# Patient Record
Sex: MALE | Race: WHITE | Employment: OTHER | URBAN - METROPOLITAN AREA
[De-identification: names, ages, dates, MRNs, and addresses within clinical notes are randomized per-mention and may not be internally consistent; named-entity substitution may affect disease eponyms.]

---

## 2017-09-29 ENCOUNTER — HOSPITAL ENCOUNTER (OUTPATIENT)
Dept: VASCULAR SURGERY | Age: 77
Discharge: HOME | End: 2017-09-29
Attending: OTOLARYNGOLOGY
Payer: MEDICARE

## 2017-09-29 DIAGNOSIS — R42 DIZZINESS AND GIDDINESS: ICD-10-CM

## 2017-09-29 DIAGNOSIS — Z78.9 NONSMOKER: ICD-10-CM

## 2017-09-29 DIAGNOSIS — H90.3 SENSORINEURAL HEARING LOSS OF COMBINED TYPES, BILATERAL: ICD-10-CM

## 2017-09-29 PROCEDURE — 93880 EXTRACRANIAL BILAT STUDY: CPT

## 2017-09-29 NOTE — PROCEDURES
[facility]  *** FINAL REPORT ***    Name: Rolla Gosselin  MRN: [id number]    Outpatient  : 1940  HIS Order #: [id number]  [id number] [facility] Visit #: [id number]  Date: 29 Sep 2017    TYPE OF TEST: Cerebrovascular Duplex    REASON FOR TEST  Dizziness/vertigo    Right Carotid:-             Proximal               Mid                 Distal  cm/s  Systolic  Diastolic  Systolic  Diastolic  Systolic  Diastolic  CCA:     17.8      14.0                            75.0      16.0  Bulb:  ECA:     74.0       8.0  ICA:     51.0      14.0       64.0      17.0       59.0      18.0  ICA/CCA:  0.7       0.9    ICA Stenosis: <50%    Right Vertebral:-  Finding: Antegrade  Sys:       40.0  Glroia:        8.0    Right Subclavian: Normal    Left Carotid:-            Proximal                Mid                 Distal  cm/s  Systolic  Diastolic  Systolic  Diastolic  Systolic  Diastolic  CCA:    912.2      17.0                            82.0      18.0  Bulb:  ECA:     78.0       6.0  ICA:     55.0      11.0       61.0      17.0       67.0      18.0  ICA/CCA:  0.7       0.6    ICA Stenosis: <50%    Left Vertebral:-  Finding: Antegrade  Sys:       45.0  Gloria:       10.0    Left Subclavian: Normal    INTERPRETATION/FINDINGS  PROCEDURE:  Carotid Duplex Examination using B-mode, color and  spectral Doppler of the extracranial cerebrovascular arteries. 1. Bilateral <50% stenosis of the internal carotid arteries. 2. No significant stenosis in the external carotid arteries  bilaterally. 3. Antegrade flow in both vertebral arteries. 4. Normal flow in both subclavian arteries. Plaque Morphology:  1. Heterogeneous plaque in the bulb and right ICA. 2. Heterogeneous plaque in the bulb and left ICA. ADDITIONAL COMMENTS    I have personally reviewed the data relevant to the interpretation of  this  study. TECHNOLOGIST: Heber Benoit RVT, RDMS  Signed: 2017 01:44 PM    PHYSICIAN: Sathya Beck MD  Signed: 10/02/2017 02:37 PM

## 2017-09-29 NOTE — PROGRESS NOTES
[facility]  Preliminary Report:  Carotid Duplex Scan    Right:  Minimal plaque noted in the right carotid system. Right ICA velocities suggest less than 50% diameter reduction. Right vertebral artery flow is antegrade. Left:  Mild plaque noted in the left carotid system. Left ICA velocities suggest less than 50% diameter reduction. Left vertebral artery flow is antegrade. Final report to follow.

## 2017-11-22 ENCOUNTER — HOSPITAL ENCOUNTER (EMERGENCY)
Age: 77
Discharge: HOME | End: 2017-11-22
Attending: EMERGENCY MEDICINE | Admitting: EMERGENCY MEDICINE
Payer: MEDICARE

## 2017-11-22 VITALS
DIASTOLIC BLOOD PRESSURE: 89 MMHG | WEIGHT: 180.78 LBS | HEIGHT: 66 IN | OXYGEN SATURATION: 98 % | BODY MASS INDEX: 29.05 KG/M2 | SYSTOLIC BLOOD PRESSURE: 152 MMHG | RESPIRATION RATE: 16 BRPM | TEMPERATURE: 98.2 F
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
## 2017-11-22 DIAGNOSIS — T07.XXXA MULTIPLE LACERATIONS: Primary | ICD-10-CM

## 2017-11-22 PROCEDURE — 74011250636 HC RX REV CODE- 250/636: Performed by: PHYSICIAN ASSISTANT

## 2017-11-22 PROCEDURE — 90715 TDAP VACCINE 7 YRS/> IM: CPT | Performed by: PHYSICIAN ASSISTANT

## 2017-11-22 PROCEDURE — 90471 IMMUNIZATION ADMIN: CPT

## 2017-11-22 PROCEDURE — 99283 EMERGENCY DEPT VISIT LOW MDM: CPT

## 2017-11-22 PROCEDURE — 77030018836 HC SOL IRR NACL ICUM -A

## 2017-11-22 PROCEDURE — 77030002916 HC SUT ETHLN J&J -A

## 2017-11-22 PROCEDURE — 74011250637 HC RX REV CODE- 250/637: Performed by: PHYSICIAN ASSISTANT

## 2017-11-22 PROCEDURE — 75810000293 HC SIMP/SUPERF WND  RPR

## 2017-11-22 RX ORDER — HYDROCODONE BITARTRATE AND ACETAMINOPHEN 5; 325 MG/1; MG/1
1 TABLET ORAL
Qty: 10 TAB | Refills: 0 | Status: SHIPPED | OUTPATIENT
Start: 2017-11-22 | End: 2018-05-31 | Stop reason: CLARIF

## 2017-11-22 RX ORDER — AMOXICILLIN AND CLAVULANATE POTASSIUM 875; 125 MG/1; MG/1
1 TABLET, FILM COATED ORAL 2 TIMES DAILY
Qty: 20 TAB | Refills: 0 | Status: SHIPPED | OUTPATIENT
Start: 2017-11-22 | End: 2018-05-31 | Stop reason: CLARIF

## 2017-11-22 RX ORDER — AMOXICILLIN AND CLAVULANATE POTASSIUM 875; 125 MG/1; MG/1
1 TABLET, FILM COATED ORAL
Status: COMPLETED | OUTPATIENT
Start: 2017-11-22 | End: 2017-11-22

## 2017-11-22 RX ADMIN — AMOXICILLIN AND CLAVULANATE POTASSIUM 1 TABLET: 875; 125 TABLET, FILM COATED ORAL at 10:30

## 2017-11-22 RX ADMIN — TETANUS TOXOID, REDUCED DIPHTHERIA TOXOID AND ACELLULAR PERTUSSIS VACCINE, ADSORBED 0.5 ML: 5; 2.5; 8; 8; 2.5 SUSPENSION INTRAMUSCULAR at 10:30

## 2017-11-22 NOTE — DISCHARGE INSTRUCTIONS
Thank you for allowing us to provide you with care today. We hope we addressed all of your concerns and needs. We strive to provide excellent quality care in the Emergency Department. Please rate us as excellent, as anything less than excellent does not meet our expectations. If you feel that you have not received excellent quality care or timely care, please ask to speak to the nurse manager. Please choose us in the future for your continued health care needs. The exam and treatment you received in the Emergency Department were for an urgent problem and are not intended as complete care. It is important that you follow-up with a doctor, nurse practitioner, or [place] [id number] assistant to: (1) confirm your diagnosis, (2) re-evaluation of changes in your illness and treatment, and (3) for ongoing care. If your symptoms become worse or you do not improve as expected and you are unable to reach your usual health care provider, you should return to the Emergency Department. We are available 24 hours a day. Take this sheet with you when you go to your follow-up visit. If you have any problem arranging the follow-up visit, contact the Emergency Department immediately. Make an appointment with your Primary Care doctor for follow up of this visit. Return to the ER if you are unable to be seen in the time recommended on your discharge instructions.

## 2017-11-22 NOTE — ED TRIAGE NOTES
Pt arrives after getting bit by his own dogs while trying to break them up. Pt states he was at home and had unknown visitor at front door. Pt states his dogs (Pitbull and American Bull Dog) went towards front door and began to fight each other Pt states he attempted to separate dogs and states he believes his Pitbull cause injuries. Pt arrives with multiple wounds to bilateral arms. Pt states his Tetanus shot \"couple years ago\" and states that both his dogs have had rabies vaccine. Pt alert oriented x 4 Skin warm dry intact     Pts wounds cleansed with saline upon arrival to tx room. Pt updated on plan with pending evaluation by PA.

## 2017-11-22 NOTE — ED NOTES
Spoke with [address] regarding pt complaint Spoke with [address] [place] [address]. Advised they would have  contact pt.

## 2017-11-22 NOTE — ED PROVIDER NOTES
[facility] [address]  EMERGENCY DEPARTMENT HISTORY AND PHYSICAL EXAM         Date of Service: 11/22/2017   Patient Name: Gisele Mckeon   YOB: 1940  Medical Record Number: [id number]    History of Presenting Illness     Chief Complaint   Patient presents with    Dog Bite     trying to break up his dogs fighting, yancy arm lacerations        History Provided By:  patient    Additional History:   Gisele Mckeon is a 69 y.o. male who presents ambulatory to the ED with cc of dog bite with multiple wounds to bilateral hands and forearms. Pt states his dogs were startled when a visitor came to his front door, and when he attempted to grab them he was bitten. Bleeding was controlled with a rag before coming to the ED. Pt notes his dogs are up to date with their rabies vaccinations, but he is unsure when he last received a tetanus vaccination. He denies any numbness or weakness in his hands. Social Hx: former Tobacco, - EtOH, - Illicit Drugs    There are no other complaints, changes or physical findings at this time.     Primary Care Provider: Ronney Schlatter, MD     Past History     Past Medical History:   Past Medical History:   Diagnosis Date    Arthritis     BPH (benign prostatic hyperplasia)     Chronic obstructive pulmonary disease ([facility] [address]     Elevated LFT's     Essential hypertension 9/24/01    GERD (gastroesophageal reflux disease)     Ill-defined condition 12/02/2016    faint    Liver disease     \"fatty liver\" as per patient    Orthostatic hypotension 9/24/01    PVC's 9/24/01    Sleep apnea     Syncope 9/24/01    secondary to venous insuffiency         Past Surgical History:   Past Surgical History:   Procedure Laterality Date    COLONOSCOPY N/A 12/13/2016    COLONOSCOPY performed by Alysa Damon MD at [facility] ENDOSCOPY    ECHO 2D ADULT  5/24/12    EF 60 - 65%; mild concentric hypertrophy; pulmonary systolic artery pressure upper limits normal    HX APPENDECTOMY 1985        Family History:   Family History   Problem Relation Age of Onset    Stroke Father     Heart Disease Father         Social History:   Social History   Substance Use Topics    Smoking status: Former Smoker     Packs/day: 3.50     Quit date: 12/22/1980    Smokeless tobacco: Never Used    Alcohol use No      Comment: no alcohol since 1999        Allergies: Allergies   Allergen Reactions    Benicar [Olmesartan] Unknown (comments)     Pt states he has found out that this is not a medication allergy.  Demerol [Meperidine] Unknown (comments)     Causes unconsciousness        Review of Systems   Review of Systems   Constitutional: Negative for fatigue and fever. HENT: Negative for congestion, ear pain and rhinorrhea. Eyes: Negative for pain and redness. Respiratory: Negative for cough and wheezing. Cardiovascular: Negative for chest pain and palpitations. Gastrointestinal: Negative for abdominal pain, nausea and vomiting. Genitourinary: Negative for dysuria, frequency and urgency. Musculoskeletal: Negative for back pain, neck pain and neck stiffness. Skin: Positive for wound. Negative for rash. Neurological: Negative for weakness, light-headedness, numbness and headaches. Physical Exam  Physical Exam   Constitutional: He is oriented to person, place, and time. He appears well-developed and well-nourished. Non-toxic appearance. No distress. HENT:   Head: Normocephalic and atraumatic. Head is without right periorbital erythema and without left periorbital erythema. Right Ear: External ear normal.   Left Ear: External ear normal.   Nose: Nose normal.   Mouth/Throat: Uvula is midline. No trismus in the jaw. Eyes: Conjunctivae and EOM are normal. Pupils are equal, round, and reactive to light. No scleral icterus. Neck: Normal range of motion and full passive range of motion without pain. Cardiovascular: Normal rate, regular rhythm and normal heart sounds. Pulmonary/Chest: Effort normal and breath sounds normal. No accessory muscle usage. No tachypnea. No respiratory distress. He has no decreased breath sounds. He has no wheezes. Abdominal: Soft. There is no tenderness. There is no rigidity and no guarding. Musculoskeletal: Normal range of motion. LEFT FOREARM:  3cm jagged laceration to ulnar aspect left forearm mid-shaft. FAROM of all joints above and below laceration. RIGHT HAND:  3cm jagged laceration to dorsum of right hand  2.5cm jagged irregular laceration to dorsum of right middle finger distal to MCPJ  2.5 cm flap laceration to dorsum of right index finger at radial aspect of PIPJ. FAROM of all joints above and below lacerations. Neurological: He is alert and oriented to person, place, and time. He is not disoriented. No cranial nerve deficit or sensory deficit. GCS eye subscore is 4. GCS verbal subscore is 5. GCS motor subscore is 6. Skin: No rash noted. Psychiatric: He has a normal mood and affect. His speech is normal.   Nursing note and vitals reviewed. Medical Decision Making   I am the first provider for this patient. I reviewed the vital signs, available nursing notes, past medical history, past surgical history, family history and social history. Old Medical Records: Old medical records. Provider Notes:   DDx: dog bite, laceration, tetanus update     ED Course:  10:20 AM   Initial assessment performed. The patients presenting problems have been discussed, and they are in agreement with the care plan formulated and outlined with them. I have encouraged them to ask questions as they arise throughout their visit. Procedures:   Procedure Note - #1 Laceration Repair:  10:52 AM  Procedure by Bhavna Nieves. Complexity: Simple  3cm jagged laceration to ulnar aspect left forearm mid-shaft  was irrigated copiously with NS under jet lavage, prepped with Hibiclens and draped in a sterile fashion.   The area was anesthetized with 5 mLs of  Lidocaine 1% with epinephrine via local infiltration. The wound was explored with the following results: No foreign bodies found. The wound was repaired with One layer suture closure: Skin Layer:  3 sutures placed, stitch type:simple interrupted, suture: 4-0 ethilon. .  The wound was closed with good hemostasis and loose approximation. Sterile dressing applied. Estimated blood loss: None  The procedure took 1-15 minutes, and pt tolerated well. Procedure Note - #2 Laceration Repair:  11:07 AM  Procedure by Bhavna Nieves. Complexity: Simple  3cm jagged laceration to dorsum of right hand was irrigated copiously with NS under jet lavage, prepped with Hibiclens and draped in a sterile fashion. The area was anesthetized with 3 mLs of  Lidocaine 1% with epinephrine via local infiltration. The wound was explored with the following results: No foreign bodies found. The wound was repaired with One layer suture closure: Skin Layer:  3 sutures placed, stitch type:simple interrupted, suture: 4-0 ethilon. .  The wound was closed with good hemostasis and loose approximation. Sterile dressing applied. Estimated blood loss: None  The procedure took 1-15 minutes, and pt tolerated well. Procedure Note - #3 Laceration Repair:  11:22 AM  Procedure by Bhavna Nieves. Complexity: Simple  2.5cm jagged irregular laceration to dorsum of right middle finger distal to MCPJ  was irrigated copiously with NS under jet lavage, prepped with Hibiclens and draped in a sterile fashion. The area was anesthetized with 3 mLs of  Lidocaine 1% with epinephrine via local infiltration. The wound was explored with the following results: No foreign bodies found. The wound was repaired with One layer suture closure: Skin Layer:  2 sutures placed, stitch type:simple interrupted, suture: 4-0 ethilon. .  The wound was closed with good hemostasis and loose approximation. Sterile dressing applied.   Estimated blood loss: None  The procedure took 1-15 minutes, and pt tolerated well. Procedure Note - #4 Laceration Repair:  11:37 AM  Procedure by Bhavna Nieves. Complexity: Simple  2.5 cm flap laceration to dorsum of right index finger at radial aspect of PIPJ was irrigated copiously with NS under jet lavage, prepped with Hibiclens and draped in a sterile fashion. The area was anesthetized with 3 mLs of  Lidocaine 1% with epinephrine via local infiltration. The wound was explored with the following results: No foreign bodies found. The wound was repaired with One layer suture closure: Skin Layer:  2 sutures placed, stitch type:simple interrupted, suture: 4-0 ethilon. .  The wound was closed with good hemostasis and loose approximation. Sterile dressing applied. Estimated blood loss: None  The procedure took 1-15 minutes, and pt tolerated well. Vital Signs-Reviewed the patient's vital signs. Patient Vitals for the past 12 hrs:   Temp Resp BP SpO2   11/22/17 0956 98.2 °F (36.8 °C) 16 152/89 98 %       Medications Given in the ED:  Medications   amoxicillin-clavulanate (AUGMENTIN) 875-125 mg per tablet 1 Tab (1 Tab Oral Given 11/22/17 1030)   diph,Pertuss(AC),Tet Vac-PF (BOOSTRIX) suspension 0.5 mL (0.5 mL IntraMUSCular Given 11/22/17 1030)       Diagnosis:  Clinical Impression:   1. Multiple lacerations    2.  Dog bite, initial encounter         Plan:  1:   Follow-up Information     Follow up With Details Comments Contact Info    Claudean Najjar, MD Schedule an appointment as soon as possible for a visit PRIMARY CARE: have stitches removed in 7 - 10 days [address] [place] [address]  [phone]            2:   Discharge Medication List as of 11/22/2017 11:40 AM      START taking these medications    Details   amoxicillin-clavulanate (AUGMENTIN) 875-125 mg per tablet Take 1 Tab by mouth two (2) times a day., Print, Disp-20 Tab, R-0      HYDROcodone-acetaminophen (NORCO) 5-325 mg per tablet Take 1 Tab by mouth every four (4) hours as needed for Pain. Max Daily Amount: 6 Tabs., Print, Disp-10 Tab, R-0         CONTINUE these medications which have NOT CHANGED    Details   predniSONE (DELTASONE) 10 mg tablet Take 10 mg by mouth daily (with breakfast). , Historical Med      lisinopril (PRINIVIL, ZESTRIL) 10 mg tablet Take 10 mg by mouth two (2) times a day., Historical Med      carvedilol (COREG) 12.5 mg tablet Take 12.5 mg by mouth two (2) times daily (with meals). , Historical Med      escitalopram (LEXAPRO) 20 mg tablet Take 20 mg by mouth daily. Historical Med, 20 mg      omeprazole (PRILOSEC) 10 mg capsule Take 40 mg by mouth daily. , Historical Med           Return to ED if worse. Disposition:    DISCHARGE NOTE  11:48 AM  The patient has been re-evaluated and is ready for discharge. Reviewed available results with patient. Counseled pt on diagnosis and care plan. Pt has expressed understanding, and all questions have been answered. Pt agrees with plan and agrees to follow up as recommended, or return to the ED if their symptoms worsen. Discharge instructions have been provided and explained to the pt, along with reasons to return to the ED. Attestations: This note is prepared by Rush Andres. Huan Brush, acting as Scribe for Bhavna Nieves. GREGORIO Ku: The scribe's documentation has been prepared under my direction and personally reviewed by me in its entirety. I confirm that the note above accurately reflects all work, treatment, procedures, and medical decision making performed by me.

## 2018-04-25 ENCOUNTER — HOSPITAL ENCOUNTER (OUTPATIENT)
Dept: GENERAL RADIOLOGY | Age: 78
Discharge: HOME | End: 2018-04-25
Attending: INTERNAL MEDICINE
Payer: MEDICARE

## 2018-04-25 DIAGNOSIS — K21.9 ESOPHAGEAL REFLUX: ICD-10-CM

## 2018-04-25 PROCEDURE — 74220 X-RAY XM ESOPHAGUS 1CNTRST: CPT

## 2018-05-31 ENCOUNTER — ANESTHESIA EVENT (OUTPATIENT)
Dept: ENDOSCOPY | Age: 78
End: 2018-05-31
Payer: MEDICARE

## 2018-05-31 RX ORDER — ASPIRIN 81 MG/1
325 TABLET ORAL
Status: ON HOLD | COMMUNITY
End: 2018-06-01 | Stop reason: SDUPTHER

## 2018-05-31 NOTE — ANESTHESIA PREPROCEDURE EVALUATION
Anesthetic History   No history of anesthetic complications            Review of Systems / Medical History  Patient summary reviewed, nursing notes reviewed and pertinent labs reviewed    Pulmonary    COPD: mild    Sleep apnea: CPAP  Shortness of breath and smoker      Comments: Former Smoker   Neuro/Psych       CVA: no residual symptoms  TIA     Cardiovascular    Hypertension: well controlled        Dysrhythmias : PVC      Exercise tolerance: >4 METS  Comments: EF 60 - 65% with trivial TR (2012)       GI/Hepatic/Renal     GERD: well controlled      Liver disease    Comments: NAUSEA  ELEVATED LFTs Endo/Other        Arthritis     Other Findings   Comments: BPH       Syncope secondary to venous insuffiency            Physical Exam    Airway  Mallampati: I  TM Distance: 4 - 6 cm  Neck ROM: decreased range of motion   Mouth opening: Normal     Cardiovascular    Rhythm: regular  Rate: normal         Dental    Dentition: Poor dentition, Lower dentition intact and Upper dentition intact  Comments: 3 missing teeth   Pulmonary  Breath sounds clear to auscultation               Abdominal  GI exam deferred       Other Findings            Anesthetic Plan    ASA: 3  Anesthesia type: total IV anesthesia and general          Induction: Intravenous  Anesthetic plan and risks discussed with: Patient

## 2018-06-01 ENCOUNTER — HOSPITAL ENCOUNTER (OUTPATIENT)
Age: 78
Setting detail: OUTPATIENT SURGERY
Discharge: HOME | End: 2018-06-01
Attending: INTERNAL MEDICINE | Admitting: INTERNAL MEDICINE
Payer: MEDICARE

## 2018-06-01 ENCOUNTER — ANESTHESIA (OUTPATIENT)
Dept: ENDOSCOPY | Age: 78
End: 2018-06-01
Payer: MEDICARE

## 2018-06-01 VITALS
TEMPERATURE: 98.2 F | HEART RATE: 76 BPM | RESPIRATION RATE: 15 BRPM | HEIGHT: 66 IN | SYSTOLIC BLOOD PRESSURE: 110 MMHG | BODY MASS INDEX: 29.11 KG/M2 | OXYGEN SATURATION: 94 % | DIASTOLIC BLOOD PRESSURE: 60 MMHG | WEIGHT: 181.13 LBS

## 2018-06-01 PROCEDURE — 74011250636 HC RX REV CODE- 250/636: Performed by: INTERNAL MEDICINE

## 2018-06-01 PROCEDURE — 76040000019: Performed by: INTERNAL MEDICINE

## 2018-06-01 PROCEDURE — 74011000250 HC RX REV CODE- 250

## 2018-06-01 PROCEDURE — C1726 CATH, BAL DIL, NON-VASCULAR: HCPCS | Performed by: INTERNAL MEDICINE

## 2018-06-01 PROCEDURE — 74011250636 HC RX REV CODE- 250/636

## 2018-06-01 PROCEDURE — 77030018712 HC DEV BLLN INFL BSC -B: Performed by: INTERNAL MEDICINE

## 2018-06-01 PROCEDURE — 88305 TISSUE EXAM BY PATHOLOGIST: CPT | Performed by: INTERNAL MEDICINE

## 2018-06-01 PROCEDURE — 76060000031 HC ANESTHESIA FIRST 0.5 HR: Performed by: INTERNAL MEDICINE

## 2018-06-01 PROCEDURE — 77030019988 HC FCPS ENDOSC DISP BSC -B: Performed by: INTERNAL MEDICINE

## 2018-06-01 RX ORDER — ATROPINE SULFATE 0.1 MG/ML
0.5 INJECTION INTRAVENOUS
Status: DISCONTINUED | OUTPATIENT
Start: 2018-06-01 | End: 2018-06-01 | Stop reason: HOSPADM

## 2018-06-01 RX ORDER — NALOXONE HYDROCHLORIDE 0.4 MG/ML
0.4 INJECTION, SOLUTION INTRAMUSCULAR; INTRAVENOUS; SUBCUTANEOUS
Status: DISCONTINUED | OUTPATIENT
Start: 2018-06-01 | End: 2018-06-01 | Stop reason: HOSPADM

## 2018-06-01 RX ORDER — DEXTROMETHORPHAN/PSEUDOEPHED 2.5-7.5/.8
1.2 DROPS ORAL
Status: DISCONTINUED | OUTPATIENT
Start: 2018-06-01 | End: 2018-06-01 | Stop reason: HOSPADM

## 2018-06-01 RX ORDER — EPINEPHRINE 0.1 MG/ML
1 INJECTION INTRACARDIAC; INTRAVENOUS
Status: DISCONTINUED | OUTPATIENT
Start: 2018-06-01 | End: 2018-06-01 | Stop reason: HOSPADM

## 2018-06-01 RX ORDER — DOXAZOSIN 2 MG/1
2 TABLET ORAL DAILY
COMMUNITY
End: 2020-02-13 | Stop reason: CLARIF

## 2018-06-01 RX ORDER — MIDAZOLAM HYDROCHLORIDE 1 MG/ML
.25-5 INJECTION, SOLUTION INTRAMUSCULAR; INTRAVENOUS
Status: DISCONTINUED | OUTPATIENT
Start: 2018-06-01 | End: 2018-06-01 | Stop reason: HOSPADM

## 2018-06-01 RX ORDER — QUINAPRIL 20 MG/1
20 TABLET ORAL 2 TIMES DAILY
Status: ON HOLD | COMMUNITY
End: 2019-04-29

## 2018-06-01 RX ORDER — SODIUM CHLORIDE 9 MG/ML
75 INJECTION, SOLUTION INTRAVENOUS CONTINUOUS
Status: DISCONTINUED | OUTPATIENT
Start: 2018-06-01 | End: 2018-06-01 | Stop reason: HOSPADM

## 2018-06-01 RX ORDER — ASPIRIN 325 MG
325 TABLET ORAL DAILY
COMMUNITY
End: 2018-08-08 | Stop reason: CLARIF

## 2018-06-01 RX ORDER — SODIUM CHLORIDE 0.9 % (FLUSH) 0.9 %
5-10 SYRINGE (ML) INJECTION AS NEEDED
Status: DISCONTINUED | OUTPATIENT
Start: 2018-06-01 | End: 2018-06-01 | Stop reason: HOSPADM

## 2018-06-01 RX ORDER — PROPOFOL 10 MG/ML
INJECTION, EMULSION INTRAVENOUS AS NEEDED
Status: DISCONTINUED | OUTPATIENT
Start: 2018-06-01 | End: 2018-06-01 | Stop reason: HOSPADM

## 2018-06-01 RX ORDER — BACLOFEN 20 MG
1000 TABLET ORAL DAILY
Status: ON HOLD | COMMUNITY
End: 2019-04-29

## 2018-06-01 RX ORDER — SODIUM CHLORIDE 0.9 % (FLUSH) 0.9 %
5-10 SYRINGE (ML) INJECTION EVERY 8 HOURS
Status: DISCONTINUED | OUTPATIENT
Start: 2018-06-01 | End: 2018-06-01 | Stop reason: HOSPADM

## 2018-06-01 RX ORDER — LIDOCAINE HYDROCHLORIDE 20 MG/ML
INJECTION, SOLUTION EPIDURAL; INFILTRATION; INTRACAUDAL; PERINEURAL AS NEEDED
Status: DISCONTINUED | OUTPATIENT
Start: 2018-06-01 | End: 2018-06-01 | Stop reason: HOSPADM

## 2018-06-01 RX ORDER — FLUMAZENIL 0.1 MG/ML
0.2 INJECTION INTRAVENOUS
Status: DISCONTINUED | OUTPATIENT
Start: 2018-06-01 | End: 2018-06-01 | Stop reason: HOSPADM

## 2018-06-01 RX ADMIN — LIDOCAINE HYDROCHLORIDE 50 MG: 20 INJECTION, SOLUTION EPIDURAL; INFILTRATION; INTRACAUDAL; PERINEURAL at 14:37

## 2018-06-01 RX ADMIN — PROPOFOL 60 MG: 10 INJECTION, EMULSION INTRAVENOUS at 14:37

## 2018-06-01 RX ADMIN — SODIUM CHLORIDE 75 ML/HR: 900 INJECTION, SOLUTION INTRAVENOUS at 14:23

## 2018-06-01 RX ADMIN — SODIUM CHLORIDE: 900 INJECTION, SOLUTION INTRAVENOUS at 14:25

## 2018-06-01 RX ADMIN — PROPOFOL 120 MG: 10 INJECTION, EMULSION INTRAVENOUS at 14:49

## 2018-06-01 NOTE — DISCHARGE INSTRUCTIONS
[place] Office: [phone]    Luz Maria Mckeon  [id number]  1940    EGD/COLONOSCOPY DISCHARGE INSTRUCTIONS  Discomfort:  Sore throat- throat lozenges or warm salt water gargle  redness at IV site- apply warm compress to area; if redness or soreness persist- contact your physician  Gaseous discomfort- walking, belching will help relieve any discomfort  You may not operate a vehicle for 12 hours  You may not engage in an occupation involving machinery or appliances for rest of today. You may not drink alcoholic beverages for at least 12 hours  Avoid making any critical decisions for at least 24 hour  DIET  You may resume your regular diet - however -  remember your colon is empty and a heavy meal will produce gas. Avoid these foods:  fried / greasy foods, excessive carbonated drinks or too much caffeine  MEDICATIONS   Regarding Aspirin or Nonsteroidal medications specifically, please see below. ACTIVITY  You may resume your normal daily activities. Spend the remainder of the day resting -  avoid any strenuous activity. CALL M.D. ANY SIGN OF   Increasing pain, nausea, vomiting  Abdominal distension (swelling)  New increased bleeding (oral or rectal)  Fever (chills)  Pain in chest area  Bloody discharge from nose or mouth  Shortness of breath    You may not take any Advil, Aspirin, Ibuprofen, Motrin, Aleve, or Goodys for 7 days, ONLY  Tylenol as needed for pain. Follow-up Instructions:   Call  Shaka Sharma MD for any questions or concerns  Results of procedure / biopsy in 7 days   Telephone # [phone]      Follow-up Information     None

## 2018-06-01 NOTE — ANESTHESIA POSTPROCEDURE EVALUATION
Post-Anesthesia Evaluation and Assessment    Patient: Heather Yee MRN: [id number]  SSN: [id number]    YOB: 1940  Age: 69 y.o. Sex: male       Cardiovascular Function/Vital Signs  Visit Vitals    /60    Pulse 76    Temp 36.8 °C (98.2 °F)    Resp 15    Ht 5' 6\" (1.676 m)    Wt 82.2 kg (181 lb 2 oz)    SpO2 94%    BMI 29.23 kg/m2       Patient is status post total IV anesthesia, general anesthesia for Procedure(s):  ESOPHAGOGASTRODUODENOSCOPY (EGD)  ESOPHAGOGASTRODUODENAL (EGD) BIOPSY  ESOPHAGEAL DILATION. Nausea/Vomiting: None    Postoperative hydration reviewed and adequate. Pain:  Pain Scale 1: Numeric (0 - 10) (06/01/18 1505)  Pain Intensity 1: 0 (06/01/18 1505)   Managed    Neurological Status: At baseline    Mental Status and Level of Consciousness: Arousable    Pulmonary Status:   O2 Device: Room air (06/01/18 1505)   Adequate oxygenation and airway patent    Complications related to anesthesia: None    Post-anesthesia assessment completed.  No concerns    Signed By: Pascual Moore MD     June 1, 2018

## 2018-06-01 NOTE — PROCEDURES
[place] Office: [phone]      Esophagogastroduodenoscopy Procedure Note      Clarisa Garcia  1940  [id number]    Indication:  dysphagia     : Mayra Hays MD    Referring Provider:  Enid Boas, MD    Sedation:  MAC anesthesia    Procedure Details:  After detailed informed consent was obtained for the procedure, with all risks and benefits of procedure explained the patient was taken to the endoscopy suite and placed in the left lateral decubitus position. Following sequential administration of sedation as per above, the endoscope was inserted into the mouth and advanced under direct vision to second portion of the duodenum. A careful inspection was made as the gastroscope was withdrawn, including a retroflexed view of the proximal stomach; findings and interventions are described below. Findings:     Esophagus: The esophageal mucosa in the proximal, mid and distal esophagus is normal.   The squamo-columnar junction is at 37 cm where the Z-line was noted. There is a 3 cm hiatal hernia. TTS CRE  Balloon dilations  were performed from 18-20 mm,each over 60 seconds    Stomach: The gastric mucosa has erythema in the body:biopsies. A few polyps are noted. Biopsies are taken. The fundus was found to be normal with no lesions noted on retroflexion. The angularis is normal as well. Duodenum:   The bulb and post bulbar mucosa is normal in appearance. The duodenal folds are normal.     Therapies:  esophageal dilation with 18-20 mm sized balloon  biopsy of stomach body, and polyps    Specimen:  Specimens were collected as described and send to the laboratory. Complications:   None were encountered during the procedure. EBL:  None. Recommendations:     -Acid suppression with a proton pump inhibitor. ,  -Await pathology. ,   -Follow symptoms. ,  -Follow up with primary care physician          Shaka Hernandez MD  6/1/2018  2:49 PM

## 2018-06-01 NOTE — H&P
Pre-endoscopy H and P    The patient was seen and examined in the room/pre-op holding area. The airway was assessed and documented. The problem list, past medical history, and medications were reviewed. Patient Active Problem List   Diagnosis Code    HTN (hypertension), benign I10    Dizziness R42    TIA (transient ischemic attack) G45.9    Leg edema R60.0    PVC (premature ventricular contraction) I49.3    Orthostatic hypotension I95.1    Other and unspecified hyperlipidemia E78.5     Social History     Social History    Marital status:      Spouse name: N/A    Number of children: N/A    Years of education: N/A     Occupational History    Not on file. Social History Main Topics    Smoking status: Former Smoker     Packs/day: 3.50     Quit date: 12/22/1980    Smokeless tobacco: Never Used    Alcohol use No      Comment: no alcohol since 1999    Drug use: No    Sexual activity: Not on file     Other Topics Concern    Not on file     Social History Narrative     Past Medical History:   Diagnosis Date    Arthritis     BPH (benign prostatic hyperplasia)     Chronic obstructive pulmonary disease ([facility] [address]     Elevated LFT's     Essential hypertension 9/24/01    GERD (gastroesophageal reflux disease)     Ill-defined condition 12/02/2016    faint    Liver disease     \"fatty liver\" as per patient    Orthostatic hypotension 9/24/01    PVC's 9/24/01    Sleep apnea     Syncope 9/24/01    secondary to venous insuffiency          Prior to Admission Medications   Prescriptions Last Dose Informant Patient Reported? Taking?   aspirin (ASPIRIN) 325 mg tablet 5/29/2018  Yes Yes   Sig: Take 325 mg by mouth daily. carvedilol (COREG) 12.5 mg tablet 6/1/2018 at Unknown time  Yes Yes   Sig: Take 12.5 mg by mouth two (2) times daily (with meals). doxazosin (CARDURA) 2 mg tablet 6/1/2018 at Unknown time  Yes Yes   Sig: Take 2 mg by mouth daily.    escitalopram (LEXAPRO) 20 mg tablet 5/31/2018 at Unknown time  Yes Yes   Sig: Take 20 mg by mouth daily. magnesium oxide 500 mg tab 5/31/2018 at Unknown time  Yes Yes   Sig: Take  by mouth. omeprazole (PRILOSEC) 10 mg capsule 6/1/2018 at Unknown time  Yes Yes   Sig: Take 40 mg by mouth daily. quinapril (ACCUPRIL) 20 mg tablet 6/1/2018 at Unknown time  Yes Yes   Sig: Take 20 mg by mouth nightly. Facility-Administered Medications: None           The review of systems is:  Negative  for shortness of breath or chest pain      The heart, lungs, and mental status were satisfactory for the administration of deep sedation and for the procedure. I discussed with the patient the objectives, risks, consequences and alternatives to the procedure.       Radha Powers MD  6/1/2018  2:33 PM

## 2018-06-01 NOTE — IP AVS SNAPSHOT
[address] [facility] 
[phone] Patient: Kathe Benton MRN: [id number] About your hospitalization You were admitted on:  June 1, 2018 You last received care in the:  [facility] ENDOSCOPY You were discharged on:  June 1, 2018 Why you were hospitalized Your primary diagnosis was:  Not on File Follow-up Information None Discharge Orders None A check yadi indicates which time of day the medication should be taken. My Medications CHANGE how you take these medications Instructions Each Dose to Equal  
 Morning Noon Evening Bedtime  
 aspirin 325 mg tablet Commonly known as:  ASPIRIN What changed:  Another medication with the same name was removed. Continue taking this medication, and follow the directions you see here. Your last dose was: Your next dose is: Take 325 mg by mouth daily. 325 mg CONTINUE taking these medications Instructions Each Dose to Equal  
 Morning Noon Evening Bedtime  
 carvedilol 12.5 mg tablet Commonly known as:  Jonel Lopez Your last dose was: Your next dose is: Take 12.5 mg by mouth two (2) times daily (with meals). 12.5 mg  
    
   
   
   
  
 doxazosin 2 mg tablet Commonly known as:  CARDURA Your last dose was: Your next dose is: Take 2 mg by mouth daily. 2 mg LEXAPRO 20 mg tablet Generic drug:  escitalopram oxalate Your last dose was: Your next dose is: Take 20 mg by mouth daily. 20 mg  
    
   
   
   
  
 magnesium oxide 500 mg Tab Your last dose was: Your next dose is: Take  by mouth. PriLOSEC 10 mg capsule Generic drug:  omeprazole Your last dose was: Your next dose is: Take 40 mg by mouth daily. 40 mg  
    
   
   
   
  
 quinapril 20 mg tablet Commonly known as:  ACCUPRIL Your last dose was: Your next dose is: Take 20 mg by mouth nightly. 20 mg Discharge Instructions [facility] Office: [phone] Melany Essex [phone] 
1940 EGD/COLONOSCOPY DISCHARGE INSTRUCTIONS Discomfort: 
Sore throat- throat lozenges or warm salt water gargle 
redness at IV site- apply warm compress to area; if redness or soreness persist- contact your physician Gaseous discomfort- walking, belching will help relieve any discomfort You may not operate a vehicle for 12 hours You may not engage in an occupation involving machinery or appliances for rest of today. You may not drink alcoholic beverages for at least 12 hours Avoid making any critical decisions for at least 24 hour DIET You may resume your regular diet  however -  remember your colon is empty and a heavy meal will produce gas. Avoid these foods:  fried / greasy foods, excessive carbonated drinks or too much caffeine MEDICATIONS Regarding Aspirin or Nonsteroidal medications specifically, please see below. ACTIVITY You may resume your normal daily activities. Spend the remainder of the day resting -  avoid any strenuous activity. CALL M.D. ANY SIGN OF Increasing pain, nausea, vomiting Abdominal distension (swelling) New increased bleeding (oral or rectal) Fever (chills) Pain in chest area Bloody discharge from nose or mouth Shortness of breath You may not take any Advil, Aspirin, Ibuprofen, Motrin, Aleve, or Goodys for 7 days, ONLY  Tylenol as needed for pain. Follow-up Instructions: 
 Call  Shaka Simpson MD for any questions or concerns Results of procedure / biopsy in 7 days Telephone # [phone] Follow-up Information None Introducing [facility]! Dear Tala Lubin: Thank you for requesting a Reachable account. Our records indicate that you already have an active Reachable account. You can access your account anytime at [email or web address] Did you know that you can access your hospital and ER discharge instructions at any time in Reachable? You can also review all of your test results from your hospital stay or ER visit. Additional Information If you have questions, please visit the Frequently Asked Questions section of the Reachable website at [email or web address] Remember, Reachable is NOT to be used for urgent needs. For medical emergencies, dial 911. Now available from your iPhone and Android! Introducing Andrea Roy As a [facility] patient, I wanted to make you aware of our electronic visit tool called Andrea Roy. JessicaREPP allows you to connect within minutes with a medical provider 24 hours a day, seven days a week via a mobile device or tablet or logging into a secure website from your computer. You can access Andrea Roy from anywhere in the [place]. A virtual visit might be right for you when you have a simple condition and feel like you just dont want to get out of bed, or cant get away from work for an appointment, when your regular [facility] provider is not available (evenings, weekends or holidays), or when youre out of town and need minor care. Electronic visits cost only $49 and if the JessicaLenddo/Imaxio provider determines a prescription is needed to treat your condition, one can be electronically transmitted to a nearby pharmacy*. Please take a moment to enroll today if you have not already done so. The enrollment process is free and takes just a few minutes. To enroll, please download the Carbonite/Imaxio yung to your tablet or phone, or visit [email or web address] to enroll on your computer. And, as an [address] [place] [address] patient with a Ariisto account, the results of your visits will be scanned into your electronic medical record and your primary care provider will be able to view the scanned results. We urge you to continue to see your regular [facility] provider for your ongoing medical care. And while your primary care provider may not be the one available when you seek a Andrea Lopezfin virtual visit, the peace of mind you get from getting a real diagnosis real time can be priceless. For more information on Andrea All4Staffcarolinefin, view our Frequently Asked Questions (FAQs) at [email or web address]. Sincerely, 
 
Zoraida Vargas MD 
Chief Medical Officer [facility] Marcela Armstrong *:  certain medications cannot be prescribed via monEchellecarolinefin Providers Seen During Your Hospitalization Provider Specialty Primary office phone Delfina Ku MD Gastroenterology [phone] Your Primary Care Physician (PCP) Primary Care Physician Office Phone Office Fax Edra [facility] [phone] You are allergic to the following Allergen Reactions Benicar (Olmesartan) Unknown (comments) Pt states he has found out that this is not a medication allergy. Demerol (Meperidine) Unknown (comments) Causes unconsciousness Recent Documentation Height Weight BMI Smoking Status 1.676 m 82.2 kg 29.23 kg/m2 Former Smoker Emergency Contacts Name Discharge Info Relation Home Work Mobile Sushila Bejarano CAREGIVER [3] Daughter [21] [phone] Patient Belongings The following personal items are in your possession at time of discharge: 
  Dental Appliances: None  Visual Aid: Glasses, With patient   Hearing Aids/Status: Right, Left (given to daughter) Please provide this summary of care documentation to your next provider. Signatures-by signing, you are acknowledging that this After Visit Summary has been reviewed with you and you have received a copy. Patient Signature:  ____________________________________________________________ Date:  ____________________________________________________________  
  
Yoanna Pipo Provider Signature:  ____________________________________________________________ Date:  ____________________________________________________________

## 2018-06-01 NOTE — PROGRESS NOTES
Eli Mock  1940  [id number]    Situation:  Verbal report received from: Nickie  Procedure: Procedure(s):  ESOPHAGOGASTRODUODENOSCOPY (EGD)  ESOPHAGOGASTRODUODENAL (EGD) BIOPSY  ESOPHAGEAL DILATION    Background:    Preoperative diagnosis: DYSPNEA, GERD, NAUSEA  Postoperative diagnosis: EGD: Gastritis, gastric polyp, hiatal hernia    :  Dr. Tania Fischer  Assistant(s): Endoscopy Technician-1: Munira Rodriguez  Endoscopy RN-1: Kell Jones RN    Specimens:   ID Type Source Tests Collected by Time Destination   1 : Gastric Bx Preservative   Shaka Beard MD 6/1/2018 1442 Pathology   2 : Gastric polyp bx Preservative   Tasha Hensley MD 6/1/2018 1443 Pathology     H. Pylori  no    Assessment:  Intra-procedure medications     Anesthesia gave intra-procedure sedation and medications, see anesthesia flow sheet yes    Intravenous fluids: NS@ KVO     Vital signs stable     Abdominal assessment: round and soft     Recommendation:  Discharge patient per MD order.   Family or Friend   Permission to share finding with family or friend yes

## 2018-07-27 ENCOUNTER — OFFICE VISIT (OUTPATIENT)
Dept: SURGERY | Age: 78
End: 2018-07-27

## 2018-07-27 VITALS
BODY MASS INDEX: 29.35 KG/M2 | DIASTOLIC BLOOD PRESSURE: 81 MMHG | TEMPERATURE: 97.4 F | HEART RATE: 72 BPM | RESPIRATION RATE: 16 BRPM | WEIGHT: 182.6 LBS | OXYGEN SATURATION: 96 % | HEIGHT: 66 IN | SYSTOLIC BLOOD PRESSURE: 150 MMHG

## 2018-07-27 DIAGNOSIS — K44.9 HIATAL HERNIA WITH GERD: Primary | ICD-10-CM

## 2018-07-27 DIAGNOSIS — K21.9 HIATAL HERNIA WITH GERD: Primary | ICD-10-CM

## 2018-07-27 RX ORDER — GLUCOSAMINE SULFATE 1500 MG
1000 POWDER IN PACKET (EA) ORAL DAILY
COMMUNITY
End: 2020-09-14 | Stop reason: CLARIF

## 2018-07-27 RX ORDER — LANOLIN ALCOHOL/MO/W.PET/CERES
1000 CREAM (GRAM) TOPICAL DAILY
COMMUNITY
End: 2020-09-14 | Stop reason: CLARIF

## 2018-07-27 NOTE — PROGRESS NOTES
Surgery Consult:  Hiatal hernia  Requesting physician:  Dr. Surya Wilson    Subjective:   Patient 69 y.o.  male presents for surgical evaluation of hiatal hernia and GERD. Patient had GERD symptoms for 35 years with substernal burning pain. Patient is currently on Omeprazole 40 mg daily with some breakthrough symptoms. Patient denies any abdominal pain, but reports intermittent nausea. No emesis. Patient also reports intermittent diarrhea. No history of chronic cough or shortness of breath. No history of aspiration pneumonia. Patient props his bed up when he sleeps with 2 bricks. Also has hoarseness on and off. Patient had esophagram on 4/25/18 which showed small hiatal hernia. Patient dee dee had EGD on 6/1/18 and it showed 3 cm hiatal hernia and gastritis. Patient had balloon dilation during EGD. Patient dysphagia but better. No prior esophageal manometry. Patient wants to discuss surgery today to try to come off PPI. Patient has other symptoms including dizziness and fatigue that he think maybe from side effects of PPI.       Past Medical & Surgical History:  Past Medical History:   Diagnosis Date    Arthritis     BPH (benign prostatic hyperplasia)     Chronic obstructive pulmonary disease ([address]     Elevated LFT's     Essential hypertension 9/24/01    GERD (gastroesophageal reflux disease)     Ill-defined condition 12/02/2016    faint    Liver disease     \"fatty liver\" as per patient    Orthostatic hypotension 9/24/01    PVC's 9/24/01    Sleep apnea     Syncope 9/24/01    secondary to venous insuffiency       Past Surgical History:   Procedure Laterality Date    COLONOSCOPY N/A 12/13/2016    COLONOSCOPY performed by [facility]rera [facility] at [facility] ENDOSCOPY    ECHO 2D ADULT  5/24/12    EF 60 - 65%; mild concentric hypertrophy; pulmonary systolic artery pressure upper limits normal    HX APPENDECTOMY  1985       Social History:  Social History     Social History    Marital status:  Spouse name: N/A    Number of children: N/A    Years of education: N/A     Occupational History    Not on file. Social History Main Topics    Smoking status: Former Smoker     Packs/day: 3.50     Quit date: 12/22/1980    Smokeless tobacco: Never Used    Alcohol use No      Comment: no alcohol since 1999    Drug use: No    Sexual activity: Not on file     Other Topics Concern    Not on file     Social History Narrative        Family History:  Family History   Problem Relation Age of Onset    Stroke Father     Heart Disease Father         Medications:  Current Outpatient Prescriptions   Medication Sig    cyanocobalamin 1,000 mcg tablet Take 1,000 mcg by mouth daily.  cholecalciferol (VITAMIN D3) 1,000 unit cap Take  by mouth daily.  quinapril (ACCUPRIL) 20 mg tablet Take 20 mg by mouth nightly.  doxazosin (CARDURA) 2 mg tablet Take 2 mg by mouth daily.  magnesium oxide 500 mg tab Take  by mouth.  carvedilol (COREG) 12.5 mg tablet Take 12.5 mg by mouth two (2) times daily (with meals).  omeprazole (PRILOSEC) 10 mg capsule Take 40 mg by mouth daily.  aspirin (ASPIRIN) 325 mg tablet Take 325 mg by mouth daily.  escitalopram (LEXAPRO) 20 mg tablet Take 20 mg by mouth daily. No current facility-administered medications for this visit. Allergies: Allergies   Allergen Reactions    Benicar [Olmesartan] Unknown (comments)     Pt states he has found out that this is not a medication allergy.  Demerol [Meperidine] Unknown (comments)     Causes unconsciousness       Review of Systems  A comprehensive review of systems was negative except for that written in the HPI.     Objective:     Exam:    Visit Vitals    /81 (BP 1 Location: Left arm, BP Patient Position: Sitting)    Pulse 72    Temp 97.4 °F (36.3 °C) (Oral)    Resp 16    Ht 5' 6\" (1.676 m)    Wt 82.8 kg (182 lb 9.6 oz)    SpO2 96%    BMI 29.47 kg/m2     General appearance: alert, cooperative, no distress, appears stated age  Eyes: negative  Lungs: clear to auscultation bilaterally  Heart: regular rate and rhythm  Abdomen: soft, non-tender. Non-distended. Extremities: extremities normal, atraumatic, no cyanosis or edema. KIMBERLYN. Skin: Skin color, texture, turgor normal. No rashes or lesions. Neurologic: Grossly normal    Assessment:     Small hiatal hernia  GERD    Plan:     Esophageal manometry  Davinci hiatal hernia repair with fundoplication  Risks, benefit, and alternative to surgery was discussed with the patient. The risks of surgery include but not limited to infection, bleeding, intraabdominal organ injury, vagus nerve injury, recurrence, dysphagia, possible conversion to open, and the risks of general anesthetic. Also discussed with the patient that some of his symptoms may not resolve even after coming off of PPI. There's also possibility that he may not be able to come off completely from PPI following surgery. Patient is agreeable to surgery. All questions answered.

## 2018-07-27 NOTE — PROGRESS NOTES
Immanuel Sanchez is a 69 y.o. male     Chief Complaint   Patient presents with    Hiatal Hernia     hernia ref by Dr. Refugio Lemon /81 (BP 1 Location: Left arm, BP Patient Position: Sitting)    Pulse 72    Temp 97.4 °F (36.3 °C) (Oral)    Resp 16    Ht 5' 6\" (1.676 m)    Wt 182 lb 9.6 oz (82.8 kg)    SpO2 96%    BMI 29.47 kg/m2       Health Maintenance Due   Topic Date Due    ZOSTER VACCINE AGE 60>  04/24/2001    GLAUCOMA SCREENING Q2Y  06/24/2006    Pneumococcal 65+ Low/Medium Risk (1 of 2 - PCV13) 06/24/2006    MEDICARE YEARLY EXAM  03/14/2018       1. Have you been to the ER, urgent care clinic since your last visit? Hospitalized since your last visit? No    2. Have you seen or consulted any other health care providers outside of the [facility] since your last visit? Include any pap smears or colon screening.  No

## 2018-07-30 ENCOUNTER — TELEPHONE (OUTPATIENT)
Dept: SURGERY | Age: 78
End: 2018-07-30

## 2018-08-08 ENCOUNTER — HOSPITAL ENCOUNTER (OUTPATIENT)
Dept: PREADMISSION TESTING | Age: 78
Discharge: HOME | End: 2018-08-08
Attending: SURGERY
Payer: MEDICARE

## 2018-08-08 ENCOUNTER — HOSPITAL ENCOUNTER (OUTPATIENT)
Age: 78
Setting detail: OUTPATIENT SURGERY
Discharge: HOME | End: 2018-08-08
Attending: INTERNAL MEDICINE | Admitting: INTERNAL MEDICINE
Payer: MEDICARE

## 2018-08-08 VITALS
BODY MASS INDEX: 29.94 KG/M2 | DIASTOLIC BLOOD PRESSURE: 77 MMHG | HEIGHT: 65 IN | RESPIRATION RATE: 20 BRPM | SYSTOLIC BLOOD PRESSURE: 135 MMHG | OXYGEN SATURATION: 96 % | HEART RATE: 74 BPM

## 2018-08-08 VITALS
TEMPERATURE: 97.9 F | RESPIRATION RATE: 16 BRPM | OXYGEN SATURATION: 97 % | BODY MASS INDEX: 29.97 KG/M2 | SYSTOLIC BLOOD PRESSURE: 150 MMHG | HEIGHT: 65 IN | DIASTOLIC BLOOD PRESSURE: 62 MMHG | WEIGHT: 179.9 LBS | HEART RATE: 72 BPM

## 2018-08-08 LAB
ANION GAP SERPL CALC-SCNC: 7 MMOL/L (ref 5–15)
ATRIAL RATE: 63 BPM
BUN SERPL-MCNC: 15 MG/DL (ref 6–20)
BUN/CREAT SERPL: 13 (ref 12–20)
CALCIUM SERPL-MCNC: 8.5 MG/DL (ref 8.5–10.1)
CALCULATED P AXIS, ECG09: 39 DEGREES
CALCULATED R AXIS, ECG10: 25 DEGREES
CALCULATED T AXIS, ECG11: 18 DEGREES
CHLORIDE SERPL-SCNC: 108 MMOL/L (ref 97–108)
CO2 SERPL-SCNC: 26 MMOL/L (ref 21–32)
CREAT SERPL-MCNC: 1.19 MG/DL (ref 0.7–1.3)
DIAGNOSIS, 93000: NORMAL
ERYTHROCYTE [DISTWIDTH] IN BLOOD BY AUTOMATED COUNT: 14.3 % (ref 11.5–14.5)
GLUCOSE SERPL-MCNC: 96 MG/DL (ref 65–100)
HCT VFR BLD AUTO: 41.5 % (ref 36.6–50.3)
HGB BLD-MCNC: 14 G/DL (ref 12.1–17)
MCH RBC QN AUTO: 29 PG (ref 26–34)
MCHC RBC AUTO-ENTMCNC: 33.7 G/DL (ref 30–36.5)
MCV RBC AUTO: 85.9 FL (ref 80–99)
NRBC # BLD: 0 K/UL (ref 0–0.01)
NRBC BLD-RTO: 0 PER 100 WBC
P-R INTERVAL, ECG05: 172 MS
PLATELET # BLD AUTO: 158 K/UL (ref 150–400)
PMV BLD AUTO: 10.9 FL (ref 8.9–12.9)
POTASSIUM SERPL-SCNC: 4.1 MMOL/L (ref 3.5–5.1)
Q-T INTERVAL, ECG07: 406 MS
QRS DURATION, ECG06: 82 MS
QTC CALCULATION (BEZET), ECG08: 415 MS
RBC # BLD AUTO: 4.83 M/UL (ref 4.1–5.7)
SODIUM SERPL-SCNC: 141 MMOL/L (ref 136–145)
VENTRICULAR RATE, ECG03: 63 BPM
WBC # BLD AUTO: 3.5 K/UL (ref 4.1–11.1)

## 2018-08-08 PROCEDURE — 93005 ELECTROCARDIOGRAM TRACING: CPT

## 2018-08-08 PROCEDURE — 80048 BASIC METABOLIC PNL TOTAL CA: CPT | Performed by: SURGERY

## 2018-08-08 PROCEDURE — 36415 COLL VENOUS BLD VENIPUNCTURE: CPT | Performed by: SURGERY

## 2018-08-08 PROCEDURE — 74011000250 HC RX REV CODE- 250: Performed by: INTERNAL MEDICINE

## 2018-08-08 PROCEDURE — 85027 COMPLETE CBC AUTOMATED: CPT | Performed by: SURGERY

## 2018-08-08 PROCEDURE — 76040000007: Performed by: SPECIALIST

## 2018-08-08 RX ORDER — LIDOCAINE HYDROCHLORIDE 20 MG/ML
JELLY TOPICAL ONCE
Status: COMPLETED | OUTPATIENT
Start: 2018-08-08 | End: 2018-08-08

## 2018-08-08 RX ORDER — SODIUM CHLORIDE, SODIUM LACTATE, POTASSIUM CHLORIDE, CALCIUM CHLORIDE 600; 310; 30; 20 MG/100ML; MG/100ML; MG/100ML; MG/100ML
25 INJECTION, SOLUTION INTRAVENOUS CONTINUOUS
Status: CANCELLED | OUTPATIENT
Start: 2018-08-29

## 2018-08-08 RX ADMIN — LIDOCAINE HYDROCHLORIDE 5 ML: 20 JELLY TOPICAL at 11:51

## 2018-08-08 NOTE — DISCHARGE INSTRUCTIONS
Marcia Blanchard  [id number]  1940      MANOMETRY DISCHARGE INSTRUCTION    You may resume your regular diet as tolerated. You may resume your normal daily activities. If you develop a sore throat- throat lozenges or warm salt water gargles will help. Call your Physician if you have any complications or questions. Missingames Activation    Thank you for requesting access to Missingames. Please follow the instructions below to securely access and download your online medical record. Missingames allows you to send messages to your doctor, view your test results, renew your prescriptions, schedule appointments, and more. How Do I Sign Up? 1. In your internet browser, go to [email or web address]  2. Click on the First Time User? Click Here link in the Sign In box. You will be redirect to the New Member Sign Up page. 3. Enter your Missingames Access Code exactly as it appears below. You will not need to use this code after youve completed the sign-up process. If you do not sign up before the expiration date, you must request a new code. Missingames Access Code: Activation code not generated  Current Missingames Status: Active (This is the date your Missingames access code will )    4. Enter the last four digits of your Social Security Number (xxxx) and Date of Birth (mm/dd/yyyy) as indicated and click Submit. You will be taken to the next sign-up page. 5. Create a Missingames ID. This will be your Missingames login ID and cannot be changed, so think of one that is secure and easy to remember. 6. Create a Missingames password. You can change your password at any time. 7. Enter your Password Reset Question and Answer. This can be used at a later time if you forget your password. 8. Enter your e-mail address. You will receive e-mail notification when new information is available in [address] 9. Click Sign Up. You can now view and download portions of your medical record.   10. Click the Download Summary menu link to download a portable copy of your medical information. Additional Information    If you have questions, please visit the Frequently Asked Questions section of the Deep Glint website at [email or web address] Remember, Deep Glint is NOT to be used for urgent needs. For medical emergencies, dial 911.

## 2018-08-08 NOTE — PERIOP NOTES
[facility]  Preoperative Instructions        Surgery Date 08/29/18          Time of Arrival 1030    1. On the day of your surgery, please report to the Surgical Services Registration Desk and sign in at your designated time. The Surgery Center is located to the right of the Emergency Room. 2. You must have someone with you to drive you home. You should not drive a car for 24 hours following surgery. Please make arrangements for a friend or family member to stay with you for the first 24 hours after your surgery. 3. Do not have anything to eat or drink (including water, gum, mints, coffee, juice) after midnight ?08/28/18? Zaida Hanna ? This may not apply to medications prescribed by your physician. ?(Please note below the special instructions with medications to take the morning of your procedure.)    4. We recommend you do not drink any alcoholic beverages for 24 hours before and after your surgery. 5. Contact your surgeons office for instructions on the following medications: non-steroidal anti-inflammatory drugs (i.e. Advil, Aleve), vitamins, and supplements. (Some surgeons will want you to stop these medications prior to surgery and others may allow you to take them)  **If you are currently taking Plavix, Coumadin, Aspirin and/or other blood-thinning agents, contact your surgeon for instructions. ** Your surgeon will partner with the physician prescribing these medications to determine if it is safe to stop or if you need to continue taking. Please do not stop taking these medications without instructions from your surgeon    6. Wear comfortable clothes. Wear glasses instead of contacts. Do not bring any money or jewelry. Please bring picture ID, insurance card, and any prearranged co-payment or hospital payment. Do not wear make-up, particularly mascara the morning of your surgery. Do not wear nail polish, particularly if you are having foot /hand surgery.   Wear your hair loose or down, no ponytails, buns, bart pins or clips. All body piercings must be removed. Please shower with antibacterial soap for three consecutive days before and on the morning of surgery, but do not apply any lotions, powders or deodorants after the shower on the day of surgery. Please use a fresh towels after each shower. Please sleep in clean clothes and change bed linens the night before surgery. Please do not shave for 48 hours prior to surgery. Shaving of the face is acceptable. 7. You should understand that if you do not follow these instructions your surgery may be cancelled. If your physical condition changes (I.e. fever, cold or flu) please contact your surgeon as soon as possible. 8. It is important that you be on time. If a situation occurs where you may be late, please call [phone] (OR Holding Area). 9. If you have any questions and or problems, please call [phone] (Pre-admission Testing). 10. Your surgery time may be subject to change. You will receive a phone call the evening prior if your time changes. 11.  If having outpatient surgery, you must have someone to drive you here, stay with you during the duration of your stay, and to drive you home at time of discharge. 12.   In an effort to improve the efficiency, privacy, and safety for all of our Pre-op patients visitors are not allowed in the Holding area. Once you arrive and are registered your family/visitors will be asked to remain in the waiting room. The Pre-op staff will get you from the Surgical Waiting Area and will explain to you and your family/visitors that the Pre-op phase is beginning. The staff will answer any questions and provide instructions for tracking of the patient, by use of the existing tracking number and color-coded status board in the waiting room.   At this time the staff will also ask for your designated spokesperson information in the event that the physician or staff need to provide an update or obtain any pertinent information. The designated spokesperson will be notified if the physician needs to speak to family during the pre-operative phase. If at any time your family/visitors has questions or concerns they may approach the volunteer desk in the waiting area for assistance. Special Instructions:    MEDICATIONS TO TAKE THE MORNING OF SURGERY WITH A SIP OF WATER: carvedilol, prilosec      I understand a pre-operative phone call will be made to verify my surgery time. In the event that I am not available, I give permission for a message to be left on my answering service and/or with another person?   Yes [phone]         ___________________      __________   _________    (Signature of Patient)             (Witness)                (Date and Time)

## 2018-08-08 NOTE — IP AVS SNAPSHOT
[address] 
[phone] Patient: Bob Trevino MRN: [id number] XHF:[id number] A check yadi indicates which time of day the medication should be taken. My Medications ASK your doctor about these medications Instructions Each Dose to Equal  
 Morning Noon Evening Bedtime  
 carvedilol 12.5 mg tablet Commonly known as:  Ozie Bitter Your last dose was: Your next dose is: Take 12.5 mg by mouth two (2) times daily (with meals). 12.5 mg  
    
   
   
   
  
 cyanocobalamin 1,000 mcg tablet Your last dose was: Your next dose is: Take 1,000 mcg by mouth daily. 1000 mcg  
    
   
   
   
  
 doxazosin 2 mg tablet Commonly known as:  CARDURA Your last dose was: Your next dose is: Take 2 mg by mouth daily. 2 mg  
    
   
   
   
  
 magnesium oxide 500 mg Tab Your last dose was: Your next dose is: Take 1,000 mg by mouth daily. 1000 mg PriLOSEC 10 mg capsule Generic drug:  omeprazole Your last dose was: Your next dose is: Take 40 mg by mouth daily. 40 mg  
    
   
   
   
  
 quinapril 20 mg tablet Commonly known as:  ACCUPRIL Your last dose was: Your next dose is: Take 20 mg by mouth two (2) times a day. 20 mg  
    
   
   
   
  
 VITAMIN D3 1,000 unit Cap Generic drug:  cholecalciferol Your last dose was: Your next dose is: Take 1,000 Units by mouth daily. 1000 Units

## 2018-08-08 NOTE — IP AVS SNAPSHOT
[address] [facility] 
[phone] Patient: Dangelo Escobar MRN: [id number] LLV:[id number]/[id number] About your hospitalization You were admitted on:  August 8, 2018 You last received care in the:  [facility] You were discharged on:  August 8, 2018 Why you were hospitalized Your primary diagnosis was:  Not on File Follow-up Information None Your Scheduled Appointments Wednesday August 29, 2018 [facility] ROBOTIC ASSISTED with Guerrero Rodgers MD  
[facility] ([facility] OR PRE ASSESSMENT) [address] [facility]  
[phone] Wednesday September 12, 2018  9:40 AM EDT  
POST OP with Guerrero Rodgers MD  
[facility] [address] Jaime Purdy ([place] [address], [address] [facility]  
[phone] Discharge Orders None A check yadi indicates which time of day the medication should be taken. My Medications ASK your doctor about these medications Instructions Each Dose to Equal  
 Morning Noon Evening Bedtime  
 carvedilol 12.5 mg tablet Commonly known as:  Reynaldo Gut Your last dose was: Your next dose is: Take 12.5 mg by mouth two (2) times daily (with meals). 12.5 mg  
    
   
   
   
  
 cyanocobalamin 1,000 mcg tablet Your last dose was: Your next dose is: Take 1,000 mcg by mouth daily. 1000 mcg  
    
   
   
   
  
 doxazosin 2 mg tablet Commonly known as:  CARDURA Your last dose was: Your next dose is: Take 2 mg by mouth daily. 2 mg  
    
   
   
   
  
 magnesium oxide 500 mg Tab Your last dose was: Your next dose is: Take 1,000 mg by mouth daily. 1000 mg PriLOSEC 10 mg capsule Generic drug:  omeprazole Your last dose was: Your next dose is: Take 40 mg by mouth daily. 40 mg  
    
   
   
   
  
 quinapril 20 mg tablet Commonly known as:  ACCUPRIL Your last dose was: Your next dose is: Take 20 mg by mouth two (2) times a day. 20 mg  
    
   
   
   
  
 VITAMIN D3 1,000 unit Cap Generic drug:  cholecalciferol Your last dose was: Your next dose is: Take 1,000 Units by mouth daily. 1000 Units Discharge Instructions Katie Back [id number] 
1940 MANOMETRY DISCHARGE INSTRUCTION You may resume your regular diet as tolerated. You may resume your normal daily activities. If you develop a sore throat- throat lozenges or warm salt water gargles will help. Call your Physician if you have any complications or questions. "[employer]" Activation Thank you for requesting access to "[employer] Please follow the instructions below to securely access and download your online medical record. "[employer]" allows you to send messages to your doctor, view your test results, renew your prescriptions, schedule appointments, and more. How Do I Sign Up? 1. In your internet browser, go to [email or web address] 
2. Click on the First Time User? Click Here link in the Sign In box. You will be redirect to the New Member Sign Up page. 3. Enter your "[employer] Access Code exactly as it appears below. You will not need to use this code after youve completed the sign-up process. If you do not sign up before the expiration date, you must request a new code. "[employer] Access Code: Activation code not generated Current "[employer]" Status: Active (This is the date your "[employer] access code will ) 4. Enter the last four digits of your Social Security Number (xxxx) and Date of Birth (mm/dd/yyyy) as indicated and click Submit. You will be taken to the next sign-up page. 5. Create a [employer] ID. This will be your [employer] login ID and cannot be changed, so think of one that is secure and easy to remember. 6. Create a [employer] password. You can change your password at any time. 7. Enter your Password Reset Question and Answer. This can be used at a later time if you forget your password. 8. Enter your e-mail address. You will receive e-mail notification when new information is available in [address] 9. Click Sign Up. You can now view and download portions of your medical record. 10. Click the Download Summary menu link to download a portable copy of your medical information. Additional Information If you have questions, please visit the Frequently Asked Questions section of the [employer] website at [email or web address] Remember, [employer] is NOT to be used for urgent needs. For medical emergencies, dial 911. Introducing [facility]! Dear Vladimir Osborn: Thank you for requesting a [employer] account. Our records indicate that you already have an active [employer] account. You can access your account anytime at [email or web address] Did you know that you can access your hospital and ER discharge instructions at any time in [employer]? You can also review all of your test results from your hospital stay or ER visit. Additional Information If you have questions, please visit the Frequently Asked Questions section of the [employer] website at [email or web address] Remember, [employer] is NOT to be used for urgent needs. For medical emergencies, dial 911. Now available from your iPhone and Android! Introducing Andrea Roy As a [facility] patient, I wanted to make you aware of our electronic visit tool called Andrea Roy. [facility] 24/7 allows you to connect within minutes with a medical provider 24 hours a day, seven days a week via a mobile device or tablet or logging into a secure website from your computer. You can access [employer] from anywhere in the [place]. A virtual visit might be right for you when you have a simple condition and feel like you just dont want to get out of bed, or cant get away from work for an appointment, when your regular [facility] provider is not available (evenings, weekends or holidays), or when youre out of town and need minor care. Electronic visits cost only $49 and if the [facility] 24[facility]7 provider determines a prescription is needed to treat your condition, one can be electronically transmitted to a nearby pharmacy*. Please take a moment to enroll today if you have not already done so. The enrollment process is free and takes just a few minutes. To enroll, please download the "[employer]"/Quolaw yung to your tablet or phone, or visit [email or web address] to enroll on your computer. And, as an [address] [place] [address] patient with a [employer] account, the results of your visits will be scanned into your electronic medical record and your primary care provider will be able to view the scanned results. We urge you to continue to see your regular [facility] provider for your ongoing medical care. And while your primary care provider may not be the one available when you seek a [employer] virtual visit, the peace of mind you get from getting a real diagnosis real time can be priceless. For more information on [employer], view our Frequently Asked Questions (FAQs) at [email or web address]. Sincerely, 
 
Rickie Arora MD 
Chief Medical Officer [facility] Marcela Armstrong *:  certain medications cannot be prescribed via [employer] Providers Seen During Your Hospitalization Provider Specialty Primary office phone Lon Rodriguez MD Gastroenterology [phone] Your Primary Care Physician (PCP) Primary Care Physician Office Phone Office Fax Liza Martin [phone] You are allergic to the following Allergen Reactions Benicar (Olmesartan) Unknown (comments) Pt states he has found out that this is not a medication allergy. Demerol (Meperidine) Unknown (comments) Causes unconsciousness Recent Documentation Height BMI Smoking Status 1.651 m 29.94 kg/m2 Former Smoker Emergency Contacts Name Discharge Info Relation Home Work Mobile Erna Jackson CAREGIVER [3] Daughter [21] [phone] Patient Belongings The following personal items are in your possession at time of discharge: 
  Dental Appliances: None  Visual Aid: Glasses, With patient   Hearing Aids/Status: Left, With patient Please provide this summary of care documentation to your next provider. Signatures-by signing, you are acknowledging that this After Visit Summary has been reviewed with you and you have received a copy. Patient Signature:  ____________________________________________________________ Date:  ____________________________________________________________  
  
Celestino Louise Provider Signature:  ____________________________________________________________ Date:  ____________________________________________________________

## 2018-08-23 NOTE — OP NOTES
[address] REPORT    Bard Villafuerte  MR#: [id number]  : 1940  ACCOUNT #: [de-identified]   DATE OF SERVICE: 2018    PREOPERATIVE DIAGNOSIS:  Hiatal hernia. PROCEDURES PERFORMED:  1. Esophageal manometry. 2.  Impedance esophageal manometry. POSTOPERATIVE DIAGNOSES:    1. Normal relaxation of esophagogastric junction. 2.  Small peristaltic breaks in the esophageal body/weak peristalsis with small defects. SPECIMENS:  None. ANESTHESIA:  Topical.    ESTIMATED BLOOD LOSS:  None. COMPLICATIONS:  None. IMPLANTS:  None. SURGEON:  Yodit Pierson MD    ASSISTANT:  Colton Rousseau. DESCRIPTION OF PROCEDURE:  High resolution esophageal manometry was performed by the nursing staff with subsequent interpretation by Dr. Antonieta Haas. The lower esophageal sphincter is at 43 cm and for a distance of 3.7 cm distally. A 3 cm hiatal hernia is present. Lower esophageal sphincter pressures are as follows:  Respiratory minimum 9.2 (4.8-32). Respiratory mean 12.0 (13-43). Residual after swallowing 7.8. The upper esophageal sphincter pressure is not reported here. This is not a reliable test for measurement of or interpretation of clinical upper esophageal sphincter disorders. Wet swallows were then administered. All are peristaltic by standard criteria. The mean amplitude in the distal esophagus is normal at 75.2 mmHg. The wave duration is normal at 3.1 seconds. The velocity is normal at 3.6 seconds. High resolution scoring is as follows:  .8 which is normal.  Contractile front velocity 3.3 which is normal.  Intrabolus pressure at LES zero which is normal.  Intrabolus pressure average maximum body of the esophagus elevated 48.5 (less than 17). [place]den scoring is as follows:  Distal latency 5.3%, failed 0%, pan esophageal pressurization 0%, premature 0%, rapid 0%, large breaks 0%, small breaks 40.     Impedance manometry is performed with a flavored electrolyte solution. 100% of the boluses failed to clear completely. (Computer artifact) tracing reviewed. COMMENT:  The small peristaltic breaks referred to transition zone defects in the upper esophagus between striated proximal esophagus and middle smooth muscle esophagus.       MD PERRI Perdomo / SONNY  D: 08/22/2018 18:13     T: 08/23/2018 08:35  JOB #: [id number]  CC: Verner Asp MD  CC: Donovan Chapman MD  CC: Inna Mcdonald MD

## 2018-08-29 ENCOUNTER — HOSPITAL ENCOUNTER (INPATIENT)
Age: 78
LOS: 2 days | Discharge: HOME | DRG: 328 | End: 2018-08-31
Attending: SURGERY | Admitting: SURGERY
Payer: MEDICARE

## 2018-08-29 ENCOUNTER — ANESTHESIA (OUTPATIENT)
Dept: SURGERY | Age: 78
DRG: 328 | End: 2018-08-29
Payer: MEDICARE

## 2018-08-29 ENCOUNTER — ANESTHESIA EVENT (OUTPATIENT)
Dept: SURGERY | Age: 78
DRG: 328 | End: 2018-08-29
Payer: MEDICARE

## 2018-08-29 DIAGNOSIS — K21.9 HIATAL HERNIA WITH GERD: Primary | ICD-10-CM

## 2018-08-29 DIAGNOSIS — K44.9 HIATAL HERNIA WITH GERD: Primary | ICD-10-CM

## 2018-08-29 PROCEDURE — 77030035277 HC OBTRTR BLDELSS DISP INTU -B: Performed by: SURGERY

## 2018-08-29 PROCEDURE — 74011250636 HC RX REV CODE- 250/636

## 2018-08-29 PROCEDURE — 76060000036 HC ANESTHESIA 2.5 TO 3 HR: Performed by: SURGERY

## 2018-08-29 PROCEDURE — 0DV44ZZ RESTRICTION OF ESOPHAGOGASTRIC JUNCTION, PERCUTANEOUS ENDOSCOPIC APPROACH: ICD-10-PCS | Performed by: SURGERY

## 2018-08-29 PROCEDURE — 77030016151 HC PROTCTR LNS DFOG COVD -B: Performed by: SURGERY

## 2018-08-29 PROCEDURE — 74011000250 HC RX REV CODE- 250

## 2018-08-29 PROCEDURE — 77030037878 HC DRSG MEPILEX >48IN BORD MOLN -B

## 2018-08-29 PROCEDURE — 77030032490 HC SLV COMPR SCD KNE COVD -B: Performed by: SURGERY

## 2018-08-29 PROCEDURE — 77030008756 HC TU IRR SUC STRY -B: Performed by: SURGERY

## 2018-08-29 PROCEDURE — 77030026438 HC STYL ET INTUB CARD -A: Performed by: ANESTHESIOLOGY

## 2018-08-29 PROCEDURE — C1781 MESH (IMPLANTABLE): HCPCS | Performed by: SURGERY

## 2018-08-29 PROCEDURE — 74011250637 HC RX REV CODE- 250/637: Performed by: SURGERY

## 2018-08-29 PROCEDURE — 77030035048 HC TRCR ENDOSC OPTCL COVD -B: Performed by: SURGERY

## 2018-08-29 PROCEDURE — 77030020782 HC GWN BAIR PAWS FLX 3M -B

## 2018-08-29 PROCEDURE — 74011250636 HC RX REV CODE- 250/636: Performed by: SURGERY

## 2018-08-29 PROCEDURE — 77030011640 HC PAD GRND REM COVD -A: Performed by: SURGERY

## 2018-08-29 PROCEDURE — 77030003581 HC NDL INSUF VERES COVD -B: Performed by: SURGERY

## 2018-08-29 PROCEDURE — 77030008771 HC TU NG SALEM SUMP -A: Performed by: ANESTHESIOLOGY

## 2018-08-29 PROCEDURE — 0BUT4JZ SUPPLEMENT DIAPHRAGM WITH SYNTHETIC SUBSTITUTE, PERCUTANEOUS ENDOSCOPIC APPROACH: ICD-10-PCS | Performed by: SURGERY

## 2018-08-29 PROCEDURE — 76010000877 HC OR TIME 2.5 TO 3HR INTENSV - TIER 2: Performed by: SURGERY

## 2018-08-29 PROCEDURE — 76210000016 HC OR PH I REC 1 TO 1.5 HR: Performed by: SURGERY

## 2018-08-29 PROCEDURE — 77030002912 HC SUT ETHBND J&J -A: Performed by: SURGERY

## 2018-08-29 PROCEDURE — 77030020703 HC SEAL CANN DISP INTU -B: Performed by: SURGERY

## 2018-08-29 PROCEDURE — 77030002933 HC SUT MCRYL J&J -A: Performed by: SURGERY

## 2018-08-29 PROCEDURE — 77030010351 HC TRCR ENDOSC VSTP COVD -B: Performed by: SURGERY

## 2018-08-29 PROCEDURE — 65270000029 HC RM PRIVATE

## 2018-08-29 PROCEDURE — 77030018846 HC SOL IRR STRL H20 ICUM -A: Performed by: SURGERY

## 2018-08-29 PROCEDURE — 74011250636 HC RX REV CODE- 250/636: Performed by: ANESTHESIOLOGY

## 2018-08-29 PROCEDURE — 77030008684 HC TU ET CUF COVD -B: Performed by: ANESTHESIOLOGY

## 2018-08-29 PROCEDURE — 77030035279 HC SEAL VSL ENDOWR XI INTU -I2: Performed by: SURGERY

## 2018-08-29 PROCEDURE — 8E0W4CZ ROBOTIC ASSISTED PROCEDURE OF TRUNK REGION, PERCUTANEOUS ENDOSCOPIC APPROACH: ICD-10-PCS | Performed by: SURGERY

## 2018-08-29 PROCEDURE — 74011000250 HC RX REV CODE- 250: Performed by: SURGERY

## 2018-08-29 PROCEDURE — 77030039266 HC ADH SKN EXOFIN S2SG -A: Performed by: SURGERY

## 2018-08-29 PROCEDURE — 77010033678 HC OXYGEN DAILY

## 2018-08-29 PROCEDURE — 77030018390 HC SPNG HEMSTAT2 J&J -B: Performed by: SURGERY

## 2018-08-29 PROCEDURE — 94760 N-INVAS EAR/PLS OXIMETRY 1: CPT

## 2018-08-29 DEVICE — BIO-A TISSUE REINFORCEMENT 7CMX10CM
Type: IMPLANTABLE DEVICE | Site: ABDOMEN | Status: FUNCTIONAL
Brand: GORE BIO-A TISSUE REINFORCEMENT

## 2018-08-29 RX ORDER — OXYCODONE HYDROCHLORIDE 5 MG/1
5 TABLET ORAL
Status: DISCONTINUED | OUTPATIENT
Start: 2018-08-29 | End: 2018-08-31 | Stop reason: HOSPADM

## 2018-08-29 RX ORDER — ONDANSETRON 2 MG/ML
4 INJECTION INTRAMUSCULAR; INTRAVENOUS AS NEEDED
Status: DISCONTINUED | OUTPATIENT
Start: 2018-08-29 | End: 2018-08-29 | Stop reason: HOSPADM

## 2018-08-29 RX ORDER — SUCCINYLCHOLINE CHLORIDE 20 MG/ML
INJECTION INTRAMUSCULAR; INTRAVENOUS AS NEEDED
Status: DISCONTINUED | OUTPATIENT
Start: 2018-08-29 | End: 2018-08-29 | Stop reason: HOSPADM

## 2018-08-29 RX ORDER — MORPHINE SULFATE 10 MG/ML
2 INJECTION, SOLUTION INTRAMUSCULAR; INTRAVENOUS
Status: DISCONTINUED | OUTPATIENT
Start: 2018-08-29 | End: 2018-08-29 | Stop reason: HOSPADM

## 2018-08-29 RX ORDER — DIPHENHYDRAMINE HYDROCHLORIDE 50 MG/ML
12.5 INJECTION, SOLUTION INTRAMUSCULAR; INTRAVENOUS
Status: ACTIVE | OUTPATIENT
Start: 2018-08-29 | End: 2018-08-30

## 2018-08-29 RX ORDER — ACETAMINOPHEN 10 MG/ML
1000 INJECTION, SOLUTION INTRAVENOUS EVERY 6 HOURS
Status: COMPLETED | OUTPATIENT
Start: 2018-08-29 | End: 2018-08-30

## 2018-08-29 RX ORDER — MIDAZOLAM HYDROCHLORIDE 1 MG/ML
INJECTION, SOLUTION INTRAMUSCULAR; INTRAVENOUS AS NEEDED
Status: DISCONTINUED | OUTPATIENT
Start: 2018-08-29 | End: 2018-08-29 | Stop reason: HOSPADM

## 2018-08-29 RX ORDER — FENTANYL CITRATE 50 UG/ML
INJECTION, SOLUTION INTRAMUSCULAR; INTRAVENOUS AS NEEDED
Status: DISCONTINUED | OUTPATIENT
Start: 2018-08-29 | End: 2018-08-29 | Stop reason: HOSPADM

## 2018-08-29 RX ORDER — CEFAZOLIN SODIUM/WATER 2 G/20 ML
2 SYRINGE (ML) INTRAVENOUS
Status: COMPLETED | OUTPATIENT
Start: 2018-08-29 | End: 2018-08-29

## 2018-08-29 RX ORDER — ROCURONIUM BROMIDE 10 MG/ML
INJECTION, SOLUTION INTRAVENOUS AS NEEDED
Status: DISCONTINUED | OUTPATIENT
Start: 2018-08-29 | End: 2018-08-29 | Stop reason: HOSPADM

## 2018-08-29 RX ORDER — SODIUM CHLORIDE 0.9 % (FLUSH) 0.9 %
5-10 SYRINGE (ML) INJECTION AS NEEDED
Status: DISCONTINUED | OUTPATIENT
Start: 2018-08-29 | End: 2018-08-31 | Stop reason: HOSPADM

## 2018-08-29 RX ORDER — QUINAPRIL 10 MG/1
20 TABLET ORAL 2 TIMES DAILY
Status: DISCONTINUED | OUTPATIENT
Start: 2018-08-29 | End: 2018-08-31 | Stop reason: HOSPADM

## 2018-08-29 RX ORDER — LIDOCAINE HYDROCHLORIDE 10 MG/ML
0.1 INJECTION, SOLUTION EPIDURAL; INFILTRATION; INTRACAUDAL; PERINEURAL AS NEEDED
Status: DISCONTINUED | OUTPATIENT
Start: 2018-08-29 | End: 2018-08-29 | Stop reason: HOSPADM

## 2018-08-29 RX ORDER — CARVEDILOL 12.5 MG/1
12.5 TABLET ORAL 2 TIMES DAILY WITH MEALS
Status: DISCONTINUED | OUTPATIENT
Start: 2018-08-29 | End: 2018-08-31 | Stop reason: HOSPADM

## 2018-08-29 RX ORDER — GLYCOPYRROLATE 0.2 MG/ML
INJECTION INTRAMUSCULAR; INTRAVENOUS AS NEEDED
Status: DISCONTINUED | OUTPATIENT
Start: 2018-08-29 | End: 2018-08-29 | Stop reason: HOSPADM

## 2018-08-29 RX ORDER — HYDROCODONE BITARTRATE AND ACETAMINOPHEN 5; 325 MG/1; MG/1
1-2 TABLET ORAL
Qty: 40 TAB | Refills: 0 | Status: SHIPPED | OUTPATIENT
Start: 2018-08-29 | End: 2018-09-12

## 2018-08-29 RX ORDER — PANTOPRAZOLE SODIUM 40 MG/1
40 TABLET, DELAYED RELEASE ORAL
Status: DISCONTINUED | OUTPATIENT
Start: 2018-08-30 | End: 2018-08-31 | Stop reason: HOSPADM

## 2018-08-29 RX ORDER — CEFAZOLIN SODIUM/WATER 2 G/20 ML
2 SYRINGE (ML) INTRAVENOUS EVERY 8 HOURS
Status: COMPLETED | OUTPATIENT
Start: 2018-08-29 | End: 2018-08-30

## 2018-08-29 RX ORDER — PROPOFOL 10 MG/ML
INJECTION, EMULSION INTRAVENOUS AS NEEDED
Status: DISCONTINUED | OUTPATIENT
Start: 2018-08-29 | End: 2018-08-29 | Stop reason: HOSPADM

## 2018-08-29 RX ORDER — ONDANSETRON 2 MG/ML
4 INJECTION INTRAMUSCULAR; INTRAVENOUS
Status: DISCONTINUED | OUTPATIENT
Start: 2018-08-29 | End: 2018-08-31 | Stop reason: HOSPADM

## 2018-08-29 RX ORDER — DEXTROSE, SODIUM CHLORIDE, AND POTASSIUM CHLORIDE 5; .45; .15 G/100ML; G/100ML; G/100ML
25 INJECTION INTRAVENOUS CONTINUOUS
Status: DISCONTINUED | OUTPATIENT
Start: 2018-08-29 | End: 2018-08-31

## 2018-08-29 RX ORDER — DEXAMETHASONE SODIUM PHOSPHATE 4 MG/ML
INJECTION, SOLUTION INTRA-ARTICULAR; INTRALESIONAL; INTRAMUSCULAR; INTRAVENOUS; SOFT TISSUE AS NEEDED
Status: DISCONTINUED | OUTPATIENT
Start: 2018-08-29 | End: 2018-08-29 | Stop reason: HOSPADM

## 2018-08-29 RX ORDER — FENTANYL CITRATE 50 UG/ML
50 INJECTION, SOLUTION INTRAMUSCULAR; INTRAVENOUS AS NEEDED
Status: DISCONTINUED | OUTPATIENT
Start: 2018-08-29 | End: 2018-08-29 | Stop reason: HOSPADM

## 2018-08-29 RX ORDER — SODIUM CHLORIDE 0.9 % (FLUSH) 0.9 %
5-10 SYRINGE (ML) INJECTION EVERY 8 HOURS
Status: DISCONTINUED | OUTPATIENT
Start: 2018-08-29 | End: 2018-08-31 | Stop reason: HOSPADM

## 2018-08-29 RX ORDER — LABETALOL HYDROCHLORIDE 5 MG/ML
INJECTION, SOLUTION INTRAVENOUS AS NEEDED
Status: DISCONTINUED | OUTPATIENT
Start: 2018-08-29 | End: 2018-08-29 | Stop reason: HOSPADM

## 2018-08-29 RX ORDER — BUPIVACAINE HYDROCHLORIDE AND EPINEPHRINE 2.5; 5 MG/ML; UG/ML
INJECTION, SOLUTION EPIDURAL; INFILTRATION; INTRACAUDAL; PERINEURAL AS NEEDED
Status: DISCONTINUED | OUTPATIENT
Start: 2018-08-29 | End: 2018-08-29 | Stop reason: HOSPADM

## 2018-08-29 RX ORDER — MIDAZOLAM HYDROCHLORIDE 1 MG/ML
0.5 INJECTION, SOLUTION INTRAMUSCULAR; INTRAVENOUS
Status: DISCONTINUED | OUTPATIENT
Start: 2018-08-29 | End: 2018-08-29 | Stop reason: HOSPADM

## 2018-08-29 RX ORDER — SODIUM CHLORIDE 0.9 % (FLUSH) 0.9 %
5-10 SYRINGE (ML) INJECTION AS NEEDED
Status: DISCONTINUED | OUTPATIENT
Start: 2018-08-29 | End: 2018-08-29 | Stop reason: HOSPADM

## 2018-08-29 RX ORDER — SODIUM CHLORIDE, SODIUM LACTATE, POTASSIUM CHLORIDE, CALCIUM CHLORIDE 600; 310; 30; 20 MG/100ML; MG/100ML; MG/100ML; MG/100ML
25 INJECTION, SOLUTION INTRAVENOUS CONTINUOUS
Status: DISCONTINUED | OUTPATIENT
Start: 2018-08-29 | End: 2018-08-29 | Stop reason: HOSPADM

## 2018-08-29 RX ORDER — FENTANYL CITRATE 50 UG/ML
25 INJECTION, SOLUTION INTRAMUSCULAR; INTRAVENOUS
Status: COMPLETED | OUTPATIENT
Start: 2018-08-29 | End: 2018-08-29

## 2018-08-29 RX ORDER — CEFAZOLIN SODIUM 1 G/3ML
1 INJECTION, POWDER, FOR SOLUTION INTRAMUSCULAR; INTRAVENOUS EVERY 8 HOURS
Status: DISCONTINUED | OUTPATIENT
Start: 2018-08-29 | End: 2018-08-29

## 2018-08-29 RX ORDER — HYDROMORPHONE HYDROCHLORIDE 1 MG/ML
.2-.5 INJECTION, SOLUTION INTRAMUSCULAR; INTRAVENOUS; SUBCUTANEOUS
Status: DISCONTINUED | OUTPATIENT
Start: 2018-08-29 | End: 2018-08-29 | Stop reason: SDUPTHER

## 2018-08-29 RX ORDER — HYDROMORPHONE HYDROCHLORIDE 1 MG/ML
.2-.5 INJECTION, SOLUTION INTRAMUSCULAR; INTRAVENOUS; SUBCUTANEOUS
Status: DISCONTINUED | OUTPATIENT
Start: 2018-08-29 | End: 2018-08-29 | Stop reason: HOSPADM

## 2018-08-29 RX ORDER — DOXAZOSIN 2 MG/1
2 TABLET ORAL DAILY
Status: DISCONTINUED | OUTPATIENT
Start: 2018-08-30 | End: 2018-08-31 | Stop reason: HOSPADM

## 2018-08-29 RX ORDER — MORPHINE SULFATE 2 MG/ML
2 INJECTION, SOLUTION INTRAMUSCULAR; INTRAVENOUS
Status: DISCONTINUED | OUTPATIENT
Start: 2018-08-29 | End: 2018-08-31 | Stop reason: HOSPADM

## 2018-08-29 RX ORDER — NEOSTIGMINE METHYLSULFATE 1 MG/ML
INJECTION INTRAVENOUS AS NEEDED
Status: DISCONTINUED | OUTPATIENT
Start: 2018-08-29 | End: 2018-08-29 | Stop reason: HOSPADM

## 2018-08-29 RX ORDER — LIDOCAINE HYDROCHLORIDE 20 MG/ML
INJECTION, SOLUTION EPIDURAL; INFILTRATION; INTRACAUDAL; PERINEURAL AS NEEDED
Status: DISCONTINUED | OUTPATIENT
Start: 2018-08-29 | End: 2018-08-29 | Stop reason: HOSPADM

## 2018-08-29 RX ORDER — ONDANSETRON 2 MG/ML
INJECTION INTRAMUSCULAR; INTRAVENOUS AS NEEDED
Status: DISCONTINUED | OUTPATIENT
Start: 2018-08-29 | End: 2018-08-29 | Stop reason: HOSPADM

## 2018-08-29 RX ORDER — DIPHENHYDRAMINE HYDROCHLORIDE 50 MG/ML
12.5 INJECTION, SOLUTION INTRAMUSCULAR; INTRAVENOUS AS NEEDED
Status: DISCONTINUED | OUTPATIENT
Start: 2018-08-29 | End: 2018-08-29 | Stop reason: HOSPADM

## 2018-08-29 RX ADMIN — FENTANYL CITRATE 50 MCG: 50 INJECTION, SOLUTION INTRAMUSCULAR; INTRAVENOUS at 14:08

## 2018-08-29 RX ADMIN — FENTANYL CITRATE 50 MCG: 50 INJECTION, SOLUTION INTRAMUSCULAR; INTRAVENOUS at 13:57

## 2018-08-29 RX ADMIN — FENTANYL CITRATE 25 MCG: 50 INJECTION, SOLUTION INTRAMUSCULAR; INTRAVENOUS at 16:09

## 2018-08-29 RX ADMIN — ROCURONIUM BROMIDE 10 MG: 10 INJECTION, SOLUTION INTRAVENOUS at 14:24

## 2018-08-29 RX ADMIN — ROCURONIUM BROMIDE 10 MG: 10 INJECTION, SOLUTION INTRAVENOUS at 14:41

## 2018-08-29 RX ADMIN — MIDAZOLAM HYDROCHLORIDE 1 MG: 1 INJECTION, SOLUTION INTRAMUSCULAR; INTRAVENOUS at 12:58

## 2018-08-29 RX ADMIN — FENTANYL CITRATE 50 MCG: 50 INJECTION, SOLUTION INTRAMUSCULAR; INTRAVENOUS at 13:07

## 2018-08-29 RX ADMIN — DEXTROSE MONOHYDRATE, SODIUM CHLORIDE, AND POTASSIUM CHLORIDE 75 ML/HR: 50; 4.5; 1.49 INJECTION, SOLUTION INTRAVENOUS at 16:47

## 2018-08-29 RX ADMIN — ROCURONIUM BROMIDE 5 MG: 10 INJECTION, SOLUTION INTRAVENOUS at 13:07

## 2018-08-29 RX ADMIN — FENTANYL CITRATE 25 MCG: 50 INJECTION, SOLUTION INTRAMUSCULAR; INTRAVENOUS at 16:13

## 2018-08-29 RX ADMIN — ROCURONIUM BROMIDE 35 MG: 10 INJECTION, SOLUTION INTRAVENOUS at 13:18

## 2018-08-29 RX ADMIN — LIDOCAINE HYDROCHLORIDE 60 MG: 20 INJECTION, SOLUTION EPIDURAL; INFILTRATION; INTRACAUDAL; PERINEURAL at 13:07

## 2018-08-29 RX ADMIN — Medication 10 ML: at 17:51

## 2018-08-29 RX ADMIN — CARVEDILOL 12.5 MG: 12.5 TABLET, FILM COATED ORAL at 17:55

## 2018-08-29 RX ADMIN — GLYCOPYRROLATE 0.2 MG: 0.2 INJECTION INTRAMUSCULAR; INTRAVENOUS at 13:44

## 2018-08-29 RX ADMIN — LABETALOL HYDROCHLORIDE 10 MG: 5 INJECTION, SOLUTION INTRAVENOUS at 15:45

## 2018-08-29 RX ADMIN — SUCCINYLCHOLINE CHLORIDE 140 MG: 20 INJECTION INTRAMUSCULAR; INTRAVENOUS at 13:07

## 2018-08-29 RX ADMIN — Medication 10 ML: at 21:35

## 2018-08-29 RX ADMIN — DEXAMETHASONE SODIUM PHOSPHATE 8 MG: 4 INJECTION, SOLUTION INTRA-ARTICULAR; INTRALESIONAL; INTRAMUSCULAR; INTRAVENOUS; SOFT TISSUE at 13:34

## 2018-08-29 RX ADMIN — FENTANYL CITRATE 25 MCG: 50 INJECTION, SOLUTION INTRAMUSCULAR; INTRAVENOUS at 16:05

## 2018-08-29 RX ADMIN — Medication 2 G: at 13:17

## 2018-08-29 RX ADMIN — NEOSTIGMINE METHYLSULFATE 3 MG: 1 INJECTION INTRAVENOUS at 15:18

## 2018-08-29 RX ADMIN — FENTANYL CITRATE 25 MCG: 50 INJECTION, SOLUTION INTRAMUSCULAR; INTRAVENOUS at 16:33

## 2018-08-29 RX ADMIN — FENTANYL CITRATE 25 MCG: 50 INJECTION, SOLUTION INTRAMUSCULAR; INTRAVENOUS at 15:53

## 2018-08-29 RX ADMIN — GLYCOPYRROLATE 0.4 MG: 0.2 INJECTION INTRAMUSCULAR; INTRAVENOUS at 15:18

## 2018-08-29 RX ADMIN — FENTANYL CITRATE 50 MCG: 50 INJECTION, SOLUTION INTRAMUSCULAR; INTRAVENOUS at 15:26

## 2018-08-29 RX ADMIN — FENTANYL CITRATE 25 MCG: 50 INJECTION, SOLUTION INTRAMUSCULAR; INTRAVENOUS at 16:38

## 2018-08-29 RX ADMIN — FENTANYL CITRATE 50 MCG: 50 INJECTION, SOLUTION INTRAMUSCULAR; INTRAVENOUS at 14:51

## 2018-08-29 RX ADMIN — FENTANYL CITRATE 25 MCG: 50 INJECTION, SOLUTION INTRAMUSCULAR; INTRAVENOUS at 16:45

## 2018-08-29 RX ADMIN — FENTANYL CITRATE 25 MCG: 50 INJECTION, SOLUTION INTRAMUSCULAR; INTRAVENOUS at 16:28

## 2018-08-29 RX ADMIN — Medication 2 G: at 21:35

## 2018-08-29 RX ADMIN — ACETAMINOPHEN 1000 MG: 10 INJECTION, SOLUTION INTRAVENOUS at 17:55

## 2018-08-29 RX ADMIN — LABETALOL HYDROCHLORIDE 5 MG: 5 INJECTION, SOLUTION INTRAVENOUS at 14:16

## 2018-08-29 RX ADMIN — ONDANSETRON 4 MG: 2 INJECTION INTRAMUSCULAR; INTRAVENOUS at 14:30

## 2018-08-29 RX ADMIN — QUINAPRIL 20 MG: 10 TABLET ORAL at 17:55

## 2018-08-29 RX ADMIN — FENTANYL CITRATE 50 MCG: 50 INJECTION, SOLUTION INTRAMUSCULAR; INTRAVENOUS at 13:28

## 2018-08-29 RX ADMIN — PROPOFOL 150 MG: 10 INJECTION, EMULSION INTRAVENOUS at 13:07

## 2018-08-29 RX ADMIN — PROPOFOL 50 MG: 10 INJECTION, EMULSION INTRAVENOUS at 13:28

## 2018-08-29 RX ADMIN — SODIUM CHLORIDE, SODIUM LACTATE, POTASSIUM CHLORIDE, AND CALCIUM CHLORIDE 25 ML/HR: 600; 310; 30; 20 INJECTION, SOLUTION INTRAVENOUS at 11:18

## 2018-08-29 NOTE — ANESTHESIA PREPROCEDURE EVALUATION
Anesthetic History No history of anesthetic complications Review of Systems / Medical History Patient summary reviewed, nursing notes reviewed and pertinent labs reviewed Pulmonary COPD Sleep apnea: No treatment Comments: Former Smoker Neuro/Psych  
 
 
 
TIA Cardiovascular Hypertension Dysrhythmias Exercise tolerance: >4 METS Comments: EF 60 - 65% PVC's GI/Hepatic/Renal 
  
GERD Hiatal hernia and liver disease Comments: NAUSEA 
ELEVATED LFTs 
fatty liver Endo/Other Arthritis Other Findings Comments: BPH Syncope Physical Exam 
 
Airway Mallampati: I Neck ROM: normal range of motion Mouth opening: Normal 
 
 Cardiovascular Regular rate and rhythm,  S1 and S2 normal,  no murmur, click, rub, or gallop Dental 
 
Dentition: Caps/crowns Comments: 3 missing teeth Pulmonary Breath sounds clear to auscultation Abdominal 
GI exam deferred Other Findings Anesthetic Plan ASA: 3 Anesthesia type: general 
 
Monitoring Plan: BIS Induction: Intravenous Anesthetic plan and risks discussed with: Patient

## 2018-08-29 NOTE — IP AVS SNAPSHOT
[address] [facility] 83. 
[phone] Patient: Yared Chan MRN: [id number] [facility]:4/66/1897 About your hospitalization You were admitted on:  August 29, 2018 You last received care in the:  [facility] 2 GENERAL SURGERY You were discharged on:  August 31, 2018 Why you were hospitalized Your primary diagnosis was:  Not on File Your diagnoses also included:  Hiatal Hernia With [place] Follow-up Information Follow up With Details Comments Contact Info Veronica Perkins MD In 2 weeks  [address] [facility] 83. 
[phone] Ana Lima MD   [address] [facility] [address] 70 [facility] 
[phone] Your Scheduled Appointments Wednesday September 12, 2018  9:40 AM EDT  
POST OP with Veronica Perkins MD  
[facility] [address] Jaime U[facility] [place]) [facility] [place] [address], [address] [facility]  
[phone] Discharge Orders None A check yadi indicates which time of day the medication should be taken. My Medications START taking these medications Instructions Each Dose to Equal  
 Morning Noon Evening Bedtime HYDROcodone-acetaminophen 5-325 mg per tablet Commonly known as:  Brittni Guerrier Your last dose was: Your next dose is: Take 1-2 Tabs by mouth every four (4) hours as needed for Pain. Max Daily Amount: 12 Tabs. 1-2 Tab CHANGE how you take these medications Instructions Each Dose to Equal  
 Morning Noon Evening Bedtime * carvedilol 12.5 mg tablet Commonly known as:  David Fraire What changed:  Another medication with the same name was added. Make sure you understand how and when to take each. Your last dose was: Your next dose is: Take 12.5 mg by mouth two (2) times daily (with meals).   
 12.5 mg  
    
   
   
 * carvedilol 12.5 mg tablet Commonly known as:  Bren Foley What changed: You were already taking a medication with the same name, and this prescription was added. Make sure you understand how and when to take each. Your last dose was: Your next dose is: Take 2 Tabs by mouth two (2) times daily (with meals). 25 mg  
    
   
   
   
  
 * Notice: This list has 2 medication(s) that are the same as other medications prescribed for you. Read the directions carefully, and ask your doctor or other care provider to review them with you. CONTINUE taking these medications Instructions Each Dose to Equal  
 Morning Noon Evening Bedtime  
 cyanocobalamin 1,000 mcg tablet Your last dose was: Your next dose is: Take 1,000 mcg by mouth daily. 1000 mcg  
    
   
   
   
  
 doxazosin 2 mg tablet Commonly known as:  CARDURA Your last dose was: Your next dose is: Take 2 mg by mouth daily. 2 mg  
    
   
   
   
  
 magnesium oxide 500 mg Tab Your last dose was: Your next dose is: Take 1,000 mg by mouth daily. 1000 mg PriLOSEC 10 mg capsule Generic drug:  omeprazole Your last dose was: Your next dose is: Take 40 mg by mouth daily. 40 mg  
    
   
   
   
  
 quinapril 20 mg tablet Commonly known as:  ACCUPRIL Your last dose was: Your next dose is: Take 20 mg by mouth two (2) times a day. 20 mg  
    
   
   
   
  
 VITAMIN D3 1,000 unit Cap Generic drug:  cholecalciferol Your last dose was: Your next dose is: Take 1,000 Units by mouth daily. 1000 Units Where to Get Your Medications These medications were sent to [facility][id number] [place], [address] AT [facility]  [address] [facility], [address] [place], [place] [address] Phone:  [phone]  
  carvedilol 12.5 mg tablet Information on where to get these meds will be given to you by the nurse or doctor. ! Ask your nurse or doctor about these medications HYDROcodone-acetaminophen 5-325 mg per tablet Opioid Education Prescription Opioids: What You Need to Know: 
 
Prescription opioids can be used to help relieve moderate-to-severe pain and are often prescribed following a surgery or injury, or for certain health conditions. These medications can be an important part of treatment but also come with serious risks. Opioids are strong pain medicines. Examples include hydrocodone, oxycodone, fentanyl, and morphine. Heroin is an example of an illegal opioid. It is important to work with your health care provider to make sure you are getting the safest, most effective care. WHAT ARE THE RISKS AND SIDE EFFECTS OF OPIOID USE? Prescription opioids carry serious risks of addiction and overdose, especially with prolonged use. An opioid overdose, often marked by slow breathing, can cause sudden death. The use of prescription opioids can have a number of side effects as well, even when taken as directed. · Tolerance-meaning you might need to take more of a medication for the same pain relief · Physical dependence-meaning you have symptoms of withdrawal when the medication is stopped. Withdrawal symptoms can include nausea, sweating, chills, diarrhea, stomach cramps, and muscle aches. Withdrawal can last up to several weeks, depending on which drug you took and how long you took it. · Increased sensitivity to pain · Constipation · Nausea, vomiting, and dry mouth · Sleepiness and dizziness · Confusion · Depression · Low levels of testosterone that can result in lower sex drive, energy, and strength · Itching and sweating RISKS ARE GREATER WITH:      
· History of drug misuse, substance use disorder, or overdose · Mental health conditions (such as depression or anxiety) · Sleep apnea · Older age (72 years or older) · Pregnancy Avoid alcohol while taking prescription opioids. Also, unless specifically advised by your health care provider, medications to avoid include: · Benzodiazepines (such as Xanax or Valium) · Muscle relaxants (such as Soma or Flexeril) · Hypnotics (such as Ambien or Lunesta) · Other prescription opioids KNOW YOUR OPTIONS Talk to your health care provider about ways to manage your pain that don't involve prescription opioids. Some of these options may actually work better and have fewer risks and side effects. Options may include: 
· Pain relievers such as acetaminophen, ibuprofen, and naproxen · Some medications that are also used for depression or seizures · Physical therapy and exercise · Counseling to help patients learn how to cope better with triggers of pain and stress. · Application of heat or cold compress · Massage therapy · Relaxation techniques Be Informed Make sure you know the name of your medication, how much and how often to take it, and its potential risks & side effects. IF YOU ARE PRESCRIBED OPIOIDS FOR PAIN: 
· Never take opioids in greater amounts or more often than prescribed. Remember the goal is not to be pain-free but to manage your pain at a tolerable level. · Follow up with your primary care provider to: · Work together to create a plan on how to manage your pain. · Talk about ways to help manage your pain that don't involve prescription opioids. · Talk about any and all concerns and side effects. · Help prevent misuse and abuse. · Never sell or share prescription opioids · Help prevent misuse and abuse. · Store prescription opioids in a secure place and out of reach of others (this may include visitors, children, friends, and family).  
· Safely dispose of unused/unwanted prescription opioids: Find your community drug take-back program or your pharmacy mail-back program, or flush them down the toilet, following guidance from the Food and Drug Administration (www.fda.gov/Drugs/ResourcesForYou). · Visit www.cdc.gov/drugoverdose to learn about the risks of opioid abuse and overdose. · If you believe you may be struggling with addiction, tell your health care provider and ask for guidance or call [address] at 1-742-059-RZPX. Discharge Instructions Patient Discharge Instructions Cecilio Sharma / [id number] : 1940 Admitted 2018 Discharged: 2018 What to do at [facility] Recommended diet: full liquid diet x 2 days, then soft diet x 1 week, then regular diet. Avoid bread products and carbonated drinks. Recommended activity: no heavy lifting > 20 lbs x 2 weeks; no driving while taking narcotics for pain. May take shower, but no bath x 2 weeks. Please take over the counter stool softener or miralax while taking narcotics for pain. If you experience a lot of drainage, develop redness around the wound, or a fever over 101 F occurs please call the office. Narcotics and anesthesia sometimes cause nausea and vomiting. If persistent please call the office. Do not hesitate to call with questions or concerns. Follow-up with Dr. Latonia Donato in 2 weeks. Please call [phone] for an appointment. Follow-up with your PCP in 1 week. Information obtained by : 
I understand that if any problems occur once I am at home I am to contact my physician. I understand and acknowledge receipt of the instructions indicated above. Physician's or R.N.'s Signature                                                                  Date/Time Patient or Representative Signature                                                          Date/Time Introducing [facility]! Dear Tanner Scruggs: Thank you for requesting a [employer] account. Our records indicate that you already have an active [employer] account. You can access your account anytime at [email or web address] Did you know that you can access your hospital and ER discharge instructions at any time in [employer]? You can also review all of your test results from your hospital stay or ER visit. Additional Information If you have questions, please visit the Frequently Asked Questions section of the [employer] website at [email or web address] Remember, [employer] is NOT to be used for urgent needs. For medical emergencies, dial 911. Now available from your iPhone and Android! Introducing Andrea Roy As a [employer] patient, I wanted to make you aware of our electronic visit tool called Andrea Roy. [employer] 24/7 allows you to connect within minutes with a medical provider 24 hours a day, seven days a week via a mobile device or tablet or logging into a secure website from your computer. You can access Andrea Roy from anywhere in the [place]. A virtual visit might be right for you when you have a simple condition and feel like you just dont want to get out of bed, or cant get away from work for an appointment, when your regular [employer] provider is not available (evenings, weekends or holidays), or when youre out of town and need minor care. Electronic visits cost only $49 and if the [employer] 24/7 provider determines a prescription is needed to treat your condition, one can be electronically transmitted to a nearby pharmacy*. Please take a moment to enroll today if you have not already done so. The enrollment process is free and takes just a few minutes. To enroll, please download the [employer] 24/7 yung to your tablet or phone, or visit [email or web address] to enroll on your computer. And, as an [address] [place] [address] patient with a [employer] account, the results of your visits will be scanned into your electronic medical record and your primary care provider will be able to view the scanned results. We urge you to continue to see your regular [employer] provider for your ongoing medical care. And while your primary care provider may not be the one available when you seek a [employer] virtual visit, the peace of mind you get from getting a real diagnosis real time can be priceless. For more information on [employer], view our Frequently Asked Questions (FAQs) at [email or web address]. Sincerely, 
 
Eliazar Zarco MD 
Chief Medical Officer [employer] *:  certain medications cannot be prescribed via [employer] Unresulted Labs-Please follow up with your PCP about these lab tests Order Current Status METABOLIC PANEL, BASIC In process Providers Seen During Your Hospitalization Provider Specialty Primary office phone Roselia Ha MD General Surgery [phone] Your Primary Care Physician (PCP) Primary Care Physician Office Phone Office Fax Erendiraalidabobby Berrios [phone] You are allergic to the following Allergen Reactions Benicar (Olmesartan) Unknown (comments) Pt states he has found out that this is not a medication allergy. Demerol (Meperidine) Unknown (comments) Causes unconsciousness Recent Documentation Height Weight BMI Smoking Status 1.651 m 81.5 kg 29.9 kg/m2 Former Smoker Emergency Contacts Name Discharge Info Relation Home Work Mobile Maureen Fajardo CAREGIVER [3] Daughter [21] [phone]    
 G,Sheyla  Child [2] Patient Belongings The following personal items are in your possession at time of discharge: 
  Dental Appliances: None  Visual Aid: Glasses   Hearing Aids/Status: Left  Home Medications: None   Jewelry: None  Clothing: Footwear, Shirt, Undergarments, Pants    Other Valuables: Eyeglasses, Other (comment) (hearing aide x1)  Personal Items Sent to Safe: na 
 
  
  
 Please provide this summary of care documentation to your next provider. Signatures-by signing, you are acknowledging that this After Visit Summary has been reviewed with you and you have received a copy. Patient Signature:  ____________________________________________________________ Date:  ____________________________________________________________  
  
Maryann Drury Provider Signature:  ____________________________________________________________ Date:  ____________________________________________________________

## 2018-08-29 NOTE — PERIOP NOTES
Bedside and Verbal shift change report given to RY Bob (oncoming nurse) by Broderick Santos (offgoing nurse). Report included the following information SBAR, OR Summary, Procedure Summary, Intake/Output and MAR.

## 2018-08-29 NOTE — OP NOTES
Patient ID:   Name: Norbert Sever   Medical Record Number: [id number]   YOB: 1940    Date of Surgery: 8/29/2018      Preoperative Diagnosis:     1. Hiatal hernia  2. GERD    Postoperative Diagnosis:    Same as above    Procedures:    1. Davinci hiatal hernia repair with Bio-A  2. Ilene Rank Nissen fundoplication    Surgeon: Shannon Morin MD      Anesthesia: General    Estimated Blood Loss:  20cc    Indications:   Patient 69 y.o.  male presents with GERD symptoms for 35 years with substernal burning pain.  Patient is currently on Omeprazole 40 mg daily with some breakthrough symptoms.  Patient had esophagram on 4/25/18 which showed small hiatal hernia.  Patient dee dee had EGD on 6/1/18 and it showed 3 cm hiatal hernia and gastritis.  Patient presents today for hiatal hernia and Nissen fundoplication. Procedure Details:  with the patient. The patient was taken to o perating room and placed in the supine position. After adequate general anesthesia was provided, the abdomen was then prepped and draped in the standard surgical fashion. After local anesthetics, small right upper quadrant incision was made and a 8 mm blunt trocar was placed under direct vision. CO2 pneumoperitoneum was then created. A 8 mm blunt trocar was placed supraumbilical and 12 mm Versastep trocar was placed in the right lower quadrant. Additional two 8 mm trocars were placed in the left upper quadrant. Additional stab incision was made in the epigastric area and Thelma liver retractor was placed to expose the hiatus. The Ilene Rank was docked properly and instruments were introduced under direct vision. CO2 pneumoperitoneum was then created. Patient was found to have small hiatal hernia. Phrenoesophageal membrane was divided and dissection continued toward the right valarie of the diaphragm, reducing associated hiatal hernia.  With the stomach retracted inferiorly and to the left, peritoneum off the abdominal surface and hiatal border of the right valarie was dissected. Similarly, the left valarie was dissected. Next, attention was then moved to the greater curvature of the stomach. The short gastric vessels were ligated. Left aspect of the phrenoesophageal membrane was divided. The esophagus was now circumferentially mobilized at the hiatus. Once the stomach was was reduced and esophagus mobilized with exposure of the left and right valarie, the crura was approximated posteriorly with running non-absorbable 0-0 VLoc suture. The hernia repair with reinforced with 6 x 10 cm Bio-A. This was secured to the hiatus using interrupted 2-0 Vicryl. Hernia sac was resected to provide better exposure for Nissen. A floppy Nissen fundoplication was performed. This was done with a series of interrupted 2-0 Surgidec sutures x 4. Hemostasis was good. Trocars were removed and CO2 pneumoperitoneum was released. Skin was reapproximated using 4-0 Monocryl subcuticular followed by Dermabond. Instrument, sponge, and needle counts were correct prior to closure and at the conclusion of the case. The patient was taken to recovery room in good condition having tolerated the procedure well.      CC:  Saul Pop MD

## 2018-08-29 NOTE — H&P
Surgery Consult:  Hiatal hernia Requesting physician:  Dr. Bre Parker 
  
Subjective:  
Patient 69 y.o.  male presents for surgical evaluation of hiatal hernia and GERD. Patient had GERD symptoms for 35 years with substernal burning pain. Patient is currently on Omeprazole 40 mg daily with some breakthrough symptoms. Patient denies any abdominal pain, but reports intermittent nausea. No emesis. Patient also reports intermittent diarrhea. No history of chronic cough or shortness of breath. No history of aspiration pneumonia. Patient props his bed up when he sleeps with 2 bricks. Also has hoarseness on and off. Patient had esophagram on 4/25/18 which showed small hiatal hernia. Patient dee dee had EGD on 6/1/18 and it showed 3 cm hiatal hernia and gastritis. Patient had balloon dilation during EGD. Patient dysphagia but better. No prior esophageal manometry. Patient wants to discuss surgery today to try to come off PPI. Patient has other symptoms including dizziness and fatigue that he think maybe from side effects of PPI. 
  
  
Past Medical & Surgical History: 
    
Past Medical History:  
Diagnosis Date  Arthritis    
 BPH (benign prostatic hyperplasia)    
 Chronic obstructive pulmonary disease (HCC)    
 Elevated LFT's    
 Essential hypertension 9/24/01  GERD (gastroesophageal reflux disease)    
 Ill-defined condition 12/02/2016  
  faint  Liver disease    
  \"fatty liver\" as per patient  Orthostatic hypotension 9/24/01  PVC's 9/24/01  Sleep apnea    
 Syncope 9/24/01  
  secondary to venous insuffiency Past Surgical History:  
Procedure Laterality Date  COLONOSCOPY N/A 12/13/2016  
  COLONOSCOPY performed by Dre Meek MD at [facility] ENDOSCOPY  ECHO 2D ADULT   5/24/12  
  EF 60 - 65%; mild concentric hypertrophy; pulmonary systolic artery pressure upper limits normal  
 HX APPENDECTOMY   1985  
  
  
Social History: 
Social History  
  
     
 Social History  Marital status:   
    Spouse name: N/A  
 Number of children: N/A  
 Years of education: N/A  
  
   
Occupational History  Not on file.  
  
Social History Main Topics  Smoking status: Former Smoker  
    Packs/day: 3.50  
    Quit date: 12/22/1980  Smokeless tobacco: Never Used  Alcohol use No  
      Comment: no alcohol since 1999  Drug use: No  
 Sexual activity: Not on file  
  
    
Other Topics Concern  Not on file  
  
Social History Narrative  
  
  
Family History: 
     
Family History Problem Relation Age of Onset  Stroke Father    
 Heart Disease Father    
  
  
Medications: 
    
Current Outpatient Prescriptions Medication Sig  cyanocobalamin 1,000 mcg tablet Take 1,000 mcg by mouth daily.  cholecalciferol (VITAMIN D3) 1,000 unit cap Take  by mouth daily.  quinapril (ACCUPRIL) 20 mg tablet Take 20 mg by mouth nightly.  doxazosin (CARDURA) 2 mg tablet Take 2 mg by mouth daily.  magnesium oxide 500 mg tab Take  by mouth.  carvedilol (COREG) 12.5 mg tablet Take 12.5 mg by mouth two (2) times daily (with meals).  omeprazole (PRILOSEC) 10 mg capsule Take 40 mg by mouth daily.  aspirin (ASPIRIN) 325 mg tablet Take 325 mg by mouth daily.  escitalopram (LEXAPRO) 20 mg tablet Take 20 mg by mouth daily.  
  
No current facility-administered medications for this visit.   
  
  
Allergies: Allergies Allergen Reactions  Benicar [Olmesartan] Unknown (comments)  
    Pt states he has found out that this is not a medication allergy.  Demerol [Meperidine] Unknown (comments)  
    Causes unconsciousness  
  
  
Review of Systems A comprehensive review of systems was negative except for that written in the HPI. 
  
Objective:  
  
Exam: 
  
    
Visit Vitals  /81 (BP 1 Location: Left arm, BP Patient Position: Sitting)  Pulse 72  Temp 97.4 °F (36.3 °C) (Oral)  Resp 16  
 Ht 5' 6\" (1.676 m)  Wt 82.8 kg (182 lb 9.6 oz)  SpO2 96%  BMI 29.47 kg/m2  
  
General appearance: alert, cooperative, no distress, appears stated age Eyes: negative Lungs: clear to auscultation bilaterally Heart: regular rate and rhythm Abdomen: soft, non-tender. Non-distended. Extremities: extremities normal, atraumatic, no cyanosis or edema. KIMBERLYN. Skin: Skin color, texture, turgor normal. No rashes or lesions. Neurologic: Grossly normal 
  
Assessment:  
  
Small hiatal hernia GERD 
  
Plan:  
  
Esophageal manometry Davinci hiatal hernia repair with fundoplication Risks, benefit, and alternative to surgery was discussed with the patient. The risks of surgery include but not limited to infection, bleeding, intraabdominal organ injury, vagus nerve injury, recurrence, dysphagia, possible conversion to open, and the risks of general anesthetic. Also discussed with the patient that some of his symptoms may not resolve even after coming off of PPI. There's also possibility that he may not be able to come off completely from PPI following surgery. Patient is agreeable to surgery.   All questions answered.

## 2018-08-29 NOTE — PERIOP NOTES
Upon introducing myself to patient, patient asleep laying on right side. Patient when woken, patient states 10/10. Patient states he is under the impression that he would not have had pain after surgery. While speaking to patient, patient falls asleep. Notified Dr. Erich Apgar concerning patient's pain level when woken up and him falling asleep while trying to have a conversation. No more medication to be administered per Dr. Erich Apgar. Will place for sign out. Vitals 60 HR,  184/71 BP.  Patient arrived today at 188/98 BP

## 2018-08-29 NOTE — IP AVS SNAPSHOT
[address] [facility] 
[phone] Patient: Rd Conway MRN: [id number] DXO:4/36/9976 A check yadi indicates which time of day the medication should be taken. My Medications START taking these medications Instructions Each Dose to Equal  
 Morning Noon Evening Bedtime HYDROcodone-acetaminophen 5-325 mg per tablet Commonly known as:  Lenon Gauze Your last dose was: Your next dose is: Take 1-2 Tabs by mouth every four (4) hours as needed for Pain. Max Daily Amount: 12 Tabs. 1-2 Tab CHANGE how you take these medications Instructions Each Dose to Equal  
 Morning Noon Evening Bedtime * carvedilol 12.5 mg tablet Commonly known as:  Jenn Chew What changed:  Another medication with the same name was added. Make sure you understand how and when to take each. Your last dose was: Your next dose is: Take 12.5 mg by mouth two (2) times daily (with meals). 12.5 mg  
    
   
   
   
  
 * carvedilol 12.5 mg tablet Commonly known as:  Jenn Chew What changed: You were already taking a medication with the same name, and this prescription was added. Make sure you understand how and when to take each. Your last dose was: Your next dose is: Take 2 Tabs by mouth two (2) times daily (with meals). 25 mg  
    
   
   
   
  
 * Notice: This list has 2 medication(s) that are the same as other medications prescribed for you. Read the directions carefully, and ask your doctor or other care provider to review them with you. CONTINUE taking these medications Instructions Each Dose to Equal  
 Morning Noon Evening Bedtime  
 cyanocobalamin 1,000 mcg tablet Your last dose was: Your next dose is: Take 1,000 mcg by mouth daily. 1000 mcg  
    
   
   
   
  
 doxazosin 2 mg tablet Commonly known as:  CARDURA Your last dose was: Your next dose is: Take 2 mg by mouth daily. 2 mg  
    
   
   
   
  
 magnesium oxide 500 mg Tab Your last dose was: Your next dose is: Take 1,000 mg by mouth daily. 1000 mg PriLOSEC 10 mg capsule Generic drug:  omeprazole Your last dose was: Your next dose is: Take 40 mg by mouth daily. 40 mg  
    
   
   
   
  
 quinapril 20 mg tablet Commonly known as:  ACCUPRIL Your last dose was: Your next dose is: Take 20 mg by mouth two (2) times a day. 20 mg  
    
   
   
   
  
 VITAMIN D3 1,000 unit Cap Generic drug:  cholecalciferol Your last dose was: Your next dose is: Take 1,000 Units by mouth daily. 1000 Units Where to Get Your Medications These medications were sent to [id number], [address] AT [facility]  [address] [place] [address] [place]  [address] [address] Phone:  [phone]  
  carvedilol 12.5 mg tablet Information on where to get these meds will be given to you by the nurse or doctor. ! Ask your nurse or doctor about these medications HYDROcodone-acetaminophen 5-325 mg per tablet

## 2018-08-29 NOTE — ANESTHESIA POSTPROCEDURE EVALUATION
Post-Anesthesia Evaluation and Assessment Patient: Geeta Farr MRN: [id number]  SSN: [id number] YOB: 1940  Age: 69 y.o. Sex: male Cardiovascular Function/Vital Signs Visit Vitals  /89  Pulse 67  Temp 36.8 °C (98.2 °F)  Resp 15  Ht 5' 5\" (1.651 m)  Wt 81.5 kg (179 lb 10.8 oz)  SpO2 96%  BMI 29.9 kg/m2 Patient is status post general anesthesia for Procedure(s): 
[address] WITH FUNDOPLICATION. Nausea/Vomiting: None Postoperative hydration reviewed and adequate. Pain: 
Pain Scale 1: FLACC (08/29/18 1656) Pain Intensity 1: 0 (08/29/18 1656) Managed Neurological Status:  
Neuro (WDL): Exceptions to WDL (08/29/18 1555) Neuro Neurologic State: Eyes open to voice (08/29/18 1600) Orientation Level: Oriented to person;Disoriented to place; Disoriented to situation;Disoriented to time (08/29/18 1600) Cognition: Decreased attention/concentration; Follows commands (08/29/18 1600) Speech: Delayed responses; Appropriate for age (08/29/18 1600) LUE Motor Response: Purposeful (08/29/18 1555) LLE Motor Response: Purposeful (08/29/18 1555) RUE Motor Response: Purposeful (08/29/18 1555) RLE Motor Response: Purposeful (08/29/18 1555) At baseline Mental Status and Level of Consciousness: Arousable Pulmonary Status:  
O2 Device: Nasal cannula (08/29/18 1645) Adequate oxygenation and airway patent Complications related to anesthesia: None Post-anesthesia assessment completed. No concerns Signed By: Aubree Turpin MD   
 August 29, 2018

## 2018-08-29 NOTE — PERIOP NOTES
Handoff Report from Operating Room to PACU Report received from SHAHRAM Lopez RN and JOSE F Phelps CRNA regarding Yesenia Bhatt. Surgeon(s): 
Veronica Perkins MD  And Procedure(s) (LRB): 
DAVINCI HIATAL HERNIA REPAIR WITH FUNDOPLICATION (N/A)  confirmed  
with allergies, dressings and local anesthetic discussed. Anesthesia type, drugs, patient history, complications, estimated blood loss, vital signs, intake and output, and last pain medication, lines, reversal medications and temperature were reviewed.

## 2018-08-29 NOTE — PROGRESS NOTES
TRANSFER - IN REPORT: 
 
Verbal report received from alberto waddell(name) on Angela Deem  being received from pacu(unit) for routine post - op Report consisted of patients Situation, Background, Assessment and  
Recommendations(SBAR). Information from the following report(s) SBAR, Procedure Summary, Intake/Output, MAR and Recent Results was reviewed with the receiving nurse. Opportunity for questions and clarification was provided. Assessment completed upon patients arrival to unit and care assumed.

## 2018-08-29 NOTE — DISCHARGE INSTRUCTIONS
Patient Discharge Instructions    Norbert Sever / [id number] : 1940    Admitted 2018 Discharged: 2018         What to do at Home    Recommended diet: full liquid diet x 2 days, then soft diet x 1 week, then regular diet. Avoid bread products and carbonated drinks. Recommended activity: no heavy lifting > 20 lbs x 2 weeks; no driving while taking narcotics for pain. May take shower, but no bath x 2 weeks. Please take over the counter stool softener or miralax while taking narcotics for pain. If you experience a lot of drainage, develop redness around the wound, or a fever over 101 F occurs please call the office. Narcotics and anesthesia sometimes cause nausea and vomiting. If persistent please call the office. Do not hesitate to call with questions or concerns. Follow-up with Dr. Marsha Gonzalez in 2 weeks. Please call [phone] for an appointment. Follow-up with your PCP in 1 week. Information obtained by :  I understand that if any problems occur once I am at home I am to contact my physician. I understand and acknowledge receipt of the instructions indicated above.                                                                                                                                                Physician's or R.N.'s Signature                                                                  Date/Time                                                                                                                                              Patient or Representative Signature                                                          Date/Time

## 2018-08-30 LAB
ANION GAP SERPL CALC-SCNC: 6 MMOL/L (ref 5–15)
BUN SERPL-MCNC: 16 MG/DL (ref 6–20)
BUN/CREAT SERPL: 14 (ref 12–20)
CALCIUM SERPL-MCNC: 8.3 MG/DL (ref 8.5–10.1)
CHLORIDE SERPL-SCNC: 105 MMOL/L (ref 97–108)
CO2 SERPL-SCNC: 25 MMOL/L (ref 21–32)
CREAT SERPL-MCNC: 1.13 MG/DL (ref 0.7–1.3)
ERYTHROCYTE [DISTWIDTH] IN BLOOD BY AUTOMATED COUNT: 13.8 % (ref 11.5–14.5)
GLUCOSE SERPL-MCNC: 149 MG/DL (ref 65–100)
HCT VFR BLD AUTO: 38.9 % (ref 36.6–50.3)
HGB BLD-MCNC: 13.1 G/DL (ref 12.1–17)
MCH RBC QN AUTO: 29 PG (ref 26–34)
MCHC RBC AUTO-ENTMCNC: 33.7 G/DL (ref 30–36.5)
MCV RBC AUTO: 86.3 FL (ref 80–99)
NRBC # BLD: 0 K/UL (ref 0–0.01)
NRBC BLD-RTO: 0 PER 100 WBC
PLATELET # BLD AUTO: 155 K/UL (ref 150–400)
PMV BLD AUTO: 10.7 FL (ref 8.9–12.9)
POTASSIUM SERPL-SCNC: 4.6 MMOL/L (ref 3.5–5.1)
RBC # BLD AUTO: 4.51 M/UL (ref 4.1–5.7)
SODIUM SERPL-SCNC: 136 MMOL/L (ref 136–145)
WBC # BLD AUTO: 6.7 K/UL (ref 4.1–11.1)

## 2018-08-30 PROCEDURE — 94640 AIRWAY INHALATION TREATMENT: CPT

## 2018-08-30 PROCEDURE — 36415 COLL VENOUS BLD VENIPUNCTURE: CPT | Performed by: SURGERY

## 2018-08-30 PROCEDURE — 74011000250 HC RX REV CODE- 250: Performed by: SURGERY

## 2018-08-30 PROCEDURE — 85027 COMPLETE CBC AUTOMATED: CPT | Performed by: SURGERY

## 2018-08-30 PROCEDURE — 77010033678 HC OXYGEN DAILY

## 2018-08-30 PROCEDURE — 80048 BASIC METABOLIC PNL TOTAL CA: CPT | Performed by: SURGERY

## 2018-08-30 PROCEDURE — 74011250637 HC RX REV CODE- 250/637: Performed by: SURGERY

## 2018-08-30 PROCEDURE — 94760 N-INVAS EAR/PLS OXIMETRY 1: CPT

## 2018-08-30 PROCEDURE — 65270000029 HC RM PRIVATE

## 2018-08-30 PROCEDURE — 74011250636 HC RX REV CODE- 250/636: Performed by: SURGERY

## 2018-08-30 RX ORDER — HEPARIN SODIUM 5000 [USP'U]/ML
5000 INJECTION, SOLUTION INTRAVENOUS; SUBCUTANEOUS EVERY 12 HOURS
Status: DISCONTINUED | OUTPATIENT
Start: 2018-08-30 | End: 2018-08-31 | Stop reason: HOSPADM

## 2018-08-30 RX ORDER — IPRATROPIUM BROMIDE AND ALBUTEROL SULFATE 2.5; .5 MG/3ML; MG/3ML
3 SOLUTION RESPIRATORY (INHALATION)
Status: DISCONTINUED | OUTPATIENT
Start: 2018-08-30 | End: 2018-08-31

## 2018-08-30 RX ADMIN — Medication 10 ML: at 13:18

## 2018-08-30 RX ADMIN — Medication 2 G: at 06:08

## 2018-08-30 RX ADMIN — IPRATROPIUM BROMIDE AND ALBUTEROL SULFATE 3 ML: .5; 3 SOLUTION RESPIRATORY (INHALATION) at 20:16

## 2018-08-30 RX ADMIN — OXYCODONE HYDROCHLORIDE 5 MG: 5 TABLET ORAL at 17:28

## 2018-08-30 RX ADMIN — MORPHINE SULFATE 2 MG: 2 INJECTION, SOLUTION INTRAMUSCULAR; INTRAVENOUS at 06:08

## 2018-08-30 RX ADMIN — DOXAZOSIN 2 MG: 2 TABLET ORAL at 09:00

## 2018-08-30 RX ADMIN — ACETAMINOPHEN 1000 MG: 10 INJECTION, SOLUTION INTRAVENOUS at 11:03

## 2018-08-30 RX ADMIN — ACETAMINOPHEN 1000 MG: 10 INJECTION, SOLUTION INTRAVENOUS at 06:08

## 2018-08-30 RX ADMIN — DEXTROSE MONOHYDRATE, SODIUM CHLORIDE, AND POTASSIUM CHLORIDE 75 ML/HR: 50; 4.5; 1.49 INJECTION, SOLUTION INTRAVENOUS at 06:08

## 2018-08-30 RX ADMIN — QUINAPRIL 20 MG: 10 TABLET ORAL at 09:00

## 2018-08-30 RX ADMIN — HEPARIN SODIUM 5000 UNITS: 5000 INJECTION, SOLUTION INTRAVENOUS; SUBCUTANEOUS at 17:20

## 2018-08-30 RX ADMIN — IPRATROPIUM BROMIDE AND ALBUTEROL SULFATE 3 ML: .5; 3 SOLUTION RESPIRATORY (INHALATION) at 15:31

## 2018-08-30 RX ADMIN — ACETAMINOPHEN 1000 MG: 10 INJECTION, SOLUTION INTRAVENOUS at 00:38

## 2018-08-30 RX ADMIN — OXYCODONE HYDROCHLORIDE 5 MG: 5 TABLET ORAL at 00:38

## 2018-08-30 RX ADMIN — PANTOPRAZOLE SODIUM 40 MG: 40 TABLET, DELAYED RELEASE ORAL at 07:10

## 2018-08-30 RX ADMIN — CARVEDILOL 12.5 MG: 12.5 TABLET, FILM COATED ORAL at 09:00

## 2018-08-30 RX ADMIN — CARVEDILOL 12.5 MG: 12.5 TABLET, FILM COATED ORAL at 17:20

## 2018-08-30 RX ADMIN — QUINAPRIL 20 MG: 10 TABLET ORAL at 17:20

## 2018-08-30 NOTE — PROGRESS NOTES
Bedside and Verbal shift change report given to Holly Rodriguez rn (oncoming nurse) by Gurvinder Patton (offgoing nurse). Report included the following information SBAR, Procedure Summary, Intake/Output, MAR and Recent Results.

## 2018-08-30 NOTE — PROGRESS NOTES
Problem: Falls - Risk of 
Goal: *Absence of Falls Document Jackie Olivares Fall Risk and appropriate interventions in the flowsheet. Outcome: Progressing Towards Goal 
Fall Risk Interventions: 
  
 
  
 
Medication Interventions: Patient to call before getting OOB

## 2018-08-30 NOTE — PROGRESS NOTES
Admit Date: 2018 POD 1 Day Post-Op Procedure:  Procedure(s): 
DAVINCI HIATAL HERNIA REPAIR WITH FUNDOPLICATION Assessment:  
Active Problems: 
  Hiatal hernia with GERD (2018) 1. Tolerating clears 2. Hypoxia and some DE JESUS 
3. COPD Plan/Recommendations/Medical Decision Makin. Pulmonary toilet 2. Albuterol nebs 3. Mobilize 4. Full liquids Subjective:  
 
Patient has complaints of pain and and shortness of breath. Objective:  
 
Blood pressure 111/73, pulse 62, temperature 97.5 °F (36.4 °C), resp. rate 16, height 5' 5\" (1.651 m), weight 81.5 kg (179 lb 10.8 oz), SpO2 97 %. Temp (24hrs), Av.1 °F (36.7 °C), Min:97.5 °F (36.4 °C), Max:98.8 °F (37.1 °C) Physical Exam:  LUNG: wheezes R apex, L apex, HEART: regular rate and rhythm, S1, S2 normal, no murmur, click, rub or gallop, ABDOMEN: soft, non-tender. Bowel sounds normal. No masses,  no organomegaly, approp tender incisions CDI Labs:  
Recent Results (from the past 48 hour(s)) METABOLIC PANEL, BASIC Collection Time: 18  5:43 AM  
Result Value Ref Range Sodium 136 136 - 145 mmol/L Potassium 4.6 3.5 - 5.1 mmol/L Chloride 105 97 - 108 mmol/L  
 CO2 25 21 - 32 mmol/L Anion gap 6 5 - 15 mmol/L Glucose 149 (H) 65 - 100 mg/dL BUN 16 6 - 20 MG/DL Creatinine 1.13 0.70 - 1.30 MG/DL  
 BUN/Creatinine ratio 14 12 - 20 GFR est AA >60 >60 ml/min/1.73m2 GFR est non-AA >60 >60 ml/min/1.73m2 Calcium 8.3 (L) 8.5 - 10.1 MG/DL  
CBC W/O DIFF Collection Time: 18  5:43 AM  
Result Value Ref Range WBC 6.7 4.1 - 11.1 K/uL  
 RBC 4.51 4.10 - 5.70 M/uL  
 HGB 13.1 12.1 - 17.0 g/dL HCT 38.9 36.6 - 50.3 % MCV 86.3 80.0 - 99.0 FL  
 MCH 29.0 26.0 - 34.0 PG  
 MCHC 33.7 30.0 - 36.5 g/dL  
 RDW 13.8 11.5 - 14.5 % PLATELET 824 122 - 368 K/uL MPV 10.7 8.9 - 12.9 FL  
 NRBC 0.0 0  WBC ABSOLUTE NRBC 0.00 0.00 - 0.01 K/uL Data Review images and reports reviewed

## 2018-08-30 NOTE — PROGRESS NOTES
Reason for Admission:   Hiatal hernia RRAT Score:   12 Plan for utilizing home health:   Not at this time Likelihood of Readmission:  Low Transition of Care Plan:   Patient is independent with ADL/IADL and drives. Patient denies past HH/Rehab and DME use. Patients daughter will be staying with patient for a week after discharge. Patient does not have any needs or concerns at this point. CM will continue to follow. PCP- Dr Autumn Chamberlain [facility] [address] [facility] Care Management Interventions PCP Verified by CM: Yes (Dr Autumn Chamberlain) Mode of Transport at Discharge: Other (see comment) (Daughter) Transition of Care Consult (CM Consult): Discharge Planning Discharge Durable Medical Equipment: No (no DME use) Physical Therapy Consult: No 
Occupational Therapy Consult: No 
Speech Therapy Consult: No 
Current Support Network: Lives Alone (Lives in a one story home with 2 steps to enter the home) Confirm Follow Up Transport: Self Plan discussed with Pt/Family/Caregiver: Yes Discharge Location Discharge Placement: Home Tamie Medel [id number]

## 2018-08-30 NOTE — PROGRESS NOTES
General Surgery End of Shift Nursing Note Bedside shift change report given to Yoli Danielle (oncoming nurse) by Jayson Frost. Álvaro Pantoja RN (offgoing nurse). Report included the following information SBAR, Kardex, Intake/Output, MAR and Recent Results. Shift worked:   7a to 3p Summary of shift:    Tolerated the diet, up to chair and ambulating in halls. Issues for physician to address:   none Number times ambulated in hallway past shift: 2 Number of times OOB to chair past shift: 2 Pain Management: 
Current medication: none Patient states pain is manageable on current pain medication: YES 
 
GI: 
 
Current diet:  DIET FULL LIQUID Tolerating current diet: YES Passing flatus: YES Last Bowel Movement: yesterday Appearance: soft Respiratory: 
 
Incentive Spirometer at bedside: YES Patient instructed on use: YES Patient Safety: 
 
Falls Score: 2 Bed Alarm On? No 
Sitter?  No 
 
Princess Bernard RN

## 2018-08-30 NOTE — PROGRESS NOTES
Spiritual Care Partner Volunteer visited patient in general surgery  on 8/30/2018. Documented by: 
Visit by: Rev. David Riddle D.Min, MA, [address] [place]  [employer] Development [employer]ment

## 2018-08-30 NOTE — PROGRESS NOTES
Bedside shift change report given to Florence (oncoming nurse) by Luann De Jesus (offgoing nurse). Report included the following information SBAR, Kardex, MAR and Recent Results.

## 2018-08-30 NOTE — PROGRESS NOTES
Surgery Tolerating full liquids Breathing some better Getting neb now Was able to walk the halls twice today If able to get off oxygen will be ok for discharge tonight, otherwise will keep one more night Charly Leon MD FACS

## 2018-08-31 VITALS
SYSTOLIC BLOOD PRESSURE: 139 MMHG | RESPIRATION RATE: 18 BRPM | WEIGHT: 179.68 LBS | TEMPERATURE: 98.7 F | HEIGHT: 65 IN | HEART RATE: 123 BPM | BODY MASS INDEX: 29.94 KG/M2 | OXYGEN SATURATION: 99 % | DIASTOLIC BLOOD PRESSURE: 96 MMHG

## 2018-08-31 LAB
ANION GAP SERPL CALC-SCNC: 9 MMOL/L (ref 5–15)
BUN SERPL-MCNC: 16 MG/DL (ref 6–20)
BUN/CREAT SERPL: 14 (ref 12–20)
CALCIUM SERPL-MCNC: 8.9 MG/DL (ref 8.5–10.1)
CHLORIDE SERPL-SCNC: 105 MMOL/L (ref 97–108)
CO2 SERPL-SCNC: 24 MMOL/L (ref 21–32)
CREAT SERPL-MCNC: 1.12 MG/DL (ref 0.7–1.3)
ERYTHROCYTE [DISTWIDTH] IN BLOOD BY AUTOMATED COUNT: 14 % (ref 11.5–14.5)
GLUCOSE SERPL-MCNC: 104 MG/DL (ref 65–100)
HCT VFR BLD AUTO: 42 % (ref 36.6–50.3)
HGB BLD-MCNC: 14.4 G/DL (ref 12.1–17)
MCH RBC QN AUTO: 29.2 PG (ref 26–34)
MCHC RBC AUTO-ENTMCNC: 34.3 G/DL (ref 30–36.5)
MCV RBC AUTO: 85.2 FL (ref 80–99)
NRBC # BLD: 0 K/UL (ref 0–0.01)
NRBC BLD-RTO: 0 PER 100 WBC
PLATELET # BLD AUTO: 145 K/UL (ref 150–400)
PMV BLD AUTO: 10.7 FL (ref 8.9–12.9)
POTASSIUM SERPL-SCNC: 3.9 MMOL/L (ref 3.5–5.1)
RBC # BLD AUTO: 4.93 M/UL (ref 4.1–5.7)
SODIUM SERPL-SCNC: 138 MMOL/L (ref 136–145)
WBC # BLD AUTO: 5.4 K/UL (ref 4.1–11.1)

## 2018-08-31 PROCEDURE — 36415 COLL VENOUS BLD VENIPUNCTURE: CPT | Performed by: SURGERY

## 2018-08-31 PROCEDURE — 80048 BASIC METABOLIC PNL TOTAL CA: CPT | Performed by: SURGERY

## 2018-08-31 PROCEDURE — 74011250636 HC RX REV CODE- 250/636: Performed by: SURGERY

## 2018-08-31 PROCEDURE — 74011250637 HC RX REV CODE- 250/637: Performed by: SURGERY

## 2018-08-31 PROCEDURE — 85027 COMPLETE CBC AUTOMATED: CPT | Performed by: SURGERY

## 2018-08-31 RX ORDER — ACETAMINOPHEN 325 MG/1
650 TABLET ORAL EVERY 6 HOURS
Status: DISCONTINUED | OUTPATIENT
Start: 2018-08-31 | End: 2018-08-31 | Stop reason: HOSPADM

## 2018-08-31 RX ORDER — IPRATROPIUM BROMIDE AND ALBUTEROL SULFATE 2.5; .5 MG/3ML; MG/3ML
3 SOLUTION RESPIRATORY (INHALATION)
Status: DISCONTINUED | OUTPATIENT
Start: 2018-08-31 | End: 2018-08-31

## 2018-08-31 RX ORDER — DEXTROSE, SODIUM CHLORIDE, AND POTASSIUM CHLORIDE 5; .45; .15 G/100ML; G/100ML; G/100ML
50 INJECTION INTRAVENOUS CONTINUOUS
Status: DISCONTINUED | OUTPATIENT
Start: 2018-08-31 | End: 2018-08-31 | Stop reason: HOSPADM

## 2018-08-31 RX ORDER — IPRATROPIUM BROMIDE AND ALBUTEROL SULFATE 2.5; .5 MG/3ML; MG/3ML
3 SOLUTION RESPIRATORY (INHALATION)
Status: DISCONTINUED | OUTPATIENT
Start: 2018-08-31 | End: 2018-08-31 | Stop reason: HOSPADM

## 2018-08-31 RX ORDER — CARVEDILOL 12.5 MG/1
25 TABLET ORAL 2 TIMES DAILY WITH MEALS
Qty: 30 TAB | Refills: 0 | Status: ON HOLD | OUTPATIENT
Start: 2018-08-31 | End: 2019-04-29

## 2018-08-31 RX ADMIN — DEXTROSE, SODIUM CHLORIDE, AND POTASSIUM CHLORIDE 50 ML/HR: 5; .45; .15 INJECTION INTRAVENOUS at 10:02

## 2018-08-31 RX ADMIN — PANTOPRAZOLE SODIUM 40 MG: 40 TABLET, DELAYED RELEASE ORAL at 08:31

## 2018-08-31 RX ADMIN — QUINAPRIL 20 MG: 10 TABLET ORAL at 08:29

## 2018-08-31 RX ADMIN — CARVEDILOL 12.5 MG: 12.5 TABLET, FILM COATED ORAL at 08:30

## 2018-08-31 RX ADMIN — DOXAZOSIN 2 MG: 2 TABLET ORAL at 08:30

## 2018-08-31 RX ADMIN — HEPARIN SODIUM 5000 UNITS: 5000 INJECTION, SOLUTION INTRAVENOUS; SUBCUTANEOUS at 05:25

## 2018-08-31 NOTE — PROGRESS NOTES
08/30/18 2017 Oxygen Therapy O2 Sat (%) 97 % Pulse via Oximetry 77 beats per minute O2 Device Nasal cannula O2 Flow Rate (L/min) 1 l/min Procedures  
$$ Subsequent Procedure Aerosol Delivery Source Breath Actuated Nebulizer Aerosolized Medications DuoNeb Incentive Spirometry Treatment Actual Volume (ml) 1250 ml

## 2018-08-31 NOTE — PROGRESS NOTES
Pt's IV discontinued as pt is being discharged. Catheter tip intact, no redness or swelling noted at insertion site. Band-aid applied. Pt discharged per MD order. Pt has all personal belongings, 1 prescription, and discharge instructions. Discharge instructions gone over with pt. Pt does not have any further questions regarding discharge. Pt instructed to take his Bp and hr at home and to call Dr. Faith Carrero with ANY changes or concerns. Pt and daughter agree.

## 2018-08-31 NOTE — PROGRESS NOTES
08/30/18 2017 Pre-Treatment Breathing Pattern Regular Cough Non-productive Breath Sounds Bilateral Clear;Diminished Left Breath Sounds Clear;Diminished Right Breath Sounds Clear;Diminished Pulse 77 SpO2 97 % Respirations 18

## 2018-08-31 NOTE — PROGRESS NOTES
Patient is being discharged home today without any needs or concerns. Patients daughter will transport patient home and will be staying with patient for the first week. Care Management Interventions PCP Verified by CM: Yes (Dr Krunal Lara) Mode of Transport at Discharge: Other (see comment) (Daughter) Transition of Care Consult (CM Consult): Discharge Planning Discharge Durable Medical Equipment: No (no DME use) Physical Therapy Consult: No 
Occupational Therapy Consult: No 
Speech Therapy Consult: No 
Current Support Network: Lives Alone (Lives in a one story home with 2 steps to enter the home) Confirm Follow Up Transport: Self Plan discussed with Pt/Family/Caregiver: Yes Discharge Location Discharge Placement: Home with family assistance Glendy Gaxiola Ext [phone] [facility] called stating they are trying to verify iron supplement that was sent over yesterday. Please call back.

## 2018-08-31 NOTE — PROGRESS NOTES
SURGERY PROGRESS NOTE Admit Date: 2018 POD 2 Days Post-Op Procedure: Procedure(s): 
DAVINCI HIATAL HERNIA REPAIR WITH FUNDOPLICATION Subjective:  
 
Patient unable to sleep due to uncomfortable bed. Patient wants to go home. SOB unchanged and according to his daughter this is his baseline. O2 Sat 97% on room air. Tolerating full liquid diet. Pain well controlled. Didn't take any narcotics over night. Objective:  
 
Visit Vitals  /90 (BP 1 Location: Right arm, BP Patient Position: At rest)  Pulse (!) 111  Temp 98.5 °F (36.9 °C)  Resp 18  Ht 5' 5\" (1.651 m)  Wt 81.5 kg (179 lb 10.8 oz)  SpO2 97%  BMI 29.9 kg/m2 Temp (24hrs), Av.3 °F (36.8 °C), Min:97.5 °F (36.4 °C), Max:98.9 °F (37.2 °C) Physical Exam: Abdomen:  Soft. Non-distended. Incision C/D/I. Lab Results Component Value Date/Time WBC 6.7 2018 05:43 AM  
HGB 13.1 2018 05:43 AM  
HCT 38.9 2018 05:43 AM  
PLATELET 115  05:43 AM  
MCV 86.3 2018 05:43 AM  
 
Lab Results Component Value Date/Time GFR est non-AA >60 2018 05:43 AM  
GFRNA, POC 59 (L) 2016 07:52 AM  
GFR est AA >60 2018 05:43 AM  
GFRAA, POC >60 2016 07:52 AM  
Creatinine 1.13 2018 05:43 AM  
Creatinine (POC) 1.2 2016 07:52 AM  
BUN 16 2018 05:43 AM  
Sodium 136 2018 05:43 AM  
Potassium 4.6 2018 05:43 AM  
Chloride 105 2018 05:43 AM  
CO2 25 2018 05:43 AM  
Magnesium 2.0 2012 12:00 AM  
 
 
Assessment:  
 
Active Problems: 
  Hiatal hernia with GERD (2018) Plan/Recommendations/Medical Decision Making:  
 
Recommended staying another night to keep an eye on the HR, but patient and patient's daughter wants to go home. Patient's daughter will be with him at home. Instructed to take BP and HR at home. F/u with PCP next week.

## 2018-08-31 NOTE — PROGRESS NOTES
PCP SHERYL appt scheduled with Corinne Tamayo on 9/7/2018 at 2:45pm. Appt added to [address] [facility], [address] [facility] [address] Specialist suite

## 2018-08-31 NOTE — PROGRESS NOTES
Addressed MEWS score 3. See new orders Dr. Olga Coffey. CBC, Chem 7, increase IVF to 50ml/hr, Tylenol Q6 scheduled

## 2018-08-31 NOTE — PROGRESS NOTES
Problem: Falls - Risk of 
Goal: *Absence of Falls Document Abigail Matthews Fall Risk and appropriate interventions in the flowsheet. Outcome: Progressing Towards Goal 
Fall Risk Interventions: 
  
 
  
 
Medication Interventions: Patient to call before getting OOB Elimination Interventions: Call light in reach

## 2018-08-31 NOTE — ROUTINE PROCESS
Pt with a MEWS of three. Pt's Hr has been elevated. Dr. Madelaine Hewitt aware. To follow through with d/c.

## 2018-09-01 NOTE — PROGRESS NOTES
Problem: Falls - Risk of 
Goal: *Absence of Falls Document Raquel Coleman Fall Risk and appropriate interventions in the flowsheet. Outcome: Progressing Towards Goal 
Fall Risk Interventions: 
Mobility Interventions: Patient to call before getting OOB Medication Interventions: Patient to call before getting OOB Elimination Interventions: Call light in reach Comments: Oriented No tele Full arina Perez, BM 8/28 
 
6 incision sites D5 with 0.45 KCL 20 meq 
 
-------------------- 
 
MEWS 3 - Tylenol every6 ATC 
 
CBC, CHEM 7 Discharge today

## 2018-09-05 NOTE — DISCHARGE SUMMARY
DISCHARGE SUMMARY      Patient ID:  Norbert Sever  [id number]  74 y.o.  1940    Admit date: 8/29/2018    Discharge date and time: 8/31/2018 12:15 PM     Admitting Physician: German Maki MD     Discharge Physician: Marsha Gonzalez MD    Admission Diagnoses: HIATAL HERNIA;Hiatal hernia with GERD    Procedures: Procedure(s):  [facility] FUNDOPLICATION    Discharge Diagnoses: Active Problems:    Hiatal hernia with GERD (8/29/2018)          Hospital Course:  Patient underwent Davinci hiatal hernia repair with Bio-A and Nissen fundoplication on 8/01/06. Patient tolerated the advancement of diet and was discharged home on POD #2.       D/C Disposition: home     Diet: full liquid diet    Follow-up Information     Follow up With Details Comments Contact Info    German Maki MD On 9/12/2018 9:40am [address] [place] [address]  [address] [place] [address] NP Go on 9/7/2018 Hospital follow-up scheduled at 2:45pm Please bring Insurance Card, Photo ID, list of medications and any co-pay you may have ( If you have questions or need to reschedule please call [address] [place] [address]  [phone]            Signed:  German Maki MD  9/5/2018  2:03 PM

## 2018-09-12 ENCOUNTER — OFFICE VISIT (OUTPATIENT)
Dept: SURGERY | Age: 78
End: 2018-09-12

## 2018-09-12 VITALS
TEMPERATURE: 98.4 F | OXYGEN SATURATION: 98 % | RESPIRATION RATE: 16 BRPM | BODY MASS INDEX: 29.01 KG/M2 | WEIGHT: 174.1 LBS | DIASTOLIC BLOOD PRESSURE: 82 MMHG | HEART RATE: 78 BPM | HEIGHT: 65 IN | SYSTOLIC BLOOD PRESSURE: 138 MMHG

## 2018-09-12 DIAGNOSIS — Z09 POSTOPERATIVE EXAMINATION: Primary | ICD-10-CM

## 2018-09-12 NOTE — MR AVS SNAPSHOT
[address], [address] [facility], [address] 
[phone] Patient: Luis Moulton MRN: [id number] ZRM:1/04/6[birth year] Visit Information Date & Time Provider Department Dept. Phone Encounter #  
 9/12/2018  9:40 AM Homero Levine MD [facility] [place] [id number] [id number] Upcoming Health Maintenance Date Due ZOSTER VACCINE AGE 60> 4/24/2001 GLAUCOMA SCREENING Q2Y 6/24/2006 Pneumococcal 65+ Low/Medium Risk (1 of 2 - PCV13) 6/24/2006 MEDICARE YEARLY EXAM 3/14/2018 Influenza Age 5 to Adult 8/1/2018 DTaP/Tdap/Td series (2 - Td) 11/22/2027 Allergies as of 9/12/2018  Review Complete On: 9/12/2018 By: Homero Levine MD  
  
 Severity Noted Reaction Type Reactions Benicar [Olmesartan]  12/06/2010    Unknown (comments) Pt states he has found out that this is not a medication allergy. Demerol [Meperidine]  12/06/2010    Unknown (comments) Causes unconsciousness Current Immunizations  Reviewed on 11/22/2017 Name Date Tdap 11/22/2017 10:30 AM  
  
 Not reviewed this visit You Were Diagnosed With   
  
 Codes Comments Postoperative examination    -  Primary ICD-10-CM: S92 ICD-9-CM: V67.00 Vitals BP Pulse Temp Resp Height(growth percentile) Weight(growth percentile) 138/82 (BP 1 Location: Left arm, BP Patient Position: Sitting) 78 98.4 °F (36.9 °C) 16 5' 5\" (1.651 m) 174 lb 1.6 oz (79 kg) SpO2 BMI Smoking Status 98% 28.97 kg/m2 Former Smoker BMI and BSA Data Body Mass Index Body Surface Area  
 28.97 kg/m 2 1.9 m 2 Preferred Pharmacy Pharmacy Name Phone [id number] [id number] [place] [address] [phone] Your Updated Medication List  
  
   
This list is accurate as of 9/12/18 10:08 AM.  Always use your most recent med list.  
  
  
  
  
 * carvedilol 12.5 mg tablet Commonly known as:  Cori Flo Take 12.5 mg by mouth two (2) times daily (with meals). * carvedilol 12.5 mg tablet Commonly known as:  Cori Bridger Take 2 Tabs by mouth two (2) times daily (with meals). cyanocobalamin 1,000 mcg tablet Take 1,000 mcg by mouth daily. doxazosin 2 mg tablet Commonly known as:  CARDURA Take 2 mg by mouth daily. magnesium oxide 500 mg Tab Take 1,000 mg by mouth daily. PriLOSEC 10 mg capsule Generic drug:  omeprazole Take 40 mg by mouth daily. quinapril 20 mg tablet Commonly known as:  ACCUPRIL Take 20 mg by mouth two (2) times a day. VITAMIN D3 1,000 unit Cap Generic drug:  cholecalciferol Take 1,000 Units by mouth daily. * Notice: This list has 2 medication(s) that are the same as other medications prescribed for you. Read the directions carefully, and ask your doctor or other care provider to review them with you. Introducing [facility]! Dear Jesus Laureano: Thank you for requesting a [employer] account. Our records indicate that you already have an active [employer] account. You can access your account anytime at [email or web address] Did you know that you can access your hospital and ER discharge instructions at any time in [employer]? You can also review all of your test results from your hospital stay or ER visit. Additional Information If you have questions, please visit the Frequently Asked Questions section of the [employer] website at [email or web address] Remember, Let[employer]de[employer] is NOT to be used for urgent needs. For medical emergencies, dial 911. Now available from your iPhone and Android! Please provide this summary of care documentation to your next provider. Your primary care clinician is listed as Eugene Anna. If you have any questions after today's visit, please call [phone].

## 2018-09-12 NOTE — PROGRESS NOTES
Patient is here for follow-up. Patient underwent Davinci hiatal hernia repair with Bio-A and Nissen on 8/29/18. Doing well. On regular diet. Had mild dysphagia when eating ham biscuit. No GERD symptoms. Self d/c'ed prilosec a day after surgery. PE:  Visit Vitals    /82 (BP 1 Location: Left arm, BP Patient Position: Sitting)    Pulse 78    Temp 98.4 °F (36.9 °C)    Resp 16    Ht 5' 5\" (1.651 m)    Wt 79 kg (174 lb 1.6 oz)    SpO2 98%    BMI 28.97 kg/m2     Abdomen:  Soft, ND. Inc C/D/I. Mild rim of erythema around the supraumbilical and LUQ trocar site. No drainage or fluctuance. Neosporin applied.     Assessment:  S/p Davinci hiatal hernia repair with Bio-A and Nissen    Plan:  -instructed to avoid bread products for now  -neosporin to the trocar sites  -RTC in 2-3 weeks; earlier if no improvement

## 2018-10-05 ENCOUNTER — OFFICE VISIT (OUTPATIENT)
Dept: SURGERY | Age: 78
End: 2018-10-05

## 2018-10-05 VITALS
SYSTOLIC BLOOD PRESSURE: 126 MMHG | OXYGEN SATURATION: 96 % | WEIGHT: 173.6 LBS | TEMPERATURE: 97.7 F | RESPIRATION RATE: 16 BRPM | HEIGHT: 65 IN | BODY MASS INDEX: 28.92 KG/M2 | DIASTOLIC BLOOD PRESSURE: 77 MMHG | HEART RATE: 91 BPM

## 2018-10-05 DIAGNOSIS — Z09 POSTOPERATIVE EXAMINATION: Primary | ICD-10-CM

## 2018-10-05 NOTE — PROGRESS NOTES
Patient is here for follow-up. Patient underwent Davinci hiatal hernia repair with Bio-A and Nissen on 8/29/18. Doing well. On regular diet. Had mild dysphagia when eating pancakes but was able to eat hamburger without dysphagia. Had 1 episode of GERD symptoms. Current not on PPI. PE:  Visit Vitals    /77 (BP 1 Location: Left arm, BP Patient Position: Sitting)    Pulse 91    Temp 97.7 °F (36.5 °C) (Oral)    Resp 16    Ht 5' 5\" (1.651 m)    Wt 78.7 kg (173 lb 9.6 oz)    SpO2 96%    BMI 28.89 kg/m2     Abdomen:  Soft, ND. Inc C/D/I.      Assessment:  S/p Davinci hiatal hernia repair with Bio-A and Nissen    Plan:  -RTC in 2 months

## 2018-10-05 NOTE — MR AVS SNAPSHOT
[address] [facility], [address] [facility], [address] 
[phone] Patient: Kalpana Taylor MRN: [id number] PTL:0/08/1218 Visit Information Date & Time Provider Department Dept. Phone Encounter #  
 10/5/2018  1:00 PM Margret Moon MD [facility] [place] [id number] Upcoming Health Maintenance Date Due Shingrix Vaccine Age 50> (1 of 2) 6/24/1991 GLAUCOMA SCREENING Q2Y 6/24/2006 Pneumococcal 65+ Low/Medium Risk (1 of 2 - PCV13) 6/24/2006 MEDICARE YEARLY EXAM 3/14/2018 Influenza Age 5 to Adult 8/1/2018 DTaP/Tdap/Td series (2 - Td) 11/22/2027 Allergies as of 10/5/2018  Review Complete On: 10/5/2018 By: Margret Moon MD  
  
 Severity Noted Reaction Type Reactions Benicar [Olmesartan]  12/06/2010    Unknown (comments) Pt states he has found out that this is not a medication allergy. Demerol [Meperidine]  12/06/2010    Unknown (comments) Causes unconsciousness Current Immunizations  Reviewed on 11/22/2017 Name Date Tdap 11/22/2017 10:30 AM  
  
 Not reviewed this visit You Were Diagnosed With   
  
 Codes Comments Postoperative examination    -  Primary ICD-10-CM: O40 ICD-9-CM: V67.00 Vitals BP Pulse Temp Resp Height(growth percentile) Weight(growth percentile) 126/77 (BP 1 Location: Left arm, BP Patient Position: Sitting) 91 97.7 °F (36.5 °C) (Oral) 16 5' 5\" (1.651 m) 173 lb 9.6 oz (78.7 kg) SpO2 BMI Smoking Status 96% 28.89 kg/m2 Former Smoker BMI and BSA Data Body Mass Index Body Surface Area  
 28.89 kg/m 2 1.9 m 2 Preferred Pharmacy Pharmacy Name Phone [id number], [id number] [place] [address] [phone] Your Updated Medication List  
  
   
This list is accurate as of 10/5/18  1:08 PM.  Always use your most recent med list.  
  
  
  
  
 * carvedilol 12.5 mg tablet Commonly known as:  Ortiz Bolognjyotsna Take 12.5 mg by mouth two (2) times daily (with meals). * carvedilol 12.5 mg tablet Commonly known as:  Ortiz Bolognese Take 2 Tabs by mouth two (2) times daily (with meals). cyanocobalamin 1,000 mcg tablet Take 1,000 mcg by mouth daily. doxazosin 2 mg tablet Commonly known as:  CARDURA Take 2 mg by mouth daily. magnesium oxide 500 mg Tab Take 1,000 mg by mouth daily. PriLOSEC 10 mg capsule Generic drug:  omeprazole Take 40 mg by mouth daily. quinapril 20 mg tablet Commonly known as:  ACCUPRIL Take 20 mg by mouth two (2) times a day. VITAMIN D3 1,000 unit Cap Generic drug:  cholecalciferol Take 1,000 Units by mouth daily. * Notice: This list has 2 medication(s) that are the same as other medications prescribed for you. Read the directions carefully, and ask your doctor or other care provider to review them with you. Introducing [facility]! Dear Kennedy Montes: Thank you for requesting a [employer] account. Our records indicate that you already have an active [employer] account. You can access your account anytime at [email or web address] Did you know that you can access your hospital and ER discharge instructions at any time in [employer]? You can also review all of your test results from your hospital stay or ER visit. Additional Information If you have questions, please visit the Frequently Asked Questions section of the [employer] website at [email or web address] Remember, [employer] is NOT to be used for urgent needs. For medical emergencies, dial 911. Now available from your iPhone and Android! Please provide this summary of care documentation to your next provider. Your primary care clinician is listed as Dao Johnson. If you have any questions after today's visit, please call [phone].

## 2018-10-05 NOTE — PROGRESS NOTES
Coleman Duran is a 69 y.o. male     Chief Complaint   Patient presents with    Surgical Follow-up     po hiatal hernia 8/29/18. Visit Vitals    /77 (BP 1 Location: Left arm, BP Patient Position: Sitting)    Pulse 91    Temp 97.7 °F (36.5 °C) (Oral)    Resp 16    Ht 5' 5\" (1.651 m)    Wt 173 lb 9.6 oz (78.7 kg)    SpO2 96%    BMI 28.89 kg/m2       Health Maintenance Due   Topic Date Due    Shingrix Vaccine Age 49> (1 of 2) 06/24/1991    GLAUCOMA SCREENING Q2Y  06/24/2006    Pneumococcal 65+ Low/Medium Risk (1 of 2 - PCV13) 06/24/2006    MEDICARE YEARLY EXAM  03/14/2018    Influenza Age 9 to Adult  08/01/2018       1. Have you been to the ER, urgent care clinic since your last visit? Hospitalized since your last visit? No    2. Have you seen or consulted any other health care providers outside of the [address] [place] [address] since your last visit? Include any pap smears or colon screening. No    Do you have an Advance Directive? No

## 2018-12-07 ENCOUNTER — OFFICE VISIT (OUTPATIENT)
Dept: SURGERY | Age: 78
End: 2018-12-07

## 2018-12-07 VITALS
RESPIRATION RATE: 18 BRPM | SYSTOLIC BLOOD PRESSURE: 156 MMHG | HEART RATE: 83 BPM | HEIGHT: 65 IN | DIASTOLIC BLOOD PRESSURE: 89 MMHG | OXYGEN SATURATION: 97 % | TEMPERATURE: 97.6 F | BODY MASS INDEX: 29.16 KG/M2 | WEIGHT: 175 LBS

## 2018-12-07 DIAGNOSIS — Z09 POSTOPERATIVE EXAMINATION: Primary | ICD-10-CM

## 2018-12-07 NOTE — PROGRESS NOTES
Patient is here for follow-up. Patient underwent Davinci hiatal hernia repair with Bio-A and Nissen on 8/29/18. Doing well. On regular diet. No dysphagia. Had 1 episode of GERD symptoms since last visit and took 1 of omeprazole with good results. Not taking PPI on regular basis. PE:  Visit Vitals  /89 (BP 1 Location: Left arm, BP Patient Position: Sitting)   Pulse 83   Temp 97.6 °F (36.4 °C)   Resp 18   Ht 5' 5\" (1.651 m)   Wt 79.4 kg (175 lb)   SpO2 97%   BMI 29.12 kg/m²     Abdomen:  [employer] C/D/I.      Assessment:  S/p Davinci hiatal hernia repair with Bio-A and Nissen    Plan:  -f/u prn

## 2019-01-23 ENCOUNTER — HOSPITAL ENCOUNTER (EMERGENCY)
Age: 79
Discharge: HOME | End: 2019-01-23
Attending: EMERGENCY MEDICINE
Payer: MEDICARE

## 2019-01-23 ENCOUNTER — APPOINTMENT (OUTPATIENT)
Dept: CT IMAGING | Age: 79
End: 2019-01-23
Attending: PHYSICIAN ASSISTANT
Payer: MEDICARE

## 2019-01-23 VITALS
SYSTOLIC BLOOD PRESSURE: 147 MMHG | DIASTOLIC BLOOD PRESSURE: 104 MMHG | BODY MASS INDEX: 29.16 KG/M2 | HEIGHT: 65 IN | HEART RATE: 89 BPM | TEMPERATURE: 97.4 F | WEIGHT: 175.04 LBS | OXYGEN SATURATION: 99 % | RESPIRATION RATE: 16 BRPM

## 2019-01-23 DIAGNOSIS — S00.83XA CONTUSION OF FACE, INITIAL ENCOUNTER: ICD-10-CM

## 2019-01-23 DIAGNOSIS — S09.90XA CLOSED HEAD INJURY, INITIAL ENCOUNTER: ICD-10-CM

## 2019-01-23 DIAGNOSIS — R03.0 ELEVATED BLOOD PRESSURE READING: Primary | ICD-10-CM

## 2019-01-23 PROCEDURE — 99282 EMERGENCY DEPT VISIT SF MDM: CPT

## 2019-01-23 PROCEDURE — 90715 TDAP VACCINE 7 YRS/> IM: CPT | Performed by: PHYSICIAN ASSISTANT

## 2019-01-23 PROCEDURE — 70486 CT MAXILLOFACIAL W/O DYE: CPT

## 2019-01-23 PROCEDURE — 70450 CT HEAD/BRAIN W/O DYE: CPT

## 2019-01-23 PROCEDURE — 74011250636 HC RX REV CODE- 250/636: Performed by: PHYSICIAN ASSISTANT

## 2019-01-23 PROCEDURE — 90471 IMMUNIZATION ADMIN: CPT

## 2019-01-23 RX ADMIN — TETANUS TOXOID, REDUCED DIPHTHERIA TOXOID AND ACELLULAR PERTUSSIS VACCINE, ADSORBED 0.5 ML: 5; 2.5; 8; 8; 2.5 SUSPENSION INTRAMUSCULAR at 18:02

## 2019-01-23 NOTE — DISCHARGE INSTRUCTIONS
Patient Education        Elevated Blood Pressure: Care Instructions  Your Care Instructions    Blood pressure is a measure of how hard the blood pushes against the walls of your arteries. It's normal for blood pressure to go up and down throughout the day. But if it stays up over time, you have high blood pressure. Two numbers tell you your blood pressure. The first number is the systolic pressure. It shows how hard the blood pushes when your heart is pumping. The second number is the diastolic pressure. It shows how hard the blood pushes between heartbeats, when your heart is relaxed and filling with blood. An ideal blood pressure in adults is less than 120/80 (say \"120 over 80\"). High blood pressure is 140/90 or higher. You have high blood pressure if your top number is 140 or higher or your bottom number is 90 or higher, or both. The main test for high blood pressure is simple, fast, and painless. To diagnose high blood pressure, your doctor will test your blood pressure at different times. After testing your blood pressure, your doctor may ask you to test it again when you are home. If you are diagnosed with high blood pressure, you can work with your doctor to make a long-term plan to manage it. Follow-up care is a key part of your treatment and safety. Be sure to make and go to all appointments, and call your doctor if you are having problems. It's also a good idea to know your test results and keep a list of the medicines you take. How can you care for yourself at home? · Do not smoke. Smoking increases your risk for heart attack and stroke. If you need help quitting, talk to your doctor about stop-smoking programs and medicines. These can increase your chances of quitting for good. · Stay at a healthy weight. · Try to limit how much sodium you eat to less than 2,300 milligrams (mg) a day. Your doctor may ask you to try to eat less than 1,500 mg a day. · Be physically active.  Get at least 30 minutes of exercise on most days of the week. Walking is a good choice. You also may want to do other activities, such as running, swimming, cycling, or playing tennis or team sports. · Avoid or limit alcohol. Talk to your doctor about whether you can drink any alcohol. · Eat plenty of fruits, vegetables, and low-fat dairy products. Eat less saturated and total fats. · Learn how to check your blood pressure at home. When should you call for help? Call your doctor now or seek immediate medical care if:  ? · Your blood pressure is much higher than normal (such as 180/110 or higher). ? · You think high blood pressure is causing symptoms such as:  ¨ Severe headache. ¨ Blurry vision. ? Watch closely for changes in your health, and be sure to contact your doctor if:  ? · You do not get better as expected. Where can you learn more? Go to http://luceroFoodieBytes.comjoey.info/. Enter Z340 in the search box to learn more about \"Elevated Blood Pressure: Care Instructions. \"  Current as of: September 21, 2016  Content Version: 11.4  © 2132-0543 [employer]. Care instructions adapted under license by [employer] (which disclaims liability or warranty for this information). If you have questions about a medical condition or this instruction, always ask your healthcare professional. [place] [address] any warranty or liability for your use of this information. Patient Education        Head Injury: Care Instructions  Your Care Instructions    Most injuries to the head are minor. Bumps, cuts, and scrapes on the head and face usually heal well and can be treated the same as injuries to other parts of the body. Although it's rare, once in a while a more serious problem shows up after you are home. So it's good to be on the lookout for symptoms for a day or two. Follow-up care is a key part of your treatment and safety.  Be sure to make and go to all appointments, and call your doctor if you are having problems. It's also a good idea to know your test results and keep a list of the medicines you take. How can you care for yourself at home? · Follow your doctor's instructions. He or she will tell you if you need someone to watch you closely for the next 24 hours or longer. · Take it easy for the next few days or more if you are not feeling well. · Ask your doctor when it's okay for you to go back to activities like driving a car, riding a bike, or operating machinery. When should you call for help? Call 911 anytime you think you may need emergency care. For example, call if:    · You have a seizure.     · You passed out (lost consciousness).     · You are confused or can't stay awake.    Call your doctor now or seek immediate medical care if:    · You have new or worse vomiting.     · You feel less alert.     · You have new weakness or numbness in any part of your body.    Watch closely for changes in your health, and be sure to contact your doctor if:    · You do not get better as expected.     · You have new symptoms, such as headaches, trouble concentrating, or changes in mood. Where can you learn more? Go to http://lucero-joey.info/. Enter H554 in the search box to learn more about \"Head Injury: Care Instructions. \"  Current as of: Akua 3, 2018  Content Version: 11.9  © 6528-9427 [employer]. Care instructions adapted under license by [employer] (which disclaims liability or warranty for this information). If you have questions about a medical condition or this instruction, always ask your healthcare professional. [place] [address] any warranty or liability for your use of this information. Patient Education        Contusion: Care Instructions  Your Care Instructions    Contusion is the medical term for a bruise. It is the result of a direct blow or an impact, such as a fall.  Contusions are common sports injuries. Most people think of a bruise as a black-and-blue spot. This happens when small blood vessels get torn and leak blood under the skin. But bones, muscles, and organs can also get bruised. This may damage deep tissues but not cause a bruise you can see. The doctor will do a physical exam to find the location of your contusion. You may also have tests to make sure you do not have a more serious injury, such as a broken bone or nerve damage. These may include X-rays or other imaging tests like a CT scan or MRI. Deep-tissue contusions may cause pain and swelling. But if there is no serious damage, they will often get better in a few weeks with home treatment. The doctor has checked you carefully, but problems can develop later. If you notice any problems or new symptoms, get medical treatment right away. Follow-up care is a key part of your treatment and safety. Be sure to make and go to all appointments, and call your doctor if you are having problems. It's also a good idea to know your test results and keep a list of the medicines you take. How can you care for yourself at home? · Put ice or a cold pack on the sore area for 10 to 20 minutes at a time to stop swelling. Put a thin cloth between the ice pack and your skin. · Be safe with medicines. Read and follow all instructions on the label. ? If the doctor gave you a prescription medicine for pain, take it as prescribed. ? If you are not taking a prescription pain medicine, ask your doctor if you can take an over-the-counter medicine. · If you can, prop up the sore area on pillows as much as possible for the next few days. Try to keep the sore area above the level of your heart. When should you call for help?   Call your doctor now or seek immediate medical care if:    · Your pain gets worse.     · You have new or worse swelling.     · You have tingling, weakness, or numbness in the area near the contusion.     · The area near the contusion is cold or pale.    Watch closely for changes in your health, and be sure to contact your doctor if:    · You do not get better as expected. Where can you learn more? Go to http://lucero-joey.info/. Enter K370 in the search box to learn more about \"Contusion: Care Instructions. \"  Current as of: September 23, 2018  Content Version: 11.9  © 1397-4229 [employer]. Care instructions adapted under license by [employer] (which disclaims liability or warranty for this information). If you have questions about a medical condition or this instruction, always ask your healthcare professional. [address] any warranty or liability for your use of this information.

## 2019-01-23 NOTE — ED PROVIDER NOTES
EMERGENCY DEPARTMENT HISTORY AND PHYSICAL EXAM 
 
 
Date: 1/23/2019 Patient Name: Michelle Westbrook History of Presenting Illness HPI: Michelle Westbrook is a 69 y.o. male with PMHx of BPH, HTN, PVC's, syncope, orthostatic hypotension, COPD, fatty liver disease, presents to the ED for a GLF that occurred at 1430 today. Pt says that he tripped and fell forward onto a side walk and caught himself with his hands but still hit his nose and chin on the ground. Pt says that he had headache that subsided. He says that he has had an intermittent nose bleed but he is able to breath through both nostrils w/o difficulty. His pain is a 0/10, sharp, intermittent, non radiating pain at the base of his nasal bone. He denies change in being on blood thinners, LOC, N/V, photophobia, neck pain, among other assoc sx's. Pt states he has not taken his bp medicine today. PCP: Radha Iverson MD 
 
Current Outpatient Medications Medication Sig Dispense Refill  carvedilol (COREG) 12.5 mg tablet Take 2 Tabs by mouth two (2) times daily (with meals). 30 Tab 0  
 cyanocobalamin 1,000 mcg tablet Take 1,000 mcg by mouth daily.  cholecalciferol (VITAMIN D3) 1,000 unit cap Take 1,000 Units by mouth daily.  quinapril (ACCUPRIL) 20 mg tablet Take 20 mg by mouth two (2) times a day.  doxazosin (CARDURA) 2 mg tablet Take 2 mg by mouth daily.  magnesium oxide 500 mg tab Take 1,000 mg by mouth daily.  carvedilol (COREG) 12.5 mg tablet Take 12.5 mg by mouth two (2) times daily (with meals).  omeprazole (PRILOSEC) 10 mg capsule Take 40 mg by mouth daily. Past History Past Medical History: 
Past Medical History:  
Diagnosis Date  Arthritis  BPH (benign prostatic hyperplasia)  Chronic kidney disease   
 stage 2  Chronic obstructive pulmonary disease ([facility] [address]  Elevated LFT's  Essential hypertension 9/24/01  GERD (gastroesophageal reflux disease)   
 hiatal hernia  Ill-defined condition 2016  
 faint  Liver disease \"fatty liver\" as per patient  Nephrosclerosis  Orthostatic hypotension 01  PVC's 01  Sleep apnea   
 no CPAP  Syncope 01  
 secondary to venous insuffiency Past Surgical History: 
Past Surgical History:  
Procedure Laterality Date  COLONOSCOPY N/A 2016 COLONOSCOPY performed by Norris Hinojosa MD at [facility] ENDOSCOPY  ECHO 2D ADULT  12 EF 60 - 65%; mild concentric hypertrophy; pulmonary systolic artery pressure upper limits normal  
 HX APPENDECTOMY  1985  HX HERNIA REPAIR  2018 Davinci hiatal hernia repair- Mark Skaggs MD  
 
 
Family History: 
Family History Problem Relation Age of Onset  Stroke Father  Heart Disease Father Social History: 
Social History Tobacco Use  Smoking status: Former Smoker Packs/day: 3.50 Last attempt to quit: 1980 Years since quittin.1  Smokeless tobacco: Never Used Substance Use Topics  Alcohol use: No  
  Comment: no alcohol since   Drug use: No  
 
 
Allergies: Allergies Allergen Reactions  Benicar [Olmesartan] Unknown (comments) Pt states he has found out that this is not a medication allergy.  Demerol [Meperidine] Unknown (comments) Causes unconsciousness Review of Systems Review of Systems Constitutional: Negative for chills, fever and unexpected weight change. Respiratory: Negative for shortness of breath. Cardiovascular: Negative for chest pain. Gastrointestinal: Negative for abdominal pain, nausea and vomiting. Musculoskeletal: Negative for arthralgias and myalgias. Skin: Negative for rash. Neurological: Positive for headaches. Negative for light-headedness. All other systems reviewed and are negative. Physical Exam  
 
Vitals:  
 19 1548 19 1731 BP: (!) 167/102 (!) 147/104 Pulse: 89 Resp: 16   
 Temp: 97.4 °F (36.3 °C) SpO2: 99% Weight: 79.4 kg (175 lb 0.7 oz) Height: 5' 5\" (1.651 m) Physical Exam  
Constitutional: He is oriented to person, place, and time. He appears well-developed and well-nourished. HENT:  
Head: Normocephalic and atraumatic. Cardiovascular: Normal rate, regular rhythm and normal heart sounds. Pulmonary/Chest: Effort normal and breath sounds normal.  
Musculoskeletal:  
Nasal bone has abrasion but no displacement. No septal hematoma. Abrasion of jaw. Jaw has full ROM, normal alignment, and no crepitus at TMJ. Cervical spine: Full ROM w/o pain, no ttp of spine or paraspinal muscles, no focal neuro deficits, no swelling/deformity/ecchymosis, no obvious signs of skin infection/rash. Knee exam: abrasion of anterior knee. No pain w/ passive ROM, Full ROM. No swelling, erythema, signs of skin infection/rash or warmth to the touch. No ballotable patella. Pt walks w/o difficulty. Gait normal and stable. 2+ distal pulses, NVI, sensation grossly intact to light touch. No TTP. No pitting edema or crepitus. Neurological: He is alert and oriented to person, place, and time. Skin: Skin is warm and dry. Psychiatric: He has a normal mood and affect. His behavior is normal. Judgment and thought content normal.  
 
 
 
Diagnostic Study Results Labs - No results found for this or any previous visit (from the past 12 hour(s)). Radiologic Studies -  
CT MAXILLOFACIAL WO CONT Final Result IMPRESSION: No evidence of acute fracture. CT HEAD WO CONT Final Result IMPRESSION:  
1. No evidence of acute intracranial abnormality. CT Results  (Last 48 hours) 01/23/19 1634  CT HEAD WO CONT Final result Impression:  IMPRESSION:  
1. No evidence of acute intracranial abnormality. Narrative:  EXAM:  CT HEAD WO CONT INDICATION:   GLF, headache COMPARISON: CT head 11/11/2008. TECHNIQUE: Unenhanced CT of the head was performed using 5 mm images. Brain and  
bone windows were generated. CT dose reduction was achieved through use of a  
standardized protocol tailored for this examination and automatic exposure  
control for dose modulation. FINDINGS:  
The ventricles are normal in size and position. Basilar cisterns are patent. No  
midline shift. There is no evidence of acute infarct, hemorrhage, or extraaxial  
fluid collection. Circumferential mucosal thickening in the left maxillary sinus. The mastoid air  
cells and middle ears are clear. The orbital contents are within normal limits  
with bilateral lens implants. There are no significant osseous or extracranial  
soft tissue lesions. 01/23/19 1634  CT MAXILLOFACIAL WO CONT Final result Impression:  IMPRESSION: No evidence of acute fracture. Narrative:  EXAM: CT MAXILLOFACIAL WO CONT INDICATION: GLF, nose and jaw injury COMPARISON: None. CONTRAST:   None. TECHNIQUE:  Multislice helical CT of the facial bones was performed in the axial  
plane without intravenous contrast administration. Coronal and sagittal  
reformations were generated. CT dose reduction was achieved through use of a  
standardized protocol tailored for this examination and automatic exposure  
control for dose modulation. FINDINGS:  
   
There is no facial fracture or other osseous abnormality. Mucosal thickening in the bilateral maxillary sinuses, left worse than right,  
with hyperdense inspissated secretions. The mastoid air cells and middle ears  
are clear. The globes, optic nerves and extraocular muscles are normal.  
Bilateral lens implants. No abnormalities are identified within the visualized  
portions of the brain or nasopharynx. Advanced degenerative changes at C1-C2  
with retrodental pannus, but no significant spinal canal narrowing. Advanced degenerative disc disease at C3-C4. CXR Results  (Last 48 hours) None Medical Decision Making I am the first provider for this patient. I reviewed the vital signs, available nursing notes, past medical history, past surgical history, family history, social history ED Course:  
Initial assessment performed. The patients presenting problems have been discussed, and they are in agreement with the care plan formulated and outlined with them. I have encouraged them to ask questions as they arise throughout their visit. Vital Signs-Reviewed the patient's vital signs. Vitals:  
 01/23/19 1548 01/23/19 1731 BP: (!) 167/102 (!) 147/104 BP 1 Location: Left arm Right arm BP Patient Position: At rest Sitting Pulse: 89 Resp: 16 Temp: 97.4 °F (36.3 °C) SpO2: 99% Weight: 79.4 kg (175 lb 0.7 oz) Height: 5' 5\" (1.651 m) Medications Administered During ED Course Medications diph,Pertuss(AC),Tet Vac-PF (BOOSTRIX) suspension 0.5 mL (0.5 mL IntraMUSCular Given 1/23/19 1802) Progress Note: On re evaluation pt is resting comfortably, is requesting discharge, and has no new complaints, changes, or physical findings. HYPERTENSION COUNSELING Patient denies chest pain, headache, shortness of breath,  sx's, abd pain. Patient is made aware of their elevated blood pressure and is instructed to follow up this week with their Primary Care for a recheck. Patient is counseled regarding consequences of chronic, uncontrolled hypertension including kidney disease, heart disease, stroke or even death. Patient states their understanding and agrees to follow up this week. Disposition: D/c home DISCHARGE NOTE:  
I Counseled the patient on diagnosis and care plan. All available lab and imaging results have been reviewed by me and were discussed with the patient, including all incidental findings.  The likelihood of other entities in the differential is insufficient to justify any further testing for them. This was explained to the patient. Patient agrees with plan and agrees to follow up with PCP as recommended, or return to the ED if their symptoms worsen. All medications were reviewed with the patient. All of pt's questions and concerns were addressed. The patient was advised that new or worsening symptoms would require further evaluation and should prompt immediate return to the Emergency Department. Discharge instructions have been provided and explained to the patient, along with reasons to return to the ED. Patient voices understanding and is agreeable with the plan for discharge. Patient is ready to go home. Follow-up Information Follow up With Specialties Details Why Contact Info India Luu MD Family Practice Schedule an appointment as soon as possible for a visit  [place] [address] [address] [place] [address] 
[phone] [facility] EMERGENCY DEPT Emergency Medicine Go to If symptoms worsen [address] [facility] 
[phone] Discharge Medication List as of 1/23/2019  5:32 PM  
  
 
 
Provider Notes (Medical Decision Making): DDx: closed head injury, concussion, facial contusion, nose fx, low concern for intracranial hemorrhage, syncope, dysrhythmia Procedures: 
Procedures Critical Care Time: none Diagnosis Clinical Impression: 1. Elevated blood pressure reading 2. Contusion of face, initial encounter 3. Closed head injury, initial encounter

## 2019-01-23 NOTE — ED NOTES
Discharge instructions given to patient by PA Molli Kehr. Verbalized understanding of instructions. Patient discharged without difficulty. Patient discharged in stable condition via amulation accompanied by self.

## 2019-04-29 ENCOUNTER — HOSPITAL ENCOUNTER (OUTPATIENT)
Age: 79
Setting detail: OUTPATIENT SURGERY
Discharge: HOME | End: 2019-04-29
Attending: SPECIALIST | Admitting: SPECIALIST
Payer: MEDICARE

## 2019-04-29 VITALS
RESPIRATION RATE: 19 BRPM | HEART RATE: 80 BPM | TEMPERATURE: 97.9 F | HEIGHT: 65 IN | DIASTOLIC BLOOD PRESSURE: 75 MMHG | SYSTOLIC BLOOD PRESSURE: 134 MMHG | WEIGHT: 174.82 LBS | BODY MASS INDEX: 29.13 KG/M2 | OXYGEN SATURATION: 96 %

## 2019-04-29 DIAGNOSIS — R07.2 PRECORDIAL PAIN: ICD-10-CM

## 2019-04-29 LAB
ATRIAL RATE: 68 BPM
CALCULATED P AXIS, ECG09: 61 DEGREES
CALCULATED R AXIS, ECG10: 19 DEGREES
CALCULATED T AXIS, ECG11: 19 DEGREES
DIAGNOSIS, 93000: NORMAL
P-R INTERVAL, ECG05: 188 MS
Q-T INTERVAL, ECG07: 418 MS
QRS DURATION, ECG06: 90 MS
QTC CALCULATION (BEZET), ECG08: 444 MS
VENTRICULAR RATE, ECG03: 68 BPM

## 2019-04-29 PROCEDURE — 92928 PRQ TCAT PLMT NTRAC ST 1 LES: CPT | Performed by: SPECIALIST

## 2019-04-29 PROCEDURE — 77030004532 HC CATH ANGI DX IMP BSC -A: Performed by: SPECIALIST

## 2019-04-29 PROCEDURE — C1874 STENT, COATED/COV W/DEL SYS: HCPCS | Performed by: SPECIALIST

## 2019-04-29 PROCEDURE — 74011000258 HC RX REV CODE- 258: Performed by: SPECIALIST

## 2019-04-29 PROCEDURE — C1769 GUIDE WIRE: HCPCS | Performed by: SPECIALIST

## 2019-04-29 PROCEDURE — 93005 ELECTROCARDIOGRAM TRACING: CPT

## 2019-04-29 PROCEDURE — 77030008543 HC TBNG MON PRSS MRTM -A: Performed by: SPECIALIST

## 2019-04-29 PROCEDURE — 74011250636 HC RX REV CODE- 250/636: Performed by: SPECIALIST

## 2019-04-29 PROCEDURE — 99152 MOD SED SAME PHYS/QHP 5/>YRS: CPT | Performed by: SPECIALIST

## 2019-04-29 PROCEDURE — C1887 CATHETER, GUIDING: HCPCS | Performed by: SPECIALIST

## 2019-04-29 PROCEDURE — 74011636320 HC RX REV CODE- 636/320: Performed by: SPECIALIST

## 2019-04-29 PROCEDURE — 99153 MOD SED SAME PHYS/QHP EA: CPT | Performed by: SPECIALIST

## 2019-04-29 PROCEDURE — C1760 CLOSURE DEV, VASC: HCPCS | Performed by: SPECIALIST

## 2019-04-29 PROCEDURE — 77030029065 HC DRSG HEMO QCLOT ZMED -B: Performed by: SPECIALIST

## 2019-04-29 PROCEDURE — 74011250636 HC RX REV CODE- 250/636

## 2019-04-29 PROCEDURE — 77030013715 HC INFL SYS MRTM -B: Performed by: SPECIALIST

## 2019-04-29 PROCEDURE — 93454 CORONARY ARTERY ANGIO S&I: CPT | Performed by: SPECIALIST

## 2019-04-29 PROCEDURE — C1894 INTRO/SHEATH, NON-LASER: HCPCS | Performed by: SPECIALIST

## 2019-04-29 DEVICE — STENT RONYX27512UX RESOLUTE ONYX 2.75X12
Type: IMPLANTABLE DEVICE | Status: FUNCTIONAL
Brand: RESOLUTE ONYX™

## 2019-04-29 RX ORDER — ATORVASTATIN CALCIUM 20 MG/1
20 TABLET, FILM COATED ORAL DAILY
COMMUNITY
End: 2020-10-08 | Stop reason: SDUPTHER

## 2019-04-29 RX ORDER — NITROGLYCERIN 0.4 MG/1
0.4 TABLET SUBLINGUAL
Status: DISCONTINUED | OUTPATIENT
Start: 2019-04-29 | End: 2019-04-29 | Stop reason: HOSPADM

## 2019-04-29 RX ORDER — FENTANYL CITRATE 50 UG/ML
INJECTION, SOLUTION INTRAMUSCULAR; INTRAVENOUS AS NEEDED
Status: DISCONTINUED | OUTPATIENT
Start: 2019-04-29 | End: 2019-04-29 | Stop reason: HOSPADM

## 2019-04-29 RX ORDER — CEFAZOLIN SODIUM/WATER 2 G/20 ML
2 SYRINGE (ML) INTRAVENOUS ONCE
Status: COMPLETED | OUTPATIENT
Start: 2019-04-29 | End: 2019-04-29

## 2019-04-29 RX ORDER — GUAIFENESIN 100 MG/5ML
81 LIQUID (ML) ORAL DAILY
Status: SHIPPED | COMMUNITY
Start: 2019-04-29 | End: 2019-07-20

## 2019-04-29 RX ORDER — CLOPIDOGREL BISULFATE 75 MG/1
75 TABLET ORAL DAILY
Qty: 30 TAB | Refills: 5 | Status: ON HOLD | OUTPATIENT
Start: 2019-04-29 | End: 2019-07-19

## 2019-04-29 RX ORDER — HEPARIN SODIUM 200 [USP'U]/100ML
INJECTION, SOLUTION INTRAVENOUS
Status: COMPLETED | OUTPATIENT
Start: 2019-04-29 | End: 2019-04-29

## 2019-04-29 RX ORDER — SODIUM CHLORIDE 0.9 % (FLUSH) 0.9 %
5-40 SYRINGE (ML) INJECTION EVERY 8 HOURS
Status: DISCONTINUED | OUTPATIENT
Start: 2019-04-29 | End: 2019-04-29 | Stop reason: HOSPADM

## 2019-04-29 RX ORDER — SODIUM CHLORIDE 9 MG/ML
125 INJECTION, SOLUTION INTRAVENOUS CONTINUOUS
Status: DISPENSED | OUTPATIENT
Start: 2019-04-29 | End: 2019-04-29

## 2019-04-29 RX ORDER — SODIUM CHLORIDE 0.9 % (FLUSH) 0.9 %
5-40 SYRINGE (ML) INJECTION AS NEEDED
Status: DISCONTINUED | OUTPATIENT
Start: 2019-04-29 | End: 2019-04-29 | Stop reason: HOSPADM

## 2019-04-29 RX ORDER — CARVEDILOL 12.5 MG/1
25 TABLET ORAL 2 TIMES DAILY WITH MEALS
COMMUNITY
End: 2020-10-08 | Stop reason: SDUPTHER

## 2019-04-29 RX ORDER — HYDROCORTISONE SODIUM SUCCINATE 100 MG/2ML
100 INJECTION, POWDER, FOR SOLUTION INTRAMUSCULAR; INTRAVENOUS
Status: DISCONTINUED | OUTPATIENT
Start: 2019-04-29 | End: 2019-04-29 | Stop reason: HOSPADM

## 2019-04-29 RX ORDER — LIDOCAINE HYDROCHLORIDE 10 MG/ML
INJECTION INFILTRATION; PERINEURAL AS NEEDED
Status: DISCONTINUED | OUTPATIENT
Start: 2019-04-29 | End: 2019-04-29 | Stop reason: HOSPADM

## 2019-04-29 RX ORDER — MIDAZOLAM HYDROCHLORIDE 1 MG/ML
INJECTION, SOLUTION INTRAMUSCULAR; INTRAVENOUS AS NEEDED
Status: DISCONTINUED | OUTPATIENT
Start: 2019-04-29 | End: 2019-04-29 | Stop reason: HOSPADM

## 2019-04-29 RX ADMIN — SODIUM CHLORIDE 75 ML/HR: 900 INJECTION, SOLUTION INTRAVENOUS at 11:34

## 2019-04-29 RX ADMIN — Medication 2 G: at 14:37

## 2019-04-29 NOTE — PROCEDURES
Cath:  Obstructive 1VD:     LAD p80     OM1 40; OM2 90 (very small). RCA p30; d30; PDA 50. Successful VIRGINIE pLAD: 2.75x12 Oynx. RFA mynx    ASA/plavix, statin  F/U with Dr. Gómez Lynch 5/13.

## 2019-04-29 NOTE — CARDIO/PULMONARY
Cardiac Rehab: 68 yo male admitted for cardiac cath. S/P PTCA with VIRGINIE to LAD (4/29). LVEF unavailable. Cardiologist is Dr Kira Estrada. Cardiac Meds: 
ACE/ARB - none (hx orthostatic hypotension) BB - carvedilol Statin - atorvastatin ASA - started 81 mg 
Prior to admission meds also included: doxazosin. New PO Cardiac Medications: Plavix and aspirin. Smoking History: Former smoker (quit 1980). 
 
4/29/2019 Met with Angela Tse in post-cath recovery. Pt is hard of hearing. His daughter Garcia Verma) was also present. Dgt reported that pt resides alone. Pt aware he needed a stent. Provided PCI education folder. Discussed benefits of outpatient cardiac rehab. Pt reported he has problem with dizziness. Encouraged pt to wear support hose as recommended by cardiologist in the past. Indicated he would like to be called tomorrow to discuss cardiac rehab further. Reviewed new cardiac meds. Emphasized importance of med compliance to reduce risk of occluding stent. Pt has f/u appt scheduled with Kira Estrada on 5/13/19. Dgt verbalized understanding of info provided. Pt would likely benefit from reinforcement due to recent sedation meds. All questions were answered. 4/30/2019 Spoke to patient on telephone regarding outpatient cardiac rehab. Pt reported he lives near [facility].\"  Explained purpose of cardiac rehab and that closest program to his home is [facility]. Encouraged pt to talk with his dgt and cardiologist about whether cardiac rehab program is recommended for him. He had no questions.

## 2019-04-29 NOTE — H&P
Date of Surgery Update: 
Geeta Farr was seen and examined. History and physical has been reviewed. The patient has been examined. There have been no significant clinical changes since the completion of the originally dated History and Physical. 
 
Signed By: Roldan Rios MD   
 April 29, 2019 8:56 AM   
  
Mare Pandey. 1940 Office/Outpatient Visit Visit Date: Fri, Apr 26, 2019 01:00 pm 
Provider: MD Shalonda Hale RN Location: [facility] AT [address] [place]. [address] [phone] Electronically signed by Zoë Mei MD on  04/26/2019 03:52:50 PM                          
SUBJECTIVE: 
 
CC:  
Mr. Vini Schmitt is a 69year old White male. His primary care physician is Naresh Johnson. Naila Pierce MD.  This is a 2 week follow-up visit. Since his last visit, he has had the following testing: nuclear stress test (4/17/19) and echocardiogram (4/17/19). Patient verbalized medications unchanged since last office visit. He has a history of chest pain and essential hypertension. HPI:  
 
Regarding hypertension: Type Primary Hypertension Currently, his treatment regimen consists of Coreg. Regarding chest pain: The discomfort is located primarily in the center of the chest and left parasternal region. It radiates to the left shoulder. The pain initially began at least 6 months ago. Typically, individual episodes of chest pain last 5 to 10 minutes. He characterizes the pain as heaviness. Associated symptoms include dyspnea and dizziness. Prior workup includes a cardiac radionuclide exam ( normal ) and an echocardiogram ( results: EF 60%, mild MR, mild-moderate TR. ). Dizziness noted. The typical duration of an episode is the majority of the day. There are no obvious aggravating factors. Nothing relieves the symptoms. Hypercholesterolemia: Current treatment includes diet.    
PMH/FMH/SH:  
 Last Reviewed on 4/24/2019 04:41 PM by Madisyn Stevenson Past Medical History: Hypertension COPD Urinary Tract Infections, Recurrent Chronic anxiety Depression INFLUENZA VACCINE: declined Past Cardiac Procedures/Tests: 
Echocardiogram on 4/17/19 -  Normal LV systolic function with an estimated ejection fraction of 60%. There is mild left ventricular hypertrophy. The left atrium is mildly enlarged. There is mild mitral regurgitation. There is mild to moderate tricuspid regurgitation. PA pressure 35 mmHg. [facility] Stress Echo:  3/22/16 -6 mins. 39 secs. There was no ECG evidence of ischemia. No ischemia noted on echocardiogram, normal pre & post global wall motion. The patient did not experience chest discomfort. Normal  blood pressure response to exercise. Normal heart rate response to exercise. Fair exercise capacity. There is mild mitral regurgitation. There is mild tricuspid regurgitation. There is trace pulmonary regurgitation. Lexiscan Myoview 4/17/19 - Normal myocardial perfusion Tetrofosmin Myoview SPECT imaging. Calculated left ventricular ejection fraction was normal at 72%. Surgical History:  
Surgical/Procedural History:  
Appendectomy Stomach surgery Social History:  
Social History:  
Occupation: Retired Marital Status: Single Tobacco/Alcohol/Supplements:  
Last Reviewed on 4/24/2019 04:41 PM by Madisyn Stevenson TOBACCO/ALCOHOL/SUPPLEMENTS Tobacco: He has a past history of cigarette smoking; quit 1980. Alcohol: Non-drinker Caffeine:  He admits to consuming caffeine via coffee ( 1 servings per week ). Substance Abuse History:  
Last Reviewed on 4/24/2019 04:41 PM by Madisyn Stevenson Substance Use/Abuse:  
None Mental Health History:  
Last Reviewed on 4/24/2019 04:41 PM by Madisyn Stevenson Communicable Diseases (eg STDs): Last Reviewed on 4/24/2019 04:41 PM by Madisyn Stevenson Current Problems:  
Last Reviewed on 4/24/2019 04:41 PM by Madisyn Stevenson Dizziness Essential hypertension, benign COPD Chest pain Shortness of Breath Dyspnea on exertion Allergies:  
Last Reviewed on 4/24/2019 04:41 PM by Nabil Box Demerol HCl: Cardiac arrest 
Benicar: Unknown Reactions Current Medications:  
Last Reviewed on 4/26/2019 02:55 PM by Sol Tyson Carvedilol 12.5mg Tablet 1 po bid Doxazosin Mesylate 2mg Tablet 1 po qd  
Magnesium Oxide Vitamin B12 Vitamin D3 Atorvastatin Calcium 20mg Tablet 1 tablet daily after supper Aspirin (ASA) 81mg Chewable Tablet 1 po qd OBJECTIVE: 
 
Vitals:  
 
Historical:  
04/12/2019  BP:   164/84 mm Hg ( (left arm, , standing, );)  
04/12/2019  BP:   164/84 mm Hg ( (left arm, , sitting, );)  
04/12/2019  Wt:   172lbs Current: 4/26/2019 2:57:29 PM 
Ht:  5 ft, 5 in; Wt: 175 lbs;  BMI: 29.1 BP: 127/90 mm Hg (left arm, sitting);  P: 74 bpm (left arm (BP Cuff), sitting) Exams:  
GENERAL:  Alert, oriented to person, place and time. HEENT:  Pinkish palpebral  conjunctivae. Anicteric sclerae. NECK:  No jugular vein engorgement. No bruit. CHEST: Equal expansion. Clear breath sounds. No rales, no wheezing. Heart: Reg rate and rhythm. Grade 2/6 systolic ejection murmur at the left sternal border area. ABDOMEN:  Soft. Normal active bowel sounds. No tenderness. EXTREMITIES:  No pitting pedal edema. Equal pulses bilaterally. NEURO:  Grossly intact. ASSESSMENT    
 
401.1   I10  Essential hypertension, benign DDx:  
786.51  Chest pain DDx:  
780.4   R42  Dizziness DDx:  
272.0   E78.1  Hypercholesterolemia DDx:  
786.51   R07.2  Precordial chest pain Persistent and worsening (heavy feeling). DDx:  
414.01   I25.10  CAD  
         DDx:  
424.2  Tricuspid valve regurgitation DDx:  
424.0  Mitral regurgitation (MR) DDx:  
 
ORDERS:  
 
Meds Prescribed: Atorvastatin Calcium 20mg Tablet 1 tablet daily after supper  #30 (Thirty) tablet(s) Refills: 11 Procedures Ordered: [facility]  Cardiac Cath  (In-House) XCD  Carotid Doppler  (In-House) Other Orders:  
  [id number]  Queried Patient for Tobacco Use  (Send-Out) 5861V  Aspirin or clopidogrel prescribed (CAD)  (Send-Out) PLAN:  
 
 Essential hypertension, benign Patient Education Handouts: COV Heart Healthy Diet COV Hypertension Chest pain CAD # 6: CAD w/ Antiplatelet Therapy Aspirin or Clopidogrel Prescribed 1. Medication list has been reviewed. Start the following medication(s):  atorvastatin. Smoking Status:  Nonsmoker 3. The patient to call the office if there is any change in his cardiac symptoms. 4.  Explained to the patient the importance of controlling his cardiac risk factors. Testing/Procedures: 
Carotid Doppler Cardiac Catheterization Explained to the patient the indication, procedure, risks, and benefits of cardiac catheterization. The patient understands 
and wishes to proceed with the cath to be performed as an outpatient in one week at [facility] by Dr. Bia Cruz. Schedule a follow up appointment in 2 weeks.

## 2019-04-29 NOTE — DISCHARGE INSTRUCTIONS
Cardiac Catheterization/Angiography Discharge Instructions    *Check the puncture site frequently for swelling or bleeding. If you see any bleeding, lie down and apply pressure over the area and call 911. Notify your doctor for any redness, swelling, drainage or oozing from the puncture site. Notify your doctor for any fever or chills. *If the leg or arm with the puncture becomes cold, numb or painful, call Dr Prisca Shaw at  [facility]    *Activity should be limited for the next 48 hours. Climb stairs as little as possible and avoid any stooping, bending or strenuous activity for 48 hours. No heavy lifting (anything over 10 pounds) for three days. *Do not drive for 24 hours. *You may resume your usual diet. Drink more fluids than usual.    *Have a responsible person drive you home and stay with you for at least 24 hours after your heart catheterization/angiography. *You may remove the bandage from your groin in 24 hours. You may shower in 24 hours. No tub baths, hot tubs or swimming for one week. Do not place any lotions, creams, powders, ointments over the puncture site for one week. You may place a clean band-aid over the puncture site each day for 5 days. Change this daily. Diet:     American Heart Association. Follow-up:     Follow up with Lacy Vargas MD on May 13th, 2019 at [address] [place] [address].  [address] [place] [address] 33  [phone]      If you smoke, STOP!

## 2019-04-29 NOTE — PROGRESS NOTES
Patient arrived. ID and allergies verified verbally with patient. Pt voices understanding of procedure to be performed. Consent obtained. Pt prepped for procedure. [facility] POSS PCI 
 
1:30PM 
TRANSFER - IN REPORT: 
 
Verbal report received from 873Nic Daniel Purdy RN(name) on Kaylah Daley  being received from [facility] LAB(unit) for routine post - op Report consisted of patients Situation, Background, Assessment and  
Recommendations(SBAR). Information from the following report(s) Procedure Summary was reviewed with the receiving nurse. Opportunity for questions and clarification was provided. Assessment completed upon patients arrival to unit and care assumed. 3:45 PM 
Daughter at bedside. Cardiac Rehab in to talk to patient Discharge instructions reviewed with patient and family. Voiced understanding. Patient given copy of discharge instructions to take home. 
 
5:00PM 
Pt sat on side of bed then ambulated around unit and to restroom. Voided. Returned to bed. Groin site dressing dry and intact. No bleeding or hematoma. Pt voices no complaints. 5:25 PM 
Pt discharged via wheekchair with family. NO complaints Personal belongings with patient upon discharge.

## 2019-04-29 NOTE — Clinical Note
TRANSFER - OUT REPORT:  
 
Verbal report given to: isatu. Report consisted of patient's Situation, Background, Assessment and  
Recommendations(SBAR). Opportunity for questions and clarification was provided. Patient transported with a Registered Nurse. Patient transported to: [facility].

## 2019-07-01 ENCOUNTER — ANESTHESIA EVENT (OUTPATIENT)
Dept: SURGERY | Age: 79
DRG: 854 | End: 2019-07-01
Payer: MEDICARE

## 2019-07-01 ENCOUNTER — APPOINTMENT (OUTPATIENT)
Dept: CT IMAGING | Age: 79
DRG: 854 | End: 2019-07-01
Attending: EMERGENCY MEDICINE
Payer: MEDICARE

## 2019-07-01 ENCOUNTER — HOSPITAL ENCOUNTER (INPATIENT)
Age: 79
LOS: 12 days | Discharge: NURSING, REHAB OR LONG-TERM CARE | DRG: 854 | End: 2019-07-13
Attending: EMERGENCY MEDICINE | Admitting: SURGERY
Payer: MEDICARE

## 2019-07-01 ENCOUNTER — ANESTHESIA (OUTPATIENT)
Dept: SURGERY | Age: 79
DRG: 854 | End: 2019-07-01
Payer: MEDICARE

## 2019-07-01 ENCOUNTER — APPOINTMENT (OUTPATIENT)
Dept: GENERAL RADIOLOGY | Age: 79
DRG: 854 | End: 2019-07-01
Attending: EMERGENCY MEDICINE
Payer: MEDICARE

## 2019-07-01 DIAGNOSIS — R10.84 ABDOMINAL PAIN, GENERALIZED: ICD-10-CM

## 2019-07-01 DIAGNOSIS — R79.89 ELEVATED LFTS: ICD-10-CM

## 2019-07-01 DIAGNOSIS — R19.8 PERFORATED VISCUS: Primary | ICD-10-CM

## 2019-07-01 PROBLEM — A41.9 SEPSIS (HCC): Status: ACTIVE | Noted: 2019-07-01

## 2019-07-01 LAB
ALBUMIN SERPL-MCNC: 3.6 G/DL (ref 3.5–5)
ALBUMIN/GLOB SERPL: 0.6 {RATIO} (ref 1.1–2.2)
ALP SERPL-CCNC: 317 U/L (ref 45–117)
ALT SERPL-CCNC: 114 U/L (ref 12–78)
ANION GAP SERPL CALC-SCNC: 4 MMOL/L (ref 5–15)
APPEARANCE UR: CLEAR
AST SERPL-CCNC: 75 U/L (ref 15–37)
BACTERIA URNS QL MICRO: ABNORMAL /HPF
BASOPHILS # BLD: 0.1 K/UL (ref 0–0.1)
BASOPHILS NFR BLD: 1 % (ref 0–1)
BILIRUB SERPL-MCNC: 0.9 MG/DL (ref 0.2–1)
BILIRUB UR QL: NEGATIVE
BUN SERPL-MCNC: 16 MG/DL (ref 6–20)
BUN/CREAT SERPL: 12 (ref 12–20)
CALCIUM SERPL-MCNC: 8.9 MG/DL (ref 8.5–10.1)
CHLORIDE SERPL-SCNC: 104 MMOL/L (ref 97–108)
CO2 SERPL-SCNC: 27 MMOL/L (ref 21–32)
COLOR UR: ABNORMAL
COMMENT, HOLDF: NORMAL
CREAT SERPL-MCNC: 1.29 MG/DL (ref 0.7–1.3)
DIFFERENTIAL METHOD BLD: NORMAL
EOSINOPHIL # BLD: 0.1 K/UL (ref 0–0.4)
EOSINOPHIL NFR BLD: 3 % (ref 0–7)
EPITH CASTS URNS QL MICRO: ABNORMAL /LPF
ERYTHROCYTE [DISTWIDTH] IN BLOOD BY AUTOMATED COUNT: 13.9 % (ref 11.5–14.5)
GLOBULIN SER CALC-MCNC: 5.8 G/DL (ref 2–4)
GLUCOSE SERPL-MCNC: 94 MG/DL (ref 65–100)
GLUCOSE UR STRIP.AUTO-MCNC: NEGATIVE MG/DL
HCT VFR BLD AUTO: 49 % (ref 36.6–50.3)
HGB BLD-MCNC: 16.2 G/DL (ref 12.1–17)
HGB UR QL STRIP: ABNORMAL
HYALINE CASTS URNS QL MICRO: ABNORMAL /LPF (ref 0–5)
IMM GRANULOCYTES # BLD AUTO: 0 K/UL (ref 0–0.04)
IMM GRANULOCYTES NFR BLD AUTO: 0 % (ref 0–0.5)
KETONES UR QL STRIP.AUTO: NEGATIVE MG/DL
LACTATE SERPL-SCNC: 2 MMOL/L (ref 0.4–2)
LEUKOCYTE ESTERASE UR QL STRIP.AUTO: ABNORMAL
LIPASE SERPL-CCNC: 214 U/L (ref 73–393)
LYMPHOCYTES # BLD: 0.9 K/UL (ref 0.8–3.5)
LYMPHOCYTES NFR BLD: 19 % (ref 12–49)
MCH RBC QN AUTO: 28.9 PG (ref 26–34)
MCHC RBC AUTO-ENTMCNC: 33.1 G/DL (ref 30–36.5)
MCV RBC AUTO: 87.5 FL (ref 80–99)
MONOCYTES # BLD: 0.3 K/UL (ref 0–1)
MONOCYTES NFR BLD: 8 % (ref 5–13)
NEUTS SEG # BLD: 3.1 K/UL (ref 1.8–8)
NEUTS SEG NFR BLD: 69 % (ref 32–75)
NITRITE UR QL STRIP.AUTO: NEGATIVE
NRBC # BLD: 0 K/UL (ref 0–0.01)
NRBC BLD-RTO: 0 PER 100 WBC
PH UR STRIP: 7 [PH] (ref 5–8)
PLATELET # BLD AUTO: 195 K/UL (ref 150–400)
PMV BLD AUTO: 10.7 FL (ref 8.9–12.9)
POTASSIUM SERPL-SCNC: 4.2 MMOL/L (ref 3.5–5.1)
PROT SERPL-MCNC: 9.4 G/DL (ref 6.4–8.2)
PROT UR STRIP-MCNC: NEGATIVE MG/DL
RBC # BLD AUTO: 5.6 M/UL (ref 4.1–5.7)
RBC #/AREA URNS HPF: ABNORMAL /HPF (ref 0–5)
SAMPLES BEING HELD,HOLD: NORMAL
SODIUM SERPL-SCNC: 135 MMOL/L (ref 136–145)
SP GR UR REFRACTOMETRY: >1.03 (ref 1–1.03)
UROBILINOGEN UR QL STRIP.AUTO: 0.2 EU/DL (ref 0.2–1)
WBC # BLD AUTO: 4.4 K/UL (ref 4.1–11.1)
WBC URNS QL MICRO: ABNORMAL /HPF (ref 0–4)

## 2019-07-01 PROCEDURE — 77030032490 HC SLV COMPR SCD KNE COVD -B: Performed by: SURGERY

## 2019-07-01 PROCEDURE — 88307 TISSUE EXAM BY PATHOLOGIST: CPT

## 2019-07-01 PROCEDURE — 77030012407 HC DRN WND BARD -B: Performed by: SURGERY

## 2019-07-01 PROCEDURE — 77030002996 HC SUT SLK J&J -A: Performed by: SURGERY

## 2019-07-01 PROCEDURE — 74011250636 HC RX REV CODE- 250/636: Performed by: EMERGENCY MEDICINE

## 2019-07-01 PROCEDURE — 74011000250 HC RX REV CODE- 250

## 2019-07-01 PROCEDURE — 74019 RADEX ABDOMEN 2 VIEWS: CPT

## 2019-07-01 PROCEDURE — 77030038157 HC DEV PWR CNTR DISP SIGNIA COVD -C: Performed by: SURGERY

## 2019-07-01 PROCEDURE — 77030026438 HC STYL ET INTUB CARD -A: Performed by: ANESTHESIOLOGY

## 2019-07-01 PROCEDURE — P9045 ALBUMIN (HUMAN), 5%, 250 ML: HCPCS

## 2019-07-01 PROCEDURE — 77030011266 HC ELECTRD BLD INSL COVD -A: Performed by: SURGERY

## 2019-07-01 PROCEDURE — 65270000029 HC RM PRIVATE

## 2019-07-01 PROCEDURE — 80053 COMPREHEN METABOLIC PANEL: CPT

## 2019-07-01 PROCEDURE — 77030011640 HC PAD GRND REM COVD -A: Performed by: SURGERY

## 2019-07-01 PROCEDURE — 77030008771 HC TU NG SALEM SUMP -A: Performed by: ANESTHESIOLOGY

## 2019-07-01 PROCEDURE — 83605 ASSAY OF LACTIC ACID: CPT

## 2019-07-01 PROCEDURE — 74011250636 HC RX REV CODE- 250/636: Performed by: SURGERY

## 2019-07-01 PROCEDURE — 77030010296 HC STPLR INT COVD -B: Performed by: SURGERY

## 2019-07-01 PROCEDURE — 36415 COLL VENOUS BLD VENIPUNCTURE: CPT

## 2019-07-01 PROCEDURE — 99284 EMERGENCY DEPT VISIT MOD MDM: CPT

## 2019-07-01 PROCEDURE — 96374 THER/PROPH/DIAG INJ IV PUSH: CPT

## 2019-07-01 PROCEDURE — 77030008467 HC STPLR SKN COVD -B: Performed by: SURGERY

## 2019-07-01 PROCEDURE — 85025 COMPLETE CBC W/AUTO DIFF WBC: CPT

## 2019-07-01 PROCEDURE — 99222 1ST HOSP IP/OBS MODERATE 55: CPT | Performed by: SURGERY

## 2019-07-01 PROCEDURE — 74011250636 HC RX REV CODE- 250/636

## 2019-07-01 PROCEDURE — 74011250636 HC RX REV CODE- 250/636: Performed by: ANESTHESIOLOGY

## 2019-07-01 PROCEDURE — 81001 URINALYSIS AUTO W/SCOPE: CPT

## 2019-07-01 PROCEDURE — 77030013567 HC DRN WND RESERV BARD -A: Performed by: SURGERY

## 2019-07-01 PROCEDURE — 77030011267 HC ELECTRD BLD COVD -A: Performed by: SURGERY

## 2019-07-01 PROCEDURE — 96375 TX/PRO/DX INJ NEW DRUG ADDON: CPT

## 2019-07-01 PROCEDURE — 74011636320 HC RX REV CODE- 636/320: Performed by: EMERGENCY MEDICINE

## 2019-07-01 PROCEDURE — 74011000258 HC RX REV CODE- 258

## 2019-07-01 PROCEDURE — 76210000016 HC OR PH I REC 1 TO 1.5 HR: Performed by: SURGERY

## 2019-07-01 PROCEDURE — 77030002916 HC SUT ETHLN J&J -A: Performed by: SURGERY

## 2019-07-01 PROCEDURE — 96361 HYDRATE IV INFUSION ADD-ON: CPT

## 2019-07-01 PROCEDURE — 77030009965 HC RELD STPLR ENDOS COVD -D: Performed by: SURGERY

## 2019-07-01 PROCEDURE — 0DBN0ZZ EXCISION OF SIGMOID COLON, OPEN APPROACH: ICD-10-PCS | Performed by: SURGERY

## 2019-07-01 PROCEDURE — 77030034698 HC LIGASURE MRYLND OPN SEAL DIV COVD -F: Performed by: SURGERY

## 2019-07-01 PROCEDURE — 77030008684 HC TU ET CUF COVD -B: Performed by: ANESTHESIOLOGY

## 2019-07-01 PROCEDURE — 76060000034 HC ANESTHESIA 1.5 TO 2 HR: Performed by: SURGERY

## 2019-07-01 PROCEDURE — 76010000153 HC OR TIME 1.5 TO 2 HR: Performed by: SURGERY

## 2019-07-01 PROCEDURE — 77030018836 HC SOL IRR NACL ICUM -A: Performed by: SURGERY

## 2019-07-01 PROCEDURE — 83690 ASSAY OF LIPASE: CPT

## 2019-07-01 PROCEDURE — 77030011255 HC DSG AQUACEL AG BMS -A: Performed by: SURGERY

## 2019-07-01 PROCEDURE — 74011000250 HC RX REV CODE- 250: Performed by: SURGERY

## 2019-07-01 PROCEDURE — 74177 CT ABD & PELVIS W/CONTRAST: CPT

## 2019-07-01 PROCEDURE — 77030034850: Performed by: SURGERY

## 2019-07-01 PROCEDURE — 77030020061 HC IV BLD WRMR ADMIN SET 3M -B: Performed by: ANESTHESIOLOGY

## 2019-07-01 PROCEDURE — 77030002966 HC SUT PDS J&J -A: Performed by: SURGERY

## 2019-07-01 PROCEDURE — 74011000258 HC RX REV CODE- 258: Performed by: EMERGENCY MEDICINE

## 2019-07-01 PROCEDURE — 77030019908 HC STETH ESOPH SIMS -A: Performed by: ANESTHESIOLOGY

## 2019-07-01 PROCEDURE — 44140 PARTIAL REMOVAL OF COLON: CPT | Performed by: SURGERY

## 2019-07-01 RX ORDER — DIPHENHYDRAMINE HYDROCHLORIDE 50 MG/ML
12.5 INJECTION, SOLUTION INTRAMUSCULAR; INTRAVENOUS AS NEEDED
Status: DISCONTINUED | OUTPATIENT
Start: 2019-07-01 | End: 2019-07-01 | Stop reason: HOSPADM

## 2019-07-01 RX ORDER — MIDAZOLAM HYDROCHLORIDE 1 MG/ML
0.5 INJECTION, SOLUTION INTRAMUSCULAR; INTRAVENOUS
Status: DISCONTINUED | OUTPATIENT
Start: 2019-07-01 | End: 2019-07-01 | Stop reason: HOSPADM

## 2019-07-01 RX ORDER — FENTANYL CITRATE 50 UG/ML
25 INJECTION, SOLUTION INTRAMUSCULAR; INTRAVENOUS
Status: DISCONTINUED | OUTPATIENT
Start: 2019-07-01 | End: 2019-07-01 | Stop reason: HOSPADM

## 2019-07-01 RX ORDER — ONDANSETRON 2 MG/ML
4 INJECTION INTRAMUSCULAR; INTRAVENOUS
Status: COMPLETED | OUTPATIENT
Start: 2019-07-01 | End: 2019-07-01

## 2019-07-01 RX ORDER — SODIUM CHLORIDE 9 MG/ML
50 INJECTION, SOLUTION INTRAVENOUS CONTINUOUS
Status: DISCONTINUED | OUTPATIENT
Start: 2019-07-01 | End: 2019-07-07

## 2019-07-01 RX ORDER — SODIUM CHLORIDE 0.9 % (FLUSH) 0.9 %
10 SYRINGE (ML) INJECTION
Status: COMPLETED | OUTPATIENT
Start: 2019-07-01 | End: 2019-07-01

## 2019-07-01 RX ORDER — DEXAMETHASONE SODIUM PHOSPHATE 4 MG/ML
INJECTION, SOLUTION INTRA-ARTICULAR; INTRALESIONAL; INTRAMUSCULAR; INTRAVENOUS; SOFT TISSUE AS NEEDED
Status: DISCONTINUED | OUTPATIENT
Start: 2019-07-01 | End: 2019-07-01 | Stop reason: HOSPADM

## 2019-07-01 RX ORDER — SODIUM CHLORIDE 0.9 % (FLUSH) 0.9 %
5-40 SYRINGE (ML) INJECTION EVERY 8 HOURS
Status: DISCONTINUED | OUTPATIENT
Start: 2019-07-01 | End: 2019-07-01 | Stop reason: HOSPADM

## 2019-07-01 RX ORDER — MORPHINE SULFATE 10 MG/ML
2 INJECTION, SOLUTION INTRAMUSCULAR; INTRAVENOUS
Status: DISCONTINUED | OUTPATIENT
Start: 2019-07-01 | End: 2019-07-01 | Stop reason: HOSPADM

## 2019-07-01 RX ORDER — HYDROMORPHONE HYDROCHLORIDE 1 MG/ML
.2-.5 INJECTION, SOLUTION INTRAMUSCULAR; INTRAVENOUS; SUBCUTANEOUS
Status: DISCONTINUED | OUTPATIENT
Start: 2019-07-01 | End: 2019-07-01 | Stop reason: HOSPADM

## 2019-07-01 RX ORDER — ONDANSETRON 2 MG/ML
INJECTION INTRAMUSCULAR; INTRAVENOUS AS NEEDED
Status: DISCONTINUED | OUTPATIENT
Start: 2019-07-01 | End: 2019-07-01 | Stop reason: HOSPADM

## 2019-07-01 RX ORDER — CALCIUM CARBONATE 500(1250)
1 TABLET ORAL DAILY
Status: ON HOLD | COMMUNITY
End: 2019-07-19

## 2019-07-01 RX ORDER — CLOPIDOGREL BISULFATE 75 MG/1
75 TABLET ORAL DAILY
Status: DISCONTINUED | OUTPATIENT
Start: 2019-07-02 | End: 2019-07-13 | Stop reason: HOSPADM

## 2019-07-01 RX ORDER — LIDOCAINE HYDROCHLORIDE 20 MG/ML
INJECTION, SOLUTION EPIDURAL; INFILTRATION; INTRACAUDAL; PERINEURAL AS NEEDED
Status: DISCONTINUED | OUTPATIENT
Start: 2019-07-01 | End: 2019-07-01 | Stop reason: HOSPADM

## 2019-07-01 RX ORDER — SUCCINYLCHOLINE CHLORIDE 20 MG/ML
INJECTION INTRAMUSCULAR; INTRAVENOUS AS NEEDED
Status: DISCONTINUED | OUTPATIENT
Start: 2019-07-01 | End: 2019-07-01 | Stop reason: HOSPADM

## 2019-07-01 RX ORDER — PROPOFOL 10 MG/ML
INJECTION, EMULSION INTRAVENOUS AS NEEDED
Status: DISCONTINUED | OUTPATIENT
Start: 2019-07-01 | End: 2019-07-01 | Stop reason: HOSPADM

## 2019-07-01 RX ORDER — FENTANYL CITRATE 50 UG/ML
50 INJECTION, SOLUTION INTRAMUSCULAR; INTRAVENOUS
Status: COMPLETED | OUTPATIENT
Start: 2019-07-01 | End: 2019-07-01

## 2019-07-01 RX ORDER — ONDANSETRON 2 MG/ML
4 INJECTION INTRAMUSCULAR; INTRAVENOUS
Status: DISCONTINUED | OUTPATIENT
Start: 2019-07-01 | End: 2019-07-13 | Stop reason: HOSPADM

## 2019-07-01 RX ORDER — HYDROMORPHONE HYDROCHLORIDE 1 MG/ML
0.5 INJECTION, SOLUTION INTRAMUSCULAR; INTRAVENOUS; SUBCUTANEOUS
Status: DISCONTINUED | OUTPATIENT
Start: 2019-07-01 | End: 2019-07-13 | Stop reason: HOSPADM

## 2019-07-01 RX ORDER — ALBUMIN HUMAN 50 G/1000ML
SOLUTION INTRAVENOUS AS NEEDED
Status: DISCONTINUED | OUTPATIENT
Start: 2019-07-01 | End: 2019-07-01 | Stop reason: HOSPADM

## 2019-07-01 RX ORDER — DOXAZOSIN 2 MG/1
2 TABLET ORAL DAILY
Status: DISCONTINUED | OUTPATIENT
Start: 2019-07-02 | End: 2019-07-13 | Stop reason: HOSPADM

## 2019-07-01 RX ORDER — LIDOCAINE HYDROCHLORIDE 10 MG/ML
0.1 INJECTION, SOLUTION EPIDURAL; INFILTRATION; INTRACAUDAL; PERINEURAL AS NEEDED
Status: DISCONTINUED | OUTPATIENT
Start: 2019-07-01 | End: 2019-07-01 | Stop reason: HOSPADM

## 2019-07-01 RX ORDER — SODIUM CHLORIDE, SODIUM LACTATE, POTASSIUM CHLORIDE, CALCIUM CHLORIDE 600; 310; 30; 20 MG/100ML; MG/100ML; MG/100ML; MG/100ML
25 INJECTION, SOLUTION INTRAVENOUS CONTINUOUS
Status: DISCONTINUED | OUTPATIENT
Start: 2019-07-01 | End: 2019-07-01 | Stop reason: HOSPADM

## 2019-07-01 RX ORDER — ONDANSETRON 2 MG/ML
4 INJECTION INTRAMUSCULAR; INTRAVENOUS AS NEEDED
Status: DISCONTINUED | OUTPATIENT
Start: 2019-07-01 | End: 2019-07-01 | Stop reason: HOSPADM

## 2019-07-01 RX ORDER — GUAIFENESIN 100 MG/5ML
81 LIQUID (ML) ORAL DAILY
Status: DISCONTINUED | OUTPATIENT
Start: 2019-07-02 | End: 2019-07-13 | Stop reason: HOSPADM

## 2019-07-01 RX ORDER — SULFAMETHOXAZOLE AND TRIMETHOPRIM 800; 160 MG/1; MG/1
1 TABLET ORAL 2 TIMES DAILY
COMMUNITY
Start: 2019-06-29 | End: 2019-07-13

## 2019-07-01 RX ORDER — CARVEDILOL 12.5 MG/1
25 TABLET ORAL 2 TIMES DAILY WITH MEALS
Status: DISCONTINUED | OUTPATIENT
Start: 2019-07-02 | End: 2019-07-13 | Stop reason: HOSPADM

## 2019-07-01 RX ORDER — ACETAMINOPHEN 10 MG/ML
INJECTION, SOLUTION INTRAVENOUS AS NEEDED
Status: DISCONTINUED | OUTPATIENT
Start: 2019-07-01 | End: 2019-07-01 | Stop reason: HOSPADM

## 2019-07-01 RX ORDER — SODIUM CHLORIDE 0.9 % (FLUSH) 0.9 %
5-40 SYRINGE (ML) INJECTION AS NEEDED
Status: DISCONTINUED | OUTPATIENT
Start: 2019-07-01 | End: 2019-07-01 | Stop reason: HOSPADM

## 2019-07-01 RX ORDER — FENTANYL CITRATE 50 UG/ML
50 INJECTION, SOLUTION INTRAMUSCULAR; INTRAVENOUS AS NEEDED
Status: DISCONTINUED | OUTPATIENT
Start: 2019-07-01 | End: 2019-07-01 | Stop reason: HOSPADM

## 2019-07-01 RX ORDER — HYDROMORPHONE HYDROCHLORIDE 2 MG/ML
INJECTION, SOLUTION INTRAMUSCULAR; INTRAVENOUS; SUBCUTANEOUS AS NEEDED
Status: DISCONTINUED | OUTPATIENT
Start: 2019-07-01 | End: 2019-07-01 | Stop reason: HOSPADM

## 2019-07-01 RX ORDER — FENTANYL CITRATE 50 UG/ML
INJECTION, SOLUTION INTRAMUSCULAR; INTRAVENOUS AS NEEDED
Status: DISCONTINUED | OUTPATIENT
Start: 2019-07-01 | End: 2019-07-01 | Stop reason: HOSPADM

## 2019-07-01 RX ORDER — ROCURONIUM BROMIDE 10 MG/ML
INJECTION, SOLUTION INTRAVENOUS AS NEEDED
Status: DISCONTINUED | OUTPATIENT
Start: 2019-07-01 | End: 2019-07-01 | Stop reason: HOSPADM

## 2019-07-01 RX ORDER — HYDROMORPHONE HYDROCHLORIDE 1 MG/ML
1 INJECTION, SOLUTION INTRAMUSCULAR; INTRAVENOUS; SUBCUTANEOUS ONCE
Status: COMPLETED | OUTPATIENT
Start: 2019-07-01 | End: 2019-07-01

## 2019-07-01 RX ADMIN — ROCURONIUM BROMIDE 5 MG: 10 INJECTION, SOLUTION INTRAVENOUS at 17:42

## 2019-07-01 RX ADMIN — HYDROMORPHONE HYDROCHLORIDE 0.2 MG: 2 INJECTION, SOLUTION INTRAMUSCULAR; INTRAVENOUS; SUBCUTANEOUS at 19:18

## 2019-07-01 RX ADMIN — HYDROMORPHONE HYDROCHLORIDE 0.2 MG: 2 INJECTION, SOLUTION INTRAMUSCULAR; INTRAVENOUS; SUBCUTANEOUS at 19:24

## 2019-07-01 RX ADMIN — PIPERACILLIN SODIUM,TAZOBACTAM SODIUM 3.38 G: 3; .375 INJECTION, POWDER, FOR SOLUTION INTRAVENOUS at 17:45

## 2019-07-01 RX ADMIN — SODIUM CHLORIDE, SODIUM LACTATE, POTASSIUM CHLORIDE, AND CALCIUM CHLORIDE: 600; 310; 30; 20 INJECTION, SOLUTION INTRAVENOUS at 18:33

## 2019-07-01 RX ADMIN — HYDROMORPHONE HYDROCHLORIDE 1 MG: 1 INJECTION, SOLUTION INTRAMUSCULAR; INTRAVENOUS; SUBCUTANEOUS at 16:25

## 2019-07-01 RX ADMIN — ONDANSETRON 4 MG: 2 INJECTION INTRAMUSCULAR; INTRAVENOUS at 14:29

## 2019-07-01 RX ADMIN — PIPERACILLIN SODIUM,TAZOBACTAM SODIUM 3.38 G: 3; .375 INJECTION, POWDER, FOR SOLUTION INTRAVENOUS at 16:25

## 2019-07-01 RX ADMIN — LIDOCAINE HYDROCHLORIDE 100 MG: 20 INJECTION, SOLUTION EPIDURAL; INFILTRATION; INTRACAUDAL; PERINEURAL at 17:42

## 2019-07-01 RX ADMIN — ROCURONIUM BROMIDE 45 MG: 10 INJECTION, SOLUTION INTRAVENOUS at 17:46

## 2019-07-01 RX ADMIN — FENTANYL CITRATE 25 MCG: 50 INJECTION, SOLUTION INTRAMUSCULAR; INTRAVENOUS at 19:43

## 2019-07-01 RX ADMIN — SODIUM CHLORIDE, SODIUM LACTATE, POTASSIUM CHLORIDE, AND CALCIUM CHLORIDE 25 ML/HR: 600; 310; 30; 20 INJECTION, SOLUTION INTRAVENOUS at 17:23

## 2019-07-01 RX ADMIN — FENTANYL CITRATE 50 MCG: 50 INJECTION, SOLUTION INTRAMUSCULAR; INTRAVENOUS at 17:31

## 2019-07-01 RX ADMIN — ONDANSETRON 4 MG: 2 INJECTION INTRAMUSCULAR; INTRAVENOUS at 19:04

## 2019-07-01 RX ADMIN — FENTANYL CITRATE 25 MCG: 50 INJECTION, SOLUTION INTRAMUSCULAR; INTRAVENOUS at 18:45

## 2019-07-01 RX ADMIN — ACETAMINOPHEN 1000 MG: 10 INJECTION, SOLUTION INTRAVENOUS at 18:23

## 2019-07-01 RX ADMIN — HYDROMORPHONE HYDROCHLORIDE 0.5 MG: 1 INJECTION, SOLUTION INTRAMUSCULAR; INTRAVENOUS; SUBCUTANEOUS at 19:57

## 2019-07-01 RX ADMIN — PROPOFOL 100 MG: 10 INJECTION, EMULSION INTRAVENOUS at 17:42

## 2019-07-01 RX ADMIN — FENTANYL CITRATE 25 MCG: 50 INJECTION, SOLUTION INTRAMUSCULAR; INTRAVENOUS at 19:53

## 2019-07-01 RX ADMIN — SUCCINYLCHOLINE CHLORIDE 140 MG: 20 INJECTION INTRAMUSCULAR; INTRAVENOUS at 17:42

## 2019-07-01 RX ADMIN — PROPOFOL 30 MG: 10 INJECTION, EMULSION INTRAVENOUS at 18:46

## 2019-07-01 RX ADMIN — FENTANYL CITRATE 50 MCG: 50 INJECTION, SOLUTION INTRAMUSCULAR; INTRAVENOUS at 14:10

## 2019-07-01 RX ADMIN — FENTANYL CITRATE 50 MCG: 50 INJECTION, SOLUTION INTRAMUSCULAR; INTRAVENOUS at 17:36

## 2019-07-01 RX ADMIN — SODIUM CHLORIDE 1000 ML: 900 INJECTION, SOLUTION INTRAVENOUS at 14:29

## 2019-07-01 RX ADMIN — FENTANYL CITRATE 25 MCG: 50 INJECTION, SOLUTION INTRAMUSCULAR; INTRAVENOUS at 19:38

## 2019-07-01 RX ADMIN — Medication 10 ML: at 15:21

## 2019-07-01 RX ADMIN — ALBUMIN HUMAN 250 ML: 50 SOLUTION INTRAVENOUS at 18:01

## 2019-07-01 RX ADMIN — IOPAMIDOL 100 ML: 755 INJECTION, SOLUTION INTRAVENOUS at 15:21

## 2019-07-01 RX ADMIN — FENTANYL CITRATE 25 MCG: 50 INJECTION, SOLUTION INTRAMUSCULAR; INTRAVENOUS at 19:48

## 2019-07-01 RX ADMIN — FENTANYL CITRATE 25 MCG: 50 INJECTION, SOLUTION INTRAMUSCULAR; INTRAVENOUS at 18:46

## 2019-07-01 RX ADMIN — HYDROMORPHONE HYDROCHLORIDE 0.2 MG: 2 INJECTION, SOLUTION INTRAMUSCULAR; INTRAVENOUS; SUBCUTANEOUS at 19:13

## 2019-07-01 RX ADMIN — HYDROMORPHONE HYDROCHLORIDE 0.2 MG: 2 INJECTION, SOLUTION INTRAMUSCULAR; INTRAVENOUS; SUBCUTANEOUS at 18:49

## 2019-07-01 RX ADMIN — PROPOFOL 10 MG: 10 INJECTION, EMULSION INTRAVENOUS at 17:36

## 2019-07-01 RX ADMIN — DEXAMETHASONE SODIUM PHOSPHATE 4 MG: 4 INJECTION, SOLUTION INTRA-ARTICULAR; INTRALESIONAL; INTRAMUSCULAR; INTRAVENOUS; SOFT TISSUE at 17:55

## 2019-07-01 RX ADMIN — HYDROMORPHONE HYDROCHLORIDE 0.5 MG: 1 INJECTION, SOLUTION INTRAMUSCULAR; INTRAVENOUS; SUBCUTANEOUS at 20:15

## 2019-07-01 RX ADMIN — FENTANYL CITRATE 25 MCG: 50 INJECTION, SOLUTION INTRAMUSCULAR; INTRAVENOUS at 18:03

## 2019-07-01 RX ADMIN — SODIUM CHLORIDE 125 ML/HR: 900 INJECTION, SOLUTION INTRAVENOUS at 19:38

## 2019-07-01 RX ADMIN — FENTANYL CITRATE 25 MCG: 50 INJECTION, SOLUTION INTRAMUSCULAR; INTRAVENOUS at 18:49

## 2019-07-01 NOTE — H&P
Surgery History and Physcial    Subjective:      Manuel English is a 67 y.o. male who presents for evaluation of abdominal pain. The pain is located in the diffuse mid abdomen without radiation. Pain is described as sharp and stabbing and measures 10/10 in intensity. Onset of pain was 4 hours ago. Aggravating factors include movement. Alleviating factors include none. Associated symptoms include anorexia, fever, nausea and sweats. Pt is s/p appendectomy remotely, lap Nissen with Dr. Alexandra Cagle 1 year ago, and coronary stent 3 months ago on aspirin and plavix.     Patient Active Problem List    Diagnosis Date Noted    Perforated viscus 07/01/2019    Sepsis ([address] 07/01/2019    Hiatal hernia with GERD 08/29/2018    Other and unspecified hyperlipidemia 04/05/2012    Orthostatic hypotension     PVC (premature ventricular contraction) 12/21/2010    HTN (hypertension), benign 12/06/2010    Dizziness 12/06/2010    TIA (transient ischemic attack) 12/06/2010    Leg edema 12/06/2010     Past Medical History:   Diagnosis Date    Arthritis     BPH (benign prostatic hyperplasia)     Chronic kidney disease     stage 2     Chronic obstructive pulmonary disease (HCC)     Elevated LFT's     Essential hypertension 9/24/01    GERD (gastroesophageal reflux disease)     hiatal hernia    Ill-defined condition 12/02/2016    faint    Liver disease     \"fatty liver\" as per patient    Nephrosclerosis     Orthostatic hypotension 9/24/01    PVC's 9/24/01    Sleep apnea     no CPAP    Syncope 9/24/01    secondary to venous insuffiency       Past Surgical History:   Procedure Laterality Date    COLONOSCOPY N/A 12/13/2016    COLONOSCOPY performed by Laina Byers MD at [facility] ENDOSCOPY    ECHO 2D ADULT  5/24/12    EF 60 - 65%; mild concentric hypertrophy; pulmonary systolic artery pressure upper limits normal    HX APPENDECTOMY  1985    HX HERNIA REPAIR  08/29/2018    Davinci hiatal hernia repair- Polo Davidson MD      Social History     Tobacco Use    Smoking status: Former Smoker     Packs/day: 3.50     Last attempt to quit: 1980     Years since quittin.5    Smokeless tobacco: Never Used   Substance Use Topics    Alcohol use: No     Comment: no alcohol since       Family History   Problem Relation Age of Onset    Stroke Father     Heart Disease Father       Prior to Admission medications    Medication Sig Start Date End Date Taking? Authorizing Provider   calcium carbonate (OS-IVAN) 500 mg calcium (1,250 mg) tablet Take 1 Tab by mouth daily. Yes Provider, Historical   trimethoprim-sulfamethoxazole (BACTRIM DS, SEPTRA DS) 160-800 mg per tablet Take 1 Tab by mouth two (2) times a day. 19  Yes Provider, Historical   carvedilol (COREG) 25 mg tablet Take 25 mg by mouth two (2) times daily (with meals). Yes Provider, Historical   atorvastatin (LIPITOR) 20 mg tablet Take 20 mg by mouth daily. Yes Provider, Historical   aspirin 81 mg chewable tablet Take 1 Tab by mouth daily. 19  Yes Dru Eric MD   clopidogrel (PLAVIX) 75 mg tab Take 1 Tab by mouth daily. 19  Yes Dru Eric MD   cyanocobalamin 1,000 mcg tablet Take 1,000 mcg by mouth daily. Yes Provider, Historical   cholecalciferol (VITAMIN D3) 1,000 unit cap Take 1,000 Units by mouth daily. Yes Provider, Historical   doxazosin (CARDURA) 2 mg tablet Take 2 mg by mouth daily. Yes Provider, Historical     Allergies   Allergen Reactions    Benicar [Olmesartan] Unknown (comments)     Pt states he has found out that this is not a medication allergy.  Demerol [Meperidine] Unknown (comments)     Causes unconsciousness         Review of Systems   Constitutional: Positive for appetite change, diaphoresis and fever. Negative for chills. Respiratory: Negative for shortness of breath and wheezing. Cardiovascular: Negative for chest pain and palpitations. Gastrointestinal: Positive for abdominal pain.  Negative for diarrhea, nausea and vomiting. Musculoskeletal: Negative for myalgias. Hematological: Does not bruise/bleed easily. Objective:     Visit Vitals  /64   Pulse (!) 107   Temp 98.9 °F (37.2 °C)   Resp 22   Ht 5' 4\" (1.626 m)   Wt 156 lb (70.8 kg)   SpO2 96%   BMI 26.78 kg/m²       Physical Exam   Constitutional: He appears well-developed and well-nourished. No distress. HENT:   Head: Normocephalic and atraumatic. Cardiovascular: Normal rate, regular rhythm, normal heart sounds and intact distal pulses. Pulmonary/Chest: Breath sounds normal. He has no wheezes. He has no rales. Abdominal: Soft. Bowel sounds are normal. He exhibits distension. He exhibits no mass. There is no hepatosplenomegaly. There is generalized tenderness. There is rigidity, rebound and guarding. No hernia. Musculoskeletal: Normal range of motion. Lymphadenopathy:     He has no cervical adenopathy. Imaging:  images and reports reviewed    Lab Review:    Recent Results (from the past 24 hour(s))   CBC WITH AUTOMATED DIFF    Collection Time: 07/01/19  2:09 PM   Result Value Ref Range    WBC 4.4 4.1 - 11.1 K/uL    RBC 5.60 4.10 - 5.70 M/uL    HGB 16.2 12.1 - 17.0 g/dL    HCT 49.0 36.6 - 50.3 %    MCV 87.5 80.0 - 99.0 FL    MCH 28.9 26.0 - 34.0 PG    MCHC 33.1 30.0 - 36.5 g/dL    RDW 13.9 11.5 - 14.5 %    PLATELET 765 071 - 342 K/uL    MPV 10.7 8.9 - 12.9 FL    NRBC 0.0 0  WBC    ABSOLUTE NRBC 0.00 0.00 - 0.01 K/uL    NEUTROPHILS 69 32 - 75 %    LYMPHOCYTES 19 12 - 49 %    MONOCYTES 8 5 - 13 %    EOSINOPHILS 3 0 - 7 %    BASOPHILS 1 0 - 1 %    IMMATURE GRANULOCYTES 0 0.0 - 0.5 %    ABS. NEUTROPHILS 3.1 1.8 - 8.0 K/UL    ABS. LYMPHOCYTES 0.9 0.8 - 3.5 K/UL    ABS. MONOCYTES 0.3 0.0 - 1.0 K/UL    ABS. EOSINOPHILS 0.1 0.0 - 0.4 K/UL    ABS. BASOPHILS 0.1 0.0 - 0.1 K/UL    ABS. IMM.  GRANS. 0.0 0.00 - 0.04 K/UL    DF AUTOMATED     METABOLIC PANEL, COMPREHENSIVE    Collection Time: 07/01/19  2:09 PM   Result Value Ref Range    Sodium 135 (L) 136 - 145 mmol/L    Potassium 4.2 3.5 - 5.1 mmol/L    Chloride 104 97 - 108 mmol/L    CO2 27 21 - 32 mmol/L    Anion gap 4 (L) 5 - 15 mmol/L    Glucose 94 65 - 100 mg/dL    BUN 16 6 - 20 MG/DL    Creatinine 1.29 0.70 - 1.30 MG/DL    BUN/Creatinine ratio 12 12 - 20      GFR est AA >60 >60 ml/min/1.73m2    GFR est non-AA 54 (L) >60 ml/min/1.73m2    Calcium 8.9 8.5 - 10.1 MG/DL    Bilirubin, total 0.9 0.2 - 1.0 MG/DL    ALT (SGPT) 114 (H) 12 - 78 U/L    AST (SGOT) 75 (H) 15 - 37 U/L    Alk. phosphatase 317 (H) 45 - 117 U/L    Protein, total 9.4 (H) 6.4 - 8.2 g/dL    Albumin 3.6 3.5 - 5.0 g/dL    Globulin 5.8 (H) 2.0 - 4.0 g/dL    A-G Ratio 0.6 (L) 1.1 - 2.2     SAMPLES BEING HELD    Collection Time: 07/01/19  2:09 PM   Result Value Ref Range    SAMPLES BEING HELD 1RED 1BLU     COMMENT        Add-on orders for these samples will be processed based on acceptable specimen integrity and analyte stability, which may vary by analyte. LIPASE    Collection Time: 07/01/19  2:09 PM   Result Value Ref Range    Lipase 214 73 - 393 U/L   LACTIC ACID    Collection Time: 07/01/19  2:16 PM   Result Value Ref Range    Lactic acid 2.0 0.4 - 2.0 MMOL/L         Assessment:     66 yo man with rigid abdomen and diffuse free air on CT, etiology unknown. Remote h/o Nissen, evidence of diverticulosis on scan. Plan:     1. I recommend proceeding with Surgery:  Exploratory laparotomy and possible bowel resection and or ostomy. 2. Discussed aspects of surgical intervention, methods, risks including by not limited to infection, bleeding, hematoma, and perforation of the intestines or solid organs, and the risks of general anesthetic. The patient understands the risks; any and all questions were answered to the patient's satisfaction.

## 2019-07-01 NOTE — ED NOTES
Sx ordered to hang iv antbx but not start it at this time. Pt. Alert and oriented x 3, no distress.  With 8/10 pain from 10/10 post med

## 2019-07-01 NOTE — PERIOP NOTES
TRANSFER - IN REPORT:    Verbal report received from Kings Patel RN(name) on Tiffani Malagon  being received from ED(unit) for ordered procedure      Report consisted of patients Situation, Background, Assessment and   Recommendations(SBAR). Information from the following report(s) SBAR, Kardex, Intake/Output, MAR and Recent Results was reviewed with the receiving nurse. Opportunity for questions and clarification was provided. Assessment completed upon patients arrival to unit and care assumed.

## 2019-07-01 NOTE — PROGRESS NOTES
Pharmacy Medication Reconciliation     The patient was interviewed regarding current PTA medication list, use and drug allergies;  patient and patient's daughter were present in room and obtained permission from patient to discuss drug regimen with visitor(s) present. The patient was questioned regarding use of any other inhalers, topical products, over the counter medications, herbal medications, vitamin products or ophthalmic/nasal/otic medication use. Allergy Update: Benicar [olmesartan] and Demerol [meperidine]    Recommendations/Findings: The following amendments were made to the patient's active medication list on file at [facility]:   1) Additions:   +bactrim DS 1 tab bid - started 6/29/19 x 10 days  +calcium 500mg po daily    2) Deletions: none (not taking alprazolam for sleep)    3) Changes: none      -Clarified PTA med list with patient interview, patient's daughter interview, and Rx query. PTA medication list was corrected to the following:     Prior to Admission Medications   Prescriptions Last Dose Informant Patient Reported? Taking?   aspirin 81 mg chewable tablet 7/1/2019 at 0600 Self Yes Yes   Sig: Take 1 Tab by mouth daily. atorvastatin (LIPITOR) 20 mg tablet 7/1/2019 at 0600 Self Yes Yes   Sig: Take 20 mg by mouth daily. calcium carbonate (OS-IVAN) 500 mg calcium (1,250 mg) tablet 7/1/2019 at 0600 Self Yes Yes   Sig: Take 1 Tab by mouth daily. carvedilol (COREG) 25 mg tablet 7/1/2019 at 0600 Self Yes Yes   Sig: Take 25 mg by mouth two (2) times daily (with meals). cholecalciferol (VITAMIN D3) 1,000 unit cap 7/1/2019 at 0600 Self Yes Yes   Sig: Take 1,000 Units by mouth daily. clopidogrel (PLAVIX) 75 mg tab 7/1/2019 at 0600 Self No Yes   Sig: Take 1 Tab by mouth daily. cyanocobalamin 1,000 mcg tablet 7/1/2019 at 0600 Self Yes Yes   Sig: Take 1,000 mcg by mouth daily. doxazosin (CARDURA) 2 mg tablet 7/1/2019 at 0600 Self Yes Yes   Sig: Take 2 mg by mouth daily. trimethoprim-sulfamethoxazole (BACTRIM DS, SEPTRA DS) 160-800 mg per tablet 7/1/2019 at 0600 Self Yes Yes   Sig: Take 1 Tab by mouth two (2) times a day.       Facility-Administered Medications: None          Thank you,  PEPE Man

## 2019-07-01 NOTE — ANESTHESIA PREPROCEDURE EVALUATION
Anesthetic History   No history of anesthetic complications            Review of Systems / Medical History  Patient summary reviewed, nursing notes reviewed and pertinent labs reviewed    Pulmonary    COPD    Sleep apnea: No treatment  Smoker      Comments: Former Smoker   Neuro/Psych         TIA     Cardiovascular    Hypertension        Dysrhythmias : PVC      Exercise tolerance: >4 METS  Comments: EF 60 - 65%  PVC's     GI/Hepatic/Renal     GERD      Hiatal hernia and liver disease    Comments: NAUSEA  ELEVATED LFTs  fatty liver  Nephrosclerosis Endo/Other        Arthritis     Other Findings   Comments: Perforated viscus     BPH   Syncope          Physical Exam    Airway  Mallampati: I    Neck ROM: normal range of motion   Mouth opening: Normal     Cardiovascular  Regular rate and rhythm,  S1 and S2 normal,  no murmur, click, rub, or gallop             Dental    Dentition: Caps/crowns  Comments: 3 missing teeth   Pulmonary  Breath sounds clear to auscultation               Abdominal  GI exam deferred       Other Findings            Anesthetic Plan    ASA: 3, emergent  Anesthesia type: general    Monitoring Plan: BIS      Induction: Intravenous  Anesthetic plan and risks discussed with: Patient

## 2019-07-01 NOTE — BRIEF OP NOTE
BRIEF OPERATIVE NOTE    Date of Procedure: 7/1/2019   Preoperative Diagnosis: PERFORATED VISCOUS, FREE AIR  Postoperative Diagnosis: PERFORATED SIGMOID COLON WITH MINIMAL SURROUNDING CONTAMINATION   Procedure(s):  LAPAROTOMY EXPLORATORY, SIGMOID COLECTOMY WITH STAPLED ANASTOMOSIS   Surgeon(s) and Role:     Rogers Ness MD - Primary         Surgical Assistant: none    Surgical Staff:  Circ-1: Asiya Horvath  Circ-Relief: Casandra Pennington RN  Scrub Tech-1: Karina [facility]  Surg Asst-1: Joe Push A  Event Time In Time Out   Incision Start 1757    Incision Close       Anesthesia: General   Estimated Blood Loss: 50 cc  Specimens:   ID Type Source Tests Collected by Time Destination   1 : Perforated Sigmoid Colon Preservative Sigmoid  Fran Fitzgerald MD 7/1/2019 1900 Pathology      Findings: focal sigmoid colon perforation with minimal surrounding contamination   Complications: none  Implants: * No implants in log *

## 2019-07-01 NOTE — ED NOTES
Pt. To OR alert and oriented x 3 no distress. Report given to Kasandra Naranjo.  Valuables to family

## 2019-07-01 NOTE — ED PROVIDER NOTES
EMERGENCY DEPARTMENT HISTORY AND PHYSICAL EXAM      Date: 7/1/2019  Patient Name: Alton Ingram    Please note that this dictation was completed with [employer] [facility], the computer voice recognition software. Quite often unanticipated grammatical, syntax, homophones, and other inte test test test test rpretive errors are inadvertently transcribed by the computer software. Please disregard these errors. Please excuse any errors that have escaped final proofreading. History of Presenting Illness     Chief Complaint   Patient presents with    Abdominal Pain       History Provided By: Patient    HPI: Alton Ingram, 67 y.o. male, with a past medical history significant for appendectomy 8, kidney stones presented the emergency department complaining of right-sided abdominal pain that began suddenly around 12:30 PM this afternoon. Patient rates the pain is severe, radiates to the testicles. No dysuria or hematuria. No nausea or vomiting. No change in stool. No hematochezia or melena. Patient did not take anything for the pain. PCP: Astrid Levin MD    No current facility-administered medications on file prior to encounter. Current Outpatient Medications on File Prior to Encounter   Medication Sig Dispense Refill    carvedilol (COREG) 25 mg tablet Take 25 mg by mouth two (2) times daily (with meals).  atorvastatin (LIPITOR) 20 mg tablet Take 20 mg by mouth daily.  aspirin 81 mg chewable tablet Take 1 Tab by mouth daily.  clopidogrel (PLAVIX) 75 mg tab Take 1 Tab by mouth daily. 30 Tab 5    cyanocobalamin 1,000 mcg tablet Take 1,000 mcg by mouth daily.  cholecalciferol (VITAMIN D3) 1,000 unit cap Take 1,000 Units by mouth daily.  doxazosin (CARDURA) 2 mg tablet Take 2 mg by mouth daily.          Past History     Past Medical History:  Past Medical History:   Diagnosis Date    Arthritis     BPH (benign prostatic hyperplasia)     Chronic kidney disease     stage 2     Chronic obstructive pulmonary disease (HCC)     Elevated LFT's     Essential hypertension 01    GERD (gastroesophageal reflux disease)     hiatal hernia    Ill-defined condition 2016    faint    Liver disease     \"fatty liver\" as per patient    Nephrosclerosis     Orthostatic hypotension 01    PVC's 01    Sleep apnea     no CPAP    Syncope 01    secondary to venous insuffiency        Past Surgical History:  Past Surgical History:   Procedure Laterality Date    COLONOSCOPY N/A 2016    COLONOSCOPY performed by Jodi Blanc [facility] at [facility] ENDOSCOPY    ECHO 2D ADULT  12    EF 60 - 65%; mild concentric hypertrophy; pulmonary systolic artery pressure upper limits normal    HX APPENDECTOMY  1985    HX HERNIA REPAIR  2018    Manjula Matos hiatal hernia repair- Carolina Sunshine MD       Family History:  Family History   Problem Relation Age of Onset    Stroke Father     Heart Disease Father        Social History:  Social History     Tobacco Use    Smoking status: Former Smoker     Packs/day: 3.50     Last attempt to quit: 1980     Years since quittin.5    Smokeless tobacco: Never Used   Substance Use Topics    Alcohol use: No     Comment: no alcohol since     Drug use: No       Allergies: Allergies   Allergen Reactions    Benicar [Olmesartan] Unknown (comments)     Pt states he has found out that this is not a medication allergy.  Demerol [Meperidine] Unknown (comments)     Causes unconsciousness         Review of Systems   Review of Systems   Constitutional: Negative for chills and fever. HENT: Negative for congestion and sore throat. Eyes: Negative for visual disturbance. Respiratory: Negative for cough and shortness of breath. Cardiovascular: Negative for chest pain and leg swelling. Gastrointestinal: Positive for abdominal pain. Negative for blood in stool, diarrhea and nausea. Endocrine: Negative for polyuria.    Genitourinary: Positive for flank pain. Negative for dysuria and testicular pain. Musculoskeletal: Negative for arthralgias, joint swelling and myalgias. Skin: Negative for rash. Allergic/Immunologic: Negative for immunocompromised state. Neurological: Negative for weakness and headaches. Hematological: Does not bruise/bleed easily. Psychiatric/Behavioral: Negative for confusion. Physical Exam   Physical Exam   Constitutional: Uncomfortable appearing male, mild distress  HENT:   Head: Normocephalic and atraumatic. Moist mucous membranes   Eyes: Pupils are equal, round, and reactive to light. Conjunctivae are normal. Right eye exhibits no discharge. Left eye exhibits no discharge. Neck: Normal range of motion. Neck supple. No tracheal deviation present. Cardiovascular: Normal rate, regular rhythm and normal heart sounds. No murmur heard. Pulmonary/Chest: Effort normal and breath sounds normal. No respiratory distress. no wheezes. no rales. Abdominal: Abdomen is rigid, tenderness and guarding in the right lower quadrant. Musculoskeletal: Normal range of motion. exhibits no edema, tenderness or deformity. Neurological: alert and oriented to person, place, and time. Skin: Skin is warm and dry. No rash noted. No erythema. Psychiatric: behavior is normal.   Nursing note and vitals reviewed.     Diagnostic Study Results     Labs -     Recent Results (from the past 12 hour(s))   CBC WITH AUTOMATED DIFF    Collection Time: 07/01/19  2:09 PM   Result Value Ref Range    WBC 4.4 4.1 - 11.1 K/uL    RBC 5.60 4.10 - 5.70 M/uL    HGB 16.2 12.1 - 17.0 g/dL    HCT 49.0 36.6 - 50.3 %    MCV 87.5 80.0 - 99.0 FL    MCH 28.9 26.0 - 34.0 PG    MCHC 33.1 30.0 - 36.5 g/dL    RDW 13.9 11.5 - 14.5 %    PLATELET 092 913 - 323 K/uL    MPV 10.7 8.9 - 12.9 FL    NRBC 0.0 0  WBC    ABSOLUTE NRBC 0.00 0.00 - 0.01 K/uL    NEUTROPHILS 69 32 - 75 %    LYMPHOCYTES 19 12 - 49 %    MONOCYTES 8 5 - 13 %    EOSINOPHILS 3 0 - 7 % BASOPHILS 1 0 - 1 %    IMMATURE GRANULOCYTES 0 0.0 - 0.5 %    ABS. NEUTROPHILS 3.1 1.8 - 8.0 K/UL    ABS. LYMPHOCYTES 0.9 0.8 - 3.5 K/UL    ABS. MONOCYTES 0.3 0.0 - 1.0 K/UL    ABS. EOSINOPHILS 0.1 0.0 - 0.4 K/UL    ABS. BASOPHILS 0.1 0.0 - 0.1 K/UL    ABS. IMM. GRANS. 0.0 0.00 - 0.04 K/UL    DF AUTOMATED     METABOLIC PANEL, COMPREHENSIVE    Collection Time: 07/01/19  2:09 PM   Result Value Ref Range    Sodium 135 (L) 136 - 145 mmol/L    Potassium 4.2 3.5 - 5.1 mmol/L    Chloride 104 97 - 108 mmol/L    CO2 27 21 - 32 mmol/L    Anion gap 4 (L) 5 - 15 mmol/L    Glucose 94 65 - 100 mg/dL    BUN 16 6 - 20 MG/DL    Creatinine 1.29 0.70 - 1.30 MG/DL    BUN/Creatinine ratio 12 12 - 20      GFR est AA >60 >60 ml/min/1.73m2    GFR est non-AA 54 (L) >60 ml/min/1.73m2    Calcium 8.9 8.5 - 10.1 MG/DL    Bilirubin, total 0.9 0.2 - 1.0 MG/DL    ALT (SGPT) 114 (H) 12 - 78 U/L    AST (SGOT) 75 (H) 15 - 37 U/L    Alk. phosphatase 317 (H) 45 - 117 U/L    Protein, total 9.4 (H) 6.4 - 8.2 g/dL    Albumin 3.6 3.5 - 5.0 g/dL    Globulin 5.8 (H) 2.0 - 4.0 g/dL    A-G Ratio 0.6 (L) 1.1 - 2.2     SAMPLES BEING HELD    Collection Time: 07/01/19  2:09 PM   Result Value Ref Range    SAMPLES BEING HELD 1RED 1BLU     COMMENT        Add-on orders for these samples will be processed based on acceptable specimen integrity and analyte stability, which may vary by analyte. LIPASE    Collection Time: 07/01/19  2:09 PM   Result Value Ref Range    Lipase 214 73 - 393 U/L   LACTIC ACID    Collection Time: 07/01/19  2:16 PM   Result Value Ref Range    Lactic acid 2.0 0.4 - 2.0 MMOL/L       Radiologic Studies -   CT ABD PELV W CONT   Final Result   IMPRESSION:   Free intraperitoneal air, origin of the perforation not definitely clear from   this examination. Dr. Nabil Veliz notified      XR ABD FLAT/ ERECT   Final Result   IMPRESSION: Borderline gaseous distention of small bowel loops may reflect early   ileus or early/partial small bowel obstruction.  No free air or other acute   findings. Clinical follow-up and radiographic follow-up as clinically   appropriate recommended. CT Results  (Last 48 hours)               07/01/19 1521  CT ABD PELV W CONT Final result    Impression:  IMPRESSION:   Free intraperitoneal air, origin of the perforation not definitely clear from   this examination. Dr. Fidel Soulier notified       Narrative:  EXAM: CT ABD PELV W CONT       INDICATION: Abdominal pain       COMPARISON: May 2, 2014        CONTRAST: 100 mL of Isovue-370. TECHNIQUE:    Following the uneventful intravenous administration of contrast, thin axial   images were obtained through the abdomen and pelvis. Coronal and sagittal   reconstructions were generated. Oral contrast was not administered. CT dose   reduction was achieved through use of a standardized protocol tailored for this   examination and automatic exposure control for dose modulation. FINDINGS:    LUNG BASES: Clear. INCIDENTALLY IMAGED HEART AND MEDIASTINUM: Unremarkable. LIVER: No mass or biliary dilatation. GALLBLADDER: Cholelithiasis, gallbladder is otherwise unremarkable. SPLEEN: No mass. PANCREAS: No mass or ductal dilatation. ADRENALS: Unremarkable. KIDNEYS: No mass, calculus, or hydronephrosis. STOMACH: Mild gastric distention with gas   SMALL BOWEL: No dilatation or wall thickening. COLON: Transverse colon gas-distention. Diffuse sigmoid diverticulosis. APPENDIX: Appendectomy   PERITONEUM: There is diffuse of free intraperitoneal gas. There is a small   amount of fluid around the liver. Diffuse stranding is seen in the peritoneum   at multiple level mainly around the sigmoid colon and small bowel on the left   side of the abdomen. Origin of the a perforation not definitely clear from this   examination. RETROPERITONEUM: No lymphadenopathy or aortic aneurysm. Prostate prominence with calcifications   URINARY BLADDER: No mass or calculus.    BONES: No destructive bone lesion. ADDITIONAL COMMENTS: N/A               CXR Results  (Last 48 hours)    None            Medical Decision Making   I am the first provider for this patient. I reviewed the vital signs, available nursing notes, past medical history, past surgical history, family history and social history. Vital Signs-Reviewed the patient's vital signs. Patient Vitals for the past 12 hrs:   Temp Pulse Resp BP SpO2   07/01/19 1615  (!) 107  127/64 96 %   07/01/19 1610    135/70    07/01/19 1401 98.9 °F (37.2 °C) 75 22 (!) 164/110 98 %       Pulse Oximetry Analysis -98%    Cardiac Monitor:   Normal sinus rhythm        Records Reviewed: Nursing Notes and Old Medical Records    Provider Notes (Medical Decision Making):   Patient has concerning exam for acute abdomen, differential includes perforation, AAA, ischemic gut, kidney stone, cholecystitis. Most likely renal colic given pain of the right lower quadrant and history of stones. Will obtain CT abdomen pelvis with IV contrast.  Disposition pending laboratory work-up, imaging, symptomatic improvement. ED Course:   Initial assessment performed. The patients presenting problems have been discussed, and they are in agreement with the care plan formulated and outlined with them. I have encouraged them to ask questions as they arise throughout their visit. ED Course as of Jul 01 1632   Mon Jul 01, 2019   1609 Discussed with Dr. Chel Caraballo, general surgery. He will come and see and evaluate the patient.    [AR]      ED Course User Index  [AR] Paul Kemp DO     3:49 PM  Phone call from radiologist Dr. [place] Sarah, [address] showing free air, no definitive site of perforation. 4:29 PM  Dr. Chel Caraballo at Bedside, Plan to go to the OR. Critical Care Time:   I have spent 40 minutes of critical care time in evaluating and treating this patient.   This includes time spent at bedside, time with family and decision makers, documentation, review of labs and imaging, and/or consultation with specialists. It does not include time spent on separately billed procedures. This patient presents with a critical illness or injury that acutely impairs one or more vital organ systems such that there is a high probability of imminent or life threatening deterioration in the patient's condition. This case involved decision making of high complexity to assess, manipulate, and support vital organ system failure and/or to prevent further life threatening deterioration of the patient's condition. Failure to initiate these interventions on an urgent basis would likely result in sudden, clinically significant or life threatening deterioration in the patient's condition. Abnormal findings supporting critical care: Perforated viscus  Interventions to support critical care: IV antibiotics, IV fluids  Failure to intervene may result in: Death, sepsis. Organ failure. Disposition:  Patient to be taken to the OR by Dr. Gerardo Murillo. Diagnosis     Clinical Impression:   1. Perforated viscus    2. Abdominal pain, generalized    3. Elevated LFTs        Attestations:   This note was completed by Kris Burt DO

## 2019-07-01 NOTE — PERIOP NOTES
Patient's daughter, Dawson Bateman updated via telephone at cell number [phone]. Daughter waiting in surgical waiting room and will update when patient goes up to his room.

## 2019-07-01 NOTE — PERIOP NOTES
Handoff Report from Operating Room to PACU    Report received from SHAHRAM Griggs RN and Ruben Uribe CRNA regarding Angie Church. Surgeon(s):  Marlys Reyes MD  And Procedure(s) (LRB):  LAPAROTOMY EXPLORATORY (N/A)  confirmed   with allergies, drains and dressings discussed. Anesthesia type, drugs, patient history, complications, estimated blood loss, vital signs, intake and output, and last pain medication, lines, reversal medications and temperature were reviewed.

## 2019-07-02 LAB
ANION GAP SERPL CALC-SCNC: 8 MMOL/L (ref 5–15)
BASOPHILS # BLD: 0 K/UL (ref 0–0.1)
BASOPHILS NFR BLD: 0 % (ref 0–1)
BUN SERPL-MCNC: 16 MG/DL (ref 6–20)
BUN/CREAT SERPL: 15 (ref 12–20)
CALCIUM SERPL-MCNC: 7.8 MG/DL (ref 8.5–10.1)
CHLORIDE SERPL-SCNC: 108 MMOL/L (ref 97–108)
CO2 SERPL-SCNC: 21 MMOL/L (ref 21–32)
CREAT SERPL-MCNC: 1.06 MG/DL (ref 0.7–1.3)
DIFFERENTIAL METHOD BLD: ABNORMAL
EOSINOPHIL # BLD: 0 K/UL (ref 0–0.4)
EOSINOPHIL NFR BLD: 0 % (ref 0–7)
ERYTHROCYTE [DISTWIDTH] IN BLOOD BY AUTOMATED COUNT: 14.1 % (ref 11.5–14.5)
GLUCOSE SERPL-MCNC: 135 MG/DL (ref 65–100)
HCT VFR BLD AUTO: 36.7 % (ref 36.6–50.3)
HGB BLD-MCNC: 12.6 G/DL (ref 12.1–17)
IMM GRANULOCYTES # BLD AUTO: 0 K/UL (ref 0–0.04)
IMM GRANULOCYTES NFR BLD AUTO: 0 % (ref 0–0.5)
LYMPHOCYTES # BLD: 0.6 K/UL (ref 0.8–3.5)
LYMPHOCYTES NFR BLD: 6 % (ref 12–49)
MCH RBC QN AUTO: 29.6 PG (ref 26–34)
MCHC RBC AUTO-ENTMCNC: 34.3 G/DL (ref 30–36.5)
MCV RBC AUTO: 86.4 FL (ref 80–99)
MONOCYTES # BLD: 0.5 K/UL (ref 0–1)
MONOCYTES NFR BLD: 5 % (ref 5–13)
NEUTS SEG # BLD: 8.8 K/UL (ref 1.8–8)
NEUTS SEG NFR BLD: 89 % (ref 32–75)
NRBC # BLD: 0 K/UL (ref 0–0.01)
NRBC BLD-RTO: 0 PER 100 WBC
PLATELET # BLD AUTO: 149 K/UL (ref 150–400)
PMV BLD AUTO: 10.7 FL (ref 8.9–12.9)
POTASSIUM SERPL-SCNC: 4.4 MMOL/L (ref 3.5–5.1)
RBC # BLD AUTO: 4.25 M/UL (ref 4.1–5.7)
RBC MORPH BLD: ABNORMAL
SODIUM SERPL-SCNC: 137 MMOL/L (ref 136–145)
WBC # BLD AUTO: 9.9 K/UL (ref 4.1–11.1)

## 2019-07-02 PROCEDURE — 80048 BASIC METABOLIC PNL TOTAL CA: CPT

## 2019-07-02 PROCEDURE — 74011250636 HC RX REV CODE- 250/636: Performed by: SURGERY

## 2019-07-02 PROCEDURE — 65270000029 HC RM PRIVATE

## 2019-07-02 PROCEDURE — 77010033678 HC OXYGEN DAILY

## 2019-07-02 PROCEDURE — 74011000258 HC RX REV CODE- 258: Performed by: SURGERY

## 2019-07-02 PROCEDURE — 94760 N-INVAS EAR/PLS OXIMETRY 1: CPT

## 2019-07-02 PROCEDURE — 36415 COLL VENOUS BLD VENIPUNCTURE: CPT

## 2019-07-02 PROCEDURE — 85025 COMPLETE CBC W/AUTO DIFF WBC: CPT

## 2019-07-02 PROCEDURE — 74011250637 HC RX REV CODE- 250/637: Performed by: SURGERY

## 2019-07-02 RX ADMIN — CLOPIDOGREL BISULFATE 75 MG: 75 TABLET ORAL at 08:13

## 2019-07-02 RX ADMIN — PIPERACILLIN SODIUM,TAZOBACTAM SODIUM 3.38 G: 3; .375 INJECTION, POWDER, FOR SOLUTION INTRAVENOUS at 00:35

## 2019-07-02 RX ADMIN — HYDROMORPHONE HYDROCHLORIDE 0.5 MG: 1 INJECTION, SOLUTION INTRAMUSCULAR; INTRAVENOUS; SUBCUTANEOUS at 13:41

## 2019-07-02 RX ADMIN — ASPIRIN 81 MG 81 MG: 81 TABLET ORAL at 08:13

## 2019-07-02 RX ADMIN — HYDROMORPHONE HYDROCHLORIDE 0.5 MG: 1 INJECTION, SOLUTION INTRAMUSCULAR; INTRAVENOUS; SUBCUTANEOUS at 20:13

## 2019-07-02 RX ADMIN — HYDROMORPHONE HYDROCHLORIDE 0.5 MG: 1 INJECTION, SOLUTION INTRAMUSCULAR; INTRAVENOUS; SUBCUTANEOUS at 11:10

## 2019-07-02 RX ADMIN — HYDROMORPHONE HYDROCHLORIDE 0.5 MG: 1 INJECTION, SOLUTION INTRAMUSCULAR; INTRAVENOUS; SUBCUTANEOUS at 02:54

## 2019-07-02 RX ADMIN — CARVEDILOL 25 MG: 12.5 TABLET, FILM COATED ORAL at 08:13

## 2019-07-02 RX ADMIN — PIPERACILLIN SODIUM,TAZOBACTAM SODIUM 3.38 G: 3; .375 INJECTION, POWDER, FOR SOLUTION INTRAVENOUS at 08:10

## 2019-07-02 RX ADMIN — SODIUM CHLORIDE 125 ML/HR: 900 INJECTION, SOLUTION INTRAVENOUS at 11:28

## 2019-07-02 RX ADMIN — HYDROMORPHONE HYDROCHLORIDE 0.5 MG: 1 INJECTION, SOLUTION INTRAMUSCULAR; INTRAVENOUS; SUBCUTANEOUS at 04:54

## 2019-07-02 RX ADMIN — SODIUM CHLORIDE 125 ML/HR: 900 INJECTION, SOLUTION INTRAVENOUS at 02:55

## 2019-07-02 RX ADMIN — PIPERACILLIN SODIUM,TAZOBACTAM SODIUM 3.38 G: 3; .375 INJECTION, POWDER, FOR SOLUTION INTRAVENOUS at 16:08

## 2019-07-02 RX ADMIN — CARVEDILOL 25 MG: 12.5 TABLET, FILM COATED ORAL at 16:08

## 2019-07-02 RX ADMIN — DOXAZOSIN 2 MG: 2 TABLET ORAL at 08:13

## 2019-07-02 NOTE — CONSULTS
Urology Consult    Subjective:     Date of Consultation:  2019    Referring Physician: Emelina Espinosa    Reason for Consultation:  Penile lesion    Patient Name: Vikash Smith  MRN: [id number]    History of Present Illness:   Patient is a 67 y.o.  male who is being seen for a penile lesion. He was admitted to the hospital for Perforated viscus [R19.8]. S/P exploratory laparotomy. Noted to have a penile lesion surrounding the meatus. Pt states it has been there for 2 years. No pain or bleeding notes. No unexpected bone pain or weight loss. Denies obstructive or irritative voiding symptoms. No prior urologic operations or procedures. Uncircumcised. Denies hematuria or dysuria.      Past Medical History:   Diagnosis Date    Arthritis     BPH (benign prostatic hyperplasia)     Chronic kidney disease     stage 2     Chronic obstructive pulmonary disease (HCC)     Elevated LFT's     Essential hypertension 01    GERD (gastroesophageal reflux disease)     hiatal hernia    Ill-defined condition 2016    faint    Liver disease     \"fatty liver\" as per patient    Nephrosclerosis     Orthostatic hypotension 01    PVC's 01    Sleep apnea     no CPAP    Syncope 01    secondary to venous insuffiency       Past Surgical History:   Procedure Laterality Date    COLONOSCOPY N/A 2016    COLONOSCOPY performed by Carla Reddy MD at [facility] ENDOSCOPY    ECHO 2D ADULT  12    EF 60 - 65%; mild concentric hypertrophy; pulmonary systolic artery pressure upper limits normal    HX APPENDECTOMY  1985    HX HERNIA REPAIR  2018    Pamela Tobar hiatal hernia repair- Jorge Gonzalez MD      Family History   Problem Relation Age of Onset    Stroke Father     Heart Disease Father       Social History     Tobacco Use    Smoking status: Former Smoker     Packs/day: 3.50     Last attempt to quit: 1980     Years since quittin.5    Smokeless tobacco: Never Used   Substance Use Topics  Alcohol use: No     Comment: no alcohol since      Allergies   Allergen Reactions    Benicar [Olmesartan] Unknown (comments)     Pt states he has found out that this is not a medication allergy.  Demerol [Meperidine] Unknown (comments)     Causes unconsciousness      Prior to Admission medications    Medication Sig Start Date End Date Taking? Authorizing Provider   calcium carbonate (OS-IVAN) 500 mg calcium (1,250 mg) tablet Take 1 Tab by mouth daily. Yes Provider, Historical   trimethoprim-sulfamethoxazole (BACTRIM DS, SEPTRA DS) 160-800 mg per tablet Take 1 Tab by mouth two (2) times a day. 19  Yes Provider, Historical   carvedilol (COREG) 25 mg tablet Take 25 mg by mouth two (2) times daily (with meals). Yes Provider, Historical   atorvastatin (LIPITOR) 20 mg tablet Take 20 mg by mouth daily. Yes Provider, Historical   aspirin 81 mg chewable tablet Take 1 Tab by mouth daily. 19  Yes Peyman Spencer MD   clopidogrel (PLAVIX) 75 mg tab Take 1 Tab by mouth daily. 19  Yes Pyeman Spencer MD   cyanocobalamin 1,000 mcg tablet Take 1,000 mcg by mouth daily. Yes Provider, Historical   cholecalciferol (VITAMIN D3) 1,000 unit cap Take 1,000 Units by mouth daily. Yes Provider, Historical   doxazosin (CARDURA) 2 mg tablet Take 2 mg by mouth daily. Yes Provider, Historical         Review of Systems:  A comprehensive review of systems was negative except for that written in the HPI.     Objective:     Data Review (Labs):    Recent Labs     19  0126 19  1409   WBC 9.9 4.4   HGB 12.6 16.2   MCV 86.4 87.5   * 195    135*   K 4.4 4.2   CREA 1.06 1.29   BUN 16 16   ALB  --  3.6   TBILI  --  0.9   SGOT  --  75*   ALT  --  114*   AP  --  317*   LPSE  --  214   IPVITALS(8:)Temp (24hrs), Av.9 °F (37.2 °C), Min:97.5 °F (36.4 °C), Max:102.8 °F (39.3 °C)    Temp (24hrs), Av.9 °F (37.2 °C), Min:97.5 °F (36.4 °C), Max:102.8 °F (39.3 °C)  [id number]  0700  In: 2722.9 [I.V.:2722.9]  Out: 1906 [Urine:1050; Drains:530]    Physical Exam:            General:    alert, cooperative, no distress, appears stated age                     Skin:  Normal.   Lymph nodes:  Cervical, supraclavicular, and axillary nodes normal.             Abdomen[de-identified]  dressing clean and dry, appropriatly tender post op, no inguinal adenopathy detected             Genitalia:  allen in place, erythematous lesion surrounding the meatus, non tender, no induration or lesions of the penile shaft noted, uncircumcised. Extremities:  negative       Assessment:     Principal Problem:    Perforated viscus (7/1/2019)    Active Problems:    Sepsis ([facility] [address] (7/1/2019)          Penile lesion    Plan:     Plan outpatient biopsy. Will have to get clearance to stop plavix.      Signed By: Saida Mendoza MD                         July 2, 2019

## 2019-07-02 NOTE — PROGRESS NOTES
General Surgery End of Shift Nursing Note    Bedside shift change report given to Valentin Bimal Vásquez [address] (oncoming nurse) by Meghna Mccoy RN (offgoing nurse). Report included the following information SBAR, Kardex, Intake/Output, MAR and Recent Results. Shift worked:   7a to [employer] of shift:    Ambulated in halls and sat in chair today. Tolerated sips of clears and using IS. Issues for physician to address:   none     Number times ambulated in hallway past shift: 1    Number of times OOB to chair past shift: 2    Pain Management:  Current medication: dilaudid   Patient states pain is manageable on current pain medication: YES    GI:    Current diet:  DIET NPO    Tolerating current diet: YES  Passing flatus: YES  Last Bowel Movement: yesterday   Appearance: soft    Respiratory:    Incentive Spirometer at bedside: YES  Patient instructed on use: YES    Patient Safety:    Falls Score: 2  Bed Alarm On? No  Sitter?  No    Tee Narayan RN

## 2019-07-02 NOTE — OP NOTES
[address]  OPERATIVE REPORT    Name:  Daly Jenkins  MR#:  [id number]  :  1940  ACCOUNT #:  [de-identified]  DATE OF SERVICE:  2019    PREOPERATIVE DIAGNOSIS:  Perforated viscus. POSTOPERATIVE DIAGNOSIS:  Performed sigmoid colon with focal small perforation and minimal surrounding contamination. PROCEDURE PERFORMED:  Exploratory laparotomy, segmental sigmoid colon resection and primary stapled anastomosis. SURGEON:  Amparo Morgan. Laurel Beck MD    ASSISTANT:  There is no surgical assistant. SURGICAL FIRST ASSIST:  Leonor Parra. ANESTHESIA:  General endotracheal by Dr. Stalin Escobedo. COMPLICATIONS:  There were no operative complications. SPECIMENS REMOVED:  Sigmoid colon, type associated mesentery. IMPLANTS:  There are no implants. ESTIMATED BLOOD LOSS:  15 mL. DRAIN:  A 15-Kinyarwanda fluted drain in the pelvis. DESCRIPTION OF OPERATION:  After appropriate consent was obtained, the patient was was brought to the operating room, made comfortable in supine position, and administered general endotracheal anesthesia. The patient was given Lowery catheter and prepped and draped in the standard fashion. A time-out was completed and when this was complete, an incision was made at the level of the umbilicus and this was extended down through the midline fascia. The abdomen was explored through a small incision and the inflammatory process was appreciated to be coming from towards the pelvis rather than towards the stomach and the incision was then extended anteriorly and towards the midline pubis. The abdomen was explored and the patient was found to have a focal perforation in the mid aspect of the redundant sigmoid loop. This was a small perforation and there was minimal surrounding contamination. There were no other pathologic findings noted. At this time, it was elected to form a segmental resection of the sigmoid colon in this location with a primary stapled anastomosis. This was performed by dividing the colon proximally and distally to the focal perforation with a Signia stapler with a 60 mm purple staple cartridge x2. The mesentery was taken down with the LigaSure device with good hemostasis. The bowel was approximated in a side-to-side fashion without any tension with interrupted 3-0 silk sutures and a small enterotomy was made at the staple line on either antimesenteric loops of the colon and a third firing of the TARUN 60 purple load Signia stapler was fired creating a side-to-side staple anastomosis. Remaining intestinal defect was closed with a TA 60 stapler. This staple line was oversewn with 3-0 silk. The mesenteric defect was approximated with 3-0 silk. At this time, the abdomen was irrigated with saline solution with bacitracin and a 15-Armenian drain was placed in pelvis and brought out through a separate stab wound incision in the left lower quadrant. This was sewn in place with 2-0 nylon stitch. The midline fascia was then approximated with #1 PDS running suture x2. The skin incision was approximated with skin staples, loosely approximating the skin and between each staple a wick was placed cut from a sheet of Aquacel Ag. When this was concluded, the wound was dressed with an occlusive dressing as was the SAVANNA drain site. The drain was connected to bulb suction. The patient was awakened, extubated and transferred to the recovery room in stable condition. Lowery catheter was left in place at the end of the case. The nasogastric tube was removed at the end of the case.       MD DONNY [email or web address]/[email or web address]  D:  07/01/2019 19:33  T:  07/01/2019 20:55  JOB #:  [id number]

## 2019-07-02 NOTE — PROGRESS NOTES
TRANSFER - IN REPORT:    Verbal report received from Maia Wren RN(name) on Jonh Park  being received from [employer]) for routine progression of care      Report consisted of patients Situation, Background, Assessment and   Recommendations(SBAR). Information from the following report(s) SBAR, Kardex, OR Summary, Procedure Summary, Intake/Output, MAR and Recent Results was reviewed with the receiving nurse. Opportunity for questions and clarification was provided. Assessment completed upon patients arrival to unit and care assumed.

## 2019-07-02 NOTE — PERIOP NOTES
Bedside shift change report given to Rachell Jose (oncoming nurse) by Isma Flor RN (offgoing nurse). Report included the following information SBAR, OR Summary, Intake/Output, MAR, Recent Results and Cardiac Rhythm Sinus Tach.

## 2019-07-02 NOTE — PROGRESS NOTES
Reason for Admission:   Pt was admitted on 7/1/19 d/t presenting to ER for Evaluation of ABD pain described as sharp stabbing and measures 10/10 in intestity. PCP is Dr. Tory Melgoza. Pt stated he had a stent placed in last year and is now on plavix. Colonscopy is scheduled for 12/19. RRAT Score:     16             Do you (patient/family) have any concerns for transition/discharge? Pt lives alone in one story home with 2 SHAYLEE. Pt has no DME. Pt has no experience with HH or SNF. Pt stated he was I W ADLs prior to hospitalization and driving. Pharmacy is [facility] on [address]. Pt has Medicare A and B and prescription drug coverage. Plan for utilizing home health:   Pt is open to using [facility] services if needed but not sure at this point if it will be ordered at discharge. Current Advanced Directive/Advance Care Plan:  Pt is Full Code. There is no AMD on file for this Pt. CM offered to have [facility] complete AMD and Pt refused at this time d/t not feeling well. He is comfortable with NOK being: DTR, Vi Primer or Granddaughter: Bakari Madsen: [phone]. Transition of Care Plan:          1. Anticipating that Pt will DC back home with family support initially. RN is anticipating that Pt will possibly be ready for DC 7/5/19?? But CM would need to get clarification from Dr. Chel Caraballo to determine anticipated DC date. 2.  DTR or Granddaughter would be able to transport Pt home in car. 3.  CM will need to verify that [facility] letter was delivered prior to DC. 4.  CM needs to assist in making sure that PCP and specialist appt are scheduled at discharge. CM will continue to monitor discharge plan. Care Management Interventions  PCP Verified by CM: Yes  Palliative Care Criteria Met (RRAT>21 & CHF Dx)?: No  Mode of Transport at Discharge:  Other (see comment)  Transition of Care Consult (CM Consult): Discharge Planning  MyChart Signup: No  Discharge Durable Medical Equipment: No  Physical Therapy Consult: No  Occupational Therapy Consult: No  Speech Therapy Consult: No  Current Support Network: Lives Alone, Own Home, Family Lives Nearby  Confirm Follow Up Transport: Family  Plan discussed with Pt/Family/Caregiver: Yes  Freedom of Choice Offered: Yes  Discharge Location  Discharge Placement: Home with family assistance    Eduar Cameron

## 2019-07-02 NOTE — PERIOP NOTES
2040-TRANSFER - OUT REPORT:    Verbal report given to Zaida RAZA(name) on Dhruv Bhakta  being transferred to gen surg(unit) for routine post - op       Report consisted of patients Situation, Background, Assessment and   Recommendations(SBAR). Information from the following report(s) SBAR, Kardex, OR Summary, Procedure Summary, Intake/Output, MAR and Recent Results was reviewed with the receiving nurse. Opportunity for questions and clarification was provided.       Patient transported with:   O2 @ 3 liters  Registered Nurse

## 2019-07-02 NOTE — ANESTHESIA POSTPROCEDURE EVALUATION
Procedure(s):  LAPAROTOMY EXPLORATORY, REPAIR OF PERFORATED SIGMOID COLON. Selma Shipman general    Anesthesia Post Evaluation        Patient location during evaluation: PACU  Note status: Adequate. Level of consciousness: responsive to verbal stimuli and sleepy but conscious  Pain management: satisfactory to patient  Airway patency: patent  Anesthetic complications: no  Cardiovascular status: acceptable  Respiratory status: acceptable  Hydration status: acceptable  Comments: +Post-Anesthesia Evaluation and Assessment    Patient: Prince Brown MRN: [id number]  SSN: [id number]   YOB: 1940  Age: 67 y.o. Sex: male      Cardiovascular Function/Vital Signs    /63   Pulse (!) 105   Temp 36.7 °C (98 °F)   Resp 16   Ht 5' 4\" (1.626 m)   Wt 70.8 kg (156 lb)   SpO2 95%   BMI 26.78 kg/m²     Patient is status post Procedure(s) with comments:  LAPAROTOMY EXPLORATORY, REPAIR OF PERFORATED SIGMOID COLON. - PERFORATED SIGMOID COLON. Nausea/Vomiting: Controlled. Postoperative hydration reviewed and adequate. Pain:  Pain Scale 1: Numeric (0 - 10) (07/01/19 2030)  Pain Intensity 1: 4 (07/01/19 2030)   Managed. Neurological Status:   Neuro (WDL): Exceptions to WDL (07/01/19 1922)   At baseline. Mental Status and Level of Consciousness: Arousable. Pulmonary Status:   O2 Device: Nasal cannula (07/01/19 2030)   Adequate oxygenation and airway patent. Complications related to anesthesia: None    Post-anesthesia assessment completed. No concerns. Signed By: Danielle Schmidt MD    7/1/2019  Post anesthesia nausea and vomiting:  controlled      Vitals Value Taken Time   /63 7/1/2019  8:30 PM   Temp 36.7 °C (98 °F) 7/1/2019  7:22 PM   Pulse 98 7/1/2019  8:35 PM   Resp 13 7/1/2019  8:35 PM   SpO2 95 % 7/1/2019  8:35 PM   Vitals shown include unvalidated device data.

## 2019-07-02 NOTE — PROGRESS NOTES
Admit Date: 2019    POD 1 Day Post-Op    Procedure:  Procedure(s) with comments:  LAPAROTOMY EXPLORATORY, REPAIR OF PERFORATED SIGMOID COLON. - PERFORATED SIGMOID COLON    Subjective:     Patient has complaints of incisional pain. Feeling better than preop. Objective:     Blood pressure 133/80, pulse 75, temperature 97.5 °F (36.4 °C), resp. rate 16, height 5' 4\" (1.626 m), weight 162 lb 11.2 oz (73.8 kg), SpO2 97 %. Temp (24hrs), Av °F (37.2 °C), Min:97.5 °F (36.4 °C), Max:102.8 °F (39.3 °C)      Physical Exam:  GENERAL: alert, cooperative, no distress, appears stated age, LUNG: clear to auscultation bilaterally, HEART: regular rate and rhythm, ABDOMEN: soft, NT, wound c/d/i, SAVANNA o/p serosanguinous, EXTREMITIES:  extremities normal, atraumatic, no cyanosis or edema    Labs:   Recent Results (from the past 24 hour(s))   CBC WITH AUTOMATED DIFF    Collection Time: 19  2:09 PM   Result Value Ref Range    WBC 4.4 4.1 - 11.1 K/uL    RBC 5.60 4.10 - 5.70 M/uL    HGB 16.2 12.1 - 17.0 g/dL    HCT 49.0 36.6 - 50.3 %    MCV 87.5 80.0 - 99.0 FL    MCH 28.9 26.0 - 34.0 PG    MCHC 33.1 30.0 - 36.5 g/dL    RDW 13.9 11.5 - 14.5 %    PLATELET 524 873 - 684 K/uL    MPV 10.7 8.9 - 12.9 FL    NRBC 0.0 0  WBC    ABSOLUTE NRBC 0.00 0.00 - 0.01 K/uL    NEUTROPHILS 69 32 - 75 %    LYMPHOCYTES 19 12 - 49 %    MONOCYTES 8 5 - 13 %    EOSINOPHILS 3 0 - 7 %    BASOPHILS 1 0 - 1 %    IMMATURE GRANULOCYTES 0 0.0 - 0.5 %    ABS. NEUTROPHILS 3.1 1.8 - 8.0 K/UL    ABS. LYMPHOCYTES 0.9 0.8 - 3.5 K/UL    ABS. MONOCYTES 0.3 0.0 - 1.0 K/UL    ABS. EOSINOPHILS 0.1 0.0 - 0.4 K/UL    ABS. BASOPHILS 0.1 0.0 - 0.1 K/UL    ABS. IMM.  GRANS. 0.0 0.00 - 0.04 K/UL    DF AUTOMATED     METABOLIC PANEL, COMPREHENSIVE    Collection Time: 19  2:09 PM   Result Value Ref Range    Sodium 135 (L) 136 - 145 mmol/L    Potassium 4.2 3.5 - 5.1 mmol/L    Chloride 104 97 - 108 mmol/L    CO2 27 21 - 32 mmol/L    Anion gap 4 (L) 5 - 15 mmol/L Glucose 94 65 - 100 mg/dL    BUN 16 6 - 20 MG/DL    Creatinine 1.29 0.70 - 1.30 MG/DL    BUN/Creatinine ratio 12 12 - 20      GFR est AA >60 >60 ml/min/1.73m2    GFR est non-AA 54 (L) >60 ml/min/1.73m2    Calcium 8.9 8.5 - 10.1 MG/DL    Bilirubin, total 0.9 0.2 - 1.0 MG/DL    ALT (SGPT) 114 (H) 12 - 78 U/L    AST (SGOT) 75 (H) 15 - 37 U/L    Alk. phosphatase 317 (H) 45 - 117 U/L    Protein, total 9.4 (H) 6.4 - 8.2 g/dL    Albumin 3.6 3.5 - 5.0 g/dL    Globulin 5.8 (H) 2.0 - 4.0 g/dL    A-G Ratio 0.6 (L) 1.1 - 2.2     SAMPLES BEING HELD    Collection Time: 07/01/19  2:09 PM   Result Value Ref Range    SAMPLES BEING HELD 1RED 1BLU     COMMENT        Add-on orders for these samples will be processed based on acceptable specimen integrity and analyte stability, which may vary by analyte.    LIPASE    Collection Time: 07/01/19  2:09 PM   Result Value Ref Range    Lipase 214 73 - 393 U/L   LACTIC ACID    Collection Time: 07/01/19  2:16 PM   Result Value Ref Range    Lactic acid 2.0 0.4 - 2.0 MMOL/L   URINALYSIS W/ RFLX MICROSCOPIC    Collection Time: 07/01/19  4:34 PM   Result Value Ref Range    Color YELLOW/STRAW      Appearance CLEAR CLEAR      Specific gravity >1.030 (H) 1.003 - 1.030    pH (UA) 7.0 5.0 - 8.0      Protein NEGATIVE  NEG mg/dL    Glucose NEGATIVE  NEG mg/dL    Ketone NEGATIVE  NEG mg/dL    Bilirubin NEGATIVE  NEG      Blood TRACE (A) NEG      Urobilinogen 0.2 0.2 - 1.0 EU/dL    Nitrites NEGATIVE  NEG      Leukocyte Esterase LARGE (A) NEG      WBC 0-4 0 - 4 /hpf    RBC 5-10 0 - 5 /hpf    Epithelial cells FEW FEW /lpf    Bacteria 1+ (A) NEG /hpf    Hyaline cast 2-5 0 - 5 /lpf   METABOLIC PANEL, BASIC    Collection Time: 07/02/19  1:26 AM   Result Value Ref Range    Sodium 137 136 - 145 mmol/L    Potassium 4.4 3.5 - 5.1 mmol/L    Chloride 108 97 - 108 mmol/L    CO2 21 21 - 32 mmol/L    Anion gap 8 5 - 15 mmol/L    Glucose 135 (H) 65 - 100 mg/dL    BUN 16 6 - 20 MG/DL    Creatinine 1.06 0.70 - 1.30 MG/DL BUN/Creatinine ratio 15 12 - 20      GFR est AA >60 >60 ml/min/1.73m2    GFR est non-AA >60 >60 ml/min/1.73m2    Calcium 7.8 (L) 8.5 - 10.1 MG/DL   CBC WITH AUTOMATED DIFF    Collection Time: 07/02/19  1:26 AM   Result Value Ref Range    WBC 9.9 4.1 - 11.1 K/uL    RBC 4.25 4. 10 - 5.70 M/uL    HGB 12.6 12.1 - 17.0 g/dL    HCT 36.7 36.6 - 50.3 %    MCV 86.4 80.0 - 99.0 FL    MCH 29.6 26.0 - 34.0 PG    MCHC 34.3 30.0 - 36.5 g/dL    RDW 14.1 11.5 - 14.5 %    PLATELET 417 (L) 531 - 400 K/uL    MPV 10.7 8.9 - 12.9 FL    NRBC 0.0 0  WBC    ABSOLUTE NRBC 0.00 0.00 - 0.01 K/uL    NEUTROPHILS 89 (H) 32 - 75 %    LYMPHOCYTES 6 (L) 12 - 49 %    MONOCYTES 5 5 - 13 %    EOSINOPHILS 0 0 - 7 %    BASOPHILS 0 0 - 1 %    IMMATURE GRANULOCYTES 0 0.0 - 0.5 %    ABS. NEUTROPHILS 8.8 (H) 1.8 - 8.0 K/UL    ABS. LYMPHOCYTES 0.6 (L) 0.8 - 3.5 K/UL    ABS. MONOCYTES 0.5 0.0 - 1.0 K/UL    ABS. EOSINOPHILS 0.0 0.0 - 0.4 K/UL    ABS. BASOPHILS 0.0 0.0 - 0.1 K/UL    ABS. IMM. GRANS. 0.0 0.00 - 0.04 K/UL    DF SMEAR SCANNED      RBC COMMENTS NORMOCYTIC, NORMOCHROMIC         Data Review images and reports reviewed    Assessment:     Principal Problem:    Perforated viscus (7/1/2019)    Active Problems:    Sepsis ([address] (7/1/2019)        Plan/Recommendations/Medical Decision Making:     Continue present treatment   Sips of clears  Ambulate with assist  Awaiting return of bowel function  Urology consult ordered regarding skin lesion on glans penis, suspicious for malignancy. Mckayla Counts.  Ana Maria Lyons MD, [facility]

## 2019-07-02 NOTE — PROGRESS NOTES
General Surgery End of Shift Nursing Note    Bedside shift change report given to Erin Stephen RN (oncoming nurse) by Elijah Richter RN (offgoing nurse). Report included the following information SBAR, Kardex, OR Summary, Procedure Summary, Intake/Output, MAR and Recent Results. Shift worked:   7p-7a   Summary of shift:    Patient rested well overnight. Admitted to the floor around 2115 from PACU. Got a round of Zosyn overnight. Issues for physician to address:   None at this time. Number times ambulated in hallway past shift: 0    Number of times OOB to chair past shift: 0    Pain Management:  Current medication: See MAR  Patient states pain is manageable on current pain medication: YES    GI:    Current diet:  DIET NPO    Tolerating current diet: YES    Respiratory:    Incentive Spirometer at bedside: YES  Patient instructed on use: YES    Patient Safety:    Bed Alarm On? No  Sitter?  No    Beto Fent

## 2019-07-03 LAB
ANION GAP SERPL CALC-SCNC: 7 MMOL/L (ref 5–15)
BUN SERPL-MCNC: 18 MG/DL (ref 6–20)
BUN/CREAT SERPL: 16 (ref 12–20)
CALCIUM SERPL-MCNC: 8.4 MG/DL (ref 8.5–10.1)
CHLORIDE SERPL-SCNC: 109 MMOL/L (ref 97–108)
CO2 SERPL-SCNC: 21 MMOL/L (ref 21–32)
CREAT SERPL-MCNC: 1.11 MG/DL (ref 0.7–1.3)
ERYTHROCYTE [DISTWIDTH] IN BLOOD BY AUTOMATED COUNT: 14.1 % (ref 11.5–14.5)
GLUCOSE SERPL-MCNC: 125 MG/DL (ref 65–100)
HCT VFR BLD AUTO: 43.5 % (ref 36.6–50.3)
HGB BLD-MCNC: 14.6 G/DL (ref 12.1–17)
MCH RBC QN AUTO: 29 PG (ref 26–34)
MCHC RBC AUTO-ENTMCNC: 33.6 G/DL (ref 30–36.5)
MCV RBC AUTO: 86.5 FL (ref 80–99)
NRBC # BLD: 0 K/UL (ref 0–0.01)
NRBC BLD-RTO: 0 PER 100 WBC
PLATELET # BLD AUTO: 197 K/UL (ref 150–400)
PMV BLD AUTO: 10.9 FL (ref 8.9–12.9)
POTASSIUM SERPL-SCNC: 4.1 MMOL/L (ref 3.5–5.1)
RBC # BLD AUTO: 5.03 M/UL (ref 4.1–5.7)
SODIUM SERPL-SCNC: 137 MMOL/L (ref 136–145)
WBC # BLD AUTO: 10.1 K/UL (ref 4.1–11.1)

## 2019-07-03 PROCEDURE — 74011000258 HC RX REV CODE- 258: Performed by: SURGERY

## 2019-07-03 PROCEDURE — 85027 COMPLETE CBC AUTOMATED: CPT

## 2019-07-03 PROCEDURE — 80048 BASIC METABOLIC PNL TOTAL CA: CPT

## 2019-07-03 PROCEDURE — 74011000250 HC RX REV CODE- 250: Performed by: SURGERY

## 2019-07-03 PROCEDURE — 65270000029 HC RM PRIVATE

## 2019-07-03 PROCEDURE — 74011250637 HC RX REV CODE- 250/637: Performed by: SURGERY

## 2019-07-03 PROCEDURE — 74011250636 HC RX REV CODE- 250/636: Performed by: SURGERY

## 2019-07-03 PROCEDURE — 36415 COLL VENOUS BLD VENIPUNCTURE: CPT

## 2019-07-03 RX ORDER — HYDRALAZINE HYDROCHLORIDE 20 MG/ML
10 INJECTION INTRAMUSCULAR; INTRAVENOUS
Status: DISCONTINUED | OUTPATIENT
Start: 2019-07-03 | End: 2019-07-13 | Stop reason: HOSPADM

## 2019-07-03 RX ORDER — ACETAMINOPHEN 10 MG/ML
1000 INJECTION, SOLUTION INTRAVENOUS
Status: DISPENSED | OUTPATIENT
Start: 2019-07-03 | End: 2019-07-04

## 2019-07-03 RX ADMIN — HYDROMORPHONE HYDROCHLORIDE 0.5 MG: 1 INJECTION, SOLUTION INTRAMUSCULAR; INTRAVENOUS; SUBCUTANEOUS at 01:18

## 2019-07-03 RX ADMIN — PROCHLORPERAZINE EDISYLATE 10 MG: 5 INJECTION INTRAMUSCULAR; INTRAVENOUS at 08:28

## 2019-07-03 RX ADMIN — PROCHLORPERAZINE EDISYLATE 10 MG: 5 INJECTION INTRAMUSCULAR; INTRAVENOUS at 22:53

## 2019-07-03 RX ADMIN — DOXAZOSIN 2 MG: 2 TABLET ORAL at 08:34

## 2019-07-03 RX ADMIN — HYDRALAZINE HYDROCHLORIDE 10 MG: 20 INJECTION INTRAMUSCULAR; INTRAVENOUS at 21:06

## 2019-07-03 RX ADMIN — HYDROMORPHONE HYDROCHLORIDE 0.5 MG: 1 INJECTION, SOLUTION INTRAMUSCULAR; INTRAVENOUS; SUBCUTANEOUS at 23:44

## 2019-07-03 RX ADMIN — CLOPIDOGREL BISULFATE 75 MG: 75 TABLET ORAL at 08:27

## 2019-07-03 RX ADMIN — PIPERACILLIN SODIUM,TAZOBACTAM SODIUM 3.38 G: 3; .375 INJECTION, POWDER, FOR SOLUTION INTRAVENOUS at 08:27

## 2019-07-03 RX ADMIN — HYDROMORPHONE HYDROCHLORIDE 0.5 MG: 1 INJECTION, SOLUTION INTRAMUSCULAR; INTRAVENOUS; SUBCUTANEOUS at 05:26

## 2019-07-03 RX ADMIN — PIPERACILLIN SODIUM,TAZOBACTAM SODIUM 3.38 G: 3; .375 INJECTION, POWDER, FOR SOLUTION INTRAVENOUS at 01:19

## 2019-07-03 RX ADMIN — HYDROMORPHONE HYDROCHLORIDE 0.5 MG: 1 INJECTION, SOLUTION INTRAMUSCULAR; INTRAVENOUS; SUBCUTANEOUS at 20:37

## 2019-07-03 RX ADMIN — PIPERACILLIN SODIUM,TAZOBACTAM SODIUM 3.38 G: 3; .375 INJECTION, POWDER, FOR SOLUTION INTRAVENOUS at 18:00

## 2019-07-03 RX ADMIN — CARVEDILOL 25 MG: 12.5 TABLET, FILM COATED ORAL at 18:01

## 2019-07-03 RX ADMIN — ONDANSETRON 4 MG: 2 INJECTION INTRAMUSCULAR; INTRAVENOUS at 05:26

## 2019-07-03 RX ADMIN — CARVEDILOL 25 MG: 12.5 TABLET, FILM COATED ORAL at 08:27

## 2019-07-03 RX ADMIN — ASPIRIN 81 MG 81 MG: 81 TABLET ORAL at 08:28

## 2019-07-03 NOTE — PROGRESS NOTES
Surgery NP SOAP Note    Subjective:  \"My gall bladder is causing this\"  Patient complaints of severe nausea this morning. Declining to allow cath removal or to walk due to nausea. Feels febrile per patient. Objective:  Patient restless in the chair, leaning forward and backward, rubbing his head and neck. Temperature checked, 97.8    Assessment:  Acute post-op nausea. Dr Jennifer Caceres addressed this morning and added compazine. Plan:  Continue with medications as ordered. Patient provided with Acosta Hoffmankin aroma therapy and a cool compress. Encouraged to mobilize. Kendra Coello NP     Addendum:    Discussed patient concerns with Dr. Jennifer Caceres who came and addressed with the patient.  Patient willing to mobilize after speaking with Dr. Jennifer Caceres and nursing in to [address], NP   7/3/19  [id number]

## 2019-07-03 NOTE — PROGRESS NOTES
Admit Date: 2019    POD 2 Day Post-Op    Procedure:  Procedure(s) with comments:  LAPAROTOMY EXPLORATORY, REPAIR OF PERFORATED SIGMOID COLON. - PERFORATED SIGMOID COLON    Subjective:     Patient c/o nausea this morning. Has been passing flatus. Objective:     Blood pressure (!) 179/92, pulse 81, temperature 97.8 °F (36.6 °C), resp. rate 18, height 5' 4\" (1.626 m), weight 162 lb 11.2 oz (73.8 kg), SpO2 92 %. Temp (24hrs), Av.8 °F (36.6 °C), Min:97.5 °F (36.4 °C), Max:98.7 °F (37.1 °C)      Physical Exam:  GENERAL: alert, cooperative, no distress, appears stated age, LUNG: clear to auscultation bilaterally, HEART: regular rate and rhythm, ABDOMEN: soft, NT, wound c/d/i, SAVANNA o/p serosanguinous, EXTREMITIES:  extremities normal, atraumatic, no cyanosis or edema    Labs:   Recent Results (from the past 24 hour(s))   METABOLIC PANEL, BASIC    Collection Time: 19  5:21 AM   Result Value Ref Range    Sodium 137 136 - 145 mmol/L    Potassium 4.1 3.5 - 5.1 mmol/L    Chloride 109 (H) 97 - 108 mmol/L    CO2 21 21 - 32 mmol/L    Anion gap 7 5 - 15 mmol/L    Glucose 125 (H) 65 - 100 mg/dL    BUN 18 6 - 20 MG/DL    Creatinine 1.11 0.70 - 1.30 MG/DL    BUN/Creatinine ratio 16 12 - 20      GFR est AA >60 >60 ml/min/1.73m2    GFR est non-AA >60 >60 ml/min/1.73m2    Calcium 8.4 (L) 8.5 - 10.1 MG/DL   CBC W/O DIFF    Collection Time: 19  5:21 AM   Result Value Ref Range    WBC 10.1 4.1 - 11.1 K/uL    RBC 5.03 4. 10 - 5.70 M/uL    HGB 14.6 12.1 - 17.0 g/dL    HCT 43.5 36.6 - 50.3 %    MCV 86.5 80.0 - 99.0 FL    MCH 29.0 26.0 - 34.0 PG    MCHC 33.6 30.0 - 36.5 g/dL    RDW 14.1 11.5 - 14.5 %    PLATELET 422 408 - 985 K/uL    MPV 10.9 8.9 - 12.9 FL    NRBC 0.0 0  WBC    ABSOLUTE NRBC 0.00 0.00 - 0.01 K/uL       Data Review images and reports reviewed    Assessment:     Principal Problem:    Perforated viscus (2019)    Active Problems:    Sepsis ([facility] (2019)        Plan/Recommendations/Medical Decision Making:     Continue present treatment   Add compazine prn nausea  Sips of clears only for now  Ambulate with assist  Awaiting return of bowel function  Appreciate Dr. Guille Bullock assistance. Pt will f/u with [facility] as outpatient for penile skin lesion. D/c allen  Hydralazine prn for htn    Anthony Sarah MD, [facility]

## 2019-07-03 NOTE — PROGRESS NOTES
Pt alert and oriented x4. C/o pain made tolerable with medication; sleeping between care. Tolerated medications with no issues. IV clean dry intact infusing with no issue. Abdominal wound dressing intact small old drainage noted. SAVANNA drain at bulb suction clean dry intact. Lowery intact drain to gravity with no issues.

## 2019-07-03 NOTE — PROGRESS NOTES
Progress Note    Patient: Manuel English MRN: [id number]  SSN: [id number]    YOB: 1940  Age: 67 y.o. Sex: male      Admit Date: 7/1/2019    LOS: 2 days     Subjective:     Pt needs encouragement to walk. Pt walked 2 times this AM  Pt did not walk this afternoon because \"he didn't feel like it\"     Objective:     Vitals:    07/03/19 0355 07/03/19 0748 07/03/19 1117 07/03/19 1515   BP: (!) 179/92 (!) 174/93 (!) 167/93 146/89   Pulse: 81 84 78 80   Resp: 18 18 18 17   Temp: 97.8 °F (36.6 °C) 98.7 °F (37.1 °C) 98.8 °F (37.1 °C) 98.7 °F (37.1 °C)   SpO2: 92% 95% 96% 97%   Weight:       Height:            Intake and Output:  Current Shift: 07/03 0701 - 07/03 1900  In: -   Out: 160 [Drains:160]  Last three shifts: 07/01 1901 - 07/03 0700  In: 3946.2 [P.O.:240; I.V.:3706.2]  Out: 3150 [Urine:2250; Drains:800]    Physical Exam:   General:  Alert, cooperative, no distress, appears stated age. Eyes:  Conjunctivae/corneas clear. PERRL, EOMs intact. Fundi benign   Ears:  Normal TMs and external ear canals both ears. Nose: Nares normal. Septum midline. Mucosa normal. No drainage or sinus tenderness. Mouth/Throat: Lips, mucosa, and tongue normal. Teeth and gums normal.   Neck: Supple, symmetrical, trachea midline, no adenopathy, thyroid: no enlargment/tenderness/nodules, no carotid bruit and no JVD. Back:   Symmetric, no curvature. ROM normal. No CVA tenderness. Lungs:   Clear to auscultation bilaterally. Heart:  Regular rate and rhythm, S1, S2 normal, no murmur, click, rub or gallop. Abdomen:   Soft, non-tender. Bowel sounds normal. No masses,  No organomegaly. Extremities: Extremities normal, atraumatic, no cyanosis or edema. Pulses: 2+ and symmetric all extremities. Skin: Skin color, texture, turgor normal. No rashes or lesions   Lymph nodes: Cervical, supraclavicular, and axillary nodes normal.   Neurologic: CNII-XII intact. Normal strength, sensation and reflexes throughout. Lab/Data Review: All lab results for the last 24 hours reviewed.          Assessment:     Principal Problem:    Perforated viscus (7/1/2019)    Active Problems:    Sepsis ([address] (7/1/2019)        Plan:     Pt to walk 3X a day and up for all meals   Control N/V    Signed By: Raji Maurer RN     July 3, 2019

## 2019-07-04 LAB
ANION GAP SERPL CALC-SCNC: 6 MMOL/L (ref 5–15)
BASOPHILS # BLD: 0 K/UL (ref 0–0.1)
BASOPHILS NFR BLD: 0 % (ref 0–1)
BUN SERPL-MCNC: 23 MG/DL (ref 6–20)
BUN/CREAT SERPL: 27 (ref 12–20)
CALCIUM SERPL-MCNC: 7.8 MG/DL (ref 8.5–10.1)
CHLORIDE SERPL-SCNC: 111 MMOL/L (ref 97–108)
CO2 SERPL-SCNC: 22 MMOL/L (ref 21–32)
CREAT SERPL-MCNC: 0.85 MG/DL (ref 0.7–1.3)
DIFFERENTIAL METHOD BLD: ABNORMAL
EOSINOPHIL # BLD: 0 K/UL (ref 0–0.4)
EOSINOPHIL NFR BLD: 0 % (ref 0–7)
ERYTHROCYTE [DISTWIDTH] IN BLOOD BY AUTOMATED COUNT: 13.8 % (ref 11.5–14.5)
GLUCOSE SERPL-MCNC: 134 MG/DL (ref 65–100)
HCT VFR BLD AUTO: 39 % (ref 36.6–50.3)
HGB BLD-MCNC: 13.3 G/DL (ref 12.1–17)
IMM GRANULOCYTES # BLD AUTO: 0.1 K/UL (ref 0–0.04)
IMM GRANULOCYTES NFR BLD AUTO: 1 % (ref 0–0.5)
LYMPHOCYTES # BLD: 0.7 K/UL (ref 0.8–3.5)
LYMPHOCYTES NFR BLD: 8 % (ref 12–49)
MCH RBC QN AUTO: 29.3 PG (ref 26–34)
MCHC RBC AUTO-ENTMCNC: 34.1 G/DL (ref 30–36.5)
MCV RBC AUTO: 85.9 FL (ref 80–99)
MONOCYTES # BLD: 0.7 K/UL (ref 0–1)
MONOCYTES NFR BLD: 7 % (ref 5–13)
NEUTS SEG # BLD: 8.1 K/UL (ref 1.8–8)
NEUTS SEG NFR BLD: 85 % (ref 32–75)
NRBC # BLD: 0 K/UL (ref 0–0.01)
NRBC BLD-RTO: 0 PER 100 WBC
PLATELET # BLD AUTO: 207 K/UL (ref 150–400)
PMV BLD AUTO: 10.9 FL (ref 8.9–12.9)
POTASSIUM SERPL-SCNC: 3.8 MMOL/L (ref 3.5–5.1)
RBC # BLD AUTO: 4.54 M/UL (ref 4.1–5.7)
SODIUM SERPL-SCNC: 139 MMOL/L (ref 136–145)
WBC # BLD AUTO: 9.5 K/UL (ref 4.1–11.1)

## 2019-07-04 PROCEDURE — 85025 COMPLETE CBC W/AUTO DIFF WBC: CPT

## 2019-07-04 PROCEDURE — 65270000029 HC RM PRIVATE

## 2019-07-04 PROCEDURE — 80048 BASIC METABOLIC PNL TOTAL CA: CPT

## 2019-07-04 PROCEDURE — 74011250636 HC RX REV CODE- 250/636: Performed by: SURGERY

## 2019-07-04 PROCEDURE — 74011250637 HC RX REV CODE- 250/637: Performed by: SURGERY

## 2019-07-04 PROCEDURE — 36415 COLL VENOUS BLD VENIPUNCTURE: CPT

## 2019-07-04 PROCEDURE — 74011000258 HC RX REV CODE- 258: Performed by: SURGERY

## 2019-07-04 RX ORDER — POLYETHYLENE GLYCOL 3350 17 G/17G
17 POWDER, FOR SOLUTION ORAL DAILY
Status: DISCONTINUED | OUTPATIENT
Start: 2019-07-04 | End: 2019-07-13

## 2019-07-04 RX ADMIN — PIPERACILLIN SODIUM,TAZOBACTAM SODIUM 3.38 G: 3; .375 INJECTION, POWDER, FOR SOLUTION INTRAVENOUS at 08:30

## 2019-07-04 RX ADMIN — POLYETHYLENE GLYCOL 3350 17 G: 17 POWDER, FOR SOLUTION ORAL at 10:00

## 2019-07-04 RX ADMIN — ACETAMINOPHEN 1000 MG: 10 INJECTION, SOLUTION INTRAVENOUS at 00:06

## 2019-07-04 RX ADMIN — ASPIRIN 81 MG 81 MG: 81 TABLET ORAL at 08:32

## 2019-07-04 RX ADMIN — SODIUM CHLORIDE 125 ML/HR: 900 INJECTION, SOLUTION INTRAVENOUS at 00:47

## 2019-07-04 RX ADMIN — PIPERACILLIN SODIUM,TAZOBACTAM SODIUM 3.38 G: 3; .375 INJECTION, POWDER, FOR SOLUTION INTRAVENOUS at 17:59

## 2019-07-04 RX ADMIN — SODIUM CHLORIDE 125 ML/HR: 900 INJECTION, SOLUTION INTRAVENOUS at 08:35

## 2019-07-04 RX ADMIN — HYDROMORPHONE HYDROCHLORIDE 0.5 MG: 1 INJECTION, SOLUTION INTRAMUSCULAR; INTRAVENOUS; SUBCUTANEOUS at 04:49

## 2019-07-04 RX ADMIN — CLOPIDOGREL BISULFATE 75 MG: 75 TABLET ORAL at 08:32

## 2019-07-04 RX ADMIN — CARVEDILOL 25 MG: 12.5 TABLET, FILM COATED ORAL at 08:30

## 2019-07-04 RX ADMIN — PIPERACILLIN SODIUM,TAZOBACTAM SODIUM 3.38 G: 3; .375 INJECTION, POWDER, FOR SOLUTION INTRAVENOUS at 00:43

## 2019-07-04 RX ADMIN — CARVEDILOL 25 MG: 12.5 TABLET, FILM COATED ORAL at 17:59

## 2019-07-04 RX ADMIN — DOXAZOSIN 2 MG: 2 TABLET ORAL at 08:31

## 2019-07-04 NOTE — PROGRESS NOTES
Progress Note    Patient: Sajan Velasco MRN: [id number]  SSN: [id number]    YOB: 1940  Age: 67 y.o. Sex: male      Admit Date: 7/1/2019    LOS: 3 days     Subjective:     Pt walked 1 time today, refused to walk the rest of the day   Pt now, is not even getting up to go to the bathroom, and is soiling himself repeatedly with urin and bowel, he reports that he can feel it coming, but is not getting up anymore      Pt refuse use incentive spirometer       Objective:     Vitals:    07/03/19 2339 07/04/19 0326 07/04/19 0712 07/04/19 1139   BP: 168/81 140/80 172/82 167/78   Pulse: 85 86 87 86   Resp: 18 18 16 17   Temp: 98.5 °F (36.9 °C) 97.3 °F (36.3 °C) 97.6 °F (36.4 °C) 97.6 °F (36.4 °C)   SpO2: 94% 96% 98% 99%   Weight:       Height:            Intake and Output:  Current Shift: 07/04 0701 - 07/04 1900  In: -   Out: 340 [Urine:300; Drains:40]  Last three shifts: 07/02 1901 - 07/04 0700  In: 1331.3 [I.V.:1331.3]  Out: 1690 [Urine:1250; Drains:440]    Physical Exam:   GENERAL: alert, cooperative, no distress, appears stated age    Lab/Data Review:  Lab results reviewed. For significant abnormal values and values requiring intervention, see assessment and plan.            Assessment:     Principal Problem:    Perforated viscus (7/1/2019)    Active Problems:    Sepsis ([address] (7/1/2019)        Plan:         Signed By: Veneda Sandifer, RN     July 4, 2019

## 2019-07-04 NOTE — PROGRESS NOTES
Admit Date: 2019    POD 3 Day Post-Op    Procedure:  Procedure(s) with comments:  LAPAROTOMY EXPLORATORY, REPAIR OF PERFORATED SIGMOID COLON. - PERFORATED SIGMOID COLON    Subjective:     Patient c/o pain. No N/V. Small amount of flatus. Objective:     Blood pressure 172/82, pulse 87, temperature 97.6 °F (36.4 °C), resp. rate 16, height 5' 4\" (1.626 m), weight 162 lb 11.2 oz (73.8 kg), SpO2 98 %. Temp (24hrs), Av.1 °F (36.7 °C), Min:97.3 °F (36.3 °C), Max:98.8 °F (37.1 °C)      Physical Exam:  GENERAL: alert, cooperative, no distress, appears stated age, LUNG: clear to auscultation bilaterally, HEART: regular rate and rhythm, ABDOMEN: more distended, appropriately tender, wound c/d/i, SAVANNA o/p serosanguinous, EXTREMITIES:  extremities normal, atraumatic, no cyanosis or edema    Labs:   Recent Results (from the past 24 hour(s))   CBC WITH AUTOMATED DIFF    Collection Time: 19  1:00 AM   Result Value Ref Range    WBC 9.5 4.1 - 11.1 K/uL    RBC 4.54 4.10 - 5.70 M/uL    HGB 13.3 12.1 - 17.0 g/dL    HCT 39.0 36.6 - 50.3 %    MCV 85.9 80.0 - 99.0 FL    MCH 29.3 26.0 - 34.0 PG    MCHC 34.1 30.0 - 36.5 g/dL    RDW 13.8 11.5 - 14.5 %    PLATELET 591 228 - 750 K/uL    MPV 10.9 8.9 - 12.9 FL    NRBC 0.0 0  WBC    ABSOLUTE NRBC 0.00 0.00 - 0.01 K/uL    NEUTROPHILS 85 (H) 32 - 75 %    LYMPHOCYTES 8 (L) 12 - 49 %    MONOCYTES 7 5 - 13 %    EOSINOPHILS 0 0 - 7 %    BASOPHILS 0 0 - 1 %    IMMATURE GRANULOCYTES 1 (H) 0.0 - 0.5 %    ABS. NEUTROPHILS 8.1 (H) 1.8 - 8.0 K/UL    ABS. LYMPHOCYTES 0.7 (L) 0.8 - 3.5 K/UL    ABS. MONOCYTES 0.7 0.0 - 1.0 K/UL    ABS. EOSINOPHILS 0.0 0.0 - 0.4 K/UL    ABS. BASOPHILS 0.0 0.0 - 0.1 K/UL    ABS. IMM.  GRANS. 0.1 (H) 0.00 - 0.04 K/UL    DF AUTOMATED     METABOLIC PANEL, BASIC    Collection Time: 19  1:00 AM   Result Value Ref Range    Sodium 139 136 - 145 mmol/L    Potassium 3.8 3.5 - 5.1 mmol/L    Chloride 111 (H) 97 - 108 mmol/L    CO2 22 21 - 32 mmol/L    Anion gap 6 5 - 15 mmol/L    Glucose 134 (H) 65 - 100 mg/dL    BUN 23 (H) 6 - 20 MG/DL    Creatinine 0.85 0.70 - 1.30 MG/DL    BUN/Creatinine ratio 27 (H) 12 - 20      GFR est AA >60 >60 ml/min/1.73m2    GFR est non-AA >60 >60 ml/min/1.73m2    Calcium 7.8 (L) 8.5 - 10.1 MG/DL       Data Review images and reports reviewed    Assessment:     Principal Problem:    Perforated viscus (7/1/2019)    Active Problems:    Sepsis ([facility] [address] (7/1/2019)        Plan/Recommendations/Medical Decision Making:     Continue present treatment   Pt needs to ambulate in hallway 4 times daily  Sips of clears only for now  Awaiting return of bowel function  Appreciate Dr. Phyllis Sevilla assistance. Pt will f/u with [facility] as outpatient for penile skin lesion. Hydralazine prn for htn    Anthony Jerry MD, [facility]

## 2019-07-04 NOTE — PROGRESS NOTES
General Surgery End of Shift Nursing Note    Bedside shift change report given to 2002 Bryan Robb (oncoming nurse) by Carl Herbert (offgoing nurse). Report included the following information SBAR, Kardex, Intake/Output and MAR. Shift worked:   7p to [facility] [place] of shift:   PRN pain meds given. Pt had been complaining of pain earlier in the shift and felt no relief from the PRN dilaudid. Pt was also hypertensive. Hydralazine was given, and pressure was still elevated with the SBP above 160. MD paged. Orders for IV tylenol given. Pts pain subsided and SBP lowered to 140s/ Pt did not ambulate prior to bedtime.     Issues for physician to address:  none       Yasmeen Serra RN

## 2019-07-05 PROCEDURE — 74011000258 HC RX REV CODE- 258: Performed by: SURGERY

## 2019-07-05 PROCEDURE — 97166 OT EVAL MOD COMPLEX 45 MIN: CPT | Performed by: OCCUPATIONAL THERAPIST

## 2019-07-05 PROCEDURE — 97530 THERAPEUTIC ACTIVITIES: CPT | Performed by: OCCUPATIONAL THERAPIST

## 2019-07-05 PROCEDURE — 74011250637 HC RX REV CODE- 250/637: Performed by: SURGERY

## 2019-07-05 PROCEDURE — 65270000029 HC RM PRIVATE

## 2019-07-05 PROCEDURE — 94760 N-INVAS EAR/PLS OXIMETRY 1: CPT

## 2019-07-05 PROCEDURE — 74011250636 HC RX REV CODE- 250/636: Performed by: SURGERY

## 2019-07-05 PROCEDURE — 97161 PT EVAL LOW COMPLEX 20 MIN: CPT

## 2019-07-05 PROCEDURE — 97116 GAIT TRAINING THERAPY: CPT

## 2019-07-05 PROCEDURE — 77010033678 HC OXYGEN DAILY

## 2019-07-05 RX ADMIN — CARVEDILOL 25 MG: 12.5 TABLET, FILM COATED ORAL at 16:39

## 2019-07-05 RX ADMIN — CARVEDILOL 25 MG: 12.5 TABLET, FILM COATED ORAL at 09:20

## 2019-07-05 RX ADMIN — ASPIRIN 81 MG 81 MG: 81 TABLET ORAL at 09:20

## 2019-07-05 RX ADMIN — HYDROMORPHONE HYDROCHLORIDE 0.5 MG: 1 INJECTION, SOLUTION INTRAMUSCULAR; INTRAVENOUS; SUBCUTANEOUS at 01:06

## 2019-07-05 RX ADMIN — CLOPIDOGREL BISULFATE 75 MG: 75 TABLET ORAL at 09:20

## 2019-07-05 RX ADMIN — DOXAZOSIN 2 MG: 2 TABLET ORAL at 09:20

## 2019-07-05 RX ADMIN — HYDROMORPHONE HYDROCHLORIDE 0.5 MG: 1 INJECTION, SOLUTION INTRAMUSCULAR; INTRAVENOUS; SUBCUTANEOUS at 21:44

## 2019-07-05 RX ADMIN — PIPERACILLIN SODIUM,TAZOBACTAM SODIUM 3.38 G: 3; .375 INJECTION, POWDER, FOR SOLUTION INTRAVENOUS at 09:19

## 2019-07-05 RX ADMIN — PIPERACILLIN SODIUM,TAZOBACTAM SODIUM 3.38 G: 3; .375 INJECTION, POWDER, FOR SOLUTION INTRAVENOUS at 16:39

## 2019-07-05 RX ADMIN — HYDROMORPHONE HYDROCHLORIDE 0.5 MG: 1 INJECTION, SOLUTION INTRAMUSCULAR; INTRAVENOUS; SUBCUTANEOUS at 12:40

## 2019-07-05 RX ADMIN — PIPERACILLIN SODIUM,TAZOBACTAM SODIUM 3.38 G: 3; .375 INJECTION, POWDER, FOR SOLUTION INTRAVENOUS at 00:59

## 2019-07-05 NOTE — PROGRESS NOTES
Problem: Self Care Deficits Care Plan (Adult)  Goal: *Acute Goals and Plan of Care (Insert Text)  Description  Occupational Therapy Goals:  Initiated 7/5/2019  1. Patient will perform grooming standing with independence within 7 days. 2. Patient will perform toileting with independence within 7 days. 3. Patient will perform upper body dressing and lower body dressing with independence within 7 days. 4. Patient will perform one light IADL task in standing with independence within 7 days. 5. Patient will transfer from toilet with independence within 7 days. Outcome: Progressing Towards Goal   OCCUPATIONAL THERAPY EVALUATION  Patient: Malia Medrano (88 y.o. male)  Date: 7/5/2019  Primary Diagnosis: Perforated viscus [R19.8]  Procedure(s) (LRB):  LAPAROTOMY EXPLORATORY, REPAIR OF PERFORATED SIGMOID COLON. (N/A) 4 Days Post-Op   Precautions:  Fall    ASSESSMENT :  Based on the objective data described below, the patient presents with wheezing at rest and with exertion with O2 sat of 96% on room air. Pt reports chronic COPD and progressive weakness over time. He lives alone and reports that just prior to admit he was struggling to take care of himself due to weakness. He has fallen this year and prior to the beginning of the year he was able to perform all functional tasks without difficulty. Currently pt needs SBA to CGA for mobility with postural sway during tasks. Frequent rests needed needed due to general weakness. Pt is performing UB ADLS at a CGA level and lower body ADLS at a min assist level. Recommend short term SNF for rehab. Patient will benefit from skilled intervention to address the above impairments. Patient?s rehabilitation potential is considered to be Good  Factors which may influence rehabilitation potential include:   ? None noted  ? Mental ability/status  ? Medical condition  ? Home/family situation and support systems  ? Safety awareness  ? Pain tolerance/management  ? Other:      PLAN :  Recommendations and Planned Interventions:  ?               Self Care Training                  ? Therapeutic Activities  ? Functional Mobility Training    ? Cognitive Retraining  ? Therapeutic Exercises           ? Endurance Activities  ? Balance Training                   ? Neuromuscular Re-Education  ? Visual/Perceptual Training     ? Home Safety Training  ? Patient Education                 ? Family Training/Education  ? Other (comment):    Frequency/Duration: Patient will be followed by occupational therapy 3 times a week to address goals. Discharge Recommendations: Bryan Ballesteros  Further Equipment Recommendations for Discharge: TBD      SUBJECTIVE:   Patient stated ? I can't even lift a jar of peanut butter. I have gotten so weak. ?    OBJECTIVE DATA SUMMARY:   HISTORY:   Past Medical History:   Diagnosis Date    Arthritis     BPH (benign prostatic hyperplasia)     Chronic kidney disease     stage 2     Chronic obstructive pulmonary disease (HCC)     Elevated LFT's     Essential hypertension 9/24/01    GERD (gastroesophageal reflux disease)     hiatal hernia    Ill-defined condition 12/02/2016    faint    Liver disease     \"fatty liver\" as per patient    Nephrosclerosis     Orthostatic hypotension 9/24/01    PVC's 9/24/01    Sleep apnea     no CPAP    Syncope 9/24/01    secondary to venous insuffiency      Past Surgical History:   Procedure Laterality Date    COLONOSCOPY N/A 12/13/2016    COLONOSCOPY performed by Parish Martinez MD at [facility]    ECHO 2D ADULT  5/24/12    EF 60 - 65%; mild concentric hypertrophy; pulmonary systolic artery pressure upper limits normal    HX APPENDECTOMY  1985    HX HERNIA REPAIR  08/29/2018    Davinci hiatal hernia repair- Aurea Pollard MD Prior Level of Function/Environment/Context: progressive weakness over the past year with falls and medical set backs, performed ADLs without assist with rest breaks, sponge bathing due to fear of getting into tub, able to perform household tasks but needs constant rest breaks, family checks on pt frequently     Expanded or extensive additional review of patient history:     Home Situation  Home Environment: Private residence  # Steps to Enter: 2  Rails to Enter: No  One/Two Story Residence: One story  Living Alone: Yes  Support Systems: Family member(s), Child(colleen)  Patient Expects to be Discharged to[de-identified] Private residence  Current DME Used/Available at Home: None  Tub or Shower Type: Tub    Hand dominance: Right    EXAMINATION OF PERFORMANCE DEFICITS:  Cognitive/Behavioral Status:  Neurologic State: Alert  Orientation Level: Oriented X4     Perception: Appears intact  Perseveration: No perseveration noted  Safety/Judgement: Awareness of environment; Fall prevention;Home safety      Hearing: Auditory  Auditory Impairment: Hearing aid(s), Hard of hearing, bilateral  Hearing Aids/Status: Left    Vision/Perceptual:                                Corrective Lenses: Glasses    Range of Motion:    AROM: Generally decreased, functional(3-/5 bilateral shoulders and general weakness)  PROM: Generally decreased, functional                      Strength:    Strength: Generally decreased, functional(B LE's)                Coordination:     Fine Motor Skills-Upper: Left Intact; Right Intact    Gross Motor Skills-Upper: Left Intact; Right Intact      Balance:  Sitting: Intact  Standing: Impaired  Standing - Static: Good  Standing - Dynamic : Good    Functional Mobility and Transfers for ADLs:  Bed Mobility:  Supine to Sit: (received up in chair)    Transfers:  Sit to Stand: Supervision  Stand to Sit: Supervision  Bed to Chair: Stand-by assistance  Bathroom Mobility: Contact guard assistance  Toilet Transfer : Contact guard assistance    ADL Assessment:  Feeding: Independent    Oral Facial Hygiene/Grooming: Contact guard assistance    Bathing: Contact guard assistance    Upper Body Dressing: Contact guard assistance    Lower Body Dressing: Minimum assistance    Toileting: Contact guard assistance                ADL Intervention and task modifications:     Educated on pacing, PLB, seated chair push ups and general UE ROM exercises. Pt would benefit from UE exercise program.  Cognitive Retraining  Safety/Judgement: Awareness of environment; Fall prevention;Home safety        Functional Measure:  Barthel Index:    Bathin  Bladder: 10  Bowels: 10  Groomin  Dressin  Feeding: 10  Mobility: 10  Stairs: 5  Toilet Use: 5  Transfer (Bed to Chair and Back): 10  Total: 70/100        Percentage of impairment   0%   1-19%   20-39%   40-59%   60-79%   80-99%   100%   Barthel Score 0-100 100 99-80 79-60 59-40 20-39 1-19   0     The Barthel ADL Index: Guidelines  1. The index should be used as a record of what a patient does, not as a record of what a patient could do. 2. The main aim is to establish degree of independence from any help, physical or verbal, however minor and for whatever reason. 3. The need for supervision renders the patient not independent. 4. A patient's performance should be established using the best available evidence. Asking the patient, friends/relatives and nurses are the usual sources, but direct observation and common sense are also important. However direct testing is not needed. 5. Usually the patient's performance over the preceding 24-48 hours is important, but occasionally longer periods will be relevant. 6. Middle categories imply that the patient supplies over 50 per cent of the effort. 7. Use of aids to be independent is allowed. Theresa Hawthorne., Barthel, D.W. ([id number]). Functional evaluation: the Barthel Index. [address] [facility] (14)2.   [facility] Jean Baptiste, Juan Kwong., Eva Valladares., Phoebe Aly. (1999). Measuring the change indisability after inpatient rehabilitation; comparison of the responsiveness of the Barthel Index and Functional [place] Measure. Journal of Neurology, Neurosurgery, and Psychiatry, 66(4), 925-676. ROCCO Ash, MICHELLE Martin, & Gerardo Lujan M.A. (2004.) Assessment of post-stroke quality of life in cost-effectiveness studies: The usefulness of the Barthel Index and the EuroQoL-5D. Quality of Life Research, 15, 627-30         Occupational Therapy Evaluation Charge Determination   History Examination Decision-Making   MEDIUM Complexity : Expanded review of history including physical, cognitive and psychosocial  history  MEDIUM Complexity : 3-5 performance deficits relating to physical, cognitive , or psychosocial skils that result in activity limitations and / or participation restrictions MEDIUM Complexity : Patient may present with comorbidities that affect occupational performnce. Miniml to moderate modification of tasks or assistance (eg, physical or verbal ) with assesment(s) is necessary to enable patient to complete evaluation       Based on the above components, the patient evaluation is determined to be of the following complexity level: MEDIUM  Activity Tolerance:     Please refer to the flowsheet for vital signs taken during this treatment. After treatment:   ? Patient left in no apparent distress sitting up in chair  ? Patient left in no apparent distress in bed  ? Call bell left within reach  ? Nursing notified  ? Caregiver present  ? Bed alarm activated    COMMUNICATION/EDUCATION:   The patient?s plan of care was discussed with: Physical Therapist, Registered Nurse and patient . ? Home safety education was provided and the patient/caregiver indicated understanding. ? Patient/family have participated as able in goal setting and plan of care. ? Patient/family agree to work toward stated goals and plan of care.   ? Patient understands intent and goals of therapy, but is neutral about his/her participation. ? Patient is unable to participate in goal setting and plan of care. This patient?s plan of care is appropriate for delegation to VIKRAM.     Thank you for this referral.  Miladys Murphy OTR/L  Time Calculation: 23 mins

## 2019-07-05 NOTE — PROGRESS NOTES
Nutrition Assessment:    RECOMMENDATIONS:   Advance diet as medically able per surgeon  RD to add Ensure Clear TID    DIETITIANS INTERVENTIONS/PLAN:   Advance diet as tolerated  Add PO supplements  Monitor appetite/PO intake    ASSESSMENT:   Pt admitted with perforated viscous. PMH: CKD stage 2, HTN, GERD. Chart reviewed. Pt is POD# 4 ex lap + repair of perforated sigmoid colon. MST negative for previous nutritional risk factors. He is on a clear liquid diet as of this morning, awaiting advancement. Labs reviewed, WNL. BM noted yesterday. As clear liquids are inadequate in kcals and protein, will add PO supplements TID. SUBJECTIVE/OBJECTIVE:     Diet Order: Clear liquids  % Eaten:    Patient Vitals for the past 72 hrs:   % Diet Eaten   07/02/19 1703 0 %   07/02/19 1332 0 %     Pertinent Medications:zosyn, miralax; [email or web address]). Chemistries:  Lab Results   Component Value Date/Time    Sodium 139 07/04/2019 01:00 AM    Potassium 3.8 07/04/2019 01:00 AM    Chloride 111 (H) 07/04/2019 01:00 AM    CO2 22 07/04/2019 01:00 AM    Anion gap 6 07/04/2019 01:00 AM    Glucose 134 (H) 07/04/2019 01:00 AM    BUN 23 (H) 07/04/2019 01:00 AM    Creatinine 0.85 07/04/2019 01:00 AM    BUN/Creatinine ratio 27 (H) 07/04/2019 01:00 AM    GFR est AA >60 07/04/2019 01:00 AM    GFR est non-AA >60 07/04/2019 01:00 AM    Calcium 7.8 (L) 07/04/2019 01:00 AM    AST (SGOT) 75 (H) 07/01/2019 02:09 PM    Alk. phosphatase 317 (H) 07/01/2019 02:09 PM    Protein, total 9.4 (H) 07/01/2019 02:09 PM    Albumin 3.6 07/01/2019 02:09 PM    Globulin 5.8 (H) 07/01/2019 02:09 PM    A-G Ratio 0.6 (L) 07/01/2019 02:09 PM    ALT (SGPT) 114 (H) 07/01/2019 02:09 PM      Anthropometrics: Height: 5' 4\" (162.6 cm) Weight: 73.8 kg (162 lb 11.2 oz)  []bed scale    []stated   [x]unknown(7/1)    IBW (%IBW):   ( ) UBW (%UBW):   (  %)    BMI: Body mass index is 27.93 kg/m².     This BMI is indicative of:  []Underweight   [x]Normal(for age)   []Overweight [] Obesity   [] Extreme Obesity (BMI>40)  Estimated Nutrition Needs (Based on): 1408 Kcals/day(MSJ 1369 x 1.3) , 74 g(-89 (1-1.2gPro/kg) ) Protein  Carbohydrate: At Least 130 g/day  Fluids: 1800 mL/day    Last BM: 7/4   [x]Active     []Hyperactive  []Hypoactive       [] Absent   BS  Skin:    [] Intact   [x] Incision  [] Breakdown   [] DTI   [] Tears/Excoriation/Abrasion  []Edema [] Other: Wt Readings from Last 30 Encounters:   07/01/19 73.8 kg (162 lb 11.2 oz)   04/29/19 79.3 kg (174 lb 13.2 oz)   01/23/19 79.4 kg (175 lb 0.7 oz)   12/07/18 79.4 kg (175 lb)   10/05/18 78.7 kg (173 lb 9.6 oz)   09/12/18 79 kg (174 lb 1.6 oz)   08/29/18 81.5 kg (179 lb 10.8 oz)   08/08/18 81.6 kg (179 lb 14.3 oz)   07/27/18 82.8 kg (182 lb 9.6 oz)   06/01/18 82.2 kg (181 lb 2 oz)   11/22/17 82 kg (180 lb 12.4 oz)   12/13/16 82.7 kg (182 lb 5 oz)   10/26/16 82.1 kg (181 lb)   09/09/16 82.2 kg (181 lb 3.2 oz)   06/14/12 85 kg (187 lb 6.4 oz)   05/24/12 84.4 kg (186 lb)   04/10/12 82.3 kg (181 lb 6.4 oz)   03/22/12 83.7 kg (184 lb 9.6 oz)   08/23/11 81.6 kg (180 lb)   04/28/11 81.6 kg (180 lb)   04/20/11 82.1 kg (181 lb)   04/12/11 81.6 kg (180 lb)   02/17/11 82 kg (180 lb 12.4 oz)   12/22/10 79.8 kg (176 lb)      NUTRITION DIAGNOSES:   Problem:  Altered GI function      Etiology: related to recent GI surgery     Signs/Symptoms: as evidenced by pt on clear liquids. NUTRITION INTERVENTIONS:  Meals/Snacks: General/healthful diet   Supplements: Commercial supplement              GOAL:   Pt will advance to solid diet and consume >70% of meals/supplements in 2-4 days.      Cultural, Nondenominational, or Ethnic Dietary Needs: None     LEARNING NEEDS (Diet, Food/Nutrient-Drug Interaction):    [x] None Identified   [] Identified and Education Provided/Documented   [] Identified and Pt declined/was not appropriate      [x] Interdisciplinary Care Plan Reviewed/Documented    [x] Participated in Discharge Planning: Per surgeon   [] Interdisciplinary Rounds     NUTRITION RISK:    [] High              [x] Moderate           []  Low  []  Minimal/Uncompromised    PT SEEN FOR:    []  MD Consult: []Calorie Count      []Diabetic Diet Education        []Diet Education     []Electrolyte Management     []General Nutrition Management and Supplements     []Management of Tube Feeding     []TPN Recommendations    []  RN Referral:  []MST score >=2     []Enteral/Parenteral Nutrition PTA     []Pregnant: Gestational DM or Multigestation   []  Low BMI  []  Re-Screen   []  LOS   [x]  NPO/clears x 5 days   []  New TF/TPN    Tammy Alvares RD, [address]   Pager [phone]  Weekend Pager [phone]

## 2019-07-05 NOTE — PROGRESS NOTES
Admit Date: 2019    POD 4 Day Post-Op    Procedure:  Procedure(s) with comments:  LAPAROTOMY EXPLORATORY, REPAIR OF PERFORATED SIGMOID COLON. - PERFORATED SIGMOID COLON    Subjective:     Patient c/o pain. Now having BMs with some blood. Objective:     Blood pressure 149/77, pulse 65, temperature 97.6 °F (36.4 °C), resp. rate 18, height 5' 4\" (1.626 m), weight 162 lb 11.2 oz (73.8 kg), SpO2 98 %. Temp (24hrs), Av.8 °F (36.6 °C), Min:97.4 °F (36.3 °C), Max:98.5 °F (36.9 °C)      Physical Exam:  GENERAL: alert, cooperative, no distress, appears stated age, LUNG: clear to auscultation bilaterally, HEART: regular rate and rhythm, ABDOMEN: remains distended, appropriately tender, wound c/d/i, SAVANNA o/p serosanguinous, EXTREMITIES:  extremities normal, atraumatic, no cyanosis or edema    Labs:   No results found for this or any previous visit (from the past 24 hour(s)). Data Review images and reports reviewed    Assessment:     Principal Problem:    Perforated viscus (2019)    Active Problems:    Sepsis ([address] (2019)        Plan/Recommendations/Medical Decision Making:     Continue present treatment   Pt needs to ambulate in hallway 4 times daily  Clear liquid diet. Continue miralax. Appreciate Dr. Anette Benton assistance. Pt will f/u with [facility] as outpatient for penile skin lesion. Hydralazine prn for htn    Anthony Edgar MD, [facility]

## 2019-07-05 NOTE — PROGRESS NOTES
Plan:  -SNF for rehab (referral to [facility] pending)  -Family to transport      2:47PM  Per IDRs, therapy evals placed. SNF list provided. CM f/u discussion with surgery NP. SNF rec. Possible d/c Sunday-Monday. CM in to speak with family. Pt resting. Dtr, Equilla Boys, and other family member at bedside. CM discussed SNF. Family states family is weak and would benefit. FOC offered and form signed. [facility] choice [facility]. Second choice [facility]. [facility] Family plans to transport to SNF at d/c.      3:08PM  Referral accepted by [facility]. Should have bed Sunday or Monday. CM will continue to follow and assist with d/c planning.        Everardo Kumar MSW  Care Manager

## 2019-07-05 NOTE — PROGRESS NOTES
General Surgery End of Shift Nursing Note    Bedside shift change report given to [place] (oncoming nurse) by Mateusz Chisholm (offgoing nurse). Report included the following information SBAR, Kardex, ED Summary, [facility] and Recent Results. Shift worked:   Night    Summary of shift:  Up to the bathroom multiple times throughout the night requested pain meds once, then slept    Issues for physician to address:       Number times ambulated in hallway past shift: 0    Number of times OOB to chair past shift: 2    Pain Management:  Current medication: see eMAR   Patient states pain is manageable on current pain medication: YES    GI:    Current diet:  DIET CLEAR LIQUID    Tolerating current diet: YES  Passing flatus: YES  Last Bowel Movement: today       Respiratory:    Incentive Spirometer at bedside: YES  Patient instructed on use: YES    Patient Safety:    Falls Score: 2  Bed Alarm On? No  Sitter?  No    May Lewis

## 2019-07-05 NOTE — PROGRESS NOTES
Problem: Mobility Impaired (Adult and Pediatric)  Goal: *Acute Goals and Plan of Care (Insert Text)  Description  Physical Therapy Goals  Initiated 7/5/2019  1. Patient will move from supine to sit and sit to supine , scoot up and down and roll side to side in bed with modified independence within 7 day(s). 2.  Patient will transfer from bed to chair and chair to bed with modified independence using the least restrictive device within 7 day(s). 3.  Patient will perform sit to stand with modified independence within 7 day(s). 4.  Patient will ambulate with modified independence for 300 feet with the least restrictive device within 7 day(s). 5.  Patient will ascend/descend 3 stairs with 0 handrail(s) with modified independence within 7 day(s). Outcome: Progressing Towards Goal     PHYSICAL THERAPY EVALUATION  Patient: Herberth Magallon (61 y.o. male)  Date: 7/5/2019  Primary Diagnosis: Perforated viscus [R19.8]  Procedure(s) (LRB):  LAPAROTOMY EXPLORATORY, REPAIR OF PERFORATED SIGMOID COLON. (N/A) 4 Days Post-Op   Precautions:   Fall    ASSESSMENT :  Based on the objective data described below, the patient presents with generalized weakness, decreased activity tolerance with increased DE JESUS, and impaired independence from baseline s/p repair of perforated sigmoid colon POD 4. PTA patient was independent with mobility although reports generalized malaise and increased difficulty caring for himself recently. He reports 1 fall in the past 6 months. Patient ambulated 200 feet without AD and SBA although audible wheezing noted quickly when ambulating. O2 sats 96-98% on RA at rest and with activity. Patient lives alone and does not have family who can assist him at discharge. Recommend SNF placement to maximize functional gains and reduce fall risk. Over the weekend recommend OOB in chair for all meals and ambulating hallways multiple times day.     Patient will benefit from skilled intervention to address the above impairments. Patient?s rehabilitation potential is considered to be Good  Factors which may influence rehabilitation potential include:   ? None noted  ? Mental ability/status  ? Medical condition  ? Home/family situation and support systems  ? Safety awareness  ? Pain tolerance/management  ? Other:      PLAN :  Recommendations and Planned Interventions:  ?           Bed Mobility Training             ? Neuromuscular Re-Education  ? Transfer Training                   ? Orthotic/Prosthetic Training  ? Gait Training                         ? Modalities  ? Therapeutic Exercises           ? Edema Management/Control  ? Therapeutic Activities            ? Patient and Family Training/Education  ? Other (comment):    Frequency/Duration: Patient will be followed by physical therapy  3 times a week to address goals. Discharge Recommendations: Bryan Ballesteros  Further Equipment Recommendations for Discharge: owns none      SUBJECTIVE:   Patient stated \"I've been getting weaker and weaker. ?    OBJECTIVE DATA SUMMARY:   HISTORY:    Past Medical History:   Diagnosis Date    Arthritis     BPH (benign prostatic hyperplasia)     Chronic kidney disease     stage 2     Chronic obstructive pulmonary disease (HCC)     Elevated LFT's     Essential hypertension 9/24/01    GERD (gastroesophageal reflux disease)     hiatal hernia    Ill-defined condition 12/02/2016    faint    Liver disease     \"fatty liver\" as per patient    Nephrosclerosis     Orthostatic hypotension 9/24/01    PVC's 9/24/01    Sleep apnea     no CPAP    Syncope 9/24/01    secondary to venous insuffiency      Past Surgical History:   Procedure Laterality Date    COLONOSCOPY N/A 12/13/2016    COLONOSCOPY performed by Hailey Lerma MD at [facility]    ECHO 2D ADULT  5/24/12    EF 60 - 65%; mild concentric hypertrophy; pulmonary systolic artery pressure upper limits normal    HX APPENDECTOMY  1985    HX HERNIA REPAIR  08/29/2018    [place] hiatal hernia repair- Susie Huddleston MD     Prior Level of Function/Home Situation: independent, lives alone, reports gradual decline in ability to care for self with ease  Personal factors and/or comorbidities impacting plan of care:     Home Situation  Home Environment: Private residence  # Steps to Enter: 2  Rails to Enter: No  One/Two Story Residence: One story  Living Alone: Yes  Support Systems: Family member(s), Child(colleen)  Patient Expects to be Discharged to[de-identified] Private residence  Current DME Used/Available at Home: None  Tub or Shower Type: Tub    EXAMINATION/PRESENTATION/DECISION MAKING:   Critical Behavior:  Neurologic State: Alert  Orientation Level: Oriented X4  Cognition: Follows commands     Hearing: Auditory  Auditory Impairment: Hearing aid(s), Hard of hearing, bilateral  Hearing Aids/Status: Left  Skin:    Edema:   Range Of Motion:  AROM: Generally decreased, functional           PROM: Generally decreased, functional           Strength:    Strength: Generally decreased, functional(B LE's)                    Tone & Sensation:                                  Coordination:     Vision:      Functional Mobility:  Bed Mobility:     Supine to Sit: (received up in chair)        Transfers:  Sit to Stand: Supervision  Stand to Sit: Supervision  Stand Pivot Transfers: Supervision                    Balance:   Sitting: Intact  Standing: Impaired  Standing - Static: Good  Standing - Dynamic : Good  Ambulation/Gait Training:  Distance (ft): 200 Feet (ft)     Ambulation - Level of Assistance: Stand-by assistance        Gait Abnormalities: Decreased step clearance              Speed/Jessica: Pace decreased (<100 feet/min)  Step Length: Right shortened;Left shortened                     Stairs:               Therapeutic Exercises:       Functional Measure:  Timed up and go:    Timed Get Up And Go Test: 15 < than 10 seconds=Normal  Greater then 13.5 seconds (in elderly)=Increased fall risk   Barber HIGH, Elissa Ritchie. Predicting the probability for falls in community dwelling older adults using the Timed Up and Go Test. Phys Ther. 2000;80:896-903. Pain:  Pain Scale 1: Numeric (0 - 10)  Pain Intensity 1: 10  Pain Location 1: Generalized  Pain Orientation 1: Posterior     Pain Intervention(s) 1: Medication (see MAR)  Activity Tolerance:   Good  Please refer to the flowsheet for vital signs taken during this treatment. After treatment:   ?         Patient left in no apparent distress sitting up in chair  ? Patient left in no apparent distress in bed  ? Call bell left within reach  ? Nursing notified  ? Caregiver present  ? Bed alarm activated    COMMUNICATION/EDUCATION:   The patient?s plan of care was discussed with: Registered Nurse. ?         Fall prevention education was provided and the patient/caregiver indicated understanding. ? Patient/family have participated as able in goal setting and plan of care. ?         Patient/family agree to work toward stated goals and plan of care. ?         Patient understands intent and goals of therapy, but is neutral about his/her participation. ? Patient is unable to participate in goal setting and plan of care.     Thank you for this referral.  Joanna Cortes, PT   Time Calculation: 23 mins

## 2019-07-06 PROBLEM — K56.7 ILEUS (HCC): Status: ACTIVE | Noted: 2019-07-06

## 2019-07-06 LAB
ANION GAP SERPL CALC-SCNC: 3 MMOL/L (ref 5–15)
BASOPHILS # BLD: 0 K/UL (ref 0–0.1)
BASOPHILS NFR BLD: 0 % (ref 0–1)
BUN SERPL-MCNC: 25 MG/DL (ref 6–20)
BUN/CREAT SERPL: 31 (ref 12–20)
CALCIUM SERPL-MCNC: 7.9 MG/DL (ref 8.5–10.1)
CHLORIDE SERPL-SCNC: 115 MMOL/L (ref 97–108)
CO2 SERPL-SCNC: 25 MMOL/L (ref 21–32)
CREAT SERPL-MCNC: 0.8 MG/DL (ref 0.7–1.3)
DIFFERENTIAL METHOD BLD: ABNORMAL
EOSINOPHIL # BLD: 0.1 K/UL (ref 0–0.4)
EOSINOPHIL NFR BLD: 1 % (ref 0–7)
ERYTHROCYTE [DISTWIDTH] IN BLOOD BY AUTOMATED COUNT: 14.3 % (ref 11.5–14.5)
GLUCOSE SERPL-MCNC: 118 MG/DL (ref 65–100)
HCT VFR BLD AUTO: 33.6 % (ref 36.6–50.3)
HGB BLD-MCNC: 11.3 G/DL (ref 12.1–17)
IMM GRANULOCYTES # BLD AUTO: 0 K/UL (ref 0–0.04)
IMM GRANULOCYTES NFR BLD AUTO: 0 % (ref 0–0.5)
LYMPHOCYTES # BLD: 0.9 K/UL (ref 0.8–3.5)
LYMPHOCYTES NFR BLD: 13 % (ref 12–49)
MCH RBC QN AUTO: 29.3 PG (ref 26–34)
MCHC RBC AUTO-ENTMCNC: 33.6 G/DL (ref 30–36.5)
MCV RBC AUTO: 87 FL (ref 80–99)
MONOCYTES # BLD: 0.7 K/UL (ref 0–1)
MONOCYTES NFR BLD: 10 % (ref 5–13)
NEUTS SEG # BLD: 5.3 K/UL (ref 1.8–8)
NEUTS SEG NFR BLD: 76 % (ref 32–75)
NRBC # BLD: 0 K/UL (ref 0–0.01)
NRBC BLD-RTO: 0 PER 100 WBC
PLATELET # BLD AUTO: 176 K/UL (ref 150–400)
PMV BLD AUTO: 10.8 FL (ref 8.9–12.9)
POTASSIUM SERPL-SCNC: 3.4 MMOL/L (ref 3.5–5.1)
RBC # BLD AUTO: 3.86 M/UL (ref 4.1–5.7)
SODIUM SERPL-SCNC: 143 MMOL/L (ref 136–145)
WBC # BLD AUTO: 7 K/UL (ref 4.1–11.1)

## 2019-07-06 PROCEDURE — 74011250637 HC RX REV CODE- 250/637: Performed by: SURGERY

## 2019-07-06 PROCEDURE — 74011250636 HC RX REV CODE- 250/636: Performed by: SURGERY

## 2019-07-06 PROCEDURE — 94760 N-INVAS EAR/PLS OXIMETRY 1: CPT

## 2019-07-06 PROCEDURE — 80048 BASIC METABOLIC PNL TOTAL CA: CPT

## 2019-07-06 PROCEDURE — 65270000029 HC RM PRIVATE

## 2019-07-06 PROCEDURE — 74011000258 HC RX REV CODE- 258: Performed by: SURGERY

## 2019-07-06 PROCEDURE — 85025 COMPLETE CBC W/AUTO DIFF WBC: CPT

## 2019-07-06 PROCEDURE — 36415 COLL VENOUS BLD VENIPUNCTURE: CPT

## 2019-07-06 RX ORDER — ACETAMINOPHEN 325 MG/1
650 TABLET ORAL
Status: DISCONTINUED | OUTPATIENT
Start: 2019-07-06 | End: 2019-07-13 | Stop reason: HOSPADM

## 2019-07-06 RX ORDER — POTASSIUM CHLORIDE 20 MEQ/1
20 TABLET, EXTENDED RELEASE ORAL 2 TIMES DAILY
Status: DISCONTINUED | OUTPATIENT
Start: 2019-07-06 | End: 2019-07-07

## 2019-07-06 RX ADMIN — DOXAZOSIN 2 MG: 2 TABLET ORAL at 09:44

## 2019-07-06 RX ADMIN — ACETAMINOPHEN 650 MG: 325 TABLET ORAL at 20:13

## 2019-07-06 RX ADMIN — CARVEDILOL 25 MG: 12.5 TABLET, FILM COATED ORAL at 18:00

## 2019-07-06 RX ADMIN — SODIUM CHLORIDE 125 ML/HR: 900 INJECTION, SOLUTION INTRAVENOUS at 00:38

## 2019-07-06 RX ADMIN — PIPERACILLIN SODIUM,TAZOBACTAM SODIUM 3.38 G: 3; .375 INJECTION, POWDER, FOR SOLUTION INTRAVENOUS at 18:01

## 2019-07-06 RX ADMIN — PIPERACILLIN SODIUM,TAZOBACTAM SODIUM 3.38 G: 3; .375 INJECTION, POWDER, FOR SOLUTION INTRAVENOUS at 00:41

## 2019-07-06 RX ADMIN — CARVEDILOL 25 MG: 12.5 TABLET, FILM COATED ORAL at 09:43

## 2019-07-06 RX ADMIN — HYDROMORPHONE HYDROCHLORIDE 0.5 MG: 1 INJECTION, SOLUTION INTRAMUSCULAR; INTRAVENOUS; SUBCUTANEOUS at 09:58

## 2019-07-06 RX ADMIN — HYDROMORPHONE HYDROCHLORIDE 0.5 MG: 1 INJECTION, SOLUTION INTRAMUSCULAR; INTRAVENOUS; SUBCUTANEOUS at 05:09

## 2019-07-06 RX ADMIN — POLYETHYLENE GLYCOL 3350 17 G: 17 POWDER, FOR SOLUTION ORAL at 09:44

## 2019-07-06 RX ADMIN — ACETAMINOPHEN 650 MG: 325 TABLET ORAL at 11:40

## 2019-07-06 RX ADMIN — SODIUM CHLORIDE 50 ML/HR: 900 INJECTION, SOLUTION INTRAVENOUS at 17:59

## 2019-07-06 RX ADMIN — POTASSIUM CHLORIDE 20 MEQ: 20 TABLET, EXTENDED RELEASE ORAL at 11:40

## 2019-07-06 RX ADMIN — POTASSIUM CHLORIDE 20 MEQ: 20 TABLET, EXTENDED RELEASE ORAL at 18:00

## 2019-07-06 RX ADMIN — CLOPIDOGREL BISULFATE 75 MG: 75 TABLET ORAL at 09:43

## 2019-07-06 RX ADMIN — ASPIRIN 81 MG 81 MG: 81 TABLET ORAL at 09:43

## 2019-07-06 RX ADMIN — PIPERACILLIN SODIUM,TAZOBACTAM SODIUM 3.38 G: 3; .375 INJECTION, POWDER, FOR SOLUTION INTRAVENOUS at 09:43

## 2019-07-06 NOTE — PROGRESS NOTES
General Surgery End of Shift Nursing Note    Bedside shift change report given to Aimee Dupont RN (oncoming nurse) by  El Berger nurse). Report included the following information SBAR, Kardex, Intake/Output, Accordion and Med Rec Status. Shift worked:   Night    Summary of shift:    Pt complained of pain and received pain medication, patient up to bathroom several times throughout the night    Issues for physician to address:        Number times ambulated in hallway past shift: 0    Number of times OOB to chair past shift: 1    Pain Management:  Current medication: dilaudid   Patient states pain is manageable on current pain medication: YES    GI:    Current diet:  DIET CLEAR LIQUID  DIET NUTRITIONAL SUPPLEMENTS No; Breakfast, Lunch, Dinner; Ensure Clear    Tolerating current diet: YES  Passing flatus: YES  Last Bowel Movement: yesterday     Respiratory:    Incentive Spirometer at bedside: YES  Patient instructed on use: YES    Patient Safety:    Falls Score: 2  Bed Alarm On? No  Sitter?  No    Frank Torres

## 2019-07-06 NOTE — PROGRESS NOTES
Admit Date: 2019    POD 5 Days Post-Op    Procedure:  Procedure(s) with comments:  LAPAROTOMY EXPLORATORY, REPAIR OF PERFORATED SIGMOID COLON. - PERFORATED SIGMOID COLON    Subjective:     Patient  5 days sp colectomy and anastamosis, and drain with mild non bilious serosanguinous drainage, labs noted K 3.4 ,  Some lower GI fnx  UO ok,  Poor PO intake thus far, some increased ambulation, and care management working towards SNF when ready next couple dasy when PO and GI fnx better. Switching to PO tylenol for pain. Reviewed with pt and ddtr       ROS:  Otherwise neg and min abdomen pain, but some abd distension     CBC ok   Review of Systems   Eyes: Negative. Respiratory: Negative. Genitourinary: Negative. Objective:     Blood pressure 160/74, pulse 98, temperature 97.8 °F (36.6 °C), resp. rate 18, height 5' 4\" (1.626 m), weight 73.8 kg (162 lb 11.2 oz), SpO2 98 %. Temp (24hrs), Av °F (36.7 °C), Min:97.7 °F (36.5 °C), Max:98.3 °F (36.8 °C)      Physical Exam:      Physical Exam   Nursing note and vitals reviewed. Constitutional: He is oriented to person, place, and time. He appears well-developed and well-nourished. No distress. Cardiovascular: Normal rate and regular rhythm. Pulmonary/Chest: Effort normal and breath sounds normal.   Abdominal:   Distended soft to firm, incision and pao, ok, no cellulitis, SAVANNA sero sanguinous,  Ok,  Rare BS,   No peritoneal signs, min tender,     Neurological: He is alert and oriented to person, place, and time. Skin: Skin is warm and dry. Psychiatric: He has a normal mood and affect.  His behavior is normal. Judgment and thought content normal.          Labs:   Recent Results (from the past 24 hour(s))   CBC WITH AUTOMATED DIFF    Collection Time: 19  3:31 AM   Result Value Ref Range    WBC 7.0 4.1 - 11.1 K/uL    RBC 3.86 (L) 4.10 - 5.70 M/uL    HGB 11.3 (L) 12.1 - 17.0 g/dL    HCT 33.6 (L) 36.6 - 50.3 %    MCV 87.0 80.0 - 99.0 FL    MCH 29.3 26.0 - 34.0 PG    MCHC 33.6 30.0 - 36.5 g/dL    RDW 14.3 11.5 - 14.5 %    PLATELET 716 958 - 460 K/uL    MPV 10.8 8.9 - 12.9 FL    NRBC 0.0 0  WBC    ABSOLUTE NRBC 0.00 0.00 - 0.01 K/uL    NEUTROPHILS 76 (H) 32 - 75 %    LYMPHOCYTES 13 12 - 49 %    MONOCYTES 10 5 - 13 %    EOSINOPHILS 1 0 - 7 %    BASOPHILS 0 0 - 1 %    IMMATURE GRANULOCYTES 0 0.0 - 0.5 %    ABS. NEUTROPHILS 5.3 1.8 - 8.0 K/UL    ABS. LYMPHOCYTES 0.9 0.8 - 3.5 K/UL    ABS. MONOCYTES 0.7 0.0 - 1.0 K/UL    ABS. EOSINOPHILS 0.1 0.0 - 0.4 K/UL    ABS. BASOPHILS 0.0 0.0 - 0.1 K/UL    ABS. IMM. GRANS. 0.0 0.00 - 0.04 K/UL    DF AUTOMATED     METABOLIC PANEL, BASIC    Collection Time: 07/06/19  3:31 AM   Result Value Ref Range    Sodium 143 136 - 145 mmol/L    Potassium 3.4 (L) 3.5 - 5.1 mmol/L    Chloride 115 (H) 97 - 108 mmol/L    CO2 25 21 - 32 mmol/L    Anion gap 3 (L) 5 - 15 mmol/L    Glucose 118 (H) 65 - 100 mg/dL    BUN 25 (H) 6 - 20 MG/DL    Creatinine 0.80 0.70 - 1.30 MG/DL    BUN/Creatinine ratio 31 (H) 12 - 20      GFR est AA >60 >60 ml/min/1.73m2    GFR est non-AA >60 >60 ml/min/1.73m2    Calcium 7.9 (L) 8.5 - 10.1 MG/DL       Data Review images and reports reviewed    Assessment:     Principal Problem:    Perforated viscus (7/1/2019)    Active Problems:    Sepsis ([address] (7/1/2019)      Ileus (HCC) (7/6/2019)        Plan/Recommendations/Medical Decision Making:     Continue present treatment  Patient  5 days sp colectomy and anastamosis, and drain with mild non bilious serosanguinous drainage, labs noted K 3.4 ,  Some lower GI fnx  UO ok,  Poor PO intake thus far, some increased ambulation, and care management working towards SNF when ready next couple dasy when PO and GI fnx better. Switching to PO tylenol for pain.      Reviewed with pt and anjana English MD , [facility]

## 2019-07-06 NOTE — PROGRESS NOTES
Problem: Falls - Risk of  Goal: Absence of falls  Outcome: Progressing Towards Goal     Problem: Patient Education: Go to Patient Education Activity  Goal: Patient/Family Education  Outcome: Progressing Towards Goal     Problem: Surgical Pathway Post-Op Day 1  Goal: Off Pathway (Use only if patient is Off Pathway)  Outcome: Progressing Towards Goal  Goal: Diagnostic Test/Procedures  Outcome: Progressing Towards Goal  Goal: Nutrition/Diet  Outcome: Progressing Towards Goal  Goal: *Tolerating diet  Outcome: Progressing Towards Goal     Problem: Falls - Risk of  Goal: *Absence of Falls  Description  Document Kasandra Pal Fall Risk and appropriate interventions in the flowsheet.   Outcome: Progressing Towards Goal     Problem: Patient Education: Go to Patient Education Activity  Goal: Patient/Family Education  Outcome: Progressing Towards Goal

## 2019-07-06 NOTE — PROGRESS NOTES
Progress Note    Patient: Bruce Huertas MRN: [id number]  SSN: [id number]    YOB: 1940  Age: 67 y.o. Sex: male      Admit Date: 7/1/2019    LOS: 4 days     Subjective:     Pt walked 5 times in the kirk   Was able to go to the bathroom w/ 1 assist with IV pole     There are new wound care orders for the midline incision     Objective:     Vitals:    07/05/19 1239 07/05/19 1331 07/05/19 1557 07/05/19 1901   BP: 142/69  151/79 150/70   Pulse: 62 (!) 56 63 67   Resp: 18  20 19   Temp: 98.1 °F (36.7 °C)  98 °F (36.7 °C) 98.3 °F (36.8 °C)   SpO2: 97% 95% 96% 98%   Weight:       Height:            Intake and Output:  Current Shift: 07/05 1901 - 07/06 0700  In: 2491.7 [I.V.:2491.7]  Out: -   Last three shifts: 07/04 0701 - 07/05 1900  In: 865 [P.O.:240; I.V.:625]  Out: 470 [Urine:300; Drains:170]    Physical Exam:   GENERAL: alert, cooperative, no distress, appears stated age    Lab/Data Review: All lab results for the last 24 hours reviewed. Assessment:     Principal Problem:    Perforated viscus (7/1/2019)    Active Problems:    Sepsis ([address] (7/1/2019)        Plan:     Pt family inquiring about rehab/SNF   Pt to continue to ambulate more   DC possible Sunday/monday       Signed By: Mateusz Perez RN     July 5, 2019      .

## 2019-07-06 NOTE — PROGRESS NOTES
.General Surgery End of Shift Nursing Note    Bedside shift change report given to Samantha Hampton RN (oncoming nurse) by Yoly Louie RN (offgoing nurse). Report included the following information SBAR, OR Summary, Procedure Summary, Intake/Output, MAR and Recent Results. Shift worked:   0623-0189   Summary of shift:    Extremely distended abdomen. Minimal PO intake. Only passed flatus 1X. Belching good. Daily wound care completed. Azalia Chatman Up to chair and ambulatory. Issues for physician to address:  CT of abdomen r/t distention - ileus? Number times ambulated in hallway past shift: 3  Number of times OOB to chair past shift: 80% of day    Pain Management:  Current medication: Dilaudid and Tylenol  Patient states pain is manageable on current pain medication:yes    GI:    Current diet:  DIET CLEAR LIQUID  DIET NUTRITIONAL SUPPLEMENTS No; Breakfast, Lunch, Dinner; [employer] current diet: NO - poor intake  Passing flatus: only 1 x  Last Bowel Movement: 07/4/19  Respiratory:    Incentive Spirometer at bedside: yes  Patient instructed on use: yes    Patient Safety:    Falls Score: 2  Bed Alarm On?no  Sitter?  no    Art Jacobs RN

## 2019-07-07 ENCOUNTER — APPOINTMENT (OUTPATIENT)
Dept: GENERAL RADIOLOGY | Age: 79
DRG: 854 | End: 2019-07-07
Attending: SURGERY
Payer: MEDICARE

## 2019-07-07 ENCOUNTER — APPOINTMENT (OUTPATIENT)
Dept: CT IMAGING | Age: 79
DRG: 854 | End: 2019-07-07
Attending: SURGERY
Payer: MEDICARE

## 2019-07-07 LAB
ANION GAP SERPL CALC-SCNC: 5 MMOL/L (ref 5–15)
BASOPHILS # BLD: 0 K/UL (ref 0–0.1)
BASOPHILS NFR BLD: 0 % (ref 0–1)
BUN SERPL-MCNC: 26 MG/DL (ref 6–20)
BUN/CREAT SERPL: 28 (ref 12–20)
CALCIUM SERPL-MCNC: 7.9 MG/DL (ref 8.5–10.1)
CHLORIDE SERPL-SCNC: 116 MMOL/L (ref 97–108)
CO2 SERPL-SCNC: 25 MMOL/L (ref 21–32)
CREAT SERPL-MCNC: 0.92 MG/DL (ref 0.7–1.3)
DIFFERENTIAL METHOD BLD: ABNORMAL
EOSINOPHIL # BLD: 0.1 K/UL (ref 0–0.4)
EOSINOPHIL NFR BLD: 2 % (ref 0–7)
ERYTHROCYTE [DISTWIDTH] IN BLOOD BY AUTOMATED COUNT: 14 % (ref 11.5–14.5)
GLUCOSE SERPL-MCNC: 122 MG/DL (ref 65–100)
HCT VFR BLD AUTO: 37 % (ref 36.6–50.3)
HGB BLD-MCNC: 12.4 G/DL (ref 12.1–17)
IMM GRANULOCYTES # BLD AUTO: 0.1 K/UL (ref 0–0.04)
IMM GRANULOCYTES NFR BLD AUTO: 1 % (ref 0–0.5)
LYMPHOCYTES # BLD: 1 K/UL (ref 0.8–3.5)
LYMPHOCYTES NFR BLD: 15 % (ref 12–49)
MCH RBC QN AUTO: 29.2 PG (ref 26–34)
MCHC RBC AUTO-ENTMCNC: 33.5 G/DL (ref 30–36.5)
MCV RBC AUTO: 87.3 FL (ref 80–99)
MONOCYTES # BLD: 0.7 K/UL (ref 0–1)
MONOCYTES NFR BLD: 10 % (ref 5–13)
NEUTS SEG # BLD: 4.9 K/UL (ref 1.8–8)
NEUTS SEG NFR BLD: 72 % (ref 32–75)
NRBC # BLD: 0 K/UL (ref 0–0.01)
NRBC BLD-RTO: 0 PER 100 WBC
PLATELET # BLD AUTO: 193 K/UL (ref 150–400)
PMV BLD AUTO: 10.6 FL (ref 8.9–12.9)
POTASSIUM SERPL-SCNC: 3.5 MMOL/L (ref 3.5–5.1)
RBC # BLD AUTO: 4.24 M/UL (ref 4.1–5.7)
SODIUM SERPL-SCNC: 146 MMOL/L (ref 136–145)
WBC # BLD AUTO: 6.7 K/UL (ref 4.1–11.1)

## 2019-07-07 PROCEDURE — 36415 COLL VENOUS BLD VENIPUNCTURE: CPT

## 2019-07-07 PROCEDURE — 74011250636 HC RX REV CODE- 250/636: Performed by: SURGERY

## 2019-07-07 PROCEDURE — 85025 COMPLETE CBC W/AUTO DIFF WBC: CPT

## 2019-07-07 PROCEDURE — 80048 BASIC METABOLIC PNL TOTAL CA: CPT

## 2019-07-07 PROCEDURE — 74011250637 HC RX REV CODE- 250/637: Performed by: SURGERY

## 2019-07-07 PROCEDURE — 77030013575 HC DRSG HYDROFERA HOLL -B

## 2019-07-07 PROCEDURE — 74018 RADEX ABDOMEN 1 VIEW: CPT

## 2019-07-07 PROCEDURE — 74011636320 HC RX REV CODE- 636/320: Performed by: SURGERY

## 2019-07-07 PROCEDURE — 74177 CT ABD & PELVIS W/CONTRAST: CPT

## 2019-07-07 PROCEDURE — 65270000029 HC RM PRIVATE

## 2019-07-07 PROCEDURE — 94760 N-INVAS EAR/PLS OXIMETRY 1: CPT

## 2019-07-07 PROCEDURE — 74011000258 HC RX REV CODE- 258: Performed by: SURGERY

## 2019-07-07 RX ORDER — SODIUM CHLORIDE 0.9 % (FLUSH) 0.9 %
10 SYRINGE (ML) INJECTION
Status: DISPENSED | OUTPATIENT
Start: 2019-07-07 | End: 2019-07-08

## 2019-07-07 RX ORDER — DEXTROSE, SODIUM CHLORIDE, AND POTASSIUM CHLORIDE 5; .45; .15 G/100ML; G/100ML; G/100ML
100 INJECTION INTRAVENOUS CONTINUOUS
Status: DISCONTINUED | OUTPATIENT
Start: 2019-07-07 | End: 2019-07-08

## 2019-07-07 RX ADMIN — CARVEDILOL 25 MG: 12.5 TABLET, FILM COATED ORAL at 10:30

## 2019-07-07 RX ADMIN — DEXTROSE MONOHYDRATE, SODIUM CHLORIDE, AND POTASSIUM CHLORIDE 100 ML/HR: 50; 4.5; 1.49 INJECTION, SOLUTION INTRAVENOUS at 15:07

## 2019-07-07 RX ADMIN — PIPERACILLIN SODIUM,TAZOBACTAM SODIUM 3.38 G: 3; .375 INJECTION, POWDER, FOR SOLUTION INTRAVENOUS at 01:10

## 2019-07-07 RX ADMIN — DOXAZOSIN 2 MG: 2 TABLET ORAL at 10:30

## 2019-07-07 RX ADMIN — POTASSIUM CHLORIDE 20 MEQ: 20 TABLET, EXTENDED RELEASE ORAL at 10:29

## 2019-07-07 RX ADMIN — IOPAMIDOL 100 ML: 755 INJECTION, SOLUTION INTRAVENOUS at 23:09

## 2019-07-07 RX ADMIN — PIPERACILLIN SODIUM,TAZOBACTAM SODIUM 3.38 G: 3; .375 INJECTION, POWDER, FOR SOLUTION INTRAVENOUS at 10:29

## 2019-07-07 RX ADMIN — CLOPIDOGREL BISULFATE 75 MG: 75 TABLET ORAL at 10:29

## 2019-07-07 RX ADMIN — CARVEDILOL 25 MG: 12.5 TABLET, FILM COATED ORAL at 17:26

## 2019-07-07 RX ADMIN — ASPIRIN 81 MG 81 MG: 81 TABLET ORAL at 10:29

## 2019-07-07 RX ADMIN — PIPERACILLIN SODIUM,TAZOBACTAM SODIUM 3.38 G: 3; .375 INJECTION, POWDER, FOR SOLUTION INTRAVENOUS at 17:26

## 2019-07-07 NOTE — PROGRESS NOTES
HPI:  POD 5 from sigmoid colectomy and anastamosis, and pt not taking PO,  Sig. abd distension , little LGI fnx,  UO Ok , Pt burping  Ileus v anastamotic leak or abscess    Review of Systems   Constitutional: Negative. HENT: Negative. Respiratory: Negative. Cardiovascular: Negative. Gastrointestinal: Positive for abdominal distention and nausea. No abdomen pain, of sig. And incision clean, and SAVANNA Serous, non bilious   Neurological: Negative. Temp:  [97.3 °F (36.3 °C)-98.5 °F (36.9 °C)]   Pulse (Heart Rate):  [55-98]   BP: (142-177)/(66-91)   Resp Rate:  [16-20]   O2 Sat (%):  [95 %-99 %]   Weight: --    Physical Exam   Constitutional: He is oriented to person, place, and time. He appears well-nourished. No distress. HENT:   Head: Normocephalic. Eyes: Conjunctivae are normal.   Cardiovascular: Normal rate. Pulmonary/Chest: Effort normal.   Abdominal:   Distended, absent BS< incision clean with pao,  SAVANNA Serous thin,  tympanic   Neurological: He is alert and oriented to person, place, and time. Skin: Skin is warm and dry. Psychiatric: He has a normal mood and affect. His behavior is normal. Judgment and thought content normal.   Nursing note and vitals reviewed.     Principal Problem:    Perforated viscus (7/1/2019)    Active Problems:    Sepsis ([address] (7/1/2019)      Ileus ([address] (7/6/2019)        ASSESSMENT/PLAN    POD 5 from sigmoid colectomy and anastamosis, and pt not taking PO,  Sig. abd distension , little LGI fnx,  UO Ok , Pt burping  Ileus v anastamotic leak or abscess  Lytes ok Potassium better     CT Abdd  CBC   NGT decompression  Pt concurs     END:

## 2019-07-07 NOTE — PROGRESS NOTES
General Surgery End of Shift Nursing Note    Bedside shift change report given to Maxime (oncoming nurse) by Wells Schwab (offgoing nurse). Report included the following information SBAR, Kardex, Intake/Output, MAR and Recent Results. Shift worked:   7a-7p   Summary of shift:    Pt ambulated in hallway and OOB and in chair during shift. Had 2 bowel movements. Wound care performed NG tube inserted and xray to confirm placement again before contrast for CT scan is given. Issues for physician to address:   None      Number times ambulated in hallway past shift: 2    Number of times OOB to chair past shift: 3    Pain Management:  Current medication:   Patient states pain is manageable on current pain medication:     GI:    Current diet:  DIET CLEAR LIQUID  DIET NUTRITIONAL SUPPLEMENTS No; Breakfast, Lunch, Dinner; Ensure [employer] current diet: NO, NPO due to NG tube  Passing flatus: NO  Last Bowel Movement: today   Appearance: brown and watery    Respiratory:    Incentive Spirometer at bedside: YES  Patient instructed on use: YES    Patient Safety:    Falls Score: 4  Bed Alarm On? No  Sitter?  No    Sondra Pouncy

## 2019-07-07 NOTE — ROUTINE PROCESS
General Surgery End of Shift Nursing Note Bedside shift change report given to [id number] Brooks Johnston (oncoming nurse) by  Collette Marcelo (offgoing nurse). Report included the following information SBAR, Kardex, Intake/Output, MAR, Accordion and Recent Results. Shift worked:  night Summary of shift:    Patient walked down the kirk at the beginning of the shift before going to bed. During the end of the shift he began complaining of feeling even more distended. Staples noted to be intact and dressing intact. Issues for physician to address:   Distended abdomen getting more uncomfortable. Number times ambulated in hallway past shift: 1 Number of times OOB to chair past shift: 2 Pain Management: 
Current medication: dilaudid tylenol Patient states pain is manageable on current pain medication: YES 
 
GI: 
 
Current diet:  DIET CLEAR LIQUID 
DIET NUTRITIONAL SUPPLEMENTS No; Breakfast, Lunch, Dinner; Ensure Clear Tolerating current diet: YES Passing flatus: YES Last Bowel Movement: yesterday Respiratory: 
 
Incentive Spirometer at bedside: YES Patient instructed on use: YES Patient Safety: 
 
Falls Score: 2 Bed Alarm On? No 
Sitter? No 
 
Reuben Leather

## 2019-07-07 NOTE — PROGRESS NOTES
Pt abdomen some softer, really NT,  NGT in , xray reviewed , advance NGT tip and awaiting CT,  SAVANNA Serous non feculent,  Reviewed with pt and family

## 2019-07-08 PROBLEM — K91.89 ILEUS, POSTOPERATIVE (HCC): Status: ACTIVE | Noted: 2019-07-08

## 2019-07-08 PROBLEM — K56.7 ILEUS, POSTOPERATIVE (HCC): Status: ACTIVE | Noted: 2019-07-08

## 2019-07-08 PROCEDURE — 97535 SELF CARE MNGMENT TRAINING: CPT | Performed by: OCCUPATIONAL THERAPIST

## 2019-07-08 PROCEDURE — 0D9670Z DRAINAGE OF STOMACH WITH DRAINAGE DEVICE, VIA NATURAL OR ARTIFICIAL OPENING: ICD-10-PCS | Performed by: SURGERY

## 2019-07-08 PROCEDURE — 97116 GAIT TRAINING THERAPY: CPT | Performed by: PHYSICAL THERAPIST

## 2019-07-08 PROCEDURE — 94760 N-INVAS EAR/PLS OXIMETRY 1: CPT

## 2019-07-08 PROCEDURE — 65270000029 HC RM PRIVATE

## 2019-07-08 PROCEDURE — 74011250637 HC RX REV CODE- 250/637: Performed by: SURGERY

## 2019-07-08 PROCEDURE — 76937 US GUIDE VASCULAR ACCESS: CPT

## 2019-07-08 PROCEDURE — 02HV33Z INSERTION OF INFUSION DEVICE INTO SUPERIOR VENA CAVA, PERCUTANEOUS APPROACH: ICD-10-PCS | Performed by: SURGERY

## 2019-07-08 PROCEDURE — 74011000250 HC RX REV CODE- 250: Performed by: SURGERY

## 2019-07-08 PROCEDURE — 77030018786 HC NDL GD F/USND BARD -B

## 2019-07-08 PROCEDURE — 36573 INSJ PICC RS&I 5 YR+: CPT | Performed by: SURGERY

## 2019-07-08 PROCEDURE — 3E0436Z INTRODUCTION OF NUTRITIONAL SUBSTANCE INTO CENTRAL VEIN, PERCUTANEOUS APPROACH: ICD-10-PCS | Performed by: SURGERY

## 2019-07-08 PROCEDURE — C1751 CATH, INF, PER/CENT/MIDLINE: HCPCS

## 2019-07-08 PROCEDURE — 77030013575 HC DRSG HYDROFERA HOLL -B

## 2019-07-08 PROCEDURE — 74011250636 HC RX REV CODE- 250/636: Performed by: SURGERY

## 2019-07-08 PROCEDURE — 74011000258 HC RX REV CODE- 258: Performed by: SURGERY

## 2019-07-08 PROCEDURE — 77010033678 HC OXYGEN DAILY

## 2019-07-08 RX ORDER — BACITRACIN 500 UNIT/G
1 PACKET (EA) TOPICAL AS NEEDED
Status: DISCONTINUED | OUTPATIENT
Start: 2019-07-08 | End: 2019-07-13 | Stop reason: HOSPADM

## 2019-07-08 RX ADMIN — CARVEDILOL 25 MG: 12.5 TABLET, FILM COATED ORAL at 09:18

## 2019-07-08 RX ADMIN — CLOPIDOGREL BISULFATE 75 MG: 75 TABLET ORAL at 09:17

## 2019-07-08 RX ADMIN — POLYETHYLENE GLYCOL 3350 17 G: 17 POWDER, FOR SOLUTION ORAL at 09:17

## 2019-07-08 RX ADMIN — HYDROMORPHONE HYDROCHLORIDE 0.5 MG: 1 INJECTION, SOLUTION INTRAMUSCULAR; INTRAVENOUS; SUBCUTANEOUS at 02:49

## 2019-07-08 RX ADMIN — I.V. FAT EMULSION 250 ML: 20 EMULSION INTRAVENOUS at 20:20

## 2019-07-08 RX ADMIN — HYDROMORPHONE HYDROCHLORIDE 0.5 MG: 1 INJECTION, SOLUTION INTRAMUSCULAR; INTRAVENOUS; SUBCUTANEOUS at 09:18

## 2019-07-08 RX ADMIN — ASPIRIN 81 MG 81 MG: 81 TABLET ORAL at 09:18

## 2019-07-08 RX ADMIN — PIPERACILLIN SODIUM,TAZOBACTAM SODIUM 3.38 G: 3; .375 INJECTION, POWDER, FOR SOLUTION INTRAVENOUS at 09:17

## 2019-07-08 RX ADMIN — DOXAZOSIN 2 MG: 2 TABLET ORAL at 09:18

## 2019-07-08 RX ADMIN — PIPERACILLIN SODIUM,TAZOBACTAM SODIUM 3.38 G: 3; .375 INJECTION, POWDER, FOR SOLUTION INTRAVENOUS at 02:46

## 2019-07-08 RX ADMIN — PIPERACILLIN SODIUM,TAZOBACTAM SODIUM 3.38 G: 3; .375 INJECTION, POWDER, FOR SOLUTION INTRAVENOUS at 18:32

## 2019-07-08 RX ADMIN — HYDROMORPHONE HYDROCHLORIDE 0.5 MG: 1 INJECTION, SOLUTION INTRAMUSCULAR; INTRAVENOUS; SUBCUTANEOUS at 15:20

## 2019-07-08 RX ADMIN — ACETAMINOPHEN 650 MG: 325 TABLET ORAL at 18:30

## 2019-07-08 RX ADMIN — DEXTROSE MONOHYDRATE, SODIUM CHLORIDE, AND POTASSIUM CHLORIDE 100 ML/HR: 50; 4.5; 1.49 INJECTION, SOLUTION INTRAVENOUS at 09:24

## 2019-07-08 RX ADMIN — CALCIUM GLUCONATE: 94 INJECTION, SOLUTION INTRAVENOUS at 18:37

## 2019-07-08 RX ADMIN — CARVEDILOL 25 MG: 12.5 TABLET, FILM COATED ORAL at 18:28

## 2019-07-08 NOTE — PROGRESS NOTES
Nutrition Assessment:    RECOMMENDATIONS:   TPN recommendations: If BG <200mg/dL and electrolytes WNL, advance to Goal Rate of D20, 5% AA @ 75mL/h + 250mL 20% lipids 3 x week (provides 1798kcals/90gPro)     ASSESSMENT:   Chart reviewed. Pt with increasing abdominal distension over the weekend so NGT was placed and he was made NPO. Imaging reveals postop ileus. TPN to start today after PICC placement. Provided TPN recommendations above. Pt did stool yesterday. Would monitor electrolytes. TPN goal rate above will meet 100% kcal and protein needs. Dietitians Intervention(s)/Plan(s): TPN recommendations  SUBJECTIVE/OBJECTIVE:     Diet Order: NPO, Other (comment)(TPN: D20, 5% AA @ 63mL/h + 250mL 20% lipids 3 x week (provides 1544kcals/76gPro) )  % Eaten:    Patient Vitals for the past 72 hrs:   % Diet Eaten   07/06/19 1801 10 %   07/06/19 0943 15 %   07/05/19 1643 90 %     to suction at   flush with       via NG Tube   Residuals: 700 mL    Pertinent Medications:zosyn, miralax. Chemistries:  Lab Results   Component Value Date/Time    Sodium 146 (H) 07/07/2019 03:40 AM    Potassium 3.5 07/07/2019 03:40 AM    Chloride 116 (H) 07/07/2019 03:40 AM    CO2 25 07/07/2019 03:40 AM    Anion gap 5 07/07/2019 03:40 AM    Glucose 122 (H) 07/07/2019 03:40 AM    BUN 26 (H) 07/07/2019 03:40 AM    Creatinine 0.92 07/07/2019 03:40 AM    BUN/Creatinine ratio 28 (H) 07/07/2019 03:40 AM    GFR est AA >60 07/07/2019 03:40 AM    GFR est non-AA >60 07/07/2019 03:40 AM    Calcium 7.9 (L) 07/07/2019 03:40 AM    Albumin 3.6 07/01/2019 02:09 PM      Anthropometrics: Height: 5' 4\" (162.6 cm) Weight: 73.8 kg (162 lb 11.2 oz)   []bed scale    []stated   []unknown     IBW (%IBW):   ( ) UBW (%UBW):   (  %)    BMI: Body mass index is 27.93 kg/m².     This BMI is indicative of:  []Underweight   [x]Normal(for age)   []Overweight   [] Obesity   [] Extreme Obesity (BMI>40)  Estimated Nutrition Needs (Based on): 1780 Kcals/day(MSJ 1369 x 1.3) , 74 g(-89 (1-1.2gPro/kg) ) Protein  Carbohydrate: At Least 130 g/day  Fluids: 1800 mL/day    Last BM: 7/7   [x]Active     []Hyperactive  []Hypoactive       [] Absent   BS  Skin:    [] Intact   [x] Incision  [] Breakdown   [] DTI   [] Tears/Excoriation/Abrasion  []Edema [] Other: Wt Readings from Last 30 Encounters:   07/01/19 73.8 kg (162 lb 11.2 oz)   04/29/19 79.3 kg (174 lb 13.2 oz)   01/23/19 79.4 kg (175 lb 0.7 oz)   12/07/18 79.4 kg (175 lb)   10/05/18 78.7 kg (173 lb 9.6 oz)   09/12/18 79 kg (174 lb 1.6 oz)   08/29/18 81.5 kg (179 lb 10.8 oz)   08/08/18 81.6 kg (179 lb 14.3 oz)   07/27/18 82.8 kg (182 lb 9.6 oz)   06/01/18 82.2 kg (181 lb 2 oz)   11/22/17 82 kg (180 lb 12.4 oz)   12/13/16 82.7 kg (182 lb 5 oz)   10/26/16 82.1 kg (181 lb)   09/09/16 82.2 kg (181 lb 3.2 oz)   06/14/12 85 kg (187 lb 6.4 oz)   05/24/12 84.4 kg (186 lb)   04/10/12 82.3 kg (181 lb 6.4 oz)   03/22/12 83.7 kg (184 lb 9.6 oz)   08/23/11 81.6 kg (180 lb)   04/28/11 81.6 kg (180 lb)   04/20/11 82.1 kg (181 lb)   04/12/11 81.6 kg (180 lb)   02/17/11 82 kg (180 lb 12.4 oz)   12/22/10 79.8 kg (176 lb)      NUTRITION DIAGNOSES:   Problem:  Altered GI function      Etiology: related to recent GI surgery     Signs/Symptoms: as evidenced by pt on clear liquids. Previous dx re: altered GI function continues. NUTRITION INTERVENTIONS:   Enteral/Parenteral Nutrition: Modify rate, concentration, composition, and schedule               GOAL:   Pt will tolerate TPN with BG <200mg/dL and electrolytes WNL in 2-4 days.      NUTRITION MONITORING AND EVALUATION   Previous Goal: Pt will advance to solid diet and consume >70% of meals/supplements in 2-4 days   Previous Goal Met: No   Previous Recommendations Implemented: Yes   Cultural, Catholic, or Ethnic Dietary Needs: None   LEARNING NEEDS (Diet, Food/Nutrient-Drug Interaction):    [x] None Identified   [] Identified and Education Provided/Documented   [] Identified and Pt declined/was not appropriate      [x] Interdisciplinary Care Plan Reviewed/Documented    [x] Participated in Discharge Planning: Unable to determine    [] Interdisciplinary Rounds     NUTRITION RISK:    [x] High              [] Moderate           []  Low  []  Minimal/Uncompromised      Derrick Hernandez RD, [address] [facility]  Pager [phone]  Weekend Pager [phone]

## 2019-07-08 NOTE — PROGRESS NOTES
Problem: Self Care Deficits Care Plan (Adult)  Goal: *Acute Goals and Plan of Care (Insert Text)  Description  Occupational Therapy Goals:  Initiated 7/5/2019  1. Patient will perform grooming standing with independence within 7 days. 2. Patient will perform toileting with independence within 7 days. 3. Patient will perform upper body dressing and lower body dressing with independence within 7 days. 4. Patient will perform one light IADL task in standing with independence within 7 days. 5. Patient will transfer from toilet with independence within 7 days. Outcome: Progressing Towards Goal   OCCUPATIONAL THERAPY TREATMENT  Patient: Mohan Chapin (20 y.o. male)  Date: 7/8/2019  Diagnosis: Perforated viscus [R19.8] Perforated viscus  Procedure(s) (LRB):  LAPAROTOMY EXPLORATORY, REPAIR OF PERFORATED SIGMOID COLON. (N/A) 7 Days Post-Op  Precautions: Fall  Chart, occupational therapy assessment, plan of care, and goals were reviewed. ASSESSMENT:  Pt was seen post PT tx session and was at rest seated in chair. O2 sats were 99% on room air, pt was SOB, which dissipated with rest.  Pt c/o his BLEs being swollen, which is not usual for him. Encouraged elevation and ankle pumps while seated in chair. Pt ambulated into the bathroom for standing grooming with good tolerance; and completed LE bathing and donning socks with BLEs elevated in chair. Progressing  Toward goals, his decreased endurance/medical status is limiting his independence. Progression toward goals:  ?       Improving appropriately and progressing toward goals  x? Improving slowly and progressing toward goals  ? Not making progress toward goals and plan of care will be adjusted     PLAN:  Patient continues to benefit from skilled intervention to address the above impairments. Continue treatment per established plan of care.   Discharge Recommendations:  Home Health/TBD  Further Equipment Recommendations for Discharge:  TBD, may benefit from a reacher     SUBJECTIVE:   Patient stated they've never been like this.    (edematous BLEs)  OBJECTIVE DATA SUMMARY:   Cognitive/Behavioral Status:  Neurologic State: Alert  Orientation Level: Oriented to person;Oriented to place  Cognition: Follows commands  Perception: Appears intact  Perseveration: No perseveration noted  Safety/Judgement: Awareness of environment    Functional Mobility and Transfers for ADLs:  Bed Mobility:     Pt seated in chair upon arrival  Transfers:  Sit to Stand: Stand-by assistance  Functional Transfers  Bathroom Mobility: Supervision/set up       Balance:  Sitting: Intact  Standing: Intact  Standing - Static: Good    ADL Intervention:  Feeding  Feeding Assistance: (pt is NPO)    Grooming  Washing Face: Set-up; Supervision;Stand-by assistance  Washing Hands: Supervision  Brushing Teeth: Set-up; Supervision         Lower Body Bathing  Lower Body : (bathed knees to feet using wipes and crossed leg technique)  Position Performed: Seated in chair(BLES elevated-crossed leg technique)  Cues: Physical assistance;Verbal cues provided         Lower Body Dressing Assistance  Socks: Moderate assistance  Leg Crossed Method Used: Yes(legs elevated in recliner chair)  Position Performed: Seated in chair(legs elevated)  Cues: Don;Physical assistance;Verbal cues provided         Cognitive Retraining  Safety/Judgement: Awareness of environment             Therapeutic Exercises:   Encouraged continued participation in adls  Pain:  Pain Scale 1: Numeric (0 - 10)  Pain Intensity 1: 0  Pain Location 1: Abdomen  Pain Orientation 1: Medial  Pain Description 1: Aching;Constant  Pain Intervention(s) 1: Medication (see MAR); Repositioned  Activity Tolerance:   Good. O2 sats stable on room air  After treatment:   x? Patient left in no apparent distress sitting up in chair  ? Patient left in no apparent distress in bed  x? Call bell left within reach  x? Nursing notified  ? Caregiver present  ?  Bed alarm activated    COMMUNICATION/COLLABORATION:   The patients plan of care was discussed with: Physical Therapist and Registered Nurse    Sejal Lua OTR/L  Time Calculation: 23 mins

## 2019-07-08 NOTE — PROGRESS NOTES
Problem: Mobility Impaired (Adult and Pediatric)  Goal: *Acute Goals and Plan of Care (Insert Text)  Description  Physical Therapy Goals  Initiated 7/5/2019  1. Patient will move from supine to sit and sit to supine , scoot up and down and roll side to side in bed with modified independence within 7 day(s). 2.  Patient will transfer from bed to chair and chair to bed with modified independence using the least restrictive device within 7 day(s). 3.  Patient will perform sit to stand with modified independence within 7 day(s). 4.  Patient will ambulate with modified independence for 300 feet with the least restrictive device within 7 day(s). 5.  Patient will ascend/descend 3 stairs with 0 handrail(s) with modified independence within 7 day(s). Outcome: Progressing Towards Goal   PHYSICAL THERAPY TREATMENT  Patient: Corin Spencer (24 y.o. male)  Date: 7/8/2019  Diagnosis: Perforated viscus [R19.8] Perforated viscus  Procedure(s) (LRB):  LAPAROTOMY EXPLORATORY, REPAIR OF PERFORATED SIGMOID COLON. (N/A) 7 Days Post-Op  Precautions: Fall  Chart, physical therapy assessment, plan of care and goals were reviewed. ASSESSMENT:  Patient making steady progress toward goals. Patient now with NG tube and able to clamp for mobility. Currently needing supervision for transfers. Amb approx 100 ft x 2 with CGA. Has slow ernst and decreased step clearance but no overt LOB. Patient with mild wheezing during activity but SpO2 98% on room air with ambulation. Patient is hopeful to DC to SNF rehab as he lives alone and is unable to perform all his daily activities at independent level at this time secondary to decreased activity tolerance, weakness and impaired balance. Progression toward goals:  ?    Improving appropriately and progressing toward goals  ? Improving slowly and progressing toward goals  ?     Not making progress toward goals and plan of care will be adjusted     PLAN:  Patient continues to benefit from skilled intervention to address the above impairments. Continue treatment per established plan of care. Discharge Recommendations:  [facility]  Further Equipment Recommendations for Discharge:  TBD      SUBJECTIVE:   Patient stated ? I think I need to get myself one of those portable O2 tanks for home. ?    OBJECTIVE DATA SUMMARY:   Critical Behavior:  Neurologic State: Alert  Orientation Level: Oriented X4  Cognition: Appropriate decision making, Appropriate for age attention/concentration, Appropriate safety awareness, Follows commands  Safety/Judgement: Awareness of environment, Fall prevention, Home safety  Functional Mobility Training:  Bed Mobility:      Not tested, up in chair at PT arrival              Transfers:  Sit to Stand: Stand-by assistance  Stand to Sit: Stand-by assistance                             Balance:  Sitting: Intact  Standing: Intact  Ambulation/Gait Training:  Distance (ft): 100 Feet (ft)(x 2)     Ambulation - Level of Assistance: Contact guard assistance        Gait Abnormalities: Decreased step clearance              Speed/Jessica: Pace decreased (<100 feet/min); Slow  Step Length: Left shortened;Right shortened       Pain:  Pain Scale 1: Numeric (0 - 10)  Pain Intensity 1: 3  Pain Location 1: Abdomen  Pain Orientation 1: Medial  Pain Description 1: Aching;Constant  Pain Intervention(s) 1: Medication (see MAR); Repositioned  Activity Tolerance:   VSS  Please refer to the flowsheet for vital signs taken during this treatment. After treatment:   ?    Patient left in no apparent distress sitting up in chair  ? Patient left in no apparent distress in bed  ? Call bell left within reach  ? Nursing notified  ? Caregiver present  ?     Bed alarm activated    COMMUNICATION/COLLABORATION:   The patient?s plan of care was discussed with: Physical Therapist, Occupational Therapist and Registered Nurse    Gabriel Caldwell PT, DPT   Time Calculation: 15 mins

## 2019-07-08 NOTE — PROGRESS NOTES
PICC (Peripherally Inserted Central Catheter) line insertion  procedure note :     Procedure explained to patient along with risks and benefits  and patient agreed to proceed. Informed consent obtained from  patient. Patient teaching completed. Timeout completed. Pre-procedure assessment done. Maximum sterile barrier precautions observed throughout procedure. Lidocaine 1%  3.0    ml sq given prior to cannulation. Cannulated basilic  vein using ultrasound guidance and modified seldinger technique. Inserted 5  [place] double  lumen PICC to right arm using SiteMinder Tip Location System and  [address]. Pt has    sinus   rhythm. PICC tip location was confirmed by 3 CG tip positioning system, indicating tall P wave and no negative deflection before P wave which would indicate that the PICC tip is properly placed in the distal SVC or at the [place]. PICC tip location was  confirmed by 2 PICC nurses and 3CG printout placed on patient's chart. Blood return verified and flushed with 20 ml normal saline in each port. Sterile dressing applied with biopatch, statLock and occlusive dressing as per protocol. Curos caps applied to each port. Patient tolerated procedure well with minimal blood loss ( less than 5 ml.)   Patient education material provided. PICC procedure performed by  :  Scooby Vazquez RN. PICC nurse  Assisted by : Ange Zimmer RN  PICC nurse  Reason for access : reliable access / MD order /   TPN infusion   Complications related to insertion  : none  X-Ray : not applicable  Notified primary nurse  Denys RAZA  that  PICC line can be used. Total Trimmed Length :  39   cm   External Length :   0  cm      PICC line site arm circumference:  29    cm   PICC catheter occupies  19   % of vein  Type of PICC: Bard Solo Power PICC   Ref # :     [id number]    Lot # :  [id number]   Expiration Date :    2020-07-31     Scooby Vazquez RN. BSN. CRNI,CMSRN. Clinician IV .  PICC Nurse, Vascular Access Team.

## 2019-07-08 NOTE — PROGRESS NOTES
HPI:  Pt feeling better, some flatus, mod NGT output and less abdomen pain,  CT abd ileus , no leak or abscess,  Pt feeling better. Review of Systems   Constitutional: Positive for fatigue. Gastrointestinal: Positive for abdominal distention. Negative for abdominal pain, nausea and vomiting. Psychiatric/Behavioral: Negative. All other systems reviewed and are negative. Temp:  [97.3 °F (36.3 °C)-98.3 °F (36.8 °C)]   Pulse (Heart Rate):  [55-98]   BP: (138-160)/(69-91)   Resp Rate:  [16-20]   O2 Sat (%):  [95 %-99 %]   Weight: --    Physical Exam   Constitutional: He appears well-developed and well-nourished. No distress. Abdominal: Soft. Less distension,  NT, incision and pao and SAVANNA all ok, serous,   UO ok,      Psychiatric: He has a normal mood and affect. His behavior is normal. Judgment and thought content normal.   Nursing note and vitals reviewed.     Principal Problem:    Perforated viscus (7/1/2019)    Active Problems:    Sepsis ([address] (7/1/2019)      Ileus ([address] (7/6/2019)      Ileus, postoperative ([address] (7/8/2019)    NGT until GI Fnx better,  PO ice chips, OOB , monitor Lytes,  And cont IVG support   ASSESSMENT/PLAN    NGT until GI Fnx better,  PO ice chips, OOB , monitor Lytes,  And cont IVG support    TPN since not improving fast    END:

## 2019-07-09 LAB
ANION GAP SERPL CALC-SCNC: 4 MMOL/L (ref 5–15)
BUN SERPL-MCNC: 19 MG/DL (ref 6–20)
BUN/CREAT SERPL: 23 (ref 12–20)
CALCIUM SERPL-MCNC: 7.2 MG/DL (ref 8.5–10.1)
CHLORIDE SERPL-SCNC: 111 MMOL/L (ref 97–108)
CO2 SERPL-SCNC: 28 MMOL/L (ref 21–32)
CREAT SERPL-MCNC: 0.83 MG/DL (ref 0.7–1.3)
GLUCOSE SERPL-MCNC: 152 MG/DL (ref 65–100)
POTASSIUM SERPL-SCNC: 3.1 MMOL/L (ref 3.5–5.1)
SODIUM SERPL-SCNC: 143 MMOL/L (ref 136–145)

## 2019-07-09 PROCEDURE — 74011250637 HC RX REV CODE- 250/637: Performed by: SURGERY

## 2019-07-09 PROCEDURE — 97530 THERAPEUTIC ACTIVITIES: CPT

## 2019-07-09 PROCEDURE — 74011250636 HC RX REV CODE- 250/636: Performed by: SURGERY

## 2019-07-09 PROCEDURE — 94760 N-INVAS EAR/PLS OXIMETRY 1: CPT

## 2019-07-09 PROCEDURE — 80048 BASIC METABOLIC PNL TOTAL CA: CPT

## 2019-07-09 PROCEDURE — 65270000029 HC RM PRIVATE

## 2019-07-09 PROCEDURE — 97116 GAIT TRAINING THERAPY: CPT

## 2019-07-09 PROCEDURE — 36415 COLL VENOUS BLD VENIPUNCTURE: CPT

## 2019-07-09 PROCEDURE — 74011000258 HC RX REV CODE- 258: Performed by: SURGERY

## 2019-07-09 PROCEDURE — 74011000250 HC RX REV CODE- 250: Performed by: SURGERY

## 2019-07-09 RX ORDER — POTASSIUM CHLORIDE 20 MEQ/1
20 TABLET, EXTENDED RELEASE ORAL 2 TIMES DAILY
Status: COMPLETED | OUTPATIENT
Start: 2019-07-09 | End: 2019-07-11

## 2019-07-09 RX ADMIN — CARVEDILOL 25 MG: 12.5 TABLET, FILM COATED ORAL at 17:43

## 2019-07-09 RX ADMIN — PIPERACILLIN SODIUM,TAZOBACTAM SODIUM 3.38 G: 3; .375 INJECTION, POWDER, FOR SOLUTION INTRAVENOUS at 09:57

## 2019-07-09 RX ADMIN — POTASSIUM CHLORIDE 20 MEQ: 20 TABLET, EXTENDED RELEASE ORAL at 11:58

## 2019-07-09 RX ADMIN — CALCIUM GLUCONATE: 94 INJECTION, SOLUTION INTRAVENOUS at 18:04

## 2019-07-09 RX ADMIN — ONDANSETRON 4 MG: 2 INJECTION INTRAMUSCULAR; INTRAVENOUS at 12:33

## 2019-07-09 RX ADMIN — HYDROMORPHONE HYDROCHLORIDE 0.5 MG: 1 INJECTION, SOLUTION INTRAMUSCULAR; INTRAVENOUS; SUBCUTANEOUS at 23:32

## 2019-07-09 RX ADMIN — PIPERACILLIN SODIUM,TAZOBACTAM SODIUM 3.38 G: 3; .375 INJECTION, POWDER, FOR SOLUTION INTRAVENOUS at 00:32

## 2019-07-09 RX ADMIN — CALCIUM GLUCONATE: 94 INJECTION, SOLUTION INTRAVENOUS at 18:01

## 2019-07-09 RX ADMIN — PIPERACILLIN SODIUM,TAZOBACTAM SODIUM 3.38 G: 3; .375 INJECTION, POWDER, FOR SOLUTION INTRAVENOUS at 17:42

## 2019-07-09 RX ADMIN — HYDROMORPHONE HYDROCHLORIDE 0.5 MG: 1 INJECTION, SOLUTION INTRAMUSCULAR; INTRAVENOUS; SUBCUTANEOUS at 00:31

## 2019-07-09 RX ADMIN — CLOPIDOGREL BISULFATE 75 MG: 75 TABLET ORAL at 09:57

## 2019-07-09 RX ADMIN — CARVEDILOL 25 MG: 12.5 TABLET, FILM COATED ORAL at 09:57

## 2019-07-09 RX ADMIN — DOXAZOSIN 2 MG: 2 TABLET ORAL at 09:57

## 2019-07-09 RX ADMIN — HYDROMORPHONE HYDROCHLORIDE 0.5 MG: 1 INJECTION, SOLUTION INTRAMUSCULAR; INTRAVENOUS; SUBCUTANEOUS at 19:54

## 2019-07-09 RX ADMIN — POLYETHYLENE GLYCOL 3350 17 G: 17 POWDER, FOR SOLUTION ORAL at 09:56

## 2019-07-09 RX ADMIN — ASPIRIN 81 MG 81 MG: 81 TABLET ORAL at 09:57

## 2019-07-09 RX ADMIN — POTASSIUM CHLORIDE 20 MEQ: 20 TABLET, EXTENDED RELEASE ORAL at 17:43

## 2019-07-09 NOTE — PROGRESS NOTES
HPI:  Pt less abdomen pain, mod NGT output, increasing LGI fnx Flatus and bm and softer abd. Less pain,  K 3.1 ,     Pt anmbulatory   Review of Systems   Gastrointestinal:        Improved as per HPI ,    All other systems reviewed and are negative. Temp:  [97.2 °F (36.2 °C)-98.3 °F (36.8 °C)]   Pulse (Heart Rate):  [55-68]   BP: (138-166)/(58-86)   Resp Rate:  [16-18]   O2 Sat (%):  [94 %-99 %]   Weight: --    Physical Exam   Constitutional: He appears well-developed. No distress. Cardiovascular: Normal rate and regular rhythm. Pulmonary/Chest: Effort normal and breath sounds normal.   Abdominal: Soft. Bowel sounds are normal.   Less distension,  Softer, + BS, incision and drain ok , serous    Nursing note and vitals reviewed.     Principal Problem:    Perforated viscus (7/1/2019)    Active Problems:    Sepsis ([address] (7/1/2019)      Ileus ([address] (7/6/2019)      Ileus, postoperative ([address] (7/8/2019)      ASSESSMENT/PLAN    Improving, Cont TPN< replenish Kcl    Clamp trial NGT and remove tomorrow if better       END:

## 2019-07-09 NOTE — PROGRESS NOTES
Problem: Mobility Impaired (Adult and Pediatric)  Goal: *Acute Goals and Plan of Care (Insert Text)  Description  Physical Therapy Goals  Initiated 7/5/2019  1. Patient will move from supine to sit and sit to supine , scoot up and down and roll side to side in bed with modified independence within 7 day(s). 2.  Patient will transfer from bed to chair and chair to bed with modified independence using the least restrictive device within 7 day(s). 3.  Patient will perform sit to stand with modified independence within 7 day(s). 4.  Patient will ambulate with modified independence for 300 feet with the least restrictive device within 7 day(s). 5.  Patient will ascend/descend 3 stairs with 0 handrail(s) with modified independence within 7 day(s). Outcome: Progressing Towards Goal   PHYSICAL THERAPY TREATMENT  Patient: Anuradha Soto (57 y.o. male)  Date: 7/9/2019  Diagnosis: Perforated viscus [R19.8] Perforated viscus  Procedure(s) (LRB):  LAPAROTOMY EXPLORATORY, REPAIR OF PERFORATED SIGMOID COLON. (N/A) 8 Days Post-Op  Precautions: Fall  Chart, physical therapy assessment, plan of care and goals were reviewed. ASSESSMENT:  Pt cleared by nurse to mobilize. Pt received in bed supine. Pt agreeable to therapy. Pt performed supine to sit at mod I. Pt performed sit to stand transfer at supervision. Pt ambulated 200ft with no device at CGA. Pt with slight trunk flexion but wit no LOB. Pt moving well. Pts o2 stats after ambulation at 97% although with dyspnea. Pt educated on increasing ambulation throughout the day with nursing. Pt agreeable . Pt left up in chair with all needs met. Pt will benefit from HHPT with increased supervision on rehab to improve strength and endurance for safe mobility. Progression toward goals:  ?    Improving appropriately and progressing toward goals  ? Improving slowly and progressing toward goals  ?     Not making progress toward goals and plan of care will be adjusted PLAN:  Patient continues to benefit from skilled intervention to address the above impairments. Continue treatment per established plan of care. Discharge Recommendations: Home Health with increased supervision or rehab   Further Equipment Recommendations for Discharge:  TBD      SUBJECTIVE:   Patient stated ? I feel okay. ?    OBJECTIVE DATA SUMMARY:   Critical Behavior:  Neurologic State: Alert  Orientation Level: Oriented X4  Cognition: Follows commands  Safety/Judgement: Awareness of environment  Functional Mobility Training:  Bed Mobility:     Supine to Sit: Modified independent              Transfers:  Sit to Stand: Stand-by assistance  Stand to Sit: Stand-by assistance                             Balance:  Sitting: Intact  Standing: Intact  Standing - Static: Good  Standing - Dynamic : Good  Ambulation/Gait Training:  Distance (ft): 200 Feet (ft)     Ambulation - Level of Assistance: Contact guard assistance        Gait Abnormalities: Decreased step clearance        Base of Support: Widened     Speed/Jessica: Pace decreased (<100 feet/min)  Step Length: Right shortened;Left shortened     Pain:  Pain Scale 1: Numeric (0 - 10)  Pain Intensity 1: 6  Pain Location 1: Abdomen  Pain Orientation 1: Mid  Pain Description 1: Constant  Pain Intervention(s) 1: Medication (see MAR)  Activity Tolerance:   Pt tolerance well with no LOB and o2 stats stable. After treatment:   ?    Patient left in no apparent distress sitting up in chair  ? Patient left in no apparent distress in bed  ? Call bell left within reach  ? Nursing notified  ? Caregiver present  ?     Bed alarm activated    COMMUNICATION/COLLABORATION:   The patient?s plan of care was discussed with: Registered Nurse    Bella Jacobson PTA   Time Calculation: 24 mins

## 2019-07-09 NOTE — PROGRESS NOTES
General Surgery End of Shift Nursing Note    Bedside shift change report given to [facility], RN (oncoming nurse) by Efren Chang RN (offgoing nurse). Report included the following information SBAR, Kardex, Procedure Summary, Intake/Output, MAR and Recent Results. Shift worked:   7p-7a   Summary of shift:    Patient able to get some rest overnight with one complaints of pain, treated appropriately. NG tube draining as well as SAVANNA. Issues for physician to address:   None at this time. Number times ambulated in hallway past shift: 0    Number of times OOB to chair past shift: 0    Pain Management:  Current medication: See MAR  Patient states pain is manageable on current pain medication: YES    GI:    Current diet:  DIET NPO Except Meds  TPN ADULT - CENTRAL    Tolerating current diet: YES    Respiratory:    Incentive Spirometer at bedside: YES  Patient instructed on use: YES    Patient Safety:    Bed Alarm On? No  Sitter?  No    Gage Otero

## 2019-07-09 NOTE — PROGRESS NOTES
Primary nurse clamp at 12:00p. Checked residual at 1750. 550ml residual. PT c/o abd cramping with no emesis. Placing patient back on low continuous suctioning.

## 2019-07-09 NOTE — PROGRESS NOTES
Order states to hang TPN at 1100. Yet, this hospital hangs at 1800. Dr. Darnell Ricci made aware per primary nurse. Cleared prior overdue infusions to screen/mar.

## 2019-07-09 NOTE — PROGRESS NOTES
.General Surgery End of Shift Nursing Note    Bedside shift change report given to RY Pimentel(oncoming nurse) by Rickey moseley RN (offgoing nurse). Report included the following information SBAR, OR Summary, Procedure Summary, Intake/Output, MAR and Recent Results. Shift worked:   [id number]   Summary of shift:   Started TPN with new R upper arm PICC. NG tube continues to have good output. Monitor SAVANNA drain high output. Daily wound care completed. Full bath, linens and shave per writer. Issues for physician to address:   none     Number times ambulated in hallway past shift: 2  Number of times OOB to chair past shift: 6    Pain Management:  Current medication: dilaudid and tylenol  Patient states pain is manageable on current pain medication: yes    GI:    Current diet:  DIET NPO Except Meds  TPN ADULT - CENTRAL    Tolerating current diet: yes  Passing flatus:no  Last Bowel Movement: 7/6/19  Respiratory:    Incentive Spirometer at bedside: yes  Patient instructed on use: no    Patient Safety:    Falls Score: 3  Bed Alarm On?no  Sitter? no    Kwadwo Mcqueen RN

## 2019-07-10 LAB
ANION GAP SERPL CALC-SCNC: 4 MMOL/L (ref 5–15)
BUN SERPL-MCNC: 18 MG/DL (ref 6–20)
BUN/CREAT SERPL: 20 (ref 12–20)
CALCIUM SERPL-MCNC: 7.5 MG/DL (ref 8.5–10.1)
CHLORIDE SERPL-SCNC: 110 MMOL/L (ref 97–108)
CO2 SERPL-SCNC: 29 MMOL/L (ref 21–32)
COMMENT, HOLDF: NORMAL
CREAT SERPL-MCNC: 0.91 MG/DL (ref 0.7–1.3)
GLUCOSE SERPL-MCNC: 140 MG/DL (ref 65–100)
MAGNESIUM SERPL-MCNC: 2.1 MG/DL (ref 1.6–2.4)
PHOSPHATE SERPL-MCNC: 3.3 MG/DL (ref 2.6–4.7)
POTASSIUM SERPL-SCNC: 3.3 MMOL/L (ref 3.5–5.1)
SAMPLES BEING HELD,HOLD: NORMAL
SODIUM SERPL-SCNC: 143 MMOL/L (ref 136–145)

## 2019-07-10 PROCEDURE — 74011250636 HC RX REV CODE- 250/636: Performed by: SURGERY

## 2019-07-10 PROCEDURE — 36415 COLL VENOUS BLD VENIPUNCTURE: CPT

## 2019-07-10 PROCEDURE — 74011250637 HC RX REV CODE- 250/637: Performed by: SURGERY

## 2019-07-10 PROCEDURE — 84100 ASSAY OF PHOSPHORUS: CPT

## 2019-07-10 PROCEDURE — 74011000250 HC RX REV CODE- 250: Performed by: SURGERY

## 2019-07-10 PROCEDURE — 74011000258 HC RX REV CODE- 258: Performed by: SURGERY

## 2019-07-10 PROCEDURE — 65270000029 HC RM PRIVATE

## 2019-07-10 PROCEDURE — 83735 ASSAY OF MAGNESIUM: CPT

## 2019-07-10 PROCEDURE — 97116 GAIT TRAINING THERAPY: CPT

## 2019-07-10 PROCEDURE — 80048 BASIC METABOLIC PNL TOTAL CA: CPT

## 2019-07-10 PROCEDURE — 94760 N-INVAS EAR/PLS OXIMETRY 1: CPT

## 2019-07-10 PROCEDURE — 97535 SELF CARE MNGMENT TRAINING: CPT | Performed by: OCCUPATIONAL THERAPIST

## 2019-07-10 RX ADMIN — ACETAMINOPHEN 650 MG: 325 TABLET ORAL at 18:16

## 2019-07-10 RX ADMIN — POTASSIUM CHLORIDE 20 MEQ: 20 TABLET, EXTENDED RELEASE ORAL at 09:31

## 2019-07-10 RX ADMIN — PIPERACILLIN SODIUM,TAZOBACTAM SODIUM 3.38 G: 3; .375 INJECTION, POWDER, FOR SOLUTION INTRAVENOUS at 00:30

## 2019-07-10 RX ADMIN — ASPIRIN 81 MG 81 MG: 81 TABLET ORAL at 09:31

## 2019-07-10 RX ADMIN — PIPERACILLIN SODIUM,TAZOBACTAM SODIUM 3.38 G: 3; .375 INJECTION, POWDER, FOR SOLUTION INTRAVENOUS at 18:15

## 2019-07-10 RX ADMIN — I.V. FAT EMULSION 250 ML: 20 EMULSION INTRAVENOUS at 21:56

## 2019-07-10 RX ADMIN — CALCIUM GLUCONATE: 94 INJECTION, SOLUTION INTRAVENOUS at 18:28

## 2019-07-10 RX ADMIN — POTASSIUM CHLORIDE 20 MEQ: 20 TABLET, EXTENDED RELEASE ORAL at 18:16

## 2019-07-10 RX ADMIN — DOXAZOSIN 2 MG: 2 TABLET ORAL at 09:31

## 2019-07-10 RX ADMIN — CARVEDILOL 25 MG: 12.5 TABLET, FILM COATED ORAL at 18:15

## 2019-07-10 RX ADMIN — PIPERACILLIN SODIUM,TAZOBACTAM SODIUM 3.38 G: 3; .375 INJECTION, POWDER, FOR SOLUTION INTRAVENOUS at 09:32

## 2019-07-10 RX ADMIN — CARVEDILOL 25 MG: 12.5 TABLET, FILM COATED ORAL at 09:31

## 2019-07-10 RX ADMIN — CLOPIDOGREL BISULFATE 75 MG: 75 TABLET ORAL at 09:31

## 2019-07-10 NOTE — PROGRESS NOTES
.General Surgery End of Shift Nursing Note    Bedside shift change report given to Amy Fernández RN (oncoming nurse). Report included the following information SBAR, OR Summary, Procedure Summary, Intake/Output, MAR and Recent Results. Shift worked:   7a to 7p   Summary of shift:     . Clamped NG at 1130 and   Restarted NG suction. At 1600   300 gastric output in 15 mins    D/Wero SAVANNA drain at 1900   Issues for physician to address:  none     Number times ambulated in hallway past shift: 1  Number of times OOB to chair past shift: 4    Pain Management:  Current medication: tylenol and dilaudid  Patient states pain is manageable on current pain medication: yes    GI:    Current diet:  DIET NPO Except Meds  TPN ADULT - CENTRAL  TPN ADULT - CENTRAL    Tolerating current diet: npo  Passing flatus: yes  Last Bowel Movement: 0   Respiratory:    Incentive Spirometer at bedside: yes  Patient instructed on use: no    Patient Safety:    Falls Score: 3  Bed Alarm On? no  Sitter? no    Alicia Ceron RN

## 2019-07-10 NOTE — PROGRESS NOTES
HPI:  Pt feeling better, passing flatus and some BM,  NGT output slowly decreasing, vol could be distorted by pt PO Ice intake. Less abd pain, pt ambulatory,  K 3.3 and improving with adjust TPN and PO KCL   Review of Systems   Gastrointestinal: Negative. All other systems reviewed and are negative. Temp:  [97.2 °F (36.2 °C)-99.3 °F (37.4 °C)]   Pulse (Heart Rate):  [59-70]   BP: (130-166)/(58-77)   Resp Rate:  [16-19]   O2 Sat (%):  [94 %-99 %]   Weight: --    Physical Exam   Constitutional: He appears well-developed. No distress. Cardiovascular: Normal rate and regular rhythm. Pulmonary/Chest: Effort normal and breath sounds normal.   Abdominal: Soft. He exhibits no distension. There is no tenderness. Incision and SAVANNA clean , no complications, softer, and flatter    Nursing note and vitals reviewed. Principal Problem:    Perforated viscus (7/1/2019)    Active Problems:    Sepsis ([address] (7/1/2019)      Ileus ([address] (7/6/2019)      Ileus, postoperative ([address] (7/8/2019)        ASSESSMENT/PLAN  Pt ileus improving,  On TPN,  Will DC SAVANNA Drain and incision pao,  antibx set to stop,  NGT clamping see if can remove.        END:

## 2019-07-10 NOTE — PROGRESS NOTES
Problem: Self Care Deficits Care Plan (Adult)  Goal: *Acute Goals and Plan of Care (Insert Text)  Description  Occupational Therapy Goals:  Initiated 7/5/2019  1. Patient will perform grooming standing with independence within 7 days. 2. Patient will perform toileting with independence within 7 days. 3. Patient will perform upper body dressing and lower body dressing with independence within 7 days. 4. Patient will perform one light IADL task in standing with independence within 7 days. 5. Patient will transfer from toilet with independence within 7 days. Outcome: Progressing Towards Goal   OCCUPATIONAL THERAPY TREATMENT  Patient: Corin Spencer (78 y.o. male)  Date: 7/10/2019  Diagnosis: Perforated viscus [R19.8] Perforated viscus  Procedure(s) (LRB):  LAPAROTOMY EXPLORATORY, REPAIR OF PERFORATED SIGMOID COLON. (N/A) 9 Days Post-Op  Precautions: Fall  Chart, occupational therapy assessment, plan of care, and goals were reviewed. ASSESSMENT:  Pt was agreeable to treatment requesting to use the bathroom. Pt on NG tube and nursing clamped it to allow mobility. Pt able to ambulte to the bathroom with stand by assistance and perform toilet transfer using grab bar. Pt requested assistance for pulling up depends, but able to perform with encouragement cues. Pt needed verbal cues to wash hands requiring only stand by assistance. Pt was able to reach into closet and drawers without LOB. Pt's BLEs appear less edematous today, pt reports that he wishes he could eat as he states that he is getting very weak. Pt will need rehab at discharge, likely only short term rehab. Progression toward goals:  ?       Improving appropriately and progressing toward goals  ? Improving slowly and progressing toward goals  ? Not making progress toward goals and plan of care will be adjusted     PLAN:  Patient continues to benefit from skilled intervention to address the above impairments.   Continue treatment per established plan of care. Discharge Recommendations:  Rehab--short term  Further Equipment Recommendations for Discharge:  tbd      SUBJECTIVE:   Patient stated ? I've gotten so weak. ?    OBJECTIVE DATA SUMMARY:   Cognitive/Behavioral Status:   Pt performed standing hand washing and used his shaving cream without recognizing it. He was cued to use the liquid soap dispenser. Pt literally followed therapist's command re: getting into the closet (for simulated adls) and tried to fit himself in the closet. Question if this behavior is decreased attention or auditory processing/dementia?         Perception: Appears intact  Perseveration: No perseveration noted  Safety/Judgement: Fall prevention;Home safety    Functional Mobility and Transfers for ADLs:  Bed Mobility:  Rolling: (pt seated upon arrival.)    Transfers:  Sit to Stand: Supervision  Functional Transfers  Bathroom Mobility: Supervision/set up;Stand-by assistance  Toilet Transfer : Stand-by assistance       Balance:  Sitting: Intact  Standing: Intact  Standing - Static: Good  Standing - Dynamic : Good    ADL Intervention:  Feeding  Feeding Assistance: (Pt is NPO,  NG tube)    Grooming  Washing Hands: Supervision(cues to wash hands post toilet-used shaving cream unknowingl)  Cues: Verbal cues provided                        Toileting  Toileting Assistance: Stand-by assistance  Clothing Management: Supervision(requested assistance to pull up depends.)  Cues: Physical assistance for pants up;Verbal cues provided  Adaptive Equipment: Grab bars    Cognitive Retraining  Problem Solving: (was able to untangle IV line that was draped on a draawer)  Safety/Judgement: Fall prevention;Home safety  Cues: Verbal cues provided;Visual cues provided(pt washed hands w shaving cream and did not realize, )          Therapeutic Exercises:   Encouraged OOB activities with nursing's supervision  Pain:  Pain Scale 1: Numeric (0 - 10)  Pain Intensity 1: no complaint of pain during tx session. Activity Tolerance:   Mildly SOB with adls this date. Decreased with rest.    After treatment:   ? Patient left in no apparent distress sitting up in chair  ? Patient left in no apparent distress in bed  ? Call bell left within reach  ? Nursing notified  ? Caregiver present  ?  Bed alarm activated    COMMUNICATION/COLLABORATION:   The patient?s plan of care was discussed with: Physical Therapy Assistant and Registered Nurse    Sejal Lua OTR/L  Time Calculation: 18 mins

## 2019-07-10 NOTE — PROGRESS NOTES
Problem: Mobility Impaired (Adult and Pediatric)  Goal: *Acute Goals and Plan of Care (Insert Text)  Description  Physical Therapy Goals  Initiated 7/5/2019  1. Patient will move from supine to sit and sit to supine , scoot up and down and roll side to side in bed with modified independence within 7 day(s). 2.  Patient will transfer from bed to chair and chair to bed with modified independence using the least restrictive device within 7 day(s). 3.  Patient will perform sit to stand with modified independence within 7 day(s). 4.  Patient will ambulate with modified independence for 300 feet with the least restrictive device within 7 day(s). 5.  Patient will ascend/descend 3 stairs with 0 handrail(s) with modified independence within 7 day(s). Outcome: Progressing Towards Goal   PHYSICAL THERAPY TREATMENT  Patient: Kelsy Cramer (24 y.o. male)  Date: 7/10/2019  Diagnosis: Perforated viscus [R19.8] Perforated viscus  Procedure(s) (LRB):  LAPAROTOMY EXPLORATORY, REPAIR OF PERFORATED SIGMOID COLON. (N/A) 9 Days Post-Op  Precautions: Fall  Chart, physical therapy assessment, plan of care and goals were reviewed. ASSESSMENT:  Pt cleared by nurse to mobilize. Pt received up in recliner with feet elevated. Pt  agreeable to therapy. Pt performed sit to stand transfer at supervision. Pt ambulated 200ft with no device at CGa. Pt with forward head flexion and slight trunk flexion . Pt cued to stand upright but unable to upright due to posture. Pt reported feeling a little worse today. Pt with improve endurance with no SOB after ambulation. Pt still moving well. Pt educated on walking in halls with nursing to improved activity tolerance. Pt agreeable . Pt will benefit from HHPT with increased family support or rehab to improve strength and endurance for safe mobility. Progression toward goals:  ?    Improving appropriately and progressing toward goals  ? Improving slowly and progressing toward goals  ?     Not making progress toward goals and plan of care will be adjusted     PLAN:  Patient continues to benefit from skilled intervention to address the above impairments. Continue treatment per established plan of care. Discharge Recommendations:  Home Health with increased support or Rehab  Further Equipment Recommendations for Discharge:  none      SUBJECTIVE:   Patient stated ? I don't fell that great today. ?    OBJECTIVE DATA SUMMARY:   Critical Behavior:  Neurologic State: Alert  Orientation Level: Oriented to person, Oriented to place, Oriented to situation, Disoriented to time  Cognition: Follows commands, Appropriate for age attention/concentration, Appropriate safety awareness  Safety/Judgement: Awareness of environment  Functional Mobility Training:  Bed Mobility:                    Transfers:  Sit to Stand: Supervision  Stand to Sit: Supervision                             Balance:  Sitting: Intact  Standing: Intact  Standing - Static: Good  Standing - Dynamic : Good  Ambulation/Gait Training:  Distance (ft): 200 Feet (ft)     Ambulation - Level of Assistance: Contact guard assistance        Gait Abnormalities: Decreased step clearance(forward head posture)        Base of Support: Widened     Speed/Jessica: Pace decreased (<100 feet/min)  Step Length: Right shortened;Left shortened        Pain:  Pain Scale 1: Numeric (0 - 10)  Pain Intensity 1: 3              Activity Tolerance:   Pt moving well although not feeling well today. After treatment:   ?    Patient left in no apparent distress sitting up in chair  ? Patient left in no apparent distress in bed  ? Call bell left within reach  ? Nursing notified  ? Caregiver present  ?     Bed alarm activated    COMMUNICATION/COLLABORATION:   The patient?s plan of care was discussed with: Registered Nurse    Eileen Spence PTA   Time Calculation: 16 mins

## 2019-07-10 NOTE — PROGRESS NOTES
General Surgery End of Shift Nursing Note    Bedside shift change report given to Vance Corley RN (oncoming nurse) by Brittany Garcia RN (offgoing nurse). Report included the following information SBAR, Kardex, Procedure Summary, Intake/Output, MAR and Recent Results. Shift worked:   7p-7a   Summary of shift:    Patient needed pain medication 2x overnight. NG and SAVANNA draining. 1 BM overnight. Issues for physician to address:   None at this time. Number times ambulated in hallway past shift: 0    Number of times OOB to chair past shift: 0    Pain Management:  Current medication: Dilaudid  Patient states pain is manageable on current pain medication: YES    GI:    Current diet:  DIET NPO Except Meds  TPN ADULT - CENTRAL    Tolerating current diet: YES  Passing flatus: YES  Last Bowel Movement: today   Appearance: Loose      Respiratory:    Incentive Spirometer at bedside: YES  Patient instructed on use: YES    Patient Safety:    Bed Alarm On? No  Sitter?  No    Grey Shereen

## 2019-07-10 NOTE — PROGRESS NOTES
.General Surgery End of Shift Nursing Note    Bedside shift change report given to Macho Dick (oncoming nurse) by Hammad Bowen RN (offgoing nurse). Report included the following information SBAR, OR Summary, Procedure Summary, Intake/Output, MAR and Recent Results. Shift worked:   0250-0125   Summary of shift:   Continue TPN and NG suction. Clamped NG at 1200 and residual at [id number] with abdominal pain with no emesis. Restarted NG suction. Wound care completed. Issues for physician to address:  none     Number times ambulated in hallway past shift: 1  Number of times OOB to chair past shift: 4    Pain Management:  Current medication: tylenol and dilaudid  Patient states pain is manageable on current pain medication: yes    GI:    Current diet:  DIET NPO Except Meds  TPN ADULT - CENTRAL    Tolerating current diet: npo  Passing flatus: yes  Last Bowel Movement: 0   Respiratory:    Incentive Spirometer at bedside: yes  Patient instructed on use: no    Patient Safety:    Falls Score: 3  Bed Alarm On? no  Sitter? no    Kayden Sanders RN

## 2019-07-11 LAB
ANION GAP SERPL CALC-SCNC: 6 MMOL/L (ref 5–15)
BUN SERPL-MCNC: 18 MG/DL (ref 6–20)
BUN/CREAT SERPL: 19 (ref 12–20)
CALCIUM SERPL-MCNC: 8 MG/DL (ref 8.5–10.1)
CHLORIDE SERPL-SCNC: 111 MMOL/L (ref 97–108)
CO2 SERPL-SCNC: 29 MMOL/L (ref 21–32)
CREAT SERPL-MCNC: 0.94 MG/DL (ref 0.7–1.3)
GLUCOSE SERPL-MCNC: 129 MG/DL (ref 65–100)
POTASSIUM SERPL-SCNC: 3.5 MMOL/L (ref 3.5–5.1)
SODIUM SERPL-SCNC: 146 MMOL/L (ref 136–145)

## 2019-07-11 PROCEDURE — 74011000258 HC RX REV CODE- 258: Performed by: SURGERY

## 2019-07-11 PROCEDURE — 36415 COLL VENOUS BLD VENIPUNCTURE: CPT

## 2019-07-11 PROCEDURE — 74011250636 HC RX REV CODE- 250/636: Performed by: SURGERY

## 2019-07-11 PROCEDURE — 80048 BASIC METABOLIC PNL TOTAL CA: CPT

## 2019-07-11 PROCEDURE — 74011250637 HC RX REV CODE- 250/637: Performed by: SURGERY

## 2019-07-11 PROCEDURE — 65270000029 HC RM PRIVATE

## 2019-07-11 PROCEDURE — 94760 N-INVAS EAR/PLS OXIMETRY 1: CPT

## 2019-07-11 PROCEDURE — 74011000250 HC RX REV CODE- 250: Performed by: SURGERY

## 2019-07-11 RX ORDER — METOCLOPRAMIDE HYDROCHLORIDE 5 MG/ML
5 INJECTION INTRAMUSCULAR; INTRAVENOUS EVERY 12 HOURS
Status: DISCONTINUED | OUTPATIENT
Start: 2019-07-11 | End: 2019-07-13 | Stop reason: HOSPADM

## 2019-07-11 RX ORDER — POLYETHYLENE GLYCOL 3350 17 G/17G
17 POWDER, FOR SOLUTION ORAL DAILY
Status: DISCONTINUED | OUTPATIENT
Start: 2019-07-11 | End: 2019-07-13 | Stop reason: HOSPADM

## 2019-07-11 RX ADMIN — POTASSIUM CHLORIDE 20 MEQ: 20 TABLET, EXTENDED RELEASE ORAL at 17:17

## 2019-07-11 RX ADMIN — CALCIUM GLUCONATE: 94 INJECTION, SOLUTION INTRAVENOUS at 17:28

## 2019-07-11 RX ADMIN — CARVEDILOL 25 MG: 12.5 TABLET, FILM COATED ORAL at 09:53

## 2019-07-11 RX ADMIN — POTASSIUM CHLORIDE 20 MEQ: 20 TABLET, EXTENDED RELEASE ORAL at 09:52

## 2019-07-11 RX ADMIN — DOXAZOSIN 2 MG: 2 TABLET ORAL at 09:53

## 2019-07-11 RX ADMIN — CLOPIDOGREL BISULFATE 75 MG: 75 TABLET ORAL at 09:53

## 2019-07-11 RX ADMIN — METOCLOPRAMIDE 5 MG: 5 INJECTION, SOLUTION INTRAMUSCULAR; INTRAVENOUS at 09:53

## 2019-07-11 RX ADMIN — METOCLOPRAMIDE 5 MG: 5 INJECTION, SOLUTION INTRAMUSCULAR; INTRAVENOUS at 23:35

## 2019-07-11 RX ADMIN — POLYETHYLENE GLYCOL 3350 17 G: 17 POWDER, FOR SOLUTION ORAL at 09:53

## 2019-07-11 RX ADMIN — ASPIRIN 81 MG 81 MG: 81 TABLET ORAL at 09:53

## 2019-07-11 RX ADMIN — CARVEDILOL 25 MG: 12.5 TABLET, FILM COATED ORAL at 17:17

## 2019-07-11 NOTE — PROGRESS NOTES
Admit Date: 2019    POD 10 Days Post-Op    Procedure:  Procedure(s) with comments:  LAPAROTOMY EXPLORATORY, REPAIR OF PERFORATED SIGMOID COLON. - PERFORATED SIGMOID COLON    Subjective:     Patient has no new c/o.  + BM this am.  Ambulating. Still with high NG o/p. Denies drinking. Objective:     Blood pressure 155/63, pulse 67, temperature 98.1 °F (36.7 °C), resp. rate 18, height 5' 2.99\" (1.6 m), weight 168 lb 6.9 oz (76.4 kg), SpO2 93 %. Temp (24hrs), Av.7 °F (37.1 °C), Min:98.1 °F (36.7 °C), Max:100.1 °F (37.8 °C)      Physical Exam:  GENERAL: alert, cooperative, no distress, appears stated age, LUNG: clear to auscultation bilaterally, HEART: regular rate and rhythm, ABDOMEN: soft, wound c/d/i, EXTREMITIES:  extremities normal, atraumatic, no cyanosis or edema    Labs:   Recent Results (from the past 24 hour(s))   METABOLIC PANEL, BASIC    Collection Time: 19  3:03 AM   Result Value Ref Range    Sodium 146 (H) 136 - 145 mmol/L    Potassium 3.5 3.5 - 5.1 mmol/L    Chloride 111 (H) 97 - 108 mmol/L    CO2 29 21 - 32 mmol/L    Anion gap 6 5 - 15 mmol/L    Glucose 129 (H) 65 - 100 mg/dL    BUN 18 6 - 20 MG/DL    Creatinine 0.94 0.70 - 1.30 MG/DL    BUN/Creatinine ratio 19 12 - 20      GFR est AA >60 >60 ml/min/1.73m2    GFR est non-AA >60 >60 ml/min/1.73m2    Calcium 8.0 (L) 8.5 - 10.1 MG/DL       Data Review images and reports reviewed    Assessment:     Principal Problem:    Perforated viscus (2019)    Active Problems:    Sepsis ([address] (2019)      Ileus ([address] (2019)      Ileus, postoperative ([address] (2019)        Plan/Recommendations/Medical Decision Making:     Continue present treatment   Trial of clamping, reglan and miralax. Hopefully can get NGT out soon and start po diet. Renew tpn today. Arelis Nagy.  Neil Rodriguez MD, [facility]

## 2019-07-11 NOTE — PROGRESS NOTES
Physical Therapy  Attempting to see patient for PT this pm. Patient completing ambulation in halls with nursing staff upon arrival to floor. Gait noted to be slightly unsteady without use of any AD with increased trunk sway, but no overt LOB noted. Continue to encourage ambulation in halls with nursing staff. Will defer therapy at this time and follow back tomorrow.   Lasha Ann, PT

## 2019-07-12 PROCEDURE — 97116 GAIT TRAINING THERAPY: CPT

## 2019-07-12 PROCEDURE — 74011250636 HC RX REV CODE- 250/636: Performed by: SURGERY

## 2019-07-12 PROCEDURE — 65270000029 HC RM PRIVATE

## 2019-07-12 PROCEDURE — 94760 N-INVAS EAR/PLS OXIMETRY 1: CPT

## 2019-07-12 PROCEDURE — 74011000250 HC RX REV CODE- 250: Performed by: SURGERY

## 2019-07-12 PROCEDURE — 74011250637 HC RX REV CODE- 250/637: Performed by: SURGERY

## 2019-07-12 RX ADMIN — I.V. FAT EMULSION 250 ML: 20 EMULSION INTRAVENOUS at 20:17

## 2019-07-12 RX ADMIN — ACETAMINOPHEN 650 MG: 325 TABLET ORAL at 20:09

## 2019-07-12 RX ADMIN — CLOPIDOGREL BISULFATE 75 MG: 75 TABLET ORAL at 11:38

## 2019-07-12 RX ADMIN — CARVEDILOL 25 MG: 12.5 TABLET, FILM COATED ORAL at 19:06

## 2019-07-12 RX ADMIN — CARVEDILOL 25 MG: 12.5 TABLET, FILM COATED ORAL at 11:39

## 2019-07-12 RX ADMIN — ASPIRIN 81 MG 81 MG: 81 TABLET ORAL at 11:38

## 2019-07-12 RX ADMIN — METOCLOPRAMIDE 5 MG: 5 INJECTION, SOLUTION INTRAMUSCULAR; INTRAVENOUS at 11:39

## 2019-07-12 RX ADMIN — METOCLOPRAMIDE 5 MG: 5 INJECTION, SOLUTION INTRAMUSCULAR; INTRAVENOUS at 20:11

## 2019-07-12 RX ADMIN — HYDROMORPHONE HYDROCHLORIDE 0.5 MG: 1 INJECTION, SOLUTION INTRAMUSCULAR; INTRAVENOUS; SUBCUTANEOUS at 11:38

## 2019-07-12 RX ADMIN — DOXAZOSIN 2 MG: 2 TABLET ORAL at 11:38

## 2019-07-12 NOTE — PROGRESS NOTES
Problem: Mobility Impaired (Adult and Pediatric)  Goal: *Acute Goals and Plan of Care (Insert Text)  Description  Physical Therapy Goals  Initiated 7/5/2019, re-assessed 7/12/19 goals appropriate and ongoing  1. Patient will move from supine to sit and sit to supine , scoot up and down and roll side to side in bed with modified independence within 7 day(s). 2.  Patient will transfer from bed to chair and chair to bed with modified independence using the least restrictive device within 7 day(s). 3.  Patient will perform sit to stand with modified independence within 7 day(s). 4.  Patient will ambulate with modified independence for 300 feet with the least restrictive device within 7 day(s). 5.  Patient will ascend/descend 3 stairs with 0 handrail(s) with modified independence within 7 day(s). Outcome: Progressing Towards Goal     PHYSICAL THERAPY TREATMENT: WEEKLY REASSESSMENT  Patient: Roderick Dhaliwal (3 y.o. male)  Date: 7/12/2019  Diagnosis: Perforated viscus [R19.8] Perforated viscus  Procedure(s) (LRB):  LAPAROTOMY EXPLORATORY, REPAIR OF PERFORATED SIGMOID COLON. (N/A) 11 Days Post-Op  Precautions: Fall  Chart, physical therapy assessment, plan of care and goals were reviewed. ASSESSMENT:  Patient ambulating hallways at supervision level without device. Gait speed decreased with increased trunk sway which is below his baseline independence. Performed and encouraged exercises when not working with PT. Patient continues to progress well towards goals but will benefit from on-going skilled PT while admitted. Patient's progression toward goals since last assessment: goals reassessed and ongoing     PLAN:  Goals have been updated based on progression since last assessment. Patient continues to benefit from skilled intervention to address the above impairments. Continue to follow the patient 3 times a week to address goals.   Planned Interventions:  ?              Bed Mobility Training ?       Neuromuscular Re-Education  ? Transfer Training                   ? Orthotic/Prosthetic Training  ? Gait Training                         ? Modalities  ? Therapeutic Exercises           ? Edema Management/Control  ? Therapeutic Activities            ? Patient and Family Training/Education  ? Other (comment):  Discharge Recommendations: Home Health vs None  Further Equipment Recommendations for Discharge: none      SUBJECTIVE:   Patient stated ? My daughter walks with me in the evenings. ?    OBJECTIVE DATA SUMMARY:   Critical Behavior:  Neurologic State: Drowsy  Orientation Level: Oriented X4, Oriented to time  Cognition: Follows commands  Safety/Judgement: Fall prevention, Home safety    Strength:   Strength: Generally decreased, functional                      Functional Mobility Training:  Bed Mobility:     Supine to Sit: (OOB)              Transfers:  Sit to Stand: Supervision  Stand to Sit: Supervision                             Balance:  Sitting: Intact  Standing: Intact  Standing - Static: Good  Standing - Dynamic : Good  Ambulation/Gait Training:  Distance (ft): 200 Feet (ft)     Ambulation - Level of Assistance: Supervision        Gait Abnormalities: Decreased step clearance;Trunk sway increased        Base of Support: Widened     Speed/Jessica: Pace decreased (<100 feet/min)             Therapeutic Exercises:   5X sit<>stand, using UE push off. Pain:  Pain Scale 1: Numeric (0 - 10)  Pain Intensity 1: 8     Pain Orientation 1: Anterior;Medial  Pain Description 1: Cramping  Pain Intervention(s) 1: Medication (see MAR)  Activity Tolerance:   good  Please refer to the flowsheet for vital signs taken during this treatment. After treatment:   ?  Patient left in no apparent distress sitting up in chair  ? Patient left in no apparent distress in bed  ? Call bell left within reach  ? Nursing notified  ?   Caregiver present  ?   Bed alarm activated    COMMUNICATION/COLLABORATION:   The patient?s plan of care was discussed with: Registered Nurse    Milan Cedeno, PT   Time Calculation: 15 mins

## 2019-07-12 NOTE — PROGRESS NOTES
Admit Date: 2019    POD 11 Days Post-Op    Procedure:  Procedure(s) with comments:  LAPAROTOMY EXPLORATORY, REPAIR OF PERFORATED SIGMOID COLON. - PERFORATED SIGMOID COLON    Subjective:     Patient has no new c/o.  tatianna NGT removal and clear liquids overnight. Objective:     Blood pressure 136/65, pulse 63, temperature 98.8 °F (37.1 °C), resp. rate 19, height 5' 2.99\" (1.6 m), weight 168 lb 6.9 oz (76.4 kg), SpO2 96 %. Temp (24hrs), Av.2 °F (36.8 °C), Min:97.5 °F (36.4 °C), Max:98.8 °F (37.1 °C)      Physical Exam:  GENERAL: alert, cooperative, no distress, appears stated age, LUNG: clear to auscultation bilaterally, HEART: regular rate and rhythm, ABDOMEN: soft, wound c/d/i, EXTREMITIES:  extremities normal, atraumatic, no cyanosis or edema    Labs:   No results found for this or any previous visit (from the past 24 hour(s)). Data Review images and reports reviewed    Assessment:     Principal Problem:    Perforated viscus (2019)    Active Problems:    Sepsis ([address] (2019)      Ileus ([address] (2019)      Ileus, postoperative ([address] (2019)        Plan/Recommendations/Medical Decision Making:     Continue present treatment   Advance diet  Wean and d/c tpn  Increase activity  Likely home next day or 2    Anthony Allen MD, [facility]

## 2019-07-12 NOTE — PROGRESS NOTES
Spiritual Care Assessment/Progress Note  [facility]      NAME: Lilliam Power      MRN: [id number]  AGE: 67 y.o.  SEX: male  Protestant Affiliation: No preference   Language: English     7/12/2019     Total Time (in minutes): 14     Spiritual Assessment begun in [facility] GENERAL SURGERY through conversation with:         [x]Patient        [] Family    [] Friend(s)        Reason for Consult: Initial/Spiritual assessment, patient floor, Initial visit     Spiritual beliefs: (Please include comment if needed)     [] Identifies with a charly tradition:         [] Supported by a charly community:            [] Claims no spiritual orientation:           [] Seeking spiritual identity:                [x] Adheres to an individual form of spirituality:           [] Not able to assess:                           Identified resources for coping:      [] Prayer                               [] Music                  [] Guided Imagery     [] Family/friends                 [] Pet visits     [] Devotional reading                         [x] Unknown     [] Other:                                              Interventions offered during this visit: (See comments for more details)    Patient Interventions: Affirmation of emotions/emotional suffering, Initial/Spiritual assessment, patient floor, Initial visit, Normalization of emotional/spiritual concerns, Catharsis/review of pertinent events in supportive environment           Plan of Care:     [x] Support spiritual and/or cultural needs    [] Support AMD and/or advance care planning process      [] Support grieving process   [] Coordinate Rites and/or Rituals    [] Coordination with community clergy   [] No spiritual needs identified at this time   [] Detailed Plan of Care below (See Comments)  [] Make referral to Music Therapy  [] Make referral to Pet Therapy     [] Make referral to Addiction services  [] Make referral to [facility]  [] Make referral to Spiritual Care Partner  [] No future visits requested        [x] Follow up visits as needed     Comments:  made initial visit to patients room for spiritual assessment. Patient was sitting in chair and watching TV. Patient told  about his medical issues and that the surgery he had saved his life from what the doctors told him. He was grateful to still be alive. Patient has family support to help him through the recovery. He is hoping for discharge soon.  provided pastoral care and support to the patient. Spiritual Care will follow up as needed.       Leonard Mckeon MDiv  Pager: [phone]

## 2019-07-12 NOTE — PROGRESS NOTES
General Surgery End of Shift Nursing Note    Bedside shift change report given to [facility] RN (oncoming nurse) by Gricel Hankins (offgoing nurse). Report included the following information SBAR, Kardex, Intake/Output, MAR, Accordion and Recent Results. Shift worked:   Night    Summary of shift:    Rested throughout the night, Bowel movements noted, dressing changed. Issues for physician to address:   Home wound care      Number times ambulated in hallway past shift: 0    Number of times OOB to chair past shift: 0    Pain Management:  Current medication: see eMAR  Patient states pain is manageable on current pain medication: YES    GI:    Current diet:  DIET NPO Except Meds  TPN ADULT - CENTRAL    Tolerating current diet: YES  Passing flatus: Yes   Last Bowel Movement: today   Appearance: soft/ liquid    Respiratory:    Incentive Spirometer at bedside: YES  Patient instructed on use: YES    Patient Safety:    Falls Score: 2  Bed Alarm On? No  Sitter?  NO    Ian Espinosa

## 2019-07-12 NOTE — PROGRESS NOTES
Nutrition Assessment:    RECOMMENDATIONS:   Continue diet per surgeon  Agree with weaning TPN    ASSESSMENT:   Chart reviewed. Pt advanced to solid diet this morning. NGT pulled yesterday and TPN is being weaned today. BM noted today. Noted plans to discharge in the next 1-2 days. Will monitor PO intake/tolerance. Labs WNL yesterday. Dietitians Intervention(s)/Plan(s): Continue diet, monitor appetite/PO intake, wean TPN  SUBJECTIVE/OBJECTIVE:     Diet Order: Other (comment)(GI Lite diet, TPN being weaned)  % Eaten:    Patient Vitals for the past 72 hrs:   % Diet Eaten   07/10/19 1541 10 %       Pertinent Medications:reglan, miralax. Chemistries:  Lab Results   Component Value Date/Time    Sodium 146 (H) 07/11/2019 03:03 AM    Potassium 3.5 07/11/2019 03:03 AM    Chloride 111 (H) 07/11/2019 03:03 AM    CO2 29 07/11/2019 03:03 AM    Anion gap 6 07/11/2019 03:03 AM    Glucose 129 (H) 07/11/2019 03:03 AM    BUN 18 07/11/2019 03:03 AM    Creatinine 0.94 07/11/2019 03:03 AM    BUN/Creatinine ratio 19 07/11/2019 03:03 AM    GFR est AA >60 07/11/2019 03:03 AM    GFR est non-AA >60 07/11/2019 03:03 AM    Calcium 8.0 (L) 07/11/2019 03:03 AM    Albumin 3.6 07/01/2019 02:09 PM      Anthropometrics: Height: 5' 2.99\" (160 cm) Weight: 76.4 kg (168 lb 6.9 oz)   []bed scale    []stated   []unknown     IBW (%IBW):   ( ) UBW (%UBW):   (  %)    BMI: Body mass index is 29.84 kg/m². This BMI is indicative of:  []Underweight   []Normal   [x]Overweight   [] Obesity   [] Extreme Obesity (BMI>40)  Estimated Nutrition Needs (Based on): 1780 Kcals/day(MSJ 1369 x 1.3) , 74 g(-89 (1-1.2gPro/kg) ) Protein  Carbohydrate: At Least 130 g/day  Fluids: 1800 mL/day    Last BM: 7/12   []Active     []Hyperactive  [x]Hypoactive       [] Absent   BS  Skin:    [] Intact   [x] Incision  [] Breakdown   [] DTI   [] Tears/Excoriation/Abrasion  [x]Edema(+1-LUE, LLE) [] Other:    Wt Readings from Last 30 Encounters:   07/11/19 76.4 kg (168 lb 6.9 oz)   04/29/19 79.3 kg (174 lb 13.2 oz)   01/23/19 79.4 kg (175 lb 0.7 oz)   12/07/18 79.4 kg (175 lb)   10/05/18 78.7 kg (173 lb 9.6 oz)   09/12/18 79 kg (174 lb 1.6 oz)   08/29/18 81.5 kg (179 lb 10.8 oz)   08/08/18 81.6 kg (179 lb 14.3 oz)   07/27/18 82.8 kg (182 lb 9.6 oz)   06/01/18 82.2 kg (181 lb 2 oz)   11/22/17 82 kg (180 lb 12.4 oz)   12/13/16 82.7 kg (182 lb 5 oz)   10/26/16 82.1 kg (181 lb)   09/09/16 82.2 kg (181 lb 3.2 oz)   06/14/12 85 kg (187 lb 6.4 oz)   05/24/12 84.4 kg (186 lb)   04/10/12 82.3 kg (181 lb 6.4 oz)   03/22/12 83.7 kg (184 lb 9.6 oz)   08/23/11 81.6 kg (180 lb)   04/28/11 81.6 kg (180 lb)   04/20/11 82.1 kg (181 lb)   04/12/11 81.6 kg (180 lb)   02/17/11 82 kg (180 lb 12.4 oz)   12/22/10 79.8 kg (176 lb)      NUTRITION DIAGNOSES:   Problem:  Altered GI function      Etiology: related to recent GI surgery     Signs/Symptoms: as evidenced by pt on clear liquids. Previous dx re: altered GI function resolving. NUTRITION INTERVENTIONS:  Meals/Snacks: General/healthful diet  Supplements: Commercial supplement              GOAL:   Pt will consume >50% of meals and wean off of TPN in 2-4 days.      NUTRITION MONITORING AND EVALUATION   Previous Goal: Pt will tolerate TPN with BG <200mg/dL and electrolytes WNL in 2-4 days   Previous Goal Met: N/A   Previous Recommendations Implemented: Yes   Cultural, Anabaptism, or Ethnic Dietary Needs: None   LEARNING NEEDS (Diet, Food/Nutrient-Drug Interaction):    [x] None Identified   [] Identified and Education Provided/Documented   [] Identified and Pt declined/was not appropriate      [x] Interdisciplinary Care Plan Reviewed/Documented    [x] Participated in Discharge Planning: Per surgeon   [] Interdisciplinary Rounds     NUTRITION RISK:    [x] High              [] Moderate           []  Low  []  Minimal/Uncompromised      Dayna Owusu, [address] [place] [address], [address] [place] [address]   Pager [phone]  Weekend Pager [phone]

## 2019-07-12 NOTE — PROGRESS NOTES
Plan:  -[facility] [place], [facility]. for short-term rehab  -Family transport      4:32PM  CM in to speak with pt regarding d/c plan. Discussed previous plan for short-term rehab. Pt in agreement due to being home alone during the day. PC to Mary Ellen Espitia in admissions at [facility]. Able to accept pt over the w/e. CM will continue to follow and assist with d/c planning.        DORENE Silva  Care Manager

## 2019-07-13 VITALS
OXYGEN SATURATION: 98 % | TEMPERATURE: 98.2 F | BODY MASS INDEX: 29.84 KG/M2 | SYSTOLIC BLOOD PRESSURE: 100 MMHG | RESPIRATION RATE: 18 BRPM | DIASTOLIC BLOOD PRESSURE: 60 MMHG | HEART RATE: 107 BPM | HEIGHT: 63 IN | WEIGHT: 168.43 LBS

## 2019-07-13 PROCEDURE — 74011250637 HC RX REV CODE- 250/637: Performed by: SURGERY

## 2019-07-13 PROCEDURE — 94760 N-INVAS EAR/PLS OXIMETRY 1: CPT

## 2019-07-13 PROCEDURE — 74011250636 HC RX REV CODE- 250/636: Performed by: SURGERY

## 2019-07-13 RX ORDER — POLYETHYLENE GLYCOL 3350 17 G/17G
17 POWDER, FOR SOLUTION ORAL DAILY
Qty: 30 PACKET | Refills: 0 | Status: SHIPPED | OUTPATIENT
Start: 2019-07-13 | End: 2019-08-12

## 2019-07-13 RX ADMIN — METOCLOPRAMIDE 5 MG: 5 INJECTION, SOLUTION INTRAMUSCULAR; INTRAVENOUS at 09:06

## 2019-07-13 RX ADMIN — CARVEDILOL 25 MG: 12.5 TABLET, FILM COATED ORAL at 09:06

## 2019-07-13 RX ADMIN — ASPIRIN 81 MG 81 MG: 81 TABLET ORAL at 09:06

## 2019-07-13 RX ADMIN — DOXAZOSIN 2 MG: 2 TABLET ORAL at 09:06

## 2019-07-13 RX ADMIN — CLOPIDOGREL BISULFATE 75 MG: 75 TABLET ORAL at 09:06

## 2019-07-13 NOTE — ROUTINE PROCESS
Pt was hesitant to be discharged as he felt he wasn't ready. Explained to patient that he will go to a rehab facility that will also be able to care for his wound. Assured pt that he won't bel eft to do things on his own. Pt and daughter both ok with decision to be discharged. Prescription for Miralax sent to the pharmacy. Discharge medications reviewed with patient and his daughter; appropriate educational materials and side effects teaching were provided. Diet for diverticulitis and printed info provided. Peripheral  IV and PICC removed and catheters intact. Patient escorted to main entrance by [facility] for discharge. All personal belongings with patient. Pt will be transported by daughter to [facility]. Report given to Remigio Saint at [phone]

## 2019-07-13 NOTE — PROGRESS NOTES
Pt states he doesn't feel he is ready to be discharged. Now having black watery stools (originally brown). Dr. Jennifer Caceres paged to advise. 36 - Per Dr. Jennifer Caceres black stools are to be expected with surgery. Confirmed wound care orders.  Pt is still to be discharged to [facility] today

## 2019-07-13 NOTE — DISCHARGE INSTRUCTIONS
Diverticulitis: Care Instructions  Your Care Instructions    Diverticulitis occurs when pouches form in the wall of the colon and become inflamed or infected. It can be very painful. Doctors aren't sure what causes diverticulitis. There is no proof that foods such as nuts, seeds, or berries cause it or make it worse. A low-fiber diet may cause the colon to work harder to push stool forward. Pouches may form because of this extra work. It may be hard to think about healthy eating while you're in pain. But as you recover, you might think about how you can use healthy eating for overall better health. Healthy eating may help you avoid future attacks. Follow-up care is a key part of your treatment and safety. Be sure to make and go to all appointments, and call your doctor if you are having problems. It's also a good idea to know your test results and keep a list of the medicines you take. How can you care for yourself at home? · Drink plenty of fluids, enough so that your urine is light yellow or clear like water. If you have kidney, heart, or liver disease and have to limit fluids, talk with your doctor before you increase the amount of fluids you drink. · Stick to liquids or a bland diet (plain rice, bananas, dry toast or crackers, applesauce) until you are feeling better. Then you can return to regular foods and gradually increase the amount of fiber in your diet. · Use a heating pad set on low on your belly to relieve mild cramps and pain. · Get extra rest until you are feeling better. · Be safe with medicines. Read and follow all instructions on the label. ? If the doctor gave you a prescription medicine for pain, take it as prescribed. ? If you are not taking a prescription pain medicine, ask your doctor if you can take an over-the-counter medicine. · If your doctor prescribed antibiotics, take them as directed. Do not stop taking them just because you feel better.  You need to take the full course of Initial interview and DC Dash:     CM met with the patient and his wife Robel Agrawal at bedside to explain the CM role and discuss possible dc needs  Pt lives with his wife in a 2 story home in [facility], [place] [address]  [address]  2nd floor bedroom w option to use 1st floor guest bedroom  Bathroom on each level  Pt is independent, unemployed and does not drive; his wife drives for him  Pt ambulates without an assist device but requests script for [facility] and a shower chair  Pt uses a stall shower  No hx of VNA or IP rehab  Pt denies any history of drugs but states that he has an ETOH history and has Anxiety/Depression  Prescriptions are filled at [facility]; his wife works here at [employer] in [employer] care  Main contact: Wife Kee Peraza [phone]  Admit Dx Tachypnea r/o New diagnoses (2 months ago) of Stage IV Lung Ca with mets to the Adrenals and Liver  Pt reported having apnic periods at nightime  DC needs pending medical progression  CM reviewed d/c planning process including the following: identifying help at home, patient preference for d/c planning needs, Discharge Lounge, [facility] Meds to Bed program, availability of treatment team to discuss questions or concerns patient and/or family may have regarding understanding medications and recognizing signs and symptoms once discharged  CM also encouraged patient to follow up with all recommended appointments after discharge  Patient advised of importance for patient and family to participate in managing patients medical well being  antibiotics. To prevent future attacks of diverticulitis  · Avoid constipation:  ? Include fruits, vegetables, beans, and whole grains in your diet each day. These foods are high in fiber. ? Drink plenty of fluids, enough so that your urine is light yellow or clear like water. If you have kidney, heart, or liver disease and have to limit fluids, talk with your doctor before you increase the amount of fluids you drink. ? Get some exercise every day. Build up slowly to 30 to 60 minutes a day on 5 or more days of the week. ? Take a fiber supplement, such as Citrucel or Metamucil, every day if needed. Read and follow all instructions on the label. ? Schedule time each day for a bowel movement. Having a daily routine may help. Take your time and do not strain when having a bowel movement. When should you call for help? Call your doctor now or seek immediate medical care if:    · You have a fever.     · You are vomiting.     · You have new or worse belly pain.     · You cannot pass stools or gas.    Watch closely for changes in your health, and be sure to contact your doctor if you have any problems. Where can you learn more? Go to http://lucero-joey.info/. Enter H901 in the search box to learn more about \"Diverticulitis: Care Instructions. \"  Current as of: March 27, 2018  Content Version: 11.9  © 2862-2131 [employer]. Care instructions adapted under license by [employer] (which disclaims liability or warranty for this information).  If you have questions about a medical condition or this instruction, always ask your healthcare professional. [place] [address] any warranty or liability for your use of this information.

## 2019-07-13 NOTE — PROGRESS NOTES
Problem: Surgical Pathway: Discharge Outcomes  Goal: *Lungs clear or at baseline  Outcome: Progressing Towards Goal     Problem: Pressure Injury - Risk of  Goal: *Prevention of pressure injury  Description  Document Sergey Scale and appropriate interventions in the flowsheet.   Outcome: Progressing Towards Goal     Problem: Patient Education: Go to Patient Education Activity  Goal: Patient/Family Education  Outcome: Progressing Towards Goal

## 2019-07-13 NOTE — PROGRESS NOTES
Plan:  -[facility] [facility]. for short-term rehab  -Family transport  - Nursing to call report to [phone]    Consult noted today for home health services. Patient is still planning to transition to [facility] and the facility has a bed. Family to transport. CM verified patient is eligible for discharge to [facility]. Care Management Interventions  PCP Verified by CM: Yes  Palliative Care Criteria Met (RRAT>21 & CHF Dx)?: No  Mode of Transport at Discharge:  Other (see comment)  Transition of Care Consult (CM Consult): SNF  Partner SNF: Yes  MyChart Signup: No  Discharge Durable Medical Equipment: No  Physical Therapy Consult: No  Occupational Therapy Consult: No  Speech Therapy Consult: No  Current Support Network: Lives Alone, Own Home, Family Lives Nearby  Confirm Follow Up Transport: Family  Plan discussed with Pt/Family/Caregiver: Yes  Freedom of Choice Offered: Yes  Discharge Location  Discharge Placement: Skilled nursing facility      Tomy Liz [facility] [phone]

## 2019-07-13 NOTE — ROUTINE PROCESS
Bedside shift change report given to Kb Paige (oncoming nurse) by Monik Brown (offgoing nurse). Report included the following information SBAR, Kardex, Intake/Output and MAR Pt complained of pain once during shift which was managed with PRN IV pain meds. Diet advanced to GI lite; tolerated with no reports of nausea or vomiting. Family at the bedside. Pt remained in the recliner most of the shift. Pt walked with pt with walker assist in the hallway. TPN stopped at 1800.

## 2019-07-13 NOTE — PROGRESS NOTES
CM contacted [address] [place] [address] #[phone] to verify Pt is able to accept Pt for admission. Admissions confirmed being able to accept Pt today. Pt will go to room 416A. RN call report to #[phone]. Pt has met three night required medicare stay. Pt admitted from ED on 7/1/19. No further CM needs at this time.     Remberto Pena, EMILIANA  Ext # [phone]

## 2019-07-13 NOTE — PROGRESS NOTES
General Surgery End of Shift Nursing Note    Bedside shift change report given to Vini Porter RN (oncoming nurse) by Jane Lenz (offgoing nurse). Report included the following information SBAR, Kardex, Intake/Output, MAR, Accordion and Recent Results. Shift worked:  [employer] of shift:    Patient up to the bathroom throughout the night. Patient complained of cramps in the stomach, Reglan and tylenol adminstered   Issues for physician to address:        Number times ambulated in hallway past shift: 0    Number of times OOB to chair past shift: 4    Pain Management:  Current medication: see eMAR  Patient states pain is manageable on current pain medication: YES    GI:    Current diet:  DIET GI LITE (POST SURGICAL)    Tolerating current diet: YES  Passing flatus: YES  Last Bowel Movement: yesterday   Appearance: watery      Respiratory:    Incentive Spirometer at bedside: YES  Patient instructed on use: YES    Patient Safety:    Falls Score: 2  Bed Alarm On? No  Sitter?  No    Tisha Dumas

## 2019-07-13 NOTE — DISCHARGE SUMMARY
Physician Discharge Summary     Patient ID:  Herberth Magallon  [id number]  71 y.o.  1940    Admit Date: 7/1/2019    Discharge Date: 7/13/2019    Admission Diagnoses: Perforated viscus [R19.8]    Discharge Diagnoses:  Principal Problem:    Perforated viscus (7/1/2019)    Active Problems:    Sepsis ([address] (7/1/2019)      Ileus ([address] (7/6/2019)      Ileus, postoperative ([address] (7/8/2019)         Admission Condition: Poor    Discharge Condition: Good    Last Procedure: Procedure(s) with comments:  LAPAROTOMY EXPLORATORY, REPAIR OF PERFORATED SIGMOID COLON. - PERFORATED SIGMOID COLON      Hospital Course:   Pt admitted with perforated viscus with large amount of free air. S/p laparotomy, found to have focal mid sigmoid perforation secondary to localized diverticulitis, s/p segmental resection with primary anastomosis. He had a prolonged postop ileus but otherwise did well and is now ready for d/c to short term rehab at [facility]. Consults: Hospitalist    Disposition: rehab placement    Patient Instructions:   Current Discharge Medication List      START taking these medications    Details   polyethylene glycol (MIRALAX) 17 gram packet Take 1 Packet by mouth daily for 30 days. Qty: 30 Packet, Refills: 0         CONTINUE these medications which have NOT CHANGED    Details   calcium carbonate (OS-IVAN) 500 mg calcium (1,250 mg) tablet Take 1 Tab by mouth daily. carvedilol (COREG) 25 mg tablet Take 25 mg by mouth two (2) times daily (with meals). atorvastatin (LIPITOR) 20 mg tablet Take 20 mg by mouth daily. aspirin 81 mg chewable tablet Take 1 Tab by mouth daily. clopidogrel (PLAVIX) 75 mg tab Take 1 Tab by mouth daily. Qty: 30 Tab, Refills: 5      cyanocobalamin 1,000 mcg tablet Take 1,000 mcg by mouth daily. cholecalciferol (VITAMIN D3) 1,000 unit cap Take 1,000 Units by mouth daily. doxazosin (CARDURA) 2 mg tablet Take 2 mg by mouth daily.          STOP taking these medications trimethoprim-sulfamethoxazole (BACTRIM DS, SEPTRA DS) 160-800 mg per tablet Comments:   Reason for Stopping:             Activity: No heavy lifting for 4 weeks  Diet: Regular Diet  Wound Care: pack midline wound between each staple until closed    Follow-up with Dr. Epi Monroe in 1 week.   Follow-up tests/labs none    Signed:  Wendi Nguyễn MD  [facility]  7/13/2019  8:08 AM

## 2019-07-16 ENCOUNTER — APPOINTMENT (OUTPATIENT)
Dept: ULTRASOUND IMAGING | Age: 79
DRG: 380 | End: 2019-07-16
Attending: SURGERY
Payer: MEDICARE

## 2019-07-16 ENCOUNTER — HOSPITAL ENCOUNTER (INPATIENT)
Age: 79
LOS: 4 days | Discharge: NURSING, REHAB OR LONG-TERM CARE | DRG: 380 | End: 2019-07-20
Attending: EMERGENCY MEDICINE | Admitting: SURGERY
Payer: MEDICARE

## 2019-07-16 ENCOUNTER — APPOINTMENT (OUTPATIENT)
Dept: CT IMAGING | Age: 79
DRG: 380 | End: 2019-07-16
Attending: EMERGENCY MEDICINE
Payer: MEDICARE

## 2019-07-16 ENCOUNTER — APPOINTMENT (OUTPATIENT)
Dept: GENERAL RADIOLOGY | Age: 79
DRG: 380 | End: 2019-07-16
Attending: EMERGENCY MEDICINE
Payer: MEDICARE

## 2019-07-16 DIAGNOSIS — K92.2 GASTROINTESTINAL HEMORRHAGE, UNSPECIFIED GASTROINTESTINAL HEMORRHAGE TYPE: Primary | ICD-10-CM

## 2019-07-16 LAB
ALBUMIN SERPL-MCNC: 1.8 G/DL (ref 3.5–5)
ALBUMIN/GLOB SERPL: 0.8 {RATIO} (ref 1.1–2.2)
ALP SERPL-CCNC: 135 U/L (ref 45–117)
ALT SERPL-CCNC: 53 U/L (ref 12–78)
ANION GAP SERPL CALC-SCNC: 8 MMOL/L (ref 5–15)
AST SERPL-CCNC: 43 U/L (ref 15–37)
BASOPHILS # BLD: 0 K/UL (ref 0–0.1)
BASOPHILS NFR BLD: 0 % (ref 0–1)
BILIRUB SERPL-MCNC: 0.3 MG/DL (ref 0.2–1)
BUN SERPL-MCNC: 37 MG/DL (ref 6–20)
BUN/CREAT SERPL: 39 (ref 12–20)
CALCIUM SERPL-MCNC: 7.3 MG/DL (ref 8.5–10.1)
CHLORIDE SERPL-SCNC: 114 MMOL/L (ref 97–108)
CO2 SERPL-SCNC: 21 MMOL/L (ref 21–32)
CREAT SERPL-MCNC: 0.94 MG/DL (ref 0.7–1.3)
DIFFERENTIAL METHOD BLD: ABNORMAL
EOSINOPHIL # BLD: 0.1 K/UL (ref 0–0.4)
EOSINOPHIL NFR BLD: 1 % (ref 0–7)
ERYTHROCYTE [DISTWIDTH] IN BLOOD BY AUTOMATED COUNT: 17.5 % (ref 11.5–14.5)
GLOBULIN SER CALC-MCNC: 2.4 G/DL (ref 2–4)
GLUCOSE SERPL-MCNC: 111 MG/DL (ref 65–100)
HCT VFR BLD AUTO: 12.3 % (ref 36.6–50.3)
HEMOCCULT STL QL: POSITIVE
HGB BLD-MCNC: 4 G/DL (ref 12.1–17)
HGB BLD-MCNC: 5.2 G/DL (ref 12.1–17)
IMM GRANULOCYTES # BLD AUTO: 0.1 K/UL (ref 0–0.04)
IMM GRANULOCYTES NFR BLD AUTO: 1 % (ref 0–0.5)
INR PPP: 1.1 (ref 0.9–1.1)
LYMPHOCYTES # BLD: 1.1 K/UL (ref 0.8–3.5)
LYMPHOCYTES NFR BLD: 19 % (ref 12–49)
MCH RBC QN AUTO: 30.1 PG (ref 26–34)
MCHC RBC AUTO-ENTMCNC: 32.5 G/DL (ref 30–36.5)
MCV RBC AUTO: 92.5 FL (ref 80–99)
MONOCYTES # BLD: 0.7 K/UL (ref 0–1)
MONOCYTES NFR BLD: 13 % (ref 5–13)
NEUTS SEG # BLD: 3.6 K/UL (ref 1.8–8)
NEUTS SEG NFR BLD: 66 % (ref 32–75)
NRBC # BLD: 0.02 K/UL (ref 0–0.01)
NRBC BLD-RTO: 0.4 PER 100 WBC
PLATELET # BLD AUTO: 169 K/UL (ref 150–400)
PMV BLD AUTO: 11.9 FL (ref 8.9–12.9)
POTASSIUM SERPL-SCNC: 3.6 MMOL/L (ref 3.5–5.1)
PROT SERPL-MCNC: 4.2 G/DL (ref 6.4–8.2)
PROTHROMBIN TIME: 11.2 SEC (ref 9–11.1)
RBC # BLD AUTO: 1.33 M/UL (ref 4.1–5.7)
RBC MORPH BLD: ABNORMAL
RBC MORPH BLD: ABNORMAL
SODIUM SERPL-SCNC: 143 MMOL/L (ref 136–145)
TROPONIN I SERPL-MCNC: <0.05 NG/ML
WBC # BLD AUTO: 5.6 K/UL (ref 4.1–11.1)

## 2019-07-16 PROCEDURE — C9113 INJ PANTOPRAZOLE SODIUM, VIA: HCPCS | Performed by: SURGERY

## 2019-07-16 PROCEDURE — 74178 CT ABD&PLV WO CNTR FLWD CNTR: CPT

## 2019-07-16 PROCEDURE — 74011636320 HC RX REV CODE- 636/320: Performed by: EMERGENCY MEDICINE

## 2019-07-16 PROCEDURE — 85610 PROTHROMBIN TIME: CPT

## 2019-07-16 PROCEDURE — 71045 X-RAY EXAM CHEST 1 VIEW: CPT

## 2019-07-16 PROCEDURE — 85018 HEMOGLOBIN: CPT

## 2019-07-16 PROCEDURE — 85025 COMPLETE CBC W/AUTO DIFF WBC: CPT

## 2019-07-16 PROCEDURE — 65620000000 HC RM CCU GENERAL

## 2019-07-16 PROCEDURE — 96361 HYDRATE IV INFUSION ADD-ON: CPT

## 2019-07-16 PROCEDURE — 74011250637 HC RX REV CODE- 250/637: Performed by: SURGERY

## 2019-07-16 PROCEDURE — 80053 COMPREHEN METABOLIC PANEL: CPT

## 2019-07-16 PROCEDURE — 86900 BLOOD TYPING SEROLOGIC ABO: CPT

## 2019-07-16 PROCEDURE — 82272 OCCULT BLD FECES 1-3 TESTS: CPT

## 2019-07-16 PROCEDURE — 99285 EMERGENCY DEPT VISIT HI MDM: CPT

## 2019-07-16 PROCEDURE — C9113 INJ PANTOPRAZOLE SODIUM, VIA: HCPCS | Performed by: EMERGENCY MEDICINE

## 2019-07-16 PROCEDURE — 94761 N-INVAS EAR/PLS OXIMETRY MLT: CPT

## 2019-07-16 PROCEDURE — 93005 ELECTROCARDIOGRAM TRACING: CPT

## 2019-07-16 PROCEDURE — 74011250636 HC RX REV CODE- 250/636: Performed by: EMERGENCY MEDICINE

## 2019-07-16 PROCEDURE — 96374 THER/PROPH/DIAG INJ IV PUSH: CPT

## 2019-07-16 PROCEDURE — 74011250636 HC RX REV CODE- 250/636: Performed by: SURGERY

## 2019-07-16 PROCEDURE — 36415 COLL VENOUS BLD VENIPUNCTURE: CPT

## 2019-07-16 PROCEDURE — P9016 RBC LEUKOCYTES REDUCED: HCPCS

## 2019-07-16 PROCEDURE — 93970 EXTREMITY STUDY: CPT

## 2019-07-16 PROCEDURE — 96375 TX/PRO/DX INJ NEW DRUG ADDON: CPT

## 2019-07-16 PROCEDURE — 86923 COMPATIBILITY TEST ELECTRIC: CPT

## 2019-07-16 PROCEDURE — 36430 TRANSFUSION BLD/BLD COMPNT: CPT

## 2019-07-16 PROCEDURE — 84484 ASSAY OF TROPONIN QUANT: CPT

## 2019-07-16 RX ORDER — CARVEDILOL 12.5 MG/1
25 TABLET ORAL 2 TIMES DAILY WITH MEALS
Status: DISCONTINUED | OUTPATIENT
Start: 2019-07-17 | End: 2019-07-20 | Stop reason: HOSPADM

## 2019-07-16 RX ORDER — SODIUM CHLORIDE 9 MG/ML
250 INJECTION, SOLUTION INTRAVENOUS AS NEEDED
Status: DISCONTINUED | OUTPATIENT
Start: 2019-07-16 | End: 2019-07-20 | Stop reason: HOSPADM

## 2019-07-16 RX ORDER — ONDANSETRON 2 MG/ML
4 INJECTION INTRAMUSCULAR; INTRAVENOUS
Status: DISCONTINUED | OUTPATIENT
Start: 2019-07-16 | End: 2019-07-20 | Stop reason: HOSPADM

## 2019-07-16 RX ORDER — LORAZEPAM 2 MG/ML
2 INJECTION INTRAMUSCULAR
Status: DISCONTINUED | OUTPATIENT
Start: 2019-07-16 | End: 2019-07-16

## 2019-07-16 RX ORDER — HYDROMORPHONE HYDROCHLORIDE 1 MG/ML
0.5 INJECTION, SOLUTION INTRAMUSCULAR; INTRAVENOUS; SUBCUTANEOUS
Status: DISCONTINUED | OUTPATIENT
Start: 2019-07-16 | End: 2019-07-18

## 2019-07-16 RX ORDER — SODIUM CHLORIDE 0.9 % (FLUSH) 0.9 %
5-40 SYRINGE (ML) INJECTION AS NEEDED
Status: DISCONTINUED | OUTPATIENT
Start: 2019-07-16 | End: 2019-07-20 | Stop reason: HOSPADM

## 2019-07-16 RX ORDER — ATORVASTATIN CALCIUM 20 MG/1
20 TABLET, FILM COATED ORAL DAILY
Status: DISCONTINUED | OUTPATIENT
Start: 2019-07-17 | End: 2019-07-20 | Stop reason: HOSPADM

## 2019-07-16 RX ORDER — SODIUM CHLORIDE 0.9 % (FLUSH) 0.9 %
5-40 SYRINGE (ML) INJECTION EVERY 8 HOURS
Status: DISCONTINUED | OUTPATIENT
Start: 2019-07-16 | End: 2019-07-20 | Stop reason: HOSPADM

## 2019-07-16 RX ORDER — DEXTROSE, SODIUM CHLORIDE, AND POTASSIUM CHLORIDE 5; .45; .15 G/100ML; G/100ML; G/100ML
125 INJECTION INTRAVENOUS CONTINUOUS
Status: DISCONTINUED | OUTPATIENT
Start: 2019-07-16 | End: 2019-07-17

## 2019-07-16 RX ORDER — ACETAMINOPHEN 325 MG/1
650 TABLET ORAL
Status: DISCONTINUED | OUTPATIENT
Start: 2019-07-16 | End: 2019-07-20 | Stop reason: HOSPADM

## 2019-07-16 RX ORDER — DOXAZOSIN 2 MG/1
2 TABLET ORAL DAILY
Status: DISCONTINUED | OUTPATIENT
Start: 2019-07-17 | End: 2019-07-20 | Stop reason: HOSPADM

## 2019-07-16 RX ORDER — PANTOPRAZOLE SODIUM 40 MG/10ML
40 INJECTION, POWDER, LYOPHILIZED, FOR SOLUTION INTRAVENOUS
Status: COMPLETED | OUTPATIENT
Start: 2019-07-16 | End: 2019-07-16

## 2019-07-16 RX ORDER — HYDRALAZINE HYDROCHLORIDE 20 MG/ML
10 INJECTION INTRAMUSCULAR; INTRAVENOUS
Status: DISCONTINUED | OUTPATIENT
Start: 2019-07-16 | End: 2019-07-20 | Stop reason: HOSPADM

## 2019-07-16 RX ORDER — SODIUM CHLORIDE 0.9 % (FLUSH) 0.9 %
10 SYRINGE (ML) INJECTION
Status: COMPLETED | OUTPATIENT
Start: 2019-07-16 | End: 2019-07-16

## 2019-07-16 RX ADMIN — Medication 10 ML: at 23:28

## 2019-07-16 RX ADMIN — PANTOPRAZOLE SODIUM 80 MG: 40 INJECTION, POWDER, FOR SOLUTION INTRAVENOUS at 23:29

## 2019-07-16 RX ADMIN — IOPAMIDOL 100 ML: 755 INJECTION, SOLUTION INTRAVENOUS at 21:33

## 2019-07-16 RX ADMIN — Medication 1 AMPULE: at 23:30

## 2019-07-16 RX ADMIN — PANTOPRAZOLE SODIUM 40 MG: 40 INJECTION, POWDER, FOR SOLUTION INTRAVENOUS at 18:10

## 2019-07-16 RX ADMIN — HYDROMORPHONE HYDROCHLORIDE 0.5 MG: 1 INJECTION, SOLUTION INTRAMUSCULAR; INTRAVENOUS; SUBCUTANEOUS at 23:29

## 2019-07-16 RX ADMIN — SODIUM CHLORIDE 500 ML: 900 INJECTION, SOLUTION INTRAVENOUS at 18:10

## 2019-07-16 RX ADMIN — DEXTROSE MONOHYDRATE, SODIUM CHLORIDE, AND POTASSIUM CHLORIDE 125 ML/HR: 50; 4.5; 1.49 INJECTION, SOLUTION INTRAVENOUS at 23:30

## 2019-07-16 RX ADMIN — Medication 10 ML: at 21:34

## 2019-07-16 NOTE — ED NOTES
Blood transfusion initiated, unit one of 2. Consent signed, education given on reaction and risk by MD. RN at bedside for 15 minutes.

## 2019-07-17 LAB
ANION GAP SERPL CALC-SCNC: 4 MMOL/L (ref 5–15)
ATRIAL RATE: 87 BPM
BUN SERPL-MCNC: 28 MG/DL (ref 6–20)
BUN/CREAT SERPL: 31 (ref 12–20)
CALCIUM SERPL-MCNC: 7 MG/DL (ref 8.5–10.1)
CALCULATED P AXIS, ECG09: 67 DEGREES
CALCULATED R AXIS, ECG10: 64 DEGREES
CALCULATED T AXIS, ECG11: 4 DEGREES
CHLORIDE SERPL-SCNC: 115 MMOL/L (ref 97–108)
CO2 SERPL-SCNC: 23 MMOL/L (ref 21–32)
CREAT SERPL-MCNC: 0.91 MG/DL (ref 0.7–1.3)
DIAGNOSIS, 93000: NORMAL
ERYTHROCYTE [DISTWIDTH] IN BLOOD BY AUTOMATED COUNT: 17.2 % (ref 11.5–14.5)
GLUCOSE SERPL-MCNC: 123 MG/DL (ref 65–100)
HCT VFR BLD AUTO: 19.4 % (ref 36.6–50.3)
HGB BLD-MCNC: 11.3 G/DL (ref 12.1–17)
HGB BLD-MCNC: 6.5 G/DL (ref 12.1–17)
MCH RBC QN AUTO: 30.1 PG (ref 26–34)
MCHC RBC AUTO-ENTMCNC: 33.5 G/DL (ref 30–36.5)
MCV RBC AUTO: 89.8 FL (ref 80–99)
NRBC # BLD: 0 K/UL (ref 0–0.01)
NRBC BLD-RTO: 0 PER 100 WBC
P-R INTERVAL, ECG05: 154 MS
PATH REV BLD -IMP: NORMAL
PLATELET # BLD AUTO: 140 K/UL (ref 150–400)
PMV BLD AUTO: 11.5 FL (ref 8.9–12.9)
POTASSIUM SERPL-SCNC: 3.6 MMOL/L (ref 3.5–5.1)
Q-T INTERVAL, ECG07: 390 MS
QRS DURATION, ECG06: 80 MS
QTC CALCULATION (BEZET), ECG08: 469 MS
RBC # BLD AUTO: 2.16 M/UL (ref 4.1–5.7)
SODIUM SERPL-SCNC: 142 MMOL/L (ref 136–145)
VENTRICULAR RATE, ECG03: 87 BPM
WBC # BLD AUTO: 4.6 K/UL (ref 4.1–11.1)

## 2019-07-17 PROCEDURE — P9016 RBC LEUKOCYTES REDUCED: HCPCS

## 2019-07-17 PROCEDURE — 65620000000 HC RM CCU GENERAL

## 2019-07-17 PROCEDURE — 77030026918 HC ADMN ST IV BLD BD -A

## 2019-07-17 PROCEDURE — 80048 BASIC METABOLIC PNL TOTAL CA: CPT

## 2019-07-17 PROCEDURE — 85018 HEMOGLOBIN: CPT

## 2019-07-17 PROCEDURE — 74011250636 HC RX REV CODE- 250/636: Performed by: SURGERY

## 2019-07-17 PROCEDURE — 74011250637 HC RX REV CODE- 250/637: Performed by: SURGERY

## 2019-07-17 PROCEDURE — 36415 COLL VENOUS BLD VENIPUNCTURE: CPT

## 2019-07-17 PROCEDURE — 74011000250 HC RX REV CODE- 250: Performed by: INTERNAL MEDICINE

## 2019-07-17 PROCEDURE — C9113 INJ PANTOPRAZOLE SODIUM, VIA: HCPCS | Performed by: INTERNAL MEDICINE

## 2019-07-17 PROCEDURE — 74011250636 HC RX REV CODE- 250/636: Performed by: INTERNAL MEDICINE

## 2019-07-17 PROCEDURE — 36430 TRANSFUSION BLD/BLD COMPNT: CPT

## 2019-07-17 PROCEDURE — 85027 COMPLETE CBC AUTOMATED: CPT

## 2019-07-17 RX ORDER — SODIUM CHLORIDE 9 MG/ML
250 INJECTION, SOLUTION INTRAVENOUS AS NEEDED
Status: DISCONTINUED | OUTPATIENT
Start: 2019-07-17 | End: 2019-07-20 | Stop reason: HOSPADM

## 2019-07-17 RX ORDER — FUROSEMIDE 10 MG/ML
20 INJECTION INTRAMUSCULAR; INTRAVENOUS ONCE
Status: COMPLETED | OUTPATIENT
Start: 2019-07-17 | End: 2019-07-17

## 2019-07-17 RX ADMIN — CARVEDILOL 25 MG: 12.5 TABLET, FILM COATED ORAL at 17:46

## 2019-07-17 RX ADMIN — Medication 1 AMPULE: at 10:55

## 2019-07-17 RX ADMIN — Medication 10 ML: at 15:40

## 2019-07-17 RX ADMIN — SODIUM CHLORIDE 40 MG: 9 INJECTION, SOLUTION INTRAMUSCULAR; INTRAVENOUS; SUBCUTANEOUS at 21:10

## 2019-07-17 RX ADMIN — DOXAZOSIN 2 MG: 2 TABLET ORAL at 09:54

## 2019-07-17 RX ADMIN — Medication 1 AMPULE: at 21:11

## 2019-07-17 RX ADMIN — SODIUM CHLORIDE 40 MG: 9 INJECTION, SOLUTION INTRAMUSCULAR; INTRAVENOUS; SUBCUTANEOUS at 09:53

## 2019-07-17 RX ADMIN — DEXTROSE MONOHYDRATE, SODIUM CHLORIDE, AND POTASSIUM CHLORIDE 125 ML/HR: 50; 4.5; 1.49 INJECTION, SOLUTION INTRAVENOUS at 07:02

## 2019-07-17 RX ADMIN — Medication 10 ML: at 06:00

## 2019-07-17 RX ADMIN — CARVEDILOL 25 MG: 12.5 TABLET, FILM COATED ORAL at 09:53

## 2019-07-17 RX ADMIN — ATORVASTATIN CALCIUM 20 MG: 20 TABLET, FILM COATED ORAL at 09:53

## 2019-07-17 RX ADMIN — Medication 10 ML: at 21:10

## 2019-07-17 RX ADMIN — FUROSEMIDE 20 MG: 10 INJECTION, SOLUTION INTRAVENOUS at 13:41

## 2019-07-17 NOTE — H&P
Surgery Consult    Subjective:      Roderick Dhaliwal is a 67 y.o. male who is being seen for dark red stools and feeling dizzy and weak when he stands up. Patient is 2 weeks out from a sigmoid resection with primary anastomosis for a diverticular perforation. Patients had an ileus post op that resolved and he was discharged to inpatient rehab 4 days ago. He states he started having melena a day before discharge and it has been persistent until yesterday. He states his stools are near black and it sticks to his fingers when he wipes. His last BM was yesterday afternoon. He denies pain and nausea. He has been taking Plavix and 81 mg ASA. He denies SOB. He also has a history of a hiatal hernia that was repaired a year ago. Hgb 7 days ago was 12.9. It is 4.0 in the ER today. He is receiving his second unit of PRBCs in the ER at this time.           Patient Active Problem List    Diagnosis Date Noted    GI bleed 07/16/2019    Ileus, postoperative ([address] 07/08/2019    Ileus ([address] 07/06/2019    Perforated viscus 07/01/2019    S[address] [address] [facility] 07/01/2019    Hiatal hernia with GERD 08/29/2018    Other and unspecified hyperlipidemia 04/05/2012    Orthostatic hypotension     PVC (premature ventricular contraction) 12/21/2010    HTN (hypertension), benign 12/06/2010    Dizziness 12/06/2010    TIA (transient ischemic attack) 12/06/2010    Leg edema 12/06/2010     Past Medical History:   Diagnosis Date    Arthritis     BPH (benign prostatic hyperplasia)     Chronic kidney disease     stage 2     Chronic obstructive pulmonary disease (HCC)     Elevated LFT's     Essential hypertension 9/24/01    GERD (gastroesophageal reflux disease)     hiatal hernia    Ill-defined condition 12/02/2016    faint    Liver disease     \"fatty liver\" as per patient    Nephrosclerosis     Orthostatic hypotension 9/24/01    PVC's 9/24/01    Sleep apnea     no CPAP    Syncope 9/24/01    secondary to venous insuffiency       Past Surgical History:   Procedure Laterality Date    COLONOSCOPY N/A 2016    COLONOSCOPY performed by Micah Coffey MD at [facility]    ECHO 2D ADULT  12    EF 60 - 65%; mild concentric hypertrophy; pulmonary systolic artery pressure upper limits normal    HX APPENDECTOMY      HX HERNIA REPAIR  2018    Davinci hiatal hernia repair- Justin Walton MD      Social History     Tobacco Use    Smoking status: Former Smoker     Packs/day: 3.50     Last attempt to quit: 1980     Years since quittin.5    Smokeless tobacco: Never Used   Substance Use Topics    Alcohol use: No     Comment: no alcohol since       Family History   Problem Relation Age of Onset    Stroke Father     Heart Disease Father       Current Facility-Administered Medications   Medication Dose Route Frequency    0.9% sodium chloride infusion 250 mL  250 mL IntraVENous PRN    sodium chloride (NS) flush 10 mL  10 mL IntraVENous RAD ONCE    iopamidol (ISOVUE-370) 76 % injection 100 mL  100 mL IntraVENous RAD ONCE     Current Outpatient Medications   Medication Sig    polyethylene glycol (MIRALAX) 17 gram packet Take 1 Packet by mouth daily for 30 days.  calcium carbonate (OS-IVAN) 500 mg calcium (1,250 mg) tablet Take 1 Tab by mouth daily.  carvedilol (COREG) 25 mg tablet Take 25 mg by mouth two (2) times daily (with meals).  atorvastatin (LIPITOR) 20 mg tablet Take 20 mg by mouth daily.  aspirin 81 mg chewable tablet Take 1 Tab by mouth daily.  clopidogrel (PLAVIX) 75 mg tab Take 1 Tab by mouth daily.  cyanocobalamin 1,000 mcg tablet Take 1,000 mcg by mouth daily.  cholecalciferol (VITAMIN D3) 1,000 unit cap Take 1,000 Units by mouth daily.  doxazosin (CARDURA) 2 mg tablet Take 2 mg by mouth daily. Allergies   Allergen Reactions    Benicar [Olmesartan] Unknown (comments)     Pt states he has found out that this is not a medication allergy.     Demerol [Meperidine] Unknown (comments)     Causes unconsciousness       Review of Systems:    A comprehensive review of systems was negative except for that written in the History of Present Illness. Objective:        Visit Vitals  /52 (BP 1 Location: Right arm)   Pulse 78   Temp 98.4 °F (36.9 °C)   Resp 16   Ht 5' 4\" (1.626 m)   Wt 72.6 kg (160 lb)   SpO2 100%   BMI 27.46 kg/m²       Physical Exam:  GENERAL: alert, cooperative, no distress, appears stated age, pale, EYE: conjunctivae/corneas clear. , EOM's intact., LUNG: clear to auscultation bilaterally, HEART: regular rate and rhythm, S1, S2 normal, no murmur, click, rub or gallop, ABDOMEN: soft, non-tender. Bowel sounds normal. No masses,  no organomegaly, healing wound. Imaging:  CTA -  No active bleeding. No hemoperitoneum. No retroperioneal bleed. But, new LLL PE     Lab/Data Review:  BMP:   Lab Results   Component Value Date/Time     07/16/2019 05:55 PM    K 3.6 07/16/2019 05:55 PM     (H) 07/16/2019 05:55 PM    CO2 21 07/16/2019 05:55 PM    AGAP 8 07/16/2019 05:55 PM     (H) 07/16/2019 05:55 PM    BUN 37 (H) 07/16/2019 05:55 PM    CREA 0.94 07/16/2019 05:55 PM    GFRAA >60 07/16/2019 05:55 PM    GFRNA >60 07/16/2019 05:55 PM     CBC:   Lab Results   Component Value Date/Time    WBC 5.6 07/16/2019 05:55 PM    HGB 4.0 (LL) 07/16/2019 05:55 PM    HCT 12.3 (LL) 07/16/2019 05:55 PM     07/16/2019 05:55 PM         Assessment/plan:     67year old with recent sigmoid resection no presents with melena and severe anemia. Stool character is consistent with an upper source. CT does not show active bleed. Since las BM was yesterday, I recommend ICU admission with serial hemoglobins. The PE makes things complicated. I will get lower extremity duplex to R? O a DVT. If DVT is found then he will need IVC filter. If not, will consider anticoagulation in a few days if no further bleeding.   Will likely need upper endoscopy prior to starting anticoagulation.

## 2019-07-17 NOTE — ED NOTES
2510 bed assignment, Marylen Savage, RN to assume care. Patient transported by nursing, all belongings sent.

## 2019-07-17 NOTE — PROGRESS NOTES
Care Management:    SHERYL: Return to [facility] for rehab prior to returning home. DTR  Jaceki Harness and grand DTR Comfort are his NOK. Reason for Readmission:   GI bleed. He is being monitored and treated in the ICU. He is finishing his third unit of PRBC's. Was here at [facility] 7/1-7/13 for perforated viscous and he had exploratory lap . He discharged four days ago to [facility]. He has a cardiac hx. He is active with his PCP and uses  [facility]. RRAT Score and Risk Level:   22       Level of Readmission:    1      Care Conference scheduled:   IDR's this am and I spoke with grand DTR on phone. I went to visit patient and was able to say good morning but he was too tired and needed to sleep. Resources/supports as identified by patient/family:   I spoke with grand DTR Lauren Tatums and goal is for patient to get rehab and regain his strength and return home with assist of family. Top Challenges facing patient (as identified by patient/family and CM): Finances/Medication cost?     He has his insurance , Medicare A , B and D. Transportation     Family can assist and patient does drive at baseline. Support system or lack thereof? Family     Living arrangements? He is anticipated to return to [facility] for rehab than return to his own home. He lives alone and has a dog. Family is nearby and able to assist as needed. Self-care/ADL's/Cognition? He will need SNF for assist at discharge . Current Advanced Directive/Advance Care Plan:           Full Code    Plan for utilizing home health: undetermined but anticipate post [facility] for rehab [facility] might be indicated. Care Management Interventions  PCP Verified by CM: Yes  Mode of Transport at Discharge:  Other (see comment)(Family versus BLS )  Transition of Care Consult (CM Consult): SNF(Anticipate returning to [facility] post hospital for rehab. )  Partner SNF: Yes  Current Support Network: Lives Alone(Lives alone and family lives nearby. No DME.  Patient drives at baseline. )  Confirm Follow Up Transport: Family  Plan discussed with Pt/Family/Caregiver: Yes(Called and spoke with grand DTR Comfort. )  Discharge Location  Discharge Placement: Skilled nursing facility(Anticipate patient returning to [facility] for rehab prior to returning home. )     Daog Anguiano RN AC [id number]

## 2019-07-17 NOTE — WOUND CARE
Wound care nurse consult from CCU staff stating \" tip of penis. Allayne Sabrina Allayne Sabrina \". Patient is a 66 y/o CM admitted for GI bleed with admit HGb=4.0. Patient is 2 weeks out from a sigmoid resection and reanastomosis secondary to divertic. Past Medical History:   Diagnosis Date    Arthritis     BPH (benign prostatic hyperplasia)     Chronic kidney disease     stage 2     Chronic obstructive pulmonary disease (HCC)     Elevated LFT's     Essential hypertension 9/24/01    GERD (gastroesophageal reflux disease)     hiatal hernia    Ill-defined condition 12/02/2016    faint    Liver disease     \"fatty liver\" as per patient    Nephrosclerosis     Orthostatic hypotension 9/24/01    PVC's 9/24/01    Sleep apnea     no CPAP    Syncope 9/24/01    secondary to venous insuffiency      Past Surgical History:   Procedure Laterality Date    COLONOSCOPY N/A 12/13/2016    COLONOSCOPY performed by Nathaniel [facility] at [facility] ENDOSCOPY    ECHO 2D ADULT  5/24/12    EF 60 - 65%; mild concentric hypertrophy; pulmonary systolic artery pressure upper limits normal    HX APPENDECTOMY  1985    HX HERNIA REPAIR  08/29/2018    Davinci hiatal hernia repair- Cristina Mensah MD     Patient A&O, but [facility] Patient has a lesion of tip of penis of unknown etiology measure aprox 2.5 x 2 cm's. It is almost circular and slightly raised granular lesion. Patient denies pain or any interference with voiding. Patient states he was supposed to follow-up with a urologist and dermatologist for this lesion.  nurse spoke with CCU Intensivist, Doc Lake, stating that the lesion is there, it is not a wound and it is not an acute issue that requires a consult to MD while inpatient. No suggested tx at this time.     Joie Connell RN, [employer]

## 2019-07-17 NOTE — PROGRESS NOTES
2330  Pt arrived from Ed on stretcher. 0004  Second unit of PRBC started. Blood administration education given. Consent completed in ED and in chart. 0230  Second unit of PRBC's completed. No transfusion reaction. 0630  Hgb 6.5. Spoke with Dr. Obi Clement orders given for 2 more units of PRBC's.    0700  Bedside report given to [facility].

## 2019-07-17 NOTE — PROGRESS NOTES
Ambulated to commode with one assist. . Had very small black soft BM. Very short of brearh with activity. O2 sat upon return to bed was 96%. Daughter at bedside inquiring about blood clot in lungs.

## 2019-07-17 NOTE — PROGRESS NOTES
Interdisciplinary team rounds were held 7/17/2019  with the following team members:Care Management, Nursing, Nutrition, Pharmacy, Physical Therapy, Physician, Respiratory Therapy and Clinical Coordinator. Plan of care discussed. Goal: Continue to monitor and support. See MD orders and progress notes for further  interventions and desired outcomes.

## 2019-07-17 NOTE — ED PROVIDER NOTES
EMERGENCY DEPARTMENT HISTORY AND PHYSICAL EXAM      Date: 7/16/2019  Patient Name: Manuel English    History of Presenting Illness     Chief Complaint   Patient presents with    Melena     Patient arrives from [facility] with a low hemoglobin of 4.4, hct of 13. Patient states he has been having dark stools and dyspnea as well. Alert and appropriate, denies any nausea, vomiting, abd pain, no hx of GI bleed or anemia. Patient is on aspirin and Plavix       History Provided By: Patient    HPI: Manuel English, 67 y.o. male with PMHx significant for hypertension and hyperlipidemia and recent admission to [address] for perforated viscus, presents to the ED with cc of moderate to severe dark bloody stools over the last several days and the low hemoglobin count. Patient is coming from [facility] rehab where he was discharged to on July 13. Patient reports feeling lightheaded and dizzy when he walks. He is also having some dyspnea on exertion. He has not had a syncopal episode and has no lower abdominal pain. He has no fevers, chills, chest pain, shortness of breath, or any other associated symptoms. He is on Plavix and is still taking this. No other exacerbating or militating factors. There are no other complaints, changes, or physical findings at this time. PCP: Salomón Mei MD    No current facility-administered medications on file prior to encounter. Current Outpatient Medications on File Prior to Encounter   Medication Sig Dispense Refill    polyethylene glycol (MIRALAX) 17 gram packet Take 1 Packet by mouth daily for 30 days. 30 Packet 0    carvedilol (COREG) 25 mg tablet Take 25 mg by mouth two (2) times daily (with meals).  atorvastatin (LIPITOR) 20 mg tablet Take 20 mg by mouth daily.  cyanocobalamin 1,000 mcg tablet Take 1,000 mcg by mouth daily.  cholecalciferol (VITAMIN D3) 1,000 unit cap Take 1,000 Units by mouth daily.       doxazosin (CARDURA) 2 mg tablet Take 2 mg by mouth daily. Past History     Past Medical History:  Past Medical History:   Diagnosis Date    Arthritis     BPH (benign prostatic hyperplasia)     Chronic kidney disease     stage 2     Chronic obstructive pulmonary disease (HCC)     Elevated LFT's     Essential hypertension 01    GERD (gastroesophageal reflux disease)     hiatal hernia    Ill-defined condition 2016    faint    Liver disease     \"fatty liver\" as per patient    Nephrosclerosis     Orthostatic hypotension 01    PVC's 01    Sleep apnea     no CPAP    Syncope 01    secondary to venous insuffiency        Past Surgical History:  Past Surgical History:   Procedure Laterality Date    COLONOSCOPY N/A 2016    [facility] END[facility]COPY    ECHO 2D ADULT  12    EF 60 - 65%; mild concentric hypertrophy; pulmonary systolic artery pressure upper limits normal    HX APPENDECTOMY  1985    HX HERNIA REPAIR  2018    Theresa Hurtado hiatal hernia repair- Mauricio Mullins MD    UPPER GI ENDOSCOPY,DIAGNOSIS  2019            Family History:  Family History   Problem Relation Age of Onset    Stroke Father     Heart Disease Father        Social History:  Social History     Tobacco Use    Smoking status: Former Smoker     Packs/day: 3.50     Last attempt to quit: 1980     Years since quittin.6    Smokeless tobacco: Never Used   Substance Use Topics    Alcohol use: No     Comment: no alcohol since     Drug use: No       Allergies: Allergies   Allergen Reactions    Benicar [Olmesartan] Unknown (comments)     Pt states he has found out that this is not a medication allergy.  Demerol [Meperidine] Unknown (comments)     Causes unconsciousness         Review of Systems   Review of Systems   Constitutional: Negative for chills, diaphoresis, fatigue and fever. HENT: Negative for ear pain and sore throat. Eyes: Negative for pain and redness. Respiratory: Negative for cough and shortness of breath. Cardiovascular: Negative for chest pain and leg swelling. Gastrointestinal: Positive for blood in stool. Negative for diarrhea, nausea and vomiting. Endocrine: Negative for cold intolerance and heat intolerance. Genitourinary: Negative for flank pain and hematuria. Musculoskeletal: Negative for back pain and neck stiffness. Skin: Negative for rash and wound. Neurological: Negative for dizziness, syncope and headaches. All other systems reviewed and are negative. Physical Exam   Physical Exam   Constitutional: He is oriented to person, place, and time. He appears well-developed and well-nourished. HENT:   Head: Normocephalic and atraumatic. Mouth/Throat: Oropharynx is clear and moist. No oropharyngeal exudate. Eyes: Pupils are equal, round, and reactive to light. Conjunctivae and EOM are normal.   Neck: Normal range of motion. Cardiovascular: Normal rate and regular rhythm. No murmur heard. Pulmonary/Chest: Effort normal and breath sounds normal. No respiratory distress. He has no wheezes. Abdominal: Soft. Bowel sounds are normal. He exhibits no distension. There is tenderness. Patient with mild tenderness around his incision scar in the lower midline. No discharge erythema from the wound. No signs of diffuse peritonitis or abdominal distention. Genitourinary: Rectal exam shows guaiac positive stool. Genitourinary Comments: Patient with black guaiac positive stools on rectal exam.  No tenderness appreciated. Musculoskeletal: Normal range of motion. He exhibits no edema or deformity. Neurological: He is alert and oriented to person, place, and time. Coordination normal.   Skin: Skin is warm and dry. No rash noted. There is pallor. Psychiatric: He has a normal mood and affect. His behavior is normal.   Nursing note and vitals reviewed.       Diagnostic Study Results     Labs -     No results found for this or any previous visit (from the past 24 hour(s)). Radiologic Studies -   CT ABD PELV W WO CONT   Final Result   IMPRESSION:      1. No CT evidence of active gastrointestinal hemorrhage. 2. Right lower lobe segmental artery pulmonary embolism is acute or subacute and   partially imaged. 3. Bilateral nonobstructing nephrolithiasis. I discussed the pulmonary embolism with Dr. Obi Clement at 2214 hours on 7/16/2019. XR CHEST PORT   Final Result   IMPRESSION:   No acute process. CT Results  (Last 48 hours)    None        CXR Results  (Last 48 hours)    None            Medical Decision Making   I am the first provider for this patient. I reviewed the vital signs, available nursing notes, past medical history, past surgical history, family history and social history. Vital Signs-Reviewed the patient's vital signs. No data found. Pulse Oximetry Analysis -100 % on room air    Cardiac Monitor:   Rate: 78 bpm  Rhythm: Normal Sinus Rhythm        Records Reviewed: Nursing Notes and Old Medical Records    Differential Diagnosis:    Patient presents with lower GI bleeding. Currently with stable vitals and benign abdominal exam.  DDx: AVM, diverticulosis, diverticulitis, IBD, IBS, colitis, hemorrhoids. Will obtain two large bore IV's, provide IVF hydration, check labs including type and screen, check rectal exam and monitor closely for the need for additional imaging. Provider Notes (Medical Decision Making):   Patient with severe lower GI bleed with anemia and hemoglobin of 4. Patient is hemodynamically stable throughout his stay in the ER. Ordered 2 units of PRBCs to be transfused emergently and patient tolerating well. ED Course:     Initial assessment performed. The patients presenting problems have been discussed, and they are in agreement with the care plan formulated and outlined with them. I have encouraged them to ask questions as they arise throughout their visit.     ED Course as of Jul 22 2200   Tue Jul 16, 2019   1929 Patient with severe anemia. Ordered 2 units to be emergently transfused. [CC]   2008 Case discussed with Dr. Obi Clement over the phone. Requested CTA of the abdomen pelvis to look for source of bleed. Patient receiving a transfusion at this time. [CC]      ED Course User Index  [CC] Rolando Smith MD       Critical Care Time:     CRITICAL CARE NOTE :    9:59 PM      IMPENDING DETERIORATION -Cardiovascular    ASSOCIATED RISK FACTORS - Hypotension and Bleeding    MANAGEMENT- Bedside Assessment and Supervision of Care    INTERPRETATION -lab work    INTERVENTIONS -blood transfusion    CASE REVIEW - Hospitalist and Medical Sub-Specialist    TREATMENT RESPONSE -Improved    PERFORMED BY - Self        NOTES   :      I have spent 35 minutes of critical care time involved in lab review, consultations with specialist, family decision- making, bedside attention and documentation. During this entire length of time I was immediately available to the patient . Bhavana Smart MD      Disposition:  Admit Note:  9:59 PM  Pt is being admitted by hospitalist. The results of their tests and reason(s) for their admission have been discussed with pt and/or available family. They convey agreement and understanding for the need to be admitted and for admission diagnosis. PLAN:  1. Discharge Medication List as of 7/20/2019  2:36 PM      START taking these medications    Details   ! ! pantoprazole (PROTONIX) 40 mg tablet Take 1 Tab by mouth Before breakfast and dinner for 30 days. , Normal, Disp-60 Tab, R-5      sucralfate (CARAFATE) 1 gram tablet Take 1 Tab by mouth Before breakfast, lunch, dinner and at bedtime for 30 days. Indications: esoph ulcer, Print, Disp-120 Tab, R-0      !! pantoprazole (PROTONIX) 20 mg tablet Take 1 Tab by mouth two (2) times a day for 30 days. , Print, Disp-60 Tab, R-0       !! - Potential duplicate medications found.  Please discuss with provider. CONTINUE these medications which have NOT CHANGED    Details   polyethylene glycol (MIRALAX) 17 gram packet Take 1 Packet by mouth daily for 30 days. , Normal, Disp-30 Packet, R-0      carvedilol (COREG) 25 mg tablet Take 25 mg by mouth two (2) times daily (with meals). , Historical Med      atorvastatin (LIPITOR) 20 mg tablet Take 20 mg by mouth daily. , Historical Med      cyanocobalamin 1,000 mcg tablet Take 1,000 mcg by mouth daily. , Historical Med      cholecalciferol (VITAMIN D3) 1,000 unit cap Take 1,000 Units by mouth daily. , Historical Med      doxazosin (CARDURA) 2 mg tablet Take 2 mg by mouth daily. , Historical Med         STOP taking these medications       aspirin 81 mg chewable tablet Comments:   Reason for Stoppin.   Follow-up Information     Follow up With Specialties Details Why Contact Info    [address] [address]UONG [address] on 2019 This is your post-acute skilled nursing facility provider. [address] [place] [address]  [phone]      Makenzie Powers MD Family Practice Schedule an appointment as soon as possible for a visit Please call to schedule hospital follow-up appointment after completion of rehab.  [address] [facility] [address] [place]  [phone]          Return to ED if worse     Diagnosis     Clinical Impression:   1.  Gastrointestinal hemorrhage, unspecified gastrointestinal hemorrhage type

## 2019-07-17 NOTE — CONSULTS
PULMONARY ASSOCIATES [facility] INTENSIVIST Consult Service Note  Pulmonary, Critical Care, and Sleep Medicine    Name: Cookie Marquez MRN: [id number]   : 1940 Hospital: [facility]   Date: 2019  Admission date: 2019 Hospital Day: 2       Subjective/Interval History:   Seen earlier today on rounds. Pt is unstable and acutely ill in the CCU. Patient was re-evaluated multiple times with repeated discussions with CCU team throughout the day    Patient Active Problem List   Diagnosis Code    HTN (hypertension), benign I10    Dizziness R42    TIA (transient ischemic attack) G45.9    Leg edema R60.0    PVC (premature ventricular contraction) I49.3    Orthostatic hypotension I95.1    Other and unspecified hyperlipidemia E78.5    Hiatal hernia with GERD K21.9, K44.9    Perforated viscus R19.8    Sepsis ([address] A41.9    Ileus ([address] K56.7    Ileus, postoperative ([address] K91.89, K56.7    GI bleed K92.2       IMPRESSION:   1. Acute LLL PE- found incidentally? Dopplers negative fo rDVT  2. Acute GI Bleed  3. Acute blood loss anemia   4. Recent ex lap for perforated bowel; focal mid sigmoid perforation secondary to localized diverticulitis, s/p segmental resection with primary anastomosis   5. repair followed by prolonged ileus  6. CAD- recent PCI Dr. Vazquez Sacnhez for Dr. Lynda Cotton 2019; Cath: Obstructive 1VD:LAD p80; OM1 40; OM2 90 (very small). RCA p30; d30; PDA 50. Successful VIRGINIE pLAD: 2.75x12 Oynx. RFA mynx -->ASA/plavix, statin; F/U with Dr. Cecile Richards .  7. Echocardiogram on 19 -  Normal LV systolic function with an estimated ejection fraction of 60%. There is mild left ventricular hypertrophy. The left atrium is mildly enlarged. There is mild mitral regurgitation. There is mild to moderate tricuspid regurgitation. PA pressure 35 mmHg. Kristina Pringle 8. GERD Hiatal hernia  9. BPH  10. Never smoker  11. Body mass index is 30.95 kg/m².   12. Additional workup outlined below  13. Multiorgan dysfunction as outlined above: Pt has one or more acute or chronic illnesses with severe exacerbation with progression or side effects of treatment that poses a threat to life or bodily function  14. Pt is unstable, unpredictable needing more CCU monitoring; at high risk of sudden decline and decompensation with life threatening consequenses and continued end organ dysfunction and failure  15. Pt is critically ill. Time spent with pt and staff actively rendering care, managing pt and coordinating care as stated below;  35 minutes, exclusive of any procedures      RECOMMENDATIONS/PLAN:   1. CCU monitoring  2. Unable to anticoagulate- relatively asymptomatic. No indication for IVC filter for now  3. Transfuse for Hgb < 8 with h/o recent PCI  4. SCDs  5. Prn Supplemental O2 for refractory hypoxia to keep sats > 93%  6. Glucose monitoring and SSI  7. Replete electrolytes  8. Labs to follow electrolytes, renal function and and blood counts  9. Bronchial hygiene with respiratory therapy techniques, bronchodilators  10. Pt needs IV fluids with additives and Drug therapy requiring intensive monitoring for toxicity  11. Prescription drug management with home med reconciliation reviewed  12. DVT, SUP prophylaxis  13. Will be available to assist in medical management while in the CCU pending disposition         Subjective/Initial History:   I have reviewed the flowsheet and previous days notes. Seen earlier today on rounds. I was asked by Jaun Mcfarland MD to see Kadi Macdonald a 67 y.o.  male  in consultation for a chief complaint of Acute LLL PE on CTA  Excerpts from admission 7/16/2019 and consult notes reviewed as follows:     \"Chandler Kumar is a 67 y.o. male who is being seen for dark red stools and feeling dizzy and weak when he stands up. Patient is 2 weeks out from a sigmoid resection with primary anastomosis for a diverticular perforation.   Patients had an ileus post op that resolved and he was discharged to inpatient rehab 4 days ago. He states he started having melena a day before discharge and it has been persistent until yesterday. He states his stools are near black and it sticks to his fingers when he wipes. His last BM was yesterday afternoon. He denies pain and nausea. He has been taking Plavix and 81 mg ASA. He denies SOB. He also has a history of a hiatal hernia that was repaired a year ago. Hgb 7 days ago was 12.9. It is 4.0 in the ER today. He is receiving his second unit of PRBCs in the ER at this time. \"    No win CCU. Despite transfusion Hgb still < 7. No chest pain. abdmoen sore. No nausea. More blood has been ordered. Said when he left here 7/11 was having some bloody stools but instead of getiing older and less, it became more and would not stop. Mild SOB on presentation but better. Never smoked. Hungry. REcalls recent stent and has been on Plavix since. Patient PCP: Edison Hamilton MD  PMH:  has a past medical history of Arthritis, BPH (benign prostatic hyperplasia), Chronic kidney disease, Chronic obstructive pulmonary disease ([facility] [address] Elevated LFT's, Essential hypertension (9/24/01), GERD (gastroesophageal reflux disease), Ill-defined condition (12/02/2016), Liver disease, Nephrosclerosis, Orthostatic hypotension (9/24/01), PVC's (9/24/01), Sleep apnea, and Syncope (9/24/01). PSH:   has a past surgical history that includes echo 2d adult (5/24/12); hx appendectomy (1985); colonoscopy (N/A, 12/13/2016); and hx hernia repair (08/29/2018). FHX: family history includes Heart Disease in his father; Stroke in his father. SHX:  reports that he quit smoking about 38 years ago. He smoked 3.50 packs per day. He has never used smokeless tobacco. He reports that he does not drink alcohol or use drugs. ROS:A comprehensive review of systems was negative except for that written in the HPI.     Allergies   Allergen Reactions    Benicar [Olmesartan] Unknown (comments)     Pt states he has found out that this is not a medication allergy.  Demerol [Meperidine] Unknown (comments)     Causes unconsciousness      MEDS:   Current Facility-Administered Medications   Medication    0.9% sodium chloride infusion 250 mL    0.9% sodium chloride infusion 250 mL    atorvastatin (LIPITOR) tablet 20 mg    carvedilol (COREG) tablet 25 mg    doxazosin (CARDURA) tablet 2 mg    hydrALAZINE (APRESOLINE) 20 mg/mL injection 10 mg    acetaminophen (TYLENOL) tablet 650 mg    HYDROmorphone (PF) (DILAUDID) injection 0.5 mg    ondansetron (ZOFRAN) injection 4 mg    prochlorperazine (COMPAZINE) with saline injection 10 mg    sodium chloride (NS) flush 5-40 mL    sodium chloride (NS) flush 5-40 mL    dextrose 5% - 0.45% NaCl with KCl 20 mEq/L infusion    pantoprazole (PROTONIX) 80 mg in sodium chloride 0.9% 20 mL injection    alcohol 62% (NOZIN) nasal  1 Ampule            Objective:     Vital Signs: Telemetry:    normal sinus rhythm Intake/Output:   Visit Vitals  /55 (BP 1 Location: Right arm)   Pulse 68   Temp 97.7 °F (36.5 °C)   Resp 16   Ht 5' 4\" (1.626 m)   Wt 81.8 kg (180 lb 5.4 oz)   SpO2 100%   BMI 30.95 kg/m²       Temp (24hrs), Av.3 °F (36.8 °C), Min:97.7 °F (36.5 °C), Max:98.9 °F (37.2 °C)        O2 Device: Room air           Body mass index is 30.95 kg/m². Wt Readings from Last 4 Encounters:   19 81.8 kg (180 lb 5.4 oz)   19 76.4 kg (168 lb 6.9 oz)   19 79.3 kg (174 lb 13.2 oz)   19 79.4 kg (175 lb 0.7 oz)          Intake/Output Summary (Last 24 hours) at 2019 0823  Last data filed at 2019 0700  Gross per 24 hour   Intake 2115.9 ml   Output 400 ml   Net 1715.9 ml       Last shift:      No intake/output data recorded.   Last 3 shifts: 07/15 1901 -  0700  In: 2115.9 [I.V.:1437.5]  Out: 400 [Urine:400]       Hemodynamics:    CO:    CI:    CVP:    SVR:   PAP Systolic:    PAP Diastolic:    PVR:    LS69: Ventilator Settings:      Mode Rate TV Press PEEP FiO2 PIP Min. Vent                            Physical Exam:     General:  male; in no respiratory distress and acyanotic;    HEAD: Normocephalic, without obvious abnormality, atraumatic   EYES: conjunctivae clear. PERRL,  AN Icteric sclerae   NOSE: nares normal, no drainage, no nasal flaring,    THROAT: mucous membranes dry; Lips, mucosa dry; No Thrush;     Neck: Supple, symmetrical, trachea midline,  No accessory mm use; No Stridor/ cuff leak, No goiter or thyroid tenderness   LYMPH: No abnormally enlarged lymph nodes. in cervical or supraclavicular regions    Chest: normal   Lungs: normal air entry   Heart: Regular rate and rhythm;  NO edema   Abdomen: nondistended, tenderness mild - in the entire abdomen   : No [place]; Extremity: negative, cyanosis, clubbing; no joint swelling or erythema   Neuro: alert; [place]; speech fluent; withdraws to pain; unable to check gait and station; following simple commands   Psych: oriented to time, place and person ; No agitation;    Devices:per sepsis flow sheet- reviewed with team during IDR   Skin: Pallor;    Pulses:Bilateral, Radial, 2+   Capillary refill: abnormal: ; brisk capillary refill, pale,      Labs:    Recent Labs     07/17/19  0421 07/16/19  2200 07/16/19  1755   WBC 4.6  --  5.6   HGB 6.5* 5.2* 4.0*   *  --  169   INR  --   --  1.1     Recent Labs     07/17/19  0421 07/16/19  1755    143   K 3.6 3.6   * 114*   CO2 23 21   * 111*   BUN 28* 37*   CREA 0.91 0.94   CA 7.0* 7.3*   ALB  --  1.8*   SGOT  --  43*   ALT  --  53     No results for input(s): PH, PCO2, PO2, HCO3, FIO2 in the last 72 hours.   Recent Labs     07/16/19  1755   TROIQ <0.05     No results found for: BNPP, BNP   No results found for: CULT   No results found for: VANCT, CPK    Imaging:  I have personally reviewed the patients radiographs and have reviewed the reports:    CXR Results  (Last 48 hours) 07/16/19 1823  XR CHEST PORT Final result    Impression:  IMPRESSION:   No acute process. Narrative:  PORTABLE CHEST RADIOGRAPH/S: 7/16/2019 6:23 PM       Clinical history: GI bleed   INDICATION:   GI bleed   COMPARISON: 4/25/2018       FINDINGS:   AP portable upright view of the chest demonstrates a stable  cardiopericardial   silhouette. The lungs are adequately expanded. There is no edema, effusion,   consolidation, or pneumothorax. The osseous structures are unremarkable. Patient   is on a cardiac monitor. Results from Hospital Encounter encounter on 07/16/19   XR CHEST PORT    Narrative PORTABLE CHEST RADIOGRAPH/S: 7/16/2019 6:23 PM    Clinical history: GI bleed  INDICATION:   GI bleed  COMPARISON: 4/25/2018    FINDINGS:  AP portable upright view of the chest demonstrates a stable  cardiopericardial  silhouette. The lungs are adequately expanded. There is no edema, effusion,  consolidation, or pneumothorax. The osseous structures are unremarkable. Patient  is on a cardiac monitor. Impression IMPRESSION:  No acute process. Results from [place] encounter on 07/01/19   XR ABD (KUB)    Narrative INTERPRETATION PROVIDED FOR COMPLIANCE ONLY AT NO CHARGE    An AP view of the abdomen demonstrates the tip of the enteric tube overlying the  body of the stomach. Gaseous distention of the stomach and small bowel are  unchanged. XR ABD (KUB)    Narrative EXAM: XR ABD (KUB)    INDICATION: confirmation of NG tube placement    COMPARISON: 7/1/2019. FINDINGS: A supine radiograph of the abdomen shows multiple dilated bowel loops. Surgical clips are noted overlying the patient's pelvis and this may represent a  postoperative ileus. The tip of the NG tube overlies the gastroesophageal junction and the sidehole  overlies the distal esophagus.         Impression IMPRESSION:     Tip of NG tube overlies the gastroesophageal junction and the tube should be  advanced. Probable postoperative ileus. Results from [place] encounter on 07/16/19   CT ABD PELV W WO CONT    Narrative EXAM: CT ABD PELV W WO CONT    INDICATION: Severe gastrointestinal hemorrhage, anemia. Previous partial  colectomy, appendectomy, hiatal hernia repair. COMPARISON: CT abdomen/pelvis on 7/7/2019. CONTRAST: Pre and postcontrast 100 mL of Isovue-370. TECHNIQUE: Triphasic CT abdomen/pelvis. Multislice helical CT was performed from the diaphragm to the symphysis pubis  prior to and following uneventful rapid bolus intravenous contrast  administration. Oral contrast was not administered. Contiguous 5 mm axial  images were reconstructed and lung and soft tissue windows were generated. Coronal and sagittal reformations were generated. CT dose reduction was  achieved through use of a standardized protocol tailored for this examination  and automatic exposure control for dose modulation. FINDINGS:   Lung bases: Mild subsegmental atelectasis versus scarring. No pneumonia. Thrombus is visible in the right lower lobe segmental arteries. Incidentally imaged heart and mediastinum: Unremarkable. CT without IV contrast:  1 mm calculus in the upper pole of the left kidney and lower pole of the right  kidney. Gallstone is subcentimeter. Radiodense suture at the sigmoid colon  anastomosis. Surgical clips are at the appendectomy site. No high density  material within the lumen of the bowel. CT with IV contrast:  Liver: Nodular undersurface. No suspicious mass. Portal vein is patent. Gallbladder: Mild mural thickening. CBD is not dilated. Spleen: Upper normal size. No mass or infarct. Pancreas: No mass or ductal dilatation. Adrenals: Unremarkable. Kidneys: No mass or hydronephrosis. Symmetric enhancement. Stomach: Partial distention. No contrast in the lumen. Small bowel: No obstruction. Normal caliber. No contrast in the lumen.   Colon: Mild rectal fecal burden. Mild diverticulosis of the descending colon and  transverse colon. No diverticulitis. No contrast in the lumen. Appendix: Appendectomy  Peritoneum: No ascites or pneumoperitoneum. Retroperitoneum: Aortic atherosclerosis without aneurysm. No lymphadenopathy. No  hemorrhage. Reproductive organs: Prostate gland contains calcifications. Urinary bladder: No mass or calculus. Bones: No change. Additional comments: N/A      Impression IMPRESSION:    1. No CT evidence of active gastrointestinal hemorrhage. 2. Right lower lobe segmental artery pulmonary embolism is acute or subacute and  partially imaged. 3. Bilateral nonobstructing nephrolithiasis. I discussed the pulmonary embolism with Dr. Emelyn Villegas at 2214 hours on 7/16/2019. During this entire length of time the patient's condition was unstable, unpredictable and critically ill in the CCU/ ICU. I was immediately available to the patient whose care required several interactions with nursing, multidisciplinary team members leading to multiple interventions with fluid resuscitation and medication adjustments to optimize respiratory support, hemodynamic treatment, medication changes based on repeat labs results, reviews, exams and assessments. The reason for providing this level of medical care was due to a critical illness that impaired one or more vital organ systems, such that there was a high probability of sudden or life threatening deterioration in the patient's condition. This care involved high complexity medical decision making to treat acute and unstable vital organ system failure, and to prevent further life threatening deterioration of the patients condition.  I personally:  · Reviewed the flowsheet and previous days notes  · Reviewed and summarized records or history from previous days note or discussions with staff, family  · Parenteral controlled substances - Reviewed/ Adjusted / [place] / Started  · High Risk Drug therapy requiring intensive monitoring for toxicity: eg steroids, pressors, antibiotics  · Reviewed and/or ordered Clinical lab tests  · Reviewed and/or ordered Radiology tests  · Reviewed and/or ordered of Medicine tests  · Independently visualized radiologic Images  · Reviewed the patients ECG / Telemetry  · discussed my assessment/management with : Consultants, Nursing, Pharmacy, Case Management, PT, OT, Respiratory Therapy, for coordination of care           Thank you for allowing us to participate in the care of this patient. We will follow along with you until they no longer require CCU services.     Flaquito Cr MD

## 2019-07-17 NOTE — PROGRESS NOTES
SURGERY PROGRESS NOTE      Admit Date: 2019        Subjective:     Patient states he feels better today. More energy. No BMs since admission. Objective:     Visit Vitals  /55   Pulse 66   Temp 97.7 °F (36.5 °C)   Resp 18   Ht 5' 4\" (1.626 m)   Wt 81.8 kg (180 lb 5.4 oz)   SpO2 100%   BMI 30.95 kg/m²        Temp (24hrs), Av.2 °F (36.8 °C), Min:97.7 °F (36.5 °C), Max:98.9 °F (37.2 °C)      701 - 1900  In: -   Out: 200 [Urine:200]  07/15 1901 - 700  In: 2115.9 [I.V.:1437.5]  Out: 400 [Urine:400]    Physical Exam:    General:  alert, cooperative, no distress, appears stated age   Abdomen: soft, bowel sounds active, non-tender   Incision:   healing well, no drainage, no erythema, no hernia, no seroma, no swelling, no dehiscence, incision well approximated           Lab Results   Component Value Date/Time    WBC 4.6 2019 04:21 AM    HGB 6.5 (L) 2019 04:21 AM    HCT 19.4 (L) 2019 04:21 AM    PLATELET 875 (L)  04:21 AM    MCV 89.8 2019 04:21 AM     Lab Results   Component Value Date/Time    GFR est non-AA >60 2019 04:21 AM    GFRNA, POC 59 (L) 2016 07:52 AM    GFR est AA >60 2019 04:21 AM    GFRAA, POC >60 2016 07:52 AM    Creatinine 0.91 2019 04:21 AM    Creatinine (POC) 1.2 2016 07:52 AM    BUN 28 (H) 2019 04:21 AM    Sodium 142 2019 04:21 AM    Potassium 3.6 2019 04:21 AM    Chloride 115 (H) 2019 04:21 AM    CO2 23 2019 04:21 AM    Magnesium 2.1 07/10/2019 12:38 AM    Phosphorus 3.3 07/10/2019 12:38 AM       Lower extremity duplex - no DVT    Assessment:     Active Problems:    GI bleed (2019)    Bleeding appears controlled. Responded appropriately to 2 units of PRBC. Will give another 2 units because Hgb less than 7 and he has significant cardiac history     PE - asymptomatic. No DVT so, no filter needed. Will watch for now    Plan:       1.  Transfuse 2 Units PRBC today  2.  Clear liquid diet  3. GI consult for possible EGD

## 2019-07-18 ENCOUNTER — ANESTHESIA (OUTPATIENT)
Dept: ENDOSCOPY | Age: 79
DRG: 380 | End: 2019-07-18
Payer: MEDICARE

## 2019-07-18 ENCOUNTER — ANESTHESIA EVENT (OUTPATIENT)
Dept: ENDOSCOPY | Age: 79
DRG: 380 | End: 2019-07-18
Payer: MEDICARE

## 2019-07-18 PROBLEM — K92.1 MELENA: Status: ACTIVE | Noted: 2019-07-18

## 2019-07-18 LAB
ABO + RH BLD: NORMAL
ANION GAP SERPL CALC-SCNC: 6 MMOL/L (ref 5–15)
BASOPHILS # BLD: 0 K/UL (ref 0–0.1)
BASOPHILS NFR BLD: 1 % (ref 0–1)
BLD PROD TYP BPU: NORMAL
BLOOD GROUP ANTIBODIES SERPL: NORMAL
BPU ID: NORMAL
BUN SERPL-MCNC: 17 MG/DL (ref 6–20)
BUN/CREAT SERPL: 24 (ref 12–20)
CALCIUM SERPL-MCNC: 6.7 MG/DL (ref 8.5–10.1)
CHLORIDE SERPL-SCNC: 113 MMOL/L (ref 97–108)
CO2 SERPL-SCNC: 22 MMOL/L (ref 21–32)
CREAT SERPL-MCNC: 0.72 MG/DL (ref 0.7–1.3)
CROSSMATCH RESULT,%XM: NORMAL
DIFFERENTIAL METHOD BLD: ABNORMAL
EOSINOPHIL # BLD: 0.1 K/UL (ref 0–0.4)
EOSINOPHIL NFR BLD: 2 % (ref 0–7)
ERYTHROCYTE [DISTWIDTH] IN BLOOD BY AUTOMATED COUNT: 16.5 % (ref 11.5–14.5)
GLUCOSE SERPL-MCNC: 85 MG/DL (ref 65–100)
HCT VFR BLD AUTO: 27.1 % (ref 36.6–50.3)
HCT VFR BLD AUTO: 28.4 % (ref 36.6–50.3)
HCT VFR BLD AUTO: 28.9 % (ref 36.6–50.3)
HGB BLD-MCNC: 9.2 G/DL (ref 12.1–17)
HGB BLD-MCNC: 9.8 G/DL (ref 12.1–17)
HGB BLD-MCNC: 9.8 G/DL (ref 12.1–17)
IMM GRANULOCYTES # BLD AUTO: 0.1 K/UL (ref 0–0.04)
IMM GRANULOCYTES NFR BLD AUTO: 2 % (ref 0–0.5)
LYMPHOCYTES # BLD: 0.6 K/UL (ref 0.8–3.5)
LYMPHOCYTES NFR BLD: 16 % (ref 12–49)
MCH RBC QN AUTO: 30.3 PG (ref 26–34)
MCHC RBC AUTO-ENTMCNC: 33.9 G/DL (ref 30–36.5)
MCV RBC AUTO: 89.1 FL (ref 80–99)
MONOCYTES # BLD: 0.5 K/UL (ref 0–1)
MONOCYTES NFR BLD: 13 % (ref 5–13)
NEUTS SEG # BLD: 2.8 K/UL (ref 1.8–8)
NEUTS SEG NFR BLD: 66 % (ref 32–75)
NRBC # BLD: 0 K/UL (ref 0–0.01)
NRBC BLD-RTO: 0 PER 100 WBC
PLATELET # BLD AUTO: 127 K/UL (ref 150–400)
PMV BLD AUTO: 11 FL (ref 8.9–12.9)
POTASSIUM SERPL-SCNC: 3.4 MMOL/L (ref 3.5–5.1)
RBC # BLD AUTO: 3.04 M/UL (ref 4.1–5.7)
SODIUM SERPL-SCNC: 141 MMOL/L (ref 136–145)
SPECIMEN EXP DATE BLD: NORMAL
STATUS OF UNIT,%ST: NORMAL
UNIT DIVISION, %UDIV: 0
WBC # BLD AUTO: 4.1 K/UL (ref 4.1–11.1)

## 2019-07-18 PROCEDURE — C9113 INJ PANTOPRAZOLE SODIUM, VIA: HCPCS | Performed by: INTERNAL MEDICINE

## 2019-07-18 PROCEDURE — 65270000029 HC RM PRIVATE

## 2019-07-18 PROCEDURE — 36415 COLL VENOUS BLD VENIPUNCTURE: CPT

## 2019-07-18 PROCEDURE — 74011000250 HC RX REV CODE- 250: Performed by: INTERNAL MEDICINE

## 2019-07-18 PROCEDURE — 74011000258 HC RX REV CODE- 258: Performed by: INTERNAL MEDICINE

## 2019-07-18 PROCEDURE — 76040000019: Performed by: INTERNAL MEDICINE

## 2019-07-18 PROCEDURE — 74011250637 HC RX REV CODE- 250/637: Performed by: INTERNAL MEDICINE

## 2019-07-18 PROCEDURE — 74011250636 HC RX REV CODE- 250/636: Performed by: INTERNAL MEDICINE

## 2019-07-18 PROCEDURE — 74011250636 HC RX REV CODE- 250/636: Performed by: SURGERY

## 2019-07-18 PROCEDURE — 0DJ08ZZ INSPECTION OF UPPER INTESTINAL TRACT, VIA NATURAL OR ARTIFICIAL OPENING ENDOSCOPIC: ICD-10-PCS | Performed by: INTERNAL MEDICINE

## 2019-07-18 PROCEDURE — 74011250636 HC RX REV CODE- 250/636

## 2019-07-18 PROCEDURE — 74011250637 HC RX REV CODE- 250/637: Performed by: SURGERY

## 2019-07-18 PROCEDURE — 94760 N-INVAS EAR/PLS OXIMETRY 1: CPT

## 2019-07-18 PROCEDURE — 76060000031 HC ANESTHESIA FIRST 0.5 HR: Performed by: INTERNAL MEDICINE

## 2019-07-18 PROCEDURE — 85025 COMPLETE CBC W/AUTO DIFF WBC: CPT

## 2019-07-18 PROCEDURE — 85018 HEMOGLOBIN: CPT

## 2019-07-18 PROCEDURE — 80048 BASIC METABOLIC PNL TOTAL CA: CPT

## 2019-07-18 RX ORDER — HYDROMORPHONE HYDROCHLORIDE 1 MG/ML
0.5 INJECTION, SOLUTION INTRAMUSCULAR; INTRAVENOUS; SUBCUTANEOUS
Status: DISCONTINUED | OUTPATIENT
Start: 2019-07-18 | End: 2019-07-20 | Stop reason: HOSPADM

## 2019-07-18 RX ORDER — EPINEPHRINE 0.1 MG/ML
1 INJECTION INTRACARDIAC; INTRAVENOUS
Status: DISCONTINUED | OUTPATIENT
Start: 2019-07-18 | End: 2019-07-18 | Stop reason: HOSPADM

## 2019-07-18 RX ORDER — SODIUM CHLORIDE 0.9 % (FLUSH) 0.9 %
5-40 SYRINGE (ML) INJECTION AS NEEDED
Status: DISCONTINUED | OUTPATIENT
Start: 2019-07-18 | End: 2019-07-20 | Stop reason: HOSPADM

## 2019-07-18 RX ORDER — DEXTROMETHORPHAN/PSEUDOEPHED 2.5-7.5/.8
1.2 DROPS ORAL
Status: DISCONTINUED | OUTPATIENT
Start: 2019-07-18 | End: 2019-07-18 | Stop reason: HOSPADM

## 2019-07-18 RX ORDER — PHENYLEPHRINE HCL IN 0.9% NACL 0.4MG/10ML
SYRINGE (ML) INTRAVENOUS AS NEEDED
Status: DISCONTINUED | OUTPATIENT
Start: 2019-07-18 | End: 2019-07-18 | Stop reason: HOSPADM

## 2019-07-18 RX ORDER — NALOXONE HYDROCHLORIDE 0.4 MG/ML
0.4 INJECTION, SOLUTION INTRAMUSCULAR; INTRAVENOUS; SUBCUTANEOUS
Status: DISCONTINUED | OUTPATIENT
Start: 2019-07-18 | End: 2019-07-18 | Stop reason: HOSPADM

## 2019-07-18 RX ORDER — FLUMAZENIL 0.1 MG/ML
0.2 INJECTION INTRAVENOUS
Status: DISCONTINUED | OUTPATIENT
Start: 2019-07-18 | End: 2019-07-18 | Stop reason: HOSPADM

## 2019-07-18 RX ORDER — DEXTROSE, SODIUM CHLORIDE, AND POTASSIUM CHLORIDE 5; .45; .15 G/100ML; G/100ML; G/100ML
100 INJECTION INTRAVENOUS CONTINUOUS
Status: DISCONTINUED | OUTPATIENT
Start: 2019-07-18 | End: 2019-07-19

## 2019-07-18 RX ORDER — ATROPINE SULFATE 0.1 MG/ML
0.5 INJECTION INTRAVENOUS
Status: DISCONTINUED | OUTPATIENT
Start: 2019-07-18 | End: 2019-07-18 | Stop reason: HOSPADM

## 2019-07-18 RX ORDER — PROPOFOL 10 MG/ML
INJECTION, EMULSION INTRAVENOUS AS NEEDED
Status: DISCONTINUED | OUTPATIENT
Start: 2019-07-18 | End: 2019-07-18 | Stop reason: HOSPADM

## 2019-07-18 RX ORDER — SODIUM CHLORIDE 9 MG/ML
INJECTION, SOLUTION INTRAVENOUS
Status: DISCONTINUED | OUTPATIENT
Start: 2019-07-18 | End: 2019-07-18 | Stop reason: HOSPADM

## 2019-07-18 RX ORDER — SODIUM CHLORIDE 9 MG/ML
75 INJECTION, SOLUTION INTRAVENOUS CONTINUOUS
Status: DISPENSED | OUTPATIENT
Start: 2019-07-18 | End: 2019-07-18

## 2019-07-18 RX ORDER — LIDOCAINE HYDROCHLORIDE 20 MG/ML
INJECTION, SOLUTION EPIDURAL; INFILTRATION; INTRACAUDAL; PERINEURAL AS NEEDED
Status: DISCONTINUED | OUTPATIENT
Start: 2019-07-18 | End: 2019-07-18 | Stop reason: HOSPADM

## 2019-07-18 RX ORDER — SODIUM CHLORIDE 0.9 % (FLUSH) 0.9 %
5-40 SYRINGE (ML) INJECTION EVERY 8 HOURS
Status: DISCONTINUED | OUTPATIENT
Start: 2019-07-18 | End: 2019-07-20 | Stop reason: HOSPADM

## 2019-07-18 RX ORDER — MIDAZOLAM HYDROCHLORIDE 1 MG/ML
.25-5 INJECTION, SOLUTION INTRAMUSCULAR; INTRAVENOUS
Status: DISCONTINUED | OUTPATIENT
Start: 2019-07-18 | End: 2019-07-18 | Stop reason: HOSPADM

## 2019-07-18 RX ORDER — POTASSIUM CHLORIDE 750 MG/1
40 TABLET, FILM COATED, EXTENDED RELEASE ORAL
Status: COMPLETED | OUTPATIENT
Start: 2019-07-18 | End: 2019-07-18

## 2019-07-18 RX ORDER — SUCRALFATE 1 G/1
1 TABLET ORAL
Status: DISCONTINUED | OUTPATIENT
Start: 2019-07-18 | End: 2019-07-20 | Stop reason: HOSPADM

## 2019-07-18 RX ADMIN — CARVEDILOL 25 MG: 12.5 TABLET, FILM COATED ORAL at 18:11

## 2019-07-18 RX ADMIN — Medication 10 ML: at 21:27

## 2019-07-18 RX ADMIN — CARVEDILOL 25 MG: 12.5 TABLET, FILM COATED ORAL at 07:50

## 2019-07-18 RX ADMIN — ACETAMINOPHEN 650 MG: 325 TABLET ORAL at 07:50

## 2019-07-18 RX ADMIN — SODIUM CHLORIDE: 9 INJECTION, SOLUTION INTRAVENOUS at 14:41

## 2019-07-18 RX ADMIN — SUCRALFATE 1 G: 1 TABLET ORAL at 18:11

## 2019-07-18 RX ADMIN — POTASSIUM CHLORIDE 40 MEQ: 750 TABLET, FILM COATED, EXTENDED RELEASE ORAL at 10:38

## 2019-07-18 RX ADMIN — ATORVASTATIN CALCIUM 20 MG: 20 TABLET, FILM COATED ORAL at 07:51

## 2019-07-18 RX ADMIN — Medication 80 MCG: at 15:11

## 2019-07-18 RX ADMIN — Medication 1 AMPULE: at 08:11

## 2019-07-18 RX ADMIN — SODIUM CHLORIDE 40 MG: 9 INJECTION, SOLUTION INTRAMUSCULAR; INTRAVENOUS; SUBCUTANEOUS at 07:50

## 2019-07-18 RX ADMIN — Medication 10 ML: at 18:11

## 2019-07-18 RX ADMIN — SUCRALFATE 1 G: 1 TABLET ORAL at 21:26

## 2019-07-18 RX ADMIN — SODIUM CHLORIDE 40 MG: 9 INJECTION, SOLUTION INTRAMUSCULAR; INTRAVENOUS; SUBCUTANEOUS at 20:46

## 2019-07-18 RX ADMIN — PROPOFOL 70 MG: 10 INJECTION, EMULSION INTRAVENOUS at 15:08

## 2019-07-18 RX ADMIN — DEXTROSE MONOHYDRATE, SODIUM CHLORIDE, AND POTASSIUM CHLORIDE 100 ML/HR: 50; 4.5; 1.49 INJECTION, SOLUTION INTRAVENOUS at 21:26

## 2019-07-18 RX ADMIN — Medication 10 ML: at 03:43

## 2019-07-18 RX ADMIN — CALCIUM GLUCONATE 2 G: 98 INJECTION, SOLUTION INTRAVENOUS at 10:23

## 2019-07-18 RX ADMIN — Medication 80 MCG: at 15:09

## 2019-07-18 RX ADMIN — DOXAZOSIN 2 MG: 2 TABLET ORAL at 07:51

## 2019-07-18 RX ADMIN — DEXTROSE MONOHYDRATE, SODIUM CHLORIDE, AND POTASSIUM CHLORIDE 100 ML/HR: 50; 4.5; 1.49 INJECTION, SOLUTION INTRAVENOUS at 10:38

## 2019-07-18 RX ADMIN — LIDOCAINE HYDROCHLORIDE 100 MG: 20 INJECTION, SOLUTION EPIDURAL; INFILTRATION; INTRACAUDAL; PERINEURAL at 15:07

## 2019-07-18 NOTE — PROCEDURES
NAME:  Marylin Gonzalez   :   1940   MRN:   [id number]     Date/Time:  2019 3:05 PM    Esophagogastroduodenoscopy (EGD) Procedure Note    Procedure: Esophagogastroduodenoscopy --diagnostic    Indication: melena, acute post hemorrhagic anemia  Pre-operative Diagnosis: see indication above  Post-operative Diagnosis: see findings below  :  Jessica Montaño MD  Primary GI:  Dr. Gladys Mckinley  Referring Provider:   Sheila Schilling MD    Exam:  Airway: clear, no airway problems anticipated  Heart: RRR, without gallops or rubs  Lungs: clear bilaterally without wheezes, crackles, or rhonchi  Abdomen: soft, nontender, nondistended, bowel sounds present  Mental Status: awake, alert and oriented to person, place and time     Anethesia/Sedation:  MAC anesthesia Propofol  Procedure Details   After informed consent was obtained for the procedure, with all risks and benefits of procedure explained the patient was taken to the endoscopy suite and placed in the left lateral decubitus position. Following sequential administration of sedation as per above, the [id number] gastroscope was inserted into the mouth and advanced under direct vision to third portion of the duodenum. A careful inspection was made as the gastroscope was withdrawn, including a retroflexed view of the proximal stomach; findings and interventions are described below. Findings:   OROPHARYNX: Cords and pyriform recesses normal.   ESOPHAGUS:   -- distal esophagus with two separate ulcerations covering 2-3 mucosal folds, and extending about 1.5 cm proximal from z-line.  No active bleeding nor stigmata of recent bleeding.  -- normal proximal and mid esophageal mucosa  STOMACH:   -- yellow, bilious liquid in stomach without any signs of recent nor active bleeding  -- The fundus on antegrade and retroflex views is normal.  -- The body, antrum, and pylorus are grossly normal, but some scattered punctate erythema present, likely some bland gastritis  DUODENUM: The bulb, second and third portions are normal.    Therapies:  none    Specimens: none    EBL:  None. Complications:   None; patient tolerated the procedure well. Impression:  -- Two shallow, distal esophageal ulcers covering 2-3 mucosal folds, and extending about 1.5 cm proximal from z-line. No active bleeding nor stigmata of recent bleeding, but in the setting of plavix use this could have been source.   -- yellow, bilious gastric and duodenal contents, suggesting no recent bleeding  -- rectal exam just prior to procedure with sticky melenic stool in rectal vault, so he will definitely have more melena  -- remainder of exam normal    Recommendations:  -- PPI BID  -- add liquid carafate  -- follow H/H and hopefully GI output will clear  -- clear liquid diet today, in case needs a colonoscopy soon, though hopefully esophageal ulcers are the source of previous bleeding and won't be required    Discharge disposition: back to wards    Dameon Frias MD

## 2019-07-18 NOTE — ANESTHESIA PREPROCEDURE EVALUATION
Anesthetic History   No history of anesthetic complications            Review of Systems / Medical History  Patient summary reviewed, nursing notes reviewed and pertinent labs reviewed    Pulmonary    COPD    Sleep apnea: No treatment  Smoker      Comments: Former Smoker   Neuro/Psych         TIA     Cardiovascular    Hypertension        Dysrhythmias : PVC      Exercise tolerance: >4 METS  Comments: EF 60 - 65%  PVC's     GI/Hepatic/Renal     GERD    Renal disease: CRI  Hiatal hernia and liver disease    Comments: NAUSEA  ELEVATED LFTs  fatty liver  Nephrosclerosis Endo/Other        Arthritis and anemia     Other Findings              Physical Exam    Airway  Mallampati: I    Neck ROM: normal range of motion   Mouth opening: Normal     Cardiovascular  Regular rate and rhythm,  S1 and S2 normal,  no murmur, click, rub, or gallop             Dental    Dentition: Caps/crowns  Comments: 3 missing teeth   Pulmonary  Breath sounds clear to auscultation               Abdominal  GI exam deferred       Other Findings            Anesthetic Plan    ASA: 3  Anesthesia type: MAC and total IV anesthesia          Induction: Intravenous  Anesthetic plan and risks discussed with: Patient

## 2019-07-18 NOTE — H&P
Date of Surgery Update:  Teresa Cazares was seen and examined. History and physical has been reviewed. The patient has been examined.  There have been no significant clinical changes since the completion of the originally dated History and Physical.    Signed By: Zach Moody MD     July 18, 2019 3:05 PM

## 2019-07-18 NOTE — CONSULTS
[address]               Gastroenterology Consult Note  JOSEFINA Hutchinson Covering for Dr. Fidel Gaxiola     Admitting: Dr. Carlota Martinez Date: 7/18/2019     Subjective:     Chief Complaint: Anemia     History of Present Illness: Tiffani Malagon is a 67 y.o. male who is seen in consultation for  GI bleed. Patient is 2.5 weeks out from a sigmoid resection with primary anastomosis for a diverticular perforation by Dr. Carlota Martinez. Patient had an ileus post op that resolved and he was discharged to inpatient [facility] and rehab 6 days ago. Pt report prior to d/c he started having melena that continued. He admits to dizziness as well as fatigue. He has been on Plavix and 81 mg of ASA secondary to recent stent placement in May per pt. No other NSAID use. In ED pt's hgb was found to be 4, MCV 92.5, plt 169, BUN 37, Cr: 0.94. He received one unit with improvement to 5.2, on 7/17 hgb was initially 6.5, he received 3 more units on 7/17 and hgb increased to 11.3. Rechecked today and hgb 9.2 at 0400, repeated at 1020 and increase to 9.8. Pt reports he has had one episode last night of melena, nothing further today. Denies any pain, no N/V. Tolerating CLD for breakfast at 8:30 AM.      Previous endoscopic evaluation:  EGD for dysphagia by Dr. Fidel Gaxiola 6/1/18: Findings:      Esophagus: The esophageal mucosa in the proximal, mid and distal esophagus is normal.   The squamo-columnar junction is at 37 cm where the Z-line was noted. There is a 3 cm hiatal hernia. TTS CRE  Balloon dilations  were performed from 18-20 mm,each over 60 seconds     Stomach: The gastric mucosa has erythema in the body:biopsies. A few polyps are noted. Biopsies are taken. The fundus was found to be normal with no lesions noted on retroflexion. The angularis is normal as well.     Duodenum:   The bulb and post bulbar mucosa is normal in appearance. The duodenal folds are normal.      Path 1.  Gastric, biopsy:   Benign gastric oxyntic mucosa, no pathologic diagnosis. 2. Gastric polyp, biopsy:   Fundic gland polyp. Colonoscopy by Dr. Marquise Brown for constipation 16: Findings:      · The colon prep was grainy. Flat/sessile lesions can be missed. · 2 sessile polyps (6-8 mm) were seen and removed with a cold snare from the ascending colon  · Another sessile polyp was removed from the sigmoid colon with a cold snare(8 mm). · Mild sigmoid diverticulosis  Large internal hemorrhoids  Past Medical History:   Diagnosis Date    Arthritis     BPH (benign prostatic hyperplasia)     Chronic kidney disease     stage 2     Chronic obstructive pulmonary disease (HCC)     Elevated LFT's     Essential hypertension 01    GERD (gastroesophageal reflux disease)     hiatal hernia    Ill-defined condition 2016    faint    Liver disease     \"fatty liver\" as per patient    Nephrosclerosis     Orthostatic hypotension 01    PVC's 01    Sleep apnea     no CPAP    Syncope 01    secondary to venous insuffiency      Past Surgical History:   Procedure Laterality Date    COLONOSCOPY N/A 2016    COLONOSCOPY performed by Gualberto Guy [facility] at [facility] ENDOSCOPY    ECHO 2D ADULT  12    EF 60 - 65%; mild concentric hypertrophy; pulmonary systolic artery pressure upper limits normal    HX APPENDECTOMY      HX HERNIA REPAIR  2018    Naz Keith hiatal hernia repair- Vanessa Mares MD      Family History   Problem Relation Age of Onset    Stroke Father     Heart Disease Father      Social History     Tobacco Use    Smoking status: Former Smoker     Packs/day: 3.50     Last attempt to quit: 1980     Years since quittin.5    Smokeless tobacco: Never Used   Substance Use Topics    Alcohol use: No     Comment: no alcohol since       Allergies   Allergen Reactions    Benicar [Olmesartan] Unknown (comments)     Pt states he has found out that this is not a medication allergy.     Demerol [Meperidine] Unknown (comments)     Causes unconsciousness     Current Facility-Administered Medications   Medication Dose Route Frequency    calcium gluconate 2 g in 0.9% sodium chloride 100 mL IVPB  2 g IntraVENous ONCE    HYDROmorphone (PF) (DILAUDID) injection 0.5 mg  0.5 mg IntraVENous Q6H PRN    potassium chloride SR (KLOR-CON 10) tablet 40 mEq  40 mEq Oral NOW    dextrose 5% - 0.45% NaCl with KCl 20 mEq/L infusion  100 mL/hr IntraVENous CONTINUOUS    0.9% sodium chloride infusion 250 mL  250 mL IntraVENous PRN    pantoprazole (PROTONIX) 40 mg in sodium chloride 0.9% 10 mL injection  40 mg IntraVENous Q12H    0.9% sodium chloride infusion 250 mL  250 mL IntraVENous PRN    atorvastatin (LIPITOR) tablet 20 mg  20 mg Oral DAILY    carvedilol (COREG) tablet 25 mg  25 mg Oral BID WITH MEALS    doxazosin (CARDURA) tablet 2 mg  2 mg Oral DAILY    hydrALAZINE (APRESOLINE) 20 mg/mL injection 10 mg  10 mg IntraVENous Q6H PRN    acetaminophen (TYLENOL) tablet 650 mg  650 mg Oral Q6H PRN    ondansetron (ZOFRAN) injection 4 mg  4 mg IntraVENous Q4H PRN    prochlorperazine (COMPAZINE) with saline injection 10 mg  10 mg IntraVENous Q6H PRN    sodium chloride (NS) flush 5-40 mL  5-40 mL IntraVENous Q8H    sodium chloride (NS) flush 5-40 mL  5-40 mL IntraVENous PRN    alcohol 62% (NOZIN) nasal  1 Ampule  1 Ampule Topical Q12H        Review of Systems:    A detailed review of systems was performed as follows:  Constitutional:  Negative  Eyes:  No ocular sensitivity to the sun, blurred vision or double vision. ENMT:  No nose or sinus problems. Respiratory: No coughing, wheezing or sob  Cardiac:  No chest pain, exertional chest pain or palpitations  Gastrointestinal:  See history of the present illness  :   No pain with urination, BRB from tip of penis  Musculoskeletal:  No arthritis or hot swollen joints.     Endocrine:  No thyroid disease or diabetes  Psychiatric: No depression, +panic attacks  Integumentary: No skin rash or sensitivity to the sun. Neurologic:  No stroke or seizure; no numbness or tingling of the extremities. Heme-Lymphatic:  +anemia, no unexplained lumps or bumps      Objective:     Physical Exam:  Visit Vitals  BP 90/50   Pulse (!) 59   Temp 98.1 °F (36.7 °C)   Resp 15   Ht 5' 4\" (1.626 m)   Wt 83.4 kg (183 lb 13.8 oz)   SpO2 98%   BMI 31.56 kg/m²        GEN: Elderly WM, NAD  Skin:  Extremities and face reveal no rashes. HEENT: Sclerae anicteric. Extra-occular muscles are intact. No oral ulcers. No abnormal pigmentation of the lips. The neck is supple. Cardiovascular: Regular rate and rhythm. No murmurs, gallops, or rubs. Respiratory:  Comfortable breathing with no accessory muscle use. Clear breath sounds with no wheezes, rales, or rhonchi. GI:  Abdomen nondistended, soft, and nontender. Normal active bowel sounds. No enlargement of the liver or spleen. No masses palpable. Midline incision C/D/I. Rectal:  Deferred  : small quantity of BRB from tip of penis  Musculoskeletal:  +pitting edema of the lower legs. Extremities have good range of motion. Neurological:  Gross memory appears intact. Patient is alert and oriented. Psychiatric:  Mood appears appropriate with judgement intact. Lymphatic:  No cervical or supraclavicular adenopathy.     Laboratory:    Recent Results (from the past 24 hour(s))   HEMOGLOBIN    Collection Time: 07/17/19  5:41 PM   Result Value Ref Range    HGB 11.3 (L) 12.1 - 22.6 g/dL   METABOLIC PANEL, BASIC    Collection Time: 07/18/19  3:12 AM   Result Value Ref Range    Sodium 141 136 - 145 mmol/L    Potassium 3.4 (L) 3.5 - 5.1 mmol/L    Chloride 113 (H) 97 - 108 mmol/L    CO2 22 21 - 32 mmol/L    Anion gap 6 5 - 15 mmol/L    Glucose 85 65 - 100 mg/dL    BUN 17 6 - 20 MG/DL    Creatinine 0.72 0.70 - 1.30 MG/DL    BUN/Creatinine ratio 24 (H) 12 - 20      GFR est AA >60 >60 ml/min/1.73m2    GFR est non-AA >60 >60 ml/min/1.73m2    Calcium 6.7 (L) 8.5 - 10.1 MG/DL   CBC WITH AUTOMATED DIFF    Collection Time: 07/18/19  4:40 AM   Result Value Ref Range    WBC 4.1 4.1 - 11.1 K/uL    RBC 3.04 (L) 4.10 - 5.70 M/uL    HGB 9.2 (L) 12.1 - 17.0 g/dL    HCT 27.1 (L) 36.6 - 50.3 %    MCV 89.1 80.0 - 99.0 FL    MCH 30.3 26.0 - 34.0 PG    MCHC 33.9 30.0 - 36.5 g/dL    RDW 16.5 (H) 11.5 - 14.5 %    PLATELET 278 (L) 111 - 400 K/uL    MPV 11.0 8.9 - 12.9 FL    NRBC 0.0 0  WBC    ABSOLUTE NRBC 0.00 0.00 - 0.01 K/uL    NEUTROPHILS 66 32 - 75 %    LYMPHOCYTES 16 12 - 49 %    MONOCYTES 13 5 - 13 %    EOSINOPHILS 2 0 - 7 %    BASOPHILS 1 0 - 1 %    IMMATURE GRANULOCYTES 2 (H) 0.0 - 0.5 %    ABS. NEUTROPHILS 2.8 1.8 - 8.0 K/UL    ABS. LYMPHOCYTES 0.6 (L) 0.8 - 3.5 K/UL    ABS. MONOCYTES 0.5 0.0 - 1.0 K/UL    ABS. EOSINOPHILS 0.1 0.0 - 0.4 K/UL    ABS. BASOPHILS 0.0 0.0 - 0.1 K/UL    ABS. IMM. GRANS. 0.1 (H) 0.00 - 0.04 K/UL    DF AUTOMATED       CT Results (most recent):  Results from Hospital Encounter encounter on 07/16/19   CT ABD PELV W WO CONT    Narrative EXAM: CT ABD PELV W WO CONT    INDICATION: Severe gastrointestinal hemorrhage, anemia. Previous partial  colectomy, appendectomy, hiatal hernia repair. COMPARISON: CT abdomen/pelvis on 7/7/2019. CONTRAST: Pre and postcontrast 100 mL of Isovue-370. TECHNIQUE: Triphasic CT abdomen/pelvis. Multislice helical CT was performed from the diaphragm to the symphysis pubis  prior to and following uneventful rapid bolus intravenous contrast  administration. Oral contrast was not administered. Contiguous 5 mm axial  images were reconstructed and lung and soft tissue windows were generated. Coronal and sagittal reformations were generated. CT dose reduction was  achieved through use of a standardized protocol tailored for this examination  and automatic exposure control for dose modulation. FINDINGS:   Lung bases: Mild subsegmental atelectasis versus scarring. No pneumonia.   Thrombus is visible in the right lower lobe segmental arteries. Incidentally imaged heart and mediastinum: Unremarkable. CT without IV contrast:  1 mm calculus in the upper pole of the left kidney and lower pole of the right  kidney. Gallstone is subcentimeter. Radiodense suture at the sigmoid colon  anastomosis. Surgical clips are at the appendectomy site. No high density  material within the lumen of the bowel. CT with IV contrast:  Liver: Nodular undersurface. No suspicious mass. Portal vein is patent. Gallbladder: Mild mural thickening. CBD is not dilated. Spleen: Upper normal size. No mass or infarct. Pancreas: No mass or ductal dilatation. Adrenals: Unremarkable. Kidneys: No mass or hydronephrosis. Symmetric enhancement. Stomach: Partial distention. No contrast in the lumen. Small bowel: No obstruction. Normal caliber. No contrast in the lumen. Colon: Mild rectal fecal burden. Mild diverticulosis of the descending colon and  transverse colon. No diverticulitis. No contrast in the lumen. Appendix: Appendectomy  Peritoneum: No ascites or pneumoperitoneum. Retroperitoneum: Aortic atherosclerosis without aneurysm. No lymphadenopathy. No  hemorrhage. Reproductive organs: Prostate gland contains calcifications. Urinary bladder: No mass or calculus. Bones: No change. Additional comments: N/A      Impression IMPRESSION:    1. No CT evidence of active gastrointestinal hemorrhage. 2. Right lower lobe segmental artery pulmonary embolism is acute or subacute and  partially imaged. 3. Bilateral nonobstructing nephrolithiasis. I discussed the pulmonary embolism with Dr. Jeremy Koenig at 2214 hours on 7/16/2019. Assessment/Plan:     Active Problems:    GI bleed (7/16/2019)      Melena (7/18/2019)    Patient is presenting with melena with severe anemia. Appears to be hemodynamically stable at this time.   Agree that pt would benefit from an EGD at this time to look for source of UGI bleeding including PUD, AVM, gastritis, esophagitis, and other causes of UGI bleeding. Procedure including risks, benefits, and alternatives discussed with pt and he is in agreement with proceeding. NPO, continue to monitor hgb. Agree with protonix in interim. JOSEFINA Gutiérrez    07/18/19  10:30 AM    [id number] [facility], [address] [place] [address] [place]: [phone]    The patient was seen and examined independently by me. I have discussed the case with the mid-level provider in detail. I have reviewed the patient's chart and the note. Please see  note for full details. Physical exam:  General:AAO x 3, had liquids(apple juice. jello and some liquids) this am  HEENT:  EOMI,   Chest:  CTA,Heart: S1, S2, RRR  GI: Soft, NT,midline incision, ND + bowel sounds  Extremities: + edema    Data Review:  reviewed     Impression:    Anemia  Melena  Hx of sigmoid diverticular perforation and repair  On ASA and Plavix    Plan and discussion:  Agree with the need for an EGD to evaluate and possibly treat the source of his blood loss. Chart review and call to [facility] office shows that he is now a Dr Yulia Simons patient (He was planning to do a colonoscopy on him in the future). I called and informed Dr Tera Shirley (covering for Dr Rajesh Espinoza), who will take over his care. Agree with IV PPI,  Follow hgb closely,  We will keep him NPO for now for GSI to decide on the timing of the EGD (today or tomorrow). CT showed no active GI bleeding. Thank you             Signed By: Rehana Garcia.  Nataliya Juarez MD    7/18/2019  11:42 AM

## 2019-07-18 NOTE — ANESTHESIA POSTPROCEDURE EVALUATION
Procedure(s):  ESOPHAGOGASTRODUODENOSCOPY (EGD). MAC, total IV anesthesia    Anesthesia Post Evaluation        Patient location during evaluation: PACU  Note status: Adequate. Level of consciousness: responsive to verbal stimuli and sleepy but conscious  Pain management: satisfactory to patient  Airway patency: patent  Anesthetic complications: no  Cardiovascular status: acceptable  Respiratory status: acceptable  Hydration status: acceptable  Comments: +Post-Anesthesia Evaluation and Assessment    Patient: Kalpana Casper MRN: [id number]  SSN: [id number]   YOB: 1940  Age: 67 y.o. Sex: male      Cardiovascular Function/Vital Signs    BP 90/42   Pulse 66   Temp 36.7 °C (98 °F)   Resp 14   Ht 5' 4\" (1.626 m)   Wt 83.4 kg (183 lb 13.8 oz)   SpO2 100%   BMI 31.56 kg/m²     Patient is status post Procedure(s):  ESOPHAGOGASTRODUODENOSCOPY (EGD). Nausea/Vomiting: Controlled. Postoperative hydration reviewed and adequate. Pain:  Pain Scale 1: Numeric (0 - 10) (07/18/19 1520)  Pain Intensity 1: 0 (07/18/19 1520)   Managed. Neurological Status: At baseline. Mental Status and Level of Consciousness: Arousable. Pulmonary Status:   O2 Device: Nasal cannula (07/18/19 1521)   Adequate oxygenation and airway patent. Complications related to anesthesia: None    Post-anesthesia assessment completed. No concerns.     Signed By: Lewis Gutierrez MD    7/18/2019  Post anesthesia nausea and vomiting:  controlled      Vitals Value Taken Time   BP 90/42 7/18/2019  3:21 PM   Temp 36.7 °C (98 °F) 7/18/2019  3:20 PM   Pulse 66 7/18/2019  3:21 PM   Resp 14 7/18/2019  3:21 PM   SpO2 100 % 7/18/2019  3:21 PM

## 2019-07-18 NOTE — PROGRESS NOTES
Interdisciplinary team rounds were held   7/18/2019 with the following team members:Care Management, Diabetes Treatment Specialist, Nursing, Nutrition, Pharmacy, Physical Therapy, Physician and Respiratory therapy. Plan of care discussed. Goal: replenish electrolyte. Continue to monitor and support. See MD orders and progress notes for further  interventions and desired outcomes.

## 2019-07-18 NOTE — PROGRESS NOTES
1939  Bedside shift change report given to Mauricio Morton (oncoming nurse) by Herlinda Sharma (offgoing nurse). Report included the following information SBAR, Kardex, ED Summary, OR Summary, Procedure Summary, Intake/Output, MAR, Accordion, Recent Results, Med Rec Status, Cardiac Rhythm SR w/ 1st degree AVB and Alarm Parameters . 2300 Bedside shift change report given to [address] [place] [address] (oncoming nurse) by Mauricio Morton (offgoing nurse). Report included the following information SBAR, Kardex, ED Summary, OR Summary, Procedure Summary, Intake/Output, MAR, Accordion, Recent Results, Med Rec Status, Cardiac Rhythm SR/1st degree AVB, Alarm Parameters , Pre Procedure Checklist, Procedure Verification and Quality Measures.

## 2019-07-18 NOTE — PROGRESS NOTES
PULMONARY ASSOCI[facility] INTENSIVIST Consult Service Note  Pulmonary, Critical Care, and Sleep Medicine    Name: Gunner Garsia MRN: [id number]   : 1940 Hospital: [facility]   Date: 2019  Admission date: 2019 Hospital Day: 3       Subjective/Interval History:   Seen earlier today on rounds. Pt is acutely ill in the CCU. Patient was re-evaluated multiple times with repeated discussions with CCU team throughout the day     Feels sluggish. Last bloody BM about midnight. No pain. Hungry? No SOB. No chest pain. SCDs in place. Hgb still fluctuating. Last night 11.3 and now 9.2      IMPRESSION:   1. Acute LLL PE- found incidentally? Dopplers negative fo rDVT  2. Acute GI Bleed  3. Acute blood loss anemia   4. Recent ex lap for perforated bowel; focal mid sigmoid perforation secondary to localized diverticulitis, s/p segmental resection with primary anastomosis   5. repair followed by prolonged ileus  6. CAD- recent PCI Dr. Ghulam Monge for Dr. Tatum Arias 2019; Cath: Obstructive 1VD:LAD p80; OM1 40; OM2 90 (very small). RCA p30; d30; PDA 50. Successful VIRGINIE pLAD: 2.75x12 Oynx. RFA mynx -->ASA/plavix, statin; F/U with Dr. Elizabeth Naidu .  7. Echocardiogram on 19 -  Normal LV systolic function with an estimated ejection fraction of 60%. There is mild left ventricular hypertrophy. The left atrium is mildly enlarged. There is mild mitral regurgitation. There is mild to moderate tricuspid regurgitation. PA pressure 35 mmHg. [place] 8. GERD Hiatal hernia  9. BPH  10. Never smoker  Body mass index is 31.56 kg/m². 11. Additional workup outlined below  12. Multiorgan dysfunction as outlined above: Pt has one or more acute or chronic illnesses       RECOMMENDATIONS/PLAN:   1. If next Hgb stable, may be able to move to floor  2. MObilize  3. Replete calcium  4. Labs in AM  5. Diet per surgery  6. Unable to anticoagulate- relatively asymptomatic.  No indication for IVC filter for now  7. Transfuse for Hgb < 8 with h/o recent PCI  8. SCDs  9. Prn Supplemental O2 for refractory hypoxia to keep sats > 93%  10. Glucose monitoring and SSI  11. Replete electrolytes  12. Labs to follow electrolytes, renal function and and blood counts  13. Bronchial hygiene with respiratory therapy techniques, bronchodilators  14. Pt needs IV fluids with additives and Drug therapy requiring intensive monitoring for toxicity  15. Prescription drug management with home med reconciliation reviewed  16. DVT, SUP prophylaxis  17. Will be available to assist in medical management while in the CCU pending disposition         Subjective/Initial History:   I have reviewed the flowsheet and previous days notes. Seen earlier today on rounds. I was asked by Rula Mendiola MD to see Luis Angel Baires a 67 y.o.  male  in consultation for a chief complaint of Acute LLL PE on CTA  Excerpts from admission 7/16/2019 and consult notes reviewed as follows:     \"Chandler Johnson is a 67 y.o. male who is being seen for dark red stools and feeling dizzy and weak when he stands up. Patient is 2 weeks out from a sigmoid resection with primary anastomosis for a diverticular perforation. Patients had an ileus post op that resolved and he was discharged to inpatient rehab 4 days ago. He states he started having melena a day before discharge and it has been persistent until yesterday. He states his stools are near black and it sticks to his fingers when he wipes. His last BM was yesterday afternoon. He denies pain and nausea. He has been taking Plavix and 81 mg ASA. He denies SOB. He also has a history of a hiatal hernia that was repaired a year ago. Hgb 7 days ago was 12.9. It is 4.0 in the ER today. He is receiving his second unit of PRBCs in the ER at this time. \"    No win CCU. Despite transfusion Hgb still < 7. No chest pain. abdmoen sore. No nausea. More blood has been ordered.  Said when he left here 7/11 was having some bloody stools but instead of getiing older and less, it became more and would not stop. Mild SOB on presentation but better. Never smoked. Hungry. REcalls recent stent and has been on Plavix since. Patient PCP: Sheila Schilling MD  PMH:  has a past medical history of Arthritis, BPH (benign prostatic hyperplasia), Chronic kidney disease, Chronic obstructive pulmonary disease ([address] Elevated LFT's, Essential hypertension (9/24/01), GERD (gastroesophageal reflux disease), Ill-defined condition (12/02/2016), Liver disease, Nephrosclerosis, Orthostatic hypotension (9/24/01), PVC's (9/24/01), Sleep apnea, and Syncope (9/24/01). PSH:   has a past surgical history that includes echo 2d adult (5/24/12); hx appendectomy (1985); colonoscopy (N/A, 12/13/2016); and hx hernia repair (08/29/2018). FHX: family history includes Heart Disease in his father; Stroke in his father. SHX:  reports that he quit smoking about 38 years ago. He smoked 3.50 packs per day. He has never used smokeless tobacco. He reports that he does not drink alcohol or use drugs. ROS:A comprehensive review of systems was negative except for that written in the HPI. Allergies   Allergen Reactions    Benicar [Olmesartan] Unknown (comments)     Pt states he has found out that this is not a medication allergy.     Demerol [Meperidine] Unknown (comments)     Causes unconsciousness      MEDS:   Current Facility-Administered Medications   Medication    0.9% sodium chloride infusion 250 mL    pantoprazole (PROTONIX) 40 mg in sodium chloride 0.9% 10 mL injection    0.9% sodium chloride infusion 250 mL    atorvastatin (LIPITOR) tablet 20 mg    carvedilol (COREG) tablet 25 mg    doxazosin (CARDURA) tablet 2 mg    hydrALAZINE (APRESOLINE) 20 mg/mL injection 10 mg    acetaminophen (TYLENOL) tablet 650 mg    HYDROmorphone (PF) (DILAUDID) injection 0.5 mg    ondansetron (ZOFRAN) injection 4 mg    prochlorperazine (COMPAZINE) with saline injection 10 mg    sodium chloride (NS) flush 5-40 mL    sodium chloride (NS) flush 5-40 mL    alcohol 62% (NOZIN) nasal  1 Ampule            Objective:     Vital Signs: Telemetry:    normal sinus rhythm Intake/Output:   Visit Vitals  /51   Pulse 65   Temp 98.1 °F (36.7 °C)   Resp 18   Ht 5' 4\" (1.626 m)   Wt 83.4 kg (183 lb 13.8 oz)   SpO2 100%   BMI 31.56 kg/m²       Temp (24hrs), Av.5 °F (36.4 °C), Min:96.8 °F (36 °C), Max:98.1 °F (36.7 °C)        O2 Device: Room air           Body mass index is 31.56 kg/m². Wt Readings from Last 4 Encounters:   19 83.4 kg (183 lb 13.8 oz)   19 76.4 kg (168 lb 6.9 oz)   19 79.3 kg (174 lb 13.2 oz)   19 79.4 kg (175 lb 0.7 oz)          Intake/Output Summary (Last 24 hours) at 2019 0814  Last data filed at 2019 0000  Gross per 24 hour   Intake 2293.33 ml   Output 1775 ml   Net 518.33 ml       Last shift:      No intake/output data recorded. Last 3 shifts:  1901 -  0700  In: 4409.2 [P.O.:400; I.V.:2343.3]  Out: 2175 [Urine:2175]         Physical Exam:     General:  male; in no respiratory distress and acyanotic;    HEAD: Normocephalic, without obvious abnormality, atraumatic   EYES: conjunctivae clear. PERRL,  AN Icteric sclerae   NOSE: nares normal, no drainage, no nasal flaring,    THROAT: mucous membranes dry; Lips, mucosa dry; No Thrush;     Neck: Supple, symmetrical, trachea midline,  No accessory mm use; No Stridor/ cuff leak, No goiter or thyroid tenderness   LYMPH: No abnormally enlarged lymph nodes. in cervical or supraclavicular regions    Chest: normal   Lungs: normal air entry   Heart: Regular rate and rhythm;  NO edema   Abdomen: nondistended, tenderness mild - in the entire abdomen   : No Lowery;     Extremity: negative, cyanosis, clubbing; no joint swelling or erythema   Neuro: alert; [place]; speech fluent; withdraws to pain; unable to check gait and station; following simple commands   Psych: oriented to time, place and person ; No agitation;    Devices:per sepsis flow sheet- reviewed with team during IDR   Skin: Pallor;    Pulses:Bilateral, Radial, 2+   Capillary refill: abnormal: ; brisk capillary refill, pale,      Labs:    Recent Labs     07/18/19  0440 07/17/19  1741 07/17/19  0421  07/16/19  1755   WBC 4.1  --  4.6  --  5.6   HGB 9.2* 11.3* 6.5*   < > 4.0*   *  --  140*  --  169   INR  --   --   --   --  1.1    < > = values in this interval not displayed. Recent Labs     07/18/19  0312 07/17/19  0421 07/16/19  1755    142 143   K 3.4* 3.6 3.6   * 115* 114*   CO2 22 23 21   GLU 85 123* 111*   BUN 17 28* 37*   CREA 0.72 0.91 0.94   CA 6.7* 7.0* 7.3*   ALB  --   --  1.8*   SGOT  --   --  43*   ALT  --   --  53     No results for input(s): PH, PCO2, PO2, HCO3, FIO2 in the last 72 hours. Recent Labs     07/16/19  1755   TROIQ <0.05     No results found for: BNPP, BNP   No results found for: CULT   No results found for: VANCT, CPK    Imaging:  I have personally reviewed the patients radiographs and have reviewed the reports:    CXR Results  (Last 48 hours)               07/16/19 1823  XR CHEST PORT Final result    Impression:  IMPRESSION:   No acute process. Narrative:  PORTABLE CHEST RADIOGRAPH/S: 7/16/2019 6:23 PM       Clinical history: GI bleed   INDICATION:   GI bleed   COMPARISON: 4/25/2018       FINDINGS:   AP portable upright view of the chest demonstrates a stable  cardiopericardial   silhouette. The lungs are adequately expanded. There is no edema, effusion,   consolidation, or pneumothorax. The osseous structures are unremarkable. Patient   is on a cardiac monitor.                 Results from Hospital Encounter encounter on 07/16/19   XR CHEST PORT    Narrative PORTABLE CHEST RADIOGRAPH/S: 7/16/2019 6:23 PM    Clinical history: GI bleed  INDICATION:   GI bleed  COMPARISON: 4/25/2018    FINDINGS:  AP portable upright view of the chest demonstrates a stable  cardiopericardial  silhouette. The lungs are adequately expanded. There is no edema, effusion,  consolidation, or pneumothorax. The osseous structures are unremarkable. Patient  is on a cardiac monitor. Impression IMPRESSION:  No acute process. Results from [place] encounter on 07/01/19   XR ABD (KUB)    Narrative INTERPRETATION PROVIDED FOR COMPLIANCE ONLY AT NO CHARGE    An AP view of the abdomen demonstrates the tip of the enteric tube overlying the  body of the stomach. Gaseous distention of the stomach and small bowel are  unchanged. XR ABD (KUB)    Narrative EXAM: XR ABD (KUB)    INDICATION: confirmation of NG tube placement    COMPARISON: 7/1/2019. FINDINGS: A supine radiograph of the abdomen shows multiple dilated bowel loops. Surgical clips are noted overlying the patient's pelvis and this may represent a  postoperative ileus. The tip of the NG tube overlies the gastroesophageal junction and the sidehole  overlies the distal esophagus. Impression IMPRESSION:     Tip of NG tube overlies the gastroesophageal junction and the tube should be  advanced. Probable postoperative ileus. Results from [place] encounter on 07/16/19   CT ABD PELV W WO CONT    Narrative EXAM: CT ABD PELV W WO CONT    INDICATION: Severe gastrointestinal hemorrhage, anemia. Previous partial  colectomy, appendectomy, hiatal hernia repair. COMPARISON: CT abdomen/pelvis on 7/7/2019. CONTRAST: Pre and postcontrast 100 mL of Isovue-370. TECHNIQUE: Triphasic CT abdomen/pelvis. Multislice helical CT was performed from the diaphragm to the symphysis pubis  prior to and following uneventful rapid bolus intravenous contrast  administration. Oral contrast was not administered. Contiguous 5 mm axial  images were reconstructed and lung and soft tissue windows were generated. Coronal and sagittal reformations were generated.   CT dose reduction was  achieved through use of a standardized protocol tailored for this examination  and automatic exposure control for dose modulation. FINDINGS:   Lung bases: Mild subsegmental atelectasis versus scarring. No pneumonia. Thrombus is visible in the right lower lobe segmental arteries. Incidentally imaged heart and mediastinum: Unremarkable. CT without IV contrast:  1 mm calculus in the upper pole of the left kidney and lower pole of the right  kidney. Gallstone is subcentimeter. Radiodense suture at the sigmoid colon  anastomosis. Surgical clips are at the appendectomy site. No high density  material within the lumen of the bowel. CT with IV contrast:  Liver: Nodular undersurface. No suspicious mass. Portal vein is patent. Gallbladder: Mild mural thickening. CBD is not dilated. Spleen: Upper normal size. No mass or infarct. Pancreas: No mass or ductal dilatation. Adrenals: Unremarkable. Kidneys: No mass or hydronephrosis. Symmetric enhancement. Stomach: Partial distention. No contrast in the lumen. Small bowel: No obstruction. Normal caliber. No contrast in the lumen. Colon: Mild rectal fecal burden. Mild diverticulosis of the descending colon and  transverse colon. No diverticulitis. No contrast in the lumen. Appendix: Appendectomy  Peritoneum: No ascites or pneumoperitoneum. Retroperitoneum: Aortic atherosclerosis without aneurysm. No lymphadenopathy. No  hemorrhage. Reproductive organs: Prostate gland contains calcifications. Urinary bladder: No mass or calculus. Bones: No change. Additional comments: N/A      Impression IMPRESSION:    1. No CT evidence of active gastrointestinal hemorrhage. 2. Right lower lobe segmental artery pulmonary embolism is acute or subacute and  partially imaged. 3. Bilateral nonobstructing nephrolithiasis. I discussed the pulmonary embolism with Dr. Mat Antoine at 2214 hours on 7/16/2019.             During this entire length of time the patient's condition was unstable, unpredictable and critically ill in the CCU/ ICU. I was immediately available to the patient whose care required several interactions with nursing, multidisciplinary team members leading to multiple interventions with fluid resuscitation and medication adjustments to optimize respiratory support, hemodynamic treatment, medication changes based on repeat labs results, reviews, exams and assessments. The reason for providing this level of medical care was due to a critical illness that impaired one or more vital organ systems, such that there was a high probability of sudden or life threatening deterioration in the patient's condition. This care involved high complexity medical decision making to treat acute and unstable vital organ system failure, and to prevent further life threatening deterioration of the patients condition. I personally:  · Reviewed the flowsheet and previous days notes  · Reviewed and summarized records or history from previous days note or discussions with staff, family  · Parenteral controlled substances - Reviewed/ Adjusted / Robby Masker / Started  · High Risk Drug therapy requiring intensive monitoring for toxicity: eg steroids, pressors, antibiotics  · Reviewed and/or ordered Clinical lab tests  · Reviewed and/or ordered Radiology tests  · Reviewed and/or ordered of Medicine tests  · Independently visualized radiologic Images  · Reviewed the patients ECG / Telemetry  · discussed my assessment/management with : Consultants, Nursing, Pharmacy, Case Management, PT, OT, Respiratory Therapy, for coordination of care           Thank you for allowing us to participate in the care of this patient. We will follow along with you until they no longer require CCU services.     Rolando Foley MD

## 2019-07-18 NOTE — ROUTINE PROCESS
0700-Bedside shift change report given to Miguelangel Rojas (oncoming nurse) by Shantal Nettles (offgoing nurse). Report included the following information SBAR, Kardex and MAR.   8556- Patient assessed. See flowsheet. 1015- Labs drawn and sent. 1030- Patient OOB to chair. 1140- Dr. Johnson Come paged in regards to H and H results. 1210- Reassessed. No changes. Patient's daughter is at the bedside. Updated her on the patient's status and plan of care. [id number]- Report given to Eduar Sanchez RN.   1330- Patient transported with nurse and monitor.

## 2019-07-18 NOTE — PROGRESS NOTES
Admit Date: 2019    Subjective:     Patient has no new complaints. Scant smear BM overnight. Objective:     Blood pressure 90/50, pulse (!) 59, temperature 98.1 °F (36.7 °C), resp. rate 15, height 5' 4\" (1.626 m), weight 183 lb 13.8 oz (83.4 kg), SpO2 98 %. Temp (24hrs), Av.5 °F (36.4 °C), Min:96.8 °F (36 °C), Max:98.1 °F (36.7 °C)      Physical Exam:  GENERAL: alert, cooperative, no distress, appears stated age, LUNG: clear to auscultation bilaterally, HEART: regular rate and rhythm, ABDOMEN: soft, non-tender. Bowel sounds normal. Wound c/d/i, EXTREMITIES:  extremities normal, atraumatic, no cyanosis or edema    Labs:   Recent Results (from the past 24 hour(s))   HEMOGLOBIN    Collection Time: 19  5:41 PM   Result Value Ref Range    HGB 11.3 (L) 12.1 - 02.1 g/dL   METABOLIC PANEL, BASIC    Collection Time: 19  3:12 AM   Result Value Ref Range    Sodium 141 136 - 145 mmol/L    Potassium 3.4 (L) 3.5 - 5.1 mmol/L    Chloride 113 (H) 97 - 108 mmol/L    CO2 22 21 - 32 mmol/L    Anion gap 6 5 - 15 mmol/L    Glucose 85 65 - 100 mg/dL    BUN 17 6 - 20 MG/DL    Creatinine 0.72 0.70 - 1.30 MG/DL    BUN/Creatinine ratio 24 (H) 12 - 20      GFR est AA >60 >60 ml/min/1.73m2    GFR est non-AA >60 >60 ml/min/1.73m2    Calcium 6.7 (L) 8.5 - 10.1 MG/DL   CBC WITH AUTOMATED DIFF    Collection Time: 19  4:40 AM   Result Value Ref Range    WBC 4.1 4.1 - 11.1 K/uL    RBC 3.04 (L) 4.10 - 5.70 M/uL    HGB 9.2 (L) 12.1 - 17.0 g/dL    HCT 27.1 (L) 36.6 - 50.3 %    MCV 89.1 80.0 - 99.0 FL    MCH 30.3 26.0 - 34.0 PG    MCHC 33.9 30.0 - 36.5 g/dL    RDW 16.5 (H) 11.5 - 14.5 %    PLATELET 628 (L) 941 - 400 K/uL    MPV 11.0 8.9 - 12.9 FL    NRBC 0.0 0  WBC    ABSOLUTE NRBC 0.00 0.00 - 0.01 K/uL    NEUTROPHILS 66 32 - 75 %    LYMPHOCYTES 16 12 - 49 %    MONOCYTES 13 5 - 13 %    EOSINOPHILS 2 0 - 7 %    BASOPHILS 1 0 - 1 %    IMMATURE GRANULOCYTES 2 (H) 0.0 - 0.5 %    ABS.  NEUTROPHILS 2.8 1.8 - 8.0 K/UL ABS. LYMPHOCYTES 0.6 (L) 0.8 - 3.5 K/UL    ABS. MONOCYTES 0.5 0.0 - 1.0 K/UL    ABS. EOSINOPHILS 0.1 0.0 - 0.4 K/UL    ABS. BASOPHILS 0.0 0.0 - 0.1 K/UL    ABS. IMM. GRANS. 0.1 (H) 0.00 - 0.04 K/UL    DF AUTOMATED         Data Review images and reports reviewed    Assessment:     Active Problems:    GI bleed (7/16/2019)        Plan/Recommendations/Medical Decision Making:     Continue present treatment   Likely UGI bleeding with black sticky stools, no h/o brbpr, unlikely this is from his sigmoid colon anastomosis. Course complicated by small PE. No DVT identified. Not a candidate for anticoagulation presently. Needs EGD to r/o ugi source of bleeding. Consult GI now. NPO until after EGD. Hemodynamically stable. Efraín Mccarthy.  Jennifer Caceres MD, [facility]

## 2019-07-18 NOTE — PROGRESS NOTES
2300  [facility] [address] [place] [address]  Lab called. CBC clotted. Will redraw. 0440  CBC resulted Hgb drop to 9.2.    0700  Uneventful night. Pt rested well most of the night. Bedside report given to Angel Del Valle, [facility].

## 2019-07-19 LAB
ALBUMIN SERPL-MCNC: 1.7 G/DL (ref 3.5–5)
ALBUMIN/GLOB SERPL: 0.7 {RATIO} (ref 1.1–2.2)
ALP SERPL-CCNC: 168 U/L (ref 45–117)
ALT SERPL-CCNC: 59 U/L (ref 12–78)
ANION GAP SERPL CALC-SCNC: 5 MMOL/L (ref 5–15)
AST SERPL-CCNC: 43 U/L (ref 15–37)
BILIRUB SERPL-MCNC: 0.9 MG/DL (ref 0.2–1)
BUN SERPL-MCNC: 12 MG/DL (ref 6–20)
BUN/CREAT SERPL: 15 (ref 12–20)
CALCIUM SERPL-MCNC: 7 MG/DL (ref 8.5–10.1)
CHLORIDE SERPL-SCNC: 112 MMOL/L (ref 97–108)
CO2 SERPL-SCNC: 22 MMOL/L (ref 21–32)
COMMENT, HOLDF: NORMAL
CREAT SERPL-MCNC: 0.8 MG/DL (ref 0.7–1.3)
ERYTHROCYTE [DISTWIDTH] IN BLOOD BY AUTOMATED COUNT: 16.6 % (ref 11.5–14.5)
GLOBULIN SER CALC-MCNC: 2.4 G/DL (ref 2–4)
GLUCOSE SERPL-MCNC: 114 MG/DL (ref 65–100)
HCT VFR BLD AUTO: 28.5 % (ref 36.6–50.3)
HGB BLD-MCNC: 9.6 G/DL (ref 12.1–17)
MAGNESIUM SERPL-MCNC: 1.7 MG/DL (ref 1.6–2.4)
MCH RBC QN AUTO: 30.2 PG (ref 26–34)
MCHC RBC AUTO-ENTMCNC: 33.7 G/DL (ref 30–36.5)
MCV RBC AUTO: 89.6 FL (ref 80–99)
NRBC # BLD: 0 K/UL (ref 0–0.01)
NRBC BLD-RTO: 0 PER 100 WBC
PHOSPHATE SERPL-MCNC: 2.3 MG/DL (ref 2.6–4.7)
PLATELET # BLD AUTO: 134 K/UL (ref 150–400)
PMV BLD AUTO: 11 FL (ref 8.9–12.9)
POTASSIUM SERPL-SCNC: 3.8 MMOL/L (ref 3.5–5.1)
PROT SERPL-MCNC: 4.1 G/DL (ref 6.4–8.2)
RBC # BLD AUTO: 3.18 M/UL (ref 4.1–5.7)
SAMPLES BEING HELD,HOLD: NORMAL
SODIUM SERPL-SCNC: 139 MMOL/L (ref 136–145)
WBC # BLD AUTO: 3.3 K/UL (ref 4.1–11.1)

## 2019-07-19 PROCEDURE — 83735 ASSAY OF MAGNESIUM: CPT

## 2019-07-19 PROCEDURE — 74011250636 HC RX REV CODE- 250/636: Performed by: INTERNAL MEDICINE

## 2019-07-19 PROCEDURE — 94760 N-INVAS EAR/PLS OXIMETRY 1: CPT

## 2019-07-19 PROCEDURE — C9113 INJ PANTOPRAZOLE SODIUM, VIA: HCPCS | Performed by: INTERNAL MEDICINE

## 2019-07-19 PROCEDURE — 36415 COLL VENOUS BLD VENIPUNCTURE: CPT

## 2019-07-19 PROCEDURE — 80053 COMPREHEN METABOLIC PANEL: CPT

## 2019-07-19 PROCEDURE — 65270000029 HC RM PRIVATE

## 2019-07-19 PROCEDURE — 74011250637 HC RX REV CODE- 250/637: Performed by: SURGERY

## 2019-07-19 PROCEDURE — 84100 ASSAY OF PHOSPHORUS: CPT

## 2019-07-19 PROCEDURE — 85027 COMPLETE CBC AUTOMATED: CPT

## 2019-07-19 PROCEDURE — 74011250637 HC RX REV CODE- 250/637: Performed by: INTERNAL MEDICINE

## 2019-07-19 PROCEDURE — 74011000250 HC RX REV CODE- 250: Performed by: INTERNAL MEDICINE

## 2019-07-19 RX ORDER — PANTOPRAZOLE SODIUM 20 MG/1
20 TABLET, DELAYED RELEASE ORAL 2 TIMES DAILY
Qty: 60 TAB | Refills: 0 | Status: SHIPPED | OUTPATIENT
Start: 2019-07-19 | End: 2019-08-18

## 2019-07-19 RX ORDER — SUCRALFATE 1 G/1
1 TABLET ORAL
Qty: 120 TAB | Refills: 0 | Status: SHIPPED | OUTPATIENT
Start: 2019-07-19 | End: 2019-08-18

## 2019-07-19 RX ADMIN — Medication 10 ML: at 17:05

## 2019-07-19 RX ADMIN — DOXAZOSIN 2 MG: 2 TABLET ORAL at 08:18

## 2019-07-19 RX ADMIN — SUCRALFATE 1 G: 1 TABLET ORAL at 17:05

## 2019-07-19 RX ADMIN — SODIUM CHLORIDE 40 MG: 9 INJECTION, SOLUTION INTRAMUSCULAR; INTRAVENOUS; SUBCUTANEOUS at 08:18

## 2019-07-19 RX ADMIN — SUCRALFATE 1 G: 1 TABLET ORAL at 12:08

## 2019-07-19 RX ADMIN — CARVEDILOL 25 MG: 12.5 TABLET, FILM COATED ORAL at 08:18

## 2019-07-19 RX ADMIN — SUCRALFATE 1 G: 1 TABLET ORAL at 21:03

## 2019-07-19 RX ADMIN — SODIUM CHLORIDE 40 MG: 9 INJECTION, SOLUTION INTRAMUSCULAR; INTRAVENOUS; SUBCUTANEOUS at 21:03

## 2019-07-19 RX ADMIN — ATORVASTATIN CALCIUM 20 MG: 20 TABLET, FILM COATED ORAL at 08:18

## 2019-07-19 RX ADMIN — SUCRALFATE 1 G: 1 TABLET ORAL at 06:38

## 2019-07-19 RX ADMIN — CARVEDILOL 25 MG: 12.5 TABLET, FILM COATED ORAL at 17:06

## 2019-07-19 RX ADMIN — Medication 10 ML: at 06:38

## 2019-07-19 NOTE — PROGRESS NOTES
Pharmacy Medication Reconciliation     The patient was interviewed regarding current PTA medication list, use and drug allergies. The patient was questioned regarding use of any other inhalers, topical products, over the counter medications, herbal medications, vitamin products or ophthalmic/nasal/otic medication use. Sources: Patient, Rx Query    Allergy Update: Benicar [olmesartan] and Demerol [meperidine]- confirmed    Recommendations/Findings: The following amendments were made to the patient's active medication list on file at [address]:   1) Additions: none    2) Deletions:   Calcium carbonate  Clopidogrel    3) Changes: none    4) Pertinent Pharmacy Findings:none        -Clarified PTA med list with patient. PTA medication list was corrected to the following:     Prior to Admission Medications   Prescriptions Last Dose Informant Patient Reported? Taking?   aspirin 81 mg chewable tablet 7/16/2019 at AM Self Yes No   Sig: Take 1 Tab by mouth daily. atorvastatin (LIPITOR) 20 mg tablet 7/16/2019 at AM Self Yes No   Sig: Take 20 mg by mouth daily. carvedilol (COREG) 25 mg tablet 7/16/2019 at AM Self Yes No   Sig: Take 25 mg by mouth two (2) times daily (with meals). cholecalciferol (VITAMIN D3) 1,000 unit cap 7/16/2019 at AM Self Yes No   Sig: Take 1,000 Units by mouth daily. cyanocobalamin 1,000 mcg tablet 7/16/2019 at AM Self Yes No   Sig: Take 1,000 mcg by mouth daily. doxazosin (CARDURA) 2 mg tablet 7/16/2019 at AM Self Yes No   Sig: Take 2 mg by mouth daily. polyethylene glycol (MIRALAX) 17 gram packet 7/10/2019 at Unknown time Self No Yes   Sig: Take 1 Packet by mouth daily for 30 days.       Facility-Administered Medications: None          Thank you,  Gaby Fernandez [address] [employer] Student

## 2019-07-19 NOTE — PROGRESS NOTES
Admit Date: 2019    Subjective:     Patient has no new complaints. No further bleeding. tatianna clears well. EGD yesterday shows 2 distal esophageal ulcers. Objective:     Blood pressure 111/52, pulse 70, temperature 97.6 °F (36.4 °C), resp. rate 16, height 5' 4\" (1.626 m), weight 183 lb 13.8 oz (83.4 kg), SpO2 97 %. Temp (24hrs), Av.1 °F (36.7 °C), Min:97.6 °F (36.4 °C), Max:98.7 °F (37.1 °C)      Physical Exam:  GENERAL: alert, cooperative, no distress, appears stated age, LUNG: clear to auscultation bilaterally, HEART: regular rate and rhythm, ABDOMEN: soft, non-tender.  Bowel sounds normal. Wound c/d/i, EXTREMITIES:  extremities normal, atraumatic, no cyanosis or edema    Labs:   Recent Results (from the past 24 hour(s))   HGB & HCT    Collection Time: 19 10:20 AM   Result Value Ref Range    HGB 9.8 (L) 12.1 - 17.0 g/dL    HCT 28.4 (L) 36.6 - 50.3 %   HGB & HCT    Collection Time: 19  8:02 PM   Result Value Ref Range    HGB 9.8 (L) 12.1 - 17.0 g/dL    HCT 28.9 (L) 36.6 - 50.3 %   CBC W/O DIFF    Collection Time: 19  2:02 AM   Result Value Ref Range    WBC 3.3 (L) 4.1 - 11.1 K/uL    RBC 3.18 (L) 4.10 - 5.70 M/uL    HGB 9.6 (L) 12.1 - 17.0 g/dL    HCT 28.5 (L) 36.6 - 50.3 %    MCV 89.6 80.0 - 99.0 FL    MCH 30.2 26.0 - 34.0 PG    MCHC 33.7 30.0 - 36.5 g/dL    RDW 16.6 (H) 11.5 - 14.5 %    PLATELET 400 (L) 616 - 400 K/uL    MPV 11.0 8.9 - 12.9 FL    NRBC 0.0 0  WBC    ABSOLUTE NRBC 0.00 0.00 - 0.01 K/uL   PHOSPHORUS    Collection Time: 19  2:02 AM   Result Value Ref Range    Phosphorus 2.3 (L) 2.6 - 4.7 MG/DL   MAGNESIUM    Collection Time: 19  2:02 AM   Result Value Ref Range    Magnesium 1.7 1.6 - 2.4 mg/dL   METABOLIC PANEL, COMPREHENSIVE    Collection Time: 19  2:02 AM   Result Value Ref Range    Sodium 139 136 - 145 mmol/L    Potassium 3.8 3.5 - 5.1 mmol/L    Chloride 112 (H) 97 - 108 mmol/L    CO2 22 21 - 32 mmol/L    Anion gap 5 5 - 15 mmol/L    Glucose 114 (H) 65 - 100 mg/dL    BUN 12 6 - 20 MG/DL    Creatinine 0.80 0.70 - 1.30 MG/DL    BUN/Creatinine ratio 15 12 - 20      GFR est AA >60 >60 ml/min/1.73m2    GFR est non-AA >60 >60 ml/min/1.73m2    Calcium 7.0 (L) 8.5 - 10.1 MG/DL    Bilirubin, total 0.9 0.2 - 1.0 MG/DL    ALT (SGPT) 59 12 - 78 U/L    AST (SGOT) 43 (H) 15 - 37 U/L    Alk. phosphatase 168 (H) 45 - 117 U/L    Protein, total 4.1 (L) 6.4 - 8.2 g/dL    Albumin 1.7 (L) 3.5 - 5.0 g/dL    Globulin 2.4 2.0 - 4.0 g/dL    A-G Ratio 0.7 (L) 1.1 - 2.2     SAMPLES BEING HELD    Collection Time: 07/19/19  2:02 AM   Result Value Ref Range    SAMPLES BEING HELD 1LAV     COMMENT        Add-on orders for these samples will be processed based on acceptable specimen integrity and analyte stability, which may vary by analyte. Data Review images and reports reviewed    Assessment:     Active Problems:    GI bleed (7/16/2019)      Melena (7/18/2019)        Plan/Recommendations/Medical Decision Making:     Continue present treatment   H/H unchanged. No evidence of active bleeding. Hemodynamically stable. Advance diet. Stop q6h h/h  Continue bid protonix and qid carafate  Repeat cbc in am and likely ready for return to rehab tomorrow. Cori Choi.  Nataliya Young MD, [facility]

## 2019-07-19 NOTE — PROGRESS NOTES
Transition of Care Plan to SNF/Rehab    SNF/Rehab Transition:  Patient has been accepted to [facility] and meets criteria for admission. Patient will transported by family and expected to leave at midday. Communication to Patient/Family:  Met with patient and Harris Dotson pt's dtr (identified care giver) and they are agreeable to the transition plan. Communication to SNF/Rehab:  Bedside RN,    , has been notified to update the transition plan to the facility and call report (phone number [phone]). Discharge information has been updated on the AVS.     Discharge instructions to be fax'd to facility at [facility] #             ).     SNF/Rehab Transition:  Patient to follow-up with Home Health: [facility], other TBD ,none)  PCP/Specialist: NENITA  Ambulatory Care Management: TBD    Reviewed and confirmed with facility,     they can manage the patient care needs for the following:     Anthony with (X) only those applicable:    Medication:  [x]  Medications will be available at the facility  []  IV Antibiotics N/A  [x]  Controlled Substance - hard copy to be sent with patient   []  Weekly Labs   Documents:  [x] Hard RX  [] MAR  [] Kardex  [] AVS  []Transfer Summary  []Discharge   Equipment:  []  CPAP/BiPAP  []  Wound Vacuum  []  Lowery or Urinary Device  []  PICC/Central Line  []  Nebulizer  []  Ventilator   Treatment:  []Isolation (for MRSA, VRE, etc.)  []Surgical Drain Management  []Tracheostomy Care  []Dressing Changes  []Dialysis with transportation and chair time N/A.  []PEG Care  []Oxygen  []Daily Weights for Heart Failure   Dietary:  []Any diet limitations  []Tube Feedings   []Total Parenteral Management (TPN)   Eligible for Medicaid Long Term Services and Supports  Yes:  [] Eligible for medical assistance or will become eligible within 180 days and UAI completed. [] Provider/Patient and/or support system has requested screening.   [] UAI copy provided to patient or responsible party, .  [] UAI unavailable at discharge will send once processed to SNF provider. [] UAI unavailable at discharged mailed to patient  No:   [] Private pay and is not financially eligible for Medicaid within the next 180 days. [] Reside out-of-state.   [] A residents of a state owned/operated facility that is licensed  by [address] and [email or web address] [facility] or [facility]  [] Enrollment in [facility] hospice services  []  Medical [facility]  [] Patient /Family declines to have screening completed or provide financial information for screening     Financial Resources:  Medicaid    [] Initiated and application pending   [] Full coverage     Advanced Care Plan:  [x]Surrogate Decision Maker of Care  []POA  []Communicated Code Status Full (DDNR\", \"Full\")    Other

## 2019-07-19 NOTE — PROGRESS NOTES
General Surgery End of Shift Nursing Note    Bedside shift change report given to Lakia Richards RN (oncoming nurse) by Kiran Vasquez RN (offgoing nurse). Report included the following information SBAR, Kardex, Procedure Summary, Intake/Output, MAR and Recent Results. Shift worked:   7p-7a   Summary of shift:    Patient able to rest comfortably overnight with no complaints. Voiding appropriately in urinal.   H/H every 6 hours - 9.8 @ 2002 & 9.6 @ 0202. Issues for physician to address:   None at this time. Number times ambulated in hallway past shift: 0    Number of times OOB to chair past shift: 0    GI:    Current diet:  DIET CLEAR LIQUID    Tolerating current diet: YES  Passing flatus: YES    Respiratory:    Incentive Spirometer at bedside: YES  Patient instructed on use: YES    Patient Safety:    Bed Alarm On? No  Sitter?  No    Tracy Anne

## 2019-07-19 NOTE — PROGRESS NOTES
Plan:  -[place] [facility] for rehab  -Dtr to transport     Plan for d/c tomorrow (Saturday) back to [facility] for continued rehab. CM spoke with [facility]. at [facility]. They are prepared to accept pt back tomorrow. Pt states dtr will transport. CM PC to pt's dtr, Bella Trujillo, updating on plan. Dtr can transport tomorrow. Plan for labs in the morning and then d/c, if stable. SNF note completed. Pt not d/c with any special needs. Medicare pt has received, reviewed, and signed 2nd IM letter informing them of their right to appeal the discharge. Signed copy has been placed on pt bedside chart. Nursing call report to [facility] at [phone]. Family to transport. CM available for any additional needs. Care Management Interventions  PCP Verified by CM: Yes  Mode of Transport at Discharge:  Other (see comment)(Pt's daughter )  Transition of Care Consult (CM Consult): SNF  Partner SNF: Yes  Discharge Durable Medical Equipment: No  Physical Therapy Consult: No  Occupational Therapy Consult: No  Speech Therapy Consult: No  Current Support Network: Own Home, Lives Alone  Confirm Follow Up Transport: Family  Plan discussed with Pt/Family/Caregiver: Yes  Freedom of Choice Offered: Yes  Discharge Location  Discharge Placement: Skilled nursing facility       DORENE Morales  Care Manager

## 2019-07-19 NOTE — PROGRESS NOTES
GI PROGRESS NOTE    NAME:             Bobbi Allan   :              1940   MRN:              [id number]   Admit Date:     2019  Todays Date:  2019      Subjective: Tolerating clear liquids. Stool still dark. No nausea, vomiting or abd pain. Medications-reviewed     Current Facility-Administered Medications   Medication Dose Route Frequency    HYDROmorphone (PF) (DILAUDID) injection 0.5 mg  0.5 mg IntraVENous Q6H PRN    sodium chloride (NS) flush 5-40 mL  5-40 mL IntraVENous Q8H    sodium chloride (NS) flush 5-40 mL  5-40 mL IntraVENous PRN    sucralfate (CARAFATE) tablet 1 g  1 g Oral AC&HS    0.9% sodium chloride infusion 250 mL  250 mL IntraVENous PRN    pantoprazole (PROTONIX) 40 mg in sodium chloride 0.9% 10 mL injection  40 mg IntraVENous Q12H    0.9% sodium chloride infusion 250 mL  250 mL IntraVENous PRN    atorvastatin (LIPITOR) tablet 20 mg  20 mg Oral DAILY    carvedilol (COREG) tablet 25 mg  25 mg Oral BID WITH MEALS    doxazosin (CARDURA) tablet 2 mg  2 mg Oral DAILY    hydrALAZINE (APRESOLINE) 20 mg/mL injection 10 mg  10 mg IntraVENous Q6H PRN    acetaminophen (TYLENOL) tablet 650 mg  650 mg Oral Q6H PRN    ondansetron (ZOFRAN) injection 4 mg  4 mg IntraVENous Q4H PRN    prochlorperazine (COMPAZINE) with saline injection 10 mg  10 mg IntraVENous Q6H PRN    sodium chloride (NS) flush 5-40 mL  5-40 mL IntraVENous Q8H    sodium chloride (NS) flush 5-40 mL  5-40 mL IntraVENous PRN        Objective:     Patient Vitals for the past 8 hrs:   BP Temp Pulse Resp SpO2   19 0724 132/63 98.5 °F (36.9 °C) 60 16 97 %   19 0253 111/52 97.6 °F (36.4 °C) 70 16 97 %     No intake/output data recorded.    1901 -  0700  In: 4411 [P.O.:880; I.V.:2510]  Out:  [Urine:]    EXAM:     NEURO-a&o   HEENT-wnl   LUNGS-clear   COR-regular rate and rhythym   ABD-soft , no tenderness, no rebound, good bs        LABS:  Recent Labs     19  4252 07/18/19 2002 07/18/19  0440   WBC 3.3*  --   --  4.1   HGB 9.6* 9.8*   < > 9.2*   HCT 28.5* 28.9*   < > 27.1*   *  --   --  127*    < > = values in this interval not displayed. Recent Labs     07/19/19  0202 07/18/19  0312 07/17/19  0421    141 142   K 3.8 3.4* 3.6   * 113* 115*   CO2 22 22 23   BUN 12 17 28*   CREA 0.80 0.72 0.91   * 85 123*   CA 7.0* 6.7* 7.0*   MG 1.7  --   --    PHOS 2.3*  --   --      Recent Labs     07/19/19 0202 07/16/19  1755   SGOT 43* 43*   * 135*   TBILI 0.9 0.3   TP 4.1* 4.2*   ALB 1.7* 1.8*   GLOB 2.4 2.4   ALT 59 53     Recent Labs     07/16/19  1755   INR 1.1   PTP 11.2*                           Assessment:   1. Anemia due to GI blood loss---stable hgb now  EGD yesterday:Impression:  -- Two shallow, distal esophageal ulcers covering 2-3 mucosal folds, and extending about 1.5 cm proximal from z-line. No active bleeding nor stigmata of recent bleeding, but in the setting of plavix use this could have been source. -- yellow, bilious gastric and duodenal contents, suggesting no recent bleeding  -- rectal exam just prior to procedure with sticky melenic stool in rectal vault, so he will definitely have more melena  -- remainder of exam normal             Active Problems:    GI bleed (7/16/2019)      Melena (7/18/2019)        Plan:   1. Advance diet  2. Repeat CBC in am  3. PPI BID  4. Carafate for one month    We will see on request this weekend, but hopefully he goes back to rehab Sat.

## 2019-07-19 NOTE — DISCHARGE INSTRUCTIONS
Patient Education        Upper Gastrointestinal Bleeding: Care Instructions  Your Care Instructions    The digestive or gastrointestinal tract goes from the mouth to the anus. It is often called the GI tract. Bleeding in the upper GI tract can happen anywhere from the esophagus to the first part of the small intestine. Sometimes it's caused by an ulcer in your stomach. Or it may be caused by blood vessels in your esophagus. Your esophagus is the tube that carries food from your throat to your stomach. Light bleeding may not cause any symptoms at first. But if you continue to bleed for a while, you may feel very weak or tired. Sudden, heavy bleeding means you need to see a doctor right away. This kind of bleeding can be very dangerous. But it can usually be cured or controlled. The doctor may do some tests to find the cause of your bleeding. Follow-up care is a key part of your treatment and safety. Be sure to make and go to all appointments, and call your doctor if you are having problems. It's also a good idea to know your test results and keep a list of the medicines you take. How can you care for yourself at home? · Be safe with medicines. Take your medicines exactly as prescribed. Call your doctor if you think you are having a problem with your medicine. You will get more details on the specific medicines your doctor prescribes. · Do not take aspirin or other anti-inflammatory medicines, such as naproxen (Aleve) or ibuprofen (Advil, Motrin), without talking to your doctor first. Ask your doctor if it is okay to use acetaminophen (Tylenol). · Do not drink alcohol. · The bleeding may make you lose iron. So it's important to eat foods that have a lot of iron. These include red meat, shellfish, poultry, and eggs. They also include beans, raisins, whole-grain breads, and leafy green vegetables. If you want help planning meals, you can meet with a dietitian. When should you call for help?   Call 911 anytime you think you may need emergency care. For example, call if:    · You have sudden, severe belly pain.     · You vomit blood or what looks like coffee grounds.     · You passed out (lost consciousness).     · Your stools are maroon or very bloody.    Call your doctor now or seek immediate medical care if:    · You are dizzy or lightheaded, or you feel like you may faint.     · Your stools are black and look like tar.     · You have belly pain.     · You vomit or have nausea.     · You have trouble swallowing, or it hurts when you swallow.    Watch closely for changes in your health, and be sure to contact your doctor if you do not get better as expected.

## 2019-07-20 VITALS
WEIGHT: 183.86 LBS | DIASTOLIC BLOOD PRESSURE: 59 MMHG | OXYGEN SATURATION: 96 % | SYSTOLIC BLOOD PRESSURE: 124 MMHG | TEMPERATURE: 97.3 F | HEIGHT: 64 IN | BODY MASS INDEX: 31.39 KG/M2 | RESPIRATION RATE: 16 BRPM | HEART RATE: 76 BPM

## 2019-07-20 LAB
ERYTHROCYTE [DISTWIDTH] IN BLOOD BY AUTOMATED COUNT: 16.6 % (ref 11.5–14.5)
HCT VFR BLD AUTO: 26.3 % (ref 36.6–50.3)
HCT VFR BLD AUTO: 27 % (ref 36.6–50.3)
HGB BLD-MCNC: 9.1 G/DL (ref 12.1–17)
HGB BLD-MCNC: 9.2 G/DL (ref 12.1–17)
MCH RBC QN AUTO: 31.1 PG (ref 26–34)
MCHC RBC AUTO-ENTMCNC: 34.6 G/DL (ref 30–36.5)
MCV RBC AUTO: 89.8 FL (ref 80–99)
NRBC # BLD: 0 K/UL (ref 0–0.01)
NRBC BLD-RTO: 0 PER 100 WBC
PLATELET # BLD AUTO: 140 K/UL (ref 150–400)
PMV BLD AUTO: 11.2 FL (ref 8.9–12.9)
RBC # BLD AUTO: 2.93 M/UL (ref 4.1–5.7)
WBC # BLD AUTO: 3.4 K/UL (ref 4.1–11.1)

## 2019-07-20 PROCEDURE — 85018 HEMOGLOBIN: CPT

## 2019-07-20 PROCEDURE — C9113 INJ PANTOPRAZOLE SODIUM, VIA: HCPCS | Performed by: INTERNAL MEDICINE

## 2019-07-20 PROCEDURE — 94760 N-INVAS EAR/PLS OXIMETRY 1: CPT

## 2019-07-20 PROCEDURE — 74011250636 HC RX REV CODE- 250/636: Performed by: INTERNAL MEDICINE

## 2019-07-20 PROCEDURE — 74011250637 HC RX REV CODE- 250/637: Performed by: INTERNAL MEDICINE

## 2019-07-20 PROCEDURE — 74011000250 HC RX REV CODE- 250: Performed by: INTERNAL MEDICINE

## 2019-07-20 PROCEDURE — 74011250637 HC RX REV CODE- 250/637: Performed by: SURGERY

## 2019-07-20 PROCEDURE — 36415 COLL VENOUS BLD VENIPUNCTURE: CPT

## 2019-07-20 PROCEDURE — 85027 COMPLETE CBC AUTOMATED: CPT

## 2019-07-20 RX ORDER — PANTOPRAZOLE SODIUM 40 MG/1
40 TABLET, DELAYED RELEASE ORAL
Qty: 60 TAB | Refills: 5 | Status: SHIPPED | OUTPATIENT
Start: 2019-07-20 | End: 2019-08-19

## 2019-07-20 RX ORDER — PANTOPRAZOLE SODIUM 40 MG/1
40 TABLET, DELAYED RELEASE ORAL
Status: DISCONTINUED | OUTPATIENT
Start: 2019-07-20 | End: 2019-07-20 | Stop reason: HOSPADM

## 2019-07-20 RX ADMIN — Medication 10 ML: at 06:00

## 2019-07-20 RX ADMIN — SUCRALFATE 1 G: 1 TABLET ORAL at 10:41

## 2019-07-20 RX ADMIN — SUCRALFATE 1 G: 1 TABLET ORAL at 06:41

## 2019-07-20 RX ADMIN — DOXAZOSIN 2 MG: 2 TABLET ORAL at 08:28

## 2019-07-20 RX ADMIN — SODIUM CHLORIDE 40 MG: 9 INJECTION, SOLUTION INTRAMUSCULAR; INTRAVENOUS; SUBCUTANEOUS at 08:28

## 2019-07-20 RX ADMIN — PANTOPRAZOLE SODIUM 40 MG: 40 TABLET, DELAYED RELEASE ORAL at 15:45

## 2019-07-20 RX ADMIN — SUCRALFATE 1 G: 1 TABLET ORAL at 15:45

## 2019-07-20 RX ADMIN — CARVEDILOL 25 MG: 12.5 TABLET, FILM COATED ORAL at 08:28

## 2019-07-20 RX ADMIN — ATORVASTATIN CALCIUM 20 MG: 20 TABLET, FILM COATED ORAL at 08:28

## 2019-07-20 NOTE — DISCHARGE SUMMARY
Physician Discharge Summary     Patient ID:  Tiffani Malagon  [id number]  73 y.o.  1940    Admit Date: 7/16/2019    Discharge Date: 7/20/2019    Admission Diagnoses: GI bleed [K92.2]    Discharge Diagnoses: Active Problems:    GI bleed (7/16/2019)      Melena (7/18/2019)         Admission Condition: Poor    Discharge Condition: Good    Last Procedure: Procedure(s):  ESOPHAGOGASTRODUODENOSCOPY (EGD)      Hospital Course:   Pt readmitted following recent discharge after sigmoid colectomy for perforated diverticulitis. He developed UGI bleeding with Hb 4.0 on presentation. He responded to transfusion and EGD demonstrated 2 esophageal ulcers and pt had been on plavix and aspirin. He is now stable with Hb 9.2 and taking bid protonix and carafate. Consults: Gastroenterology    Disposition: SNF    Patient Instructions:   Current Discharge Medication List      START taking these medications    Details   ! ! pantoprazole (PROTONIX) 40 mg tablet Take 1 Tab by mouth Before breakfast and dinner for 30 days. Qty: 60 Tab, Refills: 5      sucralfate (CARAFATE) 1 gram tablet Take 1 Tab by mouth Before breakfast, lunch, dinner and at bedtime for 30 days. Indications: esoph ulcer  Qty: 120 Tab, Refills: 0      !! pantoprazole (PROTONIX) 20 mg tablet Take 1 Tab by mouth two (2) times a day for 30 days. Qty: 60 Tab, Refills: 0       !! - Potential duplicate medications found. Please discuss with provider. CONTINUE these medications which have NOT CHANGED    Details   polyethylene glycol (MIRALAX) 17 gram packet Take 1 Packet by mouth daily for 30 days. Qty: 30 Packet, Refills: 0      carvedilol (COREG) 25 mg tablet Take 25 mg by mouth two (2) times daily (with meals). atorvastatin (LIPITOR) 20 mg tablet Take 20 mg by mouth daily. cyanocobalamin 1,000 mcg tablet Take 1,000 mcg by mouth daily. cholecalciferol (VITAMIN D3) 1,000 unit cap Take 1,000 Units by mouth daily.       doxazosin (CARDURA) 2 mg tablet Take 2 mg by mouth daily. STOP taking these medications       aspirin 81 mg chewable tablet Comments:   Reason for Stopping:             Activity: No driving while on analgesics and No heavy lifting for 4 weeks  Diet: Regular Diet  Wound Care: Keep wound clean and dry    Follow-up with surgery in 2 weeks.   Follow-up tests/labs none    Signed:  Gabriella Hampton MD  [facility]  7/20/2019  1:06 PM

## 2019-07-20 NOTE — PROGRESS NOTES
1130  Completed report with offgoing nurse, patient may be discharged with in a short period of time pending lab results for 1200 Hemoglobin. 1344  Report given to Nati Arguelles at [facility]  I have reviewed discharge instructions with the patient and caregiver. The patient and caregiver verbalized understanding. Paper copy signed and placed in chart. Current Discharge Medication List      START taking these medications    Details   ! ! pantoprazole (PROTONIX) 40 mg tablet Take 1 Tab by mouth Before breakfast and dinner for 30 days. Qty: 60 Tab, Refills: 5      sucralfate (CARAFATE) 1 gram tablet Take 1 Tab by mouth Before breakfast, lunch, dinner and at bedtime for 30 days. Indications: esoph ulcer  Qty: 120 Tab, Refills: 0      !! pantoprazole (PROTONIX) 20 mg tablet Take 1 Tab by mouth two (2) times a day for 30 days. Qty: 60 Tab, Refills: 0       !! - Potential duplicate medications found. Please discuss with provider. CONTINUE these medications which have NOT CHANGED    Details   polyethylene glycol (MIRALAX) 17 gram packet Take 1 Packet by mouth daily for 30 days. Qty: 30 Packet, Refills: 0      carvedilol (COREG) 25 mg tablet Take 25 mg by mouth two (2) times daily (with meals). atorvastatin (LIPITOR) 20 mg tablet Take 20 mg by mouth daily. cyanocobalamin 1,000 mcg tablet Take 1,000 mcg by mouth daily. cholecalciferol (VITAMIN D3) 1,000 unit cap Take 1,000 Units by mouth daily. doxazosin (CARDURA) 2 mg tablet Take 2 mg by mouth daily.          STOP taking these medications       aspirin 81 mg chewable tablet Comments:   Reason for Stopping:               Daughter notified of discharge and will be arriving this affternoon to p/u patient     1600

## 2019-07-20 NOTE — PROGRESS NOTES
Pharmacy IV to PO Conversion Program    Medication: Protonix 40 mh IV q12h  Indication: GIB      Impression/Plan:   - \"H/H unchanged. No evidence of active bleeding, Hemodynamically stable., Hgb stable\"  - tolerating diet, change to PO equivalent per [facility] protocol     Thanks  Marybeth Mendieta II, PHARMD    [email or web address]

## 2019-07-20 NOTE — PROGRESS NOTES
SHERYL: Bhupendra Lester (return for SNF rehab), Follow-up Care Appointments (PCP after completion of rehab)    Pt to discharge back to [facility] today to resume SNF rehab. DTR to transport back to facility. Pt/family to call and schedule PCP f/u appointment after completion of rehab. All information entered into pt AVS.     Pt has no additional CM needs at this time. Call Report [phone]. Please ensure any hard prescriptions discharge with pt and SNF. Care Management Interventions  PCP Verified by CM: Yes  Mode of Transport at Discharge:  Other (see comment)(Pt's daughter )  Transition of Care Consult (CM Consult): SNF  Partner SNF: Yes  Discharge Durable Medical Equipment: No  Health Maintenance Reviewed: Yes  Physical Therapy Consult: No  Occupational Therapy Consult: No  Speech Therapy Consult: No  Current Support Network: Own Home, Lives Alone  Confirm Follow Up Transport: Family  Plan discussed with Pt/Family/Caregiver: Yes  Freedom of Choice Offered: Yes  Discharge Location  Discharge Placement: Skilled nursing facility    DORENE Carrillo Supervisee in Social Work, [address] [place] [address]  [phone]

## 2019-07-20 NOTE — PROGRESS NOTES
.General Surgery End of Shift Nursing Note    Bedside shift change report given to Derrick Booth RN (oncoming nurse) by Ira Rolle (offgoing nurse). Report included the following information SBAR, OR Summary, Procedure Summary, Intake/Output, MAR and Recent Results. Shift worked:   1419-4225   Summary of shift:   Possible DC back to [facility] for rehabilitation. Daily wound care completed. Pt denies pain. He continues to state he is weak and dizzy=1x assist OOB. Hgb 9.1, Hct 26.3   Issues for physician to address:   DC planning     Number times ambulated in hallway past shift: 0   Number of times OOB to chair past shift: 1    Pain Management:  Current medication: denied pain  Patient states pain is manageable on current pain medication:denied pain    GI:    Current diet:  DIET GI LITE (POST SURGICAL)    Tolerating current diet:yes  Passing flatus: yes  Last Bowel Movement: 07/19/19  Respiratory:    Incentive Spirometer at bedside: no  Patient instructed on use: no    Patient Safety:    Falls Score: 3  Bed Alarm On?no  Sitter? no    Kwadwo Mcqueen RN

## 2019-07-24 ENCOUNTER — PATIENT OUTREACH (OUTPATIENT)
Dept: CASE MANAGEMENT | Age: 79
End: 2019-07-24

## 2019-07-24 NOTE — PROGRESS NOTES
Community Care Team documentation for patient in [facility]  Initial Follow Up       Patient was discharged to [address] [place] [address]. Information included in this progress note has been provided to SNF. Hospital Admission and Diagnosis:  MRM 7/16-7/20 GI Bleed  RRAT Score:  22   Advance Care Planning:  Not on file       PCP : Tato Siddiqui MD    Spoke with SNF team. Ensured patient arrived to SNF safely with admission packet in order. Provided needed hospital follow up appointments: Benita Alberto MD General surgery in 2 weeks. PT/OT update: Currently independent with bed mobility. Supervision with transfers> Ambulating 80ft with RW and supervision. Modified independent with upper body ADLs and toileting. Supervision with lower body ADLs, IADLs and showering. Supervision with ascending and descending 12 stairs. LOS/ Disposition:  2-3 weeks LOS anticipated. Plan to return home alone. Community Care Team will follow up weekly with [facility] until discharge. Medications were not reconciled and general patient assessment was not completed during this [facility] outreach.

## 2019-07-26 ENCOUNTER — HOME HEALTH ADMISSION (OUTPATIENT)
Dept: HOME HEALTH SERVICES | Facility: HOME HEALTH | Age: 79
End: 2019-07-26
Payer: MEDICARE

## 2019-07-28 ENCOUNTER — HOME CARE VISIT (OUTPATIENT)
Dept: SCHEDULING | Facility: HOME HEALTH | Age: 79
End: 2019-07-28
Payer: MEDICARE

## 2019-07-28 VITALS
OXYGEN SATURATION: 99 % | RESPIRATION RATE: 16 BRPM | TEMPERATURE: 98 F | HEART RATE: 68 BPM | SYSTOLIC BLOOD PRESSURE: 128 MMHG | DIASTOLIC BLOOD PRESSURE: 60 MMHG

## 2019-07-28 PROCEDURE — 400013 HH SOC

## 2019-07-28 PROCEDURE — 3331090001 HH PPS REVENUE CREDIT

## 2019-07-28 PROCEDURE — 3331090002 HH PPS REVENUE DEBIT

## 2019-07-28 PROCEDURE — G0299 HHS/HOSPICE OF RN EA 15 MIN: HCPCS

## 2019-07-29 ENCOUNTER — HOME CARE VISIT (OUTPATIENT)
Dept: SCHEDULING | Facility: HOME HEALTH | Age: 79
End: 2019-07-29
Payer: MEDICARE

## 2019-07-29 ENCOUNTER — TELEPHONE (OUTPATIENT)
Dept: SURGERY | Age: 79
End: 2019-07-29

## 2019-07-29 PROCEDURE — G0299 HHS/HOSPICE OF RN EA 15 MIN: HCPCS

## 2019-07-29 PROCEDURE — 3331090001 HH PPS REVENUE CREDIT

## 2019-07-29 PROCEDURE — 3331090002 HH PPS REVENUE DEBIT

## 2019-07-29 NOTE — TELEPHONE ENCOUNTER
Spoke with [facility] nurse. Pt is s/p exploratory laparotomy, segmental sigmoid colon resection on 7/1/19. Pt has no pain or fever. There is yellowish serosanguinous drainage on bandage only, no active drainage seen. Some scabbing at incision site, still packing with wet gauze & covering incision. Per Dr Henry Read, sutures cannot be removed until incision is completely healed. Pt has appt on 8/2/19.

## 2019-07-29 NOTE — TELEPHONE ENCOUNTER
Favian Rios 28 called stating the patient's wound doesn't look good. His stitches have been in too long and she feels he should be seen sooner than Friday.

## 2019-07-30 ENCOUNTER — HOME CARE VISIT (OUTPATIENT)
Dept: SCHEDULING | Facility: HOME HEALTH | Age: 79
End: 2019-07-30
Payer: MEDICARE

## 2019-07-30 VITALS
OXYGEN SATURATION: 94 % | DIASTOLIC BLOOD PRESSURE: 70 MMHG | RESPIRATION RATE: 19 BRPM | TEMPERATURE: 98.7 F | SYSTOLIC BLOOD PRESSURE: 140 MMHG | HEART RATE: 77 BPM

## 2019-07-30 VITALS
OXYGEN SATURATION: 99 % | RESPIRATION RATE: 20 BRPM | HEART RATE: 80 BPM | SYSTOLIC BLOOD PRESSURE: 135 MMHG | DIASTOLIC BLOOD PRESSURE: 70 MMHG | TEMPERATURE: 98.4 F

## 2019-07-30 VITALS — DIASTOLIC BLOOD PRESSURE: 68 MMHG | SYSTOLIC BLOOD PRESSURE: 122 MMHG | HEART RATE: 88 BPM | OXYGEN SATURATION: 95 %

## 2019-07-30 PROCEDURE — 3331090001 HH PPS REVENUE CREDIT

## 2019-07-30 PROCEDURE — 3331090002 HH PPS REVENUE DEBIT

## 2019-07-30 PROCEDURE — G0152 HHCP-SERV OF OT,EA 15 MIN: HCPCS

## 2019-07-30 PROCEDURE — G0151 HHCP-SERV OF PT,EA 15 MIN: HCPCS

## 2019-07-31 ENCOUNTER — HOME CARE VISIT (OUTPATIENT)
Dept: SCHEDULING | Facility: HOME HEALTH | Age: 79
End: 2019-07-31
Payer: MEDICARE

## 2019-07-31 ENCOUNTER — HOME CARE VISIT (OUTPATIENT)
Dept: HOME HEALTH SERVICES | Facility: HOME HEALTH | Age: 79
End: 2019-07-31
Payer: MEDICARE

## 2019-07-31 ENCOUNTER — PATIENT OUTREACH (OUTPATIENT)
Dept: CASE MANAGEMENT | Age: 79
End: 2019-07-31

## 2019-07-31 PROCEDURE — G0299 HHS/HOSPICE OF RN EA 15 MIN: HCPCS

## 2019-07-31 PROCEDURE — 3331090002 HH PPS REVENUE DEBIT

## 2019-07-31 PROCEDURE — 3331090001 HH PPS REVENUE CREDIT

## 2019-07-31 NOTE — PROGRESS NOTES
Community Care Team [facility]ation [facility]  Discharge Note    Patient has been discharged [facility] [address] [facility] See previous [facility] Team notes. PCP : Grace Price MD    Spoke with SNF team.  Confirmed patient discharged home alone with [facility] 7/27. Community Care Team will sign off at this time. Medications were not reconciled and general patient assessment was not completed during this skilled nursing facility outreach. minimal none

## 2019-08-01 ENCOUNTER — HOME CARE VISIT (OUTPATIENT)
Dept: SCHEDULING | Facility: HOME HEALTH | Age: 79
End: 2019-08-01
Payer: MEDICARE

## 2019-08-01 VITALS
OXYGEN SATURATION: 96 % | TEMPERATURE: 98.7 F | DIASTOLIC BLOOD PRESSURE: 79 MMHG | SYSTOLIC BLOOD PRESSURE: 131 MMHG | HEART RATE: 90 BPM

## 2019-08-01 VITALS
SYSTOLIC BLOOD PRESSURE: 130 MMHG | HEART RATE: 88 BPM | RESPIRATION RATE: 18 BRPM | OXYGEN SATURATION: 96 % | DIASTOLIC BLOOD PRESSURE: 70 MMHG | TEMPERATURE: 97.3 F

## 2019-08-01 PROCEDURE — 3331090001 HH PPS REVENUE CREDIT

## 2019-08-01 PROCEDURE — 3331090002 HH PPS REVENUE DEBIT

## 2019-08-01 PROCEDURE — G0157 HHC PT ASSISTANT EA 15: HCPCS

## 2019-08-01 NOTE — PROGRESS NOTES
Pt is a 66 yo male 4 weeks sp emergent sigmoid colectomy for min contaminated perf. Diverticulitis, pt with primary anastamosis, with prolonged post op recovery,  DC'd  July 20, 2019 or thereabouts,  Pt in FU. SUBJECTIVE: Montez Joseph is a 67 y.o. male is seen for a routine postop check. Reports no problems with the wound or other issues. Activity, diet and bowels are normal. No pain. OBJECTIVE: Appears well. Wounds are well healed without complications or infection. I removed abd skin staples, no signs complications  ASSESSMENT: normal postoperative course, doing well. PLAN: Patient is instructed to avoid heavy lifting for 2 more weeks. Return PRN for any problems or concerns.

## 2019-08-02 ENCOUNTER — OFFICE VISIT (OUTPATIENT)
Dept: SURGERY | Age: 79
End: 2019-08-02

## 2019-08-02 VITALS
WEIGHT: 166.6 LBS | OXYGEN SATURATION: 98 % | HEART RATE: 84 BPM | BODY MASS INDEX: 28.44 KG/M2 | TEMPERATURE: 97.8 F | DIASTOLIC BLOOD PRESSURE: 76 MMHG | RESPIRATION RATE: 20 BRPM | SYSTOLIC BLOOD PRESSURE: 150 MMHG | HEIGHT: 64 IN

## 2019-08-02 DIAGNOSIS — K57.20 DIVERTICULITIS OF LARGE INTESTINE WITH PERFORATION WITHOUT BLEEDING: Primary | ICD-10-CM

## 2019-08-02 PROCEDURE — 3331090002 HH PPS REVENUE DEBIT

## 2019-08-02 PROCEDURE — 3331090001 HH PPS REVENUE CREDIT

## 2019-08-02 NOTE — PROGRESS NOTES
Chief Complaint   Patient presents with    Post OP Follow Up      Exploratory laparotomy, segmental sigmoid colon resection and primary stapled anastomosis. 07/01/2019     1. Have you been to the ER, urgent care clinic since your last visit? Hospitalized since your last visit? No    2. Have you seen or consulted any other health care providers outside of the [address] [place] [address] since your last visit? Include any pap smears or colon screening.  No

## 2019-08-03 PROCEDURE — 3331090002 HH PPS REVENUE DEBIT

## 2019-08-03 PROCEDURE — 3331090001 HH PPS REVENUE CREDIT

## 2019-08-04 PROCEDURE — 3331090002 HH PPS REVENUE DEBIT

## 2019-08-04 PROCEDURE — 3331090001 HH PPS REVENUE CREDIT

## 2019-08-05 ENCOUNTER — HOME CARE VISIT (OUTPATIENT)
Dept: SCHEDULING | Facility: HOME HEALTH | Age: 79
End: 2019-08-05
Payer: MEDICARE

## 2019-08-05 VITALS
DIASTOLIC BLOOD PRESSURE: 79 MMHG | OXYGEN SATURATION: 97 % | TEMPERATURE: 98.1 F | HEART RATE: 87 BPM | SYSTOLIC BLOOD PRESSURE: 131 MMHG

## 2019-08-05 VITALS
HEART RATE: 60 BPM | RESPIRATION RATE: 18 BRPM | OXYGEN SATURATION: 96 % | TEMPERATURE: 98.7 F | SYSTOLIC BLOOD PRESSURE: 140 MMHG | DIASTOLIC BLOOD PRESSURE: 67 MMHG

## 2019-08-05 PROCEDURE — G0157 HHC PT ASSISTANT EA 15: HCPCS

## 2019-08-05 PROCEDURE — 3331090001 HH PPS REVENUE CREDIT

## 2019-08-05 PROCEDURE — G0152 HHCP-SERV OF OT,EA 15 MIN: HCPCS

## 2019-08-05 PROCEDURE — 3331090002 HH PPS REVENUE DEBIT

## 2019-08-05 PROCEDURE — 3331090003 HH PPS REVENUE ADJ

## 2019-08-06 ENCOUNTER — HOME CARE VISIT (OUTPATIENT)
Dept: SCHEDULING | Facility: HOME HEALTH | Age: 79
End: 2019-08-06
Payer: MEDICARE

## 2019-08-06 VITALS
RESPIRATION RATE: 16 BRPM | OXYGEN SATURATION: 100 % | DIASTOLIC BLOOD PRESSURE: 70 MMHG | TEMPERATURE: 98.4 F | HEART RATE: 82 BPM | SYSTOLIC BLOOD PRESSURE: 136 MMHG

## 2019-08-06 VITALS
OXYGEN SATURATION: 99 % | TEMPERATURE: 98.6 F | DIASTOLIC BLOOD PRESSURE: 70 MMHG | SYSTOLIC BLOOD PRESSURE: 136 MMHG | RESPIRATION RATE: 19 BRPM | HEART RATE: 70 BPM

## 2019-08-06 PROCEDURE — G0299 HHS/HOSPICE OF RN EA 15 MIN: HCPCS

## 2019-08-06 PROCEDURE — 3331090002 HH PPS REVENUE DEBIT

## 2019-08-06 PROCEDURE — 3331090001 HH PPS REVENUE CREDIT

## 2019-08-06 PROCEDURE — G0152 HHCP-SERV OF OT,EA 15 MIN: HCPCS

## 2019-08-06 PROCEDURE — 400013 HH SOC

## 2019-08-07 ENCOUNTER — HOME CARE VISIT (OUTPATIENT)
Dept: SCHEDULING | Facility: HOME HEALTH | Age: 79
End: 2019-08-07
Payer: MEDICARE

## 2019-08-07 VITALS
DIASTOLIC BLOOD PRESSURE: 72 MMHG | OXYGEN SATURATION: 96 % | TEMPERATURE: 98.7 F | HEART RATE: 90 BPM | SYSTOLIC BLOOD PRESSURE: 132 MMHG

## 2019-08-07 PROCEDURE — 3331090002 HH PPS REVENUE DEBIT

## 2019-08-07 PROCEDURE — 3331090001 HH PPS REVENUE CREDIT

## 2019-08-07 PROCEDURE — G0157 HHC PT ASSISTANT EA 15: HCPCS

## 2019-08-08 ENCOUNTER — HOME CARE VISIT (OUTPATIENT)
Dept: SCHEDULING | Facility: HOME HEALTH | Age: 79
End: 2019-08-08
Payer: MEDICARE

## 2019-08-08 PROCEDURE — 3331090002 HH PPS REVENUE DEBIT

## 2019-08-08 PROCEDURE — G0300 HHS/HOSPICE OF LPN EA 15 MIN: HCPCS

## 2019-08-08 PROCEDURE — 3331090001 HH PPS REVENUE CREDIT

## 2019-08-09 PROCEDURE — 3331090002 HH PPS REVENUE DEBIT

## 2019-08-09 PROCEDURE — 3331090001 HH PPS REVENUE CREDIT

## 2019-08-10 PROCEDURE — 3331090001 HH PPS REVENUE CREDIT

## 2019-08-10 PROCEDURE — 3331090002 HH PPS REVENUE DEBIT

## 2019-08-11 PROCEDURE — 3331090002 HH PPS REVENUE DEBIT

## 2019-08-11 PROCEDURE — 3331090001 HH PPS REVENUE CREDIT

## 2019-08-12 VITALS
SYSTOLIC BLOOD PRESSURE: 130 MMHG | RESPIRATION RATE: 18 BRPM | TEMPERATURE: 98.6 F | DIASTOLIC BLOOD PRESSURE: 76 MMHG | OXYGEN SATURATION: 97 % | HEART RATE: 70 BPM

## 2019-08-12 PROCEDURE — 3331090001 HH PPS REVENUE CREDIT

## 2019-08-12 PROCEDURE — 3331090002 HH PPS REVENUE DEBIT

## 2019-08-13 ENCOUNTER — HOME CARE VISIT (OUTPATIENT)
Dept: SCHEDULING | Facility: HOME HEALTH | Age: 79
End: 2019-08-13
Payer: MEDICARE

## 2019-08-13 VITALS
SYSTOLIC BLOOD PRESSURE: 120 MMHG | TEMPERATURE: 98.2 F | DIASTOLIC BLOOD PRESSURE: 60 MMHG | HEART RATE: 90 BPM | OXYGEN SATURATION: 99 % | RESPIRATION RATE: 18 BRPM

## 2019-08-13 PROCEDURE — 3331090001 HH PPS REVENUE CREDIT

## 2019-08-13 PROCEDURE — G0151 HHCP-SERV OF PT,EA 15 MIN: HCPCS

## 2019-08-13 PROCEDURE — 3331090002 HH PPS REVENUE DEBIT

## 2019-08-14 ENCOUNTER — HOME CARE VISIT (OUTPATIENT)
Dept: SCHEDULING | Facility: HOME HEALTH | Age: 79
End: 2019-08-14
Payer: MEDICARE

## 2019-08-14 VITALS
OXYGEN SATURATION: 96 % | SYSTOLIC BLOOD PRESSURE: 121 MMHG | HEART RATE: 93 BPM | TEMPERATURE: 98.3 F | DIASTOLIC BLOOD PRESSURE: 71 MMHG | RESPIRATION RATE: 20 BRPM

## 2019-08-14 PROCEDURE — 3331090001 HH PPS REVENUE CREDIT

## 2019-08-14 PROCEDURE — 3331090002 HH PPS REVENUE DEBIT

## 2019-08-14 PROCEDURE — G0152 HHCP-SERV OF OT,EA 15 MIN: HCPCS

## 2019-08-15 ENCOUNTER — HOME CARE VISIT (OUTPATIENT)
Dept: HOME HEALTH SERVICES | Facility: HOME HEALTH | Age: 79
End: 2019-08-15
Payer: MEDICARE

## 2019-08-15 ENCOUNTER — HOME CARE VISIT (OUTPATIENT)
Dept: SCHEDULING | Facility: HOME HEALTH | Age: 79
End: 2019-08-15
Payer: MEDICARE

## 2019-08-15 VITALS
DIASTOLIC BLOOD PRESSURE: 60 MMHG | RESPIRATION RATE: 18 BRPM | OXYGEN SATURATION: 98 % | SYSTOLIC BLOOD PRESSURE: 120 MMHG | TEMPERATURE: 98.7 F | HEART RATE: 78 BPM

## 2019-08-15 VITALS
OXYGEN SATURATION: 98 % | DIASTOLIC BLOOD PRESSURE: 60 MMHG | RESPIRATION RATE: 18 BRPM | HEART RATE: 70 BPM | SYSTOLIC BLOOD PRESSURE: 130 MMHG | TEMPERATURE: 98.7 F

## 2019-08-15 PROCEDURE — 3331090002 HH PPS REVENUE DEBIT

## 2019-08-15 PROCEDURE — 3331090001 HH PPS REVENUE CREDIT

## 2019-08-15 PROCEDURE — G0151 HHCP-SERV OF PT,EA 15 MIN: HCPCS

## 2019-08-15 PROCEDURE — G0152 HHCP-SERV OF OT,EA 15 MIN: HCPCS

## 2019-08-15 PROCEDURE — G0299 HHS/HOSPICE OF RN EA 15 MIN: HCPCS

## 2019-08-16 PROCEDURE — 3331090001 HH PPS REVENUE CREDIT

## 2019-08-16 PROCEDURE — 3331090002 HH PPS REVENUE DEBIT

## 2019-08-17 PROCEDURE — 3331090001 HH PPS REVENUE CREDIT

## 2019-08-17 PROCEDURE — 3331090002 HH PPS REVENUE DEBIT

## 2019-08-18 VITALS
HEART RATE: 93 BPM | TEMPERATURE: 97.6 F | OXYGEN SATURATION: 98 % | RESPIRATION RATE: 18 BRPM | DIASTOLIC BLOOD PRESSURE: 70 MMHG | SYSTOLIC BLOOD PRESSURE: 110 MMHG

## 2019-08-18 PROCEDURE — 3331090001 HH PPS REVENUE CREDIT

## 2019-08-18 PROCEDURE — 3331090002 HH PPS REVENUE DEBIT

## 2019-08-19 ENCOUNTER — HOME CARE VISIT (OUTPATIENT)
Dept: SCHEDULING | Facility: HOME HEALTH | Age: 79
End: 2019-08-19
Payer: MEDICARE

## 2019-08-19 VITALS
TEMPERATURE: 98.5 F | DIASTOLIC BLOOD PRESSURE: 80 MMHG | OXYGEN SATURATION: 99 % | HEART RATE: 82 BPM | SYSTOLIC BLOOD PRESSURE: 140 MMHG

## 2019-08-19 PROCEDURE — 3331090001 HH PPS REVENUE CREDIT

## 2019-08-19 PROCEDURE — G0157 HHC PT ASSISTANT EA 15: HCPCS

## 2019-08-19 PROCEDURE — 3331090002 HH PPS REVENUE DEBIT

## 2019-08-20 PROCEDURE — 3331090002 HH PPS REVENUE DEBIT

## 2019-08-20 PROCEDURE — 3331090001 HH PPS REVENUE CREDIT

## 2019-08-21 PROCEDURE — 3331090001 HH PPS REVENUE CREDIT

## 2019-08-21 PROCEDURE — 3331090002 HH PPS REVENUE DEBIT

## 2019-08-22 PROCEDURE — 3331090001 HH PPS REVENUE CREDIT

## 2019-08-22 PROCEDURE — 3331090002 HH PPS REVENUE DEBIT

## 2019-08-23 ENCOUNTER — HOME CARE VISIT (OUTPATIENT)
Dept: SCHEDULING | Facility: HOME HEALTH | Age: 79
End: 2019-08-23
Payer: MEDICARE

## 2019-08-23 ENCOUNTER — HOME CARE VISIT (OUTPATIENT)
Dept: HOME HEALTH SERVICES | Facility: HOME HEALTH | Age: 79
End: 2019-08-23

## 2019-08-23 VITALS
RESPIRATION RATE: 16 BRPM | TEMPERATURE: 98 F | HEART RATE: 88 BPM | DIASTOLIC BLOOD PRESSURE: 70 MMHG | SYSTOLIC BLOOD PRESSURE: 128 MMHG | OXYGEN SATURATION: 98 %

## 2019-08-23 PROCEDURE — G0151 HHCP-SERV OF PT,EA 15 MIN: HCPCS

## 2019-08-23 PROCEDURE — 3331090001 HH PPS REVENUE CREDIT

## 2019-08-23 PROCEDURE — 3331090002 HH PPS REVENUE DEBIT

## 2019-08-23 PROCEDURE — 3331090003 HH PPS REVENUE ADJ

## 2019-08-24 PROCEDURE — 3331090001 HH PPS REVENUE CREDIT

## 2019-08-24 PROCEDURE — 3331090002 HH PPS REVENUE DEBIT

## 2019-08-25 PROCEDURE — 3331090002 HH PPS REVENUE DEBIT

## 2019-08-25 PROCEDURE — 3331090001 HH PPS REVENUE CREDIT

## 2019-09-23 ENCOUNTER — HOSPITAL ENCOUNTER (OUTPATIENT)
Dept: INFUSION THERAPY | Age: 79
Discharge: HOME | End: 2019-09-23
Payer: MEDICARE

## 2019-09-23 VITALS
HEART RATE: 76 BPM | OXYGEN SATURATION: 94 % | HEIGHT: 64 IN | SYSTOLIC BLOOD PRESSURE: 151 MMHG | WEIGHT: 169.8 LBS | TEMPERATURE: 98 F | RESPIRATION RATE: 18 BRPM | DIASTOLIC BLOOD PRESSURE: 89 MMHG | BODY MASS INDEX: 28.99 KG/M2

## 2019-09-23 PROCEDURE — 96365 THER/PROPH/DIAG IV INF INIT: CPT

## 2019-09-23 PROCEDURE — 74011250636 HC RX REV CODE- 250/636

## 2019-09-23 RX ORDER — PANTOPRAZOLE SODIUM 40 MG/1
40 TABLET, DELAYED RELEASE ORAL
COMMUNITY
End: 2020-10-08 | Stop reason: SDUPTHER

## 2019-09-23 RX ORDER — SUCRALFATE 1 G/1
1 TABLET ORAL 4 TIMES DAILY
COMMUNITY
End: 2019-12-11

## 2019-09-23 RX ADMIN — SODIUM CHLORIDE 750 MG: 900 INJECTION, SOLUTION INTRAVENOUS at 15:06

## 2019-09-23 NOTE — DISCHARGE INSTRUCTIONS
OUTPATIENT INFUSION CENTER    DISCHARGE INSTRUCTIONS FOR:    IRON INFUSIONS - INCLUDING VENOFER, FERRLECIT, AND INFED    You should continue to take your usual home medications unless otherwise instructed by your physician. Drink plenty of fluids and eat your usual diet. All medications have the potential to cause side effects. Your physician can instruct you regarding any necessary treatment for side effects. Some possible side effects of iron infusions may include the following:     - Urinary changes;   - Mild muscle cramping;   - Mild nausea, stomach pain, diarrhea or constipation;   - Mild skin itching, mild pain at IV site. Signs/Symptoms of an allergic reaction may require immediate medical attention. These may include one or more of the following:       Skin redness, itching, swelling, blistering, weeping, crusting, rash or hives. Wheezing, chest tightness, cough, or shortness of breath;   Swelling of the face, eyelids, lips, tongue, or throat;  Severe headache, seizures or tremor;  Stuffy nose, runny nose, sneezing;   Red (bloodshot), itchy, swollen, or watery eyes;  Stomach  pain, nausea, vomiting, diarrhea or bloody diarrhea; Chest pain or tightness, increased heart rate, palpitations, changes in blood pressure which can cause dizziness, unusual feelings of weakness or fatigue;  Back ache or pain around waist;  Painful urination, increase or decrease in amount of urine, blood in urine. Contact your physicians office with any questions or concerns regarding your treatment.     Katelyn Desai, Signature: _____________________________________ 9/23/2019  Rayne Kidd

## 2019-09-23 NOTE — PROGRESS NOTES
Pt arrived to [address] [facility] [place]. ambulatory in no acute distress at 1455.  Assessment unremarkable. IV established in LFA site WNL and positive blood return noted. Patient Vitals for the past 12 hrs:   Temp Pulse Resp BP SpO2   09/23/19 1605  (P) 76 (P) 18 (P) 151/89    09/23/19 1458 98 °F (36.7 °C) 95 18 158/84 94 %       The following medications administered:  Medications Administered     ferric carboxymaltose (INJECTAFER) 750 mg in 0.9% sodium chloride 250 mL IVPB     Admin Date  09/23/2019 Action  Given Dose  750 mg Rate  750 mL/hr Route  IntraVENous Administered By  Mónica Gilbert                Pt tolerated treatment well.  IV flushed per policy and removed, 2x2 and tape placed.  Pt discharged ambulatory in no acute distress at [id number], accompanied by daughter. Next appointment 9/30/19 at 1500.

## 2019-09-30 ENCOUNTER — HOSPITAL ENCOUNTER (OUTPATIENT)
Dept: INFUSION THERAPY | Age: 79
Discharge: HOME | End: 2019-09-30
Payer: MEDICARE

## 2019-09-30 VITALS
WEIGHT: 166 LBS | DIASTOLIC BLOOD PRESSURE: 82 MMHG | RESPIRATION RATE: 20 BRPM | OXYGEN SATURATION: 100 % | HEART RATE: 85 BPM | BODY MASS INDEX: 28.49 KG/M2 | SYSTOLIC BLOOD PRESSURE: 130 MMHG | TEMPERATURE: 97.7 F

## 2019-09-30 PROCEDURE — 96365 THER/PROPH/DIAG IV INF INIT: CPT

## 2019-09-30 PROCEDURE — 74011250636 HC RX REV CODE- 250/636: Performed by: INTERNAL MEDICINE

## 2019-09-30 RX ORDER — SODIUM CHLORIDE 0.9 % (FLUSH) 0.9 %
10 SYRINGE (ML) INJECTION AS NEEDED
Status: DISCONTINUED | OUTPATIENT
Start: 2019-09-30 | End: 2019-10-01 | Stop reason: HOSPADM

## 2019-09-30 RX ADMIN — Medication 10 ML: at 15:41

## 2019-09-30 RX ADMIN — FERRIC CARBOXYMALTOSE INJECTION 750 MG: 50 INJECTION, SOLUTION INTRAVENOUS at 15:20

## 2019-09-30 RX ADMIN — Medication 10 ML: at 15:12

## 2019-09-30 RX ADMIN — Medication 10 ML: at 15:40

## 2019-09-30 NOTE — PROGRESS NOTES
[facility] VISIT NOTE    1510  Pt arrived at [facility] ambulatory and in no distress for Injectafer dose 2 of 2. Assessment completed, pt c/o dyspnea with exertion and fatigue; nausea last night. Blood pressure 150/82, pulse 83, temperature 97.7 °F (36.5 °C), resp. rate 20, weight 75.3 kg (166 lb), SpO2 100 %. PIV placed in right arm, flushes easily with positive blood return. Medications received: Injectafer IV    Tolerated treatment well, no adverse reaction noted. Patient remained in the [facility] for 30 minutes following completion of the infusion. Blood pressure 130/82, pulse 85, temperature 97.7 °F (36.5 °C), resp. rate 20, weight 75.3 kg (166 lb), SpO2 100 %. 1620  D/C'd from [facility] ambulatory and in no distress.

## 2019-10-29 ENCOUNTER — HOSPITAL ENCOUNTER (OUTPATIENT)
Dept: MRI IMAGING | Age: 79
Discharge: HOME | End: 2019-10-29
Attending: INTERNAL MEDICINE
Payer: MEDICARE

## 2019-10-29 ENCOUNTER — HOSPITAL ENCOUNTER (OUTPATIENT)
Dept: VASCULAR SURGERY | Age: 79
Discharge: HOME | End: 2019-10-29
Attending: INTERNAL MEDICINE
Payer: MEDICARE

## 2019-10-29 DIAGNOSIS — I25.118 CORONARY ARTERY DISEASE WITH STABLE ANGINA PECTORIS (HCC): ICD-10-CM

## 2019-10-29 PROBLEM — I65.23 BILATERAL CAROTID ARTERY STENOSIS: Status: ACTIVE | Noted: 2019-10-29

## 2019-10-29 LAB
LEFT CCA DIST DIAS: 14.4 CM/S
LEFT CCA DIST SYS: 77.4 CM/S
LEFT CCA PROX DIAS: 15.1 CM/S
LEFT CCA PROX SYS: 95.5 CM/S
LEFT ECA DIAS: 0 CM/S
LEFT ECA SYS: 82.1 CM/S
LEFT ICA DIST DIAS: 17.6 CM/S
LEFT ICA DIST SYS: 79.8 CM/S
LEFT ICA MID DIAS: 16.1 CM/S
LEFT ICA MID SYS: 72 CM/S
LEFT ICA PROX DIAS: 8.7 CM/S
LEFT ICA PROX SYS: 32.9 CM/S
LEFT ICA/CCA SYS: 1.03
LEFT SUBCLAVIAN DIAS: 0 CM/S
LEFT SUBCLAVIAN SYS: 63 CM/S
LEFT VERTEBRAL DIAS: 11.25 CM/S
LEFT VERTEBRAL SYS: 50.5 CM/S
RIGHT CCA DIST DIAS: 16 CM/S
RIGHT CCA DIST SYS: 77.4 CM/S
RIGHT CCA PROX DIAS: 12.7 CM/S
RIGHT CCA PROX SYS: 79.9 CM/S
RIGHT ECA DIAS: 0 CM/S
RIGHT ECA SYS: 65.1 CM/S
RIGHT ICA DIST DIAS: 17.5 CM/S
RIGHT ICA DIST SYS: 72 CM/S
RIGHT ICA MID DIAS: 13.8 CM/S
RIGHT ICA MID SYS: 62 CM/S
RIGHT ICA PROX DIAS: 9.4 CM/S
RIGHT ICA PROX SYS: 51.4 CM/S
RIGHT ICA/CCA SYS: 0.9
RIGHT SUBCLAVIAN DIAS: 0 CM/S
RIGHT SUBCLAVIAN SYS: 40.8 CM/S
RIGHT VERTEBRAL DIAS: 11.98 CM/S
RIGHT VERTEBRAL SYS: 54.8 CM/S

## 2019-10-29 PROCEDURE — 70553 MRI BRAIN STEM W/O & W/DYE: CPT

## 2019-10-29 PROCEDURE — 74011250636 HC RX REV CODE- 250/636: Performed by: RADIOLOGY

## 2019-10-29 PROCEDURE — 93880 EXTRACRANIAL BILAT STUDY: CPT

## 2019-10-29 PROCEDURE — A9575 INJ GADOTERATE MEGLUMI 0.1ML: HCPCS | Performed by: RADIOLOGY

## 2019-10-29 RX ORDER — GADOTERATE MEGLUMINE 376.9 MG/ML
15 INJECTION INTRAVENOUS
Status: COMPLETED | OUTPATIENT
Start: 2019-10-29 | End: 2019-10-29

## 2019-10-29 RX ADMIN — GADOTERATE MEGLUMINE 15 ML: 376.9 INJECTION INTRAVENOUS at 16:28

## 2019-12-10 ENCOUNTER — ANESTHESIA EVENT (OUTPATIENT)
Dept: ENDOSCOPY | Age: 79
End: 2019-12-10
Payer: MEDICARE

## 2019-12-10 NOTE — ANESTHESIA PREPROCEDURE EVALUATION
Anesthetic History   No history of anesthetic complications            Review of Systems / Medical History  Patient summary reviewed, nursing notes reviewed and pertinent labs reviewed    Pulmonary    COPD: mild    Sleep apnea: No treatment  Smoker      Comments: Former Smoker   Neuro/Psych         TIA  Pertinent negatives: No CVA  Comments: Syncope Cardiovascular    Hypertension: well controlled        Dysrhythmias : PVC  PAD    Exercise tolerance: <4 METS  Comments: EF 60 - 65%, trivial TR in 2012  PVC's  Carotid disease   GI/Hepatic/Renal     GERD: well controlled    Renal disease: CRI  Hiatal hernia, PUD and liver disease    Comments: NAUSEA  ELEVATED LFTs  fatty liver  Nephrosclerosis Endo/Other        Arthritis and anemia     Other Findings   Comments: GI bleed  Melena           Physical Exam    Airway  Mallampati: II  TM Distance: 4 - 6 cm  Neck ROM: normal range of motion   Mouth opening: Normal     Cardiovascular  Regular rate and rhythm,  S1 and S2 normal,  no murmur, click, rub, or gallop  Rhythm: regular  Rate: normal         Dental    Dentition: Caps/crowns, Upper dentition intact and Lower dentition intact  Comments: 3 missing teeth   Pulmonary  Breath sounds clear to auscultation               Abdominal  GI exam deferred       Other Findings            Anesthetic Plan    ASA: 3  Anesthesia type: total IV anesthesia          Induction: Intravenous  Anesthetic plan and risks discussed with: Patient

## 2019-12-11 ENCOUNTER — HOSPITAL ENCOUNTER (OUTPATIENT)
Age: 79
Setting detail: OUTPATIENT SURGERY
Discharge: HOME | End: 2019-12-11
Attending: INTERNAL MEDICINE | Admitting: INTERNAL MEDICINE
Payer: MEDICARE

## 2019-12-11 ENCOUNTER — ANESTHESIA (OUTPATIENT)
Dept: ENDOSCOPY | Age: 79
End: 2019-12-11
Payer: MEDICARE

## 2019-12-11 VITALS
OXYGEN SATURATION: 96 % | TEMPERATURE: 98 F | RESPIRATION RATE: 12 BRPM | SYSTOLIC BLOOD PRESSURE: 142 MMHG | BODY MASS INDEX: 27.66 KG/M2 | HEIGHT: 64 IN | DIASTOLIC BLOOD PRESSURE: 80 MMHG | WEIGHT: 162 LBS | HEART RATE: 65 BPM

## 2019-12-11 PROCEDURE — 74011250637 HC RX REV CODE- 250/637: Performed by: INTERNAL MEDICINE

## 2019-12-11 PROCEDURE — 74011000250 HC RX REV CODE- 250: Performed by: NURSE ANESTHETIST, CERTIFIED REGISTERED

## 2019-12-11 PROCEDURE — 76060000031 HC ANESTHESIA FIRST 0.5 HR: Performed by: INTERNAL MEDICINE

## 2019-12-11 PROCEDURE — 74011250636 HC RX REV CODE- 250/636: Performed by: NURSE ANESTHETIST, CERTIFIED REGISTERED

## 2019-12-11 PROCEDURE — 74011250636 HC RX REV CODE- 250/636: Performed by: INTERNAL MEDICINE

## 2019-12-11 PROCEDURE — 76040000019: Performed by: INTERNAL MEDICINE

## 2019-12-11 PROCEDURE — 77030019988 HC FCPS ENDOSC DISP BSC -B: Performed by: INTERNAL MEDICINE

## 2019-12-11 PROCEDURE — 88305 TISSUE EXAM BY PATHOLOGIST: CPT

## 2019-12-11 RX ORDER — ATROPINE SULFATE 0.1 MG/ML
0.5 INJECTION INTRAVENOUS
Status: DISCONTINUED | OUTPATIENT
Start: 2019-12-11 | End: 2019-12-11 | Stop reason: HOSPADM

## 2019-12-11 RX ORDER — SODIUM CHLORIDE 0.9 % (FLUSH) 0.9 %
5-40 SYRINGE (ML) INJECTION EVERY 8 HOURS
Status: DISCONTINUED | OUTPATIENT
Start: 2019-12-11 | End: 2019-12-11 | Stop reason: HOSPADM

## 2019-12-11 RX ORDER — SODIUM CHLORIDE 0.9 % (FLUSH) 0.9 %
5-40 SYRINGE (ML) INJECTION AS NEEDED
Status: DISCONTINUED | OUTPATIENT
Start: 2019-12-11 | End: 2019-12-11 | Stop reason: HOSPADM

## 2019-12-11 RX ORDER — DEXTROMETHORPHAN/PSEUDOEPHED 2.5-7.5/.8
1.2 DROPS ORAL
Status: DISCONTINUED | OUTPATIENT
Start: 2019-12-11 | End: 2019-12-11 | Stop reason: HOSPADM

## 2019-12-11 RX ORDER — PROPOFOL 10 MG/ML
INJECTION, EMULSION INTRAVENOUS AS NEEDED
Status: DISCONTINUED | OUTPATIENT
Start: 2019-12-11 | End: 2019-12-11 | Stop reason: HOSPADM

## 2019-12-11 RX ORDER — SODIUM CHLORIDE 9 MG/ML
100 INJECTION, SOLUTION INTRAVENOUS CONTINUOUS
Status: DISCONTINUED | OUTPATIENT
Start: 2019-12-11 | End: 2019-12-11 | Stop reason: HOSPADM

## 2019-12-11 RX ORDER — LIDOCAINE HYDROCHLORIDE 20 MG/ML
INJECTION, SOLUTION EPIDURAL; INFILTRATION; INTRACAUDAL; PERINEURAL AS NEEDED
Status: DISCONTINUED | OUTPATIENT
Start: 2019-12-11 | End: 2019-12-11 | Stop reason: HOSPADM

## 2019-12-11 RX ORDER — FLUMAZENIL 0.1 MG/ML
0.2 INJECTION INTRAVENOUS
Status: DISCONTINUED | OUTPATIENT
Start: 2019-12-11 | End: 2019-12-11 | Stop reason: HOSPADM

## 2019-12-11 RX ORDER — NALOXONE HYDROCHLORIDE 0.4 MG/ML
0.4 INJECTION, SOLUTION INTRAMUSCULAR; INTRAVENOUS; SUBCUTANEOUS
Status: DISCONTINUED | OUTPATIENT
Start: 2019-12-11 | End: 2019-12-11 | Stop reason: HOSPADM

## 2019-12-11 RX ADMIN — PROPOFOL 50 MG: 10 INJECTION, EMULSION INTRAVENOUS at 10:23

## 2019-12-11 RX ADMIN — PROPOFOL 50 MG: 10 INJECTION, EMULSION INTRAVENOUS at 10:15

## 2019-12-11 RX ADMIN — PROPOFOL 50 MG: 10 INJECTION, EMULSION INTRAVENOUS at 10:17

## 2019-12-11 RX ADMIN — SIMETHICONE 80 MG: 20 SUSPENSION/ DROPS ORAL at 10:29

## 2019-12-11 RX ADMIN — PROPOFOL 50 MG: 10 INJECTION, EMULSION INTRAVENOUS at 10:20

## 2019-12-11 RX ADMIN — PROPOFOL 50 MG: 10 INJECTION, EMULSION INTRAVENOUS at 10:27

## 2019-12-11 RX ADMIN — PROPOFOL 50 MG: 10 INJECTION, EMULSION INTRAVENOUS at 10:34

## 2019-12-11 RX ADMIN — SODIUM CHLORIDE 100 ML/HR: 900 INJECTION, SOLUTION INTRAVENOUS at 09:51

## 2019-12-11 RX ADMIN — LIDOCAINE HYDROCHLORIDE 100 MG: 20 INJECTION, SOLUTION EPIDURAL; INFILTRATION; INTRACAUDAL; PERINEURAL at 10:15

## 2019-12-11 NOTE — PROCEDURES
[facility]                   Endoscopic Gastroduodenoscopy Procedure Note      12/11/2019  Harinder Knowles  1940  [id number]    Procedure: Endoscopic Gastroduodenoscopy with biopsy    Indication:  GERD, Hx of esophageal ulceration with bleeding     Pre-operative Diagnosis: see indication above    Post-operative Diagnosis: see findings below    : ROMULO De Dios MD    Referring Provider:  Mini Beatty MD      Anesthesia/Sedation:  MAC anesthesia Propofol    Airway assessment: No airway problems anticipated    Pre-Procedural Exam:      Airway: clear, no airway problems anticipated  Heart: RRR, without gallops or rubs  Lungs: clear bilaterally without wheezes, crackles, or rhonchi  Abdomen: soft, nontender, nondistended, bowel sounds present  Mental Status: awake, alert and oriented to person, place and time       Procedure Details     After infomed consent was obtained for the procedure, with all risks and benefits of procedure explained the patient was taken to the endoscopy suite and placed in the left lateral decubitus position. Following sequential administration of sedation as per above, the endoscope was inserted into the mouth and advanced under direct vision to second portion of the duodenum. A careful inspection was made as the gastroscope was withdrawn, including a retroflexed view of the proximal stomach; findings and interventions are described below. Findings:   Esophagus:  Distal ulcerations have healed. Focal esophagitis at the GE junction  Stomach: Normal mucosa. Nissen fundoplication intact  Duodenum: normal    Therapies:  biopsy of esophagus    Specimens: biopsies of the GE junction           Complications:   None; patient tolerated the procedure well. EBL:  None. Impression:      -1. Healed esophageal ulcers  2. Mild esophagitis  3. Intact Nissen fundoplication  4.  Normal duodenum    Recommendations:  -Continue acid suppression. , -Await pathology.     Susana Cosme MD12/11/2019

## 2019-12-11 NOTE — PROCEDURES
[facility]                  Colonoscopy Operative Report    12/11/2019      Aime Driscoll  [id number]  1940    Procedure Type:   Colonoscopy --screening     Indications:    Personal history of colon polyps (screening only)     Pre-operative Diagnosis: see indication above    Post-operative Diagnosis:  See findings below    :  Rock Saab MD    Referring Provider: Matthew Bell MD      Sedation:  MAC anesthesia Propofol    Pre-Procedural Exam:      Airway: clear,  No airway problems anticipated  Heart: RRR, without gallops or rubs  Lungs: clear bilaterally without wheezes, crackles, or rhonchi  Abdomen: soft, nontender, nondistended, bowel sounds present  Mental Status: awake, alert and oriented to person, place and time     Procedure Details:  After informed consent was obtained with all risks and benefits of procedure explained and preoperative exam completed, the patient was taken to the endoscopy suite and placed in the left lateral decubitus position. Upon sequential sedation as per above, a digital rectal exam was performed . The Olympus videocolonoscope  was inserted in the rectum and carefully advanced to the cecum, which was identified by the ileocecal valve and appendiceal orifice. The cecum was identified by the ileocecal valve and appendiceal orifice. The quality of preparation was good. The colonoscope was slowly withdrawn with careful evaluation between folds. Retroflexion in the rectum was completed demonstrating internal hemorrhoids. Findings:   Rectum: Grade 1 internal hemorrhoid(s); Sigmoid: surgically absent  Descending Colon: normal mucosa. Intact colorectal anastomosis  Transverse Colon: normal  Ascending Colon: normal  Cecum: normal  Terminal Ileum: not intubated      Specimen Removed:  none    Complications: None. EBL:  None.     Impression:    evidence of prior surgery , sigmoid colectomy  hemorrhoids internal, Moderate in size    Recommendations: --Repeat colonoscopy in 5 years. High fiber diet. Resume normal medication(s). Discharge Disposition:  Home in the company of a  when able to ambulate. Howie Decker MD    12/11/2019     ROMULO Ortega MD  [facility], [address] [place] [address]  [phone]  [email or web address]

## 2019-12-11 NOTE — H&P
Zahira Allen MD  [facility], [address]  [address]  [phone]  [email or web address] [email or web address]      See office H and P. No interval change. Date of Surgery Update:  Harinder Knowles was seen and examined. History and physical has been reviewed. The patient has been examined.  There have been no significant clinical changes since the completion of the originally dated History and Physical.    Signed By: Yvan Handley., MD     December 11, 2019 10:04 AM

## 2019-12-11 NOTE — ROUTINE PROCESS
Christiano Augustin 1940 
[id number] Situation: 
Verbal report received from: [facility] [place] RN Procedure: Procedure(s): 
COLONOSCOPY 
ESOPHAGOGASTRODUODENOSCOPY (EGD) ESOPHAGOGASTRODUODENAL (EGD) BIOPSY Background: 
 
Preoperative diagnosis: HISTORY OF COLONIC POLYPS, ESOPHAGEAL ULCER WITH BLEEDING Postoperative diagnosis: esophagitis, hx of polyps, hemorrhoids :  Dr. Sushila Loyd Assistant(s): Endoscopy Technician-1: Jodi Ndiaye Endoscopy RN-1: Monica Lawrence RN Specimens:  
ID Type Source Tests Collected by Time Destination 1 : Zifynaun LEMOS Preservative   Aminata Pinzon MD 12/11/2019 1020 Pathology H. Pylori  no Assessment: 
Intra-procedure medications Anesthesia gave intra-procedure sedation and medications, see anesthesia flow sheet yes [facility]ous [facility] Vital signs stable Abdominal assessment: round and soft Recommendation: 
Discharge patient per MD order. Family or Friend Unique Rojas Permission to share finding with family or friend yes

## 2019-12-11 NOTE — DISCHARGE INSTRUCTIONS
Tiara Ag MD  [facility], [address]  [place], [address] [facility]  [phone]  [email or web address]    Kathya Chang  [id number]  1940    COLON DISCHARGE INSTRUCTIONS  Discomfort:  Redness at IV site- apply warm compress to area; if redness or soreness persist- contact your physician  There may be a slight amount of blood passed from the rectum  Gaseous discomfort- walking, belching will help relieve any discomfort  You may not operate a vehicle for 12 hours  You may not engage in an occupation involving machinery or appliances for rest of today  You may not drink alcoholic beverages for at least 12 hours  Avoid making any critical decisions for at least 24 hour  DIET:   High fiber diet. - however -  remember your colon is empty and a heavy meal will produce gas. Avoid these foods:  vegetables, fried / greasy foods, carbonated drinks for today      ACTIVITY:  You may resume your normal daily activities it is recommended that you spend the remainder of the day resting -  avoid any strenuous activity. CALL M.D. ANY SIGN OF:   Increasing pain, nausea, vomiting  Abdominal distension (swelling)  New increased bleeding (oral or rectal)  Fever (chills)  Pain in chest area  Bloody discharge from nose or mouth  Shortness of breath     COLONOSCOPY FINDINGS:  Your colonoscopy showed: NO polyps found. Surgical anastomosis appears normal.    Follow-up Instructions:   Call Dr. Tiara Ag if any questions or problems. Telephone # [phone]  Dr. Ernesto Mcneil office will notify you of the biopsy results within 7 to 10 days. Should have a repeat colonoscopy in 5 years.       EGD DISCHARGE INSTRUCTIONS    Discomfort:  Sore throat- throat lozenges or warm salt water gargle  redness at IV site- apply warm compress to area; if redness or soreness persist- contact your physician  Gaseous discomfort- walking, belching will help relieve any discomfort  You may not operate a vehicle for 12 hours  You may not engage in an occupation involving machinery or appliances for rest of today  You may not drink alcoholic beverages for at least 12 hours  Avoid making any critical decisions for at least 24 hour  DIET  You may have anything by mouth. You may eat and drink immediately. You may resume your regular diet - however -  remember your colon is empty and a heavy meal will produce gas. Avoid these foods:  vegetables, fried / greasy foods, carbonated drinks      ACTIVITY  You may resume your normal daily activities   Spend the remainder of the day resting -  avoid any strenuous activity. CALL M.D. ANY SIGN OF   Increasing pain, nausea, vomiting  Abdominal distension (swelling)  New increased bleeding (oral or rectal)  Fever (chills)  Pain in chest area  Bloody discharge from nose or mouth  Shortness of breath    Follow-up Instructions:   Call Dr. Luis Carlos Naqvi for any questions or problems. Telephone # [phone]  Dr. Vinicio William office will notify you of the biopsy results available  Within 7 to 10 days. We will call you or send a letter   Continue same medications. ENDOSCOPY FINDINGS:   Your endoscopy showed that the esophageal ulcers have healed. Mild residual esophagitis.       DISCHARGE SUMMARY from Nurse    The following personal items collected during your admission are returned to you:   Dental Appliance: Dental Appliances: None  Vision: Visual Aid: Glasses  Hearing Aid:    Jewelry:    Clothing:    Other Valuables:    Valuables sent to safe:

## 2019-12-11 NOTE — ANESTHESIA POSTPROCEDURE EVALUATION
Procedure(s):  COLONOSCOPY  ESOPHAGOGASTRODUODENOSCOPY (EGD)  ESOPHAGOGASTRODUODENAL (EGD) BIOPSY. total IV anesthesia    Anesthesia Post Evaluation        Patient location during evaluation: PACU  Note status: Adequate. Level of consciousness: responsive to verbal stimuli and sleepy but conscious  Pain management: satisfactory to patient  Airway patency: patent  Anesthetic complications: no  Cardiovascular status: acceptable  Respiratory status: acceptable  Hydration status: acceptable  Comments: +Post-Anesthesia Evaluation and Assessment    Patient: Brice Castaneda MRN: [id number]  SSN: [id number]   YOB: 1940  Age: 67 y.o. Sex: male      Cardiovascular Function/Vital Signs    /61   Pulse 64   Temp 36.7 °C (98.1 °F)   Resp 12   Ht 5' 4\" (1.626 m)   Wt 73.5 kg (162 lb)   SpO2 96%   BMI 27.81 kg/m²     Patient is status post Procedure(s):  COLONOSCOPY  ESOPHAGOGASTRODUODENOSCOPY (EGD)  ESOPHAGOGASTRODUODENAL (EGD) BIOPSY. Nausea/Vomiting: Controlled. Postoperative hydration reviewed and adequate. Pain:  Pain Scale 1: Visual (12/11/19 1043)  Pain Intensity 1: 0 (12/11/19 1043)   Managed. Neurological Status: At baseline. Mental Status and Level of Consciousness: Arousable. Pulmonary Status:   O2 Device: Room air (12/11/19 1043)   Adequate oxygenation and airway patent. Complications related to anesthesia: None    Post-anesthesia assessment completed. No concerns. Signed By: Joan Albarado MD    12/11/2019  Post anesthesia nausea and vomiting:  controlled      No vitals data found for the desired time range.

## 2020-02-04 ENCOUNTER — HOSPITAL ENCOUNTER (OUTPATIENT)
Dept: VASCULAR SURGERY | Age: 80
Discharge: HOME | End: 2020-02-04
Attending: INTERNAL MEDICINE
Payer: MEDICARE

## 2020-02-04 DIAGNOSIS — I70.0 ATHEROSCLEROSIS OF AORTA (HCC): ICD-10-CM

## 2020-02-04 PROCEDURE — 93922 UPR/L XTREMITY ART 2 LEVELS: CPT

## 2020-02-05 LAB
LEFT ABI: 1.13
LEFT ANTERIOR TIBIAL: 171 MMHG
LEFT ARM BP: 151 MMHG
LEFT POSTERIOR TIBIAL: 177 MMHG
LEFT TBI: 0.89
LEFT TOE PRESSURE: 140 MMHG
RIGHT ABI: 1.1
RIGHT ANTERIOR TIBIAL: 167 MMHG
RIGHT ARM BP: 157 MMHG
RIGHT POSTERIOR TIBIAL: 172 MMHG
RIGHT TBI: 0.87
RIGHT TOE PRESSURE: 136 MMHG

## 2020-02-13 ENCOUNTER — OFFICE VISIT (OUTPATIENT)
Dept: SURGERY | Age: 80
End: 2020-02-13

## 2020-02-13 VITALS
HEIGHT: 64 IN | WEIGHT: 164 LBS | DIASTOLIC BLOOD PRESSURE: 83 MMHG | SYSTOLIC BLOOD PRESSURE: 195 MMHG | TEMPERATURE: 97.5 F | BODY MASS INDEX: 28 KG/M2 | HEART RATE: 72 BPM | OXYGEN SATURATION: 99 %

## 2020-02-13 DIAGNOSIS — R51.9 RIGHT TEMPORAL HEADACHE: Primary | ICD-10-CM

## 2020-02-13 RX ORDER — VITAMIN A 3000 MCG
10000 CAPSULE ORAL DAILY
COMMUNITY
End: 2020-09-14 | Stop reason: CLARIF

## 2020-02-13 RX ORDER — ASPIRIN 81 MG/1
TABLET ORAL DAILY
COMMUNITY
End: 2020-09-22

## 2020-02-13 RX ORDER — CLOPIDOGREL BISULFATE 75 MG/1
75 TABLET ORAL DAILY
COMMUNITY
End: 2020-09-14 | Stop reason: CLARIF

## 2020-02-13 NOTE — PROGRESS NOTES
Chief Complaint   Patient presents with    Headache     Seen at the request of Dr Millicent Villarreal to evaluate temporary artery biopsy     Discussed advanced directive. Patient states that he does not have an advanced directive.

## 2020-02-13 NOTE — PROGRESS NOTES
The patient is a 69-year old man who is referred by Dr. Nancy Araujo for a temporal artery biopsy. The patient has been complaining of pain in his ear and in front of the ear. He is noted to have an elevated sedimentation rate. He has been having left temporal headaches. He has also been feeling weak. 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 The patient has a history of coronary angioplasty in May 2019 at [facility]. He presently denies anginal chest pain, irregular heartbeat. He reports he can walk about two blocks or more before he has any shortness of breath. He has not shortness of breath at rest or at night. He can lie flat. He does not have a history of diabetes. He was started on prednisone yesterday related to concern about possible temporal arteritis. The patient had a history late last year of sigmoid diverticulitis requiring resection and anastomosis. The patient has a history of sleep apnea not treated by CPAP, esophageal reflux disease, hypertension, COPD, BPH. The patient quit smoking in the 1980s. Physical Examination:   GENERAL: The patient is an alert elderly man in no acute distress. VITAL SIGNS: /83, P 72, T 97.5, O2 saturation 99% on room air, , HT 5'4\". HEAD/NECK: Revealed no jaundice, mass or thyromegaly. There are normal temporal pulses bilaterally. There is no tenderness in either the right or left temporal region. Carotid pulses are normal bilaterally. LUNGS: Clear bilaterally without rales, rhonchi or wheezes. HEART: Regular without murmur, gallop or rub. NEURO: Patient is alert and oriented. He moves all extremities equally. Facial movement is symmetrical. Speech is normal.     As per request of the patient's primary physician, I have offered planning for left temporal artery biopsy under local anesthesia as an outpatient.  I reviewed with the patient the operative and postoperative course for the procedure. I reviewed with the patient risks vs benefits of the procedure. Risks would include bleeding, infection, failure to achieve a diagnosis, risks associated with local anesthesia, etc. The patient understands and wishes to proceed. Specifically, the patient understands that a positive result of giant cells would confirm temporal arteritis and a negative result in terms of absence of giant cells would not rule out the possibility of temporal arteritis being active in other arteries. Final Diagnosis: Temporal headache, elevated sedimentation rate.     c: Layne Olmos MD

## 2020-02-14 NOTE — PERIOP NOTES
[facility]  Preoperative Instructions        Surgery Date 2/18/20          Time of Arrival 1230- LOCAL    1. On the day of your surgery, please report to the Surgical Services Registration Desk and sign in at your designated time. The Surgery Center is located to the right of the Emergency Room. 2. You must have someone with you to drive you home. You should not drive a car for 24 hours following surgery. Please make arrangements for a friend or family member to stay with you for the first 24 hours after your surgery. 3. Do not have anything to eat or drink (including water, gum, mints, coffee, juice) after midnight / clear liquid breakfast morning of-LOCAL?? .? This may not apply to medications prescribed by your physician. ?(Please note below the special instructions with medications to take the morning of your procedure.)    4. We recommend you do not drink any alcoholic beverages for 24 hours before and after your surgery. 5. Contact your surgeons office for instructions on the following medications: non-steroidal anti-inflammatory drugs (i.e. Advil, Aleve), vitamins, and supplements. (Some surgeons will want you to stop these medications prior to surgery and others may allow you to take them)  **If you are currently taking Plavix, Coumadin, Aspirin and/or other blood-thinning agents, contact your surgeon for instructions. ** Your surgeon will partner with the physician prescribing these medications to determine if it is safe to stop or if you need to continue taking. Please do not stop taking these medications without instructions from your surgeon    6. Wear comfortable clothes. Wear glasses instead of contacts. Do not bring any money or jewelry. Please bring picture ID, insurance card, and any prearranged co-payment or hospital payment. Do not wear make-up, particularly mascara the morning of your surgery.   Do not wear nail polish, particularly if you are having foot /hand surgery. Wear your hair loose or down, no ponytails, buns, bart pins or clips. All body piercings must be removed. Please shower with antibacterial soap for three consecutive days before and on the morning of surgery, but do not apply any lotions, powders or deodorants after the shower on the day of surgery. Please use a fresh towels after each shower. Please sleep in clean clothes and change bed linens the night before surgery. Please do not shave for 48 hours prior to surgery. Shaving of the face is acceptable. 7. You should understand that if you do not follow these instructions your surgery may be cancelled. If your physical condition changes (I.e. fever, cold or flu) please contact your surgeon as soon as possible. 8. It is important that you be on time. If a situation occurs where you may be late, please call [phone] (OR Holding Area). 9. If you have any questions and or problems, please call [phone] (Pre-admission Testing). 10. Your surgery time may be subject to change. You will receive a phone call the evening prior if your time changes. 11.  If having outpatient surgery, you must have someone to drive you here, stay with you during the duration of your stay, and to drive you home at time of discharge. 12.   In an effort to improve the efficiency, privacy, and safety for all of our Pre-op patients visitors are not allowed in the Holding area. Once you arrive and are registered your family/visitors will be asked to remain in the waiting room. The Pre-op staff will get you from the Surgical Waiting Area and will explain to you and your family/visitors that the Pre-op phase is beginning. The staff will answer any questions and provide instructions for tracking of the patient, by use of the existing tracking number and color-coded status board in the waiting room.   At this time the staff will also ask for your designated spokesperson information in the event that the physician or staff need to provide an update or obtain any pertinent information. The designated spokesperson will be notified if the physician needs to speak to family during the pre-operative phase. If at any time your family/visitors has questions or concerns they may approach the volunteer desk in the waiting area for assistance. Special Instructions:Light breakfast. ASA last day-2/13/20. Plavix last day 2/13/20. TAKE ALL MEDICATIONS DAY OF SURGERY EXCEPT:Plavix & ASA. I understand a pre-operative phone call will be made to verify my surgery time. In the event that I am not available, I give permission for a message to be left on my answering service and/or with another person?   {yes @ [phone].         ___________________      __________   _________    (Signature of Patient)             (Witness)                (Date and Time)

## 2020-02-18 ENCOUNTER — HOSPITAL ENCOUNTER (OUTPATIENT)
Age: 80
Setting detail: OUTPATIENT SURGERY
Discharge: HOME | End: 2020-02-18
Attending: SURGERY | Admitting: SURGERY
Payer: MEDICARE

## 2020-02-18 VITALS
RESPIRATION RATE: 10 BRPM | HEART RATE: 65 BPM | OXYGEN SATURATION: 96 % | WEIGHT: 161.82 LBS | SYSTOLIC BLOOD PRESSURE: 143 MMHG | TEMPERATURE: 97.8 F | BODY MASS INDEX: 27.63 KG/M2 | DIASTOLIC BLOOD PRESSURE: 69 MMHG | HEIGHT: 64 IN

## 2020-02-18 PROCEDURE — 77030040361 HC SLV COMPR DVT MDII -B: Performed by: SURGERY

## 2020-02-18 PROCEDURE — 74011000250 HC RX REV CODE- 250: Performed by: SURGERY

## 2020-02-18 PROCEDURE — 88305 TISSUE EXAM BY PATHOLOGIST: CPT

## 2020-02-18 PROCEDURE — 76210000020 HC REC RM PH II FIRST 0.5 HR: Performed by: SURGERY

## 2020-02-18 PROCEDURE — 77030031139 HC SUT VCRL2 J&J -A: Performed by: SURGERY

## 2020-02-18 PROCEDURE — 77030002996 HC SUT SLK J&J -A: Performed by: SURGERY

## 2020-02-18 PROCEDURE — 77030011640 HC PAD GRND REM COVD -A: Performed by: SURGERY

## 2020-02-18 PROCEDURE — 76010000149 HC OR TIME 1 TO 1.5 HR: Performed by: SURGERY

## 2020-02-18 PROCEDURE — 74011250637 HC RX REV CODE- 250/637: Performed by: ANESTHESIOLOGY

## 2020-02-18 PROCEDURE — 77030040922 HC BLNKT HYPOTHRM STRY -A

## 2020-02-18 PROCEDURE — 77030002888 HC SUT CHRMC J&J -A: Performed by: SURGERY

## 2020-02-18 PROCEDURE — 77030018836 HC SOL IRR NACL ICUM -A: Performed by: SURGERY

## 2020-02-18 RX ADMIN — Medication 3 AMPULE: at 09:55

## 2020-02-18 NOTE — OP NOTES
Operative Report    CPT  86423      Left Temporal Artery Biopsy    Angela Tse    2/18/2020    Preoperative Diagnosis - Left temporal headache    Postoperative Diagnosis - Same    Anesthesia - Local    Surgeon - Aria Moon    Operative Summary -     The patient was taken to the operating room and placed on the operating table in the supine position. The patient's left temporal region was prepared and draped. After injection of local anesthesia lidocaine 1% with epinephrine, an incision was made over the palpable temporal artery pulse. The artery was exposed for about 3 cm. The artery appeared normal.      The artery was freed with ligation of the proximal and distal ends and larger branches with 4-0 silk. The artery segment was excised and sent for pathological examination. The wound was closed with 4-0 chromic in the subcutaneous layer and with 5-0 intracuticular Vicryl at the skin level. Steri strips were placed over the incision. The patient tolerated the procedure well and was taken to the PACU in stable condition.      Specimen - left temporal artery segment    Drain - none    Estimated Blood Loss - minimal    Complications - none      G Yvrose Shearer MD

## 2020-02-18 NOTE — ROUTINE PROCESS
Patient: Thais Mcgee MRN: [id number]  SSN: [id number] YOB: 1940  Age: 67 y.o. Sex: male Patient is status post Procedure(s): LEFT TEMPORAL ARTERY BIOPSY. Surgeon(s) and Role: Ramez Musa MD - Primary Local/Dose/Irrigation:  4mL of 1% Liodocaine with Epi Dressing/Packing:  Wound Face Left 1 site with Mastisol and Steri-Strips. -Dressing Type: Adhesive wound closure strips (Steri-Strips); Adhesive wound dressing (Mastisol); Sutures (02/18/20 1327) Splint/Cast:  ] Other:

## 2020-02-18 NOTE — DISCHARGE INSTRUCTIONS
Discharge Instructions Following Temporal Artery Biopsy        Keep incision dry until tomorrow, then OK to shower. Leave steri strips in place until they fall off, usually in about 10 days. Take Tylenol for pain. Resume Plavix (clopidogrel) on Wednesday 2/19/2020. See Dr Jem Encinas in follow up in the office next week  -  Call for appointment  -  [phone]. Pathology report will be available in about 5-6 days. You can call the office if you have not received notice of the results. If you have any problems, call Dr. Jem Encinas at [phone] or [phone] (after hours). Maria Isabel Aguilera MD    TO PREVENT AN INFECTION      1. [address] YOUR HANDS     To prevent infection, good handwashing is the most important thing you or your caregiver can do.  Wash your hands with soap and water or use the hand  we gave you before you touch any wounds. 2. SHOWER     Use the antibacterial soap we gave you when you take a shower.  Shower with this soap until your wounds are healed.  To reach all areas of your body, you may need someone to help you.  Dont forget to clean your belly button with every shower. 3.  USE CLEAN SHEETS     Use freshly cleaned sheets on your bed after surgery.  To keep the surgery site clean, do not allow pets to sleep with you while your wound is still healing. 4. STOP SMOKING     Stop smoking, or at least cut back on smoking     Smoking slows your healing. 5.  CONTROL YOUR BLOOD SUGAR     High blood sugars slow wound healing. If you are diabetic, control your blood sugar levels before and after your surgery.  DISCHARGE SUMMARY from Nurse    The following personal items are in your possession at time of discharge:    Dental Appliances: None  Visual Aid: None  Home Medications: None  Jewelry: None  Clothing: None (left in in pt. room)  Other Valuables: None             PATIENT INSTRUCTIONS:    After general anesthesia or intravenous sedation, for 24 hours or while taking prescription Narcotics:  Limit your activities  Do not drive and operate hazardous machinery  Do not make important personal or business decisions  Do  not drink alcoholic beverages  If you have not urinated within 8 hours after discharge, please contact your surgeon on call. Report the following to your surgeon:  Excessive pain, swelling, redness or odor of or around the surgical area  Temperature over 100.5  Nausea and vomiting lasting longer than 4 hours or if unable to take medications  Any signs of decreased circulation or nerve impairment to extremity: change in color, persistent  numbness, tingling, coldness or increase pain  Any questions    To prevent infection remember to refer to your handout on handwashing given to you by your nurse. *  Please give a list of your current medications to your Primary Care Provider. *  Please update this list whenever your medications are discontinued, doses are      changed, or new medications (including over-the-counter products) are added. *  Please carry medication information at all times in case of emergency situations. These are general instructions for a healthy lifestyle:    No smoking/ No tobacco products/ Avoid exposure to second hand smoke    Surgeon General's Warning:  Quitting smoking now greatly reduces serious risk to your health. Obesity, smoking, and sedentary lifestyle greatly increases your risk for illness    A healthy diet, regular physical exercise & weight monitoring are important for maintaining a healthy lifestyle    You may be retaining fluid if you have a history of heart failure or if you experience any of the following symptoms:  Weight gain of 3 pounds or more overnight or 5 pounds in a week, increased swelling in our hands or feet or shortness of breath while lying flat in bed.   Please call your doctor as soon as you notice any of these symptoms; do not wait until your next office visit. Recognize signs and symptoms of STROKE:    F-face looks uneven    A-arms unable to move or move unevenly    S-speech slurred or non-existent    T-time-call 911 as soon as signs and symptoms begin-DO NOT go       Back to bed or wait to see if you get better-TIME IS BRAIN.  The discharge information has been reviewed with the patient. The patient verbalized understanding.

## 2020-02-18 NOTE — BRIEF OP NOTE
BRIEF OPERATIVE NOTE    Date of Procedure: 2/18/2020   Preoperative Diagnosis: HEADACHES  Postoperative Diagnosis: * No post-op diagnosis entered *    Procedure(s):  LEFT TEMPORAL ARTERY BIOPSY  Surgeon(s) and Role:     Latah Lee MD - Primary         Surgical Assistant: none    Surgical Staff:  Circ-1: Alvarez Barrett RN  Scrub Tech-1: Melody Bustos  Event Time In Time Out   Incision Start 1300    Incision Close 1330      Anesthesia: Local   Estimated Blood Loss: 5 ml  Specimens:   ID Type Source Tests Collected by Time Destination   1 : LEFT Temporal Artery Preservative Artery  Latha Lee MD 2/18/2020 1318 Pathology      Findings: normal appearing artery   Complications: none  Implants: * No implants in log *

## 2020-02-20 ENCOUNTER — HOSPITAL ENCOUNTER (OUTPATIENT)
Dept: ULTRASOUND IMAGING | Age: 80
Discharge: HOME | End: 2020-02-20
Attending: INTERNAL MEDICINE
Payer: MEDICARE

## 2020-02-20 DIAGNOSIS — K75.4 AUTOIMMUNE HEPATITIS (HCC): ICD-10-CM

## 2020-02-20 PROCEDURE — 76700 US EXAM ABDOM COMPLETE: CPT

## 2020-02-27 ENCOUNTER — TELEPHONE (OUTPATIENT)
Dept: WOUND CARE | Age: 80
End: 2020-02-27

## 2020-02-27 NOTE — TELEPHONE ENCOUNTER
Surgery    Pathology on temporal artery negative for arteritis. Discussed with patient. Negative biopsy does not rule out arteritis, as arteritis may be discontinuous.     Shelley Diaz MD

## 2020-03-03 ENCOUNTER — DOCUMENTATION ONLY (OUTPATIENT)
Dept: SURGERY | Age: 80
End: 2020-03-03

## 2020-03-03 NOTE — PROGRESS NOTES
Per Dr Anshu Burns, faxed telephone encounter dated 2/27/20 & pathology for temporal artery biopsy dated 2/18/20 to Dr Joseline Franks (f) [phone], confirmation received.

## 2020-03-05 ENCOUNTER — OFFICE VISIT (OUTPATIENT)
Dept: SURGERY | Age: 80
End: 2020-03-05

## 2020-03-05 VITALS
BODY MASS INDEX: 28.38 KG/M2 | TEMPERATURE: 97.4 F | OXYGEN SATURATION: 97 % | HEART RATE: 80 BPM | WEIGHT: 166.2 LBS | HEIGHT: 64 IN | DIASTOLIC BLOOD PRESSURE: 81 MMHG | SYSTOLIC BLOOD PRESSURE: 147 MMHG

## 2020-03-05 DIAGNOSIS — Z09 POSTOPERATIVE EXAMINATION: Primary | ICD-10-CM

## 2020-03-05 PROBLEM — K92.1 MELENA: Status: RESOLVED | Noted: 2019-07-18 | Resolved: 2020-03-05

## 2020-03-05 PROBLEM — K56.7 ILEUS (HCC): Status: RESOLVED | Noted: 2019-07-06 | Resolved: 2020-03-05

## 2020-03-05 PROBLEM — K92.2 GI BLEED: Status: RESOLVED | Noted: 2019-07-16 | Resolved: 2020-03-05

## 2020-03-05 PROBLEM — K56.7 ILEUS, POSTOPERATIVE (HCC): Status: RESOLVED | Noted: 2019-07-08 | Resolved: 2020-03-05

## 2020-03-05 PROBLEM — R19.8 PERFORATED VISCUS: Status: RESOLVED | Noted: 2019-07-01 | Resolved: 2020-03-05

## 2020-03-05 PROBLEM — A41.9 SEPSIS (HCC): Status: RESOLVED | Noted: 2019-07-01 | Resolved: 2020-03-05

## 2020-03-05 PROBLEM — K91.89 ILEUS, POSTOPERATIVE (HCC): Status: RESOLVED | Noted: 2019-07-08 | Resolved: 2020-03-05

## 2020-03-05 NOTE — PROGRESS NOTES
Chief Complaint   Patient presents with    Post OP Follow Up     Left temporal artery biopsy 2/18/20     1. Have you been to the ER, urgent care clinic since your last visit? Hospitalized since your last visit? No    2. Have you seen or consulted any other health care providers outside of the [address] [place] [address] since your last visit? Include any pap smears or colon screening.  No

## 2020-03-05 NOTE — PROGRESS NOTES
Surgery    Patient s/p left temporal artery biopsy on 2/18/2020. He has no complaints. On exam the incision is healing nicely. Pathology on temporal artery negative for arteritis.     Discussed with patient.     Negative biopsy does not rule out arteritis, as arteritis may be discontinuous. He will follow up with Dr Loretta Mei. Rocio Loomis MD      CC:  Dr Todd Vang.

## 2020-06-18 ENCOUNTER — APPOINTMENT (OUTPATIENT)
Dept: CT IMAGING | Age: 80
End: 2020-06-18
Attending: EMERGENCY MEDICINE
Payer: MEDICARE

## 2020-06-18 ENCOUNTER — APPOINTMENT (OUTPATIENT)
Dept: GENERAL RADIOLOGY | Age: 80
End: 2020-06-18
Attending: EMERGENCY MEDICINE
Payer: MEDICARE

## 2020-06-18 ENCOUNTER — HOSPITAL ENCOUNTER (EMERGENCY)
Age: 80
Discharge: HOME | End: 2020-06-18
Attending: EMERGENCY MEDICINE
Payer: MEDICARE

## 2020-06-18 VITALS
SYSTOLIC BLOOD PRESSURE: 155 MMHG | HEIGHT: 66 IN | TEMPERATURE: 98.5 F | RESPIRATION RATE: 17 BRPM | WEIGHT: 161.6 LBS | HEART RATE: 91 BPM | BODY MASS INDEX: 25.97 KG/M2 | OXYGEN SATURATION: 98 % | DIASTOLIC BLOOD PRESSURE: 85 MMHG

## 2020-06-18 DIAGNOSIS — K80.20 CALCULUS OF GALLBLADDER WITHOUT CHOLECYSTITIS WITHOUT OBSTRUCTION: ICD-10-CM

## 2020-06-18 DIAGNOSIS — R11.0 NAUSEA WITHOUT VOMITING: Primary | ICD-10-CM

## 2020-06-18 LAB
ALBUMIN SERPL-MCNC: 3.5 G/DL (ref 3.5–5)
ALBUMIN/GLOB SERPL: 0.7 {RATIO} (ref 1.1–2.2)
ALP SERPL-CCNC: 272 U/L (ref 45–117)
ALT SERPL-CCNC: 71 U/L (ref 12–78)
ANION GAP SERPL CALC-SCNC: 7 MMOL/L (ref 5–15)
AST SERPL-CCNC: 55 U/L (ref 15–37)
ATRIAL RATE: 79 BPM
BASOPHILS # BLD: 0 K/UL (ref 0–0.1)
BASOPHILS NFR BLD: 1 % (ref 0–1)
BILIRUB SERPL-MCNC: 0.8 MG/DL (ref 0.2–1)
BUN SERPL-MCNC: 9 MG/DL (ref 6–20)
BUN/CREAT SERPL: 7 (ref 12–20)
CALCIUM SERPL-MCNC: 8.8 MG/DL (ref 8.5–10.1)
CALCULATED R AXIS, ECG10: 19 DEGREES
CALCULATED T AXIS, ECG11: 24 DEGREES
CHLORIDE SERPL-SCNC: 104 MMOL/L (ref 97–108)
CO2 SERPL-SCNC: 25 MMOL/L (ref 21–32)
CREAT SERPL-MCNC: 1.28 MG/DL (ref 0.7–1.3)
DIAGNOSIS, 93000: NORMAL
DIFFERENTIAL METHOD BLD: ABNORMAL
EOSINOPHIL # BLD: 0.1 K/UL (ref 0–0.4)
EOSINOPHIL NFR BLD: 3 % (ref 0–7)
ERYTHROCYTE [DISTWIDTH] IN BLOOD BY AUTOMATED COUNT: 13.2 % (ref 11.5–14.5)
GLOBULIN SER CALC-MCNC: 4.9 G/DL (ref 2–4)
GLUCOSE SERPL-MCNC: 121 MG/DL (ref 65–100)
HCT VFR BLD AUTO: 44.4 % (ref 36.6–50.3)
HGB BLD-MCNC: 14.8 G/DL (ref 12.1–17)
IMM GRANULOCYTES # BLD AUTO: 0 K/UL (ref 0–0.04)
IMM GRANULOCYTES NFR BLD AUTO: 1 % (ref 0–0.5)
LIPASE SERPL-CCNC: 227 U/L (ref 73–393)
LYMPHOCYTES # BLD: 0.8 K/UL (ref 0.8–3.5)
LYMPHOCYTES NFR BLD: 21 % (ref 12–49)
MCH RBC QN AUTO: 30.6 PG (ref 26–34)
MCHC RBC AUTO-ENTMCNC: 33.3 G/DL (ref 30–36.5)
MCV RBC AUTO: 91.7 FL (ref 80–99)
MONOCYTES # BLD: 0.5 K/UL (ref 0–1)
MONOCYTES NFR BLD: 13 % (ref 5–13)
NEUTS SEG # BLD: 2.4 K/UL (ref 1.8–8)
NEUTS SEG NFR BLD: 61 % (ref 32–75)
NRBC # BLD: 0 K/UL (ref 0–0.01)
NRBC BLD-RTO: 0 PER 100 WBC
PLATELET # BLD AUTO: 179 K/UL (ref 150–400)
PMV BLD AUTO: 10.3 FL (ref 8.9–12.9)
POTASSIUM SERPL-SCNC: 4.1 MMOL/L (ref 3.5–5.1)
PROT SERPL-MCNC: 8.4 G/DL (ref 6.4–8.2)
Q-T INTERVAL, ECG07: 382 MS
QRS DURATION, ECG06: 76 MS
QTC CALCULATION (BEZET), ECG08: 440 MS
RBC # BLD AUTO: 4.84 M/UL (ref 4.1–5.7)
RBC MORPH BLD: ABNORMAL
SODIUM SERPL-SCNC: 136 MMOL/L (ref 136–145)
TROPONIN I SERPL-MCNC: <0.05 NG/ML
VENTRICULAR RATE, ECG03: 80 BPM
WBC # BLD AUTO: 3.8 K/UL (ref 4.1–11.1)

## 2020-06-18 PROCEDURE — 93005 ELECTROCARDIOGRAM TRACING: CPT

## 2020-06-18 PROCEDURE — 99283 EMERGENCY DEPT VISIT LOW MDM: CPT

## 2020-06-18 PROCEDURE — 85025 COMPLETE CBC W/AUTO DIFF WBC: CPT

## 2020-06-18 PROCEDURE — 74011636320 HC RX REV CODE- 636/320: Performed by: EMERGENCY MEDICINE

## 2020-06-18 PROCEDURE — 74019 RADEX ABDOMEN 2 VIEWS: CPT

## 2020-06-18 PROCEDURE — 84484 ASSAY OF TROPONIN QUANT: CPT

## 2020-06-18 PROCEDURE — 74011250636 HC RX REV CODE- 250/636: Performed by: EMERGENCY MEDICINE

## 2020-06-18 PROCEDURE — 36415 COLL VENOUS BLD VENIPUNCTURE: CPT

## 2020-06-18 PROCEDURE — 83690 ASSAY OF LIPASE: CPT

## 2020-06-18 PROCEDURE — 96374 THER/PROPH/DIAG INJ IV PUSH: CPT

## 2020-06-18 PROCEDURE — 74177 CT ABD & PELVIS W/CONTRAST: CPT

## 2020-06-18 PROCEDURE — 80053 COMPREHEN METABOLIC PANEL: CPT

## 2020-06-18 RX ORDER — DOXAZOSIN 2 MG/1
2 TABLET ORAL DAILY
Qty: 7 TAB | Refills: 0 | Status: SHIPPED | OUTPATIENT
Start: 2020-06-18 | End: 2020-06-25

## 2020-06-18 RX ORDER — ONDANSETRON 4 MG/1
4 TABLET, FILM COATED ORAL
Qty: 20 TAB | Refills: 0 | Status: SHIPPED | OUTPATIENT
Start: 2020-06-18 | End: 2021-01-01

## 2020-06-18 RX ORDER — ONDANSETRON 2 MG/ML
4 INJECTION INTRAMUSCULAR; INTRAVENOUS
Status: COMPLETED | OUTPATIENT
Start: 2020-06-18 | End: 2020-06-18

## 2020-06-18 RX ORDER — SODIUM CHLORIDE 0.9 % (FLUSH) 0.9 %
10 SYRINGE (ML) INJECTION
Status: COMPLETED | OUTPATIENT
Start: 2020-06-18 | End: 2020-06-18

## 2020-06-18 RX ADMIN — Medication 10 ML: at 11:45

## 2020-06-18 RX ADMIN — IOPAMIDOL 100 ML: 755 INJECTION, SOLUTION INTRAVENOUS at 11:45

## 2020-06-18 RX ADMIN — IOHEXOL 50 ML: 240 INJECTION, SOLUTION INTRATHECAL; INTRAVASCULAR; INTRAVENOUS; ORAL at 10:47

## 2020-06-18 RX ADMIN — ONDANSETRON 4 MG: 2 INJECTION INTRAMUSCULAR; INTRAVENOUS at 10:01

## 2020-06-18 NOTE — ED NOTES
Pt arrives from home via triage reporting nausea for the last week. Pt is afebrile and denies pain at this time. Dr Couch Savers at bedside to evaluate Pt.

## 2020-06-18 NOTE — ED PROVIDER NOTES
EMERGENCY DEPARTMENT HISTORY AND PHYSICAL EXAM      Date: 6/18/2020  Patient Name: Wilfrido Gavin  Patient Age and Sex: 67 y.o. male     History of Presenting Illness     Chief Complaint   Patient presents with    Nausea     Pt arrived from home reports nausea x 1 week. Pt states he was taken off his Doxazosin 3 weeks ago. History Provided By: Patient    HPI: Wilfrido Gavin is a 73-year-old male with a history of BPH presenting for nausea. Patient states that 3 weeks ago he ran out of his doxazosin. Then 1 week ago began having nausea. Denies any vomiting, abdominal pain, shortness of breath, chest pain, diarrhea, constipation, dysuria or hematuria. Patient states he is just feeling nauseated. Thinks it might be related to stopping his medication. Does have a history of gastritis and takes Protonix for that. Also has a history of hypertension. Has not taken anything for the nausea at home. There are no other complaints, changes, or physical findings at this time. PCP: Nessa Alves MD    No current facility-administered medications on file prior to encounter. Current Outpatient Medications on File Prior to Encounter   Medication Sig Dispense Refill    vitamin A (AQUASOL A) 10,000 unit capsule Take 10,000 Units by mouth daily.  clopidogreL (PLAVIX) 75 mg tab Take 75 mg by mouth daily.  aspirin delayed-release 81 mg tablet Take  by mouth daily.  pantoprazole (PROTONIX) 40 mg tablet Take 40 mg by mouth ACB/HS.  ferrous sulfate 325 mg (65 mg iron) tablet Take 325 mg by mouth daily.  ALPRAZolam (XANAX) 0.5 mg tablet Take 0.5 mg by mouth nightly as needed for Sleep.  carvedilol (COREG) 12.5 mg tablet Take 25 mg by mouth two (2) times daily (with meals).  atorvastatin (LIPITOR) 20 mg tablet Take 20 mg by mouth daily.  cyanocobalamin 1,000 mcg tablet Take 1,000 mcg by mouth daily.       cholecalciferol (VITAMIN D3) 1,000 unit cap Take 1,000 Units by mouth daily. Past History     Past Medical History:  Past Medical History:   Diagnosis Date    Arthritis     left arm    BPH (benign prostatic hyperplasia)     CAD (coronary artery disease)     s/p stent    Chronic kidney disease     stage 2     Chronic obstructive pulmonary disease (HCC)     Elevated LFT's     Essential hypertension 01    GERD (gastroesophageal reflux disease)     hiatal hernia    Ill-defined condition 2016    faint    Liver disease     \"fatty liver\" as per patient    Nephrosclerosis     Orthostatic hypotension 01    PVC's 01    Sleep apnea     no CPAP    Syncope 01    secondary to venous insuffiency        Past Surgical History:  Past Surgical History:   Procedure Laterality Date    CARDIAC SURG PROCEDURE UNLIST  2019    1 stent    COLONOSCOPY N/A 2016    COL[facility] ENDOSCOPY    COLONOSCOPY N/A 2019    COLONOSCOPY performed by Rodriguez Razo MD at  [facility]; HI RISK IND  2019         ECHO 2D ADULT  12    EF 60 - 65%; mild concentric hypertrophy; pulmonary systolic artery pressure upper limits normal    HX ABDOMINAL WALL DEFECT REPAIR  2019d     Exploratory laparotomy, segmental sigmoid colon resection and primary stapled anastomosis.     HX APPENDECTOMY  1985    HX HEENT  2020    Left temporal artery biopsy    HX HERNIA REPAIR  2018    Davinci hiatal hernia repair- Jairo Olsen MD    UPPER GI ENDOSCOPY,BIOPSY  2019         UPPER GI ENDOSCOPY,DIAGNOSIS  2019            Family History:  Family History   Problem Relation Age of Onset    Stroke Father     Heart Disease Father        Social History:  Social History     Tobacco Use    Smoking status: Former Smoker     Packs/day: 3.50     Last attempt to quit: 1980     Years since quittin.5    Smokeless tobacco: Never Used   Substance Use Topics  Alcohol use: No     Comment: no alcohol since 1999    Drug use: No       Allergies: Allergies   Allergen Reactions    Benicar [Olmesartan] Unknown (comments)     Pt states he has found out that this is not a medication allergy.  Demerol [Meperidine] Unknown (comments)     Causes unconsciousness         Review of Systems   Review of Systems   Constitutional: Negative for chills and fever. Respiratory: Negative for cough and shortness of breath. Cardiovascular: Negative for chest pain. Gastrointestinal: Positive for nausea. Negative for abdominal pain, constipation, diarrhea and vomiting. Genitourinary: Negative for dysuria, frequency and hematuria. Neurological: Negative for weakness and numbness. All other systems reviewed and are negative. Physical Exam   Physical Exam  Vitals signs and nursing note reviewed. Constitutional:       Appearance: He is well-developed. HENT:      Head: Normocephalic and atraumatic. Eyes:      Conjunctiva/sclera: Conjunctivae normal.   Neck:      Musculoskeletal: Normal range of motion and neck supple. Cardiovascular:      Rate and Rhythm: Normal rate and regular rhythm. Pulmonary:      Effort: Pulmonary effort is normal. No respiratory distress. Breath sounds: Normal breath sounds. Abdominal:      General: There is no distension. Palpations: Abdomen is soft. Tenderness: There is no abdominal tenderness. Musculoskeletal: Normal range of motion. Skin:     General: Skin is warm and dry. Neurological:      General: No focal deficit present. Mental Status: He is alert and oriented to person, place, and time. Mental status is at baseline. Psychiatric:         Mood and Affect: Mood normal.          Diagnostic Study Results     Labs -   No results found for this or any previous visit (from the past 12 hour(s)). Radiologic Studies -   CT ABD PELV W CONT   Final Result   IMPRESSION:   No Acute findings.    Incidentals as above XR ABD FLAT/ ERECT   Final Result   Impression: Proximal small bowel distention is concerning for obstruction. CT Results  (Last 48 hours)               06/18/20 1145  CT ABD PELV W CONT Final result    Impression:  IMPRESSION:   No Acute findings. Incidentals as above       Narrative:  EXAM: CT ABD PELV W CONT       INDICATION: SBO nausea and abdominal pain for week       COMPARISON: July 16, 2019        CONTRAST: 100 mL of Isovue-370. TECHNIQUE:    Following the uneventful intravenous administration of contrast, thin axial   images were obtained through the abdomen and pelvis. Coronal and sagittal   reconstructions were generated. Oral contrast was administered. CT dose   reduction was achieved through use of a standardized protocol tailored for this   examination and automatic exposure control for dose modulation. FINDINGS:    LOWER THORAX: No significant abnormality in the incidentally imaged lower chest.   LIVER: No mass. BILIARY TREE: Lithiasis. CBD is not dilated. SPLEEN: within normal limits. PANCREAS: No mass or ductal dilatation. ADRENALS: Unremarkable. KIDNEYS: Left nonobstructing left nephrolithiasis   STOMACH: Unremarkable. SMALL BOWEL: No dilatation or wall thickening. COLON: No dilatation or wall thickening. Prominent fecal stasis   APPENDIX: Appendectomy   PERITONEUM: No ascites or pneumoperitoneum. RETROPERITONEUM: No lymphadenopathy or aortic aneurysm. Mild prostate hypertrophy with calcification   URINARY BLADDER: No mass or calculus. BONES: No destructive bone lesion. ABDOMINAL WALL: No mass or hernia. ADDITIONAL COMMENTS: N/A               CXR Results  (Last 48 hours)    None            Medical Decision Making   I am the first provider for this patient. I reviewed the vital signs, available nursing notes, past medical history, past surgical history, family history and social history. Vital Signs-Reviewed the patient's vital signs.   No data found. Records Reviewed: Nursing Notes and Old Medical Records    Provider Notes (Medical Decision Making):   Patient presenting with nausea for the past 1 week. Differential includes not having his doxazosin, medication side effect, gastritis, gastroenteritis. No abdominal tenderness at all to be concern for small bowel obstruction or infectious etiology. Will get basic labs, x-ray flat and erect and treat his nausea. He is also asking for refill on his doxazosin. ED Course:   Initial assessment performed. The patients presenting problems have been discussed, and they are in agreement with the care plan formulated and outlined with them. I have encouraged them to ask questions as they arise throughout their visit. ED Course as of Jun 19 1016   Thu Jun 18, 2020   5544 EKG interpreted by me. Shows normal sinus rhythm with a heart rate of 80. No ST elevations or depressions concerning for ischemia. Normal intervals. [JS]   [id number] Called from [facility]. Jamin Garcia. States that came to the office a few days ago complaining of exactly the same thing. Had a CT abdomen set up for 12 PM today. They did do labs on him and found to have a slight elevation of lfts. Found to have a UTI given bactrim. Was not having any abdominal pain at that time. They do have his doxazosin 1 mg prescribed and sent to his pharmacy. Patient has just not picked picked it up yet    [JS]   1004 When I spoke to patient about his doxazosin, he got very upset stating that he needs his 2 mg tablet not 1 mg. Started to get angry relating to it. When asked about the Bactrim, states that he has taken 1 weeks worth of the Bactrim. Though he does not think it is a UTI he thinks is all related to him not making urine    [JS]      ED Course User Index  [JS] Aruna Hidalgo MD     Critical Care Time:   0    Disposition:  Discharge Note:  The patient has been re-evaluated and is ready for discharge.  Reviewed available results with patient. Counseled patient on diagnosis and care plan. Patient has expressed understanding, and all questions have been answered. Patient agrees with plan and agrees to follow up as recommended, or to return to the ED if their symptoms worsen. Discharge instructions have been provided and explained to the patient, along with reasons to return to the ED. PLAN:  Discharge Medication List as of 6/18/2020 12:23 PM      START taking these medications    Details   doxazosin (CARDURA) 2 mg tablet Take 1 Tab by mouth daily for 7 days. , Normal, Disp-7 Tab, R-0      ondansetron hcl (Zofran) 4 mg tablet Take 1 Tab by mouth every eight (8) hours as needed for Nausea., Normal, Disp-20 Tab, R-0         CONTINUE these medications which have NOT CHANGED    Details   vitamin A (AQUASOL A) 10,000 unit capsule Take 10,000 Units by mouth daily. , Historical Med      clopidogreL (PLAVIX) 75 mg tab Take 75 mg by mouth daily. , Historical Med      aspirin delayed-release 81 mg tablet Take  by mouth daily. , Historical Med      pantoprazole (PROTONIX) 40 mg tablet Take 40 mg by mouth ACB/HS. , Historical Med      ferrous sulfate 325 mg (65 mg iron) tablet Take 325 mg by mouth daily. , Historical Med      ALPRAZolam (XANAX) 0.5 mg tablet Take 0.5 mg by mouth nightly as needed for Sleep., Historical Med      carvedilol (COREG) 12.5 mg tablet Take 25 mg by mouth two (2) times daily (with meals). , Historical Med      atorvastatin (LIPITOR) 20 mg tablet Take 20 mg by mouth daily. , Historical Med      cyanocobalamin 1,000 mcg tablet Take 1,000 mcg by mouth daily. , Historical Med      cholecalciferol (VITAMIN D3) 1,000 unit cap Take 1,000 Units by mouth daily. , Historical Med           2. Follow-up Information     Follow up With Specialties Details Why Contact Kala Day MD Internal Medicine Schedule an appointment as soon as possible for a visit  [address] [place] [address] [phone]          3. Return to ED if worse     Diagnosis     Clinical Impression:   1. Nausea without vomiting    2. Calculus of gallbladder without cholecystitis without obstruction        Attestations:    Adria Richards M.D. Please note that this dictation was completed with Ranker, the computer voice recognition software. Quite often unanticipated grammatical, syntax, homophones, and other interpretive errors are inadvertently transcribed by the computer software. Please disregard these errors. Please excuse any errors that have escaped final proofreading. Thank you.

## 2020-06-18 NOTE — ED NOTES
RY Narayan reviewed discharge instructions with the patient. The patient verbalized understanding. All questions and concerns were addressed. The patient declined a wheelchair and is discharged ambulatory in the care of family members with instructions and prescriptions in hand. Pt is alert and oriented x 4. Respirations are clear and unlabored.

## 2020-06-18 NOTE — DISCHARGE INSTRUCTIONS
Patient Education        Low-Fat Diet for Gallbladder Disease: Care Instructions  Your Care Instructions     When you eat, the gallbladder releases bile, which helps you digest the fat in food. If you have an inflamed gallbladder, this may cause pain. A low-fat diet may give your gallbladder a rest so you can start to heal. Your doctor and dietitian can help you make an eating plan that does not irritate your digestive system. Always talk with your doctor or dietitian before you make changes in your diet. Follow-up care is a key part of your treatment and safety. Be sure to make and go to all appointments, and call your doctor if you are having problems. It's also a good idea to know your test results and keep a list of the medicines you take. How can you care for yourself at home? · Eat many small meals and snacks each day instead of three large meals. · Choose lean meats. ? Eat no more than 5 to 6½ ounces of meat a day. ? Cut off all fat you can see. ? Eat chicken and turkey without the skin. ? Many types of fish, such as salmon, lake trout, tuna, and herring, provide healthy omega-3 fat. But, avoid fish canned in oil, such as sardines in olive oil. ? Bake, broil, or grill meats, poultry, or fish instead of frying them in butter or fat. · Drink or eat nonfat or low-fat milk, yogurt, cheese, or other milk products each day. ? Read the labels on cheeses, and choose those with less than 5 grams of fat an ounce. ? Try fat-free sour cream, cream cheese, or yogurt. ? Avoid cream soups and cream sauces on pasta. ? Eat low-fat ice cream, frozen yogurt, or sorbet. Avoid regular ice cream.  · Eat whole-grain cereals, breads, crackers, rice, or pasta. Avoid high-fat foods such as croissants, scones, biscuits, waffles, doughnuts, muffins, granola, and high-fat breads. · Flavor your foods with herbs and spices (such as basil, tarragon, or mint), fat-free sauces, or lemon juice instead of butter.  You can also use butter substitutes, fat-free mayonnaise, or fat-free dressing. · Try applesauce, prune puree, or mashed bananas to replace some or all of the fat when you bake. · Limit fats and oils, such as butter, margarine, mayonnaise, and salad dressing, to no more than 1 tablespoon a meal.  · Avoid high-fat foods, such as:  ? Chocolate, whole milk, ice cream, and processed cheese. ? Fried or buttered foods. ? Sausage, salami, and [place]. ? Cinnamon rolls, cakes, pies, cookies, and other pastries. ? Prepared snack foods, such as potato chips, nut and granola bars, and mixed nuts. ? Coconut and avocado. · Learn how to read food labels for serving sizes and ingredients. Fast-food and convenience-food meals often have lots of fat. Where can you learn more? Go to http://lucero-joey.info/  Enter Z246 in the search box to learn more about \"Low-Fat Diet for Gallbladder Disease: Care Instructions. \"  Current as of: August 22, 2019               Content Version: 12.5  © 4259-2359 [employer]. Care instructions adapted under license by [employer] (which disclaims liability or warranty for this information). If you have questions about a medical condition or this instruction, always ask your healthcare professional. [place] [address] any warranty or liability for your use of this information.

## 2020-06-19 ENCOUNTER — PATIENT OUTREACH (OUTPATIENT)
Dept: INTERNAL MEDICINE CLINIC | Age: 80
End: 2020-06-19

## 2020-07-08 ENCOUNTER — PATIENT OUTREACH (OUTPATIENT)
Dept: CASE MANAGEMENT | Age: 80
End: 2020-07-08

## 2020-07-08 NOTE — PROGRESS NOTES
Patient resolved from Transition of Care episode on 7/8/2020  Discussed COVID-19 related testing which was not done at this time. Test results were not done. Patient informed of results, if available? N/A     Patient/family has been provided the following resources and education related to COVID-19:                         Signs, symptoms and red flags related to COVID-19            [facility] exposure and quarantine guidelines            [facility] exposure contact - [phone]            Contact for their local Department of Health                 Patient currently reports that the following symptoms have improved:  no new symptoms. No further outreach scheduled with this CTN/ACM/LPN/HC/ MA. Episode of Care resolved. Patient has this CTN/ACM/LPN/HC/MA contact information if future needs arise.

## 2020-08-06 ENCOUNTER — HOSPITAL ENCOUNTER (OUTPATIENT)
Dept: PREADMISSION TESTING | Age: 80
Discharge: HOME | End: 2020-08-06
Payer: MEDICARE

## 2020-08-06 VITALS
WEIGHT: 158.73 LBS | DIASTOLIC BLOOD PRESSURE: 87 MMHG | SYSTOLIC BLOOD PRESSURE: 178 MMHG | HEIGHT: 64 IN | BODY MASS INDEX: 27.1 KG/M2 | OXYGEN SATURATION: 98 % | TEMPERATURE: 97.8 F | RESPIRATION RATE: 16 BRPM | HEART RATE: 70 BPM

## 2020-08-06 LAB
ANION GAP SERPL CALC-SCNC: 2 MMOL/L (ref 5–15)
APPEARANCE UR: CLEAR
BACTERIA URNS QL MICRO: NEGATIVE /HPF
BILIRUB UR QL: NEGATIVE
BUN SERPL-MCNC: 17 MG/DL (ref 6–20)
BUN/CREAT SERPL: 16 (ref 12–20)
CALCIUM SERPL-MCNC: 9 MG/DL (ref 8.5–10.1)
CHLORIDE SERPL-SCNC: 106 MMOL/L (ref 97–108)
CO2 SERPL-SCNC: 28 MMOL/L (ref 21–32)
COLOR UR: ABNORMAL
CREAT SERPL-MCNC: 1.05 MG/DL (ref 0.7–1.3)
EPITH CASTS URNS QL MICRO: ABNORMAL /LPF
ERYTHROCYTE [DISTWIDTH] IN BLOOD BY AUTOMATED COUNT: 13.5 % (ref 11.5–14.5)
GLUCOSE SERPL-MCNC: 102 MG/DL (ref 65–100)
GLUCOSE UR STRIP.AUTO-MCNC: NEGATIVE MG/DL
HCT VFR BLD AUTO: 44.1 % (ref 36.6–50.3)
HGB BLD-MCNC: 14.6 G/DL (ref 12.1–17)
HGB UR QL STRIP: ABNORMAL
HYALINE CASTS URNS QL MICRO: ABNORMAL /LPF (ref 0–5)
KETONES UR QL STRIP.AUTO: NEGATIVE MG/DL
LEUKOCYTE ESTERASE UR QL STRIP.AUTO: ABNORMAL
MCH RBC QN AUTO: 29.8 PG (ref 26–34)
MCHC RBC AUTO-ENTMCNC: 33.1 G/DL (ref 30–36.5)
MCV RBC AUTO: 90 FL (ref 80–99)
NITRITE UR QL STRIP.AUTO: NEGATIVE
NRBC # BLD: 0 K/UL (ref 0–0.01)
NRBC BLD-RTO: 0 PER 100 WBC
PH UR STRIP: 5.5 [PH] (ref 5–8)
PLATELET # BLD AUTO: 192 K/UL (ref 150–400)
PMV BLD AUTO: 11.1 FL (ref 8.9–12.9)
POTASSIUM SERPL-SCNC: 3.8 MMOL/L (ref 3.5–5.1)
PROT UR STRIP-MCNC: NEGATIVE MG/DL
RBC # BLD AUTO: 4.9 M/UL (ref 4.1–5.7)
RBC #/AREA URNS HPF: ABNORMAL /HPF (ref 0–5)
SODIUM SERPL-SCNC: 136 MMOL/L (ref 136–145)
SP GR UR REFRACTOMETRY: 1.01 (ref 1–1.03)
UA: UC IF INDICATED,UAUC: ABNORMAL
UROBILINOGEN UR QL STRIP.AUTO: 1 EU/DL (ref 0.2–1)
WBC # BLD AUTO: 7.4 K/UL (ref 4.1–11.1)
WBC URNS QL MICRO: ABNORMAL /HPF (ref 0–4)

## 2020-08-06 PROCEDURE — 36415 COLL VENOUS BLD VENIPUNCTURE: CPT

## 2020-08-06 PROCEDURE — 81001 URINALYSIS AUTO W/SCOPE: CPT

## 2020-08-06 PROCEDURE — 85027 COMPLETE CBC AUTOMATED: CPT

## 2020-08-06 PROCEDURE — 80048 BASIC METABOLIC PNL TOTAL CA: CPT

## 2020-08-06 RX ORDER — DONEPEZIL HYDROCHLORIDE 5 MG/1
5 TABLET, FILM COATED ORAL
COMMUNITY
End: 2020-10-08 | Stop reason: SDUPTHER

## 2020-08-06 RX ORDER — DOXAZOSIN 2 MG/1
2 TABLET ORAL DAILY
COMMUNITY
End: 2020-10-08 | Stop reason: SDUPTHER

## 2020-08-06 NOTE — PERIOP NOTES
PAT appointment with patient - daughter present - reviewed all teaching and instructions with no further questions. Cardiologist requested patient stay on ASA - did not state if he is to take it the morning to surgery or not. Fax request sent to Dr. Basia Abreu office asking if pt is to take ASA the morning of surgery or not - will let patient know when we a response. Covid 8/14/20 - recommended to quarantine from time of testing til DOS.

## 2020-08-06 NOTE — PERIOP NOTES
[facility]  Preoperative Instructions        Surgery Date 8/18/2020       Time of Arrival 1100am  Contact# [phone]    1. On the day of your surgery, please report to the Surgical Services Registration Desk and sign in at your designated time. The Surgery Center is located to the right of the Emergency Room. 2. You must have someone with you to drive you home. You should not drive a car for 24 hours following surgery. Please make arrangements for a friend or family member to stay with you for the first 24 hours after your surgery. 3. Do not have anything to eat or drink (including water, gum, mints, coffee, juice) after midnight 8/17/2020 . ? This may not apply to medications prescribed by your physician. ?(Please note below the special instructions with medications to take the morning of your procedure.)    4. We recommend you do not drink any alcoholic beverages for 24 hours before and after your surgery. 5. Contact your surgeons office for instructions on the following medications: non-steroidal anti-inflammatory drugs (i.e. Advil, Aleve), vitamins, and supplements. (Some surgeons will want you to stop these medications prior to surgery and others may allow you to take them)  **If you are currently taking Plavix, Coumadin, Aspirin and/or other blood-thinning agents, contact your surgeon for instructions. ** Your surgeon will partner with the physician prescribing these medications to determine if it is safe to stop or if you need to continue taking. Please do not stop taking these medications without instructions from your surgeon    6. Wear comfortable clothes. Wear glasses instead of contacts. Do not bring any money or jewelry. Please bring picture ID, insurance card, and any prearranged co-payment or hospital payment. Do not wear make-up, particularly mascara the morning of your surgery. Do not wear nail polish, particularly if you are having foot /hand surgery. Wear your hair loose or down, no ponytails, buns, bart pins or clips. All body piercings must be removed. Please shower with antibacterial soap for three consecutive days before and on the morning of surgery, but do not apply any lotions, powders or deodorants after the shower on the day of surgery. Please use a fresh towels after each shower. Please sleep in clean clothes and change bed linens the night before surgery. Please do not shave for 48 hours prior to surgery. Shaving of the face is acceptable. 7. You should understand that if you do not follow these instructions your surgery may be cancelled. If your physical condition changes (I.e. fever, cold or flu) please contact your surgeon as soon as possible. 8. It is important that you be on time. If a situation occurs where you may be late, please call [phone] (OR Holding Area). 9. If you have any questions and or problems, please call [phone] (Pre-admission Testing). 10. Your surgery time may be subject to change. You will receive a phone call the evening prior if your time changes. 11.  If having outpatient surgery, you must have someone to drive you here, stay with you during the duration of your stay, and to drive you home at time of discharge. Special Instructions: Follow all instructions given to you by your doctor. Covid Testin2020 @ 1000am.  We recommend you self quarantine from time of testing til the day of your surgery. TAKE ALL MEDICATIONS DAY OF SURGERY EXCEPT: All vitamins and supplements (stopped 7 days before surgery) Aspirin as directed by your doctor. I understand a pre-operative phone call will be made to verify my surgery time. In the event that I am not available, I give permission for a message to be left on my answering service and/or with another person?   yes         ___________________      __________   _________    (Signature of Patient)             (Witness)                (Date and Time)

## 2020-08-13 NOTE — PERIOP NOTES
Spoke to [facility] in Dr. Homero Young office. Patient will remain on the ASA and take DOS. Patient notified.

## 2020-08-14 ENCOUNTER — HOSPITAL ENCOUNTER (OUTPATIENT)
Dept: PREADMISSION TESTING | Age: 80
Discharge: HOME | End: 2020-08-14
Payer: MEDICARE

## 2020-08-14 PROCEDURE — 87635 SARS-COV-2 COVID-19 AMP PRB: CPT

## 2020-08-15 LAB
HEALTH STATUS, XMCV2T: NORMAL
SARS-COV-2, COV2NT: NOT DETECTED
SOURCE, COVRS: NORMAL
SPECIMEN SOURCE, FCOV2M: NORMAL
SPECIMEN TYPE, XMCV1T: NORMAL

## 2020-08-17 ENCOUNTER — ANESTHESIA EVENT (OUTPATIENT)
Dept: SURGERY | Age: 80
End: 2020-08-17
Payer: MEDICARE

## 2020-08-18 ENCOUNTER — HOSPITAL ENCOUNTER (OUTPATIENT)
Age: 80
Setting detail: OUTPATIENT SURGERY
Discharge: HOME | End: 2020-08-18
Attending: UROLOGY | Admitting: UROLOGY
Payer: MEDICARE

## 2020-08-18 ENCOUNTER — ANESTHESIA (OUTPATIENT)
Dept: SURGERY | Age: 80
End: 2020-08-18
Payer: MEDICARE

## 2020-08-18 VITALS
WEIGHT: 158.51 LBS | OXYGEN SATURATION: 97 % | DIASTOLIC BLOOD PRESSURE: 67 MMHG | HEIGHT: 64 IN | BODY MASS INDEX: 27.06 KG/M2 | RESPIRATION RATE: 16 BRPM | HEART RATE: 64 BPM | SYSTOLIC BLOOD PRESSURE: 162 MMHG | TEMPERATURE: 97.7 F

## 2020-08-18 DIAGNOSIS — N48.9 PENILE LESION: Primary | ICD-10-CM

## 2020-08-18 PROCEDURE — 77030002888 HC SUT CHRMC J&J -A: Performed by: UROLOGY

## 2020-08-18 PROCEDURE — 88305 TISSUE EXAM BY PATHOLOGIST: CPT

## 2020-08-18 PROCEDURE — 74011250637 HC RX REV CODE- 250/637: Performed by: ANESTHESIOLOGY

## 2020-08-18 PROCEDURE — 77030040361 HC SLV COMPR DVT MDII -B: Performed by: UROLOGY

## 2020-08-18 PROCEDURE — 77030020143 HC AIRWY LARYN INTUB CGAS -A: Performed by: NURSE ANESTHETIST, CERTIFIED REGISTERED

## 2020-08-18 PROCEDURE — 74011000250 HC RX REV CODE- 250: Performed by: UROLOGY

## 2020-08-18 PROCEDURE — 76010000149 HC OR TIME 1 TO 1.5 HR: Performed by: UROLOGY

## 2020-08-18 PROCEDURE — 74011250636 HC RX REV CODE- 250/636: Performed by: ANESTHESIOLOGY

## 2020-08-18 PROCEDURE — 74011000250 HC RX REV CODE- 250: Performed by: NURSE ANESTHETIST, CERTIFIED REGISTERED

## 2020-08-18 PROCEDURE — 77030018836 HC SOL IRR NACL ICUM -A: Performed by: UROLOGY

## 2020-08-18 PROCEDURE — 74011250637 HC RX REV CODE- 250/637: Performed by: UROLOGY

## 2020-08-18 PROCEDURE — 74011250636 HC RX REV CODE- 250/636: Performed by: NURSE ANESTHETIST, CERTIFIED REGISTERED

## 2020-08-18 PROCEDURE — 74011250636 HC RX REV CODE- 250/636: Performed by: UROLOGY

## 2020-08-18 PROCEDURE — 77030019908 HC STETH ESOPH SIMS -A: Performed by: NURSE ANESTHETIST, CERTIFIED REGISTERED

## 2020-08-18 PROCEDURE — 76060000033 HC ANESTHESIA 1 TO 1.5 HR: Performed by: UROLOGY

## 2020-08-18 PROCEDURE — 76210000016 HC OR PH I REC 1 TO 1.5 HR: Performed by: UROLOGY

## 2020-08-18 PROCEDURE — 77030013079 HC BLNKT BAIR HGGR 3M -A: Performed by: NURSE ANESTHETIST, CERTIFIED REGISTERED

## 2020-08-18 PROCEDURE — 77030002996 HC SUT SLK J&J -A: Performed by: UROLOGY

## 2020-08-18 PROCEDURE — 77030040922 HC BLNKT HYPOTHRM STRY -A

## 2020-08-18 PROCEDURE — 88331 PATH CONSLTJ SURG 1 BLK 1SPC: CPT

## 2020-08-18 PROCEDURE — 76210000021 HC REC RM PH II 0.5 TO 1 HR: Performed by: UROLOGY

## 2020-08-18 RX ORDER — ONDANSETRON 2 MG/ML
INJECTION INTRAMUSCULAR; INTRAVENOUS AS NEEDED
Status: DISCONTINUED | OUTPATIENT
Start: 2020-08-18 | End: 2020-08-18 | Stop reason: HOSPADM

## 2020-08-18 RX ORDER — DEXAMETHASONE SODIUM PHOSPHATE 4 MG/ML
INJECTION, SOLUTION INTRA-ARTICULAR; INTRALESIONAL; INTRAMUSCULAR; INTRAVENOUS; SOFT TISSUE AS NEEDED
Status: DISCONTINUED | OUTPATIENT
Start: 2020-08-18 | End: 2020-08-18 | Stop reason: HOSPADM

## 2020-08-18 RX ORDER — ONDANSETRON 2 MG/ML
4 INJECTION INTRAMUSCULAR; INTRAVENOUS AS NEEDED
Status: DISCONTINUED | OUTPATIENT
Start: 2020-08-18 | End: 2020-08-18 | Stop reason: HOSPADM

## 2020-08-18 RX ORDER — FENTANYL CITRATE 50 UG/ML
INJECTION, SOLUTION INTRAMUSCULAR; INTRAVENOUS AS NEEDED
Status: DISCONTINUED | OUTPATIENT
Start: 2020-08-18 | End: 2020-08-18 | Stop reason: HOSPADM

## 2020-08-18 RX ORDER — HYDROCODONE BITARTRATE AND ACETAMINOPHEN 5; 325 MG/1; MG/1
1 TABLET ORAL
Qty: 12 TAB | Refills: 0 | Status: SHIPPED | OUTPATIENT
Start: 2020-08-18 | End: 2020-08-21

## 2020-08-18 RX ORDER — LIDOCAINE HYDROCHLORIDE 10 MG/ML
INJECTION INFILTRATION; PERINEURAL AS NEEDED
Status: DISCONTINUED | OUTPATIENT
Start: 2020-08-18 | End: 2020-08-18 | Stop reason: HOSPADM

## 2020-08-18 RX ORDER — SODIUM CHLORIDE 0.9 % (FLUSH) 0.9 %
5-40 SYRINGE (ML) INJECTION AS NEEDED
Status: DISCONTINUED | OUTPATIENT
Start: 2020-08-18 | End: 2020-08-18 | Stop reason: HOSPADM

## 2020-08-18 RX ORDER — LIDOCAINE HYDROCHLORIDE 10 MG/ML
0.1 INJECTION, SOLUTION EPIDURAL; INFILTRATION; INTRACAUDAL; PERINEURAL AS NEEDED
Status: DISCONTINUED | OUTPATIENT
Start: 2020-08-18 | End: 2020-08-18 | Stop reason: HOSPADM

## 2020-08-18 RX ORDER — LIDOCAINE HYDROCHLORIDE 20 MG/ML
INJECTION, SOLUTION EPIDURAL; INFILTRATION; INTRACAUDAL; PERINEURAL AS NEEDED
Status: DISCONTINUED | OUTPATIENT
Start: 2020-08-18 | End: 2020-08-18 | Stop reason: HOSPADM

## 2020-08-18 RX ORDER — NEOMYCIN-BACITRACN ZN-POLYMYX 3.5 MG-400 UNIT-5,000 UNIT/GRAM TOP OINT
OINTMENT TOPICAL 2 TIMES DAILY
Qty: 1 TUBE | Refills: 0 | Status: ON HOLD | OUTPATIENT
Start: 2020-08-18 | End: 2022-05-13

## 2020-08-18 RX ORDER — SODIUM CHLORIDE 9 MG/ML
INJECTION, SOLUTION INTRAVENOUS
Status: COMPLETED | OUTPATIENT
Start: 2020-08-18 | End: 2020-08-18

## 2020-08-18 RX ORDER — HYDROMORPHONE HYDROCHLORIDE 2 MG/ML
0.2 INJECTION, SOLUTION INTRAMUSCULAR; INTRAVENOUS; SUBCUTANEOUS
Status: DISCONTINUED | OUTPATIENT
Start: 2020-08-18 | End: 2020-08-18 | Stop reason: HOSPADM

## 2020-08-18 RX ORDER — SODIUM CHLORIDE 0.9 % (FLUSH) 0.9 %
5-40 SYRINGE (ML) INJECTION EVERY 8 HOURS
Status: DISCONTINUED | OUTPATIENT
Start: 2020-08-18 | End: 2020-08-18 | Stop reason: HOSPADM

## 2020-08-18 RX ORDER — FENTANYL CITRATE 50 UG/ML
50 INJECTION, SOLUTION INTRAMUSCULAR; INTRAVENOUS AS NEEDED
Status: DISCONTINUED | OUTPATIENT
Start: 2020-08-18 | End: 2020-08-18 | Stop reason: HOSPADM

## 2020-08-18 RX ORDER — GLYCOPYRROLATE 0.2 MG/ML
INJECTION INTRAMUSCULAR; INTRAVENOUS AS NEEDED
Status: DISCONTINUED | OUTPATIENT
Start: 2020-08-18 | End: 2020-08-18 | Stop reason: HOSPADM

## 2020-08-18 RX ORDER — SODIUM CHLORIDE, SODIUM LACTATE, POTASSIUM CHLORIDE, CALCIUM CHLORIDE 600; 310; 30; 20 MG/100ML; MG/100ML; MG/100ML; MG/100ML
50 INJECTION, SOLUTION INTRAVENOUS CONTINUOUS
Status: DISCONTINUED | OUTPATIENT
Start: 2020-08-18 | End: 2020-08-18 | Stop reason: HOSPADM

## 2020-08-18 RX ORDER — PROPOFOL 10 MG/ML
INJECTION, EMULSION INTRAVENOUS AS NEEDED
Status: DISCONTINUED | OUTPATIENT
Start: 2020-08-18 | End: 2020-08-18 | Stop reason: HOSPADM

## 2020-08-18 RX ORDER — CEFUROXIME AXETIL 250 MG/1
250 TABLET ORAL 2 TIMES DAILY
Qty: 6 TAB | Refills: 0 | Status: SHIPPED | OUTPATIENT
Start: 2020-08-18 | End: 2020-08-21

## 2020-08-18 RX ORDER — EPHEDRINE SULFATE/0.9% NACL/PF 50 MG/5 ML
SYRINGE (ML) INTRAVENOUS AS NEEDED
Status: DISCONTINUED | OUTPATIENT
Start: 2020-08-18 | End: 2020-08-18 | Stop reason: HOSPADM

## 2020-08-18 RX ORDER — FENTANYL CITRATE 50 UG/ML
25 INJECTION, SOLUTION INTRAMUSCULAR; INTRAVENOUS
Status: DISCONTINUED | OUTPATIENT
Start: 2020-08-18 | End: 2020-08-18 | Stop reason: HOSPADM

## 2020-08-18 RX ORDER — MIDAZOLAM HYDROCHLORIDE 1 MG/ML
1 INJECTION, SOLUTION INTRAMUSCULAR; INTRAVENOUS AS NEEDED
Status: DISCONTINUED | OUTPATIENT
Start: 2020-08-18 | End: 2020-08-18 | Stop reason: HOSPADM

## 2020-08-18 RX ORDER — SODIUM CHLORIDE, SODIUM LACTATE, POTASSIUM CHLORIDE, CALCIUM CHLORIDE 600; 310; 30; 20 MG/100ML; MG/100ML; MG/100ML; MG/100ML
25 INJECTION, SOLUTION INTRAVENOUS ONCE
Status: COMPLETED | OUTPATIENT
Start: 2020-08-18 | End: 2020-08-18

## 2020-08-18 RX ORDER — ACETAMINOPHEN 325 MG/1
650 TABLET ORAL ONCE
Status: COMPLETED | OUTPATIENT
Start: 2020-08-18 | End: 2020-08-18

## 2020-08-18 RX ADMIN — Medication 10 MG: at 13:29

## 2020-08-18 RX ADMIN — LIDOCAINE HYDROCHLORIDE 80 MG: 20 INJECTION, SOLUTION EPIDURAL; INFILTRATION; INTRACAUDAL; PERINEURAL at 13:09

## 2020-08-18 RX ADMIN — SODIUM CHLORIDE, SODIUM LACTATE, POTASSIUM CHLORIDE, AND CALCIUM CHLORIDE 25 ML/HR: 600; 310; 30; 20 INJECTION, SOLUTION INTRAVENOUS at 11:51

## 2020-08-18 RX ADMIN — DEXAMETHASONE SODIUM PHOSPHATE 8 MG: 4 INJECTION, SOLUTION INTRAMUSCULAR; INTRAVENOUS at 13:17

## 2020-08-18 RX ADMIN — PROPOFOL 150 MG: 10 INJECTION, EMULSION INTRAVENOUS at 13:12

## 2020-08-18 RX ADMIN — WATER 2 G: 1 INJECTION INTRAMUSCULAR; INTRAVENOUS; SUBCUTANEOUS at 13:15

## 2020-08-18 RX ADMIN — ONDANSETRON HYDROCHLORIDE 4 MG: 2 INJECTION, SOLUTION INTRAMUSCULAR; INTRAVENOUS at 13:19

## 2020-08-18 RX ADMIN — PROPOFOL 20 MG: 10 INJECTION, EMULSION INTRAVENOUS at 13:45

## 2020-08-18 RX ADMIN — ACETAMINOPHEN 650 MG: 325 TABLET ORAL at 11:50

## 2020-08-18 RX ADMIN — FENTANYL CITRATE 50 MCG: 50 INJECTION, SOLUTION INTRAMUSCULAR; INTRAVENOUS at 13:16

## 2020-08-18 RX ADMIN — Medication 3 AMPULE: at 11:51

## 2020-08-18 RX ADMIN — GLYCOPYRROLATE 0.2 MG: 0.2 INJECTION, SOLUTION INTRAMUSCULAR; INTRAVENOUS at 13:21

## 2020-08-18 RX ADMIN — SODIUM CHLORIDE, POTASSIUM CHLORIDE, SODIUM LACTATE AND CALCIUM CHLORIDE: 600; 310; 30; 20 INJECTION, SOLUTION INTRAVENOUS at 13:02

## 2020-08-18 RX ADMIN — Medication 10 MG: at 13:56

## 2020-08-18 RX ADMIN — Medication 10 MG: at 13:19

## 2020-08-18 RX ADMIN — Medication 10 MG: at 13:21

## 2020-08-18 NOTE — H&P
Osmel Tracy is a 79year old male who presents today for \"testicular lesion\". He returns for follow-up. This gentleman has a several year history of a penile lesion surrounding the meatus. Initially saw Vivi Capellan in 2017 and was diagnosed with meatitis. It slowly growing. I saw him 1 year ago in consultation at the hospital.  Patient was supposed to be scheduled for biopsy of the lesion but failed to follow-up. Currently he is off his Plavix but still on 81 of aspirin. He states he has to rule out for tuberculosis. He does have a history of COPD and ischemic heart disease. He has a coronary stent. He states occasionally gets a little bleeding from the tip of the penis. Denies any obstructive or irritative voiding symptoms. No hematuria or dysuria. Reports no unexpected bone pain or weight loss. PAST MEDICAL HISTORY:    Allergies: DEMEROL (Severe)  DENIES: Latex, Shellfish, X-Ray Dye, Iodine. Medications: ONDANSETRON HCL 4 MG ORAL TABLET (ONDANSETRON HCL) TK 1 T PO Q 8 H PRF NAUSEA; Route: ORAL  DOXAZOSIN MESYLATE 2 MG ORAL TABLET (DOXAZOSIN MESYLATE) TK 1 T PO QPM; Route: ORAL  OMEPRAZOLE 40 MG ORAL CAPSULE DELAYED RELEASE (OMEPRAZOLE) daily; Route: ORAL    Problems: Penile lesion (ICD-607.9) (UQD05-M11.9)  Pruritus genitalia (ICD-698.1) (WKO36-V92.3)  Meatitis (ICD-597.89) (HKY84-B94.2)  706.2 CYST, SEBACEOUS (ICD-706.2) (YUP90-H27.3)  600.00 BPH W/O URINARY OBSTRUCTION (ICD-600.00) (KAH83-L40.0)  608.9 TESTICULAR PAIN (ICD-608.9)    Illnesses: Heart Disease, High Blood Pressure, Stroke/Seizure, Bleeding Problems, and Hepatitis. DENIES: Pacemaker/Defibrillator, Lung Disease, Diabetes, Bowel Problems, Kidney Problems, HIV, or Cancer. Surgeries: Heart Stent Procedure, Colon Surgery, and Cataract Surgery. Family History: DENIES: Prostate cancer, Kidney cancer, Kidney disease, Kidney stones. Social History: Retired. . Smoking status: former smoker. Does not drink alcohol. System Review: Admits to: Involuntary Urine Loss, Lower Extremity Weakness, Dry Skin, Easy Bleeding, and Rash. DENIES: Unexplained Weight Loss, Dry Eyes, Dry Mouth, Leg Swelling, Shortness of Breath, Constipation, Difficulty Walking, Psychiatric Problems, Impaired Sex Drive. EXAMINATION: Vitals: Pulse: 85 BP: 171/89 Weight: 159 lbs Height: 5' 4\" BMI: 27.39 kg/m^2  Appearance: well-developed NAD Respiratory Effort: breathing easily Abdomen/Flank: soft non-tender without masses; no CVA tenderness Scrotum: without lesions Epididymes: bilaterally no masses palpated, non-tender Penis:  Circumcised There is a erythematous lesion surrounding meatus about 2 cm in size, nontender, no induration or fluctuance. Penile shaft palpably normal.  There is no inguinal adenopathy noted. Orientation: oriented to person; time and place Mood/Affect: normal       URINALYSIS from Voided specimen  Urine Dip: pH: 6.0, Bld: Neg, LE: Trace, Nit: Neg, Prot: Neg, Ket: Neg, Gluc: Neg  Urine Micro: WBC: 1-2, RBC: 0, Bacteria: 0    PSA HISTORY  0.36 ng/ml on 05/19/2008  0.29 ng/ml on 10/05/1995    IMPRESSION:    1. PENILE LESION (ZMN87-V92.9) - Deteriorated: Worrisome for penile cancer. Plan excisional biopsy under anesthesia. We will send frozen at the time. Patient understands that we may have to extend the resection. He also understands he may have to have a Lowery catheter. Risks of bleeding infection and recurrence discussed.

## 2020-08-18 NOTE — PERIOP NOTES
1105 - PT'S COVID TEST RESULTED NEG - PT STATES HAS QUARANTINED SINCE TESTING. PT DENIES S/S OF COVID - NO FEVER. COLD, COUGH, N/V,DIARRHEA. ...the patient DOES HAVE SOB ON EXERTION WHICH HE STATES IS CHRONIC. PRE-OP TCHING DONE. SR UP X2 AND CB IN PLACE. PT WAITING SURGERY.

## 2020-08-18 NOTE — OP NOTES
[address]  OPERATIVE REPORT    Name:  Jovanni Birmingham  MR#:  [id number]  :  1940  ACCOUNT #:  [de-identified]  DATE OF SERVICE:  2020    PREOPERATIVE DIAGNOSIS:  Penile lesion. POSTOPERATIVE DIAGNOSIS:  Penile lesion. PROCEDURE PERFORMED:  Biopsy, penile lesion. SURGEON:  Ginette Fernández MD    ASSISTANT:  None. ANESTHESIA:  General with penile block. COMPLICATIONS:  None. SPECIMENS REMOVED:  Penile bx. IMPLANTS:  none. ESTIMATED BLOOD LOSS:  Minimal.    FINDINGS:  Penile lesion surrounding the meatus. PROCEDURE:  After informed consent, the patient was placed on the OR table in supine position. After adequate induction of general anesthetic, the penis and suprapubic area were prepped and draped in sterile fashion. A full time-out was accomplished. A penile block was accomplished using 1% lidocaine plain. I then did an excisional biopsy after putting a 16-Mongolian red rubber to identify the exact location of the meatus. I did excisional biopsy of this penile lesion and sent it for frozen. 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 Therefore, we did a deeper biopsy more proximal to the meatus and obtained hemostasis with cautery and closed this with interrupted 3-0 chromic. This was sent as permanent section along with some other areas of the biopsy. We had obtained hemostasis. The patient tolerated the procedure well. A Xeroform gauze bandage was applied. He tolerated the procedure well. No complications during the procedure.         MD VIKAS [email or web address]  D:  2020 14:19  T:  2020 17:48  JOB #:  [id number]  CC:  [address] [place] [address]

## 2020-08-18 NOTE — DISCHARGE INSTRUCTIONS
Keep area clean and dry  Remove dressing tomorrow afternoon  Apply neosporin twice a day starting tomorrow  Call for fever or bleeding that will not stop  Apply pressure with gauze for 3 min for any bleeding        DISCHARGE SUMMARY from Nurse    PATIENT INSTRUCTIONS:    After general anesthesia or intravenous sedation, for 24 hours or while taking prescription Narcotics:  · Limit your activities  · Do not drive and operate hazardous machinery  · Do not make important personal or business decisions  · Do  not drink alcoholic beverages  · If you have not urinated within 8 hours after discharge, please contact your surgeon on call. Report the following to your surgeon:  · Excessive pain, swelling, redness or odor of or around the surgical area  · Temperature over 100.5  · Nausea and vomiting lasting longer than 4 hours or if unable to take medications  · Any signs of decreased circulation or nerve impairment to extremity: change in color, persistent  numbness, tingling, coldness or increase pain  · Any questions    Patient Education      Narcotic-Analgesic/Acetaminophen ([address], [place], [facility], Lortab 10/325) - (By mouth)   Why this medicine is used:   Relieves pain. Contact a nurse or doctor right away if you have:  · Extreme weakness, shallow breathing, slow heartbeat  · Severe confusion, lightheadedness, dizziness, fainting  · Yellow skin or eyes, dark urine or pale stools  · Severe constipation, severe stomach pain, nausea, vomiting, loss of appetite  · Sweating or cold, clammy skin     Common side effects:  · Mild constipation, nausea, vomiting  · Sleepiness, tiredness  · Itching, rash  © 2017 [facility] Information is for End User's use only and may not be sold, redistributed or otherwise used for commercial purposes. TO PREVENT AN INFECTION      1. [address] YOUR HANDS     To prevent infection, good handwashing is the most important thing you or your caregiver can do.       [address] [facility] Wash your hands with soap and water or use the hand  we gave you before you touch any wounds. 2. SHOWER     Use the antibacterial soap we gave you when you take a shower.  Shower with this soap until your wounds are healed.  To reach all areas of your body, you may need someone to help you.  Dont forget to clean your belly button with every shower. 3.  USE CLEAN SHEETS     Use freshly cleaned sheets on your bed after surgery.  To keep the surgery site clean, do not allow pets to sleep with you while your wound is still healing. 4. STOP SMOKING     Stop smoking, or at least cut back on smoking     Smoking slows your healing. 5.  CONTROL YOUR BLOOD SUGAR     High blood sugars slow wound healing. If you are diabetic, control your blood sugar levels before and after your surgery.

## 2020-08-18 NOTE — PERIOP NOTES
Handoff Report from Operating Room to PACU    Report received from [address] [place] [address] and Hilda Toney CRNA regarding Darrol Cordia. Surgeon(s):  Lachelle Casillas MD  And Procedure(s) (LRB):  EXCISION PENILE BIOPSY (N/A)  confirmed   with allergies and drains discussed. Anesthesia type, drugs, patient history, complications, estimated blood loss, vital signs, intake and output, and last pain medication, lines, reversal medications and temperature were reviewed.     3:40 PM  Report off to RY Haywood pt transferred to Phase II

## 2020-08-18 NOTE — BRIEF OP NOTE
Brief Postoperative Note    Patient: Paty Pillai  YOB: 1940  MRN: [id number]    Date of Procedure: 8/18/2020     Pre-Op Diagnosis: PENILE LESION    Post-Op Diagnosis: Same as preoperative diagnosis.       Procedure(s):  EXCISION PENILE BIOPSY    Surgeon(s):  Hieu Diaz MD    Surgical Assistant: None    Anesthesia: General     Estimated Blood Loss (mL): Minimal    Complications: None    Specimens:   ID Type Source Tests Collected by Time Destination   1 : Penile lesion Frozen Section Penis  Hieu Diaz MD 8/18/2020 1335 Pathology   2 : Penile lesion Preservative Penis  Hieu Diaz MD 8/18/2020 1342 Pathology   3 : Penile biopsy deep Preservative Penis  Hieu Diaz MD 8/18/2020 1351 Pathology        Implants: * No implants in log *    Drains: * No LDAs found *    Findings: penile lesion     Electronically Signed by Tisha Linda MD on 8/18/2020 at 2:04 PM

## 2020-08-18 NOTE — PROGRESS NOTES
Patient discharged to home. Iv removed. Discharge instructions, scripts, and medication education done with patient and daughter.

## 2020-08-18 NOTE — ANESTHESIA POSTPROCEDURE EVALUATION
Post-Anesthesia Evaluation and Assessment    Patient: Cathleen Hodges MRN: [id number]  SSN: [id number]    YOB: 1940  Age: 79 y.o. Sex: male      I have evaluated the patient and they are stable and ready for discharge from the PACU. Cardiovascular Function/Vital Signs  Visit Vitals  /67   Pulse 63   Temp 36.6 °C (97.8 °F)   Resp 13   Ht 5' 4\" (1.626 m)   Wt 71.9 kg (158 lb 8.2 oz)   SpO2 99%   BMI 27.21 kg/m²       Patient is status post General anesthesia for Procedure(s):  EXCISION PENILE BIOPSY. Nausea/Vomiting: None    Postoperative hydration reviewed and adequate. Pain:  Pain Scale 1: Numeric (0 - 10) (08/18/20 1105)  Pain Intensity 1: 0 (08/18/20 1105)   Managed    Neurological Status:   Neuro (WDL): Within Defined Limits (08/18/20 1105)   At baseline    Mental Status, Level of Consciousness: Alert and  oriented to person, place, and time    Pulmonary Status:   O2 Device: Nasal cannula (08/18/20 1407)   Adequate oxygenation and airway patent    Complications related to anesthesia: None    Post-anesthesia assessment completed.  No concerns    Signed By: Kathe Garcia MD     August 18, 2020

## 2020-08-18 NOTE — ANESTHESIA PREPROCEDURE EVALUATION
Anesthetic History   No history of anesthetic complications            Review of Systems / Medical History  Patient summary reviewed, nursing notes reviewed and pertinent labs reviewed    Pulmonary    COPD: mild    Sleep apnea: No treatment  Smoker      Comments: Former Smoker   Neuro/Psych         TIA  Pertinent negatives: No CVA  Comments: Syncope Cardiovascular    Hypertension: well controlled        Dysrhythmias : PVC  CAD and PAD  Pertinent negatives: No angina    Comments: EF 60 - 65%, trivial TR in 2012  PVC's  Carotid disease    EKG 2020  Normal sinus rhythm    [facility] 2019  Cath:  Obstructive 1VD:     LAD p80     OM1 40; OM2 90 (very small). RCA p30; d30; PDA 50. Successful VIRGINIE pLAD: 2.75x12 Oynx. RFA mynx   GI/Hepatic/Renal     GERD: well controlled    Renal disease: CRI  Hiatal hernia, PUD and liver disease    Comments: NAUSEA  ELEVATED LFTs  fatty liver  Nephrosclerosis Endo/Other        Arthritis and anemia     Other Findings   Comments: GI bleed  Melena           Physical Exam    Airway  Mallampati: II  TM Distance: 4 - 6 cm  Neck ROM: normal range of motion   Mouth opening: Normal     Cardiovascular  Regular rate and rhythm,  S1 and S2 normal,  no murmur, click, rub, or gallop  Rhythm: regular  Rate: normal         Dental    Dentition: Caps/crowns, Upper dentition intact and Lower dentition intact  Comments: 3 missing teeth  Has lower gold cap, permanent   Pulmonary  Breath sounds clear to auscultation               Abdominal  GI exam deferred       Other Findings            Anesthetic Plan    ASA: 3  Anesthesia type: general          Induction: Intravenous  Anesthetic plan and risks discussed with: Patient      Took BB last 24 hours. Last took aspirin today. Has been off plavix since June.

## 2020-08-31 ENCOUNTER — HOSPITAL ENCOUNTER (OUTPATIENT)
Dept: CT IMAGING | Age: 80
Discharge: HOME | End: 2020-08-31
Attending: UROLOGY
Payer: MEDICARE

## 2020-08-31 DIAGNOSIS — C60.9 PENILE CANCER (HCC): ICD-10-CM

## 2020-08-31 PROCEDURE — 74011000636 HC RX REV CODE- 636

## 2020-08-31 PROCEDURE — 74177 CT ABD & PELVIS W/CONTRAST: CPT

## 2020-08-31 PROCEDURE — 74011000636 HC RX REV CODE- 636: Performed by: UROLOGY

## 2020-08-31 RX ORDER — SODIUM CHLORIDE 0.9 % (FLUSH) 0.9 %
10 SYRINGE (ML) INJECTION
Status: COMPLETED | OUTPATIENT
Start: 2020-08-31 | End: 2020-08-31

## 2020-08-31 RX ADMIN — IOHEXOL 20 ML: 240 INJECTION, SOLUTION INTRATHECAL; INTRAVASCULAR; INTRAVENOUS; ORAL at 14:20

## 2020-08-31 RX ADMIN — IOPAMIDOL 100 ML: 755 INJECTION, SOLUTION INTRAVENOUS at 14:19

## 2020-08-31 RX ADMIN — Medication 10 ML: at 14:20

## 2020-09-14 NOTE — PERIOP NOTES
Due to patient admitting to having chest pains at night when he lies down, discussed with Jennifer Chicas NP and patient will need to see cardiology again. Clearance note dated 7/20/20 based off that office visit and evaluation stated patient was not having any angina symptoms. Called and LM for Bart Vyas in Dr. Cruz Malagon office. Spoke with Bart Vyas, she stated to call and discuss with daughter as patient has some dementia. Called and spoke with patient's daughter Merry Cui, she called and spoke with the patient and then called me back. She stated he also tells her that he has chest pains at night. Will still need to see cardiology due to these reports. Cannot decipher accuracy given patient's AMS. Daughter made aware. Call back spoke with Bart Vyas in Dr. Cruz Malagon office.

## 2020-09-14 NOTE — PERIOP NOTES
[facility]  Preoperative Instructions        Surgery Date 9/22/20          Time of Arrival 1100    1. On the day of your surgery, please report to the Surgical Services Registration Desk and sign in at your designated time. The Surgery Center is located to the right of the Emergency Room. 2. You must have someone with you to drive you home. You should not drive a car for 24 hours following surgery. Please make arrangements for a friend or family member to stay with you for the first 24 hours after your surgery. 3. Do not have anything to eat or drink (including water, gum, mints, coffee, juice) after midnight ?9/21/20? Bernardino Ospina ? This may not apply to medications prescribed by your physician. ?(Please note below the special instructions with medications to take the morning of your procedure.)    4. We recommend you do not drink any alcoholic beverages for 24 hours before and after your surgery. 5. Contact your surgeons office for instructions on the following medications: non-steroidal anti-inflammatory drugs (i.e. Advil, Aleve), vitamins, and supplements. (Some surgeons will want you to stop these medications prior to surgery and others may allow you to take them)  **If you are currently taking Plavix, Coumadin, Aspirin and/or other blood-thinning agents, contact your surgeon for instructions. ** Your surgeon will partner with the physician prescribing these medications to determine if it is safe to stop or if you need to continue taking. Please do not stop taking these medications without instructions from your surgeon    6. Wear comfortable clothes. Wear glasses instead of contacts. Do not bring any money or jewelry. Please bring picture ID, insurance card, and any prearranged co-payment or hospital payment. Do not wear make-up, particularly mascara the morning of your surgery. Do not wear nail polish, particularly if you are having foot /hand surgery.   Wear your hair loose or down, no ponytails, buns, bart pins or clips. All body piercings must be removed. Please shower with antibacterial soap for three consecutive days before and on the morning of surgery, but do not apply any lotions, powders or deodorants after the shower on the day of surgery. Please use a fresh towels after each shower. Please sleep in clean clothes and change bed linens the night before surgery. Please do not shave for 48 hours prior to surgery. Shaving of the face is acceptable. 7. You should understand that if you do not follow these instructions your surgery may be cancelled. If your physical condition changes (I.e. fever, cold or flu) please contact your surgeon as soon as possible. 8. It is important that you be on time. If a situation occurs where you may be late, please call [phone] (OR Holding Area). 9. If you have any questions and or problems, please call [phone] (Pre-admission Testing). 10. Your surgery time may be subject to change. You will receive a phone call the evening prior if your time changes. 11.  If having outpatient surgery, you must have someone to drive you here, stay with you during the duration of your stay, and to drive you home at time of discharge. Special Instructions: continue ASA per MD  covid testing 9/18 1100    TAKE ALL MEDICATIONS DAY OF SURGERY EXCEPT:  none      I understand a pre-operative phone call will be made to verify my surgery time. In the event that I am not available, I give permission for a message to be left on my answering service and/or with another person?   Yes [phone]         ___________________      __________   _________    (Signature of Patient)             (Witness)                (Date and Time)

## 2020-09-18 ENCOUNTER — HOSPITAL ENCOUNTER (OUTPATIENT)
Dept: PREADMISSION TESTING | Age: 80
Discharge: HOME | End: 2020-09-18
Payer: MEDICARE

## 2020-09-18 PROCEDURE — 87635 SARS-COV-2 COVID-19 AMP PRB: CPT

## 2020-09-22 ENCOUNTER — ANESTHESIA (OUTPATIENT)
Dept: SURGERY | Age: 80
End: 2020-09-22
Payer: MEDICARE

## 2020-09-22 ENCOUNTER — HOSPITAL ENCOUNTER (EMERGENCY)
Age: 80
Discharge: HOME | End: 2020-09-22
Attending: EMERGENCY MEDICINE
Payer: MEDICARE

## 2020-09-22 ENCOUNTER — ANESTHESIA EVENT (OUTPATIENT)
Dept: SURGERY | Age: 80
End: 2020-09-22
Payer: MEDICARE

## 2020-09-22 ENCOUNTER — HOSPITAL ENCOUNTER (OUTPATIENT)
Age: 80
Setting detail: OUTPATIENT SURGERY
Discharge: HOME | End: 2020-09-22
Attending: UROLOGY | Admitting: UROLOGY
Payer: MEDICARE

## 2020-09-22 VITALS
OXYGEN SATURATION: 97 % | SYSTOLIC BLOOD PRESSURE: 136 MMHG | HEART RATE: 70 BPM | RESPIRATION RATE: 16 BRPM | BODY MASS INDEX: 27.51 KG/M2 | WEIGHT: 161.16 LBS | DIASTOLIC BLOOD PRESSURE: 70 MMHG | HEIGHT: 64 IN | TEMPERATURE: 97.6 F

## 2020-09-22 VITALS
SYSTOLIC BLOOD PRESSURE: 152 MMHG | HEIGHT: 64 IN | HEART RATE: 105 BPM | WEIGHT: 164.68 LBS | DIASTOLIC BLOOD PRESSURE: 97 MMHG | RESPIRATION RATE: 17 BRPM | OXYGEN SATURATION: 97 % | TEMPERATURE: 98.3 F | BODY MASS INDEX: 28.12 KG/M2

## 2020-09-22 DIAGNOSIS — N48.9 PENILE LESION: Primary | ICD-10-CM

## 2020-09-22 DIAGNOSIS — T83.9XXA COMPLICATION OF FOLEY CATHETER, INITIAL ENCOUNTER (HCC): Primary | ICD-10-CM

## 2020-09-22 PROCEDURE — 74011000250 HC RX REV CODE- 250: Performed by: UROLOGY

## 2020-09-22 PROCEDURE — 74011250636 HC RX REV CODE- 250/636: Performed by: UROLOGY

## 2020-09-22 PROCEDURE — 77030013079 HC BLNKT BAIR HGGR 3M -A: Performed by: ANESTHESIOLOGY

## 2020-09-22 PROCEDURE — 76060000034 HC ANESTHESIA 1.5 TO 2 HR: Performed by: UROLOGY

## 2020-09-22 PROCEDURE — 77030038692 HC WND DEB SYS IRMX -B: Performed by: UROLOGY

## 2020-09-22 PROCEDURE — 74011250636 HC RX REV CODE- 250/636: Performed by: ANESTHESIOLOGY

## 2020-09-22 PROCEDURE — 77030031139 HC SUT VCRL2 J&J -A: Performed by: UROLOGY

## 2020-09-22 PROCEDURE — 74011250637 HC RX REV CODE- 250/637: Performed by: STUDENT IN AN ORGANIZED HEALTH CARE EDUCATION/TRAINING PROGRAM

## 2020-09-22 PROCEDURE — 77030018836 HC SOL IRR NACL ICUM -A: Performed by: UROLOGY

## 2020-09-22 PROCEDURE — 77030005513 HC CATH URETH FOL11 MDII -B: Performed by: UROLOGY

## 2020-09-22 PROCEDURE — 74011250637 HC RX REV CODE- 250/637: Performed by: UROLOGY

## 2020-09-22 PROCEDURE — 88309 TISSUE EXAM BY PATHOLOGIST: CPT

## 2020-09-22 PROCEDURE — 74011250636 HC RX REV CODE- 250/636: Performed by: NURSE ANESTHETIST, CERTIFIED REGISTERED

## 2020-09-22 PROCEDURE — 88331 PATH CONSLTJ SURG 1 BLK 1SPC: CPT

## 2020-09-22 PROCEDURE — 88332 PATH CONSLTJ SURG EA ADD BLK: CPT

## 2020-09-22 PROCEDURE — 77030034696 HC CATH URETH FOL 2W BARD -A: Performed by: UROLOGY

## 2020-09-22 PROCEDURE — 76210000017 HC OR PH I REC 1.5 TO 2 HR: Performed by: UROLOGY

## 2020-09-22 PROCEDURE — 77030020143 HC AIRWY LARYN INTUB CGAS -A: Performed by: ANESTHESIOLOGY

## 2020-09-22 PROCEDURE — 2709999900 HC NON-CHARGEABLE SUPPLY: Performed by: UROLOGY

## 2020-09-22 PROCEDURE — 76210000020 HC REC RM PH II FIRST 0.5 HR: Performed by: UROLOGY

## 2020-09-22 PROCEDURE — 76010000153 HC OR TIME 1.5 TO 2 HR: Performed by: UROLOGY

## 2020-09-22 PROCEDURE — 99283 EMERGENCY DEPT VISIT LOW MDM: CPT

## 2020-09-22 PROCEDURE — 88305 TISSUE EXAM BY PATHOLOGIST: CPT

## 2020-09-22 PROCEDURE — 74011000250 HC RX REV CODE- 250: Performed by: NURSE ANESTHETIST, CERTIFIED REGISTERED

## 2020-09-22 RX ORDER — OXYBUTYNIN CHLORIDE 5 MG/1
5 TABLET, EXTENDED RELEASE ORAL ONCE
Status: COMPLETED | OUTPATIENT
Start: 2020-09-22 | End: 2020-09-22

## 2020-09-22 RX ORDER — SODIUM CHLORIDE, SODIUM LACTATE, POTASSIUM CHLORIDE, CALCIUM CHLORIDE 600; 310; 30; 20 MG/100ML; MG/100ML; MG/100ML; MG/100ML
25 INJECTION, SOLUTION INTRAVENOUS CONTINUOUS
Status: DISCONTINUED | OUTPATIENT
Start: 2020-09-22 | End: 2020-09-22 | Stop reason: HOSPADM

## 2020-09-22 RX ORDER — DOCUSATE SODIUM 100 MG/1
100 CAPSULE, LIQUID FILLED ORAL 2 TIMES DAILY
Qty: 60 CAP | Refills: 2 | Status: SHIPPED | OUTPATIENT
Start: 2020-09-22 | End: 2020-12-21

## 2020-09-22 RX ORDER — PHENYLEPHRINE HCL IN 0.9% NACL 0.4MG/10ML
SYRINGE (ML) INTRAVENOUS AS NEEDED
Status: DISCONTINUED | OUTPATIENT
Start: 2020-09-22 | End: 2020-09-22 | Stop reason: HOSPADM

## 2020-09-22 RX ORDER — CEFUROXIME AXETIL 500 MG/1
500 TABLET ORAL 2 TIMES DAILY
Qty: 10 TAB | Refills: 0 | Status: SHIPPED | OUTPATIENT
Start: 2020-09-22 | End: 2020-09-27

## 2020-09-22 RX ORDER — GLYCOPYRROLATE 0.2 MG/ML
INJECTION INTRAMUSCULAR; INTRAVENOUS AS NEEDED
Status: DISCONTINUED | OUTPATIENT
Start: 2020-09-22 | End: 2020-09-22 | Stop reason: HOSPADM

## 2020-09-22 RX ORDER — FENTANYL CITRATE 50 UG/ML
25 INJECTION, SOLUTION INTRAMUSCULAR; INTRAVENOUS
Status: DISCONTINUED | OUTPATIENT
Start: 2020-09-22 | End: 2020-09-22 | Stop reason: HOSPADM

## 2020-09-22 RX ORDER — LIDOCAINE HYDROCHLORIDE 20 MG/ML
INJECTION, SOLUTION EPIDURAL; INFILTRATION; INTRACAUDAL; PERINEURAL AS NEEDED
Status: DISCONTINUED | OUTPATIENT
Start: 2020-09-22 | End: 2020-09-22 | Stop reason: HOSPADM

## 2020-09-22 RX ORDER — SODIUM CHLORIDE 0.9 % (FLUSH) 0.9 %
5-40 SYRINGE (ML) INJECTION EVERY 8 HOURS
Status: DISCONTINUED | OUTPATIENT
Start: 2020-09-22 | End: 2020-09-22 | Stop reason: HOSPADM

## 2020-09-22 RX ORDER — DIPHENHYDRAMINE HYDROCHLORIDE 50 MG/ML
12.5 INJECTION, SOLUTION INTRAMUSCULAR; INTRAVENOUS AS NEEDED
Status: DISCONTINUED | OUTPATIENT
Start: 2020-09-22 | End: 2020-09-22 | Stop reason: HOSPADM

## 2020-09-22 RX ORDER — HYDROMORPHONE HYDROCHLORIDE 2 MG/ML
0.2 INJECTION, SOLUTION INTRAMUSCULAR; INTRAVENOUS; SUBCUTANEOUS
Status: DISCONTINUED | OUTPATIENT
Start: 2020-09-22 | End: 2020-09-22 | Stop reason: HOSPADM

## 2020-09-22 RX ORDER — SODIUM CHLORIDE 0.9 % (FLUSH) 0.9 %
5-40 SYRINGE (ML) INJECTION AS NEEDED
Status: DISCONTINUED | OUTPATIENT
Start: 2020-09-22 | End: 2020-09-22 | Stop reason: HOSPADM

## 2020-09-22 RX ORDER — ONDANSETRON 2 MG/ML
INJECTION INTRAMUSCULAR; INTRAVENOUS AS NEEDED
Status: DISCONTINUED | OUTPATIENT
Start: 2020-09-22 | End: 2020-09-22 | Stop reason: HOSPADM

## 2020-09-22 RX ORDER — HYDROCODONE BITARTRATE AND ACETAMINOPHEN 5; 325 MG/1; MG/1
1 TABLET ORAL
Qty: 25 TAB | Refills: 0 | Status: SHIPPED | OUTPATIENT
Start: 2020-09-22 | End: 2020-09-25

## 2020-09-22 RX ORDER — FENTANYL CITRATE 50 UG/ML
INJECTION, SOLUTION INTRAMUSCULAR; INTRAVENOUS AS NEEDED
Status: DISCONTINUED | OUTPATIENT
Start: 2020-09-22 | End: 2020-09-22 | Stop reason: HOSPADM

## 2020-09-22 RX ORDER — LIDOCAINE HYDROCHLORIDE 10 MG/ML
0.1 INJECTION, SOLUTION EPIDURAL; INFILTRATION; INTRACAUDAL; PERINEURAL AS NEEDED
Status: DISCONTINUED | OUTPATIENT
Start: 2020-09-22 | End: 2020-09-22 | Stop reason: HOSPADM

## 2020-09-22 RX ORDER — PROPOFOL 10 MG/ML
INJECTION, EMULSION INTRAVENOUS AS NEEDED
Status: DISCONTINUED | OUTPATIENT
Start: 2020-09-22 | End: 2020-09-22 | Stop reason: HOSPADM

## 2020-09-22 RX ORDER — EPHEDRINE SULFATE/0.9% NACL/PF 50 MG/5 ML
SYRINGE (ML) INTRAVENOUS AS NEEDED
Status: DISCONTINUED | OUTPATIENT
Start: 2020-09-22 | End: 2020-09-22 | Stop reason: HOSPADM

## 2020-09-22 RX ADMIN — WATER 2 G: 1 INJECTION INTRAMUSCULAR; INTRAVENOUS; SUBCUTANEOUS at 13:45

## 2020-09-22 RX ADMIN — GLYCOPYRROLATE 0.2 MG: 0.2 INJECTION, SOLUTION INTRAMUSCULAR; INTRAVENOUS at 14:05

## 2020-09-22 RX ADMIN — Medication 80 MCG: at 13:51

## 2020-09-22 RX ADMIN — SODIUM CHLORIDE, SODIUM LACTATE, POTASSIUM CHLORIDE, AND CALCIUM CHLORIDE 25 ML/HR: 600; 310; 30; 20 INJECTION, SOLUTION INTRAVENOUS at 12:02

## 2020-09-22 RX ADMIN — FENTANYL CITRATE 25 MCG: 50 INJECTION, SOLUTION INTRAMUSCULAR; INTRAVENOUS at 14:50

## 2020-09-22 RX ADMIN — FENTANYL CITRATE 25 MCG: 50 INJECTION, SOLUTION INTRAMUSCULAR; INTRAVENOUS at 14:25

## 2020-09-22 RX ADMIN — Medication 10 MG: at 13:52

## 2020-09-22 RX ADMIN — FENTANYL CITRATE 25 MCG: 50 INJECTION, SOLUTION INTRAMUSCULAR; INTRAVENOUS at 14:34

## 2020-09-22 RX ADMIN — PROPOFOL 200 MG: 10 INJECTION, EMULSION INTRAVENOUS at 13:41

## 2020-09-22 RX ADMIN — Medication 10 MG: at 14:02

## 2020-09-22 RX ADMIN — Medication 80 MCG: at 13:53

## 2020-09-22 RX ADMIN — Medication 40 MCG: at 13:54

## 2020-09-22 RX ADMIN — Medication 40 MCG: at 13:48

## 2020-09-22 RX ADMIN — Medication 3 AMPULE: at 11:30

## 2020-09-22 RX ADMIN — Medication 80 MCG: at 13:52

## 2020-09-22 RX ADMIN — SODIUM CHLORIDE, SODIUM LACTATE, POTASSIUM CHLORIDE, AND CALCIUM CHLORIDE: 600; 310; 30; 20 INJECTION, SOLUTION INTRAVENOUS at 14:58

## 2020-09-22 RX ADMIN — FENTANYL CITRATE 25 MCG: 50 INJECTION, SOLUTION INTRAMUSCULAR; INTRAVENOUS at 13:56

## 2020-09-22 RX ADMIN — SODIUM CHLORIDE, SODIUM LACTATE, POTASSIUM CHLORIDE, AND CALCIUM CHLORIDE: 600; 310; 30; 20 INJECTION, SOLUTION INTRAVENOUS at 13:28

## 2020-09-22 RX ADMIN — OXYBUTYNIN CHLORIDE 5 MG: 5 TABLET, EXTENDED RELEASE ORAL at 21:51

## 2020-09-22 RX ADMIN — LIDOCAINE HYDROCHLORIDE 60 MG: 20 INJECTION, SOLUTION EPIDURAL; INFILTRATION; INTRACAUDAL; PERINEURAL at 13:41

## 2020-09-22 RX ADMIN — Medication 80 MCG: at 13:49

## 2020-09-22 RX ADMIN — ONDANSETRON HYDROCHLORIDE 4 MG: 2 INJECTION, SOLUTION INTRAMUSCULAR; INTRAVENOUS at 13:50

## 2020-09-22 NOTE — PERIOP NOTES
Handoff Report from Operating Room to PACU    Report received from Donta Desai RN and Leopoldo Ade, CRNA regarding Sunshine Sprain. Surgeon(s):  Trinh Kevin MD  And Procedure(s) (LRB):  PARTIAL PENECTOMY (N/A)  confirmed   with dressings discussed. Anesthesia type, drugs, patient history, complications, estimated blood loss, vital signs, intake and output, and last pain medication, lines, reversal medications and temperature were reviewed.

## 2020-09-22 NOTE — DISCHARGE INSTRUCTIONS
Patient Education        Partial Penectomy: What to Expect at [address] [place] [address] Drive may feel pain and be tired. You may also have swelling and bruising in the area. During surgery, a thin flexible tube (catheter) may have been put in your urethra and into the bladder to drain urine. This will stay in until Thursday or Friday  After your surgery, you will likely go home the same day. This care sheet gives you a general idea about how long it will take for you to recover. But each person recovers at a different pace. Follow the steps below to get better as quickly as possible. How can you care for yourself at home? Activity    · Allow your body to heal. Don't move quickly or lift anything heavy until you are feeling better.     · Rest when you feel tired.     · You can do your normal activities when it feels okay to do so.     · You will probably need to take 2 to 4 weeks off from work. It depends on the type of work you do and how you feel.     · Wear supportive underwear. Keep your penis pointed up toward your belly button, and take it easy for about 2 weeks.        Diet    · You can eat your normal diet. If your stomach is upset, try bland, low-fat foods like plain rice, broiled chicken, toast, and yogurt. Medicines    · Be safe with medicines. Read and follow all instructions on the label. ? If the doctor gave you a prescription medicine for pain, take it as prescribed. ? If you are not taking a prescription pain medicine, ask your doctor if you can take an over-the-counter medicine. ? You will take antibiotics for 5 days after surgery to prevent infection. ? Take your antibiotics as directed. Do not stop taking them just because you feel better. You need to take the full course of antibiotics.     · Restart your aspirin in 5 days   Incision care    · You will have a dressing over the cut (incision). A dressing helps the incision heal and protects it.   · We will remove the dressing on Thursday in the office. If it falls off earlier, that is ok, put bacitracin ointment on the incision twice a day once the dressing is off     · Wash the area daily with warm water, and pat it dry. Don't use hydrogen peroxide or alcohol. They can slow healing. Ice and elevation    · Put cold pack on the area for 10 to 20 minutes at a time. Try to do this every 1 to 2 hours for the next 24 hours (when you are awake) or until the swelling goes down. Put a thin cloth between the ice and your skin.     · When you lie on your back, roll up a hand towel and place it between your legs under your scrotum to raise the area. This will help reduce swelling. Hygiene    · You may shower 24 to 48 hours after surgery, if your doctor okays it. Pat the incision dry. Do not take a bath for the first 2 weeks, or until your doctor tells you it is okay. Follow-up care is a key part of your treatment and safety. Be sure to make and go to all appointments, and call your doctor if you are having problems. It's also a good idea to know your test results and keep a list of the medicines you take. When should you call for help? Call 911 anytime you think you may need emergency care. For example, call if:    · You passed out (lost consciousness).     · You have chest pain, are short of breath, or cough up blood. Call your doctor now or seek immediate medical care if:    · You have pain that does not get better after you take pain medicine.     · You have pain, swelling that gets worse or more painful, or bruising that lasts more than 2 or 3 weeks.     · You have loose stitches, or your incision comes open.     · Bright red blood has soaked through the bandage over your incision.     · You have symptoms of infection, such as:  ? Increased pain, swelling, warmth, or redness. ? Red streaks leading from the area. ? Pus draining from the area. ? A fever.     · You can't pass urine.     · You have symptoms of a urinary tract infection.  These may include:  ? Pain or burning when you urinate. ? A frequent need to urinate without being able to pass much urine. ? Pain in the flank, which is just below the rib cage and above the waist on either side of the back. ? Blood in your urine. ? A fever.     · You are sick to your stomach or can't keep down fluids.     · You have signs of a blood clot in your leg (called a deep vein thrombosis), such as:  ? Pain in your calf, back of the knee, thigh, or groin. ? Redness or swelling in your leg. Watch closely for changes in your health, and be sure to contact your doctor if you have problems. Current as of: February 11, 2020               Content Version: 12.6  © 2006-2020 [employer]. Care instructions adapted under license by [employer] (which disclaims liability or warranty for this information). If you have questions about a medical condition or this instruction, always ask your healthcare professional. [place] [address] any warranty or liability for your use of this information. DISCHARGE SUMMARY from Nurse    PATIENT INSTRUCTIONS:    After general anesthesia or intravenous sedation, for 24 hours or while taking prescription Narcotics:  · Limit your activities  · Do not drive and operate hazardous machinery  · Do not make important personal or business decisions  · Do  not drink alcoholic beverages  · If you have not urinated within 8 hours after discharge, please contact your surgeon on call.     Report the following to your surgeon:  · Excessive pain, swelling, redness or odor of or around the surgical area  · Temperature over 100.5  · Nausea and vomiting lasting longer than 4 hours or if unable to take medications  · Any signs of decreased circulation or nerve impairment to extremity: change in color, persistent  numbness, tingling, coldness or increase pain  · Any questions    What to do at Home:    *  Please give a list of your current medications to your Primary Care Provider. *  Please update this list whenever your medications are discontinued, doses are      changed, or new medications (including over-the-counter products) are added. *  Please carry medication information at all times in case of emergency situations. These are general instructions for a healthy lifestyle:    No smoking/ No tobacco products/ Avoid exposure to second hand smoke  Surgeon General's Warning:  Quitting smoking now greatly reduces serious risk to your health. Obesity, smoking, and sedentary lifestyle greatly increases your risk for illness    A healthy diet, regular physical exercise & weight monitoring are important for maintaining a healthy lifestyle    You may be retaining fluid if you have a history of heart failure or if you experience any of the following symptoms:  Weight gain of 3 pounds or more overnight or 5 pounds in a week, increased swelling in our hands or feet or shortness of breath while lying flat in bed. Please call your doctor as soon as you notice any of these symptoms; do not wait until your next office visit. The discharge information has been reviewed with the patient and caregiver. The patient and caregiver verbalized understanding. Discharge medications reviewed with the patient and caregiver and appropriate educational materials and side effects teaching were provided. ___________________________________________________________________________________________________________________________________    A common side effect of anesthesia following surgery is nausea and/or vomiting. In order to decrease symptoms, it is wise to avoid foods that are high in fat, greasy foods, milk products, and spicy foods for the first 24 hours.     Acceptable foods for the first 24 hours following surgery include but are not limited to:     soup   broth    toast    crackers    applesauce    bananas    mashed potatoes,   soft or scrambled eggs   oatmeal    jello    It is important to eat when taking your pain medication. This will help to prevent nausea. If possible, please try to time your meals with your medications. It is very important to stay hydrated following surgery. Sip fluids frequently while awake. Avoid acidic drinks such as citrus juices and soda for 24 hours. Carbonated beverages may cause bloating and gas. Acceptable fluids include:    - water (flavor packets may add variety)  - coffee or tea (in moderation)  - Gatorade  - Roberto-aid  - apple juice  - cranberry juice    You are encouraged to cough and deep breathe every hour when awake. This will help to prevent respiratory complications following anesthesia. You may want to hug a pillow when coughing and sneezing to add additional support to the surgical area and to decrease discomfort if you had abdominal or chest surgery. If you are discharged home with support stockings, you may remove them after 24 hours. Support stockings are used to help prevent blood clots in the legs following surgery. Please take time to review all of your Home Care Instructions and Medication Information sheets provided in your discharge packet. If you have any questions, please contact your surgeons office. Thank you. Patient Education      Cefuroxime (Ceftin) - (By mouth)   Why this medicine is used:   Treats infections. Contact a nurse or doctor right away if you have:  · Blistering, peeling, or red skin rash  · Severe or bloody diarrhea     Common side effects:  · Nausea  · Bad taste in your mouth  © 2017 [address] is for End User's use only and may not be sold, redistributed or otherwise used for commercial purposes. Patient Education      Laxative, Stool Softeners (Doculax, Colace, Colace Clear, DSS) - (By mouth)   Why this medicine is used:   Treats constipation by helping you have a bowel movement.   Contact a nurse or doctor right away if you have:  · Dark urine or pale stools  · Vomiting, loss of appetite, stomach pain  · Yellow skin or eyes     Common side effects:  · Nausea, diarrhea, stomach cramps, bitter taste in mouth  © 2017 [address] Information is for End User's use only and may not be sold, redistributed or otherwise used for commercial purposes. Patient Education   Narcotic-Analgesic/Acetaminophen (By mouth)   Relieves pain. Brand Name(s): Capital w/Codeine, [place], [place], [place], Lorcet HD, Lorcet Plus, Lortab 10/325, Lortab 5/325, Lortab 7.5/325, Lortab Elixir, [place], [place], [place], Trezix, Tylenol With Codeine No. 4   There may be other brand names for this medicine. When This Medicine Should Not Be Used: You should not use this medicine if you have had an allergic reaction to acetaminophen, codeine, hydrocodone, propoxyphene, or sulfites. You should not use this medicine if you have had an allergic reaction to any other opioid pain medicine. How to Use This Medicine:   Liquid, Tablet, Capsule  · Your doctor will tell you how much medicine to use. Do not use more than directed. · This medicine contains acetaminophen. Read the labels of all other medicines you are using to see if they also contain acetaminophen, or ask your doctor or pharmacist. [place] not use more than 4 grams (4,000 milligrams) total of acetaminophen in one day. · Drink plenty of liquids to help avoid constipation. If a dose is missed:   · Some of these medicines need to be used on a fixed schedule. If you miss a dose or forget to use your medicine, call your doctor pharmacist for instructions. Do not use extra medicine to make up for a missed dose. How to Store and Dispose of This Medicine:   · Store the medicine in a closed container at room temperature, away from heat, moisture, and direct light. Do not refrigerate or freeze the medicine.   · Ask your pharmacist, doctor, or health caregiver about the best way to dispose of any outdated medicine or medicine no longer needed. · Keep all medicine out of the reach of children. Never share your medicine with anyone. Drugs and Foods to Avoid:   Ask your doctor or pharmacist before using any other medicine, including over-the-counter medicines, vitamins, and herbal products. · Make sure your doctor knows if you are using a monoamine oxidase inhibitor (MAOI) medicine, such as Eldepryl®, Marplan®, [place], or Parnate®. Make sure your doctor knows if you are also using a medicine to treat depression, such as amitriptyline, doxepin, nortriptyline, Elavil®, Pamelor®, or Sinequan®. Make sure your doctor knows if you are taking an anticholinergic medicine, such as atropine, methscopolamine, or scopolamine. · Tell your doctor if you use anything else that makes you sleepy. Some examples are allergy medicine, narcotic pain medicine, and alcohol. · Do not drink alcohol while you are using this medicine. Warnings While Using This Medicine:   · Make sure your doctor knows if you are pregnant or breast feeding, or if you have a head injury, or other conditions that may cause an increase in intercranial pressure (pressure inside your head). Make sure your doctor knows if you have severe kidney problems or severe liver problems, or if you have hypothyroidism (lack of thyroid function). Make sure your doctor knows if you have Walt's disease (adrenal gland disease), or if you have enlarged prostate or urethral stricture. Make sure your doctor knows if you have any abdominal problems, or if you have lung disease or asthma. · This medicine may make you dizzy or drowsy. Avoid driving, using machines, or anything else that could be dangerous if you are not alert. · This medicine can be habit-forming. Do not use more than your prescribed dose. Call your doctor if you think your medicine is not working. · Tell any doctor or dentist who treats you that you are using this medicine.  This medicine may affect certain medical test results. · This medicine may cause constipation, especially with long-term use. Ask your doctor if you should use a laxative to prevent and treat constipation. · When a mother is breastfeeding and takes codeine, there is a very small chance that this medicine could cause serious side effects in the baby. This is because codeine works differently in a few women, so their breast milk contains too much medicine. If you take codeine, be alert for these signs of overdose in your nursing baby: sleeping more than usual, trouble breastfeeding, trouble breathing, or being limp and weak. Call the baby's doctor right away if you think there is a problem. If you cannot talk to the doctor, take the baby to the emergency room or call 911. Possible Side Effects While Using This Medicine:   Call your doctor right away if you notice any of these side effects:  · Allergic reaction: Itching or hives, swelling in your face or hands, swelling or tingling in your mouth or throat, chest tightness, trouble breathing  · Dizziness. · Seeing or hearing things that are not there. · Very slow heartbeat. If you notice these less serious side effects, talk with your doctor:   · Change in how much or how often you urinate. · Cold, clammy skin. · Feeling unusually anxious, excited, fearful, or tired. · Nausea, vomiting, constipation, stomach pain or upset, or heartburn. · Skin rash. · Vision changes. If you notice other side effects that you think are caused by this medicine, tell your doctor. Call your doctor for medical advice about side effects. You may report side effects to FDA at 1-581-EVB-1070  © 2017 [facility] Information is for End User's use only and may not be sold, redistributed or otherwise used for commercial purposes. The above information is an  only. It is not intended as medical advice for individual conditions or treatments.  Talk to your doctor, nurse or pharmacist before following any medical regimen to see if it is safe and effective for you.

## 2020-09-22 NOTE — PERIOP NOTES
Irrisept Wound Debridement and Cleansing System  Ref: [id number]   LOT: [id number] Expiration Date: 06/30/2022

## 2020-09-22 NOTE — H&P
Amrit De La Cruz is a 79year old male who presents today for \"s/p penile bx\". Mr. Hutchinson Dear returns today for a follow up. He is s/p biopsy, penile lesion. Pathology revealed squamous carcinoma, at least carcinoma in situ. Moderately differentiated invasive squamous carcinoma. He is healing well. He is currently on Aspirin. PAST MEDICAL HISTORY:    Allergies: DEMEROL (Severe)  DENIES: Latex, Shellfish, X-Ray Dye, Iodine. Medications: ONDANSETRON HCL 4 MG ORAL TABLET (ONDANSETRON HCL) TK 1 T PO Q 8 H PRF NAUSEA; Route: ORAL  DOXAZOSIN MESYLATE 2 MG ORAL TABLET (DOXAZOSIN MESYLATE) TK 1 T PO QPM; Route: ORAL  OMEPRAZOLE 40 MG ORAL CAPSULE DELAYED RELEASE (OMEPRAZOLE) daily; Route: ORAL    Problems: Penile cancer (ICD-187.4) (QWN91-E28.9)  Penile lesion (ICD-607.9) (HPY46-H80.9)  Pruritus genitalia (ICD-698.1) (VJD50-B19.3)  Meatitis (ICD-597.89) (MNT95-J59.2)  706.2 CYST, SEBACEOUS (ICD-706.2) (MBL63-R60.3)  600.00 BPH W/O URINARY OBSTRUCTION (ICD-600.00) (ADQ46-D46.0)  608.9 TESTICULAR PAIN (ICD-608.9)    Illnesses: Heart Disease, High Blood Pressure, Stroke/Seizure, Bleeding Problems, and Hepatitis. DENIES: Pacemaker/Defibrillator, Lung Disease, Diabetes, Bowel Problems, Kidney Problems, HIV, or Cancer. Surgeries: Heart Stent Procedure, Colon Surgery, and Cataract Surgery. Family History: DENIES: Prostate cancer, Kidney cancer, Kidney disease, Kidney stones. Social History: Retired. . Smoking status: former smoker. Does not drink alcohol. System Review: Admits to: Involuntary Urine Loss, Lower Extremity Weakness, Dry Skin, Easy Bleeding, and Rash. DENIES: Unexplained Weight Loss, Dry Eyes, Dry Mouth, Leg Swelling, Shortness of Breath, Constipation, Difficulty Walking, Psychiatric Problems, Impaired Sex Drive. EXAMINATION: Appearance: well-developed NAD Respiratory Effort: breathing easily Penis: Glands penis bx site is healing well. No palpable abnormalities of the penile shaft.  No Appointment scheduled for Friday 5/29 at 7:30 am.    inguinal adenopathy. No signs of infection Skin Inspection: warm and dry Orientation: oriented to person; time and place Mood/Affect: normal       URINALYSIS  Urine Micro not done    IMPRESSION:    1. PENILE CANCER (ROW66-Q80.9) - New: We will arrange for a CT Abd/pelvis with/without contrast. We will also arrange for a Partial penectomy. Risks of cancer recurrence, bleeding, and infection. Patient is unable to come off of Aspirin. PLAN: All questions and concerns were addressed prior to the patient leaving the clinic. The patient understands and agrees with the plan. cc: MD Gurpreet Caraballo MD  Transcribed by Speech to Text Technology  [employer]  E&M Service    Upcoming Orders  Schedule Surgery - Schedule for PARTIAL PENECTOMY at Hospital under General anesthesia. Requesting Next Available (Me).   Imaging Other

## 2020-09-22 NOTE — ANESTHESIA POSTPROCEDURE EVALUATION
Procedure(s):  PARTIAL PENECTOMY. general    Anesthesia Post Evaluation        Patient location during evaluation: PACU  Note status: Adequate. Level of consciousness: responsive to verbal stimuli and sleepy but conscious  Pain management: satisfactory to patient  Airway patency: patent  Anesthetic complications: no  Cardiovascular status: acceptable  Respiratory status: acceptable  Hydration status: acceptable  Comments: +Post-Anesthesia Evaluation and Assessment    Patient: Rebeka Colon MRN: [id number]  SSN: [id number]   YOB: 1940  Age: 79 y.o. Sex: male      Cardiovascular Function/Vital Signs    /76   Pulse 73   Temp (!) 35.8 °C (96.5 °F)   Resp 15   Ht 5' 4\" (1.626 m)   Wt 73.1 kg (161 lb 2.5 oz)   SpO2 97%   BMI 27.66 kg/m²     Patient is status post Procedure(s):  PARTIAL PENECTOMY. Nausea/Vomiting: Controlled. Postoperative hydration reviewed and adequate. Pain:  Pain Scale 1: Numeric (0 - 10) (09/22/20 1645)  Pain Intensity 1: 0 (09/22/20 1645)   Managed. Neurological Status:   Neuro (WDL): Exceptions to WDL (09/22/20 1522)   At baseline. Mental Status and Level of Consciousness: Arousable. Pulmonary Status:   O2 Device: Room air (09/22/20 1615)   Adequate oxygenation and airway patent. Complications related to anesthesia: None    Post-anesthesia assessment completed. No concerns. Signed By: Alisa Alvarado MD    9/22/2020  Post anesthesia nausea and vomiting:  controlled      INITIAL Post-op Vital signs:   Vitals Value Taken Time   /76 9/22/2020  5:15 PM   Temp 35.8 °C (96.5 °F) 9/22/2020  3:22 PM   Pulse 79 9/22/2020  5:19 PM   Resp 17 9/22/2020  5:19 PM   SpO2 97 % 9/22/2020  5:19 PM   Vitals shown include unvalidated device data.

## 2020-09-22 NOTE — PERIOP NOTES
1126 - PT'S COVID TEST RESULTED NEG. PT STATES HAS QUARANTINED SINCE TESTING. PT DENIES S/S OF COVID - NO COUGH, COLD, N/V, DIARRHEA. ...the patient DOESN HAS MILD SOB WHICH IS CHRONIC. PRE-OP TCHING DONE. PT VERBALIZES UNDERSTANDING. STRETCHER IN LOWEST POSITION, CB IN PLACE AND SR UP X2. WAITING SURGERY.

## 2020-09-22 NOTE — ANESTHESIA PREPROCEDURE EVALUATION
Anesthetic History   No history of anesthetic complications            Review of Systems / Medical History  Patient summary reviewed, nursing notes reviewed and pertinent labs reviewed    Pulmonary    COPD: mild    Sleep apnea: No treatment  Smoker      Comments: Former Smoker   Neuro/Psych         TIA and dementia  Pertinent negatives: No CVA  Comments: Syncope Cardiovascular    Hypertension: well controlled        Dysrhythmias : PVC  CAD, PAD and hyperlipidemia  Pertinent negatives: No angina  Exercise tolerance: <4 METS  Comments: Orthostatic Hypotension    ECG (8/6/20):  Poor data quality   Normal sinus rhythm   Early rs transition   When compared with ECG of 16-JUL-2019 17:50,   No significant change was found     TTE (5/24/12): Left ventricle: Systolic function was normal. Ejection fraction was   estimated in the range of 60 % to 65 %. There were no regional wall motion   abnormalities. There was mild concentric hypertrophy. Bilateral carotid artery stenosis    Carotid Duplex (10/29/19):  ·There is mild stenosis in the right ICA (<50%). ·The right vertebral is antegrade. ·There is mild stenosis in the left ICA (<50%). ·The left vertebral is antegrade. ·No significant change from previous exam of 9/29/2017.    GI/Hepatic/Renal     GERD: well controlled    Renal disease: CRI  Hiatal hernia, PUD and liver disease    Comments: Fatty Liver  H/O Elevated LFT's  Nephrosclerosis  H/O Bleeding Ulcer Endo/Other        Arthritis, cancer and anemia    Comments: Penile Squamous Cell Carcinoma Other Findings   Comments:            Physical Exam    Airway  Mallampati: II  TM Distance: 4 - 6 cm  Neck ROM: normal range of motion   Mouth opening: Normal     Cardiovascular  Regular rate and rhythm,  S1 and S2 normal,  no murmur, click, rub, or gallop  Rhythm: regular  Rate: normal         Dental    Dentition: Caps/crowns  Comments: 3 missing teeth  Has lower gold cap, permanent   Pulmonary  Breath sounds clear to auscultation               Abdominal  GI exam deferred       Other Findings            Anesthetic Plan    ASA: 3  Anesthesia type: general    Monitoring Plan: BIS      Induction: Intravenous  Anesthetic plan and risks discussed with: Patient

## 2020-09-22 NOTE — BRIEF OP NOTE
Brief Postoperative Note    Patient: Jey Nguyen  YOB: 1940  MRN: [id number]    Date of Procedure: 9/22/2020     Pre-Op Diagnosis: PENILE CANCER    Post-Op Diagnosis: Same as preoperative diagnosis.       Procedure(s):  PARTIAL PENECTOMY    Surgeon(s):  Taryn Conroy MD    Surgical Assistant: None    Anesthesia: General     Estimated Blood Loss (mL): less than 50     Complications: None    Specimens:   ID Type Source Tests Collected by Time Destination   1 : Glans Penis Frozen Section Penis  Taryn Conroy MD 9/22/2020 1404 Pathology   2 : Penis Frozen Section Penis  Taryn Conroy MD 9/22/2020 1440 Pathology   3 : Prepuce Preservative Penis  Taryn Conroy MD 9/22/2020 1452 Pathology        Implants: * No implants in log *    Drains: * No LDAs found *    Findings: SCCA of glans penis    Electronically Signed by Jey Owusu MD on 9/22/2020 at 3:27 PM

## 2020-09-22 NOTE — PERIOP NOTES
For dc home. Vswnl. Denies pain, nausea. dsgs to penis intact. Small amt of blood noted on dsg. Went over [employer] instructions w/pt and daughter including Rx, f/up, and allen care. Verbalized understanding. dc'd home.

## 2020-09-23 NOTE — ED PROVIDER NOTES
EMERGENCY DEPARTMENT HISTORY AND PHYSICAL EXAM          Date: 9/22/2020  Patient Name: Dara Baxter  Attending of Record: Conrad Nelson DO    History of Presenting Illness     Chief Complaint   Patient presents with    Urinary Catheter Problem     Pt reports having penile surgery today where they removed the tip of the penis d/t cancer and placed a allen. Patient reprots leaking of urine around the catheter. History Provided By: Patient    HPI: Dara Baxter is a 79 y.o. male, pmh HTN and penile cancer presents with penile pain and urine leakage. Earlier today he had a partial penectomy for penile carcinoma. He had a allen placed and has since had persistant leakage around the allen or urine and blood. He notes bandages were in place which have since come off. He notes some pain around the site but otherwise feels ok. He has been getting urine into the bag as well but notes it keep coming out around the allen. Denies any other omplaints at this time. PCP: Shola Sweeney MD    There are no other complaints, changes, or physical findings at this time. Current Outpatient Medications   Medication Sig Dispense Refill    HYDROcodone-acetaminophen (Norco) 5-325 mg per tablet Take 1 Tab by mouth every four (4) hours as needed for Pain for up to 3 days. Max Daily Amount: 6 Tabs. 25 Tab 0    cefUROXime (CEFTIN) 500 mg tablet Take 1 Tab by mouth two (2) times a day for 5 days. 10 Tab 0    docusate sodium (Colace) 100 mg capsule Take 1 Cap by mouth two (2) times a day for 90 days. 60 Cap 2    neomycin-bacitracin-polymyxin (Neosporin, lwy-oua-omiat,) 3.5mg-400 unit- 5,000 unit/gram ointment Apply  to affected area two (2) times a day. 1 Tube 0    donepeziL (ARICEPT) 5 mg tablet Take 5 mg by mouth nightly.  doxazosin (CARDURA) 2 mg tablet Take 2 mg by mouth daily.  ondansetron hcl (Zofran) 4 mg tablet Take 1 Tab by mouth every eight (8) hours as needed for Nausea.  20 Tab 0    pantoprazole (PROTONIX) 40 mg tablet Take 40 mg by mouth ACB/HS.  carvedilol (COREG) 12.5 mg tablet Take 25 mg by mouth two (2) times daily (with meals).  atorvastatin (LIPITOR) 20 mg tablet Take 20 mg by mouth daily. Facility-Administered Medications Ordered in Other Encounters   Medication Dose Route Frequency Provider Last Rate Last Dose    bupivacaine (PF) 0.5 % (5 mg/mL) 30 mL solution    PRN Clark Johnson MD   30 mL at 09/1940       Past History     Past Medical History:  Past Medical History:   Diagnosis Date    Arthritis     left arm    Bleeding ulcer 07/2019    BPH (benign prostatic hyperplasia)     CAD (coronary artery disease)     s/p stent    Cancer (HCC)     penile squamous carcinoma    Chronic kidney disease     stage 2     Chronic obstructive pulmonary disease (HCC)     Dementia (HCC)     Elevated LFT's     Essential hypertension 9/24/01    GERD (gastroesophageal reflux disease)     hiatal hernia    Ill-defined condition 12/02/2016    faint    Liver disease     \"fatty liver\" as per patient    Nephrosclerosis     Orthostatic hypotension 9/24/01    PVC's 9/24/01    Sleep apnea     no CPAP    Syncope 9/24/01    secondary to venous insuffiency        Past Surgical History:  Past Surgical History:   Procedure Laterality Date    CARDIAC SURG PROCEDURE UNLIST  05/13/2019    1 stent    COLONOSCOPY N/A 12/13/2016    COLONOS[facility] performed [facility]zman [facility] at [facility] ENDOSCOPY    COLONOSCOPY N/A 12/11/2019    COLONOSCOPY performed by Raegan Covarrubias MD at [address] [place] [address]; HI RISK IND  12/11/2019         ECHO 2D ADULT  5/24/12    EF 60 - 65%; mild concentric hypertrophy; pulmonary systolic artery pressure upper limits normal    HX ABDOMINAL WALL DEFECT REPAIR  07/01/2019d     Exploratory laparotomy, segmental sigmoid colon resection and primary stapled anastomosis.     HX APPENDECTOMY  1985    HX HEENT  02/18/2020    Left temporal artery biopsy    HX HERNIA REPAIR  2018    Davinci hiatal hernia repair- Kaye Angelo MD    HX OTHER SURGICAL  2020    penile lesion bx    UPPER GI ENDOSCOPY,BIOPSY  2019         UPPER GI ENDOSCOPY,DIAGNOSIS  2019            Family History:  Family History   Problem Relation Age of Onset    Stroke Mother     Stroke Father     Heart Disease Father        Social History:  Social History     Tobacco Use    Smoking status: Former Smoker     Packs/day: 3.50     Last attempt to quit: 1980     Years since quittin.7    Smokeless tobacco: Never Used   Substance Use Topics    Alcohol use: No     Comment: no alcohol since     Drug use: No       Allergies: Allergies   Allergen Reactions    Demerol [Meperidine] Unknown (comments)     Causes unconsciousness         Review of Systems   Review of Systems   Constitutional: Negative for activity change and appetite change. HENT: Negative. Eyes: Negative. Respiratory: Negative. Cardiovascular: Negative. Gastrointestinal: Negative for abdominal pain, anal bleeding, constipation, diarrhea, nausea and vomiting. Endocrine: Negative. Genitourinary: Positive for discharge and penile pain. Negative for penile swelling and scrotal swelling. Musculoskeletal: Negative. Skin: Positive for wound. Allergic/Immunologic: Negative. Neurological: Negative. Hematological: Does not bruise/bleed easily. Psychiatric/Behavioral: Negative. Physical Exam   Physical Exam  Constitutional:       Appearance: Normal appearance. HENT:      Head: Normocephalic. Nose: Nose normal.   Eyes:      Extraocular Movements: Extraocular movements intact. Pupils: Pupils are equal, round, and reactive to light. Neck:      Musculoskeletal: Normal range of motion. Cardiovascular:      Rate and Rhythm: Normal rate and regular rhythm. Pulses: Normal pulses.    Pulmonary:      Effort: Pulmonary effort is normal.   Abdominal: General: Abdomen is flat. Bowel sounds are normal. There is no distension. Palpations: Abdomen is soft. Tenderness: There is no abdominal tenderness. Genitourinary:     Comments: Sp penectomy, clotted blood around allen exit from penis. Pants and underwear notably soaked with urine and blood. Allen bag with yellow urine. Musculoskeletal: Normal range of motion. Skin:     General: Skin is warm and dry. Neurological:      General: No focal deficit present. Mental Status: He is alert. Psychiatric:         Mood and Affect: Mood normal.          Diagnostic Study Results     Labs -   No results found for this or any previous visit (from the past 12 hour(s)). Radiologic Studies -   No orders to display     CT Results  (Last 48 hours)    None        CXR Results  (Last 48 hours)    None            Medical Decision Making   I am the first provider for this patient. I reviewed the vital signs, available nursing notes, past medical history, past surgical history, family history and social history. Vital Signs-Reviewed the patient's vital signs. Patient Vitals for the past 12 hrs:   Temp Pulse Resp BP SpO2   09/22/20 1944 98.3 °F (36.8 °C) (!) 105 17 (!) 152/97 97 %       Records Reviewed: Nursing Notes and Old Medical Records    Provider Notes (Medical Decision Making):   DDx: allen complication, penile bleeidng, penile discharge, post-op complication    78U[facility] presents sp penectomy with constant leakage around allen and bleeding. Vitlas are stble, no indication for hemorrhagic shock. Patient otherwise feels fine, mild pain around site. Exam shows well appearing post-surgical changes with yellow urine in bag, clothes wet with urine and blood. Will reach out to Bianca Mar MD to request allen change or other further recs. Spoke with urology on call. Will not tamper with allen placement. Will irrigate to make sure it is patent and give dose of oxybutynin to stop bladder spasm.   Will wrap in ABD pads scrotal support. Pages sent to Dr. Megan Gomez to schedule close follow up in AM.    ED Course and Progress Notes:   Initial assessment performed. The patients presenting problems have been discussed, and they are in agreement with the care plan formulated and outlined with them. I have encouraged them to ask questions as they arise throughout their visit. Diagnosis     Clinical Impression:   1. Complication of Lowery catheter, initial encounter ([facility] [address]        Disposition:  Home    DISCHARGE PLAN:    1. Current Discharge Medication List        2. Follow-up Information     Follow up With Specialties Details Why Contact Info    Ju James MD Urology In 1 day  [address] [facility] [address] [facility] [phone]      [facility] EMERGENCY DEPT Emergency Medicine  If symptoms worsen [address]  [address] [facility]  [phone]        3.  Return to ED if worse         Veena Wilson MD, PGY-2  Emergency Medicine

## 2020-09-23 NOTE — ED NOTES
Pt discharged by Rod Ornelas DO  . Pt provided with discharge instructions Rx and instructions on follow up care. Pt out of ED in stable condition accompanied by family.

## 2020-09-23 NOTE — OP NOTES
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 PROCEDURE PERFORMED:  Partial penectomy. SURGEON:  MD Subhash Middleton. ANESTHESIA:  General.    COMPLICATIONS:  None. SPECIMENS REMOVED:  Distal penis. IMPLANTS:  None. ESTIMATED BLOOD LOSS:  Less than 50 mL. 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 PROCEDURE:  After informed consent, the patient was already on preoperative antibiotics, he was placed on the operating table in the supine position. All pressure points were carefully padded. After adequate induction of general anesthetic, the penis and scrotal area were prepped and draped in sterile fashion. A full time-out was accomplished. We began by placing a tourniquet at the base of the penis and essentially removing the glans penis using the #15 blade. The urethra was left 1 cm protruding from the severed sites to allow for nice spatulation. We then sent the margins for frozen and unfortunately it came back positive, so we then took another 1 cm section of the distal penis, once again sent it for frozen, and this time our margins were negative. We then reapproximated, i.e., closed the corpora using interrupted 2-0 Vicryl sutures, took the tourniquet off, we had good hemostasis. The urethra was spatulated dorsally and after a 1 cm margin of prepuce was excised, we essentially spatulated the meatus creating a meatus to the prepuce using interrupted 4-0 Vicryl suture. We then tennis racketed it, closed with 3-0 Vicryl suture reapproximationg  the prepuce. The meatus was nice and capacious and this was all done with an 18-Icelandic Lowery with 10 mL of water in the bladder.   Hemostasis was achieved, Vaseline gauze placed and a 2-inch Cyndi. Please note that we did do a penile block prior to starting using Marcaine 0.25% plain. He tolerated the procedure well with no complications.       MD VIKAS [email or web address]  D:  09/22/2020 15:35  T:  09/23/2020 0:03  JOB #:  [id number]  CC:  Freya Potts MD       [facility]

## 2020-09-27 NOTE — Clinical Note
Grand Eaton Twin Cruz    Primary Care Physician   Sun Purvis    Stent deployed. Single technique used. First inflation pressure = 15 vinita; inflation time: 44 sec. [facility], Dr. Pompa.     OTHER SURGICAL HISTORY      [id number],SHELLY SHARMA MD

## 2020-10-08 ENCOUNTER — VIRTUAL VISIT (OUTPATIENT)
Dept: FAMILY MEDICINE CLINIC | Age: 80
End: 2020-10-08
Payer: MEDICARE

## 2020-10-08 DIAGNOSIS — K21.9 GASTRO-ESOPHAGEAL REFLUX DISEASE WITHOUT ESOPHAGITIS: ICD-10-CM

## 2020-10-08 DIAGNOSIS — C60.9 PENILE CANCER (HCC): ICD-10-CM

## 2020-10-08 DIAGNOSIS — F51.01 PRIMARY INSOMNIA: Primary | ICD-10-CM

## 2020-10-08 DIAGNOSIS — I10 ESSENTIAL HYPERTENSION: ICD-10-CM

## 2020-10-08 DIAGNOSIS — Z71.89 ADVICE GIVEN ABOUT COVID-19 VIRUS INFECTION: ICD-10-CM

## 2020-10-08 DIAGNOSIS — R41.3 MEMORY DEFICIT: ICD-10-CM

## 2020-10-08 DIAGNOSIS — R35.1 BPH ASSOCIATED WITH NOCTURIA: ICD-10-CM

## 2020-10-08 DIAGNOSIS — N40.1 BPH ASSOCIATED WITH NOCTURIA: ICD-10-CM

## 2020-10-08 PROCEDURE — G8427 DOCREV CUR MEDS BY ELIG CLIN: HCPCS | Performed by: FAMILY MEDICINE

## 2020-10-08 PROCEDURE — 1101F PT FALLS ASSESS-DOCD LE1/YR: CPT | Performed by: FAMILY MEDICINE

## 2020-10-08 PROCEDURE — 99204 OFFICE O/P NEW MOD 45 MIN: CPT | Performed by: FAMILY MEDICINE

## 2020-10-08 PROCEDURE — G8756 NO BP MEASURE DOC: HCPCS | Performed by: FAMILY MEDICINE

## 2020-10-08 PROCEDURE — G8432 DEP SCR NOT DOC, RNG: HCPCS | Performed by: FAMILY MEDICINE

## 2020-10-08 RX ORDER — PANTOPRAZOLE SODIUM 40 MG/1
40 TABLET, DELAYED RELEASE ORAL
Qty: 90 TAB | Refills: 1 | Status: ON HOLD
Start: 2020-10-08 | End: 2022-05-19 | Stop reason: SDUPTHER

## 2020-10-08 RX ORDER — DOXAZOSIN 2 MG/1
2 TABLET ORAL DAILY
Qty: 90 TAB | Refills: 1
Start: 2020-10-08 | End: 2021-03-04 | Stop reason: CLARIF

## 2020-10-08 RX ORDER — CARVEDILOL 12.5 MG/1
25 TABLET ORAL 2 TIMES DAILY WITH MEALS
Qty: 180 TAB | Refills: 1
Start: 2020-10-08 | End: 2020-11-27 | Stop reason: SDUPTHER

## 2020-10-08 RX ORDER — DONEPEZIL HYDROCHLORIDE 5 MG/1
5 TABLET, FILM COATED ORAL
Qty: 90 TAB | Refills: 1 | Status: ON HOLD
Start: 2020-10-08 | End: 2022-05-13

## 2020-10-08 RX ORDER — ZOLPIDEM TARTRATE 5 MG/1
5 TABLET ORAL
Qty: 30 TAB | Refills: 1 | Status: SHIPPED | OUTPATIENT
Start: 2020-10-08 | End: 2021-06-12

## 2020-10-08 RX ORDER — ATORVASTATIN CALCIUM 20 MG/1
20 TABLET, FILM COATED ORAL DAILY
Qty: 90 TAB | Refills: 1
Start: 2020-10-08 | End: 2021-01-01

## 2020-10-08 NOTE — PROGRESS NOTES
HISTORY OF PRESENT ILLNESS  Kenny Molina is a 79 y.o. male. HPI 79years old new patient with past medical history of penile cancer, wears hearing aid hard on hearing, primary insomnia,  controlled hypertension present for follow-up concerning his most recent emergency room visit secondary to penile pain patient has had surgical procedure on the tip of the penis secondary to penile cancer, did not like the other pcp and   Pt's main concerns were provided on virtual visit, a telemed format,  pt is w/ comorbid medical history and unaware of been exposed to covid-19 individual, Pt Have been staying at home for couple of wks,  pt has no fever no cough no dyspnea, no ha, not dizzy, nl smell nl taste, no N/V/D, no body ache. Insomnia with restlessness  Pt presents stating that patient has trouble with restlessness and sleep problem is not suicidal and not homicidal, patient problem is not falling asleep, the problem is staying asleep,  it gets 1-3 Hours Of sleep, then the pt is up for another 1-2 hours, Then  goes back to sleep, patient has been given sleeping aids in the past currently Zero pill count , aware of its side effect, never abused any meds,  patient has tried some over-the-counter sleeping and No over-the-counter medication helped this patient so for,  No hx of sleep apnea, no daily fatigue no trouble with TSH, not an anxious person,     Today blood pressure is at 120/70 has been stable having low-salt diet unfortunately as per the family patient has had abnormal MRI in the past with chronic microvascular changes no recent fall     Current Outpatient Medications   Medication Sig Dispense Refill    docusate sodium (Colace) 100 mg capsule Take 1 Cap by mouth two (2) times a day for 90 days. 60 Cap 2    neomycin-bacitracin-polymyxin (Neosporin, xnd-xur-deeua,) 3.5mg-400 unit- 5,000 unit/gram ointment Apply  to affected area two (2) times a day.  1 Tube 0    donepeziL (ARICEPT) 5 mg tablet Take 5 mg by mouth nightly.  doxazosin (CARDURA) 2 mg tablet Take 2 mg by mouth daily.  ondansetron hcl (Zofran) 4 mg tablet Take 1 Tab by mouth every eight (8) hours as needed for Nausea. 20 Tab 0    pantoprazole (PROTONIX) 40 mg tablet Take 40 mg by mouth ACB/HS.  carvedilol (COREG) 12.5 mg tablet Take 25 mg by mouth two (2) times daily (with meals).  atorvastatin (LIPITOR) 20 mg tablet Take 20 mg by mouth daily. Allergies   Allergen Reactions    Demerol [Meperidine] Unknown (comments)     Causes unconsciousness     Past Medical History:   Diagnosis Date    Arthritis     left arm    Bleeding ulcer 07/2019    BPH (benign prostatic hyperplasia)     CAD (coronary artery disease)     s/p stent    Cancer (HCC)     penile squamous carcinoma    Chronic kidney disease     stage 2     Chronic obstructive pulmonary disease (HCC)     Dementia (HCC)     Elevated LFT's     Essential hypertension 9/24/01    GERD (gastroesophageal reflux disease)     hiatal hernia    Ill-defined condition 12/02/2016    faint    Liver disease     \"fatty liver\" as per patient    Nephrosclerosis     Orthostatic hypotension 9/24/01    PVC's 9/24/01    Sleep apnea     no CPAP    Syncope 9/24/01    secondary to venous insuffiency      Past Surgical History:   Procedure Laterality Date    CARDIAC SURG PROCEDURE UNLIST  05/13/2019    1 stent    COLONOSCOPY N/A 12/13/2016    COLON[facility]ins [facility] at [facility] ENDOSCOPY    COLONOSCOPY N/A 12/11/2019    COLONOSCOPY performed by Eleno Adam MD at [address] [place] [address]; HI RISK IND  12/11/2019         ECHO 2D ADULT  5/24/12    EF 60 - 65%; mild concentric hypertrophy; pulmonary systolic artery pressure upper limits normal    HX ABDOMINAL WALL DEFECT REPAIR  07/01/2019d     Exploratory laparotomy, segmental sigmoid colon resection and primary stapled anastomosis.    [facility] HX HEENT  02/18/2020    Left temporal artery biopsy    HX HERNIA REPAIR  2018    Davinci hiatal hernia repair- Kari Carroll MD    HX OTHER SURGICAL  2020    penile lesion bx    UPPER GI ENDOSCOPY,BIOPSY  2019         UPPER GI ENDOSCOPY,DIAGNOSIS  2019          Family History   Problem Relation Age of Onset    Stroke Mother     Stroke Father     Heart Disease Father      Social History     Tobacco Use    Smoking status: Former Smoker     Packs/day: 3.50     Last attempt to quit: 1980     Years since quittin.8    Smokeless tobacco: Never Used   Substance Use Topics    Alcohol use: No     Comment: no alcohol since       Lab Results   Component Value Date/Time    WBC 7.4 2020 03:07 PM    HGB 14.6 2020 03:07 PM    HCT 44.1 2020 03:07 PM    PLATELET 660  03:07 PM    MCV 90.0 2020 03:07 PM     No results found for: PSA, PSA2, PSAR1, PSA1, PSAR2, PSA3, PSAR3, [id number], [id number], PSALT  No results found for: TSH, TSH2, TSH3, TSHP, TSHELE, TSHEXT, TT3, T3U, T3UP, FRT3, FT3, FT4, FT4P, T4, T4P, FT4T, TT7, TSHEXT      Review of Systems   Constitutional: Negative for chills and fever. HENT: Negative for congestion and nosebleeds. Eyes: Negative for blurred vision and pain. Respiratory: Negative for cough, shortness of breath and wheezing. Cardiovascular: Negative for chest pain and leg swelling. Gastrointestinal: Negative for constipation, diarrhea, nausea and vomiting. Genitourinary: Negative for dysuria and frequency. Musculoskeletal: Negative for joint pain and myalgias. Skin: Negative for itching and rash. Neurological: Negative for dizziness, loss of consciousness and headaches. Psychiatric/Behavioral: Negative for depression. The patient is not nervous/anxious and does not have insomnia. Physical Exam  Constitutional:       Appearance: Normal appearance. HENT:      Head: Normocephalic and atraumatic. Nose: Nose normal. No congestion. Neurological:      Mental Status: He is alert and oriented to person, place, and time. Psychiatric:         Mood and Affect: Mood normal.         Behavior: Behavior normal.         Thought Content: Thought content normal.         Judgment: Judgment normal.         ASSESSMENT and PLAN  Diagnoses and all orders for this visit:    1. Primary insomnia  -     zolpidem (AMBIEN) 5 mg tablet; Take 1 Tab by mouth nightly as needed for Sleep. Max Daily Amount: 5 mg. 2. Penile cancer (HCC)  -     doxazosin (CARDURA) 2 mg tablet; Take 1 Tab by mouth daily. 3. Essential hypertension  -     atorvastatin (LIPITOR) 20 mg tablet; Take 1 Tab by mouth daily. -     carvediloL (COREG) 12.5 mg tablet; Take 2 Tabs by mouth two (2) times daily (with meals). 4. BPH associated with nocturia  -     doxazosin (CARDURA) 2 mg tablet; Take 1 Tab by mouth daily. 5. Memory deficit  -     donepeziL (ARICEPT) 5 mg tablet; Take 1 Tab by mouth nightly. 6. Gastro-esophageal reflux disease without esophagitis  -     zolpidem (AMBIEN) 5 mg tablet; Take 1 Tab by mouth nightly as needed for Sleep. Max Daily Amount: 5 mg. 7. Advice given about COVID-19 virus infection    Other orders  -     pantoprazole (PROTONIX) 40 mg tablet; Take 1 Tab by mouth ACB/HS.             Patient was told that the medication is not safe was told not to operate any machinery while taking the medication meanwhile emphasized that the pt should take the medication as needed not on a regular basis avoid alcohol intake and avoid other medication that could potentiate its effect, regardless patient was told any other medication given by any other doctor the pt need to call primary care for further advice` patient acknowledged understanding and agreed with today's recommendation     Patient advised to have the mask on most of the time, social distance and handwashing avoid crowded area pursuant to the emergency declaration under the [employer] and [employer] Emergencies Act, 1135 waiver authority and the Coronavirus Preparedness and Response Supplemental Appropriations Act, this Virtual Visit was conducted, with patient's consent, to reduce the patient's risk of exposure to COVID-19 and provide continuity of care for an established patient  Services were provided through a Video synchronous discussion virtually to substitute for in-person appointment.

## 2020-10-30 ENCOUNTER — OFFICE VISIT (OUTPATIENT)
Dept: HEMATOLOGY | Age: 80
End: 2020-10-30
Payer: MEDICARE

## 2020-10-30 VITALS
BODY MASS INDEX: 27.52 KG/M2 | SYSTOLIC BLOOD PRESSURE: 124 MMHG | HEART RATE: 77 BPM | RESPIRATION RATE: 18 BRPM | HEIGHT: 64 IN | DIASTOLIC BLOOD PRESSURE: 66 MMHG | TEMPERATURE: 97.4 F | OXYGEN SATURATION: 98 % | WEIGHT: 161.2 LBS

## 2020-10-30 DIAGNOSIS — R74.8 ELEVATED LIVER ENZYMES: Primary | ICD-10-CM

## 2020-10-30 PROCEDURE — 1101F PT FALLS ASSESS-DOCD LE1/YR: CPT | Performed by: INTERNAL MEDICINE

## 2020-10-30 PROCEDURE — G8427 DOCREV CUR MEDS BY ELIG CLIN: HCPCS | Performed by: INTERNAL MEDICINE

## 2020-10-30 PROCEDURE — 99204 OFFICE O/P NEW MOD 45 MIN: CPT | Performed by: INTERNAL MEDICINE

## 2020-10-30 PROCEDURE — G8754 DIAS BP LESS 90: HCPCS | Performed by: INTERNAL MEDICINE

## 2020-10-30 PROCEDURE — G8510 SCR DEP NEG, NO PLAN REQD: HCPCS | Performed by: INTERNAL MEDICINE

## 2020-10-30 PROCEDURE — G8536 NO DOC ELDER MAL SCRN: HCPCS | Performed by: INTERNAL MEDICINE

## 2020-10-30 PROCEDURE — G8752 SYS BP LESS 140: HCPCS | Performed by: INTERNAL MEDICINE

## 2020-10-30 PROCEDURE — G8419 CALC BMI OUT NRM PARAM NOF/U: HCPCS | Performed by: INTERNAL MEDICINE

## 2020-10-30 RX ORDER — FLUTICASONE PROPIONATE 50 MCG
2 SPRAY, SUSPENSION (ML) NASAL
Status: ON HOLD | COMMUNITY
End: 2022-05-13

## 2020-10-30 RX ORDER — ASCORBIC ACID 500 MG
TABLET ORAL
Status: ON HOLD | COMMUNITY
End: 2022-05-13

## 2020-10-30 RX ORDER — LANOLIN ALCOHOL/MO/W.PET/CERES
CREAM (GRAM) TOPICAL
COMMUNITY
End: 2021-03-04 | Stop reason: CLARIF

## 2020-10-30 NOTE — PROGRESS NOTES
Identified pt with two pt identifiers(name and ). Reviewed record in preparation for visit and have obtained necessary documentation. No chief complaint on file. Vitals:    10/30/20 1158   Weight: 161 lb 3.2 oz (73.1 kg)   Height: 5' 4\" (1.626 m)       Health Maintenance Review: Patient reminded of \"due or due soon\" health maintenance. I have asked the patient to contact his/her primary care provider (PCP) for follow-up on his/her health maintenance. Coordination of Care Questionnaire:  :   1) Have you been to an emergency room, urgent care, or hospitalized since your last visit? If yes, where when, and reason for visit? Yes, [facility] 2020 for    penile bleeding and bag was leaking    2. Have seen or consulted any other health care provider since your last visit? If yes, where when, and reason for visit? YES, Cardiologist, Dr Brook Arnett 2020      Patient is accompanied by self I have received verbal consent from Kendra Drivers to discuss any/all medical information while they are present in the room.

## 2020-10-30 NOTE — Clinical Note
11/1/20 Patient: Asia Ferrer YOB: 1940 Date of Visit: 10/30/2020 Allie Cruz MD 
[address] [address] VIA Facsimile: [phone] Karishma Reed MD 
[address] [address] [address] [address] VIA Facsimile: [phone] Dear MD Karishma Nelson MD, Thank you for referring Mr. Davide Pierre to [address] [place] [address] Ryne oNe for evaluation. My notes for this consultation are attached. If you have questions, please do not hesitate to call me. I look forward to following your patient along with you. Sincerely, Antonio Mckeon MD

## 2020-10-30 NOTE — PROGRESS NOTES
[facility] [address]      Kobi García MD, Joy Nye, Lorena Shah MD, MPH      Sherron Mckeon, PA-CECILIA Horta, [facility]     Lillian Gonzalez, [facility]   Cecelia Devlin, [facility]    Roque Orozco, [facility]       Frida Moses [address]    at [address], [address] [place] [address], [facility].    [phone]    FAX: [address] [place] [address], [address] [facility] [address]    [phone]    FAX: [phone]           Patient Care Team:  Richa Dunn MD as PCP - General (Internal Medicine)  Osmany Sherman MD as PCP - [facility]  Pam Gillis MD (Pulmonary Disease)  Greg Wells MD as Surgeon (General Surgery)  Greg Wells MD (General Surgery)  Ronel Odell MD as Surgeon (General Surgery)  Doug Guevara MD as Surgeon (General Surgery)      Problem List  Date Reviewed: 11/1/2020          Codes Class Noted    Autoimmune hepatitis [facility]) ICD-10-CM: K75.4  ICD-9-CM: 571.42  11/1/2020        History of partial colectomy ICD-10-CM: Z90.49  ICD-9-CM: V45.72  11/1/2020        Diverticulitis ICD-10-CM: K57.92  ICD-9-CM: 562.11  11/1/2020        Penile cancer ([facility] [address] ICD-10-CM: C60.9  ICD-9-CM: 187.4  8/18/2020        Bilateral carotid artery stenosis ICD-10-CM: I65.23  ICD-9-CM: 433.10, 433.30  10/29/2019        GERD (gastroesophageal reflux disease) ICD-10-CM: K21.9  ICD-9-CM: 530.81  8/29/2018        Hyperlipidemia ICD-10-CM: E78.5  ICD-9-CM: 272.4  4/5/2012        Orthostatic hypotension (Chronic) ICD-10-CM: I95.1  ICD-9-CM: 458.0  Unknown    Overview Signed 12/22/2010 10:14 AM by Echo Segundo     Positive tilt test 10/01             PVC (premature ventricular contraction) ICD-10-CM: I49.3  ICD-9-CM: 427.69  12/21/2010    Overview Addendum 12/21/2010 11:09 AM by Chad Pritchard LPN     0/0/[id number] PVC with periods of bigeminy. Hypertension ICD-10-CM: I10  ICD-9-CM: 401.9  12/6/2010    Overview Addendum 8/22/2011  2:08 PM by Chad Pritchard LPN     4/67/2642-YSBW 60-65%. Normal.  7/23/2009-ECHO-LVEF 60%. Normal.             TIA (transient ischemic attack) ICD-10-CM: G45.9  ICD-9-CM: 435.9  12/6/2010        Leg edema ICD-10-CM: R60.0  ICD-9-CM: 782.3  12/6/2010                The clinicians listed above have asked me to see Otto Frost in consultation regarding autoimmune hepatitis. All medical records sent by the referring physicians were reviewed including imaging studies and pathology. The patient is a 79 y.o.  male who was found to have abnormalities in liver transaminases in 2016. Serologic evaluation for markers of chronic liver disease has either not been performed or the results are not available. The most recent imaging of the liver was CT performed in 8/2020. Results suggest that the liver is normal.      A liver biopsy was performed in 2016. This demonstrated active hepatitis with stage 2 fibrosis. The patient has not been treated with any medications to date. The patient has the following symptoms which are thought to be due to the liver disease:  fatigue,     The patient is not currently experiencing the following symptoms of liver disease:  Problems concentrating, swelling of the abdomen, swelling of the lower extremities, hematemesis, hematochezia. The patient has Moderate limitations in functional activities which can be attributed to other medical problems that are not related to the liver disease. ASSESSMENT AND PLAN:  Autoimmune Hepatitis   The diagnosis was based upon liver biopsy in 2016  I do not have any serologic studies from back then. A liver biopsy performed in 2016 shows moderate inflammation stage 2 septal fibrosis.       The patient is not currently receiving treatment for the liver disease. AST is elevated. ALT is normal.  ALP is elevated. Liver function is normal.  The platelet count is   normal.      Based upon laboratory studies and imaging the patient does not appear to have advanced liver disease. Will perform laboratory testing to monitor liver function and degree of liver injury. This will include BMP,   hepatic panel, CBC with platelet count,     Will perform serologic and virologic studies to assess for other causes of chronic liver disease. The patient had a liver biopsy performed in 2016 at [facility]. Will request that the liver biopsy slides sent be to me for my personal review. The need to perform an assessment of liver fibrosis was discussed with the patient. The Fibroscan can assess liver fibrosis and determine if a patient has advanced fibrosis or cirrhosis without the need for liver biopsy. This will be performed at the next office visit. The Fibroscan can be repeated annually or as often as clinically indicated to assess for fibrosis progression and/or regression. Screening for Hepatocellular Carcinoma  HCC screening is not necessary if the patient has no evidence of cirrhosis. Treatment of other medical problems in patients with chronic liver disease  There are no contraindications for the patient to take most medications that are necessary for treatment of other medical issues. Counseling for alcohol in patients with chronic liver disease  The patient was counseled regarding alcohol consumption and the effect of alcohol on chronic liver disease. The patient does not consume any significant amount of alcohol. Vaccinations   The need for vaccination against viral hepatitis A and B will be assessed with serologic and instituted as appropriate. Routine vaccinations against other bacterial and viral agents can be performed as indicated.   Annual flu vaccination should be administered if indicated. ALLERGIES  Allergies   Allergen Reactions    Demerol [Meperidine] Unknown (comments)     Causes unconsciousness       MEDICATIONS  Current Outpatient Medications   Medication Sig    ascorbic acid, vitamin C, (Vitamin C) 500 mg tablet Take  by mouth.  fluticasone propionate (FLONASE) 50 mcg/actuation nasal spray 2 Sprays by Both Nostrils route daily.  psyllium husk (METAMUCIL PO) Take  by mouth.  ferrous sulfate (FerrouSul) 325 mg (65 mg iron) tablet Take  by mouth Daily (before breakfast).  doxazosin (CARDURA) 2 mg tablet Take 1 Tab by mouth daily.  atorvastatin (LIPITOR) 20 mg tablet Take 1 Tab by mouth daily. (Patient taking differently: Take 40 mg by mouth daily.)    carvediloL (COREG) 12.5 mg tablet Take 2 Tabs by mouth two (2) times daily (with meals).  donepeziL (ARICEPT) 5 mg tablet Take 1 Tab by mouth nightly.  zolpidem (AMBIEN) 5 mg tablet Take 1 Tab by mouth nightly as needed for Sleep. Max Daily Amount: 5 mg.  pantoprazole (PROTONIX) 40 mg tablet Take 1 Tab by mouth ACB/HS.  docusate sodium (Colace) 100 mg capsule Take 1 Cap by mouth two (2) times a day for 90 days.  neomycin-bacitracin-polymyxin (Neosporin, [id number],) 3.5mg-400 unit- 5,000 unit/gram ointment Apply  to affected area two (2) times a day.  ondansetron hcl (Zofran) 4 mg tablet Take 1 Tab by mouth every eight (8) hours as needed for Nausea. (Patient taking differently: Take 8 mg by mouth two (2) times a day.)     No current facility-administered medications for this visit. SYSTEM REVIEW NOT RELATED TO LIVER DISEASE OR REVIEWED ABOVE:  Constitution systems: Negative for fever, chills, weight gain, weight loss. Eyes: Negative for visual changes. ENT: Negative for sore throat, painful swallowing. Respiratory: Negative for cough, hemoptysis, SOB. Cardiology: Negative for chest pain, palpitations. GI:  Negative for constipation or diarrhea.   : Negative for urinary frequency, dysuria, hematuria, nocturia. Skin: Negative for rash. Hematology: Negative for easy bruising, blood clots. Musculo-skelatal: Claudication when walking. Neurologic: Negative for headaches, dizziness, vertigo, memory problems not related to HE. Psychology: Negative for anxiety, depression. FAMILY HISTORY:  The father  of CVA. The mother  of CVA. There is no family history of liver disease. SOCIAL HISTORY:  The patient is . The patient has 2 children, and 2 grandchildren. The patient has never used tobacco products. The patient has never consumed significant amounts of alcohol. The patient used to work as . PHYSICAL EXAMINATION:  Visit Vitals  /66 (BP 1 Location: Left arm, BP Patient Position: Sitting)   Pulse 77   Temp 97.4 °F (36.3 °C)   Resp 18   Ht 5' 4\" (1.626 m)   Wt 161 lb 3.2 oz (73.1 kg)   SpO2 98%   BMI 27.67 kg/m²     General: No acute distress. Eyes: Sclera anicteric. ENT: No oral lesions. Thyroid normal.  Nodes: No adenopathy. Skin: No spider angiomata. No jaundice. No palmar erythema. Respiratory: Lungs clear to auscultation. Cardiovascular: Regular heart rate. No murmurs. No JVD. Abdomen: Soft non-tender. Liver size normal to percussion/palpation. Spleen not palpable. No obvious ascites. Extremities: No edema. No muscle wasting. No gross arthritic changes. Neurologic: Alert and oriented. Cranial nerves grossly intact. No asterixis.     LABORATORY STUDIES:  Liver [place] of [address] & Units 2020   WBC 4.1 - 11.1 K/uL 3.8 (L)   ANC 1.8 - 8.0 K/UL 2.4   HGB 12.1 - 17.0 g/dL 14.8    - 400 K/uL 179   INR 0.9 - 1.1      AST 15 - 37 U/L 55 (H)   ALT 12 - 78 U/L 71   Alk Phos 45 - 117 U/L 272 (H)   Bili, Total 0.2 - 1.0 MG/DL 0.8   Albumin 3.5 - 5.0 g/dL 3.5   BUN 6 - 20 MG/DL 9   Creat 0.70 - 1.30 MG/DL 1.28   Na 136 - 145 mmol/L 136   K 3.5 - 5.1 mmol/L 4.1   Cl 97 - 108 mmol/L 104   CO2 21 - 32 mmol/L 25   Glucose 65 - 100 mg/dL 121 (H)     SEROLOGIES:  Not available or performed. Testing will be performed as indicated. LIVER HISTOLOGY:  10/2016. Chronic hepatitis with moderate activity. Stage 2 fibrosis. Consistent [facility] autoimmune etiology. ENDOSCOPIC PROCEDURES:  12/2016. Colonoscopy by Dr Shannon Connors. Adenoma polyps. 12/2019. EGD by Dr Abbie Yan. Esophagitis. RADIOLOGY:  Not available or performed    OTHER TESTING:  Not available or performed    FOLLOW-UP:  All of the issues listed above in the Assessment and Plan were discussed with the patient. All questions were answered. The patient expressed a clear understanding of the above. [address] [place] [address] in 4 weeks for Fibroscan to review all data and determine the treatment plan.       Boni Damon MD  [address].  [phone]  [address] [place] [address]

## 2020-10-31 LAB
A1AT SERPL-MCNC: 154 MG/DL (ref 101–187)
ACTIN IGG SERPL-ACNC: 95 UNITS (ref 0–19)
ALBUMIN SERPL-MCNC: 4.1 G/DL (ref 3.7–4.7)
ALP SERPL-CCNC: 298 IU/L (ref 39–117)
ALT SERPL-CCNC: 78 IU/L (ref 0–44)
AST SERPL-CCNC: 55 IU/L (ref 0–40)
BASOPHILS # BLD AUTO: 0.1 X10E3/UL (ref 0–0.2)
BASOPHILS NFR BLD AUTO: 2 %
BILIRUB DIRECT SERPL-MCNC: 0.49 MG/DL (ref 0–0.4)
BILIRUB SERPL-MCNC: 1 MG/DL (ref 0–1.2)
BUN SERPL-MCNC: 18 MG/DL (ref 8–27)
BUN/CREAT SERPL: 23 (ref 10–24)
CALCIUM SERPL-MCNC: 9.3 MG/DL (ref 8.6–10.2)
CHLORIDE SERPL-SCNC: 105 MMOL/L (ref 96–106)
CO2 SERPL-SCNC: 22 MMOL/L (ref 20–29)
COMMENT, 144067: NORMAL
CREAT SERPL-MCNC: 0.8 MG/DL (ref 0.76–1.27)
EOSINOPHIL # BLD AUTO: 0.1 X10E3/UL (ref 0–0.4)
EOSINOPHIL NFR BLD AUTO: 3 %
ERYTHROCYTE [DISTWIDTH] IN BLOOD BY AUTOMATED COUNT: 13.4 % (ref 11.6–15.4)
FERRITIN SERPL-MCNC: 475 NG/ML (ref 30–400)
GLUCOSE SERPL-MCNC: 113 MG/DL (ref 65–99)
HAV AB SER QL IA: NEGATIVE
HBV CORE AB SERPL QL IA: NEGATIVE
HBV SURFACE AB SER QL: NON REACTIVE
HBV SURFACE AG SERPL QL IA: NEGATIVE
HCT VFR BLD AUTO: 41.9 % (ref 37.5–51)
HCV AB S/CO SERPL IA: <0.1 S/CO RATIO (ref 0–0.9)
HGB BLD-MCNC: 14.5 G/DL (ref 13–17.7)
IMM GRANULOCYTES # BLD AUTO: 0 X10E3/UL (ref 0–0.1)
IMM GRANULOCYTES NFR BLD AUTO: 1 %
INR PPP: 1 (ref 0.9–1.2)
IRON SATN MFR SERPL: 40 % (ref 15–55)
IRON SERPL-MCNC: 100 UG/DL (ref 38–169)
LYMPHOCYTES # BLD AUTO: 0.9 X10E3/UL (ref 0.7–3.1)
LYMPHOCYTES NFR BLD AUTO: 23 %
MCH RBC QN AUTO: 30.3 PG (ref 26.6–33)
MCHC RBC AUTO-ENTMCNC: 34.6 G/DL (ref 31.5–35.7)
MCV RBC AUTO: 88 FL (ref 79–97)
MONOCYTES # BLD AUTO: 0.5 X10E3/UL (ref 0.1–0.9)
MONOCYTES NFR BLD AUTO: 14 %
NEUTROPHILS # BLD AUTO: 2.1 X10E3/UL (ref 1.4–7)
NEUTROPHILS NFR BLD AUTO: 57 %
PLATELET # BLD AUTO: 196 X10E3/UL (ref 150–450)
POTASSIUM SERPL-SCNC: 4.1 MMOL/L (ref 3.5–5.2)
PROT SERPL-MCNC: 7.4 G/DL (ref 6–8.5)
PROTHROMBIN TIME: 11.1 SEC (ref 9.1–12)
RBC # BLD AUTO: 4.78 X10E6/UL (ref 4.14–5.8)
SODIUM SERPL-SCNC: 139 MMOL/L (ref 134–144)
TIBC SERPL-MCNC: 251 UG/DL (ref 250–450)
UIBC SERPL-MCNC: 151 UG/DL (ref 111–343)
WBC # BLD AUTO: 3.6 X10E3/UL (ref 3.4–10.8)

## 2020-11-01 PROBLEM — K75.4 AUTOIMMUNE HEPATITIS (HCC): Status: ACTIVE | Noted: 2020-11-01

## 2020-11-01 PROBLEM — Z90.49 HISTORY OF PARTIAL COLECTOMY: Status: ACTIVE | Noted: 2020-11-01

## 2020-11-01 PROBLEM — K57.92 DIVERTICULITIS: Status: ACTIVE | Noted: 2020-11-01

## 2020-11-01 PROBLEM — C60.9 PENILE CANCER (HCC): Status: ACTIVE | Noted: 2020-08-18

## 2020-11-04 LAB — ANA TITR SER IF: NEGATIVE {TITER}

## 2020-11-27 ENCOUNTER — OFFICE VISIT (OUTPATIENT)
Dept: HEMATOLOGY | Age: 80
End: 2020-11-27
Payer: MEDICARE

## 2020-11-27 VITALS
DIASTOLIC BLOOD PRESSURE: 68 MMHG | HEART RATE: 88 BPM | HEIGHT: 64 IN | SYSTOLIC BLOOD PRESSURE: 121 MMHG | RESPIRATION RATE: 16 BRPM | WEIGHT: 160.2 LBS | BODY MASS INDEX: 27.35 KG/M2 | TEMPERATURE: 97.8 F | OXYGEN SATURATION: 99 %

## 2020-11-27 DIAGNOSIS — R74.8 ELEVATED LIVER ENZYMES: Primary | ICD-10-CM

## 2020-11-27 DIAGNOSIS — I10 ESSENTIAL HYPERTENSION: ICD-10-CM

## 2020-11-27 PROCEDURE — G8752 SYS BP LESS 140: HCPCS | Performed by: INTERNAL MEDICINE

## 2020-11-27 PROCEDURE — G8419 CALC BMI OUT NRM PARAM NOF/U: HCPCS | Performed by: INTERNAL MEDICINE

## 2020-11-27 PROCEDURE — 1101F PT FALLS ASSESS-DOCD LE1/YR: CPT | Performed by: INTERNAL MEDICINE

## 2020-11-27 PROCEDURE — 91200 LIVER ELASTOGRAPHY: CPT | Performed by: INTERNAL MEDICINE

## 2020-11-27 PROCEDURE — G8427 DOCREV CUR MEDS BY ELIG CLIN: HCPCS | Performed by: INTERNAL MEDICINE

## 2020-11-27 PROCEDURE — G8510 SCR DEP NEG, NO PLAN REQD: HCPCS | Performed by: INTERNAL MEDICINE

## 2020-11-27 PROCEDURE — G8536 NO DOC ELDER MAL SCRN: HCPCS | Performed by: INTERNAL MEDICINE

## 2020-11-27 PROCEDURE — G8754 DIAS BP LESS 90: HCPCS | Performed by: INTERNAL MEDICINE

## 2020-11-27 PROCEDURE — 99214 OFFICE O/P EST MOD 30 MIN: CPT | Performed by: INTERNAL MEDICINE

## 2020-11-27 RX ORDER — CARVEDILOL 12.5 MG/1
25 TABLET ORAL 2 TIMES DAILY WITH MEALS
Qty: 180 TAB | Refills: 1 | Status: SHIPPED | OUTPATIENT
Start: 2020-11-27

## 2020-11-27 NOTE — PROGRESS NOTES
Zenia Varghese [address] [facility]      Boni Damon MD, Kasey Mohan, Glenda Neri MD, MPH      Yumiko Virgen, PA-CECILIA Tello, ACNP-BC     April S Carlos, [facility]   Mode Christensen, FNP-C    Lin Sifuentes, [facility]-BC       Frida Moses Shalom De Guzman 136    at [address], [address] [place] [address], [facility] 22.    [phone]    FAX: [address] [place] [address], [address]    [phone]    FAX: [phone]       Patient Care Team:  Ermelinda Razo MD as PCP - General (Internal Medicine)  Arron Soto MD as PCP - [facility] Provider  Kim Cast MD (Pulmonary Disease)  Rosa Maria Zaragoza MD as Surgeon (General Surgery)  Rosa Maria Zaragoza MD (General Surgery)  Payam Sloan MD as Surgeon (General Surgery)  Salomón Vaca MD as Surgeon (General Surgery)      Problem List  Date Reviewed: 11/1/2020          Codes Class Noted    Autoimmune hepatitis [facility]) ICD-10-CM: K75.4  ICD-9-CM: 571.42  11/1/2020        History of partial colectomy ICD-10-CM: Z90.49  ICD-9-CM: V45.72  11/1/2020        Diverticulitis ICD-10-CM: K57.92  ICD-9-CM: 562.11  11/1/2020        Penile cancer ([facility] ICD-10-CM: C60.9  ICD-9-CM: 187.4  8/18/2020        Bilateral carotid artery stenosis ICD-10-CM: I65.23  ICD-9-CM: 433.10, 433.30  10/29/2019        GERD (gastroesophageal reflux disease) ICD-10-CM: K21.9  ICD-9-CM: 530.81  8/29/2018        Hyperlipidemia ICD-10-CM: E78.5  ICD-9-CM: 272.4  4/5/2012        Orthostatic hypotension (Chronic) ICD-10-CM: I95.1  ICD-9-CM: 458.0  Unknown    Overview Signed 12/22/2010 10:14 AM by Sera Weeks     Positive tilt test 10/01             PVC (premature ventricular contraction) ICD-10-CM: I49.3  ICD-9-CM: 427.69  12/21/2010    Overview Addendum 12/21/2010 11:09 AM by Kevin Power LPN     9/9/[id number]-GWIE PVC with periods of bigeminy. Hypertension ICD-10-CM: I10  ICD-9-CM: 401.9  12/6/2010    Overview Addendum 8/22/2011  2:08 PM by Kevin Power LPN     1/05/5821-QYNO 60-65%. Normal.  7/23/2009-ECHO-LVEF 60%. Normal.             TIA (transient ischemic attack) ICD-10-CM: G45.9  ICD-9-CM: 435.9  12/6/2010        Leg edema ICD-10-CM: R60.0  ICD-9-CM: 782.3  12/6/2010              Felisa Taylor is being seen at [facility] for management of autoimmune hepatitis. The active problem list, all pertinent past medical history, medications, liver histology, radiologic findings and laboratory findings related to the liver disorder were reviewed with the patient. The patient is a 79 y.o.  male who was found to have abnormalities in liver transaminases in 2016. Serologic evaluation for markers of chronic liver disease has positive for ASMA. The most recent imaging of the liver was CT performed in 8/2020. Results suggest that the liver is normal.      A liver biopsy was performed in 2016. This demonstrated active hepatitis with stage 2 fibrosis. The patient has not been treated with any medications to date. Assessment of liver fibrosis with Fibroscan was performed in the office today. 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 The patient has Moderate limitations in functional activities which can be attributed to other medical problems that are not related to the liver disease.       ASSESSMENT AND PLAN:  Autoimmune Hepatitis   The diagnosis was based upon serology, liver biopsy     A liver biopsy performed in 2016 is reported to show moderate inflammation and stage 2 septal fibrosis. Fibroscan performed in 11/2020 was 13.6 kPa suggesting stage 3 fibrosis    The patient is not currently receiving treatment for the liver disease. Liver transaminases are elevated. ALP is elevated. Liver function is normal.  The platelet count is normal.      Based upon laboratory studies Fibroscan, and imaging  the patient appears to have   significant liver injury. Will perform imaging of the liver with MRI and MRCP because of persistent elevation in ALP and possible bile duct disease. If the MRI does not show PSC will consider liver biopsy to confirm there is significant fibrosis prior to treating with immune suppression in a 79year old. Screening for Hepatocellular Carcinoma  HCC screening is not necessary if the patient has no evidence of cirrhosis. Treatment of other medical problems in patients with chronic liver disease  There are no contraindications for the patient to take most medications that are necessary for treatment of other medical issues. Counseling for alcohol in patients with chronic liver disease  The patient was counseled regarding alcohol consumption and the effect of alcohol on chronic liver disease. The patient does not consume any significant amount of alcohol. Vaccinations   The need for vaccination against viral hepatitis A and B will be assessed with serologic and instituted as appropriate. Routine vaccinations against other bacterial and viral agents can be performed as indicated. Annual flu vaccination should be administered if indicated.       ALLERGIES  Allergies   Allergen Reactions    Ace Inhibitors Other (comments)     Doesn't agree with pt    Demerol [Meperidine] Unknown (comments)     Causes unconsciousness       MEDICATIONS  Current Outpatient Medications   Medication Sig    carvediloL (COREG) 12.5 mg tablet Take 2 Tabs by mouth two (2) times daily (with meals).  ascorbic acid, vitamin C, (Vitamin C) 500 mg tablet Take  by mouth.  fluticasone propionate (FLONASE) 50 mcg/actuation nasal spray 2 Sprays by Both Nostrils route daily.  psyllium husk (METAMUCIL PO) Take  by mouth.  ferrous sulfate (FerrouSul) 325 mg (65 mg iron) tablet Take  by mouth Daily (before breakfast).  doxazosin (CARDURA) 2 mg tablet Take 1 Tab by mouth daily.  atorvastatin (LIPITOR) 20 mg tablet Take 1 Tab by mouth daily. (Patient taking differently: Take 40 mg by mouth daily.)    donepeziL (ARICEPT) 5 mg tablet Take 1 Tab by mouth nightly.  zolpidem (AMBIEN) 5 mg tablet Take 1 Tab by mouth nightly as needed for Sleep. Max Daily Amount: 5 mg.  pantoprazole (PROTONIX) 40 mg tablet Take 1 Tab by mouth ACB/HS.  docusate sodium (Colace) 100 mg capsule Take 1 Cap by mouth two (2) times a day for 90 days.  ondansetron hcl (Zofran) 4 mg tablet Take 1 Tab by mouth every eight (8) hours as needed for Nausea. (Patient taking differently: Take 8 mg by mouth two (2) times a day.)    neomycin-bacitracin-polymyxin (Neosporin, [id number],) 3.5mg-400 unit- 5,000 unit/gram ointment Apply  to affected area two (2) times a day. No current facility-administered medications for this visit. SYSTEM REVIEW NOT RELATED TO LIVER DISEASE OR REVIEWED ABOVE:  Constitution systems: Negative for fever, chills, weight gain, weight loss. Eyes: Negative for visual changes. ENT: Negative for sore throat, painful swallowing. Respiratory: Negative for cough, hemoptysis, SOB. Cardiology: Negative for chest pain, palpitations. GI:  Negative for constipation or diarrhea. : Negative for urinary frequency, dysuria, hematuria, nocturia. Skin: Negative for rash. Hematology: Negative for easy bruising, blood clots. Musculo-skelatal: Claudication when walking.     Neurologic: Negative for headaches, dizziness, vertigo, memory problems not related to HE.  Psychology: Negative for anxiety, depression. FAMILY HISTORY:  The father  of CVA. The mother  of CVA. There is no family history of liver disease. SOCIAL HISTORY:  The patient is . The patient has 2 children, and 2 grandchildren. The patient has never used tobacco products. The patient has never consumed significant amounts of alcohol. The patient used to work as . PHYSICAL EXAMINATION:  Visit Vitals  /68 (BP 1 Location: Right arm, BP Patient Position: Sitting)   Pulse 88   Temp 97.8 °F (36.6 °C) (Temporal)   Resp 16   Ht 5' 4\" (1.626 m)   Wt 160 lb 3.2 oz (72.7 kg)   SpO2 99%   BMI 27.50 kg/m²     General: No acute distress. Eyes: Sclera anicteric. ENT: No oral lesions. Thyroid normal.  Nodes: No adenopathy. Skin: No spider angiomata. No jaundice. No palmar erythema. Respiratory: Lungs clear to auscultation. Cardiovascular: Regular heart rate. No murmurs. No JVD. Abdomen: Soft non-tender. Liver size normal to percussion/palpation. Spleen not palpable. No obvious ascites. Extremities: No edema. No muscle wasting. No gross arthritic changes. Neurologic: Alert and oriented. Cranial nerves grossly intact. No asterixis.     LABORATORY STUDIES:  Liver [place] of [address] [place] [address] 10/30/2020 2020   WBC 3.4 - 10.8 x10E3/uL 3.6 7.4   ANC 1.4 - 7.0 x10E3/uL 2.1    HGB 13.0 - 17.7 g/dL 14.5 14.6    - 450 x10E3/uL 196 192   INR 0.9 - 1.2 1.0    AST 0 - 40 IU/L 55 (H)    ALT 0 - 44 IU/L 78 (H)    Alk Phos 39 - 117 IU/L 298 (H)    Bili, Total 0.0 - 1.2 mg/dL 1.0    Bili, Direct 0.00 - 0.40 mg/dL 0.49 (H)    Albumin 3.7 - 4.7 g/dL 4.1    BUN 8 - 27 mg/dL 18 17   Creat 0.76 - 1.27 mg/dL 0.80 1.05   Na 134 - 144 mmol/L 139 136   K 3.5 - 5.2 mmol/L 4.1 3.8   Cl 96 - 106 mmol/L 105 106   CO2 20 - 29 mmol/L 22 28   Glucose 65 - 99 mg/dL 113 (H) 102 (H)     SEROLOGIES:  Serologies Latest Ref Rng & Units 10/30/2020   Hep A Ab, Total Negative Negative   Hep B Surface Ag Negative Negative   Hep B Core Ab, Total Negative Negative   Hep B Surface AB QL  Non Reactive   Hep C Ab 0.0 - 0.9 s/co ratio <0.1   Ferritin 30 - 400 ng/mL 475 (H)   Iron % Saturation 15 - 55 % 40   CYRIL, IFA  Negative   ASMCA 0 - 19 Units 95 (H)   Alpha-1 antitrypsin level 101 - 187 mg/dL 154     LIVER HISTOLOGY:  10/2016. Chronic hepatitis with moderate activity. Stage 2 fibrosis. Consistent wih autoimmune etiology. 11/2020. FibroScan performed at [address]. EkPa was 13.6. IQR/med 32%. . The results suggested a fibrosis level of F3. The CAP score suggests no evidence of fatty liver. ENDOSCOPIC PROCEDURES:  12/2016. Colonoscopy by Dr Valentin Harris. Adenoma polyps. 12/2019. EGD by Dr Yaya Mcelroy. Esophagitis. RADIOLOGY:  Not available or performed    OTHER TESTING:  Not available or performed    FOLLOW-UP:  All of the issues listed above in the Assessment and Plan were discussed with the patient. All questions were answered. The patient expressed a clear understanding of the above. [address] [place] [address] in 4 weeks to review results of MRI and determine the treatment plan.       Pina Elizalde MD  [address], [address] [place] [address] [place] [facility].  [phone]  [address] [facility]

## 2020-11-27 NOTE — TELEPHONE ENCOUNTER
Last visit: 10/08/20 (virtual visit)  Next visit: not scheduled  Previous refill 10/8/20(180+1R)    Patient would also like carvedilol called into [facility]. States Castro Nicely should be sending request.     Requested Prescriptions     Pending Prescriptions Disp Refills    carvediloL (COREG) 12.5 mg tablet 180 Tab 1     Sig: Take 2 Tabs by mouth two (2) times daily (with meals).        Thanks,  Kobe Cooper

## 2020-11-27 NOTE — Clinical Note
12/13/20 Patient: Felisa Taylor YOB: 1940 Date of Visit: 11/27/2020 Mary Oliveros MD 
[facility] [address] VIA Facsimile: [phone] Dear Mary Oliveros MD, Thank you for referring Mr. Salina Villarreal to [address] for evaluation. My notes for this consultation are attached. If you have questions, please do not hesitate to call me. I look forward to following your patient along with you. Sincerely, Juliette Montenegro MD

## 2020-11-27 NOTE — PROGRESS NOTES
Identified pt with two pt identifiers(name and ). Reviewed record in preparation for visit and have obtained necessary documentation. Chief Complaint   Patient presents with    Hepatitis     Fibroscan F/U      Vitals:    20 1442   Temp: 97.8 °F (36.6 °C)   TempSrc: Temporal   Weight: 160 lb 3.2 oz (72.7 kg)   Height: 5' 4\" (1.626 m)   PainSc:   0 - No pain       Health Maintenance Review: Patient reminded of \"due or due soon\" health maintenance. I have asked the patient to contact his/her primary care provider (PCP) for follow-up on his/her health maintenance. Coordination of Care Questionnaire:  :   1) Have you been to an emergency room, urgent care, or hospitalized since your last visit? If yes, where when, and reason for visit? no       2. Have seen or consulted any other health care provider since your last visit? If yes, where when, and reason for visit? NO      Patient is accompanied by self I have received verbal consent from Kim Jefferson to discuss any/all medical information while they are present in the room.

## 2020-12-24 ENCOUNTER — OFFICE VISIT (OUTPATIENT)
Dept: HEMATOLOGY | Age: 80
End: 2020-12-24
Payer: MEDICARE

## 2020-12-24 VITALS
HEIGHT: 64 IN | RESPIRATION RATE: 18 BRPM | TEMPERATURE: 96.9 F | HEART RATE: 79 BPM | DIASTOLIC BLOOD PRESSURE: 64 MMHG | SYSTOLIC BLOOD PRESSURE: 115 MMHG | OXYGEN SATURATION: 100 % | WEIGHT: 162 LBS | BODY MASS INDEX: 27.66 KG/M2

## 2020-12-24 DIAGNOSIS — I10 ESSENTIAL HYPERTENSION: ICD-10-CM

## 2020-12-24 PROCEDURE — 99214 OFFICE O/P EST MOD 30 MIN: CPT | Performed by: INTERNAL MEDICINE

## 2020-12-24 PROCEDURE — G8536 NO DOC ELDER MAL SCRN: HCPCS | Performed by: INTERNAL MEDICINE

## 2020-12-24 PROCEDURE — G8752 SYS BP LESS 140: HCPCS | Performed by: INTERNAL MEDICINE

## 2020-12-24 PROCEDURE — G8754 DIAS BP LESS 90: HCPCS | Performed by: INTERNAL MEDICINE

## 2020-12-24 PROCEDURE — G8432 DEP SCR NOT DOC, RNG: HCPCS | Performed by: INTERNAL MEDICINE

## 2020-12-24 PROCEDURE — G8427 DOCREV CUR MEDS BY ELIG CLIN: HCPCS | Performed by: INTERNAL MEDICINE

## 2020-12-24 PROCEDURE — G8419 CALC BMI OUT NRM PARAM NOF/U: HCPCS | Performed by: INTERNAL MEDICINE

## 2020-12-24 PROCEDURE — 1101F PT FALLS ASSESS-DOCD LE1/YR: CPT | Performed by: INTERNAL MEDICINE

## 2020-12-24 RX ORDER — TRAZODONE HYDROCHLORIDE 50 MG/1
50 TABLET ORAL
Status: ON HOLD | COMMUNITY
Start: 2020-10-31 | End: 2022-05-13

## 2020-12-24 NOTE — PROGRESS NOTES
Identified pt with two pt identifiers(name and ). Reviewed record in preparation for visit and have obtained necessary documentation. Chief Complaint   Patient presents with    Hepatitis     4 week F/U      Vitals:    20 1213   BP: 115/64   Pulse: 79   Resp: 18   Temp: 96.9 °F (36.1 °C)   TempSrc: Temporal   SpO2: 100%   Weight: 162 lb (73.5 kg)   Height: 5' 4\" (1.626 m)   PainSc:   0 - No pain       Health Maintenance Review: Patient reminded of \"due or due soon\" health maintenance. I have asked the patient to contact his/her primary care provider (PCP) for follow-up on his/her health maintenance. Coordination of Care Questionnaire:  :   1) Have you been to an emergency room, urgent care, or hospitalized since your last visit? If yes, where when, and reason for visit? no       2. Have seen or consulted any other health care provider since your last visit? If yes, where when, and reason for visit? NO      Patient is accompanied by self I have received verbal consent from Charlene Castro to discuss any/all medical information while they are present in the room.

## 2020-12-24 NOTE — PROGRESS NOTES
Yared Morales [address]      Luis Angel Richey MD, Kamala Saldivar, Selvin Cisneros MD, MPH      Danika Lewis, PA-C    Yanely Huynh, [facility]     Lillian Gonzalez, [facility]   Iris Do, FNP-C    Greta Mckaykulwinder, [facility]       Frida Moses [facility] [address]    at [address], [address] [place] [address] 22.    [phone]    FAX: [address] [place] [address], [address], [address]    [phone]    FAX: [phone]       Patient Care Team:  Mayra Flynn MD as PCP - General (Internal Medicine)  Faisal Goodman MD as PCP - [facility] Provider  Roxie Coates MD (Pulmonary Disease)  Ronnie Fu MD as Surgeon (General Surgery)  Ronnie Fu MD (General Surgery)  Sammi Coburn MD as Surgeon (General Surgery)  Krys Parnell MD as Surgeon (General Surgery)      Problem List  Date Reviewed: 11/1/2020          Codes Class Noted    Autoimmune hepatitis [facility]) ICD-10-CM: K75.4  ICD-9-CM: 571.42  11/1/2020        History of partial colectomy ICD-10-CM: Z90.49  ICD-9-CM: V45.72  11/1/2020        Diverticulitis ICD-10-CM: K57.92  ICD-9-CM: 562.11  11/1/2020        [facility] ICD-10-CM: C60.9  ICD-9-CM: 187.4  8/18/2020        Bilateral carotid artery stenosis ICD-10-CM: I65.23  ICD-9-CM: 433.10, 433.30  10/29/2019        GERD (gastroesophageal reflux disease) ICD-10-CM: K21.9  ICD-9-CM: 530.81  8/29/2018        Hyperlipidemia ICD-10-CM: E78.5  ICD-9-CM: 272.4  4/5/2012        Orthostatic hypotension (Chronic) ICD-10-CM: I95.1  ICD-9-CM: 458.0  Unknown    Overview Signed 12/22/2010 10:14 AM by Liliana Alvarez     Positive tilt test 10/01             PVC (premature ventricular contraction) ICD-10-CM: I49.3  ICD-9-CM: 427.69  12/21/2010    Overview Addendum 12/21/2010 11:09 AM by Fallon Moncada LPN     9/0/[id number]-KZBF PVC with periods of bigeminy. Hypertension ICD-10-CM: I10  ICD-9-CM: 401.9  12/6/2010    Overview Addendum 8/22/2011  2:08 PM by Fallon Moncada LPN     4/82/6618-ZHKR 60-65%. Normal.  7/23/2009-ECHO-LVEF 60%. Normal.             TIA (transient ischemic attack) ICD-10-CM: G45.9  ICD-9-CM: 435.9  12/6/2010        Leg edema ICD-10-CM: R60.0  ICD-9-CM: 782.3  12/6/2010              Ni Mensah is being seen at [address] for management of autoimmune hepatitis. The active problem list, all pertinent past medical history, medications, liver histology, radiologic findings and laboratory findings related to the liver disorder were reviewed with the patient. The patient is a 79 y.o.  male who was found to have abnormalities in liver transaminases in 2016. Serologic evaluation for markers of chronic liver disease was positive for ASMA. A liver biopsy was performed in 2016. This demonstrated active hepatitis with stage 2 fibrosis. The most recent imaging of the liver was MRI performed in 12/2020. Results suggest that the liver is normal.  There is no evidence of bile duct strictures and PSC. The patient has not been treated with any medications to date. Assessment of liver fibrosis with Fibroscan was performed in the office today. 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 The patient has Moderate limitations in functional activities which can be attributed to other medical problems that are not related to the liver disease.       ASSESSMENT AND PLAN:  Autoimmune Hepatitis   The diagnosis was based upon serology, liver biopsy     A liver biopsy performed in 2016 is reported to show moderate inflammation and stage 2 septal fibrosis. Fibroscan performed in 11/2020 was 13.6 kPa suggesting stage 3 fibrosis    Liver transaminases are elevated. ALP is elevated. Liver function is normal.  The platelet count is normal.      The patient is not currently receiving treatment for the liver disease. Will start prednisone 20 mg daily. We see him back after 2 weeks to make sure he is tolerating this. Elevated ALP  There is persistent elevation in ALP. The MRI does not show [facility]     Screening for Hepatocellular Carcinoma  HCC screening is not necessary if the patient has no evidence of cirrhosis. Treatment of other medical problems in patients with chronic liver disease  There are no contraindications for the patient to take most medications that are necessary for treatment of other medical issues. Counseling for alcohol in patients with chronic liver disease  The patient was counseled regarding alcohol consumption and the effect of alcohol on chronic liver disease. The patient does not consume any significant amount of alcohol. Vaccinations   The need for vaccination against viral hepatitis A and B will be assessed with serologic and instituted as appropriate. Routine vaccinations against other bacterial and viral agents can be performed as indicated. Annual flu vaccination should be administered if indicated. ALLERGIES  Allergies   Allergen Reactions    Ace Inhibitors Other (comments)     Doesn't agree with pt    Demerol [Meperidine] Unknown (comments)     Causes unconsciousness       MEDICATIONS  Current Outpatient Medications   Medication Sig    carvediloL (COREG) 12.5 mg tablet Take 2 Tabs by mouth two (2) times daily (with meals).  ascorbic acid, vitamin C, (Vitamin C) 500 mg tablet Take  by mouth.     fluticasone propionate (FLONASE) 50 mcg/actuation nasal spray 2 Sprays by Both Nostrils route daily.  psyllium husk (METAMUCIL PO) Take  by mouth.  ferrous sulfate (FerrouSul) 325 mg (65 mg iron) tablet Take  by mouth Daily (before breakfast).  doxazosin (CARDURA) 2 mg tablet Take 1 Tab by mouth daily.  atorvastatin (LIPITOR) 20 mg tablet Take 1 Tab by mouth daily. (Patient taking differently: Take 40 mg by mouth daily.)    donepeziL (ARICEPT) 5 mg tablet Take 1 Tab by mouth nightly.  zolpidem (AMBIEN) 5 mg tablet Take 1 Tab by mouth nightly as needed for Sleep. Max Daily Amount: 5 mg.  pantoprazole (PROTONIX) 40 mg tablet Take 1 Tab by mouth ACB/HS.  neomycin-bacitracin-polymyxin (Neosporin, uix-[id number],) 3.5mg-400 unit- 5,000 unit/gram ointment Apply  to affected area two (2) times a day.  ondansetron hcl (Zofran) 4 mg tablet Take 1 Tab by mouth every eight (8) hours as needed for Nausea. (Patient taking differently: Take 8 mg by mouth two (2) times a day.)    traZODone (DESYREL) 50 mg tablet Take 50 mg by mouth nightly as needed. No current facility-administered medications for this visit. SYSTEM REVIEW NOT RELATED TO LIVER DISEASE OR REVIEWED ABOVE:  Constitution systems: Negative for fever, chills, weight gain, weight loss. Eyes: Negative for visual changes. ENT: Negative for sore throat, painful swallowing. Respiratory: Negative for cough, hemoptysis, SOB. Cardiology: Negative for chest pain, palpitations. GI:  Negative for constipation or diarrhea. : Negative for urinary frequency, dysuria, hematuria, nocturia. Skin: Negative for rash. Hematology: Negative for easy bruising, blood clots. Musculo-skelatal: Claudication when walking. Neurologic: Negative for headaches, dizziness, vertigo, memory problems not related to HE. Psychology: Negative for anxiety, depression. FAMILY HISTORY:  The father  of CVA. The mother  of CVA. There is no family history of liver disease. SOCIAL HISTORY:  The patient is . The patient has 2 children, and 2 grandchildren. The patient has never used tobacco products. The patient has never consumed significant amounts of alcohol. The patient used to work as . PHYSICAL EXAMINATION:  Visit Vitals  /64 (BP 1 Location: Right arm, BP Patient Position: Sitting)   Pulse 79   Temp 96.9 °F (36.1 °C) (Temporal)   Resp 18   Ht 5' 4\" (1.626 m)   Wt 162 lb (73.5 kg)   SpO2 100%   BMI 27.81 kg/m²     General: No acute distress. Eyes: Sclera anicteric. ENT: No oral lesions. Thyroid normal.  Nodes: No adenopathy. Skin: No spider angiomata. No jaundice. No palmar erythema. Respiratory: Lungs clear to auscultation. Cardiovascular: Regular heart rate. No murmurs. No JVD. Abdomen: Soft non-tender. Liver size normal to percussion/palpation. Spleen not palpable. No obvious ascites. Extremities: No edema. No muscle wasting. No gross arthritic changes. Neurologic: Alert and oriented. Cranial nerves grossly intact. No asterixis.     LABORATORY STUDIES:  Liver [place] of [address] Units 10/30/2020 8/6/2020   WBC 3.4 - 10.8 x10E3/uL 3.6 7.4   ANC 1.4 - 7.0 x10E3/uL 2.1    HGB 13.0 - 17.7 g/dL 14.5 14.6    - 450 x10E3/uL 196 192   INR 0.9 - 1.2 1.0    AST 0 - 40 IU/L 55 (H)    ALT 0 - 44 IU/L 78 (H)    Alk Phos 39 - 117 IU/L 298 (H)    Bili, Total 0.0 - 1.2 mg/dL 1.0    Bili, Direct 0.00 - 0.40 mg/dL 0.49 (H)    Albumin 3.7 - 4.7 g/dL 4.1    BUN 8 - 27 mg/dL 18 17   Creat 0.76 - 1.27 mg/dL 0.80 1.05   Na 134 - 144 mmol/L 139 136   K 3.5 - 5.2 mmol/L 4.1 3.8   Cl 96 - 106 mmol/L 105 106   CO2 20 - 29 mmol/L 22 28   Glucose 65 - 99 mg/dL 113 (H) 102 (H)     SEROLOGIES:  Serologies Latest Ref Rng & Units 10/30/2020   Hep A Ab, Total Negative Negative   Hep B Surface Ag Negative Negative   Hep B Core Ab, Total Negative Negative   Hep B Surface AB QL  Non Reactive   Hep C Ab 0.0 - 0.9 s/co ratio <0.1   Ferritin 30 - 400 ng/mL 475 (H)   Iron % Saturation 15 - 55 % 40   CYRIL, IFA  Negative   ASMCA 0 - 19 Units 95 (H)   Alpha-1 antitrypsin level 101 - 187 mg/dL 154     LIVER HISTOLOGY:  10/2016. Chronic hepatitis with moderate activity. Stage 2 fibrosis. Consistent [facility] autoimmune etiology. 11/2020. [facility]. EkPa was 13.6. IQR/med 32%. . The results suggested a fibrosis level of F3. The CAP score suggests no evidence of fatty liver. ENDOSCOPIC PROCEDURES:  12/2016. Colonoscopy by Dr Melyssa Angel. Adenoma polyps. 12/2019. EGD by Dr Theodore Lazar. Esophagitis. RADIOLOGY:  Not available or performed    OTHER TESTING:  Not available or performed    FOLLOW-UP:  All of the issues listed above in the Assessment and Plan were discussed with the patient. All questions were answered. The patient expressed a clear understanding of the above. [address] [address] in 2 weeks to review assess response to starting prednisone.       Kanu Mccullough MD  [facility] of [address], [address] [facility] 22.  [phone]  [address] [place] [address]

## 2020-12-24 NOTE — Clinical Note
1/1/2021 Patient: Lyla Car YOB: 1940 Date of Visit: 12/24/2020 Natty Castrejon MD 
[address] [facility] [address] [address] Via Fax: [phone] Dear Natty Castrejon MD, Thank you for referring Mr. Tony Boyle to [address] [place] [address],[address] for evaluation. My notes for this consultation are attached. If you have questions, please do not hesitate to call me. I look forward to following your patient along with you. Sincerely, Jah Minor MD

## 2020-12-26 ENCOUNTER — HOSPITAL ENCOUNTER (OUTPATIENT)
Dept: MRI IMAGING | Age: 80
Discharge: HOME | End: 2020-12-26
Attending: INTERNAL MEDICINE
Payer: MEDICARE

## 2020-12-26 DIAGNOSIS — R74.8 ELEVATED LIVER ENZYMES: ICD-10-CM

## 2020-12-26 PROCEDURE — A9585 GADOBUTROL INJECTION: HCPCS | Performed by: INTERNAL MEDICINE

## 2020-12-26 PROCEDURE — 74183 MRI ABD W/O CNTR FLWD CNTR: CPT

## 2020-12-26 PROCEDURE — 74011000258 HC RX REV CODE- 258: Performed by: INTERNAL MEDICINE

## 2020-12-26 PROCEDURE — 74011250636 HC RX REV CODE- 250/636: Performed by: INTERNAL MEDICINE

## 2020-12-26 RX ADMIN — GADOBUTROL 7.5 ML: 604.72 INJECTION INTRAVENOUS at 14:47

## 2020-12-26 RX ADMIN — SODIUM CHLORIDE 100 ML: 900 INJECTION, SOLUTION INTRAVENOUS at 14:48

## 2021-01-01 RX ORDER — ONDANSETRON 4 MG/1
8 TABLET, FILM COATED ORAL 2 TIMES DAILY
Qty: 30 TAB | Refills: 0
Start: 2021-01-01 | End: 2022-05-22

## 2021-01-01 RX ORDER — ATORVASTATIN CALCIUM 20 MG/1
40 TABLET, FILM COATED ORAL DAILY
Qty: 30 TAB | Refills: 0
Start: 2021-01-01 | End: 2022-05-22

## 2021-01-01 RX ORDER — PREDNISONE 10 MG/1
20 TABLET ORAL DAILY
Qty: 180 TAB | Refills: 3 | Status: SHIPPED | OUTPATIENT
Start: 2021-01-01 | End: 2021-03-26 | Stop reason: SDUPTHER

## 2021-01-07 ENCOUNTER — OFFICE VISIT (OUTPATIENT)
Dept: HEMATOLOGY | Age: 81
End: 2021-01-07
Payer: MEDICARE

## 2021-01-07 VITALS
TEMPERATURE: 96.7 F | OXYGEN SATURATION: 98 % | DIASTOLIC BLOOD PRESSURE: 77 MMHG | WEIGHT: 159.2 LBS | RESPIRATION RATE: 18 BRPM | BODY MASS INDEX: 27.18 KG/M2 | HEIGHT: 64 IN | SYSTOLIC BLOOD PRESSURE: 144 MMHG | HEART RATE: 71 BPM

## 2021-01-07 DIAGNOSIS — K75.4 AUTOIMMUNE HEPATITIS (HCC): Primary | ICD-10-CM

## 2021-01-07 PROCEDURE — G8754 DIAS BP LESS 90: HCPCS | Performed by: INTERNAL MEDICINE

## 2021-01-07 PROCEDURE — G8432 DEP SCR NOT DOC, RNG: HCPCS | Performed by: INTERNAL MEDICINE

## 2021-01-07 PROCEDURE — G8536 NO DOC ELDER MAL SCRN: HCPCS | Performed by: INTERNAL MEDICINE

## 2021-01-07 PROCEDURE — G8419 CALC BMI OUT NRM PARAM NOF/U: HCPCS | Performed by: INTERNAL MEDICINE

## 2021-01-07 PROCEDURE — G8427 DOCREV CUR MEDS BY ELIG CLIN: HCPCS | Performed by: INTERNAL MEDICINE

## 2021-01-07 PROCEDURE — 99214 OFFICE O/P EST MOD 30 MIN: CPT | Performed by: INTERNAL MEDICINE

## 2021-01-07 PROCEDURE — G8753 SYS BP > OR = 140: HCPCS | Performed by: INTERNAL MEDICINE

## 2021-01-07 PROCEDURE — 1101F PT FALLS ASSESS-DOCD LE1/YR: CPT | Performed by: INTERNAL MEDICINE

## 2021-01-07 RX ORDER — CARVEDILOL 3.12 MG/1
TABLET ORAL
COMMUNITY
Start: 2021-01-05 | End: 2021-06-12

## 2021-01-07 NOTE — PROGRESS NOTES
[facility] [address]      Talya Cleary MD, Bita Hoang, Colleen Franks MD, MPH      Xuan Barrios, PA-C    Sergey Spann, [facility]     Lillian Gonzalez, [facility]   Brissa Shipman, [facility]-    Lupe Pate, [facility]       Frida Moses [facility] [address]    at [address], [address] [place] [address], [facility] 22.    [phone]    FAX: [address] [place] [address], [address]    [phone]    FAX: [phone]       Patient Care Team:  Roya Quezada MD as PCP - General (Internal Medicine)  Ghulam Vazquez MD as PCP - [facility]  Yue Sofia MD (Pulmonary Disease)  Deidre England MD as Surgeon (General Surgery)  Deidre England MD (General Surgery)  Celina Mcgee MD as Surgeon (General Surgery)  Jamin Lozano MD as Surgeon (General Surgery)      Problem List  Date Reviewed: 11/1/2020          Codes Class Noted    Autoimmune hepatitis [facility]) ICD-10-CM: K75.4  ICD-9-CM: 571.42  11/1/2020        History of partial colectomy ICD-10-CM: Z90.49  ICD-9-CM: V45.72  11/1/2020        Diverticulitis ICD-10-CM: K57.92  ICD-9-CM: 562.11  11/1/2020        Penile cancer ([facility] ICD-10-CM: C60.9  ICD-9-CM: 187.4  8/18/2020        Bilateral carotid artery stenosis ICD-10-CM: I65.23  ICD-9-CM: 433.10, 433.30  10/29/2019        GERD (gastroesophageal reflux disease) ICD-10-CM: K21.9  ICD-9-CM: 530.81  8/29/2018        Hyperlipidemia ICD-10-CM: E78.5  ICD-9-CM: 272.4  4/5/2012        Orthostatic hypotension (Chronic) ICD-10-CM: I95.1  ICD-9-CM: 458.0  Unknown    Overview Signed 12/22/2010 10:14 AM by Antonio Hand     Positive tilt test 10/01             PVC (premature ventricular contraction) ICD-10-CM: I49.3  ICD-9-CM: 427.69  12/21/2010    Overview Addendum 12/21/2010 11:09 AM by Delphine Billy LPN     2/3/1964-BAWWSBUM-SDNO PVC with periods of bigeminy. Hypertension ICD-10-CM: I10  ICD-9-CM: 401.9  12/6/2010    Overview Addendum 8/22/2011  2:08 PM by Delphine Billy LPN     4/66/3374-YLMQ 60-65%. Normal.  7/23/2009-ECHO-LVEF 60%. Normal.             TIA (transient ischemic attack) ICD-10-CM: G45.9  ICD-9-CM: 435.9  12/6/2010        Leg edema ICD-10-CM: R60.0  ICD-9-CM: 782.3  12/6/2010              Zenia Trevino is being seen at [address] for management of autoimmune hepatitis. The active problem list, all pertinent past medical history, medications, liver histology, radiologic findings and laboratory findings related to the liver disorder were reviewed with the patient. The patient is a 79 y.o.  male who was found to have abnormalities in liver transaminases in 2016. Serologic evaluation for markers of chronic liver disease was positive for ASMA. A liver biopsy performed in 2016 demonstrated active hepatitis with stage 2 fibrosis. Assessment of liver fibrosis with Fibroscan was performed in 11/2020. 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 Results suggest that the liver is normal.  There is no evidence of bile duct strictures and PSC. The patient was started on prednisone 20 mg every day in 12/2020. The patient has the following symptoms which are thought to be due to the liver disease:  fatigue,   He is otherwise tolerating prednisone well. The patient is not currently experiencing the following symptoms of liver disease:  problems concentrating, swelling of the abdomen, swelling of the lower extremities, hematemesis, hematochezia.     The patient has Moderate limitations in functional activities which can be attributed to other medical problems that are not related to the liver disease.      ASSESSMENT AND PLAN:  Autoimmune Hepatitis   The diagnosis was based upon serology, liver biopsy     A liver biopsy performed in 2016 is reported to show moderate inflammation and stage 2 septal fibrosis.    Fibroscan performed in 11/2020 was 13.6 kPa suggesting stage 3 fibrosis    MRI performed in 12/2020 did not show any evidence of PSC.    Liver transaminases are elevated.  ALP is elevated.  Liver function is normal.  The platelet count is normal.      Will continue prednisone 20 mg daily for 3 months.  If not improvement in liver enzymes will probably stop treatment and just monitor.    Screening for Hepatocellular Carcinoma  HCC screening is not necessary if the patient has no evidence of cirrhosis.    Treatment of other medical problems in patients with chronic liver disease  There are no contraindications for the patient to take most medications that are necessary for treatment of other medical issues.    Counseling for alcohol in patients with chronic liver disease  The patient was counseled regarding alcohol consumption and the effect of alcohol on chronic liver disease.  The patient does not consume any significant amount of alcohol.    Vaccinations   The need for vaccination against viral hepatitis A and B will be assessed with serologic and instituted as appropriate.  Routine vaccinations against other bacterial and viral agents can be performed as indicated.  Annual flu vaccination should be administered if indicated.      ALLERGIES  Allergies   Allergen Reactions   • Ace Inhibitors Other (comments)     Doesn't agree with pt   • Demerol [Meperidine] Unknown (comments)     Causes unconsciousness       MEDICATIONS  Current Outpatient Medications   Medication Sig   • carvediloL (COREG) 3.125 mg tablet    • atorvastatin (LIPITOR) 20 mg tablet Take 2 Tabs by mouth daily.   • ondansetron hcl (Zofran) 4 mg tablet Take 2 Tabs by mouth two (2) times a day.   • predniSONE (DELTASONE) 10 mg tablet  Take 20 mg by mouth daily.  traZODone (DESYREL) 50 mg tablet Take 50 mg by mouth nightly as needed.  carvediloL (COREG) 12.5 mg tablet Take 2 Tabs by mouth two (2) times daily (with meals).  ascorbic acid, vitamin C, (Vitamin C) 500 mg tablet Take  by mouth.  fluticasone propionate (FLONASE) 50 mcg/actuation nasal spray 2 Sprays by Both Nostrils route daily.  psyllium husk (METAMUCIL PO) Take  by mouth.  ferrous sulfate (FerrouSul) 325 mg (65 mg iron) tablet Take  by mouth Daily (before breakfast).  doxazosin (CARDURA) 2 mg tablet Take 1 Tab by mouth daily.  donepeziL (ARICEPT) 5 mg tablet Take 1 Tab by mouth nightly.  zolpidem (AMBIEN) 5 mg tablet Take 1 Tab by mouth nightly as needed for Sleep. Max Daily Amount: 5 mg.  pantoprazole (PROTONIX) 40 mg tablet Take 1 Tab by mouth ACB/HS.  neomycin-bacitracin-polymyxin (Neosporin, tek-ian-jipow,) 3.5mg-400 unit- 5,000 unit/gram ointment Apply  to affected area two (2) times a day. No current facility-administered medications for this visit. SYSTEM REVIEW NOT RELATED TO LIVER DISEASE OR REVIEWED ABOVE:  Constitution systems: Negative for fever, chills, weight gain, weight loss. Eyes: Negative for visual changes. ENT: Negative for sore throat, painful swallowing. Respiratory: Negative for cough, hemoptysis, SOB. Cardiology: Negative for chest pain, palpitations. GI:  Negative for constipation or diarrhea. : Negative for urinary frequency, dysuria, hematuria, nocturia. Skin: Negative for rash. Hematology: Negative for easy bruising, blood clots. Musculo-skelatal: Claudication when walking. Neurologic: Negative for headaches, dizziness, vertigo, memory problems not related to HE. Psychology: Negative for anxiety, depression. FAMILY HISTORY:  The father  of CVA. The mother  of CVA. There is no family history of liver disease. SOCIAL HISTORY:  The patient is .     The patient has 2 children, and 2 grandchildren. The patient has never used tobacco products. The patient has never consumed significant amounts of alcohol. The patient used to work as . PHYSICAL EXAMINATION:  Visit Vitals  BP (!) 144/77 (BP 1 Location: Right arm, BP Patient Position: Sitting)   Pulse 71   Temp (!) 96.7 °F (35.9 °C) (Temporal)   Resp 18   Ht 5' 4\" (1.626 m)   Wt 159 lb 3.2 oz (72.2 kg)   SpO2 98%   BMI 27.33 kg/m²     General: No acute distress. Eyes: Sclera anicteric. ENT: No oral lesions. Thyroid normal.  Nodes: No adenopathy. Skin: No spider angiomata. No jaundice. No palmar erythema. Respiratory: Lungs clear to auscultation. Cardiovascular: Regular heart rate. No murmurs. No JVD. Abdomen: Soft non-tender. Liver size normal to percussion/palpation. Spleen not palpable. No obvious ascites. Extremities: No edema. No muscle wasting. No gross arthritic changes. Neurologic: Alert and oriented. Cranial nerves grossly intact. No asterixis.     LABORATORY STUDIES:  Liver [place] of [address] Units 1/7/2021 10/30/2020   WBC 4.1 - 11.1 K/uL 7.0 3.6   ANC 1.8 - 8.0 K/UL 5.6 2.1   HGB 12.1 - 17.0 g/dL 13.8 14.5    - 400 K/uL 189 196   INR 0.9 - 1.2  1.0   AST 15 - 37 U/L 56 (H) 55 (H)   ALT 12 - 78 U/L 87 (H) 78 (H)   Alk Phos 45 - 117 U/L 277 (H) 298 (H)   Bili, Total 0.2 - 1.0 MG/DL 0.9 1.0   Bili, Direct 0.0 - 0.2 MG/DL 0.5 (H) 0.49 (H)   Albumin 3.5 - 5.0 g/dL 4.1 4.1   BUN 6 - 20 MG/DL 16 18   Creat 0.70 - 1.30 MG/DL 1.01 0.80   Na 136 - 145 mmol/L 139 139   K 3.5 - 5.1 mmol/L 3.7 4.1   Cl 97 - 108 mmol/L 108 105   CO2 21 - 32 mmol/L 26 22   Glucose 65 - 100 mg/dL 110 (H) 113 (H)     SEROLOGIES:  Serologies Latest Ref Rng & Units 10/30/2020   Hep A Ab, Total Negative Negative   Hep B Surface Ag Negative Negative   Hep B Core Ab, Total Negative Negative   Hep B Surface AB QL  Non Reactive   Hep C Ab 0.0 - 0.9 s/co ratio <0.1   Ferritin 30 - 400 ng/mL 475 (H)   Iron % Saturation 15 - 55 % 40   CYRIL, IFA  Negative   ASMCA 0 - 19 Units 95 (H)   Alpha-1 antitrypsin level 101 - 187 mg/dL 154     LIVER HISTOLOGY:  10/2016. Chronic hepatitis with moderate activity. Stage 2 fibrosis. Consistent wih autoimmune etiology. 11/2020. [facility]. EkPa was 13.6. IQR/med 32%. . The results suggested a fibrosis level of F3. The CAP score suggests no evidence of fatty liver. ENDOSCOPIC PROCEDURES:  12/2016. Colonoscopy by Dr Shereen Uribe. Adenoma polyps. 12/2019. EGD by Dr Bakari Anderson. Esophagitis. RADIOLOGY:  Not available or performed    OTHER TESTING:  Not available or performed    FOLLOW-UP:  All of the issues listed above in the Assessment and Plan were discussed with the patient. All questions were answered. The patient expressed a clear understanding of the above. [address] [place] [address] in 4 weeks to review and assess response to prednisone.       Manual MD Ricardo BarbaDallas County Medical Center [address], [address] [place] [address] 22.  [phone]  [address] [place] [address]

## 2021-01-07 NOTE — Clinical Note
1/19/2021 Patient: Carlota Weathers YOB: 1940 Date of Visit: 1/7/2021 Nhi Heredia MD 
[address] [place] [phone] Via Fax: [phone] Dear Nhi Heredia MD, Thank you for referring Mr. Lloyd Neri to [address] [place] [address],[address] for evaluation. My notes for this consultation are attached. If you have questions, please do not hesitate to call me. I look forward to following your patient along with you. Sincerely, Valma Dakins, MD

## 2021-01-07 NOTE — PROGRESS NOTES
Identified pt with two pt identifiers(name and ). Reviewed record in preparation for visit and have obtained necessary documentation. Chief Complaint   Patient presents with    Other     Autoimmune Hepatitis      Vitals:    21 1006   BP: (!) 144/77   Pulse: 71   Resp: 18   Temp: (!) 96.7 °F (35.9 °C)   TempSrc: Temporal   SpO2: 98%   Weight: 159 lb 3.2 oz (72.2 kg)   Height: 5' 4\" (1.626 m)   PainSc:   0 - No pain       Health Maintenance Review: Patient reminded of \"due or due soon\" health maintenance. I have asked the patient to contact his/her primary care provider (PCP) for follow-up on his/her health maintenance. Coordination of Care Questionnaire:  :   1) Have you been to an emergency room, urgent care, or hospitalized since your last visit? If yes, where when, and reason for visit? no       2. Have seen or consulted any other health care provider since your last visit? If yes, where when, and reason for visit? NO      Patient is accompanied by self I have received verbal consent from Nam Ken to discuss any/all medical information while they are present in the room.

## 2021-01-08 LAB
ALBUMIN SERPL-MCNC: 4.1 G/DL (ref 3.5–5)
ALBUMIN/GLOB SERPL: 1 {RATIO} (ref 1.1–2.2)
ALP SERPL-CCNC: 277 U/L (ref 45–117)
ALT SERPL-CCNC: 87 U/L (ref 12–78)
ANION GAP SERPL CALC-SCNC: 5 MMOL/L (ref 5–15)
AST SERPL-CCNC: 56 U/L (ref 15–37)
BASOPHILS # BLD: 0 K/UL (ref 0–0.1)
BASOPHILS NFR BLD: 0 % (ref 0–1)
BILIRUB DIRECT SERPL-MCNC: 0.5 MG/DL (ref 0–0.2)
BILIRUB SERPL-MCNC: 0.9 MG/DL (ref 0.2–1)
BUN SERPL-MCNC: 16 MG/DL (ref 6–20)
BUN/CREAT SERPL: 16 (ref 12–20)
CALCIUM SERPL-MCNC: 9.3 MG/DL (ref 8.5–10.1)
CHLORIDE SERPL-SCNC: 108 MMOL/L (ref 97–108)
CO2 SERPL-SCNC: 26 MMOL/L (ref 21–32)
CREAT SERPL-MCNC: 1.01 MG/DL (ref 0.7–1.3)
DIFFERENTIAL METHOD BLD: ABNORMAL
EOSINOPHIL # BLD: 0 K/UL (ref 0–0.4)
EOSINOPHIL NFR BLD: 0 % (ref 0–7)
ERYTHROCYTE [DISTWIDTH] IN BLOOD BY AUTOMATED COUNT: 13.5 % (ref 11.5–14.5)
GLOBULIN SER CALC-MCNC: 4.2 G/DL (ref 2–4)
GLUCOSE SERPL-MCNC: 110 MG/DL (ref 65–100)
HCT VFR BLD AUTO: 41.9 % (ref 36.6–50.3)
HGB BLD-MCNC: 13.8 G/DL (ref 12.1–17)
IMM GRANULOCYTES # BLD AUTO: 0 K/UL (ref 0–0.04)
IMM GRANULOCYTES NFR BLD AUTO: 0 % (ref 0–0.5)
LYMPHOCYTES # BLD: 0.9 K/UL (ref 0.8–3.5)
LYMPHOCYTES NFR BLD: 13 % (ref 12–49)
MCH RBC QN AUTO: 29.9 PG (ref 26–34)
MCHC RBC AUTO-ENTMCNC: 32.9 G/DL (ref 30–36.5)
MCV RBC AUTO: 90.9 FL (ref 80–99)
MONOCYTES # BLD: 0.4 K/UL (ref 0–1)
MONOCYTES NFR BLD: 6 % (ref 5–13)
NEUTS SEG # BLD: 5.6 K/UL (ref 1.8–8)
NEUTS SEG NFR BLD: 81 % (ref 32–75)
NRBC # BLD: 0 K/UL (ref 0–0.01)
NRBC BLD-RTO: 0 PER 100 WBC
PLATELET # BLD AUTO: 189 K/UL (ref 150–400)
PMV BLD AUTO: 11.9 FL (ref 8.9–12.9)
POTASSIUM SERPL-SCNC: 3.7 MMOL/L (ref 3.5–5.1)
PROT SERPL-MCNC: 8.3 G/DL (ref 6.4–8.2)
RBC # BLD AUTO: 4.61 M/UL (ref 4.1–5.7)
SODIUM SERPL-SCNC: 139 MMOL/L (ref 136–145)
WBC # BLD AUTO: 7 K/UL (ref 4.1–11.1)

## 2021-01-28 ENCOUNTER — TRANSCRIBE ORDER (OUTPATIENT)
Dept: SCHEDULING | Age: 81
End: 2021-01-28

## 2021-01-28 DIAGNOSIS — G44.52 NEW DAILY PERSISTENT HEADACHE: ICD-10-CM

## 2021-01-28 DIAGNOSIS — I25.118 CORONARY ARTERY DISEASE WITH STABLE ANGINA PECTORIS (HCC): Primary | ICD-10-CM

## 2021-02-03 ENCOUNTER — HOSPITAL ENCOUNTER (OUTPATIENT)
Dept: CT IMAGING | Age: 81
Discharge: HOME | End: 2021-02-03
Attending: FAMILY MEDICINE
Payer: MEDICARE

## 2021-02-03 DIAGNOSIS — G44.52 NEW DAILY PERSISTENT HEADACHE: ICD-10-CM

## 2021-02-03 PROCEDURE — 70450 CT HEAD/BRAIN W/O DYE: CPT

## 2021-02-09 ENCOUNTER — OFFICE VISIT (OUTPATIENT)
Dept: HEMATOLOGY | Age: 81
End: 2021-02-09
Payer: MEDICARE

## 2021-02-09 VITALS
OXYGEN SATURATION: 97 % | DIASTOLIC BLOOD PRESSURE: 83 MMHG | HEART RATE: 82 BPM | WEIGHT: 161 LBS | BODY MASS INDEX: 27.49 KG/M2 | HEIGHT: 64 IN | TEMPERATURE: 96.9 F | RESPIRATION RATE: 20 BRPM | SYSTOLIC BLOOD PRESSURE: 171 MMHG

## 2021-02-09 DIAGNOSIS — K75.4 AUTOIMMUNE HEPATITIS (HCC): Primary | ICD-10-CM

## 2021-02-09 PROCEDURE — G8753 SYS BP > OR = 140: HCPCS | Performed by: NURSE PRACTITIONER

## 2021-02-09 PROCEDURE — G8754 DIAS BP LESS 90: HCPCS | Performed by: NURSE PRACTITIONER

## 2021-02-09 PROCEDURE — G8427 DOCREV CUR MEDS BY ELIG CLIN: HCPCS | Performed by: NURSE PRACTITIONER

## 2021-02-09 PROCEDURE — 99214 OFFICE O/P EST MOD 30 MIN: CPT | Performed by: NURSE PRACTITIONER

## 2021-02-09 PROCEDURE — G8536 NO DOC ELDER MAL SCRN: HCPCS | Performed by: NURSE PRACTITIONER

## 2021-02-09 PROCEDURE — 1101F PT FALLS ASSESS-DOCD LE1/YR: CPT | Performed by: NURSE PRACTITIONER

## 2021-02-09 PROCEDURE — G8419 CALC BMI OUT NRM PARAM NOF/U: HCPCS | Performed by: NURSE PRACTITIONER

## 2021-02-09 PROCEDURE — G8432 DEP SCR NOT DOC, RNG: HCPCS | Performed by: NURSE PRACTITIONER

## 2021-02-09 NOTE — PROGRESS NOTES
[address] [facility]      Luis Angel Richey MD, Selvin Vargas MD, MPH      Danika Lewis, PA-CECILIA Huynh, ACN-BC     Lillian Gonzalez, [facility]   Iris Do FNP-C [place], [facility]       Frida Moses Shalom De Guzman 136    at [address], [address] [place] [address], [facility] 22.    [phone]    FAX: [address] [place] [address] [address], [address]    [phone]    FAX: [phone]     Patient Care Team:  Curtis Galvan MD as PCP - General (Family Medicine)  Faisal Goodman MD as PCP - [address] [place] [address] Provider  Roxie Coates MD (Pulmonary Disease)  Ronnie Fu MD as Surgeon (General Surgery)  Ronnie Fu MD (General Surgery)  Sammi Coburn MD as Surgeon (General Surgery)  Krys Parnell MD as Surgeon (General Surgery)    Problem List  Date Reviewed: 11/1/2020          Codes Class Noted    Autoimmune hepatitis [facility]) ICD-10-CM: K75.4  ICD-9-CM: 571.42  11/1/2020        History of partial colectomy ICD-10-CM: Z90.49  ICD-9-CM: V45.72  11/1/2020        Diverticulitis ICD-10-CM: K57.92  ICD-9-CM: 562.11  11/1/2020        Penile cancer ([facility] [address] ICD-10-CM: C60.9  ICD-9-CM: 187.4  8/18/2020        Bilateral carotid artery stenosis ICD-10-CM: I65.23  ICD-9-CM: 433.10, 433.30  10/29/2019        GERD (gastroesophageal reflux disease) ICD-10-CM: K21.9  ICD-9-CM: 530.81  8/29/2018        Hyperlipidemia ICD-10-CM: E78.5  ICD-9-CM: 272.4  4/5/2012        Orthostatic hypotension (Chronic) ICD-10-CM: I95.1  ICD-9-CM: 458.0  Unknown    Overview Signed 12/22/2010 10:14 AM by Liliana Alvarez     Positive tilt test 10/01             PVC (premature ventricular contraction) ICD-10-CM: I49.3  ICD-9-CM: 427.69  12/21/2010    Overview Addendum 12/21/2010 11:09 AM by Cathleen Naqvi LPN     8/1/[id number] PVC with periods of bigeminy. Hypertension ICD-10-CM: I10  ICD-9-CM: 401.9  12/6/2010    Overview Addendum 8/22/2011  2:08 PM by Cathleen Naqvi LPN     4/15/6244-UPSO 60-65%. Normal.  7/23/2009-ECHO-LVEF 60%. Normal.             TIA (transient ischemic attack) ICD-10-CM: G45.9  ICD-9-CM: 435.9  12/6/2010        Leg edema ICD-10-CM: R60.0  ICD-9-CM: 782.3  12/6/2010            Wilfrido Gavin is being seen at [address] for management of autoimmune hepatitis. The active problem list, all pertinent past medical history, medications, liver histology, radiologic findings and laboratory findings related to the liver disorder were reviewed with the patient. The patient is a 79 y.o.  male who was found to have abnormalities in liver transaminases in 2016. Serologic evaluation for markers of chronic liver disease was positive for ASMA. A liver biopsy performed in 2016 demonstrated active hepatitis with stage 2 fibrosis. Assessment of liver fibrosis with FibroScan was performed in 11/2020. The result was 13.6 kPa which correlates with stage 3 bridging fibrosis    The most recent imaging of the liver was MRI performed in 12/2020. Results suggest the liver is normal. There is no evidence of bile duct strictures and PSC. The patient was started on prednisone 20 mg every day in 12/2020. The patient has the following symptoms which are thought to be due to the liver disease: fatigue. He is otherwise tolerating prednisone well. The patient is not currently experiencing the following symptoms of liver disease: problems concentrating, swelling of the abdomen, swelling of the lower extremities, hematemesis or hematochezia. The patient has moderate limitations in functional activities which can be attributed to other medical problems not related to the liver disease.     ASSESSMENT AND PLAN:  Autoimmune hepatitis   The diagnosis was based upon serology and liver biopsy. A liver biopsy performed in 2016 is reported to show moderate inflammation and stage 2 septal fibrosis. FibroScan performed in 11/2020 was 13.6 kPa suggesting stage 3 fibrosis    MRI performed in 12/2020 did not show any evidence of PSC. Liver transaminases are elevated. ALP is elevated. Liver function is normal. The platelet count is normal.      Will continue prednisone 20 mg daily until the next visit in 3 months. Will repeat labs again and if no response by then, will discontinue prednisone and just monitor. Screening for hepatocellular carcinoma  HCC screening is recommended in F3 fibrotics. US and AFP were ordered today. Treatment of other medical problems in patients with chronic liver disease  There are no contraindications for the patient to take most medications necessary for treatment of other medical issues. Counseling for alcohol in patients with chronic liver disease  The patient was counseled regarding alcohol consumption and the effect of alcohol on chronic liver disease. The patient does not consume any significant amount of alcohol. Vaccinations   Recommend vaccination against viral hepatitis A and B as there is no documented immunity. Routine vaccinations against other bacterial and viral agents can be performed as indicated. Annual flu vaccination should be administered if indicated. ALLERGIES  Allergies   Allergen Reactions    Ace Inhibitors Other (comments)     Doesn't agree with pt    Demerol [Meperidine] Unknown (comments)     Causes unconsciousness     MEDICATIONS  Current Outpatient Medications   Medication Sig    carvediloL (COREG) 3.125 mg tablet     atorvastatin (LIPITOR) 20 mg tablet Take 2 Tabs by mouth daily.  ondansetron hcl (Zofran) 4 mg tablet Take 2 Tabs by mouth two (2) times a day.  predniSONE (DELTASONE) 10 mg tablet Take 20 mg by mouth daily.     traZODone (DESYREL) 50 mg tablet Take 50 mg by mouth nightly as needed.  carvediloL (COREG) 12.5 mg tablet Take 2 Tabs by mouth two (2) times daily (with meals).  ascorbic acid, vitamin C, (Vitamin C) 500 mg tablet Take  by mouth.  fluticasone propionate (FLONASE) 50 mcg/actuation nasal spray 2 Sprays by Both Nostrils route daily.  psyllium husk (METAMUCIL PO) Take  by mouth.  ferrous sulfate (FerrouSul) 325 mg (65 mg iron) tablet Take  by mouth Daily (before breakfast).  doxazosin (CARDURA) 2 mg tablet Take 1 Tab by mouth daily.  donepeziL (ARICEPT) 5 mg tablet Take 1 Tab by mouth nightly.  zolpidem (AMBIEN) 5 mg tablet Take 1 Tab by mouth nightly as needed for Sleep. Max Daily Amount: 5 mg.  pantoprazole (PROTONIX) 40 mg tablet Take 1 Tab by mouth ACB/HS.  neomycin-bacitracin-polymyxin (Neosporin, isy-wzm-vkypb,) 3.5mg-400 unit- 5,000 unit/gram ointment Apply  to affected area two (2) times a day. No current facility-administered medications for this visit. SYSTEM REVIEW NOT RELATED TO LIVER DISEASE OR REVIEWED ABOVE:  Constitution systems: Negative for fever, chills, weight gain, weight loss. Eyes: Negative for visual changes. ENT: Negative for sore throat, painful swallowing. Respiratory: Negative for cough, hemoptysis, SOB. Cardiology: Negative for chest pain, palpitations. GI:  Negative for constipation or diarrhea. : Negative for urinary frequency, dysuria, hematuria, nocturia. Skin: Negative for rash. Hematology: Negative for easy bruising, blood clots. Musculo-skeletal: Claudication when walking. Neurologic: Negative for headaches, dizziness, vertigo, memory problems not related to HE. Psychology: Negative for anxiety, depression. FAMILY HISTORY:  The father  of CVA. The mother  of CVA. There is no family history of liver disease. SOCIAL HISTORY:  The patient is . The patient has 2 children and 2 grandchildren.     The patient has never used tobacco products. The patient has never consumed significant amounts of alcohol. The patient used to work as . PHYSICAL EXAMINATION:  Visit Vitals  BP (!) 171/83   Pulse 82   Temp 96.9 °F (36.1 °C) (Temporal)   Resp 20   Ht 5' 4\" (1.626 m)   Wt 161 lb (73 kg)   SpO2 97%   BMI 27.64 kg/m²     General: No acute distress. Eyes: Sclera anicteric. ENT: No oral lesions. Nodes: No adenopathy. Skin: No spider angiomata. No jaundice. No palmar erythema. Respiratory: Lungs clear to auscultation. Cardiovascular: Regular heart rate. No murmurs. No JVD. Abdomen: Soft non-tender, liver size normal to percussion/palpation. Spleen not palpable. No obvious ascites. Extremities: No edema. No muscle wasting. No gross arthritic changes. Neurologic: Alert and oriented. Cranial nerves grossly intact. No asterixis.     LABORATORY STUDIES:  Liver [place] [address] Units 1/7/2021 10/30/2020   WBC 4.1 - 11.1 K/uL 7.0 3.6   ANC 1.8 - 8.0 K/UL 5.6 2.1   HGB 12.1 - 17.0 g/dL 13.8 14.5    - 400 K/uL 189 196   INR 0.9 - 1.2  1.0   AST 15 - 37 U/L 56 (H) 55 (H)   ALT 12 - 78 U/L 87 (H) 78 (H)   Alk Phos 45 - 117 U/L 277 (H) 298 (H)   Bili, Total 0.2 - 1.0 MG/DL 0.9 1.0   Bili, Direct 0.0 - 0.2 MG/DL 0.5 (H) 0.49 (H)   Albumin 3.5 - 5.0 g/dL 4.1 4.1   BUN 6 - 20 MG/DL 16 18   Creat 0.70 - 1.30 MG/DL 1.01 0.80   Na 136 - 145 mmol/L 139 139   K 3.5 - 5.1 mmol/L 3.7 4.1   Cl 97 - 108 mmol/L 108 105   CO2 21 - 32 mmol/L 26 22   Glucose 65 - 100 mg/dL 110 (H) 113 (H)     SEROLOGIES:  Serologies Latest Ref Rng & Units 10/30/2020   Hep A Ab, Total Negative Negative   Hep B Surface Ag Negative Negative   Hep B Core Ab, Total Negative Negative   Hep B Surface AB QL  Non Reactive   Hep C Ab 0.0 - 0.9 s/co ratio <0.1   Ferritin 30 - 400 ng/mL 475 (H)   Iron % Saturation 15 - 55 % 40   CYRIL, IFA  Negative   ASMCA 0 - 19 Units 95 (H)   Alpha-1 antitrypsin level 101 - 187 mg/dL 154     LIVER HISTOLOGY:  11/2020. [facility]. EkPa was 13.6. IQR/med 32%. . The results suggested a fibrosis level of F3. The CAP score suggests no evidence of fatty liver. 10/2016. Chronic hepatitis with moderate activity. Stage 2 fibrosis. Consistent with autoimmune etiology. ENDOSCOPIC PROCEDURES:  12/2019. EGD by Dr Rangel. Esophagitis. 12/2016. Colonoscopy by Dr Mckinley Coffman. Adenoma polyps. RADIOLOGY:  12/2020. MRI abd/pelvis. Subcentimeter hepatic hyperplastic nodules in a cirrhotic liver. No evidence of hepatocellular carcinoma. LI-RADS Assessment Category 2: Benign finding. Cholelithiasis. Otherwise normal biliary tree. No evidence of cholangitis or biliary sclerosis. Small pancreatic body cyst is of doubtful clinical significance in this patient. OTHER TESTING:  Not available or performed    FOLLOW-UP:  All of the issues listed above in the assessment and plan were discussed with the patient. All questions were answered. The patient expressed a clear understanding of the above. [address] [facility] 87 in 3 months.      Lee Koroma, [facility]  Liver [place] [address], [address] [facility], [facility] 22.  [phone]

## 2021-02-09 NOTE — PROGRESS NOTES
Patient stated he missed his PB medication this morning        Identified pt with two pt identifiers(name and ). Reviewed record in preparation for visit and have obtained necessary documentation. Chief Complaint   Patient presents with    Hepatitis     4 week f/u      Vitals:    21 1116   BP: (!) 164/92   Pulse: 82   Resp: 20   Temp: 96.9 °F (36.1 °C)   TempSrc: Temporal   SpO2: 97%   Weight: 161 lb (73 kg)   Height: 5' 4\" (1.626 m)   PainSc:   0 - No pain       Health Maintenance Review: Patient reminded of \"due or due soon\" health maintenance. I have asked the patient to contact his/her primary care provider (PCP) for follow-up on his/her health maintenance. Coordination of Care Questionnaire:  :   1) Have you been to an emergency room, urgent care, or hospitalized since your last visit? If yes, where when, and reason for visit? no       2. Have seen or consulted any other health care provider since your last visit? If yes, where when, and reason for visit? Yes, Dr. Aakash Scott 21 f/u blood work      Patient is accompanied by self I have received verbal consent from Thom Harper to discuss any/all medical information while they are present in the room.

## 2021-02-10 LAB
AFP L3 MFR SERPL: NORMAL % (ref 0–9.9)
AFP SERPL-MCNC: 4 NG/ML (ref 0–8)
ALBUMIN SERPL-MCNC: 3.5 G/DL (ref 3.5–5)
ALBUMIN/GLOB SERPL: 0.9 {RATIO} (ref 1.1–2.2)
ALP SERPL-CCNC: 178 U/L (ref 45–117)
ALT SERPL-CCNC: 141 U/L (ref 12–78)
ANION GAP SERPL CALC-SCNC: 10 MMOL/L (ref 5–15)
AST SERPL-CCNC: 58 U/L (ref 15–37)
BILIRUB DIRECT SERPL-MCNC: 0.4 MG/DL (ref 0–0.2)
BILIRUB SERPL-MCNC: 0.9 MG/DL (ref 0.2–1)
BUN SERPL-MCNC: 21 MG/DL (ref 6–20)
BUN/CREAT SERPL: 19 (ref 12–20)
CALCIUM SERPL-MCNC: 8.9 MG/DL (ref 8.5–10.1)
CHLORIDE SERPL-SCNC: 107 MMOL/L (ref 97–108)
CO2 SERPL-SCNC: 25 MMOL/L (ref 21–32)
CREAT SERPL-MCNC: 1.08 MG/DL (ref 0.7–1.3)
ERYTHROCYTE [DISTWIDTH] IN BLOOD BY AUTOMATED COUNT: 15 % (ref 11.5–14.5)
GLOBULIN SER CALC-MCNC: 3.7 G/DL (ref 2–4)
GLUCOSE SERPL-MCNC: 73 MG/DL (ref 65–100)
HCT VFR BLD AUTO: 47 % (ref 36.6–50.3)
HGB BLD-MCNC: 15.3 G/DL (ref 12.1–17)
MCH RBC QN AUTO: 30.1 PG (ref 26–34)
MCHC RBC AUTO-ENTMCNC: 32.6 G/DL (ref 30–36.5)
MCV RBC AUTO: 92.3 FL (ref 80–99)
NRBC # BLD: 0 K/UL (ref 0–0.01)
NRBC BLD-RTO: 0 PER 100 WBC
PLATELET # BLD AUTO: 160 K/UL (ref 150–400)
PMV BLD AUTO: 11.5 FL (ref 8.9–12.9)
POTASSIUM SERPL-SCNC: 4.1 MMOL/L (ref 3.5–5.1)
PROT SERPL-MCNC: 7.2 G/DL (ref 6.4–8.2)
RBC # BLD AUTO: 5.09 M/UL (ref 4.1–5.7)
SODIUM SERPL-SCNC: 142 MMOL/L (ref 136–145)
WBC # BLD AUTO: 7.4 K/UL (ref 4.1–11.1)

## 2021-02-22 ENCOUNTER — HOSPITAL ENCOUNTER (OUTPATIENT)
Dept: ULTRASOUND IMAGING | Age: 81
Discharge: HOME | End: 2021-02-22
Attending: NURSE PRACTITIONER
Payer: MEDICARE

## 2021-02-22 DIAGNOSIS — K75.4 AUTOIMMUNE HEPATITIS (HCC): ICD-10-CM

## 2021-02-22 PROCEDURE — 76700 US EXAM ABDOM COMPLETE: CPT

## 2021-03-04 RX ORDER — ASPIRIN 81 MG/1
81 TABLET ORAL DAILY
COMMUNITY

## 2021-03-04 NOTE — PERIOP NOTES
[facility]  Preoperative Instructions        Surgery Date 3/9/21          Time of Arrival 1:00 pm    1. On the day of your surgery, please report to the Surgical Services Registration Desk and sign in at your designated time. The Surgery Center is located to the right of the Emergency Room. 2. You must have someone with you to drive you home. You should not drive a car for 24 hours following surgery. Please make arrangements for a friend or family member to stay with you for the first 24 hours after your surgery. 3. Do not have anything to eat or drink (including water, gum, mints, coffee, juice) after midnight ?3/8/21? Lewis Salter ? This may not apply to medications prescribed by your physician. ?(Please note below the special instructions with medications to take the morning of your procedure.)    4. We recommend you do not drink any alcoholic beverages for 24 hours before and after your surgery. 5. Contact your surgeons office for instructions on the following medications: non-steroidal anti-inflammatory drugs (i.e. Advil, Aleve), vitamins, and supplements. (Some surgeons will want you to stop these medications prior to surgery and others may allow you to take them)  **If you are currently taking Plavix, Coumadin, Aspirin and/or other blood-thinning agents, contact your surgeon for instructions. ** Your surgeon will partner with the physician prescribing these medications to determine if it is safe to stop or if you need to continue taking. Please do not stop taking these medications without instructions from your surgeon    6. Wear comfortable clothes. Wear glasses instead of contacts. Do not bring any money or jewelry. Please bring picture ID, insurance card, and any prearranged co-payment or hospital payment. Do not wear make-up, particularly mascara the morning of your surgery. Do not wear nail polish, particularly if you are having foot /hand surgery.   Wear your hair loose or down, no ponytails, buns, bart pins or clips. All body piercings must be removed. Please shower with antibacterial soap for three consecutive days before and on the morning of surgery, but do not apply any lotions, powders or deodorants after the shower on the day of surgery. Please use a fresh towels after each shower. Please sleep in clean clothes and change bed linens the night before surgery. Please do not shave for 48 hours prior to surgery. Shaving of the face is acceptable. 7. You should understand that if you do not follow these instructions your surgery may be cancelled. If your physical condition changes (I.e. fever, cold or flu) please contact your surgeon as soon as possible. 8. It is important that you be on time. If a situation occurs where you may be late, please call [phone] (OR Holding Area). 9. If you have any questions and or problems, please call [phone] (Pre-admission Testing). 10. Your surgery time may be subject to change. You will receive a phone call the evening prior if your time changes. 11.  If having outpatient surgery, you must have someone to drive you here, stay with you during the duration of your stay, and to drive you home at time of discharge. Special Instructions: follow MD instructions for aspirin    TAKE ALL MEDICATIONS DAY OF SURGERY EXCEPT:  Vitamins/supplements, aspirin    I understand a pre-operative phone call will be made to verify my surgery time. In the event that I am not available, I give permission for a message to be left on my answering service and/or with another person?   Yes daughter Brandt Mcdaniels [phone]         ___________________      __________   _________    Neel Gimeneze of Patient)             (Witness)                (Date and Time)

## 2021-03-05 ENCOUNTER — HOSPITAL ENCOUNTER (OUTPATIENT)
Dept: PREADMISSION TESTING | Age: 81
Discharge: HOME | End: 2021-03-05
Payer: MEDICARE

## 2021-03-05 LAB — SARS-COV-2, COV2: NORMAL

## 2021-03-05 PROCEDURE — U0003 INFECTIOUS AGENT DETECTION BY NUCLEIC ACID (DNA OR RNA); SEVERE ACUTE RESPIRATORY SYNDROME CORONAVIRUS 2 (SARS-COV-2) (CORONAVIRUS DISEASE [COVID-19]), AMPLIFIED PROBE TECHNIQUE, MAKING USE OF HIGH THROUGHPUT TECHNOLOGIES AS DESCRIBED BY CMS-2020-01-R: HCPCS

## 2021-03-06 LAB — SARS-COV-2, COV2NT: NOT DETECTED

## 2021-03-09 ENCOUNTER — HOSPITAL ENCOUNTER (OUTPATIENT)
Age: 81
Setting detail: OUTPATIENT SURGERY
Discharge: HOME | End: 2021-03-09
Attending: UROLOGY | Admitting: UROLOGY
Payer: MEDICARE

## 2021-03-09 ENCOUNTER — ANESTHESIA (OUTPATIENT)
Dept: SURGERY | Age: 81
End: 2021-03-09
Payer: MEDICARE

## 2021-03-09 ENCOUNTER — ANESTHESIA EVENT (OUTPATIENT)
Dept: SURGERY | Age: 81
End: 2021-03-09
Payer: MEDICARE

## 2021-03-09 VITALS
TEMPERATURE: 97.1 F | DIASTOLIC BLOOD PRESSURE: 76 MMHG | RESPIRATION RATE: 16 BRPM | WEIGHT: 161.16 LBS | HEART RATE: 77 BPM | SYSTOLIC BLOOD PRESSURE: 148 MMHG | OXYGEN SATURATION: 100 % | BODY MASS INDEX: 27.51 KG/M2 | HEIGHT: 64 IN

## 2021-03-09 DIAGNOSIS — C60.9 PENILE CANCER (HCC): Primary | ICD-10-CM

## 2021-03-09 PROCEDURE — 74011250637 HC RX REV CODE- 250/637: Performed by: UROLOGY

## 2021-03-09 PROCEDURE — 74011250636 HC RX REV CODE- 250/636: Performed by: NURSE ANESTHETIST, CERTIFIED REGISTERED

## 2021-03-09 PROCEDURE — 76010000138 HC OR TIME 0.5 TO 1 HR: Performed by: UROLOGY

## 2021-03-09 PROCEDURE — 74011000250 HC RX REV CODE- 250: Performed by: UROLOGY

## 2021-03-09 PROCEDURE — 2709999900 HC NON-CHARGEABLE SUPPLY: Performed by: UROLOGY

## 2021-03-09 PROCEDURE — 77030040361 HC SLV COMPR DVT MDII -B: Performed by: UROLOGY

## 2021-03-09 PROCEDURE — 88305 TISSUE EXAM BY PATHOLOGIST: CPT

## 2021-03-09 PROCEDURE — 76060000032 HC ANESTHESIA 0.5 TO 1 HR: Performed by: UROLOGY

## 2021-03-09 PROCEDURE — 77030019905 HC CATH URETH INTMIT MDII -A: Performed by: UROLOGY

## 2021-03-09 PROCEDURE — 74011250636 HC RX REV CODE- 250/636: Performed by: ANESTHESIOLOGY

## 2021-03-09 PROCEDURE — 76210000006 HC OR PH I REC 0.5 TO 1 HR: Performed by: UROLOGY

## 2021-03-09 PROCEDURE — 74011250636 HC RX REV CODE- 250/636: Performed by: UROLOGY

## 2021-03-09 PROCEDURE — 77030002888 HC SUT CHRMC J&J -A: Performed by: UROLOGY

## 2021-03-09 RX ORDER — BUPIVACAINE HYDROCHLORIDE 5 MG/ML
INJECTION, SOLUTION EPIDURAL; INTRACAUDAL AS NEEDED
Status: DISCONTINUED | OUTPATIENT
Start: 2021-03-09 | End: 2021-03-09 | Stop reason: HOSPADM

## 2021-03-09 RX ORDER — LIDOCAINE HYDROCHLORIDE 10 MG/ML
INJECTION INFILTRATION; PERINEURAL AS NEEDED
Status: DISCONTINUED | OUTPATIENT
Start: 2021-03-09 | End: 2021-03-09 | Stop reason: HOSPADM

## 2021-03-09 RX ORDER — SODIUM CHLORIDE, SODIUM LACTATE, POTASSIUM CHLORIDE, CALCIUM CHLORIDE 600; 310; 30; 20 MG/100ML; MG/100ML; MG/100ML; MG/100ML
25 INJECTION, SOLUTION INTRAVENOUS CONTINUOUS
Status: DISCONTINUED | OUTPATIENT
Start: 2021-03-09 | End: 2021-03-09 | Stop reason: HOSPADM

## 2021-03-09 RX ORDER — BACITRACIN ZINC 500 UNIT/G
OINTMENT (GRAM) TOPICAL AS NEEDED
Status: DISCONTINUED | OUTPATIENT
Start: 2021-03-09 | End: 2021-03-09 | Stop reason: HOSPADM

## 2021-03-09 RX ORDER — PROPOFOL 10 MG/ML
INJECTION, EMULSION INTRAVENOUS
Status: DISCONTINUED | OUTPATIENT
Start: 2021-03-09 | End: 2021-03-09 | Stop reason: HOSPADM

## 2021-03-09 RX ORDER — PROPOFOL 10 MG/ML
INJECTION, EMULSION INTRAVENOUS AS NEEDED
Status: DISCONTINUED | OUTPATIENT
Start: 2021-03-09 | End: 2021-03-09 | Stop reason: HOSPADM

## 2021-03-09 RX ORDER — CEFUROXIME AXETIL 500 MG/1
500 TABLET ORAL 2 TIMES DAILY
Qty: 10 TAB | Refills: 0 | Status: SHIPPED | OUTPATIENT
Start: 2021-03-09 | End: 2021-03-14

## 2021-03-09 RX ORDER — TRAMADOL HYDROCHLORIDE 50 MG/1
50 TABLET ORAL
Qty: 12 TAB | Refills: 0 | Status: SHIPPED | OUTPATIENT
Start: 2021-03-09 | End: 2021-03-12

## 2021-03-09 RX ADMIN — PROPOFOL 75 MCG/KG/MIN: 10 INJECTION, EMULSION INTRAVENOUS at 13:12

## 2021-03-09 RX ADMIN — Medication 3 AMPULE: at 12:16

## 2021-03-09 RX ADMIN — SODIUM CHLORIDE, POTASSIUM CHLORIDE, SODIUM LACTATE AND CALCIUM CHLORIDE 25 ML/HR: 600; 310; 30; 20 INJECTION, SOLUTION INTRAVENOUS at 12:16

## 2021-03-09 RX ADMIN — PROPOFOL 30 MG: 10 INJECTION, EMULSION INTRAVENOUS at 13:13

## 2021-03-09 RX ADMIN — PROPOFOL 30 MG: 10 INJECTION, EMULSION INTRAVENOUS at 13:12

## 2021-03-09 RX ADMIN — WATER 2 G: 1 INJECTION INTRAMUSCULAR; INTRAVENOUS; SUBCUTANEOUS at 13:14

## 2021-03-09 NOTE — ANESTHESIA POSTPROCEDURE EVALUATION
Procedure(s):  PENILE BIOPSY. general    Anesthesia Post Evaluation      Multimodal analgesia: multimodal analgesia used between 6 hours prior to anesthesia start to PACU discharge  Patient location during evaluation: bedside  Patient participation: complete - patient participated  Level of consciousness: awake  Pain management: adequate  Airway patency: patent  Anesthetic complications: no  Cardiovascular status: acceptable  Respiratory status: acceptable  Hydration status: acceptable  Post anesthesia nausea and vomiting:  controlled  Final Post Anesthesia Temperature Assessment:  Normothermia (36.0-37.5 degrees C)      INITIAL Post-op Vital signs:   Vitals Value Taken Time   /72 03/09/21 1430   Temp 36.4 °C (97.6 °F) 03/09/21 1415   Pulse 74 03/09/21 1435   Resp 18 03/09/21 1435   SpO2 98 % 03/09/21 1435   Vitals shown include unvalidated device data.

## 2021-03-09 NOTE — H&P
Jame Pinto is a 79year old male who presents today for \"Penile Cancer\". Mr. Angelica Hebert returns today for a follow up. He is status post partial penectomy on September 22, 2020. 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 Margins uninvolved. He is currently on Tamsulosin. Nocturia 2-3x. He is on Prednisone and has gained a bit of weight,  Denies any fever chills. No obstructive or irritative voiding symptoms. Reports no pain. He received the first dose of the COVID-19 vaccine. PAST MEDICAL HISTORY:    Allergies: DEMEROL (Severe)  DENIES: Latex, Shellfish, X-Ray Dye, Iodine. Medications: * UNABLE TO VERIFY MEDICATIONS, WILL BRING LIST NEXT VISIT   SUCRALFATE 1 GM ORAL TABLET (SUCRALFATE) ; Route: ORAL  TAMSULOSIN HCL 0.4 MG ORAL CAPSULE (TAMSULOSIN HCL) ; Route: ORAL  PREDNISONE 10 MG ORAL TABLET (PREDNISONE) ; Route: ORAL  DONEPEZIL HCL 5 MG ORAL TABLET (DONEPEZIL HCL) ; Route: ORAL  CARVEDILOL 3.125 MG ORAL TABLET (CARVEDILOL) ; Route: ORAL  PANTOPRAZOLE SODIUM 40 MG ORAL TABLET DELAYED RELEASE (PANTOPRAZOLE SODIUM) ; Route: ORAL  ATORVASTATIN CALCIUM 40 MG ORAL TABLET (ATORVASTATIN CALCIUM) ; Route: ORAL  B-12 TABLET (CYANOCOBALAMIN TABS) daily; Route: ORAL  VITAMIN C TABLET (ASCORBIC ACID TABS) daily; Route: ORAL  VITAMIN D TABLET (CHOLECALCIFEROL TABS) daily; Route: ORAL  CARVEDILOL 12.5 MG ORAL TABLET (CARVEDILOL) TK 1 T PO BID FOR BP; Route: ORAL  OMEPRAZOLE 40 MG ORAL CAPSULE DELAYED RELEASE (OMEPRAZOLE) daily; Route: ORAL    Problems: Nocturia (QYL-359.88) (CNU07-T72.1)  Other specified counseling (ICD-V65.49) (UEK32-Z41.89)  Penile cancer (ICD-187.4) (FND35-R91.9)  Penile lesion (ICD-607.9) (IXU96-K52.9)  Pruritus genitalia (ICD-698.1) (YQB78-B73.3)  Meatitis (ICD-597.89) (VWK20-I20.2)  706.2 CYST, SEBACEOUS (ICD-706.2) (FRO29-L63. 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 DENIES: Pacemaker/Defibrillator, Lung Disease, Diabetes, Bowel Problems, Kidney Problems, HIV, or Cancer. Surgeries: Heart Stent Procedure, Colon Surgery, and Cataract Surgery. Family History: DENIES: Prostate cancer, Kidney cancer, Kidney disease, Kidney stones. Social History: Retired. . Smoking status: former smoker. Does not drink alcohol. System Review: Admits to: Involuntary Urine Loss, Lower Extremity Weakness, Dry Skin, Easy Bleeding, and Rash. DENIES: Unexplained Weight Loss, Dry Eyes, Dry Mouth, Leg Swelling, Shortness of Breath, Constipation, Difficulty Walking, Psychiatric Problems, Impaired Sex Drive. EXAMINATION: Vitals: Pulse: 76 BP: 180/93 Weight: 162 lbs Height: 5' 4\" BMI: 27.91 kg/m^2  Appearance: well-developed NAD Respiratory Effort: breathing easily Hernia: No inguinal adenopathy. Scrotum: without lesions Testes: bilaterally non-tender, no masses Epididymes: bilaterally no masses palpated, non-tender Penis: S/p partial penectomy with some new 1-2 mm nodules near the marylu meatus. Non-tender. Meatus: patent Skin Inspection: warm and dry Orientation: oriented to person; time and place Mood/Affect: normal       URINALYSIS  Urine Micro not done    PSA HISTORY  0.36 ng/ml on 05/19/2008  0.29 ng/ml on 10/05/1995    IMPRESSION:    1. PENILE CANCER (VLM85-J54.9) - Resolved: Monitor closely. Patient understands risk of recurrence. We will arrange for a penile biopsy. Risks were discussed. 2. BPH WITH OBSTRUCTION (WIM53-B60.1) - Unchanged: Continue Tamsulosin as directed. 3. NOCTURIA (YGL42-T74.1) - New: Advised to limit fluids after 6pm, specifically alcohol and caffeine. PLAN: All questions and concerns were addressed prior to the patient leaving the clinic. The patient understands and agrees with the plan.     The patient was given a verbal/written log of Blood Pressure and recommendations. Crisis Hypertension: Refer patient to ER/PCP/Cardiologist/Renal for immediate (same day) followup.

## 2021-03-09 NOTE — ANESTHESIA PREPROCEDURE EVALUATION
Anesthetic History   No history of anesthetic complications            Review of Systems / Medical History  Patient summary reviewed, nursing notes reviewed and pertinent labs reviewed    Pulmonary    COPD: mild    Sleep apnea: No treatment  Smoker      Comments: Former Smoker   Neuro/Psych         TIA and dementia  Pertinent negatives: No CVA  Comments: Syncope Cardiovascular    Hypertension: well controlled        Dysrhythmias : PVC  CAD, PAD and hyperlipidemia  Pertinent negatives: No angina  Exercise tolerance: <4 METS  Comments: Orthostatic Hypotension    ECG (8/6/20):  Poor data quality   Normal sinus rhythm   Early rs transition   When compared with ECG of 16-JUL-2019 17:50,   No significant change was found     TTE (5/24/12): Left ventricle: Systolic function was normal. Ejection fraction was   estimated in the range of 60 % to 65 %. There were no regional wall motion   abnormalities. There was mild concentric hypertrophy. Bilateral carotid artery stenosis    Carotid Duplex (10/29/19):  ·There is mild stenosis in the right ICA (<50%). ·The right vertebral is antegrade. ·There is mild stenosis in the left ICA (<50%). ·The left vertebral is antegrade. ·No significant change from previous exam of 9/29/2017.    GI/Hepatic/Renal     GERD: well controlled    Renal disease: CRI  Hiatal hernia, PUD and liver disease    Comments: Fatty Liver  H/O Elevated LFT's  Nephrosclerosis  H/O Bleeding Ulcer Endo/Other        Arthritis, cancer and anemia    Comments: Penile Squamous Cell Carcinoma Other Findings   Comments:            Physical Exam    Airway  Mallampati: II  TM Distance: 4 - 6 cm  Neck ROM: normal range of motion   Mouth opening: Normal     Cardiovascular  Regular rate and rhythm,  S1 and S2 normal,  no murmur, click, rub, or gallop  Rhythm: regular  Rate: normal         Dental    Dentition: Caps/crowns  Comments: 3 missing teeth  Has lower gold cap, permanent   Pulmonary  Breath sounds clear to auscultation               Abdominal  GI exam deferred       Other Findings            Anesthetic Plan    ASA: 3  Anesthesia type: general          Induction: Intravenous  Anesthetic plan and risks discussed with: Patient

## 2021-03-09 NOTE — OP NOTES
[address]  OPERATIVE REPORT    Name:  Jaon Tapia  MR#:  [id number]  :  1940  ACCOUNT #:  [de-identified]  DATE OF SERVICE:  2021    PREOPERATIVE DIAGNOSIS:  History of penile cancer. POSTOPERATIVE DIAGNOSIS:  History of penile cancer. PROCEDURE PERFORMED:  Penile biopsy procedure with excision of a penile lesion. SURGEON:  Vickie Nieves MD    ASSISTANT:  None. ANESTHESIA:  Mac with local.    COMPLICATIONS:  None. SPECIMENS REMOVED:  Perimeatal biopsy as well as dorsal lesion excision and deep biopsy. IMPLANTS:  none. ESTIMATED BLOOD LOSS:  Minimal.    PROCEDURE:  After obtaining informed consent, the patient received preoperative antibiotics, placed on operating table in supine position. After adequate induction of MAC anesthesia, a full time-out was accomplished. The penis was then prepped and draped in sterile fashion. A full penile block was accomplished using Marcaine lidocaine plain mix. I then excised some of the erythematous tissue in the distal aspect of the penis near the meatus after placing a 12-Nepalese red rubber and the meatus with return of clear urine. I did this selectively to identify exactly where the neomeatus was. This was sent to pathology. The base was fulgurated and closed with a running 3-0 chromic. On the ventral surface of the penis, there was a bulbous feeling area. I did use #15 blade to incise upon this and then carefully dissected out with iris scissors. There appeared to be a hematoma. I sent the specimen to pathology and then took a deeper bite and sent that to pathology as well. Hemostasis was achieved with cautery and then it was closed with 3-0 chromic mattress sutures. Bacitracin was applied to the incision and Xeroform gauze was applied to the ventral penile incision. He tolerated the procedure well with no complications. Please note that the red rubber was removed.         SYED Vazquez, MD [id number]  D:  03/09/2021 14:14  T:  03/09/2021 18:15  JOB #:  [id number]  CC:  Ambrose Kanner, MD       [facility]

## 2021-03-09 NOTE — PERIOP NOTES
Handoff Report from Operating Room to PACU  2 PM  Report received from [address] [place] [address] and Charlie Nobles CRNA regarding Wilfrido Gavin. Surgeon(s):  Alan Sarah MD  And Procedure(s) (LRB):  PENILE BIOPSY (N/A)  confirmed   with allergies and dressings discussed. Anesthesia type, drugs, patient history, complications, estimated blood loss, vital signs, intake and output, and last pain medication, lines and temperature were reviewed. 2:32 PM  Verbal sign out per Dr Giovanna Garcia      Discharge papers provided; Reviewed DC instructions with patient and pt's daughter Susu Clarke. Opportunity for questions and clarifications was provided; verbalized understanding. Follow-up appointments reviewed/written for patient. Pt stable and ambulatory for discharge; Daughter Susu Clarke to provide transportation to home. Clarified all personal belongings sent with patient, including L hearing aid and eyeglasses.         Pt transferred to Phase II and d/c completed by writer

## 2021-03-09 NOTE — DISCHARGE INSTRUCTIONS
Patient Education     Wound Care: After Your Visit  Your Care Instructions  Taking good care of your wound at home will help it heal quickly and reduce your chance of infection. The doctor has checked you carefully, but problems can develop later. If you notice any problems or new symptoms, get medical treatment right away. Follow-up care is a key part of your treatment and safety. Be sure to make and go to all appointments, and call your doctor if you are having problems. It's also a good idea to know your test results and keep a list of the medicines you take. How can you care for yourself at home? · Clean the area with soap and water 2 times a day unless your doctor gives you different instructions. Don't use hydrogen peroxide or alcohol, which can slow healing. ·  cover the wound with a thin layer of  Bacitracin twice a dayTake pain medicines exactly as directed. Some pain is normal with a wound, but do not ignore pain that is getting worse instead of better. You could have an infection. ¨ If the doctor gave you a prescription medicine for pain, take it as prescribed. ¨ If you are not taking a prescription pain medicine, ask your doctor if you can take an over-the-counter medicine. · Your doctor closed your wound with absorbable stiches    When should you call for help? Call your doctor now or seek immediate medical care if:  · You have signs of infection, such as:  ¨ Increased pain, swelling, warmth, or redness near the wound. ¨ Red streaks leading from the wound. ¨ Pus draining from the wound. ¨ A fever. · You bleed so much from your incision that you soak one or more bandages over 2 to 4 hours. Watch closely for changes in your health, and be sure to contact your doctor if:  · The wound is not getting better each day. Where can you learn more? Go to [employer]  Enter M973 in the search box to learn more about \"Wound Care: After Your Visit. \"   © 2826-6369 [employer]. Care instructions adapted under license by [facility] (which disclaims liability or warranty for this information). This care instruction is for use with your licensed healthcare professional. If you have questions about a medical condition or this instruction, always ask your healthcare professional. [address] any warranty or liability for your use of this information. Content Version: 44.2.550226; Last Revised: April 23, 2012                 DISCHARGE SUMMARY from Nurse    PATIENT INSTRUCTIONS:    After general anesthesia or intravenous sedation, for 24 hours or while taking prescription Narcotics:  · Limit your activities  · Do not drive and operate hazardous machinery  · Do not make important personal or business decisions  · Do  not drink alcoholic beverages  · If you have not urinated within 8 hours after discharge, please contact your surgeon on call. Report the following to your surgeon:  · Excessive pain, swelling, redness or odor of or around the surgical area  · Temperature over 100.5  · Nausea and vomiting lasting longer than 4 hours or if unable to take medications  · Any signs of decreased circulation or nerve impairment to extremity: change in color, persistent  numbness, tingling, coldness or increase pain  · Any questions    Patient Education   Tramadol (By mouth)   Tramadol (TRAM-a-dol)  Treats moderate to severe pain. This medicine is a narcotic pain reliever. Brand Name(s): ConZip, FusePaq Synapryn, Ultram, Ultram ER, traMADol HCl   There may be other brand names for this medicine. When This Medicine Should Not Be Used: This medicine is not right for everyone. Do not use if you had an allergic reaction to tramadol or other narcotic medicine, or if you have stomach or bowel blockage (including paralytic ileus). How to Use This Medicine:   Long Acting Capsule, Liquid, Tablet, Dissolving Tablet, Long Acting Tablet  · Take your medicine as directed. Your dose may need to be changed several times to find what works best for you. · Make sure your hands are dry before you handle the disintegrating tablet. Peel back the foil from the blister pack, then remove the tablet. Do not push the tablet through the foil. Place the tablet in your mouth. After it has melted, swallow or take a drink of water. · Swallow the extended-release tablet whole. Do not crush, break, or chew it. · Drink plenty of liquids to help avoid constipation. · This medicine should come with a Medication Guide. Ask your pharmacist for a copy if you do not have one. · Missed dose: Take a dose as soon as you remember. If it is almost time for your next dose, wait until then and take a regular dose. Do not take extra medicine to make up for a missed dose. · Store the medicine in a closed container at room temperature, away from heat, moisture, and direct light. Drugs and Foods to Avoid:   Ask your doctor or pharmacist before using any other medicine, including over-the-counter medicines, vitamins, and herbal products. · Do not use this medicine if you are using or have used an MAO inhibitor within the past 14 days. · Some medicines can affect how tramadol works. Tell your doctor if you are using any of the following:  ¨ Carbamazepine, cyclobenzaprine, digoxin, erythromycin, ketoconazole, lithium, mirtazapine, phenytoin, promethazine, rifampin, ritonavir, quinidine, or trazodone  ¨ Blood thinner (including warfarin)  ¨ Diuretic (water pill)  ¨ Medicine to treat depression (including bupropion, fluoxetine, paroxetine, quinidine)  ¨ Phenothiazine medicine  ¨ Triptan medicine for migraine headaches  · Tell your doctor if you use anything else that makes you sleepy. Some examples are allergy medicine, narcotic pain medicine, and alcohol. Tell your doctor if you are using a muscle relaxer. · Do not drink alcohol while you are using this medicine.   Warnings While Using This Medicine:   · Tell your doctor if you are pregnant or breastfeeding, or if you have kidney disease, liver disease (including cirrhosis), gallstones, lung or breathing problems, pancreas problems, or a history of head injury, seizures, drug addiction, or depression or similar emotional problems. Tell your doctor if you have phenylketonuria. · This medicine may cause the following problems:  ¨ High risk of overdose, which can lead to death  ¨ Respiratory depression (serious breathing problem that can be life-threatening)  ¨ Serotonin syndrome (when used with certain medicines)  ¨ Unusual change in mood or behavior  · This medicine may make you dizzy, drowsy, or lightheaded. Do not drive or doing anything else that could be dangerous until you know how this medicine affects you. · This medicine can be habit-forming. Do not use more than your prescribed dose. Call your doctor if you think your medicine is not working. · Do not stop using this medicine suddenly. Your doctor will need to slowly decrease your dose before you stop it completely. · Tell any doctor or dentist who treats you that you are using this medicine. · This medicine may cause constipation, especially with long-term use. Ask your doctor if you should use a laxative to prevent and treat constipation. · This medicine could cause infertility. Talk with your doctor before using this medicine if you plan to have children. · Keep all medicine out of the reach of children. Never share your medicine with anyone.   Possible Side Effects While Using This Medicine:   Call your doctor right away if you notice any of these side effects:  · Allergic reaction: Itching or hives, swelling in your face or hands, swelling or tingling in your mouth or throat, chest tightness, trouble breathing  · Anxiety, restlessness, fast heartbeat, fever, sweating, muscle spasms, nausea, vomiting, diarrhea, seeing or hearing things that are not there  · Blistering, peeling, red skin rash  · Blue lips, fingernails, or skin  · Extreme dizziness, drowsiness, or weakness, shallow breathing, slow heartbeat, seizures, and cold, clammy skin  · Lightheadedness, dizziness, fainting  · Seizures  · Unusual mood or behavior, thoughts of killing yourself or others  · Trouble breathing  If you notice these less serious side effects, talk with your doctor:   · Constipation, loss of appetite, stomach upset  · Dry mouth  · Headache  If you notice other side effects that you think are caused by this medicine, tell your doctor. Call your doctor for medical advice about side effects. You may report side effects to FDA at 3-375-ADG-2538  © 2017 [facility] Information is for End User's use only and may not be sold, redistributed or otherwise used for commercial purposes. The above information is an  only. It is not intended as medical advice for individual conditions or treatments. Talk to your doctor, nurse or pharmacist before following any medical regimen to see if it is safe and effective for you. Patient Education       Cefuroxime (By mouth)   Cefuroxime Axetil (qvl-vi-UJC-eem AX-e-til)  Treats infections. This medicine is a cephalosporin antibiotic. Brand Name(s): Ceftin   There may be other brand names for this medicine. When This Medicine Should Not Be Used: This medicine is not right for everyone. Do not use it if you had an allergic reaction to cefuroxime or similar medicine, including penicillin antibiotics. How to Use This Medicine:   Liquid, Tablet  · Your doctor will tell you how much medicine to use. Do not use more than directed. · Tablet: Swallow whole. Do not break, crush, or chew it. The medicine tastes bitter when it is broken or crushed. If you cannot swallow the tablet whole, tell your doctor. · Oral liquid: Shake well just before you measure each dose. Measure the medicine with a marked measuring spoon, oral syringe, or medicine cup.  It is best to take this medicine with food or milk. · If you change from one form of this medicine to another, make sure you understand how much medicine to take. The dose for the tablet is not the same as the dose for the oral liquid form. · Take all of the medicine in your prescription to clear up your infection, even if you feel better after the first few doses. · Missed dose: Take a dose as soon as you remember. If it is almost time for your next dose, wait until then and take a regular dose. Do not take extra medicine to make up for a missed dose. · Tablets:Store the medicine in a closed container at room temperature, away from heat, moisture, and direct light. · Oral liquid: Store in the refrigerator. Do not freeze. Throw away any unused medicine after 10 days. Drugs and Foods to Avoid:   Ask your doctor or pharmacist before using any other medicine, including over-the-counter medicines, vitamins, and herbal products. · Some food and medicines can affect how cefuroxime works. Tell your doctor if you are using any of the following:  ¨ Probenecid  ¨ Birth control pills  ¨ Acid reducer medicine for the stomach  · If you take an antacid, take this medicine at least 1 hour before or 2 hours after the antacid. Warnings While Using This Medicine:   · Tell your doctor if you are pregnant or breastfeeding, or if you have kidney disease, liver disease, or allergies. · Tell your doctor if you have phenylketonuria (PKU). The oral liquid contains phenylalanine. · This medicine can cause diarrhea. Call your doctor if the diarrhea becomes severe, does not stop, or is bloody. Do not take any medicine to stop diarrhea until you have talked to your doctor. Diarrhea can occur 2 months or more after you stop taking this medicine. · Tell any doctor or dentist who treats you that you are using this medicine. This medicine may affect certain medical test results. · Call your doctor if your symptoms do not improve or if they get worse.   Possible Side Effects While Using This Medicine:   Call your doctor right away if you notice any of these side effects:  · Allergic reaction: Itching or hives, swelling in your face or hands, swelling or tingling in your mouth or throat, chest tightness, trouble breathing  · Blistering, peeling, red skin rash  · Severe diarrhea  If you notice these less serious side effects, talk with your doctor:   · Mild diarrhea or vomiting  If you notice other side effects that you think are caused by this medicine, tell your doctor. Call your doctor for medical advice about side effects. You may report side effects to FDA at 2-847-FDA-0321  © 2017 [facility] Information is for End User's use only and may not be sold, redistributed or otherwise used for commercial purposes. The above information is an  only. It is not intended as medical advice for individual conditions or treatments. Talk to your doctor, nurse or pharmacist before following any medical regimen to see if it is safe and effective for you.

## 2021-03-26 RX ORDER — PREDNISONE 10 MG/1
20 TABLET ORAL DAILY
Qty: 180 TAB | Refills: 3 | Status: SHIPPED | OUTPATIENT
Start: 2021-03-26 | End: 2021-03-29 | Stop reason: SDUPTHER

## 2021-03-26 NOTE — TELEPHONE ENCOUNTER
PCP: Morenita Smith MD    Last appt: 2/9/2021  Future Appointments   Date Time Provider Radha Kendrick   5/11/2021  1:45 PM Charu Plasencia., NP JEAN PAULR BS [facility]       Requested Prescriptions     Pending Prescriptions Disp Refills    predniSONE (DELTASONE) 10 mg tablet 180 Tab 3     Sig: Take 20 mg by mouth daily.

## 2021-03-29 RX ORDER — PREDNISONE 10 MG/1
20 TABLET ORAL DAILY
Qty: 180 TAB | Refills: 3 | Status: SHIPPED | OUTPATIENT
Start: 2021-03-29 | End: 2021-06-12

## 2021-04-28 NOTE — PROGRESS NOTES
Nutrition: Consult received for diet education. Discussed diet following nissen fundoplication. Continue full liquids for the next week; discussed what is included in full liquids and incorporating protein shakes as needed. When comfortable, patient can add soft foods to diet, eat slowly, and chew thoroughly. Handouts provided with diet information and questions answered. Patient agreeable to trying ensure shake with dinner tonight. Thanks. Isaiah Oneil RD Pager [phone] Normal for race

## 2021-05-11 ENCOUNTER — OFFICE VISIT (OUTPATIENT)
Dept: HEMATOLOGY | Age: 81
End: 2021-05-11
Payer: MEDICARE

## 2021-05-11 VITALS
BODY MASS INDEX: 29.09 KG/M2 | HEIGHT: 64 IN | DIASTOLIC BLOOD PRESSURE: 85 MMHG | SYSTOLIC BLOOD PRESSURE: 143 MMHG | RESPIRATION RATE: 18 BRPM | TEMPERATURE: 98.8 F | HEART RATE: 100 BPM | OXYGEN SATURATION: 93 % | WEIGHT: 170.4 LBS

## 2021-05-11 DIAGNOSIS — K75.4 AUTOIMMUNE HEPATITIS (HCC): Primary | ICD-10-CM

## 2021-05-11 PROCEDURE — 1101F PT FALLS ASSESS-DOCD LE1/YR: CPT | Performed by: NURSE PRACTITIONER

## 2021-05-11 PROCEDURE — G8753 SYS BP > OR = 140: HCPCS | Performed by: NURSE PRACTITIONER

## 2021-05-11 PROCEDURE — 99213 OFFICE O/P EST LOW 20 MIN: CPT | Performed by: NURSE PRACTITIONER

## 2021-05-11 PROCEDURE — G8536 NO DOC ELDER MAL SCRN: HCPCS | Performed by: NURSE PRACTITIONER

## 2021-05-11 PROCEDURE — G8432 DEP SCR NOT DOC, RNG: HCPCS | Performed by: NURSE PRACTITIONER

## 2021-05-11 PROCEDURE — G8427 DOCREV CUR MEDS BY ELIG CLIN: HCPCS | Performed by: NURSE PRACTITIONER

## 2021-05-11 PROCEDURE — G8419 CALC BMI OUT NRM PARAM NOF/U: HCPCS | Performed by: NURSE PRACTITIONER

## 2021-05-11 PROCEDURE — G8754 DIAS BP LESS 90: HCPCS | Performed by: NURSE PRACTITIONER

## 2021-05-11 NOTE — PROGRESS NOTES
[address] [facility]      Nai Blanc MD, Shanice Frank, Ash Hogan MD, MPH      Radha Reynolds, PA-CECILIA Gill, [facility]-BC     Lillian Gonzalez, [facility]   Ari Diaz P-C    Marcelino Edgar, [facility]       Frida Moses [facility] [address]    at [address], [address] [place] [address], [facility] 22.    [phone]    FAX: [address] [place] [address] [address], [address]    [phone]    FAX: [phone]     Patient Care Team:  Ubaldo Aguilar MD as PCP - General (Family Medicine)  Christian Chahal MD (Pulmonary Disease)  Braydon Youngblood MD as Surgeon (General Surgery)  Braydon Youngblood MD (General Surgery)  Eliel Colvin MD as Surgeon (General Surgery)  Yoselyn Chisholm MD as Surgeon (General Surgery)    Problem List  Date Reviewed: 2/9/2021          Codes Class Noted    Autoimmune hepatitis [facility]) ICD-10-CM: K75.4  ICD-9-CM: 571.42  11/1/2020        History of partial colectomy ICD-10-CM: Z90.49  ICD-9-CM: V45.72  11/1/2020        Diverticulitis ICD-10-CM: K57.92  ICD-9-CM: 562.11  11/1/2020        Penile cancer ([facility] [address] ICD-10-CM: C60.9  ICD-9-CM: 187.4  8/18/2020        Bilateral carotid artery stenosis ICD-10-CM: I65.23  ICD-9-CM: 433.10, 433.30  10/29/2019        GERD (gastroesophageal reflux disease) ICD-10-CM: K21.9  ICD-9-CM: 530.81  8/29/2018        Hyperlipidemia ICD-10-CM: E78.5  ICD-9-CM: 272.4  4/5/2012        Orthostatic hypotension (Chronic) ICD-10-CM: I95.1  ICD-9-CM: 458.0  Unknown    Overview Signed 12/22/2010 10:14 AM by Jai Carmen     Positive tilt test 10/01             PVC (premature ventricular contraction) ICD-10-CM: I49.3  ICD-9-CM: 427.69  12/21/2010    Overview Addendum 12/21/2010 11:09 AM by Alfonso Fairchild LPN [id number] PVC with periods of bigeminy. Hypertension ICD-10-CM: I10  ICD-9-CM: 401.9  12/6/2010    Overview Addendum 8/22/2011  2:08 PM by Bree Cameron LPN     6/52/2394-YJOO 60-65%. Normal.  7/23/2009-ECHO-LVEF 60%. Normal.             TIA (transient ischemic attack) ICD-10-CM: G45.9  ICD-9-CM: 435.9  12/6/2010        Leg edema ICD-10-CM: R60.0  ICD-9-CM: 782.3  12/6/2010            Guerda Luna is being seen at [address] for management of autoimmune hepatitis. The active problem list, all pertinent past medical history, medications, liver histology, radiologic findings and laboratory findings related to the liver disorder were reviewed with the patient. The patient is a 79 y.o.  male who was found to have abnormalities in liver transaminases in 2016. The most recent imaging of the liver was MRI performed in 12/2020. Results suggest the liver is normal. There is no evidence of bile duct strictures and PSC. The patient was started on prednisone 20 mg every day in 12/2020. The patient has the following symptoms which are thought to be due to the liver disease: fatigue. He is otherwise tolerating prednisone well. The patient is not currently experiencing the following symptoms of liver disease: problems concentrating, swelling of the abdomen, swelling of the lower extremities, hematemesis or hematochezia. The patient has moderate limitations in functional activities which can be attributed to other medical problems not related to the liver disease. ASSESSMENT AND PLAN:  Autoimmune hepatitis   The diagnosis was based upon serology and liver biopsy. A liver biopsy performed in 2016 is reported to show moderate inflammation and stage 2 septal fibrosis. FibroScan performed in 11/2020 was 13.6 kPa suggesting stage 3 fibrosis    MRI performed in 12/2020 did not show any evidence of PSC. Liver transaminases are elevated.  ALP is elevated. Liver function is normal. The platelet count is normal.      Will check labs today. If no response, wean off prednisone. Will type up instructions on weaning and mail to the patient with the lab result letter. Screening for hepatocellular carcinoma  HCC screening is recommended in F3 fibrotics. Up to date. Treatment of other medical problems in patients with chronic liver disease  There are no contraindications for the patient to take most medications necessary for treatment of other medical issues. Counseling for alcohol in patients with chronic liver disease  The patient was counseled regarding alcohol consumption and the effect of alcohol on chronic liver disease. The patient does not consume any significant amount of alcohol. Vaccinations   Recommend vaccination against viral hepatitis A and B as there is no documented immunity. Routine vaccinations against other bacterial and viral agents can be performed as indicated. Annual flu vaccination should be administered if indicated. ALLERGIES  Allergies   Allergen Reactions    Ace Inhibitors Other (comments)     Doesn't agree with pt    Demerol [Meperidine] Unknown (comments)     Causes unconsciousness     MEDICATIONS  Current Outpatient Medications   Medication Sig    predniSONE (DELTASONE) 10 mg tablet Take 20 mg by mouth daily.  aspirin delayed-release 81 mg tablet Take 81 mg by mouth daily.  carvediloL (COREG) 3.125 mg tablet     atorvastatin (LIPITOR) 20 mg tablet Take 2 Tabs by mouth daily.  ondansetron hcl (Zofran) 4 mg tablet Take 2 Tabs by mouth two (2) times a day. (Patient taking differently: Take 8 mg by mouth every eight (8) hours as needed.)    traZODone (DESYREL) 50 mg tablet Take 50 mg by mouth nightly as needed.  carvediloL (COREG) 12.5 mg tablet Take 2 Tabs by mouth two (2) times daily (with meals).  ascorbic acid, vitamin C, (Vitamin C) 500 mg tablet Take  by mouth.     fluticasone propionate (FLONASE) 50 mcg/actuation nasal spray 2 Sprays by Both Nostrils route daily as needed.  psyllium husk (METAMUCIL PO) Take  by mouth.  donepeziL (ARICEPT) 5 mg tablet Take 1 Tab by mouth nightly.  zolpidem (AMBIEN) 5 mg tablet Take 1 Tab by mouth nightly as needed for Sleep. Max Daily Amount: 5 mg.  pantoprazole (PROTONIX) 40 mg tablet Take 1 Tab by mouth ACB/HS.  neomycin-bacitracin-polymyxin (Neosporin, xyr-tbz-qvsoq,) 3.5mg-400 unit- 5,000 unit/gram ointment Apply  to affected area two (2) times a day. (Patient taking differently: Apply  to affected area as needed.)     No current facility-administered medications for this visit. FAMILY HISTORY:  The father  of CVA. The mother  of CVA. There is no family history of liver disease. SOCIAL HISTORY:  The patient is . The patient has 2 children and 2 grandchildren. The patient has never used tobacco products. The patient has never consumed significant amounts of alcohol. The patient used to work as . PHYSICAL EXAMINATION:  Visit Vitals  BP (!) 143/85 (BP 1 Location: Left upper arm, BP Patient Position: Sitting, BP Cuff Size: Adult)   Pulse 100   Temp 98.8 °F (37.1 °C) (Temporal)   Resp 18   Ht 5' 4\" (1.626 m)   Wt 170 lb 6.4 oz (77.3 kg)   SpO2 93%   BMI 29.25 kg/m²     General: No acute distress. Eyes: Sclera anicteric. ENT: No oral lesions. Nodes: No adenopathy. Skin: No spider angiomata. No jaundice. No palmar erythema. Respiratory: Lungs clear to auscultation. Cardiovascular: Regular heart rate. No murmurs. No JVD. Abdomen: Soft non-tender, liver size normal to percussion/palpation. Spleen not palpable. No obvious ascites. Extremities: No edema. No muscle wasting. No gross arthritic changes. Long fingernails. Neurologic: Alert and oriented. Cranial nerves grossly intact. No asterixis.     LABORATORY STUDIES:  Liver [place] [address] & Units 2021 10/30/2020   WBC 4.1 - 11.1 K/uL 7.4 7.0 3.6   ANC 1.8 - 8.0 K/UL  5.6 2.1   HGB 12.1 - 17.0 g/dL 15.3 13.8 14.5    - 400 K/uL 160 189 196   INR 0.9 - 1.2   1.0   AST 15 - 37 U/L 58 (H) 56 (H) 55 (H)   ALT 12 - 78 U/L 141 (H) 87 (H) 78 (H)   Alk Phos 45 - 117 U/L 178 (H) 277 (H) 298 (H)   Bili, Total 0.2 - 1.0 MG/DL 0.9 0.9 1.0   Bili, Direct 0.0 - 0.2 MG/DL 0.4 (H) 0.5 (H) 0.49 (H)   Albumin 3.5 - 5.0 g/dL 3.5 4.1 4.1   BUN 6 - 20 MG/DL 21 (H) 16 18   Creat 0.70 - 1.30 MG/DL 1.08 1.01 0.80   Na 136 - 145 mmol/L 142 139 139   K 3.5 - 5.1 mmol/L 4.1 3.7 4.1   Cl 97 - 108 mmol/L 107 108 105   CO2 21 - 32 mmol/L 25 26 22   Glucose 65 - 100 mg/dL 73 110 (H) 113 (H)   Magnesium 1.6 - 2.4 mg/dL        SEROLOGIES:  Serologies Latest Ref Rng & Units 10/30/2020   Hep A Ab, Total Negative Negative   Hep B Surface Ag Negative Negative   Hep B Core Ab, Total Negative Negative   Hep B Surface AB QL  Non Reactive   Hep C Ab 0.0 - 0.9 s/co ratio <0.1   Ferritin 30 - 400 ng/mL 475 (H)   Iron % Saturation 15 - 55 % 40   CYRIL, IFA  Negative   ASMCA 0 - 19 Units 95 (H)   Alpha-1 antitrypsin level 101 - 187 mg/dL 154     LIVER HISTOLOGY:  11/2020. FibroScan performed at [facility]. EkPa was 13.6. IQR/med 32%. . The results suggested a fibrosis level of F3. The CAP score suggests no evidence of fatty liver. 10/2016. Chronic hepatitis with moderate activity. Stage 2 fibrosis. Consistent with autoimmune etiology. ENDOSCOPIC PROCEDURES:  12/2019. EGD by Dr Mallory Vargas. Esophagitis. 12/2016. Colonoscopy by Dr Thedora Fabry. Adenoma polyps. RADIOLOGY:  12/2020. MRI abd/pelvis. Subcentimeter hepatic hyperplastic nodules in a cirrhotic liver. No evidence of hepatocellular carcinoma. LI-RADS Assessment Category 2: Benign finding. Cholelithiasis. Otherwise normal biliary tree. No evidence of cholangitis or biliary sclerosis. Small pancreatic body cyst is of doubtful clinical significance in this patient.     OTHER TESTING:  Not available or performed    FOLLOW-UP:  All of the issues listed above in the assessment and plan were discussed with the patient. All questions were answered. The patient expressed a clear understanding of the above. [facility] in 3 months. Labs today. Will send letter with notification of how to wean pred.     [place], [facility]  Liver [place] [address], [address] Jennifer Massey  22.  [phone]

## 2021-05-11 NOTE — PROGRESS NOTES
Identified pt with two pt identifiers(name and ). Reviewed record in preparation for visit and have obtained necessary documentation. Chief Complaint   Patient presents with    Hepatitis     f/u      Vitals:    21 1337   BP: (!) 143/85   Pulse: 100   Resp: 18   Temp: 98.8 °F (37.1 °C)   TempSrc: Temporal   SpO2: 93%   Weight: 170 lb 6.4 oz (77.3 kg)   Height: 5' 4\" (1.626 m)   PainSc:   0 - No pain       Health Maintenance Review: Patient reminded of \"due or due soon\" health maintenance. I have asked the patient to contact his/her primary care provider (PCP) for follow-up on his/her health maintenance. Coordination of Care Questionnaire:  :   1) Have you been to an emergency room, urgent care, or hospitalized since your last visit? If yes, where when, and reason for visit? no       2. Have seen or consulted any other health care provider since your last visit? If yes, where when, and reason for visit? NO      Patient is accompanied by self I have received verbal consent from Grady Cheadle to discuss any/all medical information while they are present in the room.

## 2021-05-12 ENCOUNTER — TELEPHONE (OUTPATIENT)
Dept: HEMATOLOGY | Age: 81
End: 2021-05-12

## 2021-05-12 LAB
ALBUMIN SERPL-MCNC: 4.2 G/DL (ref 3.7–4.7)
ALP SERPL-CCNC: 135 IU/L (ref 39–117)
ALT SERPL-CCNC: 76 IU/L (ref 0–44)
AST SERPL-CCNC: 34 IU/L (ref 0–40)
BILIRUB DIRECT SERPL-MCNC: 0.33 MG/DL (ref 0–0.4)
BILIRUB SERPL-MCNC: 1 MG/DL (ref 0–1.2)
BUN SERPL-MCNC: 20 MG/DL (ref 8–27)
BUN/CREAT SERPL: 19 (ref 10–24)
CALCIUM SERPL-MCNC: 9.2 MG/DL (ref 8.6–10.2)
CHLORIDE SERPL-SCNC: 107 MMOL/L (ref 96–106)
CO2 SERPL-SCNC: 22 MMOL/L (ref 20–29)
CREAT SERPL-MCNC: 1.06 MG/DL (ref 0.76–1.27)
ERYTHROCYTE [DISTWIDTH] IN BLOOD BY AUTOMATED COUNT: 14 % (ref 11.6–15.4)
GLUCOSE SERPL-MCNC: 105 MG/DL (ref 65–99)
HCT VFR BLD AUTO: 46.8 % (ref 37.5–51)
HGB BLD-MCNC: 15.7 G/DL (ref 13–17.7)
INR PPP: 1 (ref 0.9–1.2)
MCH RBC QN AUTO: 31.2 PG (ref 26.6–33)
MCHC RBC AUTO-ENTMCNC: 33.5 G/DL (ref 31.5–35.7)
MCV RBC AUTO: 93 FL (ref 79–97)
PLATELET # BLD AUTO: 189 X10E3/UL (ref 150–450)
POTASSIUM SERPL-SCNC: 3.8 MMOL/L (ref 3.5–5.2)
PROT SERPL-MCNC: 6.8 G/DL (ref 6–8.5)
PROTHROMBIN TIME: 10.4 SEC (ref 9.1–12)
RBC # BLD AUTO: 5.04 X10E6/UL (ref 4.14–5.8)
SODIUM SERPL-SCNC: 142 MMOL/L (ref 134–144)
WBC # BLD AUTO: 8.2 X10E3/UL (ref 3.4–10.8)

## 2021-06-01 ENCOUNTER — HOSPITAL ENCOUNTER (INPATIENT)
Age: 81
LOS: 11 days | Discharge: HOME | DRG: 885 | End: 2021-06-12
Attending: PSYCHIATRY & NEUROLOGY | Admitting: PSYCHIATRY & NEUROLOGY
Payer: MEDICARE

## 2021-06-01 ENCOUNTER — HOSPITAL ENCOUNTER (EMERGENCY)
Age: 81
Discharge: ANOTHER HOSPITAL | End: 2021-06-01
Attending: EMERGENCY MEDICINE
Payer: MEDICARE

## 2021-06-01 VITALS
TEMPERATURE: 98.7 F | OXYGEN SATURATION: 98 % | SYSTOLIC BLOOD PRESSURE: 175 MMHG | HEART RATE: 95 BPM | HEIGHT: 64 IN | DIASTOLIC BLOOD PRESSURE: 98 MMHG | RESPIRATION RATE: 20 BRPM | BODY MASS INDEX: 29.88 KG/M2 | WEIGHT: 175 LBS

## 2021-06-01 DIAGNOSIS — T39.1X2A SUICIDE ATTEMPT BY ACETAMINOPHEN OVERDOSE, INITIAL ENCOUNTER (HCC): Primary | ICD-10-CM

## 2021-06-01 PROBLEM — F32.A DEPRESSION: Status: ACTIVE | Noted: 2021-06-01

## 2021-06-01 PROBLEM — F03.90 DEMENTIA (HCC): Status: ACTIVE | Noted: 2021-06-01

## 2021-06-01 LAB
ALBUMIN SERPL-MCNC: 3.7 G/DL (ref 3.5–5)
ALBUMIN/GLOB SERPL: 1 {RATIO} (ref 1.1–2.2)
ALP SERPL-CCNC: 151 U/L (ref 45–117)
ALT SERPL-CCNC: 91 U/L (ref 12–78)
ANION GAP SERPL CALC-SCNC: 9 MMOL/L (ref 5–15)
APAP SERPL-MCNC: <2 UG/ML (ref 10–30)
AST SERPL-CCNC: 31 U/L (ref 15–37)
ATRIAL RATE: 119 BPM
BASOPHILS # BLD: 0.1 K/UL (ref 0–0.1)
BASOPHILS NFR BLD: 1 % (ref 0–1)
BILIRUB SERPL-MCNC: 1.4 MG/DL (ref 0.2–1)
BUN SERPL-MCNC: 21 MG/DL (ref 6–20)
BUN/CREAT SERPL: 18 (ref 12–20)
CALCIUM SERPL-MCNC: 9.6 MG/DL (ref 8.5–10.1)
CALCULATED P AXIS, ECG09: 57 DEGREES
CALCULATED R AXIS, ECG10: 31 DEGREES
CALCULATED T AXIS, ECG11: 15 DEGREES
CHLORIDE SERPL-SCNC: 107 MMOL/L (ref 97–108)
CO2 SERPL-SCNC: 22 MMOL/L (ref 21–32)
COMMENT, HOLDF: NORMAL
COVID-19 RAPID TEST, COVR: NOT DETECTED
CREAT SERPL-MCNC: 1.18 MG/DL (ref 0.7–1.3)
DIAGNOSIS, 93000: NORMAL
DIFFERENTIAL METHOD BLD: ABNORMAL
EOSINOPHIL # BLD: 0.1 K/UL (ref 0–0.4)
EOSINOPHIL NFR BLD: 1 % (ref 0–7)
ERYTHROCYTE [DISTWIDTH] IN BLOOD BY AUTOMATED COUNT: 14 % (ref 11.5–14.5)
ETHANOL SERPL-MCNC: <10 MG/DL
GLOBULIN SER CALC-MCNC: 3.8 G/DL (ref 2–4)
GLUCOSE SERPL-MCNC: 108 MG/DL (ref 65–100)
HCT VFR BLD AUTO: 46.2 % (ref 36.6–50.3)
HGB BLD-MCNC: 15.9 G/DL (ref 12.1–17)
IMM GRANULOCYTES # BLD AUTO: 0 K/UL (ref 0–0.04)
IMM GRANULOCYTES NFR BLD AUTO: 1 % (ref 0–0.5)
LYMPHOCYTES # BLD: 1.1 K/UL (ref 0.8–3.5)
LYMPHOCYTES NFR BLD: 14 % (ref 12–49)
MCH RBC QN AUTO: 31.6 PG (ref 26–34)
MCHC RBC AUTO-ENTMCNC: 34.4 G/DL (ref 30–36.5)
MCV RBC AUTO: 91.8 FL (ref 80–99)
MONOCYTES # BLD: 0.9 K/UL (ref 0–1)
MONOCYTES NFR BLD: 11 % (ref 5–13)
NEUTS SEG # BLD: 5.9 K/UL (ref 1.8–8)
NEUTS SEG NFR BLD: 72 % (ref 32–75)
NRBC # BLD: 0 K/UL (ref 0–0.01)
NRBC BLD-RTO: 0 PER 100 WBC
P-R INTERVAL, ECG05: 152 MS
PLATELET # BLD AUTO: 184 K/UL (ref 150–400)
PMV BLD AUTO: 10.5 FL (ref 8.9–12.9)
POTASSIUM SERPL-SCNC: 3.4 MMOL/L (ref 3.5–5.1)
PROT SERPL-MCNC: 7.5 G/DL (ref 6.4–8.2)
Q-T INTERVAL, ECG07: 322 MS
QRS DURATION, ECG06: 78 MS
QTC CALCULATION (BEZET), ECG08: 452 MS
RBC # BLD AUTO: 5.03 M/UL (ref 4.1–5.7)
SALICYLATES SERPL-MCNC: <1.7 MG/DL (ref 2.8–20)
SAMPLES BEING HELD,HOLD: NORMAL
SARS-COV-2, COV2: NORMAL
SODIUM SERPL-SCNC: 138 MMOL/L (ref 136–145)
SOURCE, COVRS: NORMAL
VENTRICULAR RATE, ECG03: 119 BPM
WBC # BLD AUTO: 8 K/UL (ref 4.1–11.1)

## 2021-06-01 PROCEDURE — 74011250637 HC RX REV CODE- 250/637: Performed by: PSYCHIATRY & NEUROLOGY

## 2021-06-01 PROCEDURE — 74011250637 HC RX REV CODE- 250/637: Performed by: INTERNAL MEDICINE

## 2021-06-01 PROCEDURE — 99285 EMERGENCY DEPT VISIT HI MDM: CPT

## 2021-06-01 PROCEDURE — 65220000003 HC RM SEMIPRIVATE PSYCH

## 2021-06-01 PROCEDURE — 80179 DRUG ASSAY SALICYLATE: CPT

## 2021-06-01 PROCEDURE — 82077 ASSAY SPEC XCP UR&BREATH IA: CPT

## 2021-06-01 PROCEDURE — 90791 PSYCH DIAGNOSTIC EVALUATION: CPT

## 2021-06-01 PROCEDURE — 87635 SARS-COV-2 COVID-19 AMP PRB: CPT

## 2021-06-01 PROCEDURE — 80143 DRUG ASSAY ACETAMINOPHEN: CPT

## 2021-06-01 PROCEDURE — 74011250636 HC RX REV CODE- 250/636: Performed by: EMERGENCY MEDICINE

## 2021-06-01 PROCEDURE — 93005 ELECTROCARDIOGRAM TRACING: CPT

## 2021-06-01 PROCEDURE — 80053 COMPREHEN METABOLIC PANEL: CPT

## 2021-06-01 PROCEDURE — 36415 COLL VENOUS BLD VENIPUNCTURE: CPT

## 2021-06-01 PROCEDURE — 85025 COMPLETE CBC W/AUTO DIFF WBC: CPT

## 2021-06-01 PROCEDURE — 74011250637 HC RX REV CODE- 250/637: Performed by: EMERGENCY MEDICINE

## 2021-06-01 PROCEDURE — 87636 SARSCOV2 & INF A&B AMP PRB: CPT

## 2021-06-01 RX ORDER — HYDROXYZINE PAMOATE 25 MG/1
25 CAPSULE ORAL
Status: DISCONTINUED | OUTPATIENT
Start: 2021-06-01 | End: 2021-06-12 | Stop reason: HOSPADM

## 2021-06-01 RX ORDER — CARVEDILOL 12.5 MG/1
12.5 TABLET ORAL 2 TIMES DAILY WITH MEALS
Status: DISCONTINUED | OUTPATIENT
Start: 2021-06-01 | End: 2021-06-12 | Stop reason: HOSPADM

## 2021-06-01 RX ORDER — ACETAMINOPHEN 325 MG/1
650 TABLET ORAL
Status: DISCONTINUED | OUTPATIENT
Start: 2021-06-01 | End: 2021-06-12 | Stop reason: HOSPADM

## 2021-06-01 RX ORDER — IBUPROFEN 400 MG/1
400 TABLET ORAL
Status: DISCONTINUED | OUTPATIENT
Start: 2021-06-01 | End: 2021-06-12 | Stop reason: HOSPADM

## 2021-06-01 RX ORDER — DIAZEPAM 5 MG/1
2.5 TABLET ORAL
Status: COMPLETED | OUTPATIENT
Start: 2021-06-01 | End: 2021-06-01

## 2021-06-01 RX ORDER — MAG HYDROX/ALUMINUM HYD/SIMETH 200-200-20
30 SUSPENSION, ORAL (FINAL DOSE FORM) ORAL
Status: DISCONTINUED | OUTPATIENT
Start: 2021-06-01 | End: 2021-06-12 | Stop reason: HOSPADM

## 2021-06-01 RX ORDER — ADHESIVE BANDAGE
30 BANDAGE TOPICAL DAILY PRN
Status: DISCONTINUED | OUTPATIENT
Start: 2021-06-01 | End: 2021-06-12 | Stop reason: HOSPADM

## 2021-06-01 RX ORDER — PREDNISONE 20 MG/1
20 TABLET ORAL
Status: DISCONTINUED | OUTPATIENT
Start: 2021-06-02 | End: 2021-06-12 | Stop reason: HOSPADM

## 2021-06-01 RX ORDER — DONEPEZIL HYDROCHLORIDE 5 MG/1
5 TABLET, FILM COATED ORAL
Status: DISCONTINUED | OUTPATIENT
Start: 2021-06-01 | End: 2021-06-12 | Stop reason: HOSPADM

## 2021-06-01 RX ORDER — ASPIRIN 81 MG/1
81 TABLET ORAL DAILY
Status: DISCONTINUED | OUTPATIENT
Start: 2021-06-02 | End: 2021-06-12 | Stop reason: HOSPADM

## 2021-06-01 RX ORDER — TRAZODONE HYDROCHLORIDE 50 MG/1
50 TABLET ORAL
Status: DISCONTINUED | OUTPATIENT
Start: 2021-06-01 | End: 2021-06-03

## 2021-06-01 RX ORDER — AMLODIPINE BESYLATE 5 MG/1
5 TABLET ORAL DAILY
Status: DISCONTINUED | OUTPATIENT
Start: 2021-06-01 | End: 2021-06-12 | Stop reason: HOSPADM

## 2021-06-01 RX ORDER — PANTOPRAZOLE SODIUM 40 MG/1
40 TABLET, DELAYED RELEASE ORAL
Status: DISCONTINUED | OUTPATIENT
Start: 2021-06-02 | End: 2021-06-12 | Stop reason: HOSPADM

## 2021-06-01 RX ORDER — ATORVASTATIN CALCIUM 40 MG/1
40 TABLET, FILM COATED ORAL DAILY
Status: DISCONTINUED | OUTPATIENT
Start: 2021-06-02 | End: 2021-06-12 | Stop reason: HOSPADM

## 2021-06-01 RX ADMIN — SODIUM CHLORIDE 1000 ML: 9 INJECTION, SOLUTION INTRAVENOUS at 14:44

## 2021-06-01 RX ADMIN — AMLODIPINE BESYLATE 5 MG: 5 TABLET ORAL at 18:54

## 2021-06-01 RX ADMIN — CARVEDILOL 12.5 MG: 12.5 TABLET, FILM COATED ORAL at 18:22

## 2021-06-01 RX ADMIN — HYDROXYZINE PAMOATE 25 MG: 25 CAPSULE ORAL at 18:22

## 2021-06-01 RX ADMIN — TRAZODONE HYDROCHLORIDE 50 MG: 50 TABLET ORAL at 21:21

## 2021-06-01 RX ADMIN — DIAZEPAM 2.5 MG: 5 TABLET ORAL at 14:44

## 2021-06-01 RX ADMIN — DONEPEZIL HYDROCHLORIDE 5 MG: 5 TABLET, FILM COATED ORAL at 21:21

## 2021-06-01 NOTE — ED PROVIDER NOTES
EMERGENCY DEPARTMENT HISTORY AND PHYSICAL EXAM      Date: 6/1/2021  Patient Name: Tika Lopez  Patient Age and Sex: 79 y.o. male    History of Presenting Illness     Chief Complaint   Patient presents with   [address] Problem    Suicidal       History Provided By: Patient and Patient's Daughter    Ability to gather history was limited by:     HPI: Tika Lopez, 79 y.o. male with history of dementia, CKD, COPD, no reported history of depression or mental health disorder or suicidality, brought to the emergency department by his daughter for concerns for suicide attempt last night. Reportedly approximately 9 hours prior to ED arrival the patient states that he swallowed a bottle of Norco (hydrocodone/acetaminophen) tablets. He reports that the bottle was full, and the label shows they were 25 tablets in the bottle. Patient reports that he is depressed and that he does nothing in his life and he intended to end his life. He denies any other coingestions with other medications or with alcohol. No prior history of suicidality. Location:    Quality:      Severity:    Duration:   Timing:      Context:    Modifying factors:   Associated symptoms:     Past History      The patient's medical, surgical, and social history on file were reviewed by me today.      The family history was reviewed by me today and was non-contributory, unless otherwise specified below:    Past Medical History:  Past Medical History:   Diagnosis Date    Arthritis     left arm    Bleeding ulcer 07/2019    BPH (benign prostatic hyperplasia)     CAD (coronary artery disease)     s/p stent    Cancer ([facility] [address]     penile squamous carcinoma    Chronic kidney disease     stage 2     Chronic obstructive pulmonary disease ([address]     Dementia ([facility] [address]     Elevated LFT's     Essential hypertension 9/24/01    GERD (gastroesophageal reflux disease)     hiatal hernia    Ill-defined condition 12/02/2016    faint    Liver disease \"fatty liver\" as per patient    Nephrosclerosis     Orthostatic hypotension 01    PVC's 01    Sleep apnea     no CPAP    Syncope 01    secondary to venous insuffiency        Past Surgical History:  Past Surgical History:   Procedure Laterality Date    COLONOSCOPY N/A 2016    COLON[facility] at [facility]COPY    COLONOSCOPY N/A 2019    [facility] HI RISK IND  2019         ECHO 2D ADULT  12    EF 60 - 65%; mild concentric hypertrophy; pulmonary systolic artery pressure upper limits normal    HX ABDOMINAL WALL DEFECT REPAIR  2019d     Exploratory laparotomy, segmental sigmoid colon resection and primary stapled anastomosis.  HX APPENDECTOMY  1985    HX HEENT  2020    Left temporal artery biopsy    HX HERNIA REPAIR  2018    Davinci hiatal hernia repair- Estelle MD Linda    HX OTHER SURGICAL  2020    penile lesion bx    IA CARDIAC SURG PROCEDURE UNLIST  2019    1 stent    UPPER GI ENDOSCOPY,BIOPSY  2019         UPPER GI ENDOSCOPY,DIAGNOSIS  2019            Family History:  Family History   Problem Relation Age of Onset    Stroke Mother     Stroke Father     Heart Disease Father        Social History:  Social History     Tobacco Use    Smoking status: Former Smoker     Packs/day: 3.50     Quit date: 1980     Years since quittin.4    Smokeless tobacco: Never Used   Substance Use Topics    Alcohol use: Not Currently     Comment: no alcohol since     Drug use: No       Current Medications:  No current facility-administered medications on file prior to encounter. Current Outpatient Medications on File Prior to Encounter   Medication Sig Dispense Refill    predniSONE (DELTASONE) 10 mg tablet Take 20 mg by mouth daily. 180 Tab 3    aspirin delayed-release 81 mg tablet Take 81 mg by mouth daily.  carvediloL (COREG) 3.125 mg tablet       atorvastatin (LIPITOR) 20 mg tablet Take 2 Tabs by mouth daily. 30 Tab 0    ondansetron hcl (Zofran) 4 mg tablet Take 2 Tabs by mouth two (2) times a day. (Patient taking differently: Take 8 mg by mouth every eight (8) hours as needed.) 30 Tab 0    traZODone (DESYREL) 50 mg tablet Take 50 mg by mouth nightly as needed.  carvediloL (COREG) 12.5 mg tablet Take 2 Tabs by mouth two (2) times daily (with meals). 180 Tab 1    ascorbic acid, vitamin C, (Vitamin C) 500 mg tablet Take  by mouth.  fluticasone propionate (FLONASE) 50 mcg/actuation nasal spray 2 Sprays by Both Nostrils route daily as needed.  psyllium husk (METAMUCIL PO) Take  by mouth.  donepeziL (ARICEPT) 5 mg tablet Take 1 Tab by mouth nightly. 90 Tab 1    zolpidem (AMBIEN) 5 mg tablet Take 1 Tab by mouth nightly as needed for Sleep. Max Daily Amount: 5 mg. 30 Tab 1    pantoprazole (PROTONIX) 40 mg tablet Take 1 Tab by mouth ACB/HS. 90 Tab 1    neomycin-bacitracin-polymyxin (Neosporin, och-bnx-xntrr,) 3.5mg-400 unit- 5,000 unit/gram ointment Apply  to affected area two (2) times a day. (Patient taking differently: Apply  to affected area as needed.) 1 Tube 0       Allergies: Allergies   Allergen Reactions    Ace Inhibitors Other (comments)     Doesn't agree with pt    Demerol [Meperidine] Unknown (comments)     Causes unconsciousness     Review of Systems    A complete ROS was reviewed by me today and was negative, unless otherwise specified below:    Review of Systems   Constitutional: Negative for fatigue and fever. Psychiatric/Behavioral: Positive for dysphoric mood, self-injury, sleep disturbance and suicidal ideas. All other systems reviewed and are negative.       Physical Exam   Vital Signs  Patient Vitals for the past 8 hrs:   Temp Pulse Resp BP SpO2   06/01/21 1602 98.7 °F (37.1 °C) 95 20 (!) 175/98 98 %   06/01/21 1416 -- (!) 115 22 (!) 151/86 98 %          Physical Exam  Vitals and nursing note reviewed. Constitutional:       General: He is not in acute distress. Appearance: Normal appearance. He is well-developed. He is not ill-appearing. HENT:      Head: Normocephalic and atraumatic. Mouth/Throat:      Mouth: Mucous membranes are dry. Comments: Very dry lips and mouth  Eyes:      General:         Right eye: No discharge. Left eye: No discharge. Conjunctiva/sclera: Conjunctivae normal.   Cardiovascular:      Rate and Rhythm: Normal rate and regular rhythm. Heart sounds: Normal heart sounds. No murmur heard. Pulmonary:      Effort: Pulmonary effort is normal. No respiratory distress. Breath sounds: Normal breath sounds. No wheezing. Abdominal:      General: There is no distension. Palpations: Abdomen is soft. Tenderness: There is no abdominal tenderness. Musculoskeletal:         General: No deformity. Normal range of motion. Cervical back: Normal range of motion and neck supple. Skin:     General: Skin is warm and dry. Findings: No rash. Neurological:      General: No focal deficit present. Mental Status: He is alert and oriented to person, place, and time. GCS: GCS eye subscore is 4. GCS verbal subscore is 5. GCS motor subscore is 6. Comments: Alert and oriented   Psychiatric:         Mood and Affect: Mood normal.         Speech: Speech normal.         Behavior: Behavior normal.         Thought Content:  Thought content normal.         Cognition and Memory: Cognition normal.      Comments: Disheveled         Diagnostic Study Results   Labs  Recent Results (from the past 24 hour(s))   EKG, 12 LEAD, INITIAL    Collection Time: 06/01/21  8:28 AM   Result Value Ref Range    Ventricular Rate 119 BPM    Atrial Rate 119 BPM    P-R Interval 152 ms    QRS Duration 78 ms    Q-T Interval 322 ms    QTC Calculation (Bezet) 452 ms    Calculated P Axis 57 degrees    Calculated R Axis 31 degrees Calculated T Axis 15 degrees    Diagnosis       ** Poor data quality, interpretation may be adversely affected  Recommend repeat  Sinus tachycardia  Possible Left atrial enlargement  Nonspecific ST abnormality  When compared with ECG of 18-JUN-2020 09:40,  Vent. rate has increased BY  39 BPM    Confirmed by Maryjane Mcallister MD, Bozena Tyson ([id number]) on 6/1/2021 4:29:12 PM     CBC WITH AUTOMATED DIFF    Collection Time: 06/01/21  8:52 AM   Result Value Ref Range    WBC 8.0 4.1 - 11.1 K/uL    RBC 5.03 4. 10 - 5.70 M/uL    HGB 15.9 12.1 - 17.0 g/dL    HCT 46.2 36.6 - 50.3 %    MCV 91.8 80.0 - 99.0 FL    MCH 31.6 26.0 - 34.0 PG    MCHC 34.4 30.0 - 36.5 g/dL    RDW 14.0 11.5 - 14.5 %    PLATELET 783 913 - 606 K/uL    MPV 10.5 8.9 - 12.9 FL    NRBC 0.0 0  WBC    ABSOLUTE NRBC 0.00 0.00 - 0.01 K/uL    NEUTROPHILS 72 32 - 75 %    LYMPHOCYTES 14 12 - 49 %    MONOCYTES 11 5 - 13 %    EOSINOPHILS 1 0 - 7 %    BASOPHILS 1 0 - 1 %    IMMATURE GRANULOCYTES 1 (H) 0.0 - 0.5 %    ABS. NEUTROPHILS 5.9 1.8 - 8.0 K/UL    ABS. LYMPHOCYTES 1.1 0.8 - 3.5 K/UL    ABS. MONOCYTES 0.9 0.0 - 1.0 K/UL    ABS. EOSINOPHILS 0.1 0.0 - 0.4 K/UL    ABS. BASOPHILS 0.1 0.0 - 0.1 K/UL    ABS. IMM. GRANS. 0.0 0.00 - 0.04 K/UL    DF AUTOMATED     METABOLIC PANEL, COMPREHENSIVE    Collection Time: 06/01/21  8:52 AM   Result Value Ref Range    Sodium 138 136 - 145 mmol/L    Potassium 3.4 (L) 3.5 - 5.1 mmol/L    Chloride 107 97 - 108 mmol/L    CO2 22 21 - 32 mmol/L    Anion gap 9 5 - 15 mmol/L    Glucose 108 (H) 65 - 100 mg/dL    BUN 21 (H) 6 - 20 MG/DL    Creatinine 1.18 0.70 - 1.30 MG/DL    BUN/Creatinine ratio 18 12 - 20      GFR est AA >60 >60 ml/min/1.73m2    GFR est non-AA 60 (L) >60 ml/min/1.73m2    Calcium 9.6 8.5 - 10.1 MG/DL    Bilirubin, total 1.4 (H) 0.2 - 1.0 MG/DL    ALT (SGPT) 91 (H) 12 - 78 U/L    AST (SGOT) 31 15 - 37 U/L    Alk.  phosphatase 151 (H) 45 - 117 U/L    Protein, total 7.5 6.4 - 8.2 g/dL    Albumin 3.7 3.5 - 5.0 g/dL    Globulin 3.8 2.0 - 4.0 g/dL    A-G Ratio 1.0 (L) 1.1 - 2.2     SAMPLES BEING HELD    Collection Time: 06/01/21  8:53 AM   Result Value Ref Range    SAMPLES BEING HELD  RED     COMMENT        Add-on orders for these samples will be processed based on acceptable specimen integrity and analyte stability, which may vary by analyte.    ACETAMINOPHEN    Collection Time: 06/01/21  8:53 AM   Result Value Ref Range    Acetaminophen level <2 (L) 10 - 30 ug/mL   SALICYLATE    Collection Time: 06/01/21  8:53 AM   Result Value Ref Range    Salicylate level <6.7 (L) 2.8 - 20.0 MG/DL   ETHYL ALCOHOL    Collection Time: 06/01/21  8:53 AM   Result Value Ref Range    ALCOHOL(ETHYL),SERUM <10 <10 MG/DL   SARS-COV-2    Collection Time: 06/01/21  9:30 AM   Result Value Ref Range    SARS-CoV-2 Please find results under separate order     COVID-19 RAPID TEST    Collection Time: 06/01/21  9:30 AM   Result Value Ref Range    Specimen source Nasopharyngeal      COVID-19 rapid test Not detected NOTD         Radiologic Studies  No orders to display     CT Results  (Last 48 hours)    None        CXR Results  (Last 48 hours)    None          Billable Procedures   Critical Care  Performed by: Vikash Royal MD  Authorized by: Vikash Royal MD     Critical care provider statement:     Critical care time (minutes):  35    Critical care time was exclusive of:  Separately billable procedures and treating other patients    Critical care was necessary to treat or prevent imminent or life-threatening deterioration of the following conditions:  Metabolic crisis    Critical care was time spent personally by me on the following activities:  Ordering and review of laboratory studies, pulse oximetry, re-evaluation of patient's condition, examination of patient, evaluation of patient's response to treatment, ordering and performing treatments and interventions, review of old charts and discussions with consultants        Cardiac monitoring was not ordered for this patient. Medical Decision Making     I reviewed the patient's most recent Emergency Dept notes and diagnostic tests in formulating my MDM on today's visit. Provider Notes (Medical Decision Making):   60-year-old male presenting with suicide attempt approximately 9 hours ago. He reportedly swallowed 25 tablets of hydrocodone/acetaminophen, denies any other coingestions with other medications or alcohol. On examination he is alert, cooperative, hypertensive, tachycardic. Very dry mucous membranes. No focal neuro deficits. Laboratories are reassuring, undetectable acetaminophen level, salicylate level, ethyl alcohol level. Reassuring LFTs. I consulted poison control and we agree that given the reported time of ingestion is approximately 9 hours prior to ED arrival, with normal laboratories, there is no further testing or treatment indicated. Patient was administered normal saline. He was medically cleared for psychiatric evaluation, seen by [facility] health team, and he will be admitted for psychiatric treatment. Critical care 35 minutes. Steven Lantigua MD  9:27 AM  2021     Consults:    Social History     Tobacco Use    Smoking status: Former Smoker     Packs/day: 3.50     Quit date: 1980     Years since quittin.4    Smokeless tobacco: Never Used   Substance Use Topics    Alcohol use: Not Currently     Comment: no alcohol since     Drug use: No       Medications Administered during ED course:  Medications   diazePAM (VALIUM) tablet 2.5 mg (2.5 mg Oral Given 21 1444)   sodium chloride 0.9 % bolus infusion 1,000 mL (0 mL IntraVENous IV Completed 21 1544)          Prescriptions from today's ED visit:  Discharge Medication List as of 2021  4:21 PM         Diagnosis and Disposition     Disposition:  Transferred to Another Facility    Clinical Impression:   1.  Suicide attempt by acetaminophen overdose, initial encounter [facility])        Attestation:  I personally performed the services described in this documentation on this date 6/1/2021 for patient Cortez Mcfarlane. Elva Negrete MD        I was the first provider for this patient on this visit. To the best of my ability I reviewed relevant prior medical records, electrocardiograms, laboratories, and radiologic studies. The patient's presenting problems were discussed, and the patient was in agreement with the care plan formulated and outlined with them. Elva Negrete MD    Please note that this dictation was completed with Dragon voice recognition software. Quite often unanticipated grammatical, syntax, homophones, and other interpretive errors are inadvertently transcribed by the computer software. Please disregard these errors and excuse any errors that have escaped final proofreading.

## 2021-06-01 NOTE — BH NOTES
Shift change report given to Jillian Colmenares RN re: SBAR, medications, and behavior.   Roxann fall risk score 1

## 2021-06-01 NOTE — ROUTINE PROCESS
Shift change report given to Florentin Woodruff Rn, re: SBAR. Medications, and behavior. Roxann Fall Risk Score (4)

## 2021-06-01 NOTE — BH NOTES
Admission Note:    Patient arrived on the unit @ 1801 on a voluntary admission from [facility] ED. Patient was escorted on the unit by [facility]. Dr. Michael Flores and Nursing Supervisor were notified of patient's arrival.  Hospitalist Dr. Svitlana Lu notified for Medical H&P. Patient is alert & oriented to person. Patient denied having any license with the [employer]. Patient has unsteady gait and provided a walker on admission to assist with ambulating. Treatment Plan Initiated.

## 2021-06-01 NOTE — BH NOTES
TRANSFER - IN REPORT:    Verbal report received from Mily Hua RN to Solis Pierre RN on Mitzyleatha Brown  being received from [id number] [address] [facility] ED for routine progression of care      Report consisted of patients Situation, Background, Assessment and   Recommendations(SBAR). Information from the following report(s) SBAR, MAR, Accordion and Recent Results was reviewed with the receiving nurse. Opportunity for questions and clarification was provided.

## 2021-06-01 NOTE — BSMART NOTE
Patient has been accepted to [facility] [address] Bed B by Dr Jesse Pearl. Report can be called at [phone].  Address of facility is [address], [facility] [address]

## 2021-06-01 NOTE — MASTER TREATMENT PLAN
[address] [id number] Master Treatment Plan for Venessa Theodore Date Treatment Plan Initiated: 6/1/2021 Treatment Plan Modalities: 
Type of Modality Amount (x minutes) Frequency (x/week) Duration (x days) Name of Responsible Staff Community & wrap-up meetings to encourage peer interactions 30 14 1 Kaden HAWTHORNE, CNA/MHT Abdi Leo., MHT Group psychotherapy to assist in building coping skills and internal controls [address] [facility] Churchill., [facility] Therapeutic activity groups to build coping skills 60 7 1 Carlos Contreras., T Psychoeducation in group setting to address:  
Medication education Coping Skills Symptom Management [address] [facility] Churchill., [facility] Génesis Alfaro., RY Smart RN Discharge planning [address] [address] Spirituality 60 2 1 [facility].  
Physician medication management 15 7 1 MD OLIVER Brody. Amena Smith MD  
      
      
 
      
 
      
 
 
Treatment Team Signatures I have participated in the development of this plan of treatment and agree to its implementation. Patient Signature Patient Printed Name Date/Time Social Work/Therapist Signature Social Work/Therapist Printed Name Date/Time RN Signature RN Printed Name Date/Time Other Signature Other Signature Date/Time MD Signature MD Printed Name Date/Time

## 2021-06-01 NOTE — ED TRIAGE NOTES
Assumed care of pt via EMS stretcher. Pt is A&O x 3. Pt is not oriented to time. Twelve hours ago pt took 25 Percocet to end his life. Pt was with his daughter and his daughter found out this morning and called 911. Pt only complaint at this time is that he is hot and he is mouth dry. When this RN asks pt why he took the percocet, pt states, \"I don't know. \" Pt reports that he is having thoughts of SI at this time. Pt changed into paper scrubs at this time and sitter at bedside for safety. 1313: Pt provided with lunch tray. 1332: BSMART currently evaluating pt at this time. 1459: TRANSFER - OUT REPORT:    Verbal report given to Yeyo Balderrama RN (name) on Oris Lie  being transferred to [facility] (unit) for routine progression of care       Report consisted of patients Situation, Background, Assessment and   Recommendations(SBAR). Information from the following report(s) SBAR, ED Summary and Recent Results was reviewed with the receiving nurse. Lines:   Peripheral IV 06/01/21 Left Forearm (Active)   Site Assessment Clean, dry, & intact 06/01/21 0827   Phlebitis Assessment 0 06/01/21 0827   Infiltration Assessment 0 06/01/21 0827   Dressing Status Clean, dry, & intact 06/01/21 0827   Dressing Type Tape;Transparent 06/01/21 0827   Hub Color/Line Status Pink;Flushed;Patent 06/01/21 0827        Opportunity for questions and clarification was provided. Patient transported with:  [facility]    1619: [facility] here to transport pt.

## 2021-06-01 NOTE — PROGRESS NOTES
Problem: Pressure Injury - Risk of  Goal: *Prevention of pressure injury  Description: Document Sergey Scale and appropriate interventions in the flowsheet.   Outcome: Progressing Towards Goal  Note: Pressure Injury Interventions:  Sensory Interventions: Assess changes in LOC    Moisture Interventions: Absorbent underpads    Activity Interventions: PT/OT evaluation    Mobility Interventions: PT/OT evaluation    Nutrition Interventions: Document food/fluid/supplement intake    Friction and Shear Interventions: Apply protective barrier, creams and emollients

## 2021-06-01 NOTE — BSMART NOTE
Comprehensive Assessment Form Part 1      Section I - Disposition    Axis I - Major Depressive d/o, recurrent, severe, without psychotic features         Dementia (mild)   Axis II - deferred  Axis III - Partial Penectomy 9/2020          Autoimmune hepatitis by hx          Coronary artery disease with stable angina pectoris ([address] by hx  Axis IV - lives alone, likely needs higher level of care (AL), noncompliant with Rx meds, lack of structure  [place] V - 39      The Medical Doctor to Psychiatrist conference was not completed. Medical doctor is in agreement with this counselor's assessment and plan of care. The plan is to admit to [facility] [address]. The physician consulted was Dr. Alfred Crawford. The admitting Psychiatrist will be Dr. Ten De Jesus. The admitting Diagnosis is Major Depressive d/o, recurrent, severe, without psychotic features  The Payor source is [address] [place] [address]. Section II - Integrated Summary  Summary:    Patient is a 78 yo white male who arrives at ED via EMS accompanied by daughter/Heather ([address] [facility]) [phone] with chief complaint of intentional overdose of 25 tabs of Percocet at approximately 11pm last night. Patient's daughter found out this morning and called 911. Patient states he swallowed a bottle of Norco (hydrocodone/acetaminophen) tablets. He reports the bottle was full. Label shows there were 25 tablets prescribed. Patient reports he is depressed, does nothing in life, and intended to end his life when he took the tablets. Per ED provider, labs do not indicate patient took as many Norco tabs as reported. Poison Control was contacted, labs results reviewed, and Poison Control and ED provider have subsequently cleared patient. Daughter reports that this particular bottle was 35 years old and was \"no where full at the time. \"    Patient presents depressed and slightly disheveled but is alert and oriented X3 (did not know the date or time).  Per daughter, patient was diagnosed with dementia in 2019. However, patient was able to provide correct address, phone number of daughter, and number of children and their respective ages without difficulty. He reports he is tired of living, is frustrated \"because they cut my penis off\" (partial penectomy in Sept 2020), and is tired of feeling depressed. Patient lives alone and daughter checks on him regularly. This writer discussed the likely need for higher level of care (assisted living, etc)  Daughter reports she will use the next few days to explore this option. Patient ambulates without assistance and is able to accomplish all ADLs. Daughter reports patient has not been med compliant and is very selective regarding which meds he takes and how often. His thought process is linear, organized, and coherent. Patient's memory indicates mild dementia. Patient continues to endorse SI with intention to \"kill\" himself. He denies homicidal ideation and does not appear to be agitated or aggressive. He denies auditory/visual hallucinations, demonstrates no delusional thoughts, and does not appear to be responding to internal stimuli. Patient's ETOH is <10, drug screen is pending, and Covid test is negative. Patient has no history of psych admissions, reports no previous suicide attempts, and is not followed by a psychiatrist or counselor. Patient is amenable to a psychiatric admission. Daughter has medical power of  and can be reached at [phone]. Plan is to admit to [facility]. The patient has demonstrated mental capacity to provide informed consent. The information is given by the patient, relative(s) and past medical records. The Chief Complaint is depression and intentional overdose. The Precipitant Factors are lives alone, multiple medical problems,  noncompliant with Rx meds, lack of structure. Previous Hospitalizations: none reported  The patient has not previously been in restraints.   Current Psychiatrist and/or  is n/a. Lethality Assessment:    The potential for suicide noted by the following: intent, defined plan, current attempt, ideation and means . The potential for homicide is not noted. The patient has not been a perpetrator of sexual or physical abuse. There are not pending charges. The patient is felt to be at risk for self harm or harm to others. The attending nurse was advised to remove potentially harmful or dangerous items from the patient's room , to request a search of the patient's belongings, to remove patient clothing and place it out of immediate access to the patient, the patient is at risk for self harm and the patient needs supervision. Section III - Psychosocial  The patient's overall mood and attitude is depressed. Feelings of helplessness and hopelessness are observed by affect, mood, and self report. Generalized anxiety is not observed. Panic is not observed. Phobias are not observed. Obsessive compulsive tendencies are not observed. Section IV - Mental Status Exam  The patient's appearance is unkempt. The patient's behavior is restless. The patient is only aware of  place, person and situation. The patient's speech is soft. The patient's mood is depressed, is withdrawn and displays anhedonia. The range of affect is flat. The patient's thought content demonstrates no evidence of impairment. The thought process shows no evidence of impairment. The patient's perception shows no evidence of impairment. The patient's memory is mildly impaired. The patient's appetite shows no evidence of impairment. The patient's sleep shows no evidence of impairment. The patient's insight shows no evidence of impairment. The patient's judgement shows no evidence of impairment. Section V - Substance Abuse  The patient is not using substances. Section VI - Living Arrangements  The patient is . The patient lives alone.  The patient has 3 children ages 50, 60, 65. The patient does plan to return home upon discharge. The patient does not have legal issues pending. The patient's source of income comes from disability and social security. Jain and cultural practices have not been voiced at this time. The patient's greatest support comes from daughter/Heather [employer] [facility]) and this person will be involved with the treatment. The patient has not been in an event described as horrible or outside the realm of ordinary life experience either currently or in the past.  The patient has not been a victim of sexual/physical abuse. Section VII - Other Areas of Clinical Concern  The highest grade achieved is 8th with the overall quality of school experience being described as fair. The patient is currently retired and speaks [place] as a primary language. The patient has no communication impairments affecting communication. The patient's preference for learning can be described as: learns best by oral information.   The patient's hearing is normal.  The patient's vision is normal.      Yelena Hart, LPC

## 2021-06-02 PROBLEM — F33.2 SEVERE RECURRENT MAJOR DEPRESSION WITHOUT PSYCHOTIC FEATURES (HCC): Status: ACTIVE | Noted: 2021-06-01

## 2021-06-02 LAB
FLUAV RNA SPEC QL NAA+PROBE: NOT DETECTED
FLUBV RNA SPEC QL NAA+PROBE: NOT DETECTED
SARS-COV-2, COV2: NOT DETECTED

## 2021-06-02 PROCEDURE — 65220000003 HC RM SEMIPRIVATE PSYCH

## 2021-06-02 PROCEDURE — 90791 PSYCH DIAGNOSTIC EVALUATION: CPT | Performed by: PSYCHIATRY & NEUROLOGY

## 2021-06-02 PROCEDURE — 97161 PT EVAL LOW COMPLEX 20 MIN: CPT

## 2021-06-02 PROCEDURE — 97165 OT EVAL LOW COMPLEX 30 MIN: CPT

## 2021-06-02 PROCEDURE — 97116 GAIT TRAINING THERAPY: CPT

## 2021-06-02 PROCEDURE — 74011250637 HC RX REV CODE- 250/637: Performed by: PSYCHIATRY & NEUROLOGY

## 2021-06-02 PROCEDURE — 74011250637 HC RX REV CODE- 250/637: Performed by: INTERNAL MEDICINE

## 2021-06-02 PROCEDURE — 74011636637 HC RX REV CODE- 636/637: Performed by: PSYCHIATRY & NEUROLOGY

## 2021-06-02 RX ORDER — ESCITALOPRAM OXALATE 10 MG/1
5 TABLET ORAL DAILY
Status: DISCONTINUED | OUTPATIENT
Start: 2021-06-02 | End: 2021-06-12 | Stop reason: HOSPADM

## 2021-06-02 RX ADMIN — TRAZODONE HYDROCHLORIDE 50 MG: 50 TABLET ORAL at 21:23

## 2021-06-02 RX ADMIN — AMLODIPINE BESYLATE 5 MG: 5 TABLET ORAL at 08:03

## 2021-06-02 RX ADMIN — CARVEDILOL 12.5 MG: 12.5 TABLET, FILM COATED ORAL at 07:56

## 2021-06-02 RX ADMIN — ASPIRIN 81 MG: 81 TABLET, COATED ORAL at 08:03

## 2021-06-02 RX ADMIN — PANTOPRAZOLE SODIUM 40 MG: 40 TABLET, DELAYED RELEASE ORAL at 06:35

## 2021-06-02 RX ADMIN — DONEPEZIL HYDROCHLORIDE 5 MG: 5 TABLET, FILM COATED ORAL at 21:23

## 2021-06-02 RX ADMIN — PREDNISONE 20 MG: 20 TABLET ORAL at 07:57

## 2021-06-02 RX ADMIN — ATORVASTATIN CALCIUM 40 MG: 40 TABLET, FILM COATED ORAL at 08:03

## 2021-06-02 RX ADMIN — CARVEDILOL 12.5 MG: 12.5 TABLET, FILM COATED ORAL at 17:44

## 2021-06-02 RX ADMIN — ESCITALOPRAM OXALATE 5 MG: 10 TABLET ORAL at 09:43

## 2021-06-02 NOTE — GROUP NOTE
[facility] GROUP DOCUMENTATION INDIVIDUAL Group Therapy Note Date: 6/2/2021 Group Start Time: 1300 Group End Time: 3512 Group Topic: Process Group - Inpatient 1 [place] [facility] GROUP DOCUMENTATION GROUP Group Therapy Note Focus of session was on establishing daily routine and using tools such as a calendar and journals to help establish it. We spoke about how we benefit when we have a sense of what our priorities are and when we are going to get them done. I shared with group members a template for journaling and noting ideas for what their daily expectations are for themselves, what their expectations are from others, something to do daily for recreation and enjoyment, and what to focus on to improve self-esteem. Group members were asked to share the impact that using tools for organization may impact their self-esteem and confidence. Attendance: Attended Patient's Goal: 
  
Verbalizes anger, guilt, and other feelings in a constructive manor Interventions/techniques: Informed, Validated and Supported Follows Directions: Followed directions Interactions: Interacted appropriately Mental Status: Calm and Congruent Behavior/appearance: Cooperative, Disheveled and Needed prompting Goals Achieved: Able to engage in interactions Additional Notes:  Pt attempted to participate with the activity and the other members. He is limited in his ability to do so but did introduce himself and gave me his paper back stating he had \"signed \" it. He was quiet throughout the group but was pleasant and  cooperative. Matt Amador

## 2021-06-02 NOTE — H&P
History & Physical    Primary Care Provider: Adolfo Jimenez MD  Source of Information: Patient/family     History of Presenting Illness:   Amparo Crawford is a 79 y.o. male with a myriad of chronic medical problems as outlined below who was admitted to the behavioral health unit yesterday as a transfer from [facility] after presenting there with an overdose of [address] in an attempt to end his life. Laboratory evaluation showed an undetectable acetaminophen level and unremarkable LFTs. He was not felt to require any further treatment with N-acetylcysteine    Upon arrival to the unit he was hypertensive and tachycardic    I added amlodipine to his regimen yesterday and blood pressure this morning is down to 138/68, pulse 100    It appears patient is on chronic corticosteroids (prednisone) for autoimmune hepatitis    Rapid COVID-19 testing was negative       Review of Systems:  A comprehensive review of systems was negative except for that written in the History of Present Illness.      Past Medical History:   Diagnosis Date    Arthritis     left arm    Autoimmune hepatitis ([address]     Bleeding ulcer 07/2019    BPH (benign prostatic hyperplasia)     CAD (coronary artery disease)     s/p stent    Cancer (HCC)     penile squamous carcinoma    Chronic kidney disease     stage 2     Chronic obstructive pulmonary disease (HCC)     Dementia (HCC)     Elevated LFT's     Essential hypertension 9/24/01    GERD (gastroesophageal reflux disease)     hiatal hernia    Ill-defined condition 12/02/2016    faint    Nephrosclerosis     Orthostatic hypotension 9/24/01    PVC's 9/24/01    Sleep apnea     no CPAP    Syncope 9/24/01    secondary to venous insuffiency         Past Surgical History:   Procedure Laterality Date    COLONOSCOPY N/A 12/13/2016    COLONOSCOPY [facility]    COLONOSCOPY N/A 12/11/2019    COLONOSCOPY performed by John Crouch Abby Oseguera MD at hospitals ENDOSCOPY   [facility] SCRN; HI RISK IND  12/11/2019         ECHO 2D ADULT  5/24/12    EF 60 - 65%; mild concentric hypertrophy; pulmonary systolic artery pressure upper limits normal    HX ABDOMINAL WALL DEFECT REPAIR  07/01/2019d     Exploratory laparotomy, segmental sigmoid colon resection and primary stapled anastomosis.  HX APPENDECTOMY  1985    HX HEENT  02/18/2020    Left temporal artery biopsy    HX HERNIA REPAIR  08/29/2018    Davinci hiatal hernia repair- Supriya Sheikh MD    HX OTHER SURGICAL  08/2020    penile lesion bx    [place] CARDIAC SURG PROCEDURE UNLIST  05/13/2019    1 stent    UPPER GI ENDOSCOPY,BIOPSY  12/11/2019         UPPER GI ENDOSCOPY,DIAGNOSIS  7/18/2019            Prior to Admission medications    Medication Sig Start Date End Date Taking? Authorizing Provider   predniSONE (DELTASONE) 10 mg tablet Take 20 mg by mouth daily. 3/29/21   Aleksey Vasquez MD   aspirin delayed-release 81 mg tablet Take 81 mg by mouth daily. Provider, Historical   carvediloL (COREG) 3.125 mg tablet  1/5/21   Provider, Historical   atorvastatin (LIPITOR) 20 mg tablet Take 2 Tabs by mouth daily. 1/1/21   Aleksey Vasquez MD   ondansetron hcl (Zofran) 4 mg tablet Take 2 Tabs by mouth two (2) times a day. Patient taking differently: Take 8 mg by mouth every eight (8) hours as needed. 1/1/21   Aleksey Vasquez MD   traZODone (DESYREL) 50 mg tablet Take 50 mg by mouth nightly as needed. 10/31/20   Provider, Historical   carvediloL (COREG) 12.5 mg tablet Take 2 Tabs by mouth two (2) times daily (with meals). 11/27/20   Elise Cuello MD   ascorbic acid, vitamin C, (Vitamin C) 500 mg tablet Take  by mouth. Provider, Historical   fluticasone propionate (FLONASE) 50 mcg/actuation nasal spray 2 Sprays by Both Nostrils route daily as needed. Provider, Historical   psyllium husk (METAMUCIL PO) Take  by mouth.     Provider, Historical   donepeziL (ARICEPT) 5 mg tablet Take 1 Tab by mouth nightly. 10/8/20   Chirag Edgar MD   zolpidem (AMBIEN) 5 mg tablet Take 1 Tab by mouth nightly as needed for Sleep. Max Daily Amount: 5 mg. 10/8/20   hCirag Edgar MD   pantoprazole (PROTONIX) 40 mg tablet Take 1 Tab by mouth ACB/HS. 10/8/20   Chirag Edgar MD   neomycin-bacitracin-polymyxin (Neosporin, [id number]-bkrvb,) 3.5mg-400 unit- 5,000 unit/gram ointment Apply  to affected area two (2) times a day. Patient taking differently: Apply  to affected area as needed. 20   Robby Corrigan MD       Allergies   Allergen Reactions    Ace Inhibitors Other (comments)     Doesn't agree with pt    Demerol [Meperidine] Unknown (comments)     Causes unconsciousness        Family History   Problem Relation Age of Onset    Stroke Mother     Stroke Father     Heart Disease Father         Social History     Socioeconomic History    Marital status: SINGLE     Spouse name: Not on file    Number of children: Not on file    Years of education: Not on file    Highest education level: Not on file   Tobacco Use    Smoking status: Former Smoker     Packs/day: 3.50     Quit date: 1980     Years since quittin.4    Smokeless tobacco: Never Used   Substance and Sexual Activity    Alcohol use: Not Currently     Comment: no alcohol since     Drug use: No   Other Topics Concern     Social Determinants of Health     Financial Resource Strain:     Difficulty of Paying Living Expenses:    Food Insecurity:     Worried About Running Out of Food in the Last Year:     [address] in the Last Year:    Transportation Needs:     Lack of Transportation (Medical):      Lack of Transportation (Non-Medical):    Physical Activity:     Days of Exercise per Week:     Minutes of Exercise per Session:    Stress:     Feeling of Stress :    Social Connections:     Frequency of Communication with Friends and Family:     Frequency of Social Gatherings with Friends and Family:     Attends [employer] Services:     Active Member of Clubs or Organizations:     Attends Club or Organization Meetings:     Marital Status:             CODE STATUS:  DNR    Full    Other      Objective:     Physical Exam:     Visit Vitals  /68   Pulse 100   Temp 98.4 °F (36.9 °C)   Resp 16   SpO2 95%           General:  Alert, cooperative, no distress, appears stated age. Head:  Normocephalic, without obvious abnormality, atraumatic. Eyes:  Conjunctivae/corneas clear. PERRL, EOMs intact. Nose: Nares normal. Septum midline. Mucosa normal. No drainage or sinus tenderness. Throat: Lips, mucosa, and tongue normal. Teeth and gums normal.   Neck: Supple, symmetrical, trachea midline, no adenopathy, thyroid: no enlargement/tenderness/nodules, no carotid bruit and no JVD. Back:   Symmetric, no curvature. ROM normal. No CVA tenderness. Lungs:   Clear to auscultation bilaterally. Chest wall:  No tenderness or deformity. Heart:  Regular rate and rhythm, S1, S2 normal, no murmur, click, rub or gallop. Abdomen:   Soft, non-tender. Bowel sounds normal. No masses,  No organomegaly. Extremities: Extremities normal, atraumatic, no cyanosis or edema. Pulses: 2+ and symmetric all extremities. Skin: Skin color, texture, turgor normal. No rashes or lesions   Neurologic: CNII-XII intact. No motor or sensory deficits. 24 Hour Results:    Recent Results (from the past 24 hour(s))   CBC WITH AUTOMATED DIFF    Collection Time: 06/01/21  8:52 AM   Result Value Ref Range    WBC 8.0 4.1 - 11.1 K/uL    RBC 5.03 4. 10 - 5.70 M/uL    HGB 15.9 12.1 - 17.0 g/dL    HCT 46.2 36.6 - 50.3 %    MCV 91.8 80.0 - 99.0 FL    MCH 31.6 26.0 - 34.0 PG    MCHC 34.4 30.0 - 36.5 g/dL    RDW 14.0 11.5 - 14.5 %    PLATELET 752 302 - 771 K/uL    MPV 10.5 8.9 - 12.9 FL    NRBC 0.0 0  WBC    ABSOLUTE NRBC 0.00 0.00 - 0.01 K/uL    NEUTROPHILS 72 32 - 75 %    LYMPHOCYTES 14 12 - 49 %    MONOCYTES 11 5 - 13 %    EOSINOPHILS 1 0 - 7 %    BASOPHILS 1 0 - 1 %    IMMATURE GRANULOCYTES 1 (H) 0.0 - 0.5 %    ABS. NEUTROPHILS 5.9 1.8 - 8.0 K/UL    ABS. LYMPHOCYTES 1.1 0.8 - 3.5 K/UL    ABS. MONOCYTES 0.9 0.0 - 1.0 K/UL    ABS. EOSINOPHILS 0.1 0.0 - 0.4 K/UL    ABS. BASOPHILS 0.1 0.0 - 0.1 K/UL    ABS. IMM. GRANS. 0.0 0.00 - 0.04 K/UL    DF AUTOMATED     METABOLIC PANEL, COMPREHENSIVE    Collection Time: 06/01/21  8:52 AM   Result Value Ref Range    Sodium 138 136 - 145 mmol/L    Potassium 3.4 (L) 3.5 - 5.1 mmol/L    Chloride 107 97 - 108 mmol/L    CO2 22 21 - 32 mmol/L    Anion gap 9 5 - 15 mmol/L    Glucose 108 (H) 65 - 100 mg/dL    BUN 21 (H) 6 - 20 MG/DL    Creatinine 1.18 0.70 - 1.30 MG/DL    BUN/Creatinine ratio 18 12 - 20      GFR est AA >60 >60 ml/min/1.73m2    GFR est non-AA 60 (L) >60 ml/min/1.73m2    Calcium 9.6 8.5 - 10.1 MG/DL    Bilirubin, total 1.4 (H) 0.2 - 1.0 MG/DL    ALT (SGPT) 91 (H) 12 - 78 U/L    AST (SGOT) 31 15 - 37 U/L    Alk. phosphatase 151 (H) 45 - 117 U/L    Protein, total 7.5 6.4 - 8.2 g/dL    Albumin 3.7 3.5 - 5.0 g/dL    Globulin 3.8 2.0 - 4.0 g/dL    A-G Ratio 1.0 (L) 1.1 - 2.2     SAMPLES BEING HELD    Collection Time: 06/01/21  8:53 AM   Result Value Ref Range    SAMPLES BEING HELD  RED     COMMENT        Add-on orders for these samples will be processed based on acceptable specimen integrity and analyte stability, which may vary by analyte.    ACETAMINOPHEN    Collection Time: 06/01/21  8:53 AM   Result Value Ref Range    Acetaminophen level <2 (L) 10 - 30 ug/mL   SALICYLATE    Collection Time: 06/01/21  8:53 AM   Result Value Ref Range    Salicylate level <3.4 (L) 2.8 - 20.0 MG/DL   ETHYL ALCOHOL    Collection Time: 06/01/21  8:53 AM   Result Value Ref Range    ALCOHOL(ETHYL),SERUM <10 <10 MG/DL   SARS-COV-2    Collection Time: 06/01/21  9:30 AM   Result Value Ref Range    SARS-CoV-2 Please find results under separate order     COVID-19 RAPID TEST    Collection Time: 06/01/21  9:30 AM   Result Value Ref Range Specimen source Nasopharyngeal      COVID-19 rapid test Not detected NOTD     COVID-19 WITH INFLUENZA A/B    Collection Time: 06/01/21  9:30 AM   Result Value Ref Range    SARS-CoV-2 Not detected NOTD      Influenza A by PCR Not detected NOTD      Influenza B by PCR Not detected NOTD           Imaging:   No orders to display           Assessment:   Intentional [place] overdose    Coronary artery disease with history of PCI    Chronic kidney disease stage II    BPH    Obstructive sleep apnea    COPD without exacerbation      History of dementia    GERD    Hypertension    Autoimmune hepatitis, on chronic prednisone therapy    Hyperlipidemia        Plan:   Continue home medications as ordered  I have added amlodipine to his regimen  Patient currently stable from a medical standpoint    CODE STATUS is full    Home medications were reviewed    Signed By: Grace Whiting MD     June 2, 2021

## 2021-06-02 NOTE — BH NOTES
Madison Byrne was not out for the Wrap-Up Group tonight. He went to bed early tonight.     Sintia SenderYOKO

## 2021-06-02 NOTE — BH NOTES
Affect blunted, mood depressed/anxious. Pt did not attend or participate in scheduled group activities. Pt remained isolative to his room, and a bit confused. Pt denies SI/HI/AVH/pain. Pt was given a walker to assist with ambulation since he is unsteady on his feet. Staff will assist with transfers and ADL's. Pt was compliant with medications and given a PRN. Pt refused snack. Pt is in no apparent distress at this time and made no delusional statements. Pt rested in his bed with his eyes closed for 6.5 hours.     PRN Medication Documentation    Specific patient behavior that led to need for PRN medication: restless  Staff interventions attempted prior to PRN being given: reassurance  PRN medication given: Trazodone 50 mg PO @ 2121  Patient response/effectiveness of PRN medication: Pt rested

## 2021-06-02 NOTE — BH NOTES
Affect constricted, mood depressed/anxious. Alert and Oriented to person and situation. Cooperative and pleasant. Attended and participated in group. Interacted with staff and peers. Makes needs known. Ate all meals and snacks. Denied SI/HI/AVH and pain. Medication compliant. Stable with no s/s of distress.

## 2021-06-02 NOTE — PROGRESS NOTES
Problem: Self Care Deficits Care Plan (Adult)  Goal: *Acute Goals and Plan of Care (Insert Text)  6/2/2021 1029 by Makenzie Moody OT  Outcome: Progressing Towards Goal  6/2/2021 1025 by Makenzie Moody OT  Note:   FUNCTIONAL STATUS PRIOR TO ADMISSION: Patient was modified independent using a rolling walker for functional mobility. HOME SUPPORT: The patient lived alone with family to provide assistance. Occupational Therapy Goals  Initiated 6/2/2021  1. Patient will perform lower body dressing with supervision/set-up within 7 days. 2.  Patient will perform all aspects of toileting with supervision/set-up within 7 days. 3.  Patient will participate in upper extremity therapeutic exercise/activities with supervision/set-up for 15 to 20 minutes within 7 days. OCCUPATIONAL THERAPY EVALUATION  Patient: Amparo Crawford (44 y.o. male)  Date: 6/2/2021  Primary Diagnosis: Depression [F32.9]  Dementia [facility]) [F03.90]        Precautions:   Fall Risk    ASSESSMENT  Based on the objective data described below, the patient presents with muscle weakness in his L arm and hand, reduced ability for lower body self-care, need for HEP training for program to take home for further strengthening after discharge. Current Level of Function Impacting Discharge (ADLs/self-care):  Need for further assessment of risk for possible self-harm    Functional Outcome Measure: The patient scored 60 on the Modified Barthel Index outcome measure which is indicative of Moderate Assistance Level of Care. Other factors to consider for discharge:  Fall Risk     Patient will benefit from skilled therapy intervention to address the above noted impairments.        PLAN :  Recommendations and Planned Interventions: self care training, functional mobility training, therapeutic exercise, therapeutic activities, endurance activities, patient education, and family training/education    Frequency/Duration: Patient will be followed by occupational therapy 3 to 5 x per week x 1 week to address goals. Recommendation for discharge: (in order for the patient to meet his/her long term goals)  Occupational therapy at least 2 days/week in the home AND ensure assist and/or supervision for safety with IADLs    This discharge recommendation:  A follow-up discussion with the attending provider and/or case management is planned    IF patient discharges home will need the following DME: bedside commode and shower chair       SUBJECTIVE:   Patient stated I think I had a stroke that effected my right side.     OBJECTIVE DATA SUMMARY:   HISTORY:   Past Medical History:   Diagnosis Date    Arthritis     left arm    Autoimmune hepatitis ([facility] [address]     Bleeding ulcer 07/2019    BPH (benign prostatic hyperplasia)     CAD (coronary artery disease)     s/p stent    Cancer (HCC)     penile squamous carcinoma    Chronic kidney disease     stage 2     Chronic obstructive pulmonary disease (HCC)     Dementia (HCC)     Elevated LFT's     Essential hypertension 9/24/01    GERD (gastroesophageal reflux disease)     hiatal hernia    Ill-defined condition 12/02/2016    faint    Nephrosclerosis     Orthostatic hypotension 9/24/01    PVC's 9/24/01    Sleep apnea     no CPAP    Syncope 9/24/01    secondary to venous insuffiency      Past Surgical History:   Procedure Laterality Date    COLONOSCOPY N/A 12/13/2016    COLONOSCOPY performed by Monica Ballard MD at \[facility]\"" ENDOSCOPY    COLONOSCOPY N/A 12/11/2019    COLONOSCOPY performed by Rod Vuong MD at [address]; HI RISK IND  12/11/2019         ECHO 2D ADULT  5/24/12    EF 60 - 65%; mild concentric hypertrophy; pulmonary systolic artery pressure upper limits normal    HX ABDOMINAL WALL DEFECT REPAIR  07/01/2019d     Exploratory laparotomy, segmental sigmoid colon resection and primary stapled anastomosis.     HX APPENDECTOMY  1985    HX HEENT  02/18/2020    Left temporal artery biopsy    HX HERNIA REPAIR  08/29/2018    Davinci hiatal hernia repair- Glenn Ortega MD    HX OTHER SURGICAL  08/2020    penile lesion bx    IN CARDIAC SURG PROCEDURE UNLIST  05/13/2019    1 stent    UPPER GI ENDOSCOPY,BIOPSY  12/11/2019         UPPER GI ENDOSCOPY,DIAGNOSIS  7/18/2019            Expanded or extensive additional review of patient history:     Home Situation  Home Environment: Private residence ([address])  # Steps to Enter: 2  One/Two Story Residence: One story  Living Alone: Yes  Support Systems: Family member(s)  Patient Expects to be Discharged to[de-identified] Private residence  Current DME Used/Available at Home: Walker, rolling  Tub or Shower Type: Tub/Shower combination    Hand dominance: Right    EXAMINATION OF PERFORMANCE DEFICITS:  Cognitive/Behavioral Status:  Neurologic State: Alert  Orientation Level: Disoriented to place;Oriented to person (Knew the month, reported year was 2001)  Cognition: Impulsive  Perception: Appears intact  Perseveration: No perseveration noted  Safety/Judgement: Awareness of environment    Skin: See Nursing report    Edema: See Nursing report    Hearing: Auditory  Auditory Impairment: None    Vision/Perceptual:     Appears to be intact            Range of Motion  AROM: Generally decreased, functional        Strength:  Strength: Generally decreased, functional        Coordination:  Coordination: Within functional limits          Tone & Sensation:  Tone: Normal  Sensation: Intact (UNILATERAL FT NUMBNESS, LEFT)        Balance:  Sitting: Intact  Standing: Impaired; With support  Standing - Static: Constant support  Standing - Dynamic : Fair    Functional Mobility and Transfers for ADLs:  Bed Mobility:  Rolling: Supervision; Additional time  Supine to Sit: Supervision; Additional time  Sit to Supine: Supervision  Scooting: Supervision    Transfers:  Sit to Stand: Supervision (Pt needed reminders to place his hands on the bed to stand  Simultaneous filing.  User may not have seen previous data.)  Stand to Sit: Supervision (Simultaneous filing. User may not have seen previous data.)  Bed to Chair: Stand-by assistance        Cognitive Retraining  Safety/Judgement: Awareness of environment    Therapeutic Exercise:  Pt was able to ambulate in the [place]irk with OT & PT using his RW and stand by assistance. Occupational Therapy Evaluation Charge Determination   History Examination Decision-Making   LOW Complexity : Brief history review  LOW Complexity : 1-3 performance deficits relating to physical, cognitive , or psychosocial skils that result in activity limitations and / or participation restrictions  LOW Complexity : No comorbidities that affect functional and no verbal or physical assistance needed to complete eval tasks       Based on the above components, the patient evaluation is determined to be of the following complexity level: LOW   Pain Ratin/10 in R FA    Activity Tolerance:   Fair    After treatment patient left in no apparent distress:    Pt ambulated to the kirk way to speak with his nurse. COMMUNICATION/EDUCATION:   The patients plan of care was discussed with: Physical therapist and Registered nurse. Patient/family have participated as able in goal setting and plan of care. and Patient/family agree to work toward stated goals and plan of care. This patients plan of care is appropriate for delegation to [facility].     Thank you for this referral.  Dianne Duverney, OT  Time Calculation: 21 mins

## 2021-06-02 NOTE — PROGRESS NOTES
Problem: Falls - Risk of  Goal: *Absence of Falls  Description: Document Michelle Hyatt Fall Risk and appropriate interventions in the flowsheet.   Outcome: Progressing Towards Goal  Note: Fall Risk Interventions:  Mobility Interventions: Utilize walker, cane, or other assistive device    Mentation Interventions: Room close to nurse's station    Medication Interventions: Teach patient to arise slowly, Patient to call before getting OOB    Elimination Interventions: Stay With Me (per policy), Patient to call for help with toileting needs    History of Falls Interventions: Door open when patient unattended         Problem: Patient Education: Go to Patient Education Activity  Goal: Patient/Family Education  Outcome: Progressing Towards Goal     Problem: Depressed Mood (Adult/Pediatric)  Goal: *STG: Participates in 1:1 therapy sessions  Outcome: Progressing Towards Goal  Goal: *STG: Remains safe in hospital  Outcome: Progressing Towards Goal  Goal: *STG: Complies with medication therapy  Outcome: Progressing Towards Goal

## 2021-06-02 NOTE — PROGRESS NOTES
Problem: Mobility Impaired (Adult and Pediatric)  Goal: *Acute Goals and Plan of Care (Insert Text)  Description: FUNCTIONAL STATUS PRIOR TO ADMISSION: Patient was modified independent using a rolling walker for functional mobility. HOME SUPPORT PRIOR TO ADMISSION: The patient lived alone with DTR to provide assistance. Physical Therapy Goals  Initiated 6/2/2021  1. Patient will move from supine to sit and sit to supine  in bed with independence within 7 day(s). 2.  Patient will transfer from bed to chair and chair to bed with independence using the least restrictive device within 7 day(s). 3.  Patient will perform sit to stand with independence within 7 day(s). 4.  Patient will ambulate with independence for 150 feet with the least restrictive device within 7 day(s). 5.  Patient will ascend/descend 3 STEPS/ stairs with BILAT handrail(s) with independence within 7 day(s). Outcome: Progressing Towards Goal   PHYSICAL THERAPY EVALUATION  Patient: Collette Turk (25 y.o. male)  Date: 6/2/2021  Primary Diagnosis: Depression [F32.9]  Dementia ([facility] [address] [F03.90]        Precautions: BEHAVIORAL HEALTH,   Fall    ASSESSMENT  Based on the objective data described below, the patient presents SUPINE. Current Level of Function Impacting Discharge (mobility/balance): PATIENT IS ABLE TO PERFORM SUP TO SIT WITH SUPERVISION, TRANSFERS SIT TO STAND WITH RW, WITH CGA TO SUPERVISION, GAIT WITH RW WITH SUP, ON, LEVEL SURFACES, 90 FEET. PATIENT REQUIRES VC S AND TC S FOR ERECTING POSTURE, INCREASING LAURY, KEEPING BODY WITHIN THE FRAME OF THE WALKER, PROPER POSITION FOR HAND PLACEMENT ON  THE RW. PATIENT REQUIRES VC S AND TC S FOR SEQUENCING DURING TRANSFERS AND CUES FOR FALL PREVENTION. Functional Outcome Measure: The patient scored 7/20 on the [address] outcome measure which is indicative of PATIENT IS DEPENDENT FOR SAFETY DURING ADLS, MOBILITY, AND GAIT.  PATIENT IS AT RISK FOR FALLS,      Other factors TO CONSIDER FOR DC: PATIENT WAS LIVING AT HOME ALONE WITH PET DOG. PATIENT DOES NOT DRIVE A MOTOR VEHICLE. PATIENT IS AT RISK FOR FALLING. Patient will benefit from skilled therapy intervention to address the above noted impairments. PLAN :  Recommendations and Planned Interventions: bed mobility training, transfer training, gait training, therapeutic exercises, edema management/control, patient and family training/education, and therapeutic activities      Frequency/Duration: Patient will be followed by physical therapy: 4 TO 6 TIMES PER WK, 1 TO 2 TIMES PER WEEK, to address goals. Recommendation for discharge: (in order for the patient to meet his/her long term goals)  Therapy up to 5 days/week in SNF setting    This discharge recommendation:  Has been made in collaboration with the attending provider and/or case management    IF patient discharges home will need the following DME: TBD         SUBJECTIVE:   Patient stated 66 [address] HURTS.      OBJECTIVE DATA SUMMARY:   HISTORY:    Past Medical History:   Diagnosis Date    Arthritis     left arm    Autoimmune hepatitis ([facility] [address]     Bleeding ulcer 07/2019    BPH (benign prostatic hyperplasia)     CAD (coronary artery disease)     s/p stent    Cancer (HCC)     penile squamous carcinoma    Chronic kidney disease     stage 2     Chronic obstructive pulmonary disease (HCC)     Dementia (HCC)     Elevated LFT's     Essential hypertension 9/24/01    GERD (gastroesophageal reflux disease)     hiatal hernia    Ill-defined condition 12/02/2016    faint    Nephrosclerosis     Orthostatic hypotension 9/24/01    PVC's 9/24/01    Sleep apnea     no CPAP    Syncope 9/24/01    secondary to venous insuffiency      Past Surgical History:   Procedure Laterality Date    COLONOSCOPY N/A 12/13/2016    COLONOSCOPY performed by Lorraine Bolanos MD at [facility] ENDOSCOPY    COLONOSCOPY N/A 12/11/2019    COLONOSCOPY performed by Governor Dorothy MD at [facility] ENDOSCOPY    COLORECTAL SCRN; HI RISK IND  12/11/2019         ECHO 2D ADULT  5/24/12    EF 60 - 65%; mild concentric hypertrophy; pulmonary systolic artery pressure upper limits normal    HX ABDOMINAL WALL DEFECT REPAIR  07/01/2019d     Exploratory laparotomy, segmental sigmoid colon resection and primary stapled anastomosis. HX APPENDECTOMY  1985    HX HEENT  02/18/2020    Left temporal artery biopsy    HX HERNIA REPAIR  08/29/2018    Davinci hiatal hernia repair- Alan Pruitt MD    HX OTHER SURGICAL  08/2020    penile lesion bx    [place] CARDIAC SURG PROCEDURE UNLIST  05/13/2019    1 stent    UPPER GI ENDOSCOPY,BIOPSY  12/11/2019         UPPER GI ENDOSCOPY,DIAGNOSIS  7/18/2019            Personal factors and/or comorbidities impacting plan of care: PATIENT LIVES ALONE. Home Situation  Home Environment: Private residence ([address])  # Steps to Enter: 2  One/Two Story Residence: One story  Living Alone: Yes  Support Systems: Family member(s)  Patient Expects to be Discharged to[de-identified] Private residence  Current DME Used/Available at Home: Walker, rolling  Tub or Shower Type: Tub/Shower combination    EXAMINATION/PRESENTATION/DECISION MAKING:   Critical Behavior:  Neurologic State: Alert  Orientation Level: Disoriented to place, Oriented to person (Knew the month, reported year was 2001)  Cognition: Impulsive  Safety/Judgement: Awareness of environment  Hearing: Auditory  Auditory Impairment: None  Skin:    Edema:  Range Of Motion:  AROM: Generally decreased, functional                       Strength:    Strength: Generally decreased, functional                    Tone & Sensation:   Tone: Normal              Sensation: Intact (UNILATERAL FT NUMBNESS, LEFT)               Coordination:  Coordination: Within functional limits  Vision:      Functional Mobility:  Bed Mobility:  Rolling: Supervision; Additional time  Supine to Sit: Supervision; Additional time  Sit to Supine: Supervision  Scooting: Supervision  Transfers:  Sit to Stand: Supervision (Pt needed reminders to place his hands on the bed to stand  Simultaneous filing. User may not have seen previous data.)  Stand to Sit: Supervision (Simultaneous filing. User may not have seen previous data.)        Bed to Chair: Stand-by assistance              Balance:   Sitting: Intact  Standing: Impaired; With support  Standing - Static: Constant support  Standing - Dynamic : Fair  Ambulation/Gait Training:  Distance (ft): 90 Feet (ft)  Assistive Device: Walker, rollator  Ambulation - Level of Assistance: Stand-by assistance;Contact guard assistance     Gait Description (WDL): Within defined limits  Gait Abnormalities: Decreased step clearance  Right Side Weight Bearing: Full  Left Side Weight Bearing: Full  Base of Support: Narrowed        Step Length: Left shortened;Right shortened        Interventions: Safety awareness training; Tactile cues; Verbal cues; Visual/Demos            Stairs: Therapeutic Exercises:   GAIT WITH RW    Functional Measure:   ON ELDERLY MOBILITY SCALE. Physical Therapy Evaluation Charge Determination   History Examination Presentation Decision-Making   LOW Complexity : Zero comorbidities / personal factors that will impact the outcome / POC LOW Complexity : 1-2 Standardized tests and measures addressing body structure, function, activity limitation and / or participation in recreation  LOW Complexity : Stable, uncomplicated  LOW Complexity : FOTO score of       Based on the above components, the patient evaluation is determined to be of the following complexity level: LOW     Pain Ratin/10 R FOREARM. Activity Tolerance:   Fair    After treatment patient left in no apparent distress:   Sitting in chair, Call bell within reach, and Caregiver / family present    COMMUNICATION/EDUCATION:   The patients plan of care was discussed with: Case management. , OT, PATIENT.     Fall prevention education was provided and the patient/caregiver indicated understanding., Patient/family have participated as able in goal setting and plan of care. , and Patient/family agree to work toward stated goals and plan of care.     Thank you for this referral.  Mohan Salamanca   Time Calculation: 25 mins

## 2021-06-02 NOTE — BH NOTES
Shift change report given to Joy Cornejo RN re: SBAR, medications, and behavior.   Roxann fall risk score 5

## 2021-06-02 NOTE — BH NOTES
SOCIAL WORK PROGRESS NOTE    Monique Harley  : 1940  MRN: [id number]      Patient presentation including presence/absence SI/HI, psychosis, ability to perform ADLs: he denies SI/HI currently, denies AVH, has had assistance with ADLs, is cooperative and received therapy eval    Concerns expressed by patient: depressed mood    Family involvement: 3 children all are actively involved    Resource needs: help in the home if he returns back home, home OT/skilled care    Strengths:    Limitations: loneliness, depressed mood    Other: patient was admitted voluntarily after suicidal actions and worsening depression. SW has spoken to his daughter and she plans to look into senior living but is not sure if he will be transitioning directly there or going home with help.  She would like to see how if treatment goes and mood improves

## 2021-06-02 NOTE — H&P
[facility] HISTORY AND PHYSICAL    Name:  Kathy Brown  MR#:  [id number]  :  1940  ACCOUNT #:  [de-identified]  ADMIT DATE:  2021      [facility] ADMISS[facility] NOTE    HISTORY OF PRESENT ILLNESS:  The patient is a 71-year-old  white male who has no past psychiatric history, but he was admitted voluntarily from the [facility] Emergency Room due to concerns about worsening depression and some suicidal actions. Specifically, he had stated he had ingested up to 25 Percocet pills in an overdose on the evening of , although his daughter states that this prescription was a year and a half old, and it may have only had a few pills in it. In any event, the patient was brought to the emergency room where he was noted to still be quite depressed, and although he was denying any active suicidal plans or intentions at that moment, it was felt that he is clearly not safe to return home where he lives on his own, given his level of depression. It was also reported that he has a history of some dementia, and it is evident during the interview that he does have some deficits in areas of short-term and recent memory as well as some aspects of processing and executive functioning. His neurovegetative functioning has only been slightly impaired in some ways. He states that he sleeps and eats quite well, with no change noted over the past several months. He states, however, that his energy level is low and that he is definitely more anhedonic and dysphoric than usual.  He also reports that his concentration and attention span are poor, and he is much more forgetful. He just states he has been feeling helpless and hopeless and has had the suicidal thoughts just recently but not currently.   He also states that he occasionally will feel very tremulous and anxious, but that is not a significant issue compared to his level of depression and his psychosocial stressors. He attributes his depression to a number of stressors including the fact that he is living alone and feels isolated and lonely. He had penile surgery in 09/20 for cancer and consequently he states he is particularly struggling with making a mess every time he tries to urinate. It was also reported that he may be somewhat erratically compliant with his medication when he is at home, taking the medicines some days and not on other days, but he tells me that he thinks he takes his medicines consistently. There is no evidence to suggest any underlying psychosis such as hallucinations or delusional thinking, but he does have evidence of some cognitive limitations as already noted. PAST PSYCHIATRIC HISTORY:  None whatsoever. This includes no medication trials and no prior evaluations. PAST MEDICAL HISTORY:  1. History of TIAs/CVAs. 2.  History of penile cancer with surgical resection in 09/20.  3.  Bilateral carotid stenosis. 4.  GERD. 5.  Hyperlipidemia. 6.  History of diverticulitis. 7.  Chronic kidney disease, stage II. 8.  Autoimmune hepatitis. 9.  Hypertension. ALLERGIES:  ACE INHIBITORS AND DEMEROL. MEDICATIONS ON ADMISSION:  1. Trazodone 50 mg at bedtime p.r.n.  2.  Ambien 5 mg at bedtime p.r.n.  3.  Aricept 5 mg at bedtime. 4.  Coreg 12.5 mg b.i.d.  5.  Prednisone 20 mg daily. 6.  Amlodipine 5 mg daily. 7.  Lipitor 40 mg daily. 8.  Protonix 40 mg daily. 9.  Aspirin 81 mg daily. FAMILY HISTORY:  He denies any family psychiatric history that he is aware of. SOCIAL HISTORY:  He has been  since 1982 and lives alone in the [facility]. He does have three children ages 72, 61, and 48, and at least one daughter is his power of  Monie Geronimo) and she checks on him regularly.   He states he failed the 8th grade two and a half years in a row and quit at that point, but he was able to get trained and certified as a  and functioned in that capacity until he retired. He does not smoke anymore. He also does not drink or use drugs. MENTAL STATUS EXAM:  The patient was noted to be a casually dressed, somewhat poorly groomed 69-year-old who appeared his stated age. He moved slightly slowly with the use of a walker and was a bit unstable, but he otherwise was functioning close to an independent level. He was very pleasant and cooperative, but clearly has been quite depressed and dysphoric as noted above. He denied any suicidal thoughts or intentions currently, but admits to having suicidal thoughts when he took the overdose. He also reported some neurovegetative dysfunction but that his sleep and appetite were actually fairly good. There was no evidence of any psychosis such as hallucinations or delusional thinking, and no evidence of any daniel at any point. His cognitive functioning showed deficits in short-term memory, processing speed, and some aspects of executive functioning based on the interview. DIAGNOSTIC IMPRESSION:  Axis I:  1. Major Depression, possibly recurrent, moderate to severe (F33.2). 2.  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 Axis II. Deferred. Axis III:  Transient ischemic attacks; history of penile cancer with surgical resection; bilateral carotid stenosis; gastroesophageal reflux disease; hyperlipidemia; history of diverticulitis; autoimmune hepatitis; hypertension; chronic kidney disease, stage II. Axis IV:  Stressors are mild to moderate (social isolation and declining health). Axis V:  Global Assessment of Functioning currently is 45. PLAN:  1. We will admit the patient for further supportive treatment, close observation, increased structure, and involvement within the groups and milieu. 2.  We will begin a trial on Lexapro at 5 mg daily, possibly increasing if indicated. 3.  We will also continue the trazodone but not the Ambien, along with Aricept.   4.  He clearly would be an excellent candidate for assisted living placement, and he seems to be willing to consider that. 5.  We will also have Physical Therapy and Occupational Therapy assess for his need for treatment and assistance. 6.  Estimated length of stay would likely be on the order of 7-10 days. I certify that this patient's inpatient psychiatric hospital admission is medically necessary for treatment, which could reasonably be expected to improve his condition or for diagnostic study. The inpatient psychiatric services are provided while he is under the care of a physician and are included in the individualized plan of care.       Latasha Rojo MD      [id number]  D:  06/02/2021 11:16  T:  06/02/2021 13:26  JOB #:  [id number]

## 2021-06-02 NOTE — PROGRESS NOTES
Physical Therapy Screening:  Services are not indicated at this time. [facility] based on results obtained during the nursing admission assessment. The patients chart was reviewed and the patient is not appropriate for a skilled therapy evaluation at this time. Please consult physical therapy if any therapy needs arise. Thank you.     Schuyler Aguirre, PTA

## 2021-06-03 PROCEDURE — 74011636637 HC RX REV CODE- 636/637: Performed by: PSYCHIATRY & NEUROLOGY

## 2021-06-03 PROCEDURE — 99231 SBSQ HOSP IP/OBS SF/LOW 25: CPT | Performed by: PSYCHIATRY & NEUROLOGY

## 2021-06-03 PROCEDURE — 97110 THERAPEUTIC EXERCISES: CPT

## 2021-06-03 PROCEDURE — 74011250637 HC RX REV CODE- 250/637: Performed by: INTERNAL MEDICINE

## 2021-06-03 PROCEDURE — 97116 GAIT TRAINING THERAPY: CPT

## 2021-06-03 PROCEDURE — 74011250637 HC RX REV CODE- 250/637: Performed by: PSYCHIATRY & NEUROLOGY

## 2021-06-03 PROCEDURE — 65220000003 HC RM SEMIPRIVATE PSYCH

## 2021-06-03 RX ORDER — TRAZODONE HYDROCHLORIDE 50 MG/1
25 TABLET ORAL
Status: DISCONTINUED | OUTPATIENT
Start: 2021-06-03 | End: 2021-06-12 | Stop reason: HOSPADM

## 2021-06-03 RX ADMIN — PANTOPRAZOLE SODIUM 40 MG: 40 TABLET, DELAYED RELEASE ORAL at 06:38

## 2021-06-03 RX ADMIN — AMLODIPINE BESYLATE 5 MG: 5 TABLET ORAL at 09:11

## 2021-06-03 RX ADMIN — CARVEDILOL 12.5 MG: 12.5 TABLET, FILM COATED ORAL at 17:03

## 2021-06-03 RX ADMIN — CARVEDILOL 12.5 MG: 12.5 TABLET, FILM COATED ORAL at 09:11

## 2021-06-03 RX ADMIN — ASPIRIN 81 MG: 81 TABLET, COATED ORAL at 09:10

## 2021-06-03 RX ADMIN — PREDNISONE 20 MG: 20 TABLET ORAL at 09:10

## 2021-06-03 RX ADMIN — ATORVASTATIN CALCIUM 40 MG: 40 TABLET, FILM COATED ORAL at 09:10

## 2021-06-03 RX ADMIN — ESCITALOPRAM OXALATE 5 MG: 10 TABLET ORAL at 09:10

## 2021-06-03 RX ADMIN — TRAZODONE HYDROCHLORIDE 25 MG: 50 TABLET ORAL at 21:04

## 2021-06-03 RX ADMIN — DONEPEZIL HYDROCHLORIDE 5 MG: 5 TABLET, FILM COATED ORAL at 21:04

## 2021-06-03 NOTE — ROUTINE PROCESS
Shift change report given to Blaise Smith Rn, re: SBAR. Medications, and behavior. Roxann Fall Risk Score (5)

## 2021-06-03 NOTE — PROGRESS NOTES
Problem: Self Care Deficits Care Plan (Adult)  Goal: *Acute Goals and Plan of Care (Insert Text)  6/2/2021 1029 by Elida Morgan OT  Outcome: Progressing Towards Goal  6/2/2021 1025 by Elida Morgan OT  Note:   FUNCTIONAL STATUS PRIOR TO ADMISSION: Patient was modified independent using a rolling walker for functional mobility. HOME SUPPORT: The patient lived alone with family to provide assistance. Occupational Therapy Goals  Initiated 6/2/2021  1. Patient will perform lower body dressing with supervision/set-up within 7 days. 2.  Patient will perform all aspects of toileting with supervision/set-up within 7 days. 3.  Patient will participate in upper extremity therapeutic exercise/activities with supervision/set-up for 15 to 20 minutes within 7 days. Problem: Self Care Deficits Care Plan (Adult)  Goal: *Acute Goals and Plan of Care (Insert Text)  Outcome: Progressing Towards Goal     OCCUPATIONAL THERAPY TREATMENT  Patient: Dorn Holstein (49 y.o. male)  Date: 6/3/2021  Diagnosis: Depression [F32.9]  Dementia ([address] [F03.90] Severe recurrent major depression without psychotic features ([facility] [address]       Precautions: Fall  Chart, occupational therapy assessment, plan of care, and goals were reviewed. ASSESSMENT  Patient continues with skilled OT services and is progressing towards goals. Current Level of Function Impacting Discharge (ADLs):  Need for assistance with IADLs     Other factors to consider for discharge: Fall Risk         PLAN :  Patient continues to benefit from skilled intervention to address the above impairments. Continue treatment per established plan of care to address goals. Recommend with staff:  Set pt up with items needed to perform self-care tasks    Recommend next OT session:  Continue with strengthening exercises.       Recommendation for discharge: (in order for the patient to meet his/her long term goals)  Therapy up to 5 days/week in SNF setting or an intensive home health therapy program    This discharge recommendation:  A follow-up discussion with the attending provider and/or case management is planned    IF patient discharges home will need the following DME: To be determined closer to discharge       SUBJECTIVE:   Patient stated yes, I would like to do that.  meaning the band exercises. OBJECTIVE DATA SUMMARY:   Cognitive/Behavioral Status:  Neurologic State: Alert  Orientation Level: Oriented to person  Cognition: Follows commands     Functional Mobility and Transfers for ADLs:  Bed Mobility:   Independent    Balance:  Sitting: Intact  Sitting - Static: Good (unsupported)  Sitting - Dynamic: Good (unsupported)      Therapeutic Exercises:   Pt was set up with a yellow loop and red single bands to perform upper extremity therapeutic exercises. Pt received full set up assistance for each of the 8 exercises today. Pt performed 4 exercises with the yellow band x 8 reps each. Pt performed another 4 exercises with the red band x 10 reps each. Pt was able to perform all 8 exercises today. At session end pt remained in the room and supine in the bed. Pt was asked about his ability to use the toilet. Pt reported he was going to the toilet himself and was able to perform his hygiene himself. Pain:  0    Activity Tolerance:   Good    After treatment patient left in no apparent distress:   Supine in bed    COMMUNICATION/COLLABORATION:   The patients plan of care was discussed with: Registered nurse.      Onesimo Abbott OT  Time Calculation: 17 mins

## 2021-06-03 NOTE — BH NOTES
Behavioral Health Interdisciplinary Rounds     Patient Name: Evita Moore  Age: 79 y.o. Room/Bed:  230/02  Primary Diagnosis: Severe recurrent major depression without psychotic features ([facility] [address]   Admission Status: Voluntary     Readmission within 30 days: no  Power of  in place: no  Patient requires a blocked bed: no          Reason for blocked bed:     VTE Prophylaxis: Not indicated    Mobility needs/Fall risk: yes  Flu Vaccine : no   Nutritional Plan: no  Consults: no         Labs/Testing due today?: no    Sleep hours: 7.25       Participation in Care/Groups:  yes  Medication Compliant?: Yes  PRNS (last 24 hours): Sleep Aid    Restraints (last 24 hours):  no     CIWA (range last 24 hours):     COWS (range last 24 hours):      Alcohol screening (AUDIT) completed -   AUDIT Score: 0     If applicable, date SBIRT discussed in treatment team AND documented:   AUDIT Screen Score: AUDIT Score: 0      Document Brief Intervention (corresponds directly with the 5 A's, Ask, Advise, Assess, Assist, and Arrange): At- Risk Patients (Score 7-15 for women; 8-15 for men)  Discuss concern patient is drinking at unhealthy levels known to increase risk of alcohol-related health problems. Is Patient ready to commit to change? If No:   Encourage reflection   Discuss short term and long term health risks of consuming alcohol   Barriers to change   Reaffirm willingness to help / Educational materials provided  If Yes:   Set goal  Kayentis provided    Harmful use or Dependence (Score 16 or greater)   Discuss short term and long term health risks of consuming alcohol   Recommendations   Negotiate drinking goal   Recommend addiction specialist/center   Arrange follow-up appointments.     Tobacco - patient is a smoker: Have You Used Tobacco in the Past 30 Days: No  Illegal Drugs use: Have You Used Any Illegal Substances Over the Past 12 Months: No    24 hour chart check complete: yes Patient goal(s) for today: has not set goal  Treatment team focus/goals: comply with medication therapy  Progress note patient is confused, denies SI/HI spoke with his daughter yesterday about discharge plans    LOS:  2  Expected LOS: 5-7    Financial concerns/prescription coverage:  no  Family contact: yes      Family requesting physician contact today:  no  Discharge plan: home with care or [facility]  Access to weapons : no        Outpatient provider(s): PCP  Patient's preferred phone number for follow up call : [phone]    Participating treatment team members: Madison Adair, * (assigned SW), Mayela Bain

## 2021-06-03 NOTE — BH NOTES
PSYCHOSOCIAL ASSESSMENT  :Patient identifying info:   Guerda Luna is a 79 y.o., male admitted 2021  5:52 PM     Presenting problem and precipitating factors: patient ingested percocet pills stating he was lonely and depressed      Mental status assessment: adequately groomed, calm and cooperative, motor slowed, mood is dysphoric, flat affect thoughts are organized but can derail denies SI/HI/AVH insight and judgment are limited    Strengths: has been living at home with help, has family that is supportive    Collateral information: daughter    Current psychiatric /substance abuse providers and contact info: denies    Previous psychiatric/substance abuse providers and response to treatment: denies    Family history of mental illness or substance abuse: denies    Substance abuse history:  n/a  Social History     Tobacco Use    Smoking status: Former Smoker     Packs/day: 3.50     Quit date: 1980     Years since quittin.4    Smokeless tobacco: Never Used   Substance Use Topics    Alcohol use: Not Currently     Comment: no alcohol since        History of biomedical complications associated with substance abuse : n/a    Patient's current acceptance of treatment or motivation for change: n/a    Family constellation: , 3 children    Is significant other involved?        Describe support system: kids    Describe living arrangements and home environment: lives home alone    Health issues:   Hospital Problems  Date Reviewed: 2021        Codes Class Noted POA    * (Principal) Severe recurrent major depression without psychotic features [facility]) ICD-10-CM: F33.2  ICD-9-CM: 296.33  2021         Dementia [facility]) ICD-10-CM: F03.90  ICD-9-CM: 294.20  2021 Unknown              Trauma history: denies    Legal issues: denies    History of  service: denies    Financial status:     Gnosticism/cultural factors: none expressed    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

## 2021-06-03 NOTE — BH NOTES
Affect blunted/restricted, mood depressed/anxious. Patient alert and oriented x 1 to person only. Patient very confused and disorganized. Patient make needs known to staff and needs one person assist with all ADL's. Patient denies SI/HI/AVH/pain. No delusional statements made. Patient is compliant with medications and no PRN's given. Patient appetite adequate. Patient laying in bed resting and did not participate in groups today. Patient stated \" I am tried and just need some sleep. \"   Patient utilizes a walker due to unsteady gait. Patient in no apparent distress all vital signs within normal limits.

## 2021-06-03 NOTE — BH NOTES
Shift change report given to Jaquna Sampson re: SBAR, medications, and behavior.   Roxann fall risk score: (5)

## 2021-06-03 NOTE — PROGRESS NOTES
Problem: Pressure Injury - Risk of  Goal: *Prevention of pressure injury  Description: Document Sergey Scale and appropriate interventions in the flowsheet.   Outcome: Progressing Towards Goal  Note: Pressure Injury Interventions:  Sensory Interventions: Assess changes in LOC    Moisture Interventions: Absorbent underpads    Activity Interventions: PT/OT evaluation    Mobility Interventions: PT/OT evaluation    Nutrition Interventions: Document food/fluid/supplement intake    Friction and Shear Interventions: Lift sheet

## 2021-06-03 NOTE — GROUP NOTE
MEGAN  GROUP DOCUMENTATION INDIVIDUAL Group Therapy Note Date: 6/3/2021 Chance Flores was in bed sleeping during group. We will continue to encourage him to attend group when possible. Darya Fernandes

## 2021-06-03 NOTE — PROGRESS NOTES
Problem: Pressure Injury - Risk of  Goal: *Prevention of pressure injury  Description: Document Sergey Scale and appropriate interventions in the flowsheet. Outcome: Progressing Towards Goal  Note: Pressure Injury Interventions:  Sensory Interventions: Assess changes in LOC    Moisture Interventions: Absorbent underpads    Activity Interventions: PT/OT evaluation    Mobility Interventions: PT/OT evaluation    Nutrition Interventions: Document food/fluid/supplement intake    Friction and Shear Interventions: Lift sheet                Problem: Patient Education: Go to Patient Education Activity  Goal: Patient/Family Education  Outcome: Progressing Towards Goal     Problem: Falls - Risk of  Goal: *Absence of Falls  Description: Document Roxann Fall Risk and appropriate interventions in the flowsheet.   Outcome: Progressing Towards Goal  Note: Fall Risk Interventions:  Mobility Interventions: Utilize walker, cane, or other assistive device    Mentation Interventions: Door open when patient unattended    Medication Interventions: Teach patient to arise slowly    Elimination Interventions: Stay With Me (per policy)    History of Falls Interventions: Room close to nurse's station         Problem: Patient Education: Go to Patient Education Activity  Goal: Patient/Family Education  Outcome: Progressing Towards Goal

## 2021-06-03 NOTE — PROGRESS NOTES
Problem: Falls - Risk of  Goal: *Absence of Falls  Description: Document Liseth Peterson Fall Risk and appropriate interventions in the flowsheet.   Outcome: Progressing Towards Goal  Note: Fall Risk Interventions:  Mobility Interventions: Utilize walker, cane, or other assistive device    Mentation Interventions: Door open when patient unattended    Medication Interventions: Teach patient to arise slowly    Elimination Interventions: Stay With Me (per policy), Toilet paper/wipes in reach, Toileting schedule/hourly rounds    History of Falls Interventions: Room close to nurse's station         Problem: Patient Education: Go to Patient Education Activity  Goal: Patient/Family Education  Outcome: Progressing Towards Goal

## 2021-06-03 NOTE — BH NOTES
PSYCHOSOCIAL ASSESSMENT  :Patient identifying info:   Brock Monte is a 79 y.o., male admitted 2021  5:52 PM     Presenting problem and precipitating factors: worsening depression with suicidal actions, he reported that he ingested 25 percocet pills, daughter believes that it was not that many in the bottle and it was 35 years old      Mental status assessment: adequately groomed, calm and cooperative, motor slowed, mood is dysphoric affect flat, thought process is mostly organized can derail, denies AVH,SI/HI,delusions insight and judgment are limited    Strengths: supportive children, has been able to care for self    Collateral information: daughter    Current psychiatric /substance abuse providers and contact info: no history    Previous psychiatric/substance abuse providers and response to treatment:     Family history of mental illness or substance abuse: denies    Substance abuse history:  n/a  Social History     Tobacco Use    Smoking status: Former Smoker     Packs/day: 3.50     Quit date: 1980     Years since quittin.4    Smokeless tobacco: Never Used   Substance Use Topics    Alcohol use: Not Currently     Comment: no alcohol since        History of biomedical complications associated with substance abuse :n/a    Patient's current acceptance of treatment or motivation for change: n/a    Family constellation: 3 children    Is significant other involved?        Describe support system: daughter    Describe living arrangements and home environment: has been living at home alone    Health issues:   Hospital Problems  Date Reviewed: 2021        Codes Class Noted POA    * (Principal) Severe recurrent major depression without psychotic features [facility]) ICD-10-CM: F33.2  ICD-9-CM: 296.33  2021         Dementia [facility]) ICD-10-CM: F03.90  ICD-9-CM: 294.20  2021 Unknown              Trauma history: denies    Legal issues: denies    History of  service:     Financial status: SS, nursing home    Mandaeism/cultural factors: none expressed    Education/work history: 8th grade education, retired     Have you been licensed as a health care professional (current or ): no    Leisure and recreation preferences:     Describe coping skills:    Patricia Marcial  6/3/2021

## 2021-06-03 NOTE — PROGRESS NOTES
INTERVAL Hx:  Records and clinical information were reviewed. States he's still feeling depressed, and he appears to be slightly drowsy this AM. Will decrease the Trazodone to 25 mg PRN. Was more confused last night, c/w some \"sundowning\" from the dementia. Appreciate PT and OT input--may need SNF placement given his limitations and risks. Tolerating the Lexapro so far--no nausea or other SE's at all. Still dysphoric but denies any SI currently. Slept 7.25 hours last night. MEDS:  Current Facility-Administered Medications   Medication Dose Route Frequency    traZODone (DESYREL) tablet 25 mg  25 mg Oral QHS PRN    escitalopram oxalate (LEXAPRO) tablet 5 mg  5 mg Oral DAILY    acetaminophen (TYLENOL) tablet 650 mg  650 mg Oral Q4H PRN    magnesium hydroxide (MILK OF MAGNESIA) 400 mg/5 mL oral suspension 30 mL  30 mL Oral DAILY PRN    ibuprofen (MOTRIN) tablet 400 mg  400 mg Oral Q8H PRN    alum-mag hydroxide-simeth (MYLANTA) oral suspension 30 mL  30 mL Oral Q4H PRN    pantoprazole (PROTONIX) tablet 40 mg  40 mg Oral ACB    donepeziL (ARICEPT) tablet 5 mg  5 mg Oral QHS    predniSONE (DELTASONE) tablet 20 mg  20 mg Oral DAILY WITH BREAKFAST    carvediloL (COREG) tablet 12.5 mg  12.5 mg Oral BID WITH MEALS    atorvastatin (LIPITOR) tablet 40 mg  40 mg Oral DAILY    hydrOXYzine pamoate (VISTARIL) capsule 25 mg  25 mg Oral TID PRN    aspirin delayed-release tablet 81 mg  81 mg Oral DAILY    amLODIPine (NORVASC) tablet 5 mg  5 mg Oral DAILY       VITALS:   Patient Vitals for the past 12 hrs:   Temp Pulse Resp BP SpO2   06/03/21 0732 97.6 °F (36.4 °C) 78 17 (!) 143/72 96 %       LABS: .No results found for this or any previous visit (from the past 24 hour(s)).     MSE:   Appearance: casually dressed; adequately groomed  Behavior: calm and cooperative; no agitation  Motor: slowed  Mood/Affect: dysphoric; slightly flat  Thought Process: mostly organized but can derail  Thought Content: no delusions; no SI/HI  Perceptions: no hallucinations  Insight/Judgment: limited    ASSESSMENT:   1.  Major Depression, severe (F33.2)  2. Dementia, likely mixed, moderate (F02.80)    PLAN:  1. Continue the Lexapro at 5 mg daily for now. 2. Decrease the Trazodone to 25 mg QHS PRN. 3. Continue the Aricept at 5 mg daily. 4. ELOS: 7-10 days--may transition to SNF.

## 2021-06-03 NOTE — BH NOTES
Affect blunted, mood anxious/labile. Pt did not attend or participate in scheduled group activities. Pt remained in his room laying in bed during the evening. Pt refused snack. Pt denies SI/HI/AVH/pain. Pt is very confused and only oriented to his name. Pt was compliant with medications and was given a PRN. Pt is in no apparent distress at this time and made no overtly delusional statements. Pt rested in his bed with his eyes closed for 7.25 hours.     PRN Medication Documentation    Specific patient behavior that led to need for PRN medication: resltess  Staff interventions attempted prior to PRN being given: reassurance  PRN medication given: Trazodone 50 mg PO @ 2123  Patient response/effectiveness of PRN medication: Pt rested

## 2021-06-04 PROCEDURE — 97116 GAIT TRAINING THERAPY: CPT

## 2021-06-04 PROCEDURE — 74011250637 HC RX REV CODE- 250/637: Performed by: PSYCHIATRY & NEUROLOGY

## 2021-06-04 PROCEDURE — 74011250637 HC RX REV CODE- 250/637: Performed by: INTERNAL MEDICINE

## 2021-06-04 PROCEDURE — 97110 THERAPEUTIC EXERCISES: CPT

## 2021-06-04 PROCEDURE — 65220000003 HC RM SEMIPRIVATE PSYCH

## 2021-06-04 PROCEDURE — 99231 SBSQ HOSP IP/OBS SF/LOW 25: CPT | Performed by: PSYCHIATRY & NEUROLOGY

## 2021-06-04 PROCEDURE — 74011636637 HC RX REV CODE- 636/637: Performed by: PSYCHIATRY & NEUROLOGY

## 2021-06-04 RX ADMIN — ESCITALOPRAM OXALATE 5 MG: 10 TABLET ORAL at 08:38

## 2021-06-04 RX ADMIN — ASPIRIN 81 MG: 81 TABLET, COATED ORAL at 08:38

## 2021-06-04 RX ADMIN — PREDNISONE 20 MG: 20 TABLET ORAL at 08:38

## 2021-06-04 RX ADMIN — AMLODIPINE BESYLATE 5 MG: 5 TABLET ORAL at 08:38

## 2021-06-04 RX ADMIN — TRAZODONE HYDROCHLORIDE 25 MG: 50 TABLET ORAL at 21:20

## 2021-06-04 RX ADMIN — CARVEDILOL 12.5 MG: 12.5 TABLET, FILM COATED ORAL at 17:14

## 2021-06-04 RX ADMIN — ATORVASTATIN CALCIUM 40 MG: 40 TABLET, FILM COATED ORAL at 08:39

## 2021-06-04 RX ADMIN — CARVEDILOL 12.5 MG: 12.5 TABLET, FILM COATED ORAL at 08:38

## 2021-06-04 RX ADMIN — PANTOPRAZOLE SODIUM 40 MG: 40 TABLET, DELAYED RELEASE ORAL at 06:37

## 2021-06-04 RX ADMIN — DONEPEZIL HYDROCHLORIDE 5 MG: 5 TABLET, FILM COATED ORAL at 21:21

## 2021-06-04 NOTE — PROGRESS NOTES
Problem: Mobility Impaired (Adult and Pediatric)  Goal: *Acute Goals and Plan of Care (Insert Text)  Description: FUNCTIONAL STATUS PRIOR TO ADMISSION: Patient was modified independent using a rolling walker for functional mobility. HOME SUPPORT PRIOR TO ADMISSION: The patient lived alone with DTR to provide assistance. Physical Therapy Goals  Initiated 6/2/2021  1. Patient will move from supine to sit and sit to supine  in bed with independence within 7 day(s). 2.  Patient will transfer from bed to chair and chair to bed with independence using the least restrictive device within 7 day(s). 3.  Patient will perform sit to stand with independence within 7 day(s). 4.  Patient will ambulate with independence for 150 feet with the least restrictive device within 7 day(s). 5.  Patient will ascend/descend 3 STEPS/ stairs with BILAT handrail(s) with independence within 7 day(s). Outcome: Progressing Towards Goal  PHYSICAL THERAPY TREATMENT  Patient: Olaf Lares (22 y.o. male)  Date: 6/4/2021  Diagnosis: Depression [F32.9]  Dementia ([facility] [F03.90] Severe recurrent major depression without psychotic features ([facility] [address]       Precautions: Fall  Chart, physical therapy assessment, plan of care and goals were reviewed. ASSESSMENT  Patient continues with skilled PT services and is progressing towards goals. YES: ALSO PATIENT IS DISPLAYING IMPROVED TECHNIQUE RE PROPER POSITIONING OF BODY TO TORSO. Current Level of Function Impacting Discharge (mobility/balance):PATIENT REQUIRES SUP WITH MOBILITY AND GAIT WITH RW. BED MOB IND-SUP. Other factors to consider for discharge: PATIENT IS AT [address]. PLAN :  Patient continues to benefit from skilled intervention to address the above impairments. Continue treatment per established plan of care. to address goals.     Recommendation for discharge: (in order for the patient to meet his/her long term goals)  Therapy up to 5 days/week in SNF setting    This discharge recommendation:  Has been made in collaboration with the attending provider and/or case management    IF patient discharges home will need the following DME: to be determined (TBD)       SUBJECTIVE:   Patient HAS NO C/O PAIN    OBJECTIVE DATA SUMMARY:   Critical Behavior:  Neurologic State: Alert  Orientation Level: Oriented to person, Oriented to place, Oriented to situation  Cognition: Follows commands  Safety/Judgement: Decreased awareness of need for assistance, Decreased awareness of need for safety  Functional Mobility Training:  Bed Mobility:                    Transfers:  Sit to Stand: Supervision;Assist x1  Stand to Sit: Supervision                             Balance:  Sitting: Intact  Sitting - Static: Normal   Sitting - Dynamic: Good (unsupported)  Standing: Impaired  Standing - Static: Good  Standing - Dynamic : Fair  Ambulation/Gait Training:  Distance (ft): 130 Feet (ft)  Assistive Device: Gait belt;Walker, rolling  Ambulation - Level of Assistance: Supervision;Contact guard assistance     Gait Description (WDL): Exceptions to WDL  Gait Abnormalities: Decreased step clearance        Base of Support: Narrowed        Step Length: Left shortened;Right shortened        Interventions: Safety awareness training; Tactile cues; Verbal cues          Stairs: Therapeutic Exercises:     Pain Rating:  NO C/O PAIN. Activity Tolerance:   Fair    After treatment patient left in no apparent distress:   Sitting in chair and IN COMMUNITY AREA. COMMUNICATION/COLLABORATION:   The patients plan of care was discussed with: Case management.      Samm Claros   Time Calculation: 15 mins

## 2021-06-04 NOTE — BH NOTES
Shift change report given to Linda Mcelroy re: SBAR, medications, and behavior.   Roxann fall risk score 5

## 2021-06-04 NOTE — BH NOTES
Affect constricted, mood depressed/anxious. Alert and Oriented to self and situation . Cooperative and pleasant. Interacted with staff and peers. Makes needs known. Ate all meals and snacks. Denied SI/HI/AVH and pain. Medication compliant. Stable with no s/s of distress.       Laid in bed with eyes closed for 7 hours and 30min

## 2021-06-04 NOTE — PROGRESS NOTES
Problem: Mobility Impaired (Adult and Pediatric)  Goal: *Acute Goals and Plan of Care (Insert Text)  Description: FUNCTIONAL STATUS PRIOR TO ADMISSION: Patient was modified independent using a rolling walker for functional mobility. HOME SUPPORT PRIOR TO ADMISSION: The patient lived alone with DTR to provide assistance. Physical Therapy Goals  Initiated 6/2/2021  1. Patient will move from supine to sit and sit to supine  in bed with independence within 7 day(s). 2.  Patient will transfer from bed to chair and chair to bed with independence using the least restrictive device within 7 day(s). 3.  Patient will perform sit to stand with independence within 7 day(s). 4.  Patient will ambulate with independence for 150 feet with the least restrictive device within 7 day(s). 5.  Patient will ascend/descend 3 STEPS/ stairs with BILAT handrail(s) with independence within 7 day(s). Outcome: Progressing Towards Goal      PHYSICAL THERAPY TREATMENT  Patient: Dionisio Quinn (75 y.o. male)  Date: 6/3/2021  Diagnosis: Depression [F32.9]  Dementia ([facility] [address] [F03.90] Severe recurrent major depression without psychotic features ([facility] [address]       Precautions: Fall  Chart, physical therapy assessment, plan of care and goals were reviewed. ASSESSMENT  Patient continues with skilled PT services and is progressing towards goals. YES.      Current Level of Function Impacting Discharge (mobility/balance): PATIENT REQUIRES SUP TO/FROM CGA X 1 FOR BED MOB, TRANSFERS AND GAIT WITH RW. GAIT WITH RW, 120 FEET X 1, ON LEVEL SURFACES. VC S AND TC S ARE NEEDED FOR ERECTING POSTURE, INCREASING LAURY, KEEPING BODY IN THE FRAME OF THE WALKER, AND PROPER HAND POSITION ON WALKER.                             Other factors to consider for discharge: PATIENT IS AT RISK FOR FALLS. PLAN :  Patient continues to benefit from skilled intervention to address the above impairments. Continue treatment per established plan of care. to address goals. Recommendation for discharge: (in order for the patient to meet his/her long term goals)  Therapy up to 5 days/week in SNF setting or intensive home health therapy program    This discharge recommendation:  Has been made in collaboration with the attending provider and/or case management    IF patient discharges home will need the following DME: to be determined (TBD)       SUBJECTIVE:   Patient HAD NO C/O PAIN. OBJECTIVE DATA SUMMARY:   Critical Behavior:  Neurologic State: Alert  Orientation Level: Oriented X4  Cognition: Follows commands  Safety/Judgement: Awareness of environment  Functional Mobility Training:  Bed Mobility:                    Transfers:  Sit to Stand: Contact guard assistance  Stand to Sit: Supervision                             Balance:  Sitting: Intact  Sitting - Static: Good (unsupported)  Sitting - Dynamic: Good (unsupported)  Standing - Static: Constant support  Standing - Dynamic : Good;Fair  Ambulation/Gait Training:  Distance (ft): 120 Feet (ft)  Assistive Device: Walker, rolling  Ambulation - Level of Assistance: Supervision;Contact guard assistance        Gait Abnormalities: Decreased step clearance        Base of Support: Narrowed     Speed/Jessica: Slow  Step Length: Left shortened;Right shortened        Interventions: Tactile cues; Verbal cues; Visual/Demos           Stairs: Therapeutic Exercises:   GAIT WITH RW  Pain Rating:  NO C/O PAIN. Activity Tolerance:   Fair    After treatment patient left in no apparent distress:   SITTING ON BEDSIDE. COMMUNICATION/COLLABORATION:   The patients plan of care was discussed with: Registered nurse and PATIENT .      Jefe Holden   Time Calculation: 20 mins

## 2021-06-04 NOTE — BH NOTES
SOCIAL WORK PROGRESS NOTE    Ravindra Prado  : 1940  MRN: [id number]      Patient presentation including presence/absence SI/HI, psychosis, ability to perform ADLs: medication compliant, less dysphoric, affect flat denies SI/HI and psychosis, participating with OT/PT and attending groups    Concerns expressed by patient: none at this time    Family involvement: daughter    Resource needs: possible SNF    Strengths: supportive daughter    Limitations: cognitive impairment, limited hearing    Other: patient does report he feels his mood is lifting, not as confused, will touch base with his daughter on Monday to see if he is still progressing and she and the patient would be interested in more therapy in a skilled setting before he went home.  Dr. Maurice Bonilla is comfortable with patient returning home with help in the home

## 2021-06-04 NOTE — PROGRESS NOTES
INTERVAL Hx:  Records and clinical information were reviewed. States he's beginning to feel a little less depressed, although he still feels fairly dysphoric. He also states that he's been sleepy more than normal although the reduced dose of the Trazodone has helped--less visibly tired this AM. Not as confused last night, slthough he clearly has evidence of cognitive impairment. Appreciate PT and OT input--will try to pursue SNF placement once stable given his limitations and risks. Tolerating the Lexapro so far--no nausea or other SE's at all. Slept 7.5 hours last night. MEDS:  Current Facility-Administered Medications   Medication Dose Route Frequency    traZODone (DESYREL) tablet 25 mg  25 mg Oral QHS PRN    escitalopram oxalate (LEXAPRO) tablet 5 mg  5 mg Oral DAILY    acetaminophen (TYLENOL) tablet 650 mg  650 mg Oral Q4H PRN    magnesium hydroxide (MILK OF MAGNESIA) 400 mg/5 mL oral suspension 30 mL  30 mL Oral DAILY PRN    ibuprofen (MOTRIN) tablet 400 mg  400 mg Oral Q8H PRN    alum-mag hydroxide-simeth (MYLANTA) oral suspension 30 mL  30 mL Oral Q4H PRN    pantoprazole (PROTONIX) tablet 40 mg  40 mg Oral ACB    donepeziL (ARICEPT) tablet 5 mg  5 mg Oral QHS    predniSONE (DELTASONE) tablet 20 mg  20 mg Oral DAILY WITH BREAKFAST    carvediloL (COREG) tablet 12.5 mg  12.5 mg Oral BID WITH MEALS    atorvastatin (LIPITOR) tablet 40 mg  40 mg Oral DAILY    hydrOXYzine pamoate (VISTARIL) capsule 25 mg  25 mg Oral TID PRN    aspirin delayed-release tablet 81 mg  81 mg Oral DAILY    amLODIPine (NORVASC) tablet 5 mg  5 mg Oral DAILY       VITALS:   Patient Vitals for the past 12 hrs:   Temp Pulse Resp BP SpO2   06/04/21 0736 (!) 96.6 °F (35.9 °C) 73 18 (!) 155/72 97 %       LABS: .No results found for this or any previous visit (from the past 24 hour(s)).     MSE:   Appearance: casually dressed; adequately groomed  Behavior: calm and cooperative; no agitation  Motor: slowed  Mood/Affect: slightly less dysphoric; still flat  Thought Process: mostly organized but can derail  Thought Content: no delusions; no SI/HI  Perceptions: no hallucinations  Insight/Judgment: limited    ASSESSMENT:   1.  Major Depression, severe (F33.2)  2. Dementia, likely mixed, moderate (F02.80)    PLAN:  1. Continue the Lexapro at 5 mg daily for now. 2. Continue the Trazodone at 25 mg QHS PRN. 3. Continue the Aricept at 5 mg daily. 4. ELOS: 6-10 days--may transition to SNF or home with home assistance.

## 2021-06-04 NOTE — GROUP NOTE
MEGAN  GROUP DOCUMENTATION INDIVIDUAL Group Therapy Note Date: 6/4/2021 Group Start Time: 2445 Group End Time: 1100 Group Topic: [employer] Zhen Dumas MEGAN  GROUP DOCUMENTATION GROUP Group Therapy Note Attendees: 6 Attendance: Attended Patient's Goal:  No goal today Interventions/techniques: Provide feedback Follows Directions: Followed directions Interactions: Interacted appropriately Mental Status: Calm Behavior/appearance: Cooperative Goals Achieved: Able to listen to others Additional Notes:  Patient appetite was good, no physical pain, no suicidal thoughts. Patient has no depression or anxiety.

## 2021-06-04 NOTE — PROGRESS NOTES
Problem: Depressed Mood (Adult/Pediatric)  Goal: *STG: Participates in treatment plan  Outcome: Progressing Towards Goal  Note: Affect blunted. Mood depressed/anxious. Cooperative and kind,  Med compliant. PT worked with him ambulating in kirk with walker. Incontinent of bladder and bowel at times - skin intact - wearing incontinent briefs. Denies SI/HI/AVH/pain.

## 2021-06-04 NOTE — PROGRESS NOTES
Problem: Self Care Deficits Care Plan (Adult)  Goal: *Acute Goals and Plan of Care (Insert Text)  Description: FUNCTIONAL STATUS PRIOR TO ADMISSION: Patient was modified independent using a rolling walker for functional mobility. HOME SUPPORT: The patient lived alone with family to provide assistance. Occupational Therapy Goals  Initiated 6/2/2021  1. Patient will perform lower body dressing with supervision/set-up within 7 days. 2.  Patient will perform all aspects of toileting with supervision/set-up within 7 days. 3.  Patient will participate in upper extremity therapeutic exercise/activities with supervision/set-up for 15 to 20 minutes within 7 days. Outcome: Progressing Towards Goal         OCCUPATIONAL THERAPY TREATMENT  Patient: Amparo Crawford (10 y.o. male)  Date: 6/4/2021  Diagnosis: Depression [F32.9]  Dementia ([address] [F03.90] Severe recurrent major depression without psychotic features ([address]       Precautions: Fall  Chart, occupational therapy assessment, plan of care, and goals were reviewed. ASSESSMENT  Patient continues with skilled OT services and is progressing towards goals. Pt noted to have intention tremor, he participates well in exercises today with mild soreness afterward. Also performed ADL for socks on/off with extra time. Current Level of Function Impacting Discharge (ADLs): per pt gets assist for cutting foods, extra time for dressing    Other factors to consider for discharge: may need home assessment         PLAN :  Patient continues to benefit from skilled intervention to address the above impairments. Continue treatment per established plan of care to address goals.     Recommend with staff: encourage independence in self care/cutting foods when possible    Recommend next OT session: practice finger strengthening, UE strengthening, cutting with knife/fork    Recommendation for discharge: (in order for the patient to meet his/her long term goals)  Recommend home OT assessment to determine if he has difficulty with set-up/needs in home environment    This discharge recommendation:  Has not yet been discussed the attending provider and/or case management    IF patient discharges home will need the following DME: none       SUBJECTIVE:   Patient stated I have trouble sometimes getting these (socks) on.    OBJECTIVE DATA SUMMARY:   Cognitive/Behavioral Status:  Neurologic State: Alert  Orientation Level: Oriented to person;Oriented to place;Oriented to situation  Cognition: Follows commands        Safety/Judgement: Decreased awareness of need for assistance;Decreased awareness of need for safety    Functional Mobility and Transfers for ADLs:    Transfers:  Sit to Stand: Supervision;Assist x1          Balance:  Sitting: Intact  Sitting - Static: Normal   Sitting - Dynamic: Good (unsupported)  Standing: Impaired  Standing - Static: Good  Standing - Dynamic : Fair    ADL Intervention:    Cognitive Retraining  Safety/Judgement: Decreased awareness of need for assistance;Decreased awareness of need for safety    Therapeutic Exercises: 10 reps each  Yellow band wrist -co-contraction/ ER   Red band: elbow flexion; shoulder flex/ext , seated rows, resisted reaches, shoulder ab and adduction  Withheld elbow ext due to fatigue    Pain:  denies    Activity Tolerance:   Good    After treatment patient left in no apparent distress:   Caregiver / family present    COMMUNICATION/COLLABORATION:   The patients plan of care was discussed with:  staff caregiver .      Catherine Dumont OT  Time Calculation: 16 mins

## 2021-06-04 NOTE — BH NOTES
Affect blunted. Mood depressed/anxious. Cooperative and kind,  Med compliant. PT worked with him ambulating in kirk with walker. Incontinent of bladder and bowel at times - skin intact - wearing incontinent briefs. Denies SI/HI/AVH/pain.

## 2021-06-04 NOTE — GROUP NOTE
[facility] GROUP DOCUMENTATION INDIVIDUAL Group Therapy Note Date: 6/4/2021 Group Start Time: 0900 Group End Time: 6983 Group Topic: Process Group - Inpatient 1 [place] [place] [facility] GROUP DOCUMENTATION GROUP Group Therapy Note Focus of session was on the quote Each Day I Remember and we went over it and that it included the fact that each day we have choices, that taking care of ourselves can be our first choice, that it is okay to say no when necessary and to stand up for myself, that I don't have to please others or be everything to everyone, I can be honest and still kind and stick boundaries and stick with them, as well as I can honor myself and that is my responsibility and right to do so. We spoke about how this can be a quote that can help push us in the direction of self-care and we spoke about what stood out in particular to group members about self-care. Attendance: Attended Patient's Goal:  
  
Attends activities and groups Interventions/techniques: Informed, Validated and Reinforced Follows Directions: Followed directions Interactions: Interacted appropriately Mental Status: Calm and Congruent Behavior/appearance: Attentive, Cooperative and Withdrawn/quiet Goals Achieved: Able to engage in interactions Additional Notes:  Pt attended group and attempted to follow along. He has limited hearing and doesn't have his hearing aids and glasses . He was [place] and cooperative Matt Amador

## 2021-06-05 PROCEDURE — 74011636637 HC RX REV CODE- 636/637: Performed by: PSYCHIATRY & NEUROLOGY

## 2021-06-05 PROCEDURE — 74011250637 HC RX REV CODE- 250/637: Performed by: PSYCHIATRY & NEUROLOGY

## 2021-06-05 PROCEDURE — 65220000003 HC RM SEMIPRIVATE PSYCH

## 2021-06-05 PROCEDURE — 99231 SBSQ HOSP IP/OBS SF/LOW 25: CPT | Performed by: PSYCHIATRY & NEUROLOGY

## 2021-06-05 PROCEDURE — 74011250637 HC RX REV CODE- 250/637: Performed by: INTERNAL MEDICINE

## 2021-06-05 RX ADMIN — ATORVASTATIN CALCIUM 40 MG: 40 TABLET, FILM COATED ORAL at 08:14

## 2021-06-05 RX ADMIN — PANTOPRAZOLE SODIUM 40 MG: 40 TABLET, DELAYED RELEASE ORAL at 06:30

## 2021-06-05 RX ADMIN — CARVEDILOL 12.5 MG: 12.5 TABLET, FILM COATED ORAL at 08:15

## 2021-06-05 RX ADMIN — ASPIRIN 81 MG: 81 TABLET, COATED ORAL at 08:15

## 2021-06-05 RX ADMIN — AMLODIPINE BESYLATE 5 MG: 5 TABLET ORAL at 08:15

## 2021-06-05 RX ADMIN — PREDNISONE 20 MG: 20 TABLET ORAL at 08:14

## 2021-06-05 RX ADMIN — DONEPEZIL HYDROCHLORIDE 5 MG: 5 TABLET, FILM COATED ORAL at 21:03

## 2021-06-05 RX ADMIN — CARVEDILOL 12.5 MG: 12.5 TABLET, FILM COATED ORAL at 17:35

## 2021-06-05 RX ADMIN — TRAZODONE HYDROCHLORIDE 25 MG: 50 TABLET ORAL at 21:04

## 2021-06-05 RX ADMIN — ESCITALOPRAM OXALATE 5 MG: 10 TABLET ORAL at 08:14

## 2021-06-05 NOTE — PROGRESS NOTES
Problem: Depressed Mood (Adult/Pediatric)    Goal: *STG: Attends activities and groups    Affect blunted, mood depressed/anxious. Attended and participated in group. Denied SI or intent to harm self. Assessed/monitored potential harm to self. Encouraged sharing of feelings. Monitored medication compliance and effectiveness. Offered encouragement, reassurance,and support.     Outcome: Progressing Towards Goal

## 2021-06-05 NOTE — GROUP NOTE
MEGAN  GROUP DOCUMENTATION INDIVIDUAL Group Therapy Note Date: 6/4/2021 Group Start Time: 46 Group End Time: 2020 Group Topic: [employer] [facility] [place] [place] GROUP DOCUMENTATION GROUP Group Therapy Note Attendees: 5 Attendance: Attended Patient's Goal:  No goal  
 
Interventions/techniques: Provide feedback Follows Directions: Followed directions Interactions: Interacted appropriately Mental Status: Calm Behavior/appearance: Cooperative Goals Achieved: Able to listen to others Additional Notes:  Patient said he had a good day . Patient said he has no pain , no depression  , no anxiety , and no SI . [facility]

## 2021-06-05 NOTE — PROGRESS NOTES
Subjective:  \"IM OK. \"    VERY CONCRETE THINKING     NOT ORIENTED TO DATE OR PLACE. SLEPT 7.25 HOURS. Objective:   Vitals: /67  Labs: NO NEW LABS AT THIS TIME    Mental Status Exam:  Appearance: DRESSED IN SCRUBS, USES A WALKER FOR AMBULATION  Behavior: COOPERATIVE, POLITE  Motor: NO PSYCHOMOTOR AGITATION OR RETARDATION NOTED  Speech: NORMAL RATE, VOLUME AND PROSODY  Mood: OK\"  Affect:  CONSTRICTED  Thought Content: NO SI/HI/AVH,   Thought Process: CONCRETE THINKING  Orientation: A&O X 1  Insight/Judgment: LIMITED X 2    Assessment:   1. Major Depression, severe (F33.2)  2. Dementia, likely mixed, moderate (F02.80)    Plan:   CONT CURRENT PLAN    SNF PLACEMENT      REASON FOR CONTINUED STAY:  DISORIENTED, UNABLE TO CARE FOR SELF  1. I certify that the patient needs 24 hours of medical supervision to improve the above conditions. 2. The current mental illness is classified as severe. 3. Outpatient services only are insufficient currently to treat this patient's current psychiatric condition. 4. There is continued need for psychiatric inpatient treatment to address the above condition. 5. I certify that current psychiatric treatment could be reasonably expected to improve the patient's condition. 6. I certify that the patient should expect to make improvements as a result of inpatient psychiatric services  7. The risks and benefits of treatment were discussed with the patient.

## 2021-06-05 NOTE — PROGRESS NOTES
Problem: Falls - Risk of  Goal: *Absence of Falls  Description: Document Charly Mcnulty Fall Risk and appropriate interventions in the flowsheet. Outcome: Progressing Towards Goal  Note: Fall Risk Interventions:  Mobility Interventions: Utilize walker, cane, or other assistive device    Mentation Interventions: Door open when patient unattended    Medication Interventions: Teach patient to arise slowly    Elimination Interventions: Stay With Me (per policy)    History of Falls Interventions: Room close to nurse's station    Gripper socks. Gait steadier each day. Using walker to ambulate. Alert to name only. Affect blunted. Mood euthymic. Attended all group activities as active participant when prompted. BP high this am and improved after medication admin. To 160/80. Talked to dtr on phone. Conversations limited and sometimes nonsensical.  Cooperative.

## 2021-06-05 NOTE — BH NOTES
Affect blunted,mood depressed/anxious. Attended and participated in group. Ate snacks. Talked with daughter on phone. Told daughter that he will be here \"until November. \" Alert and oriented to name and time. Confused and disoriented to place and situation. Denied SI/HI/AVH/pain. Medication compliant. Pleasant and co-operative. PRN Medication Documentation    Specific patient behavior that led to need for PRN medication: Insomnia. Staff interventions attempted prior to PRN being given:Low stimulation prior to bedtime. PRN medication given: Trazodone 25 mg PO at 2121. Patient response/effectiveness of PRN medication: Sitting in dayroom. Trazodone was effective. Pt rested quietly in bed with eyes closed for 7.25 hours. Respirations even and unlabored. Pt in no apparent distress.

## 2021-06-05 NOTE — BH NOTES
Gait steadier each day. Using walker to ambulate. Alert to name only. Affect blunted. Mood euthymic. Attended all group activities as active participant when prompted. BP high this am and improved after medication admin. to 160/80 abd ub 997;M systolic at 2155. Talked to dtr on phone. Conversations limited and sometimes nonsensical.  Cooperative. Family in to visit in evening. Ate 100%. Able to do own ADL's except needs assist with cleaning after BM's.

## 2021-06-05 NOTE — MASTER TREATMENT PLAN
Master Treatment Plan Review for Eran Chopra Date of Admission Date Treatment Plan Reviewed Anticipated Date of Discharge Legal Status 06/01/2021 06/05/2021  Voluntary Treatment & Discharge Planning Changes Modality or Intervention Changes N/A Psychotropic Medication Changes 06/02/2021 - Lexapro 5 mg PO daily 06/03/2021 - Trazodone 25 mg PO QHS PRN Discharge Planning or Target Date Changes ELOS: 6 - 10 days. May transition to SNF or home with home assistance. New Issues N/A Treatment Team Signatures I have participated in the development of this plan of treatment and agree to its implementation. Patient Signature Patient Printed Name Date/Time /Therapist Signature //Therapist Printed Name Date/Time RN Signature RN Printed Name Zeus Castorena RN Date/Time 
 
 
06/05/21/0509 Unit  Signature Unit  Printed Name Date/Time MD Signature MD Printed Name Date/Time

## 2021-06-05 NOTE — ROUTINE PROCESS
Shift change report given to DENISE Malcolm RN, re: SBAR, medications, and behavior. Roxann Fall Risk Score - 5.

## 2021-06-06 PROCEDURE — 65220000003 HC RM SEMIPRIVATE PSYCH

## 2021-06-06 PROCEDURE — 99231 SBSQ HOSP IP/OBS SF/LOW 25: CPT | Performed by: PSYCHIATRY & NEUROLOGY

## 2021-06-06 PROCEDURE — 74011250637 HC RX REV CODE- 250/637: Performed by: PSYCHIATRY & NEUROLOGY

## 2021-06-06 PROCEDURE — 74011250637 HC RX REV CODE- 250/637: Performed by: INTERNAL MEDICINE

## 2021-06-06 PROCEDURE — 74011636637 HC RX REV CODE- 636/637: Performed by: PSYCHIATRY & NEUROLOGY

## 2021-06-06 RX ADMIN — PREDNISONE 20 MG: 20 TABLET ORAL at 09:35

## 2021-06-06 RX ADMIN — CARVEDILOL 12.5 MG: 12.5 TABLET, FILM COATED ORAL at 08:56

## 2021-06-06 RX ADMIN — ATORVASTATIN CALCIUM 40 MG: 40 TABLET, FILM COATED ORAL at 09:35

## 2021-06-06 RX ADMIN — TRAZODONE HYDROCHLORIDE 25 MG: 50 TABLET ORAL at 21:16

## 2021-06-06 RX ADMIN — ACETAMINOPHEN 650 MG: 325 TABLET ORAL at 21:15

## 2021-06-06 RX ADMIN — CARVEDILOL 12.5 MG: 12.5 TABLET, FILM COATED ORAL at 17:37

## 2021-06-06 RX ADMIN — ESCITALOPRAM OXALATE 5 MG: 10 TABLET ORAL at 09:35

## 2021-06-06 RX ADMIN — DONEPEZIL HYDROCHLORIDE 5 MG: 5 TABLET, FILM COATED ORAL at 21:16

## 2021-06-06 RX ADMIN — HYDROXYZINE PAMOATE 25 MG: 25 CAPSULE ORAL at 17:41

## 2021-06-06 RX ADMIN — AMLODIPINE BESYLATE 5 MG: 5 TABLET ORAL at 08:56

## 2021-06-06 RX ADMIN — ASPIRIN 81 MG: 81 TABLET, COATED ORAL at 09:36

## 2021-06-06 RX ADMIN — PANTOPRAZOLE SODIUM 40 MG: 40 TABLET, DELAYED RELEASE ORAL at 06:40

## 2021-06-06 NOTE — BH NOTES
Gait improving. Continues to use walker to ambulate. Alert and oriented to x 2. Has some short and long term memory deficits. Cooperative. Active participant in group activities. Med compliant. Ate all of meals. Denies SI/HI/AVH/pain. Needs minimal assist with ADL's. Vistaril 50mg po given at 1741 for anxiety after having visior with some relief.

## 2021-06-06 NOTE — BH NOTES
Affect blunted, mood calm. Daughter and granddaughter in to visit pt. Refused group and snacks. \"i'm tired. I want to go to bed. \" Pt wanted to rest after family left. Alert and oriented to name only. Confused and disoriented to time, place, and situation. No attempt to harm self or others. No overt evidence of AVH. No c/o pain. Medication compliant. Pleasant and co-operative. PRN Medication Documentation    Specific patient behavior that led to need for PRN medication: C/o insomnia. Staff interventions attempted prior to PRN being given: Low stimulation prior to bedtime. PRN medication given: Trazodone 25 mg PO at 2104. Patient response/effectiveness of PRN medication: Currently resting in bed. Trazodone was effective. Pt rested quietly in bed with eyes closed for 6.5 hours. Respirations even and unlabored. Pt in no apparent distress.

## 2021-06-06 NOTE — BH NOTES
Patient was encouraged to attend and participate in community meeting. Patient couldn't follow due to cognitive deficit.

## 2021-06-06 NOTE — PROGRESS NOTES
Problem: Depressed Mood (Adult/Pediatric)    Goal: *STG: Remains safe in hospital  Affect blunted, mood calm. No attempt to harm self or others. No acting out behaviors. Assessed/monitored potential harm to self. Encouraged sharing of feelings. Monitored medication compliance and effectiveness. Reinforced abilities and accomplishments. Provided encouragement, reassurance, and support.     Outcome: Progressing Towards Goal

## 2021-06-06 NOTE — PROGRESS NOTES
Subjective:  \"IM OK. \"     VERY CONCRETE THINKING      ALERT AND ORIENTED IN [facility] THIS AM.      SLEPT 6.5 HOURS.      Objective:   Vitals: BP 65/75  Labs: NO NEW LABS AT THIS TIME     Mental Status Exam:  Appearance: DRESSED IN SCRUBS, USES A WALKER FOR AMBULATION, WALKING BETTER TODAY  Behavior: COOPERATIVE, POLITE  Motor: NO PSYCHOMOTOR AGITATION OR RETARDATION NOTED  Speech: NORMAL RATE, VOLUME AND PROSODY  Mood: OK\"  Affect:  CONSTRICTED  Thought Content: NO SI/HI/AVH,   Thought Process: CONCRETE THINKING  Orientation: A&O X 4  Insight/Judgment: LIMITED X 2     Assessment:   1.  Major Depression, severe (F33.2)  2.  Dementia, likely mixed, moderate (F02.80)     Plan:   CONT CURRENT PLAN     SNF PLACEMENT        REASON FOR CONTINUED STAY:  DISORIENTED, UNABLE TO CARE FOR SELF  1. I certify that the patient needs 24 hours of medical supervision to improve the above conditions. 2. The current mental illness is classified as severe. 3. Outpatient services only are insufficient currently to treat this patient's current psychiatric condition. 4. There is continued need for psychiatric inpatient treatment to address the above condition. 5. I certify that current psychiatric treatment could be reasonably expected to improve the patient's condition. 6. I certify that the patient should expect to make improvements as a result of inpatient psychiatric services  7. The risks and benefits of treatment were discussed with the patient.

## 2021-06-06 NOTE — PROGRESS NOTES
Problem: Falls - Risk of  Goal: *Absence of Falls  Description: Document Conchis Rose Fall Risk and appropriate interventions in the flowsheet. Outcome: Progressing Towards Goal  Note: Fall Risk Interventions:  Mobility Interventions: Utilize walker, cane, or other assistive device    Mentation Interventions: Door open when patient unattended    Medication Interventions: Teach patient to arise slowly    Elimination Interventions: Stay With Me (per policy)    History of Falls Interventions: Room close to nurse's station    Gait improving. Continues to use walker to ambulate. Alert and oriented to x 2. Has some short and long term memory deficits. Cooperative. Active participant in group activities. Med compliant. Ate all of meals. Denies SI/HI/AVH/pain. Needs minimal assist with ADL's.

## 2021-06-07 PROCEDURE — 74011250637 HC RX REV CODE- 250/637: Performed by: PSYCHIATRY & NEUROLOGY

## 2021-06-07 PROCEDURE — 65220000003 HC RM SEMIPRIVATE PSYCH

## 2021-06-07 PROCEDURE — 97110 THERAPEUTIC EXERCISES: CPT

## 2021-06-07 PROCEDURE — 99231 SBSQ HOSP IP/OBS SF/LOW 25: CPT | Performed by: PSYCHIATRY & NEUROLOGY

## 2021-06-07 PROCEDURE — 74011250637 HC RX REV CODE- 250/637: Performed by: INTERNAL MEDICINE

## 2021-06-07 PROCEDURE — 74011636637 HC RX REV CODE- 636/637: Performed by: PSYCHIATRY & NEUROLOGY

## 2021-06-07 PROCEDURE — 97116 GAIT TRAINING THERAPY: CPT

## 2021-06-07 RX ADMIN — CARVEDILOL 12.5 MG: 12.5 TABLET, FILM COATED ORAL at 17:00

## 2021-06-07 RX ADMIN — PANTOPRAZOLE SODIUM 40 MG: 40 TABLET, DELAYED RELEASE ORAL at 06:33

## 2021-06-07 RX ADMIN — HYDROXYZINE PAMOATE 25 MG: 25 CAPSULE ORAL at 09:35

## 2021-06-07 RX ADMIN — PREDNISONE 20 MG: 20 TABLET ORAL at 09:27

## 2021-06-07 RX ADMIN — ESCITALOPRAM OXALATE 5 MG: 10 TABLET ORAL at 09:26

## 2021-06-07 RX ADMIN — ASPIRIN 81 MG: 81 TABLET, COATED ORAL at 09:26

## 2021-06-07 RX ADMIN — ATORVASTATIN CALCIUM 40 MG: 40 TABLET, FILM COATED ORAL at 09:27

## 2021-06-07 RX ADMIN — CARVEDILOL 12.5 MG: 12.5 TABLET, FILM COATED ORAL at 09:26

## 2021-06-07 RX ADMIN — DONEPEZIL HYDROCHLORIDE 5 MG: 5 TABLET, FILM COATED ORAL at 21:12

## 2021-06-07 RX ADMIN — AMLODIPINE BESYLATE 5 MG: 5 TABLET ORAL at 09:27

## 2021-06-07 NOTE — BH NOTES
Affect blunted/constricted, mood depressed/anxious. Patient is alert & oriented x 4 disorganized thoughts. Patient speech clear and coherent. Patient answers questions appropriately. Patient denies SI/HI/AVH/pain. No delusional statements made. Patient is compliant with medications and PRN given. Patient appetite good eats 100% of all meals plus snacks. Patient attended and engaged in groups with prompting. Patient needs one person assist with all ADL's. Patient utilizes a walker to assist with ambulating and continues to work with PT/OT. Patient wearing a face mask and adhering to social distancing protocol. Patient in no apparent distress all vital signs within normal limits. PRN Medication Documentation    Specific patient behavior that led to need for PRN medication: Patient c/o being anxious and requested PRN Vistaril. Staff interventions attempted prior to PRN being given: Assessment done. PRN medication given: Vistaril 25 mg po @ 0935. Patient response/effectiveness of PRN medication: Positive effects.

## 2021-06-07 NOTE — PROGRESS NOTES
Subjective:  \"IM DOPED UP. \"     VERY CONCRETE THINKING      HAS PERIODS OF WAXING AND WANING ORIENTATION     SLEPT 8  HOURS.      Objective:   Vitals: /79  Labs: NO NEW LABS AT THIS TIME     Mental Status Exam:  Appearance: DRESSED IN SCRUBS, USES A WALKER FOR AMBULATION, WALKING BETTER TODAY  Behavior: COOPERATIVE, POLITE  Motor: NO PSYCHOMOTOR AGITATION OR RETARDATION NOTED  Speech: NORMAL RATE, VOLUME AND PROSODY  Mood: IM DOPED UP\"  Affect:  CONSTRICTED  Thought Content: NO SI/HI/AVH,   Thought Process: CONCRETE THINKING  Orientation: A&O X 4  Insight/Judgment: LIMITED X 2     Assessment:   1.  Major Depression, severe (F33.2)  2.  Dementia, likely mixed, moderate (F02.80)     Plan:   CONT CURRENT PLAN     SNF PLACEMENT        REASON FOR CONTINUED STAY:  DISORIENTED, UNABLE TO CARE FOR SELF, PLACEMENT    1. I certify that the patient needs 24 hours of medical supervision to improve the above conditions. 2. The current mental illness is classified as severe. 3. Outpatient services only are insufficient currently to treat this patient's current psychiatric condition. 4. There is continued need for psychiatric inpatient treatment to address the above condition. 5. I certify that current psychiatric treatment could be reasonably expected to improve the patient's condition. 6. I certify that the patient should expect to make improvements as a result of inpatient psychiatric services  7. The risks and benefits of treatment were discussed with the patient.

## 2021-06-07 NOTE — PROGRESS NOTES
Problem: Depressed Mood (Adult/Pediatric)  Goal: *STG: Attends activities and groups  Outcome: Progressing Towards Goal  Goal: *STG: Demonstrates reduction in symptoms and increase in insight into coping skills/future focused  Outcome: Progressing Towards Goal  Goal: *STG: Remains safe in hospital  Outcome: Progressing Towards Goal  Goal: *STG: Complies with medication therapy  Outcome: Progressing Towards Goal  Goal: *LTG: Returns to previous level of functioning and participates with after care plan  Outcome: Progressing Towards Goal

## 2021-06-07 NOTE — GROUP NOTE
MEGAN  GROUP DOCUMENTATION INDIVIDUAL Group Therapy Note Date: 2021 Group Start Time: 1300 Group End Time: 3717 Group Topic: Process Group - Inpatient [address] OP [place] [facility] GROUP DOCUMENTATION GROUP Group Therapy Note Focus of session was based on the article 10 Signs you are Starting to Heal.  We spoke about the following: you no longer dwell on negative thoughts from others, you no longer feel afraid to ask for help, you are starting to feel again, you no longer beat yourself up about everything, you experience more better days that bad ones, you are gaining more control over your life, you are regaining your appetite, you no longer rely on others to make you happy, and you look forward to the future. We spoke about where group members are and what they are noticing are positive signs of recovery Attendance: Attended Patient's Goal:   
  
Verbalizes anger, guilt, and other feelings in a constructive manor Interventions/techniques: Informed, Validated, Reinforced and Supported Follows Directions: Followed directions Interactions: Interacted appropriately Mental Status: Calm and Congruent Behavior/appearance: Attentive, Cooperative and Needed prompting Goals Achieved: Able to engage in interactions, Able to listen to others and Able to receive feedback Additional Notes:  Pt attempted to participate in the activity and was able to engage more today with others. He appeared to be more alert, smiling and was able to talk when prompted. He stated he has tow brothers die and his sister  curing covid. He also told everyone he was [de-identified]years old. Jennifer Hampton

## 2021-06-07 NOTE — BH NOTES
SOCIAL WORK PROGRESS NOTE    Janine uCrry  : 1940  MRN: [id number]      Patient presentation including presence/absence SI/HI, psychosis, ability to perform ADLs: denies SI/HI/AVH, requires assistance with ADLs for safety, insight and judgment are limited, continues to work with PT/OT    Concerns expressed by patient: expressed to the Doctor that he felt medicated    Family involvement: daughter very involved    Resource needs: will need assistance at home, which his daughter has someone to help    Strengths: supportive children, has a place to return to and help    Limitations: concrete thinking    Other: spoke with his daughter this morning, he will return home she is agreeable with home PT/OT if he still qualifies, also she is interested in iin-home care BURT will contact Cynthia Du to call her.  BURT has also update his PCP at [facility]

## 2021-06-07 NOTE — GROUP NOTE
MEGAN ELIZALDE GROUP DOCUMENTATION INDIVIDUAL Group Therapy Note Date: 6/6/2021 Group Start Time: 2000 Group End Time: 2030 Group Topic: [employer] GROUP DOCUMENTATION GROUP Group Therapy Note Attendees: 5/8 Attendance: Attended Patient's Goal:  To get out of here. Interventions/techniques: Supported Follows Directions: Followed directions Interactions: Interacted appropriately Mental Status: Calm Behavior/appearance: Cooperative Goals Achieved: Able to engage in interactions Additional Notes:  He has no anxiety or depression at this time. He has abdomen pain at a 5. Zenon Handy CNA

## 2021-06-07 NOTE — BH NOTES
Shift change report given to [facility] [place] MAR[place]LEJO re: SBAR, medications, and behavior.   Roxann fall risk score: (5)

## 2021-06-07 NOTE — PROGRESS NOTES
Problem: Depressed Mood (Adult/Pediatric)    Goal: *STG: Attends activities and groups    Affect blunted, mood depressed. Denied SI or intent to harm self. Attending and participating in groups consistently. Assessed/monitored potential harm to self. Encouraged sharing of feelings. Monitored medication compliance and effectiveness. Monitored pt's daily level of functioning. Reinforced abilities and accomplishments. Offered encouragement, reassurance, and support.   .  Outcome: Resolved/Met

## 2021-06-07 NOTE — ROUTINE PROCESS
Shift change report given to Mikhail Shanks RN, re: SBAR, medications, and behavior. Roxann Fall Risk Score - 5.

## 2021-06-07 NOTE — PROGRESS NOTES
Problem: Falls - Risk of  Goal: *Absence of Falls    Pt has not fallen or incurred injury since arrival on the unit. Walking with aid of walker. Wearing gripper socks. Roxann Fall Risk Score - 5.     Note: Fall Risk Interventions:    Mobility Interventions: Utilize walker, cane, or other assistive device    Mentation Interventions: Door open when patient unattended    Medication Interventions: Teach patient to arise slowly    Elimination Interventions: Stay With Me (per policy)    History of Falls Interventions: Room close to nurse's station    Outcome: Progressing toward goal

## 2021-06-07 NOTE — PROGRESS NOTES
Problem: Mobility Impaired (Adult and Pediatric)  Goal: *Acute Goals and Plan of Care (Insert Text)  Description: FUNCTIONAL STATUS PRIOR TO ADMISSION: Patient was modified independent using a rolling walker for functional mobility. HOME SUPPORT PRIOR TO ADMISSION: The patient lived alone with DTR to provide assistance. Physical Therapy Goals  Initiated 6/2/2021  1. Patient will move from supine to sit and sit to supine  in bed with independence within 7 day(s). 2.  Patient will transfer from bed to chair and chair to bed with independence using the least restrictive device within 7 day(s). 3.  Patient will perform sit to stand with independence within 7 day(s). 4.  Patient will ambulate with independence for 150 feet with the least restrictive device within 7 day(s). 5.  Patient will ascend/descend 3 STEPS/ stairs with BILAT handrail(s) with independence within 7 day(s). Outcome: Progressing Towards Goal     PHYSICAL THERAPY TREATMENT  Patient: Larry De Oliveira (27 y.o. male)  Date: 6/7/2021  Diagnosis: Depression [F32.9]  Dementia ([facility] [address] [F03.90] Severe recurrent major depression without psychotic features ([facility] [address]       Precautions: Fall  Chart, physical therapy assessment, plan of care and goals were reviewed. ASSESSMENT  Patient continues with skilled PT services and is progressing towards goals. Trial gait with RW, HHA and no device. Only demonstrated mild gait impairments with HHA and no device but slower and decreased foot clearance. With RW patient needs increased motor planning and obstacle negotiation. Will continue to progress as tolerated. Current Level of Function Impacting Discharge (mobility/balance): CGA- supervision    Other factors to consider for discharge:          PLAN :  Patient continues to benefit from skilled intervention to address the above impairments. Continue treatment per established plan of care. to address goals.     Recommendation for discharge: (in order for the patient to meet his/her long term goals)  Physical therapy at least 2 days/week in the home     This discharge recommendation:  Has been made in collaboration with the attending provider and/or case management    IF patient discharges home will need the following DME: straight cane       SUBJECTIVE:   Patient stated This is how I walk at home.  (no device)    OBJECTIVE DATA SUMMARY:   Critical Behavior:  Neurologic State: Alert, Confused  Orientation Level: Oriented to person  Cognition: Decreased command following, Poor safety awareness  Safety/Judgement: Decreased awareness of need for assistance, Decreased awareness of need for safety  Functional Mobility Training:  Bed Mobility:  Rolling: Stand-by assistance  Supine to Sit: Stand-by assistance     Scooting: Stand-by assistance        Transfers:  Sit to Stand: Supervision;Assist x1  Stand to Sit: Supervision                             Balance:  Sitting: Intact  Sitting - Static: Good (unsupported)  Sitting - Dynamic: Good (unsupported)  Standing: Impaired  Standing - Static: Good  Standing - Dynamic : Fair  Ambulation/Gait Training:  Distance (ft): 500 Feet (ft)  Assistive Device: Gait belt;Walker, rolling (HHA and no device trials)  Ambulation - Level of Assistance: Supervision;Contact guard assistance        Gait Abnormalities: Decreased step clearance        Base of Support: Narrowed     Speed/Jessica: Slow  Step Length: Right shortened;Left shortened        Interventions: Safety awareness training      Pain Rating:  No pain    Activity Tolerance:   Good    After treatment patient left in no apparent distress:   Sitting in chair and Caregiver / family present    COMMUNICATION/COLLABORATION:   The patients plan of care was discussed with: Registered nurse and Case management.      Juanis Fenton, PT   Time Calculation: 11 mins

## 2021-06-07 NOTE — BH NOTES
Affect blunted, mood depressed/anxious. Attended and participated in group. Ate snacks. Denied SI/HI/AVH. Alert and oriented to name and time. Confused and disoriented to place and situation. Using walker to aid in ambulation. Interacting with staff and peers. Jovial. Medication compliant. Pleasant and co-operative. PRN Medication Documentation    Specific patient behavior that led to need for PRN medication: C/o insomnia. Staff interventions attempted prior to PRN being given: Assessed pt  PRN medication given: Trazodone 25 mg PO at 2116. Patient response/effectiveness of pain medication: Currently resting in bed. Trazodone was effective. PRN Medication Documentation    Specific patient behavior that led to need for PRN medication: C/o back pain at 3/10. Staff interventions attempted prior to PRN being given: Assessed pt. PRN medication given: Tylenol 650 mg PO at 2116. Patient response/effectiveness of PRN medication: Tylenol was effective    Pt rested quietly in bed with eyes closed for 8 hours. Respirations even and unlabored. Pt in no apparent distress. Maria Fernanda Mo

## 2021-06-07 NOTE — PROGRESS NOTES
Problem: Self Care Deficits Care Plan (Adult)  Goal: *Acute Goals and Plan of Care (Insert Text)  Description: FUNCTIONAL STATUS PRIOR TO ADMISSION: Patient was modified independent using a rolling walker for functional mobility. HOME SUPPORT: The patient lived alone with family to provide assistance. Occupational Therapy Goals  Initiated 6/2/2021  1. Patient will perform lower body dressing with supervision/set-up within 7 days. 2.  Patient will perform all aspects of toileting with supervision/set-up within 7 days. 3.  Patient will participate in upper extremity therapeutic exercise/activities with supervision/set-up for 15 to 20 minutes within 7 days. Outcome: Progressing Towards Goal   OCCUPATIONAL THERAPY TREATMENT  Patient: Elissa Holder (83 y.o. male)  Date: 6/7/2021  Diagnosis: Depression [F32.9]  Dementia ([address] [F03.90] Severe recurrent major depression without psychotic features ([facility] [address]       Precautions: Fall  Chart, occupational therapy assessment, plan of care, and goals were reviewed. ASSESSMENT  Patient continues with skilled OT services and is progressing towards goals. Today OTR focused with patient on hand skills. We attempted tooth brushing but no toothbrushes available on unit. Pt asked to perform tasks to work on in-hand manipulation, eye/hand coordination, finger and  strengthening, assessed sterognosis for light touch. He first used all fingers on small foam ball for 3 mins to squeeze repeatedly. With vision occluded he was accurate in light touch identification 4/5 fingers on L hand but only 2 of 5 on right hand and not along a specific nerve pathway. Admits he has trouble \"feeling\" in his hands. He was better able to learn to tear paper towel into small pieces nad roll into a small ball with one hand on the left but significant difficulty tearing on the right and more  Difficulty trying to roll into a ball.   He was unable to flip a pen from one end to the other nor a marker or marker cap using the right hand but did learn to do with left. He is unable to close digits III-V to create pinch of thumb and index on the right or left even after hand over hand assist.  He has difficulty on left but with extra time and two demonstrations by therapist touch thumb to each digit but cannot do so on the left and cannot hold thumb to V even once placed by therapist.  He is RHD and on one trial of drawing a line over OTR line he was able to use a more mature pencil  although he went off the line multiple times. Second trial to stay on a curved road he used an all finger pencil grasp, even with verbal cues to reposition in his hand. His hand is off the table but he was able to stay in the approx 1/2\" road 12 inches curved with 2 errors. Current Level of Function Impacting Discharge (ADLs): weakness in both hands impacting use of hand. Bilateral sensation changes, difficulty manipulating objects. Walked to room without use of walker even though PT wants him to use. Other factors to consider for discharge: fall risk         PLAN :  Patient continues to benefit from skilled intervention to address the above impairments. Continue treatment per established plan of care to address goals. Recommend with staff: left patient with AROM and paper towel activities he can easily do to work on coordination and finger strength. Recommend next OT session: Breakfast to see him use utensils    Recommendation for discharge: (in order for the patient to meet his/her long term goals)  Occupational therapy at least 2 days/week in the home AND ensure assist and/or supervision for safety with mobility    This discharge recommendation:  Has not yet been discussed the attending provider and/or case management    IF patient discharges home will need the following DME: walker: rolling       SUBJECTIVE:   Patient stated I just like to walk without it.   Pt was seated in dining area and was asked to go to his room for ADL with OTR, which we could not do and he walked back, leaning forward needing VC to stand tall. When OTR asked about the walker in the dining room above was his response. He admits daughter wants him to use walker, showed scars on elbows from falling into walls at home. OBJECTIVE DATA SUMMARY:   Cognitive/Behavioral Status:  Neurologic State: Alert;Confused  Orientation Level: Oriented to person  Cognition: Decreased command following;Poor safety awareness             Functional Mobility and Transfers for ADLs:  Transfers:   supervision          Balance:   SBA    Therapeutic Exercises:   As listed above in the assessment    Pain:  No complaints    Activity Tolerance:   Good    After treatment patient left in no apparent distress:   Sitting in chair and Caregiver / family present    COMMUNICATION/COLLABORATION:   The patients plan of care was discussed with: Registered nurse.      Aurelia Vieyra OT  Time Calculation: 32 mins

## 2021-06-08 PROCEDURE — 74011250637 HC RX REV CODE- 250/637: Performed by: PSYCHIATRY & NEUROLOGY

## 2021-06-08 PROCEDURE — 97530 THERAPEUTIC ACTIVITIES: CPT

## 2021-06-08 PROCEDURE — 74011250637 HC RX REV CODE- 250/637: Performed by: INTERNAL MEDICINE

## 2021-06-08 PROCEDURE — 74011636637 HC RX REV CODE- 636/637: Performed by: PSYCHIATRY & NEUROLOGY

## 2021-06-08 PROCEDURE — 99231 SBSQ HOSP IP/OBS SF/LOW 25: CPT | Performed by: PSYCHIATRY & NEUROLOGY

## 2021-06-08 PROCEDURE — 97535 SELF CARE MNGMENT TRAINING: CPT

## 2021-06-08 PROCEDURE — 65220000003 HC RM SEMIPRIVATE PSYCH

## 2021-06-08 RX ADMIN — CARVEDILOL 12.5 MG: 12.5 TABLET, FILM COATED ORAL at 17:28

## 2021-06-08 RX ADMIN — PANTOPRAZOLE SODIUM 40 MG: 40 TABLET, DELAYED RELEASE ORAL at 06:30

## 2021-06-08 RX ADMIN — PREDNISONE 20 MG: 20 TABLET ORAL at 08:55

## 2021-06-08 RX ADMIN — AMLODIPINE BESYLATE 5 MG: 5 TABLET ORAL at 08:55

## 2021-06-08 RX ADMIN — ATORVASTATIN CALCIUM 40 MG: 40 TABLET, FILM COATED ORAL at 08:54

## 2021-06-08 RX ADMIN — ESCITALOPRAM OXALATE 5 MG: 10 TABLET ORAL at 08:55

## 2021-06-08 RX ADMIN — CARVEDILOL 12.5 MG: 12.5 TABLET, FILM COATED ORAL at 08:55

## 2021-06-08 RX ADMIN — DONEPEZIL HYDROCHLORIDE 5 MG: 5 TABLET, FILM COATED ORAL at 21:23

## 2021-06-08 RX ADMIN — ASPIRIN 81 MG: 81 TABLET, COATED ORAL at 08:55

## 2021-06-08 NOTE — GROUP NOTE
MEGAN  GROUP DOCUMENTATION INDIVIDUAL Group Therapy Note Date: 6/8/2021 Pt did not attend group as he was meeting with the doctor and then went back to his room.

## 2021-06-08 NOTE — PROGRESS NOTES
Problem: Depressed Mood (Adult/Pediatric)  Goal: *STG: Attends activities and groups  Outcome: Progressing Towards Goal

## 2021-06-08 NOTE — PROGRESS NOTES
Problem: Falls - Risk of  Goal: *Absence of Falls  Description: Document Abigail Burris Fall Risk and appropriate interventions in the flowsheet.   Outcome: Progressing Towards Goal  Note: Fall Risk Interventions:  Mobility Interventions: PT Consult for mobility concerns    Mentation Interventions: Door open when patient unattended    Medication Interventions: Teach patient to arise slowly    Elimination Interventions: Stay With Me (per policy)    History of Falls Interventions: Room close to nurse's station         Problem: Patient Education: Go to Patient Education Activity  Goal: Patient/Family Education  Outcome: Progressing Towards Goal

## 2021-06-08 NOTE — BH NOTES
Behavioral Health Interdisciplinary Rounds     Patient Name: Kraig Michelle  Age: 79 y.o. Room/Bed:  230/02  Primary Diagnosis: Severe recurrent major depression without psychotic features ([facility] [address]   Admission Status: Voluntary     Readmission within 30 days: no  Power of  in place: no  Patient requires a blocked bed: no          Reason for blocked bed:     VTE Prophylaxis: Not indicated    Mobility needs/Fall risk: yes  Flu Vaccine : no   Nutritional Plan: no  Consults: continued PT/OT         Labs/Testing due today?: no    Sleep hours: 7       Participation in Care/Groups:  yes  Medication Compliant?: Yes  PRNS (last 24 hours): None    Restraints (last 24 hours):  no     CIWA (range last 24 hours):     COWS (range last 24 hours):      Alcohol screening (AUDIT) completed -   AUDIT Score: 0     If applicable, date SBIRT discussed in treatment team AND documented:   AUDIT Screen Score: AUDIT Score: 0      Document Brief Intervention (corresponds directly with the 5 A's, Ask, Advise, Assess, Assist, and Arrange): At- Risk Patients (Score 7-15 for women; 8-15 for men)  Discuss concern patient is drinking at unhealthy levels known to increase risk of alcohol-related health problems. Is Patient ready to commit to change? If No:   Encourage reflection   Discuss short term and long term health risks of consuming alcohol   Barriers to change   Reaffirm willingness to help / Educational materials provided  If Yes:   Set goal  OrderWithMe provided    Harmful use or Dependence (Score 16 or greater)   Discuss short term and long term health risks of consuming alcohol   Recommendations   Negotiate drinking goal   Recommend addiction specialist/center   Arrange follow-up appointments.     Tobacco - patient is a smoker: Have You Used Tobacco in the Past 30 Days: No  Illegal Drugs use: Have You Used Any Illegal Substances Over the Past 12 Months: No    24 hour chart check complete: yes     Patient goal(s) for today: to get home soon  Treatment team focus/goals: attend activities and groups  Progress note patient will be returning home soon with in home care and OT/PT    LOS:  7  Expected LOS: 3-5    Financial concerns/prescription coverage:  no  Family contact: yes      Family requesting physician contact today:  no  Discharge plan: home with in home care  Access to weapons : no        Outpatient provider(s): PCP  Patient's preferred phone number for follow up call : [phone]    Participating treatment team members: Comfort Moeller, * (assigned SW), Bala Bain

## 2021-06-08 NOTE — BH NOTES
[facility] [facility] re: SBAR, medications, and behavior.   Roxann fall risk score: (5)

## 2021-06-08 NOTE — GROUP NOTE
MEGAN  GROUP DOCUMENTATION INDIVIDUAL Group Therapy Note Date: 6/8/2021 Group Start Time: 7749 Group End Time: 1100 Group Topic: [employer] [address] Rosalinda Goldberg MEGAN  GROUP DOCUMENTATION GROUP Group Therapy Note Attendees: 7 Attendance: Attended Patient's Goal:  Was able to use the bathroom Interventions/techniques: Provide feedback Follows Directions: Followed directions Interactions: Interacted appropriately Mental Status: Calm Behavior/appearance: Cooperative Goals Achieved: Able to listen to others Additional Notes:  Patient appetite was good, no physical pain, no suicidal  Thoughts. Patient ha no depression or anxiety. Rosendo Cargo

## 2021-06-08 NOTE — PROGRESS NOTES
Subjective:  \"I FEEL DRUGGED. \"     VERY CONCRETE THINKING      HAS PERIODS OF WAXING AND WANING ORIENTATION     SLEPT 7  HOURS.      Objective:   Vitals: BP 140/82  Labs: NO NEW LABS AT THIS TIME     Mental Status Exam:  Appearance: DRESSED IN SCRUBS, AMBULATORY  Behavior: COOPERATIVE, POLITE  Motor: NO PSYCHOMOTOR AGITATION OR RETARDATION NOTED  Speech: NORMAL RATE, VOLUME AND PROSODY  Mood: IM DOPED UP\"  Affect:  CONSTRICTED  Thought Content: NO SI/HI/AVH,   Thought Process: CONCRETE THINKING  Orientation: A&O X 4  Insight/Judgment: LIMITED X 2     Assessment:   1.  Major Depression, severe (F33.2)  2.  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 I certify that the patient needs 24 hours of medical supervision to improve the above conditions. 2. The current mental illness is classified as severe. 3. Outpatient services only are insufficient currently to treat this patient's current psychiatric condition. 4. There is continued need for psychiatric inpatient treatment to address the above condition. 5. I certify that current psychiatric treatment could be reasonably expected to improve the patient's condition. 6. I certify that the patient should expect to make improvements as a result of inpatient psychiatric services  7. The risks and benefits of treatment were discussed with the patient.

## 2021-06-08 NOTE — BH NOTES
Affect blunted, mood depressed. Pt did not attend or participate in scheduled group activities. Pt was more bright this evening and able to carry on a conversation with staff with minimal confusion. Pt refused snack. Pt denies SI/HI/AVH/Pain. Pt was noted to have some +1, non pitting edema in both legs/ankles. Pt was said to have been more up and walking during day shift today. Staff encouraged him to elevate his feet while in bed. Pt continues to utilize a walker for assistance with ambulation. Pt was compliant with medications and did not request a PRN. Pt is in no apparent distress at this time and made no delusional statements. Pt rested in his bed with his eyes closed for 7 hours.

## 2021-06-08 NOTE — PROGRESS NOTES
Problem: Self Care Deficits Care Plan (Adult)  Goal: *Acute Goals and Plan of Care (Insert Text)  Description: FUNCTIONAL STATUS PRIOR TO ADMISSION: Patient was modified independent using a rolling walker for functional mobility. HOME SUPPORT: The patient lived alone with family to provide assistance. Occupational Therapy Goals  Initiated 6/2/2021  1. Patient will perform lower body dressing with supervision/set-up within 7 days. 2.  Patient will perform all aspects of toileting with supervision/set-up within 7 days. 3.  Patient will participate in upper extremity therapeutic exercise/activities with supervision/set-up for 15 to 20 minutes within 7 days. NEW GOAL 6/8/2021  4. Patient will be independent in self feeding with spillage only 1-2 times during meal in 7 days. 6/8/2021 1102 by David Tolentino OT  Outcome: Progressing Towards Goal   OCCUPATIONAL THERAPY TREATMENT  Patient: Megan Juárez (64 y.o. male)  Date: 6/8/2021  Diagnosis: Depression [F32.9]  Dementia ([address] [F03.90] Severe recurrent major depression without psychotic features ([address]       Precautions: Fall  Chart, occupational therapy assessment, plan of care, and goals were reviewed. ASSESSMENT  Patient continues with skilled OT services and is progressing towards goals. OTR assessed pt today for sensation B hands using monofilament test and he is at 4.08 which indicates diminished protective sensation. He was seen during breakfast and had difficulty manipulating fork, initially using like spoon and scooping food,. The first bite was a very large, more than one mouthful and he needed a cue for smaller bites. Needd cue to stab, also demonstration, food with fork. Needed verbal cue to turn knife over as was upside down in hand. Has awkward grasp at times with fork, using left to reposition. Finger feeds food that should be cut until asked to cut and use fork.  He was able to open cellophane package with extra time, straw was difficult to remove from package and he was unable to open his milk carton without assist.  He opened a peel back container of syrup but got it on finges trying to pourl  He used L hand on knife to cut but again cues to stab with R hand fork. He was asked to use left hand to eat as less tremors noted and he did but alternated between the two. Left him with maxe to complete using pencil and inquired about weighted utensiols which staff said MD would have to write an order to use on this unit. Will see if facility has utensils. Current Level of Function Impacting Discharge (ADLs): per patient difficulty using hands, poor inhand coordination left and very poor right; decreased sensation on testing today    Other factors to consider for discharge: lives alone         PLAN :  Patient continues to benefit from skilled intervention to address the above impairments. Continue treatment per established plan of care to address goals. Recommend with staff: weight fork    Recommend next OT session: hand/finger strength, coordination    Recommendation for discharge: (in order for the patient to meet his/her long term goals)  To be determined: at this time uncertain if he will need SNF or if he will be safe to go home give dx. If he goes home recommend OT HH eval to assess safety    This discharge recommendation:  Has not yet been discussed the attending provider and/or case management    IF patient discharges home will need the following DME: walker: rolling       SUBJECTIVE:   Patient stated it's hard for me to do buttons.     OBJECTIVE DATA SUMMARY:   Cognitive/Behavioral Status:  Neurologic State: Alert  Orientation Level: Oriented to person;Oriented to place;Oriented to time;Oriented to situation  Cognition: Decreased command following    Functional Mobility and Transfers for ADLs:    Transfers:     Ambulated w/walker but needs cues, pushes in front of himself, abandons to sit down      ADL Intervention:  Feeding  Feeding Assistance: Stand-by assistance  Container Management: Minimum assistance  Cutting Food: Supervision  Utensil Management: Supervision  Food to Mouth: Supervision  Drink to Mouth: Independent  Cues: Verbal cues provided;Physical assistance    Pain:  0/10    Activity Tolerance:   Good    After treatment patient left in no apparent distress:   Caregiver / family present    COMMUNICATION/COLLABORATION:   The patients plan of care was discussed with: Registered nurse.      Hansa Wick OT  Time Calculation: 30 mins

## 2021-06-08 NOTE — BH NOTES
Affect blunted/restricted, mood depressed. Patient alert & oriented some disorganized thoughts. Patient quiet and isolative but interacts with prompting. Patient denies SI/HI/AVH/pain. No delusional statements made. Patient is compliant with medications and no PRN's given. Patient appetite good eats 100% of all meals plus snacks. Patient utilizes a walker to assist with ambulating and continues to work with PT/OT. Patient in no apparent distress all vital signs within normal limits.

## 2021-06-08 NOTE — ROUTINE PROCESS
Shift change report given to Yeyo Balderrama Rn, re: SBAR. Medications, and behavior. Roxann Fall Risk Score (5)

## 2021-06-09 PROCEDURE — 74011250637 HC RX REV CODE- 250/637: Performed by: PSYCHIATRY & NEUROLOGY

## 2021-06-09 PROCEDURE — 65220000003 HC RM SEMIPRIVATE PSYCH

## 2021-06-09 PROCEDURE — 99232 SBSQ HOSP IP/OBS MODERATE 35: CPT | Performed by: PSYCHIATRY & NEUROLOGY

## 2021-06-09 PROCEDURE — 74011250637 HC RX REV CODE- 250/637: Performed by: INTERNAL MEDICINE

## 2021-06-09 PROCEDURE — 74011636637 HC RX REV CODE- 636/637: Performed by: PSYCHIATRY & NEUROLOGY

## 2021-06-09 RX ADMIN — ATORVASTATIN CALCIUM 40 MG: 40 TABLET, FILM COATED ORAL at 08:32

## 2021-06-09 RX ADMIN — ASPIRIN 81 MG: 81 TABLET, COATED ORAL at 08:32

## 2021-06-09 RX ADMIN — CARVEDILOL 12.5 MG: 12.5 TABLET, FILM COATED ORAL at 17:27

## 2021-06-09 RX ADMIN — ESCITALOPRAM OXALATE 5 MG: 10 TABLET ORAL at 08:32

## 2021-06-09 RX ADMIN — PANTOPRAZOLE SODIUM 40 MG: 40 TABLET, DELAYED RELEASE ORAL at 06:33

## 2021-06-09 RX ADMIN — DONEPEZIL HYDROCHLORIDE 5 MG: 5 TABLET, FILM COATED ORAL at 21:28

## 2021-06-09 RX ADMIN — PREDNISONE 20 MG: 20 TABLET ORAL at 08:32

## 2021-06-09 RX ADMIN — AMLODIPINE BESYLATE 5 MG: 5 TABLET ORAL at 08:32

## 2021-06-09 RX ADMIN — CARVEDILOL 12.5 MG: 12.5 TABLET, FILM COATED ORAL at 08:32

## 2021-06-09 NOTE — BH NOTES
Shift change report given to Dimitris Rivas RN re: SBAR, medications, and behavior.   Roxann fall risk score 5

## 2021-06-09 NOTE — PROGRESS NOTES
Problem: Pressure Injury - Risk of  Goal: *Prevention of pressure injury  Description: Document Sergey Scale and appropriate interventions in the flowsheet.   Outcome: Progressing Towards Goal  Note: Pressure Injury Interventions:  Sensory Interventions: Assess changes in LOC    Moisture Interventions: Limit adult briefs    Activity Interventions: PT/OT evaluation    Mobility Interventions: PT/OT evaluation    Nutrition Interventions: Document food/fluid/supplement intake    Friction and Shear Interventions: HOB 30 degrees or less

## 2021-06-09 NOTE — GROUP NOTE
MEGAN  GROUP DOCUMENTATION INDIVIDUAL Group Therapy Note Date: 6/9/2021 Pt did not attend group today. We will continue to assess patients ability to attend future groups.

## 2021-06-09 NOTE — PROGRESS NOTES
INTERVAL Hx:  Records and clinical information from the past 4 days were reviewed. States he's feeling less depressed overall compared to PTA, but that he still feels \"tired\" a lot and therefore slightly dysphoric. He's not overly visibly tired this AM and seems slightly more animated and active than last week. Not as confused at night now, either. Discussed D/C options with him and we'll try to pursue SNF placement before he transitions home, unless family say otherwise. Doubt the Lexapro is contributing to the tiredness, but we may have to change it if the issue doesn't improve further. Slept 8.25 hours last night. MEDS:  Current Facility-Administered Medications   Medication Dose Route Frequency    traZODone (DESYREL) tablet 25 mg  25 mg Oral QHS PRN    escitalopram oxalate (LEXAPRO) tablet 5 mg  5 mg Oral DAILY    acetaminophen (TYLENOL) tablet 650 mg  650 mg Oral Q4H PRN    magnesium hydroxide (MILK OF MAGNESIA) 400 mg/5 mL oral suspension 30 mL  30 mL Oral DAILY PRN    ibuprofen (MOTRIN) tablet 400 mg  400 mg Oral Q8H PRN    alum-mag hydroxide-simeth (MYLANTA) oral suspension 30 mL  30 mL Oral Q4H PRN    pantoprazole (PROTONIX) tablet 40 mg  40 mg Oral ACB    donepeziL (ARICEPT) tablet 5 mg  5 mg Oral QHS    predniSONE (DELTASONE) tablet 20 mg  20 mg Oral DAILY WITH BREAKFAST    carvediloL (COREG) tablet 12.5 mg  12.5 mg Oral BID WITH MEALS    atorvastatin (LIPITOR) tablet 40 mg  40 mg Oral DAILY    hydrOXYzine pamoate (VISTARIL) capsule 25 mg  25 mg Oral TID PRN    aspirin delayed-release tablet 81 mg  81 mg Oral DAILY    amLODIPine (NORVASC) tablet 5 mg  5 mg Oral DAILY       VITALS:   Patient Vitals for the past 12 hrs:   Temp Pulse Resp BP SpO2   06/09/21 0730 (!) 96.4 °F (35.8 °C) 69 16 (!) 171/89 97 %       LABS: .No results found for this or any previous visit (from the past 24 hour(s)).     MSE:   Appearance: casually dressed; adequately groomed  Behavior: calm and cooperative; no agitation  Motor: slowed  Mood/Affect: slightly less dysphoric; still flat  Thought Process: mostly organized but can derail  Thought Content: no delusions; no SI/HI  Perceptions: no hallucinations  Insight/Judgment: limited    ASSESSMENT:   1.  Major Depression, severe (F33.2)  2. Dementia, likely mixed, moderate (F02.80)    PLAN:  1. Continue the Lexapro at 5 mg daily for now. May need to change med if tiredness doesn't lift. 2. Continue the Trazodone at 25 mg QHS PRN. 3. Continue the Aricept at 5 mg QHS. 4. ELOS: 2-5 days--plan to transition to SNF before going home with home assistance. I certify that the inpatient psychiatric hospital services furnished since the previous certification/re-certification were, and continue to be, medically necessary for treatment which could reasonably be expected to improve the patients condition and/or for diagnostic study. This patient continues to need, on a daily basis, active treatment furnished directly by or under the supervision of inpatient psychiatric personnel.

## 2021-06-09 NOTE — PROGRESS NOTES
Problem: Pressure Injury - Risk of  Goal: *Prevention of pressure injury  Description: Document Sergey Scale and appropriate interventions in the flowsheet. Outcome: Resolved/Met  Note: Pressure Injury Interventions:  Sensory Interventions: Assess changes in LOC    Moisture Interventions: Absorbent underpads    Activity Interventions: PT/OT evaluation    Mobility Interventions: Float heels    Nutrition Interventions: Document food/fluid/supplement intake    Friction and Shear Interventions: HOB 30 degrees or less                Problem: Patient Education: Go to Patient Education Activity  Goal: Patient/Family Education  Outcome: Resolved/Met     Problem: Falls - Risk of  Goal: *Absence of Falls  Description: Document Roxann Fall Risk and appropriate interventions in the flowsheet.   Outcome: Progressing Towards Goal  Note: Fall Risk Interventions:  Mobility Interventions: Utilize walker, cane, or other assistive device    Mentation Interventions: Door open when patient unattended, Room close to nurse's station    Medication Interventions: Teach patient to arise slowly    Elimination Interventions: Stay With Me (per policy)    History of Falls Interventions: Door open when patient unattended         Problem: Patient Education: Go to Patient Education Activity  Goal: Patient/Family Education  Outcome: Progressing Towards Goal     Problem: Depressed Mood (Adult/Pediatric)  Goal: *STG: Participates in treatment plan  Outcome: Progressing Towards Goal  Goal: *STG: Participates in 1:1 therapy sessions  Outcome: Progressing Towards Goal  Goal: *STG: Verbalizes anger, guilt, and other feelings in a constructive manor  Outcome: Progressing Towards Goal  Goal: *STG: Attends activities and groups  Outcome: Progressing Towards Goal  Goal: *STG: Demonstrates reduction in symptoms and increase in insight into coping skills/future focused  Outcome: Progressing Towards Goal  Goal: *STG: Remains safe in hospital  Outcome: Progressing Towards Goal  Goal: *STG: Complies with medication therapy  Outcome: Progressing Towards Goal  Goal: *LTG: Returns to previous level of functioning and participates with after care plan  Outcome: Progressing Towards Goal

## 2021-06-09 NOTE — BH NOTES
Master Treatment Plan Review for Rich Billy    Date of Admission Date Treatment Plan Reviewed Anticipated Date of Discharge Legal Status    06/01/2021 06/09/2021  Voluntary     Treatment & Discharge Planning Changes    Modality or Intervention Changes N/A     Psychotropic Medication Changes N/A   Discharge Planning or Target Date Changes Reason for continued stay: disorientation,unable to care for himself, placement   New Issues N/A       Treatment Team Signatures    I have participated in the development of this plan of treatment and agree to its implementation.     Patient Signature       Patient Printed Name Date/Time   /Therapist Signature       /Therapist Printed Name Date/Time   RN Signature       RN Printed Name  Julio Huggins RN Date/Time  06/09/2021  @9933   Unit  Signature       Unit  Printed Name Date/Time   MD Signature       MD Printed Name Date/Time

## 2021-06-09 NOTE — ROUTINE PROCESS
Shift change report given to John Dempsey Rn, re: SBAR. Medications, and behavior. Roxann Fall Risk Score (5)

## 2021-06-09 NOTE — PROGRESS NOTES
Physical Therapy  Chart reviewed. Nurse consulted. Patient is doing well, ambulating with a walker independently. They are looking into placement for patient. Anant, the RN said that patient is not in need of physical therapy and that no one has observed any loss of balance or safety issues in last few days. A review of notes appears to report the same. Patient will be re-assessed in next two days to confirm. Deferred PT today. Thanks.

## 2021-06-09 NOTE — BH NOTES
Affect constricted, mood depressed/anxious. Disoriented to time. Cooperative and pleasant. Refused to attend group. Interacted with staff and peers. Makes needs known. Ate all meals and snacks. Denied SI/HI/AVH and pain. Medication compliant. Stable with no s/s of distress.

## 2021-06-09 NOTE — GROUP NOTE
MEGAN  GROUP DOCUMENTATION INDIVIDUAL Group Therapy Note Date: 6/8/2021 Group Start Time: 2000 Group End Time: 2030 Group Topic: [employer] [address] MEGAN  GROUP DOCUMENTATION GROUP Group Therapy Note Attendees: 6 Attendance: Attended Patient's Goal:  Was able to use bathroom Interventions/techniques: Provide feedback Follows Directions: Followed directions Interactions: Interacted appropriately Mental Status: Calm and Depressed Behavior/appearance: Cooperative Goals Achieved: Able to listen to others Additional Notes:  Patient said he had a okay day . Patient said he has no pain ,no anxiety and no SI . Patient said his depression is 8/10 . [facility]

## 2021-06-09 NOTE — BH NOTES
Affect blunted, mood depressed. Pt attended and participated in scheduled group activities. Pt was minimally social with staff and peers but interacted appropriately. Pt denies SI/HI/AVH/Pain. Pt did state that he was still felling depressed. Pt ate 100% of snack. Pt continues to utilize a walker for assistance with ambulation and staff assists as needed with ADL's. Pt was compliant with medications and did not request a PRN. Pt is in no apparent distress at this time and made no delusional statements. Pt rested in his bed with his eyes closed for 8.25 hours.

## 2021-06-10 PROCEDURE — 74011250637 HC RX REV CODE- 250/637: Performed by: INTERNAL MEDICINE

## 2021-06-10 PROCEDURE — 74011250637 HC RX REV CODE- 250/637: Performed by: PSYCHIATRY & NEUROLOGY

## 2021-06-10 PROCEDURE — 65220000003 HC RM SEMIPRIVATE PSYCH

## 2021-06-10 PROCEDURE — 74011636637 HC RX REV CODE- 636/637: Performed by: PSYCHIATRY & NEUROLOGY

## 2021-06-10 PROCEDURE — 99231 SBSQ HOSP IP/OBS SF/LOW 25: CPT | Performed by: PSYCHIATRY & NEUROLOGY

## 2021-06-10 RX ADMIN — CARVEDILOL 12.5 MG: 12.5 TABLET, FILM COATED ORAL at 17:00

## 2021-06-10 RX ADMIN — CARVEDILOL 12.5 MG: 12.5 TABLET, FILM COATED ORAL at 08:42

## 2021-06-10 RX ADMIN — ESCITALOPRAM OXALATE 5 MG: 10 TABLET ORAL at 08:42

## 2021-06-10 RX ADMIN — ATORVASTATIN CALCIUM 40 MG: 40 TABLET, FILM COATED ORAL at 08:42

## 2021-06-10 RX ADMIN — DONEPEZIL HYDROCHLORIDE 5 MG: 5 TABLET, FILM COATED ORAL at 21:11

## 2021-06-10 RX ADMIN — AMLODIPINE BESYLATE 5 MG: 5 TABLET ORAL at 08:42

## 2021-06-10 RX ADMIN — PANTOPRAZOLE SODIUM 40 MG: 40 TABLET, DELAYED RELEASE ORAL at 06:30

## 2021-06-10 RX ADMIN — PREDNISONE 20 MG: 20 TABLET ORAL at 08:41

## 2021-06-10 RX ADMIN — ASPIRIN 81 MG: 81 TABLET, COATED ORAL at 08:41

## 2021-06-10 NOTE — PROGRESS NOTES
Physical Therapy Goals  Initiated 6/2/2021  1. Patient will move from supine to sit and sit to supine  in bed with independence within 7 day(s). 2.  Patient will transfer from bed to chair and chair to bed with independence using the least restrictive device within 7 day(s). 3.  Patient will perform sit to stand with independence within 7 day(s). 4.  Patient will ambulate with independence for 150 feet with the least restrictive device within 7 day(s). 5.  Patient will ascend/descend 3 STEPS/ stairs with BILAT handrail(s) with independence within 7 day(s). Followed up with patient and staff. Patient napping however, he woke up briefly and told me he was doing well with his walking. Spoke with staff. Patient ambulating independently with and without a walker per staff. Patient will be going home with 24 hour caregiver. Recommend HHPT for a home safety evaluation and the HHPT can give recommendations to the caregivers regarding patients mobility in the home. D/C inpatient Physical therapy.

## 2021-06-10 NOTE — BH NOTES
Behavioral Health Interdisciplinary Rounds     Patient Name: Rich Billy  Age: 79 y.o. Room/Bed:  230/02  Primary Diagnosis: Severe recurrent major depression without psychotic features ([facility]   Admission Status: Voluntary     Readmission within 30 days: no  Power of  in place: no  Patient requires a blocked bed: no          Reason for blocked bed:     VTE Prophylaxis: Not indicated    Mobility needs/Fall risk: yes  Flu Vaccine : no   Nutritional Plan: no  Consults: no         Labs/Testing due today?: no    Sleep hours: 7.25       Participation in Care/Groups:  yes  Medication Compliant?: Yes  PRNS (last 24 hours): None    Restraints (last 24 hours):  no     CIWA (range last 24 hours):     COWS (range last 24 hours):      Alcohol screening (AUDIT) completed -   AUDIT Score: 0     If applicable, date SBIRT discussed in treatment team AND documented:   AUDIT Screen Score: AUDIT Score: 0      Document Brief Intervention (corresponds directly with the 5 A's, Ask, Advise, Assess, Assist, and Arrange): At- Risk Patients (Score 7-15 for women; 8-15 for men)  Discuss concern patient is drinking at unhealthy levels known to increase risk of alcohol-related health problems. Is Patient ready to commit to change? If No:   Encourage reflection   Discuss short term and long term health risks of consuming alcohol   Barriers to change   Reaffirm willingness to help / Educational materials provided  If Yes:   Set goal  [employer] provided    Harmful use or Dependence (Score 16 or greater)   Discuss short term and long term health risks of consuming alcohol   Recommendations   Negotiate drinking goal   Recommend addiction specialist/center   Arrange follow-up appointments.     Tobacco - patient is a smoker: Have You Used Tobacco in the Past 30 Days: No  Illegal Drugs use: Have You Used Any Illegal Substances Over the Past 12 Months: No    24 hour chart check complete: yes Patient goal(s) for today: to have positive goals  Treatment team focus/goals: attend activities and groups  Progress note patient will be returning home over the weekend he will have in home care, pt denies SI and his mood has improved, he has been appropriate with staff and peers    LOS:  9  Expected LOS: 1-3    Financial concerns/prescription coverage:  no  Family contact: yes      Family requesting physician contact today:  no  Discharge plan: home with in home care  Access to weapons : no        Outpatient provider(s): Dr. Tovar Friend  Patient's preferred phone number for follow up call : [phone]    Participating treatment team members: Bria Benjamin, * (assigned SW), Nhi Staley

## 2021-06-10 NOTE — BH NOTES
Affect blunted/restricted, mood depressed. Patient alert and oriented with disorganized thoughts. Patient is quiet and withdrawn but makes needs known to staff. Patient denies SI/HI/AVH/pain. No delusional statements made. No agitation or aggression noted. Patient is compliant with medications and no PRN's requested or given. Patient appetite adequate eats 90%-100% of all meals plus snacks. Patient attended and engaged in groups with prompting. Patient utilizes a walker to assist with ambulating. Physical therapy discontinued. Patient in no apparent distress all yuri signs within normal limits.

## 2021-06-10 NOTE — PROGRESS NOTES
Problem: Falls - Risk of  Goal: *Absence of Falls  Description: Document Conchis Lambertojames Fall Risk and appropriate interventions in the flowsheet.   Outcome: Progressing Towards Goal  Note: Fall Risk Interventions:  Mobility Interventions: OT consult for ADLs, Patient to call before getting OOB, Utilize walker, cane, or other assistive device    Mentation Interventions: Door open when patient unattended    Medication Interventions: Teach patient to arise slowly    Elimination Interventions: Stay With Me (per policy), Toilet paper/wipes in reach    History of Falls Interventions: Door open when patient unattended         Problem: Patient Education: Go to Patient Education Activity  Goal: Patient/Family Education  Outcome: Progressing Towards Goal

## 2021-06-10 NOTE — GROUP NOTE
[facility] GROUP DOCUMENTATION INDIVIDUAL Group Therapy Note Date: 6/10/2021 Group Start Time: 1400 Group End Time: 5035 Group Topic: Process Group - Inpatient [address] OP Cami Ramsey 
 
[facility] GROUP DOCUMENTATION GROUP Group Therapy Note Attendees: Focus of session was on 5 ways we cope with anxiety that can actually make the anxiety worse. We spoke about the 5 ways include avoiding it, trying to live our lives in order to not feel it at all, beating ourselves up for feeling it at all, pathologizing it, and dreading it. We spoke about what these 5 ways of coping does and how they make anxiety symptoms and feelings worse. We spoke about which strategies group members can relate to and how they think they can overcome it in order to not make anxiety worse and learn to accept it and manage it. Attendance: Attended Patient's Goal:   
  
Understands illness and can identify signs of relapse Interventions/techniques: Informed, Validated, Promoted peer support and Supported Follows Directions: Followed directions Interactions: Interacted appropriately Mental Status: Anxious, Congruent and Preoccupied Behavior/appearance: Attentive, Cooperative and Needed prompting Goals Achieved: Able to engage in interactions, Able to listen to others, Discussed discharge plans, Discussed self-esteem issues, Identified feelings, Identified triggers and Identified relapse prevention strategies Additional Notes:  Chance Flores engaged in group with prompting. He spoke about how he continues to have a lot of negative thoughts about himself and his current situation. He is doing what he can to improve what is in his control but he does not think there is much that he can do. Darya Fernandes

## 2021-06-10 NOTE — BH NOTES
Shift change report given to Woodfin Galeazzi re: SBAR, medications, and behavior.   Roxann fall risk score: (5)

## 2021-06-10 NOTE — GROUP NOTE
[facility] GROUP DOCUMENTATION INDIVIDUAL Group Therapy Note Date: 6/9/2021 Group Start Time: 0800 Group End Time: 0830 Group Topic: [employer] Ning Villagran [facility] GROUP DOCUMENTATION GROUP Group Therapy Note Attendees: 6 Attendance: Attended Patient's Goal:  Pt. Has positive goals, he feels he has been feeling better. Interventions/techniques: Challenged Follows Directions: Followed directions Interactions: Disorganized interaction Mental Status: Preoccupied Behavior/appearance: Withdrawn/quiet Goals Achieved: Able to listen to others Additional Notes:  Pt. States he has back pain (but stated it wasn't bad). He has no anxiety or depression. Pt. Has no thoughts of self harm or harm to others. Raghavendra Malone

## 2021-06-10 NOTE — PROGRESS NOTES
INTERVAL Hx:  Records and clinical information were reviewed. States he's feeling about the same but definitely less depressed overall compared to PTA. Still feels \"tired\" a lot but that's also less severe than it had been. Seems to be more animated and active than last week and also not as confused either. I spoke with his daughter and she has helped lined up 4 days a week staring Monday, and she'll care for him the other 3 days. She plans to pick him up on Saturday. Slept 7.25 hours last night. MEDS:  Current Facility-Administered Medications   Medication Dose Route Frequency    traZODone (DESYREL) tablet 25 mg  25 mg Oral QHS PRN    escitalopram oxalate (LEXAPRO) tablet 5 mg  5 mg Oral DAILY    acetaminophen (TYLENOL) tablet 650 mg  650 mg Oral Q4H PRN    magnesium hydroxide (MILK OF MAGNESIA) 400 mg/5 mL oral suspension 30 mL  30 mL Oral DAILY PRN    ibuprofen (MOTRIN) tablet 400 mg  400 mg Oral Q8H PRN    alum-mag hydroxide-simeth (MYLANTA) oral suspension 30 mL  30 mL Oral Q4H PRN    pantoprazole (PROTONIX) tablet 40 mg  40 mg Oral ACB    donepeziL (ARICEPT) tablet 5 mg  5 mg Oral QHS    predniSONE (DELTASONE) tablet 20 mg  20 mg Oral DAILY WITH BREAKFAST    carvediloL (COREG) tablet 12.5 mg  12.5 mg Oral BID WITH MEALS    atorvastatin (LIPITOR) tablet 40 mg  40 mg Oral DAILY    hydrOXYzine pamoate (VISTARIL) capsule 25 mg  25 mg Oral TID PRN    aspirin delayed-release tablet 81 mg  81 mg Oral DAILY    amLODIPine (NORVASC) tablet 5 mg  5 mg Oral DAILY       VITALS:   Patient Vitals for the past 12 hrs:   Temp Pulse Resp BP SpO2   06/10/21 0735 (!) 96.3 °F (35.7 °C) 66 16 125/78 98 %       LABS: .No results found for this or any previous visit (from the past 24 hour(s)).     MSE:   Appearance: casually dressed; adequately groomed  Behavior: calm and cooperative; no agitation  Motor: slowed  Mood/Affect:  less dysphoric; still flat  Thought Process: mostly organized but can derail  Thought Content: no delusions; no SI/HI  Perceptions: no hallucinations  Insight/Judgment: limited    ASSESSMENT:   1.  Major Depression, severe (F33.2)  2. Dementia, likely mixed, moderate (F02.80)    PLAN:  1. Continue the Lexapro at 5 mg daily for now. May need to change med if tiredness doesn't lift. 2. Continue the Trazodone at 25 mg QHS PRN. 3. Continue the Aricept at 5 mg QHS. 4. ELOS: 2 days.

## 2021-06-10 NOTE — BH NOTES
Affect blunted, mood depressed. Pt attended and participated in scheduled group activities. Pt was social and interacted well with staff and peers. Pt ate 100% of snack. Pt denies SI/HI/AVH/Pain. Pt was more talkative and alert this evening. Pt was compliant with medications and did not request a PRN. Pt is in no apparent distress at this time and made no delusional statements. Pt continues to utilize a walker for assistance with ambulation and staff continues to offer assistance with ADL's as needed. Pt rested in his bed with his eyes closed for 7.25 hours.

## 2021-06-10 NOTE — BH NOTES
SOCIAL WORK PROGRESS NOTE    Marianne Mock  : 1940  MRN: [id number]      Patient presentation including presence/absence SI/HI, psychosis, ability to perform ADLs: denies SI/HI/AVH, needs assistance with ADL care.  He is pleasant and appropriate    Concerns expressed by patient: none    Family involvement: daughter is involved    Resource needs: will continue with PCP for meds    Strengths: is able to return home with care    Limitations: cognitive function    Other: patients daughter has care lined up for him in home 7 days a week, she plans to pick him up Saturday morning for discharge

## 2021-06-11 PROCEDURE — 97535 SELF CARE MNGMENT TRAINING: CPT

## 2021-06-11 PROCEDURE — 74011636637 HC RX REV CODE- 636/637: Performed by: PSYCHIATRY & NEUROLOGY

## 2021-06-11 PROCEDURE — 65220000003 HC RM SEMIPRIVATE PSYCH

## 2021-06-11 PROCEDURE — 74011250637 HC RX REV CODE- 250/637: Performed by: INTERNAL MEDICINE

## 2021-06-11 PROCEDURE — 99231 SBSQ HOSP IP/OBS SF/LOW 25: CPT | Performed by: PSYCHIATRY & NEUROLOGY

## 2021-06-11 PROCEDURE — 97110 THERAPEUTIC EXERCISES: CPT

## 2021-06-11 PROCEDURE — 74011250637 HC RX REV CODE- 250/637: Performed by: PSYCHIATRY & NEUROLOGY

## 2021-06-11 RX ADMIN — CARVEDILOL 12.5 MG: 12.5 TABLET, FILM COATED ORAL at 08:20

## 2021-06-11 RX ADMIN — ATORVASTATIN CALCIUM 40 MG: 40 TABLET, FILM COATED ORAL at 08:24

## 2021-06-11 RX ADMIN — ASPIRIN 81 MG: 81 TABLET, COATED ORAL at 08:22

## 2021-06-11 RX ADMIN — PANTOPRAZOLE SODIUM 40 MG: 40 TABLET, DELAYED RELEASE ORAL at 07:52

## 2021-06-11 RX ADMIN — CARVEDILOL 12.5 MG: 12.5 TABLET, FILM COATED ORAL at 16:49

## 2021-06-11 RX ADMIN — DONEPEZIL HYDROCHLORIDE 5 MG: 5 TABLET, FILM COATED ORAL at 21:09

## 2021-06-11 RX ADMIN — PREDNISONE 20 MG: 20 TABLET ORAL at 08:20

## 2021-06-11 RX ADMIN — AMLODIPINE BESYLATE 5 MG: 5 TABLET ORAL at 08:24

## 2021-06-11 RX ADMIN — ESCITALOPRAM OXALATE 5 MG: 10 TABLET ORAL at 08:22

## 2021-06-11 NOTE — BH NOTES
Affect smiling. Mood euthymic. Alert and oriented to name and place. Worked with OT today. Able to carry out ADL's with min assist, mainly with opening things and wiping when having bm. Incontinent at times. Skin intact. Uses walker to ambulate. Cooperative and med compliant. Attended group activity as active participant when prompted.

## 2021-06-11 NOTE — GROUP NOTE
[facility] GROUP DOCUMENTATION INDIVIDUAL Group Therapy Note Date: 6/11/2021 Group Start Time: 0900 Group End Time: 1520 Group Topic: Process Group - Inpatient [address] OP Cami Ramsey 
 
[facility] GROUP DOCUMENTATION GROUP Group Therapy Note Attendees: Focus of session was developing skills to identify and manage triggers to strong moods. We discussed the most common negative and overpowering emotions that can get in our way and get us stuck or taking steps back (depression, nervousness, anxious, anger, frustration, hurt, fear). We spoke about recognizing what was going on around us that led us to feel these intense feelings and look at what we did in response to those moods. We discussed alternative ways we could have responded and discussed what skills we need to practice in order to develop resilience to minor or major stressors in our lives. Attendance: Attended Patient's Goal:   
  
Verbalizes anger, guilt, and other feelings in a constructive manor Interventions/techniques: Informed, Validated, Promoted peer support and Supported Follows Directions: Followed directions Interactions: Disorganized interaction Mental Status: Anxious and Preoccupied Behavior/appearance: Attentive, Cooperative, Needed prompting and Withdrawn/quiet Goals Achieved: Able to engage in interactions, Able to listen to others, Discussed coping, Identified feelings, Identified triggers and Identified relapse prevention strategies Additional Notes:  Yaneli Cisneros was quiet in group and stated he did not sleep well because he had to sleep on a \"ripped mattress. \"  He was not willing to engage in group discussion and he also has struggles with being able to hear anything. Darya Fernandes

## 2021-06-11 NOTE — PROGRESS NOTES
Problem: Falls - Risk of  Goal: *Absence of Falls  Description: Document Yossi Sawyer Fall Risk and appropriate interventions in the flowsheet.   Outcome: Progressing Towards Goal  Note: Fall Risk Interventions:  Mobility Interventions: Utilize walker, cane, or other assistive device    Mentation Interventions: Door open when patient unattended    Medication Interventions: Teach patient to arise slowly    Elimination Interventions: Stay With Me (per policy)    History of Falls Interventions: Room close to nurse's station         Problem: Depressed Mood (Adult/Pediatric)  Goal: *STG: Participates in treatment plan  Outcome: Resolved/Met  Goal: *STG: Participates in 1:1 therapy sessions  Outcome: Resolved/Met  Goal: *STG: Demonstrates reduction in symptoms and increase in insight into coping skills/future focused  Outcome: Resolved/Met  Goal: *STG: Remains safe in hospital  Outcome: Resolved/Met

## 2021-06-11 NOTE — BH NOTES
Patient will be discharged to home tomorrow, his daughter will be picking him up at noon. He will follow up with Arden Kirkland currently working on follow up. Referred patient to [facility] for a safety eval and they declined to accept patient for services.

## 2021-06-11 NOTE — PROGRESS NOTES
Problem: Depressed Mood (Adult/Pediatric)  Goal: *STG: Attends activities and groups  Outcome: Progressing Towards Goal  Note: Affect smiling. Mood euthymic. Alert and oriented to name and place. Worked with OT today. Able to carry out ADL's with min assist, mainly with opening things and wiping when having bm. Incontinent at times. Skin intact. Uses walker to ambulate. Cooperative and med compliant. Attended group activity as active participant when prompted.

## 2021-06-11 NOTE — BH NOTES
Shift change report given to Julian Torres re: SBAR, medications, and behavior.   Roxann fall risk score 5

## 2021-06-11 NOTE — PROGRESS NOTES
INTERVAL Hx:  Records and clinical information were reviewed. States he's feeling \"better\" today, although he's still somewhat flat and anergic. However, he does feel that he's not as depressed, possibly because he's being D/C'ed tomorrow. Daughter will be taking him home and staying with him 3 days a week, with a neighbor being paid to stay with him the other 4 days. No SE's with the meds and he is less tired and weak than a week ago. Slept 8.25 hours last night. MEDS:  Current Facility-Administered Medications   Medication Dose Route Frequency    traZODone (DESYREL) tablet 25 mg  25 mg Oral QHS PRN    escitalopram oxalate (LEXAPRO) tablet 5 mg  5 mg Oral DAILY    acetaminophen (TYLENOL) tablet 650 mg  650 mg Oral Q4H PRN    magnesium hydroxide (MILK OF MAGNESIA) 400 mg/5 mL oral suspension 30 mL  30 mL Oral DAILY PRN    ibuprofen (MOTRIN) tablet 400 mg  400 mg Oral Q8H PRN    alum-mag hydroxide-simeth (MYLANTA) oral suspension 30 mL  30 mL Oral Q4H PRN    pantoprazole (PROTONIX) tablet 40 mg  40 mg Oral ACB    donepeziL (ARICEPT) tablet 5 mg  5 mg Oral QHS    predniSONE (DELTASONE) tablet 20 mg  20 mg Oral DAILY WITH BREAKFAST    carvediloL (COREG) tablet 12.5 mg  12.5 mg Oral BID WITH MEALS    atorvastatin (LIPITOR) tablet 40 mg  40 mg Oral DAILY    hydrOXYzine pamoate (VISTARIL) capsule 25 mg  25 mg Oral TID PRN    aspirin delayed-release tablet 81 mg  81 mg Oral DAILY    amLODIPine (NORVASC) tablet 5 mg  5 mg Oral DAILY       VITALS:   Patient Vitals for the past 12 hrs:   Temp Pulse Resp BP SpO2   06/11/21 0752 (!) 96.5 °F (35.8 °C) 68 16 (!) 181/84 98 %       LABS: .No results found for this or any previous visit (from the past 24 hour(s)).     MSE:   Appearance: casually dressed; adequately groomed  Behavior: calm and cooperative; no agitation  Motor: slowed  Mood/Affect:  less dysphoric; still flat  Thought Process: mostly organized but can derail  Thought Content: no delusions; no SI/HI  Perceptions: no hallucinations  Insight/Judgment: limited    ASSESSMENT:   1.  Major Depression, severe (F33.2)  2. Dementia, likely mixed, moderate (F02.80)    PLAN:  1. Continue the Lexapro at 5 mg daily. 2. Continue the Aricept at 5 mg QHS. 3. Plan for D/C tomorrow--daughter will pick him up. F/U will be with PCP but he can be referred to the [facility] if needed.

## 2021-06-11 NOTE — SUICIDE SAFETY PLAN
SAFETY PLAN    A suicide Safety Plan is a document that supports someone when they are having thoughts of suicide. Warning Signs that indicate a suicidal crisis may be developing: What (situations, thoughts, feelings, body sensations, behaviors, etc.) do you experience that lets you know you are beginning to think about suicide? 1. Patient is unaware of any symptoms  2.   3. Internal Coping Strategies:  What things can I do (relaxation techniques, hobbies, physical activities, etc.) to take my mind off my problems without contacting another person? 1. Watch TV  2.   3.     People and social settings that provide distraction: Who can I call or where can I go to distract me? 1. Name: Rohini Leon  Phone: [phone]  2. Name:   Phone:   3. Place:           4. Place:     People whom I can ask for help: Who can I call when I need help - for example, friends, family, clergy, someone else? 1. Name: Rohini Leon               Phone: [phone]  2. Name:   Phone:   3. Name:   Phone:     Professionals or [address] [place] [address] I can contact during a crisis: Who can I call for help - for example, my doctor, my psychiatrist, my psychologist, a mental health provider, a suicide hotline? 1. Clinician Name:   Phone:       Clinician Pager or Emergency Contact #:     2. Clinician Name:   Phone:       Clinician Pager or Emergency Contact #:     3. Suicide Prevention Lifeline: 8-647-377-TALK (1851)    4. [address] [place] [address] Emergency Services -  for example, [facility]: [phone]      Emergency Services Address: [place] [address]      Emergency Services Phone: [phone]    Making the environment safe: How can I make my environment (house/apartment/living space) safer? For example, can I remove guns, medications, and other items? 1. All old medications have been removed, he will be administered his medications  2.

## 2021-06-11 NOTE — BH NOTES
Affect constricted, mood depressed/anxious. Disoriented to time. Cooperative and pleasant. Refused to attend group. Interacted with staff and peers. Makes needs known. Ate all  snacks. Denied SI/HI/AVH and pain. Medication compliant.  Stable with no s/s of distress  Laid in bed with eyes closed for 8 hours and 15 min

## 2021-06-11 NOTE — PROGRESS NOTES
Problem: Self Care Deficits Care Plan (Adult)  Goal: *Acute Goals and Plan of Care (Insert Text)  Description: FUNCTIONAL STATUS PRIOR TO ADMISSION: Patient was modified independent using a rolling walker for functional mobility. HOME SUPPORT: The patient lived alone with family to provide assistance. Occupational Therapy Goals  Initiated 6/2/2021  1. Patient will perform lower body dressing with supervision/set-up within 7 days. 2.  Patient will perform all aspects of toileting with supervision/set-up within 7 days. 3.  Patient will participate in upper extremity therapeutic exercise/activities with supervision/set-up for 15 to 20 minutes within 7 days. NEW GOAL 6/8/2021  4. Patient will be independent in self feeding with spillage only 1-2 times during meal in 7 days. Outcome: Progressing Towards Goal   OCCUPATIONAL THERAPY TREATMENT  Patient: aJy Cazares (49 y.o. male)  Date: 6/11/2021  Diagnosis: Depression [F32.9]  Dementia ([address] [F03.90] Severe recurrent major depression without psychotic features ([address]       Precautions: Fall  Chart, occupational therapy assessment, plan of care, and goals were reviewed. ASSESSMENT  Patient continues with skilled OT services and is progressing towards goals. Pt was asleep but awakens with voice and willing to come to the dining room for therapy. He still has some difficulty following command for hand position even with demonstration. He participated in finger and  strengthening before meal and then needed cues during meal wearing a 3/4lb weights cuff on right wrist. It did seem to dampen his tremors. He needed cue to turn knife blade down and to cut food into smaller pieces. He was able to open milk carton with extra time independently but needed physical assist with pull tab on syrup and spilled on his hand. He  might be leaving this facility today.     Current Level of Function Impacting Discharge (ADLs): may need assist with containers for meals, could benefit from weighted cuff or weighted utensil for right hand. Other factors to consider for discharge: lives alone         PLAN :  Patient continues to benefit from skilled intervention to address the above impairments. Continue treatment per established plan of care to address goals. Recommend with staff: continue to encourage smaller bites    Recommend next OT session: continue working on strength     Recommendation for discharge: (in order for the patient to meet his/her long term goals)  To be determined: due to mentation/cognition, patient might need to be at a location where he has assistance for meal prep, cleaning, supervision as he forgets to use walker when walking. This discharge recommendation:  Has been made in collaboration with the attending provider and/or case management    IF patient discharges home will need the following DME: weighted wrist cuff or utensils       SUBJECTIVE:   Patient stated I can't feel anything in these hands.     OBJECTIVE DATA SUMMARY:   Cognitive/Behavioral Status:  Neurologic State: Alert  Orientation Level: Disoriented to time        Perseveration: No perseveration noted  Safety/Judgement: Decreased awareness of need for assistance    Functional Mobility and Transfers for ADLs:  Bed Mobility:  Rolling: Independent  Supine to Sit: Independent  Sit to Supine: Independent    Transfers:  Sit to Stand: Independent     Bed to Chair: Independent    Balance:  Sitting - Static: Good (unsupported)  Sitting - Dynamic: Good (unsupported)  Standing - Static: Good  Standing - Dynamic : Fair;Occasional    ADL Intervention:  Feeding  Container Management: Minimum assistance  Cutting Food: Stand-by assistance  Utensil Management: Supervision  Food to Mouth: Independent (3/4 lb R wrist weighted cuff to dampen tremor)  Drink to Mouth:  Independent  Cues: Verbal cues provided (to stab food, cut smaller pieces, turn knife blade down)  Adaptive Equipment: Other (comment) (weighted cuff R)      Cognitive Retraining  Following Commands: Follows one step commands/directions  Safety/Judgement: Decreased awareness of need for assistance        0/10    Activity Tolerance:   Good    After treatment patient left in no apparent distress:   Sitting in chair and Caregiver / family present    COMMUNICATION/COLLABORATION:   The patients plan of care was discussed with: Registered nurse.      Fransico Johnson, OT  Time Calculation: 30 mins

## 2021-06-11 NOTE — BH NOTES
Patient did not attend nor participate in community meeting with his peers this morning. Patient was sleep at time of meeting.

## 2021-06-12 VITALS
RESPIRATION RATE: 16 BRPM | DIASTOLIC BLOOD PRESSURE: 80 MMHG | TEMPERATURE: 96.4 F | SYSTOLIC BLOOD PRESSURE: 169 MMHG | HEART RATE: 65 BPM | OXYGEN SATURATION: 96 %

## 2021-06-12 PROCEDURE — 74011636637 HC RX REV CODE- 636/637: Performed by: PSYCHIATRY & NEUROLOGY

## 2021-06-12 PROCEDURE — 74011250637 HC RX REV CODE- 250/637: Performed by: PSYCHIATRY & NEUROLOGY

## 2021-06-12 PROCEDURE — 99238 HOSP IP/OBS DSCHRG MGMT 30/<: CPT | Performed by: PSYCHIATRY & NEUROLOGY

## 2021-06-12 PROCEDURE — 74011250637 HC RX REV CODE- 250/637: Performed by: INTERNAL MEDICINE

## 2021-06-12 RX ORDER — ESCITALOPRAM OXALATE 5 MG/1
5 TABLET ORAL DAILY
Qty: 30 TABLET | Refills: 0 | Status: SHIPPED | OUTPATIENT
Start: 2021-06-13 | End: 2021-07-13

## 2021-06-12 RX ORDER — AMLODIPINE BESYLATE 5 MG/1
5 TABLET ORAL DAILY
Qty: 30 TABLET | Refills: 0 | Status: SHIPPED | OUTPATIENT
Start: 2021-06-13 | End: 2021-07-13

## 2021-06-12 RX ORDER — PREDNISONE 20 MG/1
20 TABLET ORAL
Qty: 30 TABLET | Refills: 0 | Status: SHIPPED | OUTPATIENT
Start: 2021-06-13 | End: 2021-07-13

## 2021-06-12 RX ADMIN — PANTOPRAZOLE SODIUM 40 MG: 40 TABLET, DELAYED RELEASE ORAL at 06:32

## 2021-06-12 RX ADMIN — ESCITALOPRAM OXALATE 5 MG: 10 TABLET ORAL at 08:35

## 2021-06-12 RX ADMIN — ASPIRIN 81 MG: 81 TABLET, COATED ORAL at 08:35

## 2021-06-12 RX ADMIN — ATORVASTATIN CALCIUM 40 MG: 40 TABLET, FILM COATED ORAL at 08:35

## 2021-06-12 RX ADMIN — AMLODIPINE BESYLATE 5 MG: 5 TABLET ORAL at 08:35

## 2021-06-12 RX ADMIN — PREDNISONE 20 MG: 20 TABLET ORAL at 08:36

## 2021-06-12 RX ADMIN — CARVEDILOL 12.5 MG: 12.5 TABLET, FILM COATED ORAL at 08:35

## 2021-06-12 NOTE — DISCHARGE INSTRUCTIONS
Patient Education        Dementia: Care Instructions  Your Care Instructions     Dementia is a loss of mental skills that affects your daily life. It is different than the occasional trouble with memory that is part of aging. You may find it hard to remember things that you feel you should be able to remember. Or you may feel that your mind is just not working as well as usual.  Finding out that you have dementia is a shock. You may be afraid and worried about how the condition will change your life. Although there is no cure at this time, medicine may slow memory loss and improve thinking for a while. Other medicines may be able to help you sleep or cope with depression and behavior changes. Dementia often gets worse slowly. But it can get worse quickly. As dementia gets worse, it may become harder to do common things that take planning, like making a list and going shopping. Over time, the disease may make it hard for you to take care of yourself. Some people with dementia need others to help care for them. Dementia is different for everyone. You may be able to function well for a long time. In the early stage of the condition, you can do things at home to make life easier and safer. You also can keep doing your hobbies and other activities. Many people find comfort in planning now for their future needs. Follow-up care is a key part of your treatment and safety. Be sure to make and go to all appointments, and call your doctor if you are having problems. It's also a good idea to know your test results and keep a list of the medicines you take. How can you care for yourself at home? · Take your medicines exactly as prescribed. Call your doctor if you think you are having a problem with your medicine. · Eat healthy foods. Eat lots of whole grains, fruits, and vegetables every day.  If you are not hungry, try snacks or nutritional drinks such as Boost, Ensure, or Sustacal.  · If you have problems sleeping:  ? Try not to nap too close to your bedtime. ? Exercise regularly. Walking is a good choice. ? Try a glass of warm milk or caffeine-free herbal tea before bed. · Do tasks and activities during the time of day when you feel your best. It may help to develop a daily routine. · Post labels, lists, and sticky notes to help you remember things. Write your activities on a calendar you can easily find. Put your clock where you can easily see it. · Stay active. Take walks in familiar places, or with friends or loved ones. Try to stay active mentally too. Read and work crossword puzzles if you enjoy these activities. · Do not drive unless you can pass an on-road driving test. If you are not sure if you are safe to drive, your state 's license bureau can test you. · Keep a cordless phone and a flashlight with new batteries by your bed. If possible, put a phone in each of the main rooms of your house, or carry a cell phone in case you fall and cannot reach a phone. Or, you can wear a device around your neck or wrist. You push a button that sends a signal for help. Acknowledge your emotions and plan for the future  · Talk openly and honestly with your doctor. · Let yourself grieve. It is common to feel angry, scared, frustrated, anxious, or depressed. · Get emotional support from family, friends, a support group, or a counselor experienced in working with people who have dementia. · Ask for help if you need it. · Tell your doctor how you feel. You may feel upset, angry, or worried at times. Many things can cause this, including poor sleep, medicine side effects, confusion, and pain. Your doctor may be able to help you. · Plan for the future. ? Talk to your family and doctor about preparing a living will and other important papers while you can make decisions. These papers tell your doctors how to care for you at the end of your life.   ? Consider naming a person to make decisions about your care if you are not able to. When should you call for help? Call 911 anytime you think you may need emergency care. For example, call if:    · You are lost and do not know whom to call.     · You are injured and do not know whom to call. Call your doctor now or seek immediate medical care if:    · You are more confused or upset than usual.     · You feel like you could hurt yourself because your mind is not working well. Watch closely for changes in your health, and be sure to contact your doctor if you have any problems. Where can you learn more? Go to http://www.gray.com/  Enter V737 in the search box to learn more about \"Dementia: Care Instructions. \"  Current as of: September 23, 2020               Content Version: 12.8  © 5196-3850 [employer]. Care instructions adapted under license by [employer] (which disclaims liability or warranty for this information). If you have questions about a medical condition or this instruction, always ask your healthcare professional. [place] [address] any warranty or liability for your use of this information. Patient Education        Recovering From Depression: Care Instructions  Your Care Instructions     Taking good care of yourself is important as you recover from depression. In time, your symptoms will fade as your treatment takes hold. Do not give up. Instead, focus your energy on getting better. Your mood will improve. It just takes some time. Focus on things that can help you feel better, such as being with friends and family, eating well, and getting enough rest. But take things slowly. Do not do too much too soon. You will begin to feel better gradually. Follow-up care is a key part of your treatment and safety. Be sure to make and go to all appointments, and call your doctor if you are having problems.  It's also a good idea to know your test results and keep a list of the medicines you take.  How can you care for yourself at home? Be realistic  · If you have a large task to do, break it up into smaller steps you can handle, and just do what you can. · You may want to put off important decisions until your depression has lifted. If you have plans that will have a major impact on your life, such as marriage, divorce, or a job change, try to wait a bit. Talk it over with friends and loved ones who can help you look at the overall picture first.  · Reaching out to people for help is important. Do not isolate yourself. Let your family and friends help you. Find someone you can trust and confide in, and talk to that person. · Be patient, and be kind to yourself. Remember that depression is not your fault and is not something you can overcome with willpower alone. Treatment is important for depression, just like for any other illness. Feeling better takes time, and your mood will improve little by little. Stay active  · Stay busy and get outside. Take a walk, or try some other light exercise. · Talk with your doctor about an exercise program. Exercise can help with mild depression. · Go to a movie or concert. Take part in a Orthodox activity or other social gathering. Go to a ball game. · Ask a friend to have dinner with you. Take care of yourself  · Eat a balanced diet with plenty of fresh fruits and vegetables, whole grains, and lean protein. If you have lost your appetite, eat small snacks rather than large meals. · Avoid using illegal drugs or marijuana and drinking alcohol. Do not take medicines that have not been prescribed for you. They may interfere with medicines you may be taking for depression, or they may make your depression worse. · Take your medicines exactly as they are prescribed. You may start to feel better within 1 to 3 weeks of taking antidepressant medicine. But it can take as many as 6 to 8 weeks to see more improvement.  If you have questions or concerns about your medicines, or if you do not notice any improvement by 3 weeks, talk to your doctor. · Continue to take your medicine after your symptoms improve. Taking your medicine for at least 6 months after you feel better can help keep you from getting depressed again. If this isn't the first time you have been depressed, your doctor may recommend you to take medicine even longer. · If you have any side effects from your medicine, tell your doctor. Many side effects are mild and will go away on their own after you have been taking the medicine for a few weeks. Some may last longer. Talk to your doctor if side effects are bothering you too much. You might be able to try a different medicine. · Continue counseling. It may help prevent depression from returning, especially if you've had multiple episodes of depression. Talk with your counselor if you are having a hard time attending your sessions or you think the sessions aren't working. Don't just stop going. · Get enough sleep. Talk to your doctor if you are having problems sleeping. · Avoid sleeping pills unless they are prescribed by the doctor treating your depression. Sleeping pills may make you groggy during the day, and they may interact with other medicine you are taking. · If you have any other illnesses, such as diabetes, heart disease, or high blood pressure, make sure to continue with your treatment. Tell your doctor about all of the medicines you take, including those with or without a prescription. · If you or someone you know talks about suicide, self-harm, or feeling hopeless, get help right away. Call the [address] [place] [address] at 1-800-273-talk (5-607.582.5366) or text HOME to 544588 to access the Crisis Text Line. Consider saving these numbers in your phone. When should you call for help? Call 911 anytime you think you may need emergency care.  For example, call if:    · You feel like hurting yourself or someone else.     · Someone you know has depression and is about to attempt or is attempting suicide. Call your doctor now or seek immediate medical care if:    · You hear voices.     · Someone you know has depression and:  ? Starts to give away his or her possessions. ? Uses illegal drugs or drinks alcohol heavily. ? Talks or writes about death, including writing suicide notes or talking about guns, knives, or pills. ? Starts to spend a lot of time alone. ? Acts very aggressively or suddenly appears calm. Watch closely for changes in your health, and be sure to contact your doctor if:    · You do not get better as expected. Where can you learn more? Go to http://www.gray.com/  Enter N529 in the search box to learn more about \"Recovering From Depression: Care Instructions. \"  Current as of: September 23, 2020               Content Version: 12.8  © 0667-1174 [employer] Care instructions adapted under license by [employer] (which disclaims liability or warranty for this information). If you have questions about a medical condition or this instruction, always ask your healthcare professional. [place] [address] any warranty or liability for your use of this information. BEHAVIORAL HEALTH NURSING DISCHARGE NOTE      The following personal items collected during your admission are returned to you:   Dental Appliance: Dental Appliances: None  Vision: Visual Aid: Glasses, With patient  Hearing Aid: Hearing Aid: Bilateral  Jewelry: Jewelry: None  Clothing: Clothing: Belt, Pants, Shirt  Other Valuables: Other Valuables: None  Valuables sent to safe:        PATIENT INSTRUCTIONS:    What to do at Home. Avoid making any critical decisions for at least 24-hours. Recommended diet: Heart healthy meal    Recommended activity: As tolerated      Follow-up with Dr. Doneta Boxer as needed/[facility]/home health.   If you have problems relating to your recovery, call your physician. The discharge information has been reviewed with the patient. The patient verbalized understanding.

## 2021-06-12 NOTE — ROUTINE PROCESS
Shift change report given to DENISE Malcolm   RN, re: SBAR, medications, and behavior. Roxann Fall Risk Score - 3.

## 2021-06-12 NOTE — DISCHARGE SUMMARY
DATE OF ADMISSION: 6-1-21  DATE OF DISCHARGE:  6-12-21    DISCHARGE DIAGNOSES:  1.  Major Depression, severe (F33.2)  2. Dementia, likely mixed, moderate (F02.80)      DISCHARGE MEDICATIONS:  norvasc 5mg qday  Asa 81mg qday  lipitor 40mg qday  Coreg 12.5mg qday  aricept 5mg qhs  lexapro 5mg qday  protonix 40mg qday  Prednisone 20mg qday      DISCHARGE DISPOSITION:  STABLE. NO SI/HI/AVH. DISCHARGED TO HOME. FOLLOWED WITH HOME HEALTH CARE. [address] WILL CONTACT PATIENT ABOUT MENTAL HEALTH FOLLOW UP    LABS DURING STAY:  CMP, CBC WNL,     HOSPITAL COURSE:  PATIENT ADMITTED TO INPATIENT UNIT AND PLACED ON APPROPRIATE PRECAUTIONS. WAS STARTED ON LEXAPRO. AMBIEN WAS DISCONTINUED. ARICEPT CONTINUED. PT IMPROVED. HAD PERIODS OF DISORIENTATION. NOTE FROM ADMISSION:  HISTORY OF PRESENT ILLNESS:  The patient is a 67-year-old  white male who has no past psychiatric history, but he was admitted voluntarily from the [facility] Emergency Room due to concerns about worsening depression and some suicidal actions. Specifically, he had stated he had ingested up to 25 Percocet pills in an overdose on the evening of 05/31, although his daughter states that this prescription was a year and a half old, and it may have only had a few pills in it. In any event, the patient was brought to the emergency room where he was noted to still be quite depressed, and although he was denying any active suicidal plans or intentions at that moment, it was felt that he is clearly not safe to return home where he lives on his own, given his level of depression. It was also reported that he has a history of some dementia, and it is evident during the interview that he does have some deficits in areas of short-term and recent memory as well as some aspects of processing and executive functioning.     His neurovegetative functioning has only been slightly impaired in some ways.   He states that he sleeps and eats quite well, with no change noted over the past several months. He states, however, that his energy level is low and that he is definitely more anhedonic and dysphoric than usual.  He also reports that his concentration and attention span are poor, and he is much more forgetful. He just states he has been feeling helpless and hopeless and has had the suicidal thoughts just recently but not currently. He also states that he occasionally will feel very tremulous and anxious, but that is not a significant issue compared to his level of depression and his psychosocial stressors.     He attributes his depression to a number of stressors including the fact that he is living alone and feels isolated and lonely. He had penile surgery in 09/20 for cancer and consequently he states he is particularly struggling with making a mess every time he tries to urinate. It was also reported that he may be somewhat erratically compliant with his medication when he is at home, taking the medicines some days and not on other days, but he tells me that he thinks he takes his medicines consistently.     There is no evidence to suggest any underlying psychosis such as hallucinations or delusional thinking, but he does have evidence of some cognitive limitations as already noted.     PAST PSYCHIATRIC HISTORY:  None whatsoever. This includes no medication trials and no prior evaluations.     PAST MEDICAL HISTORY:  1. History of TIAs/CVAs. 2.  History of penile cancer with surgical resection in 09/20.  3.  Bilateral carotid stenosis. 4.  GERD. 5.  Hyperlipidemia. 6.  History of diverticulitis. 7.  Chronic kidney disease, stage II. 8.  Autoimmune hepatitis. 9.  Hypertension.     ALLERGIES:  ACE INHIBITORS AND DEMEROL.     MEDICATIONS ON ADMISSION:  1. Trazodone 50 mg at bedtime p.r.n.  2.  Ambien 5 mg at bedtime p.r.n.  3.  Aricept 5 mg at bedtime. 4.  Coreg 12.5 mg b.i.d.  5.  Prednisone 20 mg daily. 6.  Amlodipine 5 mg daily.   7. Lipitor 40 mg daily. 8.  Protonix 40 mg daily. 9.  Aspirin 81 mg daily.     FAMILY HISTORY:  He denies any family psychiatric history that he is aware of.     SOCIAL HISTORY:  He has been  since 1982 and lives alone in the [place] area. He does have three children ages 72, 61, and 48, and at least one daughter is his power of  [place]) and she checks on him regularly. He states he failed the 8th grade two and a half years in a row and quit at that point, but he was able to get trained and certified as a  and functioned in that capacity until he retired. He does not smoke anymore. He also does not drink or use drugs.     MENTAL STATUS EXAM:  The patient was noted to be a casually dressed, somewhat poorly groomed 78-year-old who appeared his stated age. He moved slightly slowly with the use of a walker and was a bit unstable, but he otherwise was functioning close to an independent level. He was very pleasant and cooperative, but clearly has been quite depressed and dysphoric as noted above. He denied any suicidal thoughts or intentions currently, but admits to having suicidal thoughts when he took the overdose. He also reported some neurovegetative dysfunction but that his sleep and appetite were actually fairly good. There was no evidence of any psychosis such as hallucinations or delusional thinking, and no evidence of any daniel at any point. His cognitive functioning showed deficits in short-term memory, processing speed, and some aspects of executive functioning based on the interview.     DIAGNOSTIC IMPRESSION:  Axis I:  1. Major Depression, possibly recurrent, moderate to severe (F33.2). 2.  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 Axis II. Deferred.   Axis III:  Transient ischemic attacks; history of penile cancer with surgical resection; bilateral carotid stenosis; gastroesophageal reflux disease; hyperlipidemia; history of diverticulitis; autoimmune hepatitis; hypertension; chronic kidney disease, stage II. Axis IV:  Stressors are mild to moderate (social isolation and declining health). Axis V:  Global Assessment of Functioning currently is 45.     PLAN:  1. We will admit the patient for further supportive treatment, close observation, increased structure, and involvement within the groups and milieu. 2.  We will begin a trial on Lexapro at 5 mg daily, possibly increasing if indicated. 3.  We will also continue the trazodone but not the Ambien, along with Aricept. 4.  He clearly would be an excellent candidate for assisted living placement, and he seems to be willing to consider that. 5.  We will also have Physical Therapy and Occupational Therapy assess for his need for treatment and assistance. 6.  Estimated length of stay would likely be on the order of 7-10 days.   END OF ADMISSION NOTE

## 2021-06-12 NOTE — BH NOTES
Behavioral Health Transition Record to Provider    Patient Name: Tika Lopez  YOB: 1940  Medical Record Number: [id number]  Date of Admission: 6/1/2021  Date of Discharge: 06/12/2021    Attending Provider: Crescencio Martel MD  Discharging Provider: Addy Gupta  To contact this individual call [phone] and ask the  to page. If unavailable, ask to be transferred to [address] [place] [address] Provider on call. [facility] Provider will be available on call 24/7 and during holidays. Primary Care Provider: Elvie Camarillo MD    Allergies   Allergen Reactions    Ace Inhibitors Other (comments)     Doesn't agree with pt    Demerol [Meperidine] Unknown (comments)     Causes unconsciousness       Reason for Admission: depression,confusion    Admission Diagnosis: Depression [F32.9]  Dementia ([facility] [address] [F03.90]    * No surgery found *    Results for orders placed or performed during the hospital encounter of 06/01/21   COVID-19 RAPID TEST   Result Value Ref Range    Specimen source Nasopharyngeal      COVID-19 rapid test Not detected NOTD     CBC WITH AUTOMATED DIFF   Result Value Ref Range    WBC 8.0 4.1 - 11.1 K/uL    RBC 5.03 4. 10 - 5.70 M/uL    HGB 15.9 12.1 - 17.0 g/dL    HCT 46.2 36.6 - 50.3 %    MCV 91.8 80.0 - 99.0 FL    MCH 31.6 26.0 - 34.0 PG    MCHC 34.4 30.0 - 36.5 g/dL    RDW 14.0 11.5 - 14.5 %    PLATELET 335 402 - 910 K/uL    MPV 10.5 8.9 - 12.9 FL    NRBC 0.0 0  WBC    ABSOLUTE NRBC 0.00 0.00 - 0.01 K/uL    NEUTROPHILS 72 32 - 75 %    LYMPHOCYTES 14 12 - 49 %    MONOCYTES 11 5 - 13 %    EOSINOPHILS 1 0 - 7 %    BASOPHILS 1 0 - 1 %    IMMATURE GRANULOCYTES 1 (H) 0.0 - 0.5 %    ABS. NEUTROPHILS 5.9 1.8 - 8.0 K/UL    ABS. LYMPHOCYTES 1.1 0.8 - 3.5 K/UL    ABS. MONOCYTES 0.9 0.0 - 1.0 K/UL    ABS. EOSINOPHILS 0.1 0.0 - 0.4 K/UL    ABS. BASOPHILS 0.1 0.0 - 0.1 K/UL    ABS. IMM.  GRANS. 0.0 0.00 - 0.04 K/UL    DF AUTOMATED     METABOLIC PANEL, COMPREHENSIVE   Result Value Ref Range    Sodium 138 136 - 145 mmol/L    Potassium 3.4 (L) 3.5 - 5.1 mmol/L    Chloride 107 97 - 108 mmol/L    CO2 22 21 - 32 mmol/L    Anion gap 9 5 - 15 mmol/L    Glucose 108 (H) 65 - 100 mg/dL    BUN 21 (H) 6 - 20 MG/DL    Creatinine 1.18 0.70 - 1.30 MG/DL    BUN/Creatinine ratio 18 12 - 20      GFR est AA >60 >60 ml/min/1.73m2    GFR est non-AA 60 (L) >60 ml/min/1.73m2    Calcium 9.6 8.5 - 10.1 MG/DL    Bilirubin, total 1.4 (H) 0.2 - 1.0 MG/DL    ALT (SGPT) 91 (H) 12 - 78 U/L    AST (SGOT) 31 15 - 37 U/L    Alk. phosphatase 151 (H) 45 - 117 U/L    Protein, total 7.5 6.4 - 8.2 g/dL    Albumin 3.7 3.5 - 5.0 g/dL    Globulin 3.8 2.0 - 4.0 g/dL    A-G Ratio 1.0 (L) 1.1 - 2.2     SAMPLES BEING HELD   Result Value Ref Range    SAMPLES BEING HELD  RED     COMMENT        Add-on orders for these samples will be processed based on acceptable specimen integrity and analyte stability, which may vary by analyte.    ACETAMINOPHEN   Result Value Ref Range    Acetaminophen level <2 (L) 10 - 30 ug/mL   SALICYLATE   Result Value Ref Range    Salicylate level <9.0 (L) 2.8 - 20.0 MG/DL   ETHYL ALCOHOL   Result Value Ref Range    ALCOHOL(ETHYL),SERUM <10 <10 MG/DL   SARS-COV-2   Result Value Ref Range    SARS-CoV-2 Please find results under separate order     COVID-19 WITH INFLUENZA A/B   Result Value Ref Range    SARS-CoV-2 Not detected NOTD      Influenza A by PCR Not detected NOTD      Influenza B by PCR Not detected NOTD     EKG, 12 LEAD, INITIAL   Result Value Ref Range    Ventricular Rate 119 BPM    Atrial Rate 119 BPM    P-R Interval 152 ms    QRS Duration 78 ms    Q-T Interval 322 ms    QTC Calculation (Bezet) 452 ms    Calculated P Axis 57 degrees    Calculated R Axis 31 degrees    Calculated T Axis 15 degrees    Diagnosis       ** Poor data quality, interpretation may be adversely affected  Recommend repeat  Sinus tachycardia  Possible Left atrial enlargement  Nonspecific ST abnormality  When compared with ECG of 18-STEW-2020 09:40,  Vent. rate has increased BY  39 BPM    Confirmed by Buzz Magdaleno MD, [facility] ([id number]) on 6/1/2021 4:29:12 PM         Immunizations administered during this encounter:   Immunization History   Administered Date(s) Administered    Tdap 11/22/2017, 01/23/2019       Screening for Metabolic Disorders for Patients on Antipsychotic Medications  (Data obtained from the EMR)    Estimated Body Mass Index  Estimated body mass index is 30.04 kg/m² as calculated from the following:    Height as of an earlier encounter on 6/1/21: 5' 4\" (1.626 m). Weight as of an earlier encounter on 6/1/21: 79.4 kg (175 lb). Vital Signs/Blood Pressure  Visit Vitals  BP (!) 169/80 (BP 1 Location: Left arm, BP Patient Position: Sitting)   Pulse 65   Temp (!) 96.4 °F (35.8 °C)   Resp 16   SpO2 96%       Blood Glucose/Hemoglobin A1c  Lab Results   Component Value Date/Time    Glucose 108 (H) 06/01/2021 08:52 AM       No results found for: HBA1C, [id number]     Lipid Panel  Lab Results   Component Value Date/Time    Cholesterol, total 209 (H) 03/26/2012 12:00 AM    HDL Cholesterol 52 03/26/2012 12:00 AM    LDL, calculated 121 (H) 03/26/2012 12:00 AM    Triglyceride 179 (H) 03/26/2012 12:00 AM        Discharge Diagnosis: major depression, severe    Discharge Plan: [place] meds with mental health f/u, home health and [address] [place] [address]    Discharge Medication List and Instructions:   Current Discharge Medication List      START taking these medications    Details   amLODIPine (NORVASC) 5 mg tablet Take 1 Tablet by mouth daily for 30 days. Qty: 30 Tablet, Refills: 0  Start date: 6/13/2021, End date: 7/13/2021      escitalopram oxalate (LEXAPRO) 5 mg tablet Take 1 Tablet by mouth daily for 30 days.  Indications: anxiousness associated with depression  Qty: 30 Tablet, Refills: 0  Start date: 6/13/2021, End date: 7/13/2021         CONTINUE these medications which have CHANGED    Details   predniSONE (DELTASONE) 20 mg tablet Take 20 mg by mouth daily (with breakfast) for 30 days. Qty: 30 Tablet, Refills: 0  Start date: 6/13/2021, End date: 7/13/2021         CONTINUE these medications which have NOT CHANGED    Details   aspirin delayed-release 81 mg tablet Take 81 mg by mouth daily. atorvastatin (LIPITOR) 20 mg tablet Take 2 Tabs by mouth daily. Qty: 30 Tab, Refills: 0    Associated Diagnoses: Essential hypertension      ondansetron hcl (Zofran) 4 mg tablet Take 2 Tabs by mouth two (2) times a day. Qty: 30 Tab, Refills: 0      traZODone (DESYREL) 50 mg tablet Take 50 mg by mouth nightly as needed. carvediloL (COREG) 12.5 mg tablet Take 2 Tabs by mouth two (2) times daily (with meals). Qty: 180 Tab, Refills: 1    Associated Diagnoses: Essential hypertension      ascorbic acid, vitamin C, (Vitamin C) 500 mg tablet Take  by mouth. fluticasone propionate (FLONASE) 50 mcg/actuation nasal spray 2 Sprays by Both Nostrils route daily as needed. psyllium husk (METAMUCIL PO) Take  by mouth. donepeziL (ARICEPT) 5 mg tablet Take 1 Tab by mouth nightly. Qty: 90 Tab, Refills: 1    Associated Diagnoses: Memory deficit      pantoprazole (PROTONIX) 40 mg tablet Take 1 Tab by mouth ACB/HS. Qty: 90 Tab, Refills: 1      neomycin-bacitracin-polymyxin (Neosporin, [id number]-fjryy,) 3.5mg-400 unit- 5,000 unit/gram ointment Apply  to affected area two (2) times a day.   Qty: 1 Tube, Refills: 0         STOP taking these medications       zolpidem (AMBIEN) 5 mg tablet Comments:   Reason for Stopping:               Unresulted Labs (24h ago, onward) Comment    None        To obtain results of studies pending at discharge, please contact [phone]    Follow-up Information     Follow up With Specialties Details Why Contact Info    Michael Calle MD Family Medicine Go to For medication management will call daughter with all follow up appointments [address]  [phone]            Advanced Directive:   Does the patient have an appointed surrogate decision maker? NoDoes the patient have a Medical Advance Directive? Yes  Does the patient have a Psychiatric Advance Directive? No  If the patient does not have a surrogate or Medical Advance Directive AND Psychiatric Advance Directive, the patient was offered information on these advance directives Other n/a    Patient Instructions: Please continue all medications until otherwise directed by physician. Yes      Tobacco Cessation Discharge Plan:   Is the patient a smoker and needs referral for smoking cessation? Yes  Patient referred to the following for smoking cessation with an appointment? No     Patient was offered medication to assist with smoking cessation at discharge? No  Was education for smoking cessation added to the discharge instructions? No    Alcohol/Substance Abuse Discharge Plan:   Does the patient have a history of substance/alcohol abuse and requires a referral for treatment? No  Patient referred to the following for substance/alcohol abuse treatment with an appointment? No  Patient was offered medication to assist with alcohol cessation at discharge? No  Was education for substance/alcohol abuse added to discharge instructions? No    Patient discharged to home.

## 2021-06-12 NOTE — BH NOTES
Affect blunted, mood calm. Attended and participated in group. Ate snacks. Interacting appropriately with staff and peers. Denied SI/HI/AVH/pain. Ambulating on unit with the aid of a walker. Medication compliant. Pleasant and co-operative. Pt rested quietly in bed with eyes closed for 8.25 hours. Respirations even and unlabored. Pt in no apparent distress.

## 2021-06-12 NOTE — BH NOTES
Affect blunted; mood euthymic. Ate 100% of meals. All belongings returned to patient. Verbalized understanding of DC instructions. Denies SI/HI/AVH/pain. Med compliant. DC with daughter at 0.

## 2021-06-12 NOTE — PROGRESS NOTES
Problem: Falls - Risk of  Goal: *Absence of Falls  Description: Document Tee Mcgrath Fall Risk and appropriate interventions in the flowsheet.   Outcome: Resolved/Met     Problem: Patient Education: Go to Patient Education Activity  Goal: Patient/Family Education  Outcome: Resolved/Met     Problem: Depressed Mood (Adult/Pediatric)  Goal: *STG: Verbalizes anger, guilt, and other feelings in a constructive manor  Outcome: Resolved/Met  Goal: *STG: Attends activities and groups  Outcome: Resolved/Met  Goal: *LTG: Returns to previous level of functioning and participates with after care plan  Outcome: Resolved/Met  Goal: *LTG: Understands illness and can identify signs of relapse  Outcome: Resolved/Met  Goal: Interventions  Outcome: Resolved/Met     Problem: Patient Education: Go to Patient Education Activity  Goal: Patient/Family Education  Outcome: Resolved/Met     Problem: Hypertension  Goal: *Blood pressure within specified parameters  Outcome: Resolved/Met  Goal: *Fluid volume balance  Outcome: Resolved/Met  Goal: *Labs within defined limits  Outcome: Resolved/Met     Problem: Patient Education: Go to Patient Education Activity  Goal: Patient/Family Education  Outcome: Resolved/Met     Problem: Patient Education: Go to Patient Education Activity  Goal: Patient/Family Education  Outcome: Resolved/Met     Problem: Hypertension  Goal: *Blood pressure within specified parameters  Outcome: Resolved/Met  Goal: *Fluid volume balance  Outcome: Resolved/Met  Goal: *Labs within defined limits  Outcome: Resolved/Met

## 2021-06-12 NOTE — GROUP NOTE
MEGAN  GROUP DOCUMENTATION INDIVIDUAL Group Therapy Note Date: 6/11/2021 Group Start Time: 0800 Group End Time: 0830 Group Topic: [employer] Ning Hernandez MEGAN  GROUP DOCUMENTATION GROUP Group Therapy Note Attendees: 2 Attendance: Attended Patient's Goal:  Pt.'s goal is ready to leave the facility Interventions/techniques: Challenged Follows Directions: Followed directions Interactions: Interacted appropriately Mental Status: Calm Behavior/appearance: Cooperative Goals Achieved: Able to engage in interactions, Able to listen to others and Able to give feedback to another Additional Notes:  Pt. Stated he is still having back pain and rates it at a (6). He has no anxiety or depression. No thoughts of self harm or harm to others. Essie Kawasaki

## 2021-06-12 NOTE — PROGRESS NOTES
Problem: Depressed Mood (Adult/Pediatric)    Goal: *STG: Complies with medication therapy    Affect blunted, mood calm. Denied SI or intent to harm self. Medication compliant consistently. Assessed/monitored potential harm to self. Encouraged sharing of feelings. Monitored medication compliance and effectiveness. Reinforced abilities and accomplishments. Offered encouragement, reassurance, and support.     Outcome: Resolved/Met

## 2021-06-12 NOTE — BH NOTES
Moris Cross was out for the Group Meeting but left after eating his snacks before answering questions.     Steffi Mak CNA

## 2021-06-26 ENCOUNTER — HOSPITAL ENCOUNTER (EMERGENCY)
Age: 81
Discharge: HOME | End: 2021-06-26
Attending: EMERGENCY MEDICINE
Payer: MEDICARE

## 2021-06-26 VITALS
SYSTOLIC BLOOD PRESSURE: 140 MMHG | HEART RATE: 67 BPM | RESPIRATION RATE: 16 BRPM | OXYGEN SATURATION: 96 % | DIASTOLIC BLOOD PRESSURE: 77 MMHG | TEMPERATURE: 97.6 F | BODY MASS INDEX: 28.72 KG/M2 | WEIGHT: 168.21 LBS | HEIGHT: 64 IN

## 2021-06-26 DIAGNOSIS — I10 HYPERTENSION, UNSPECIFIED TYPE: Primary | ICD-10-CM

## 2021-06-26 LAB
ALBUMIN SERPL-MCNC: 3.4 G/DL (ref 3.5–5)
ALBUMIN/GLOB SERPL: 1 {RATIO} (ref 1.1–2.2)
ALP SERPL-CCNC: 150 U/L (ref 45–117)
ALT SERPL-CCNC: 104 U/L (ref 12–78)
ANION GAP SERPL CALC-SCNC: 8 MMOL/L (ref 5–15)
APPEARANCE UR: CLEAR
AST SERPL-CCNC: 46 U/L (ref 15–37)
BACTERIA URNS QL MICRO: NEGATIVE /HPF
BILIRUB SERPL-MCNC: 0.9 MG/DL (ref 0.2–1)
BILIRUB UR QL: NEGATIVE
BUN SERPL-MCNC: 20 MG/DL (ref 6–20)
BUN/CREAT SERPL: 20 (ref 12–20)
CALCIUM SERPL-MCNC: 8.3 MG/DL (ref 8.5–10.1)
CAOX CRY URNS QL MICRO: ABNORMAL
CHLORIDE SERPL-SCNC: 109 MMOL/L (ref 97–108)
CO2 SERPL-SCNC: 23 MMOL/L (ref 21–32)
COLOR UR: ABNORMAL
CREAT SERPL-MCNC: 1.02 MG/DL (ref 0.7–1.3)
EPITH CASTS URNS QL MICRO: ABNORMAL /LPF
GLOBULIN SER CALC-MCNC: 3.3 G/DL (ref 2–4)
GLUCOSE SERPL-MCNC: 132 MG/DL (ref 65–100)
GLUCOSE UR STRIP.AUTO-MCNC: >1000 MG/DL
HGB UR QL STRIP: NEGATIVE
KETONES UR QL STRIP.AUTO: ABNORMAL MG/DL
LEUKOCYTE ESTERASE UR QL STRIP.AUTO: NEGATIVE
MUCOUS THREADS URNS QL MICRO: ABNORMAL /LPF
NITRITE UR QL STRIP.AUTO: NEGATIVE
PH UR STRIP: 5.5 [PH] (ref 5–8)
POTASSIUM SERPL-SCNC: 3.6 MMOL/L (ref 3.5–5.1)
PROT SERPL-MCNC: 6.7 G/DL (ref 6.4–8.2)
PROT UR STRIP-MCNC: NEGATIVE MG/DL
RBC #/AREA URNS HPF: ABNORMAL /HPF (ref 0–5)
SODIUM SERPL-SCNC: 140 MMOL/L (ref 136–145)
SP GR UR REFRACTOMETRY: 1.03 (ref 1–1.03)
TROPONIN I SERPL-MCNC: <0.05 NG/ML
UA: UC IF INDICATED,UAUC: ABNORMAL
UROBILINOGEN UR QL STRIP.AUTO: 1 EU/DL (ref 0.2–1)
WBC URNS QL MICRO: ABNORMAL /HPF (ref 0–4)

## 2021-06-26 PROCEDURE — 80053 COMPREHEN METABOLIC PANEL: CPT

## 2021-06-26 PROCEDURE — 84484 ASSAY OF TROPONIN QUANT: CPT

## 2021-06-26 PROCEDURE — 99284 EMERGENCY DEPT VISIT MOD MDM: CPT

## 2021-06-26 PROCEDURE — 36415 COLL VENOUS BLD VENIPUNCTURE: CPT

## 2021-06-26 PROCEDURE — 81001 URINALYSIS AUTO W/SCOPE: CPT

## 2021-06-27 NOTE — ED NOTES
Pt arrives ambulatory to ed with daughter. Per pt daughter, thew pt was prescribed carvedilo 12.5 mg - 2 tablets BID. Pt daughter reports she mistakenly had been giving pt only 1 tablet and recently realized this and started giving the pt the correct dosage. Pt reports that he thinks his daughter is overdosing him. Pt alert and oriented.      Pt denies cp, sob increased from baseline, n,v,d, dizziness, HA.

## 2021-06-27 NOTE — DISCHARGE INSTRUCTIONS
Please make sure you are taking medication as prescribed    Please continue to allow your family to help administer your medications to keep you safe

## 2021-06-27 NOTE — ED NOTES
Pt given discharge instructions. Saline lock removed. Discharged ambulatory with steady gait. No acute distress at time of departure. Accompanied by family.

## 2021-06-28 NOTE — ED PROVIDER NOTES
EMERGENCY DEPARTMENT HISTORY AND PHYSICAL EXAM      Date: 6/26/2021  Patient Name: Evita Moore    History of Presenting Illness     Chief Complaint   Patient presents with    Other     Ambulatory into the ED accompanied by his daughter. Was prescribed Carvedilol 12.5 mg - 2 tablets BID, starting 6/12/21. Pt's daughter helps administer his medications and didn't realize he needed 2 tablets BID and was only giving him 1 tablet BID. She started giving him the proper dose today and the patient is convinced that she overdosed him, although it is clear that she did not. His BP at home was 174/131, but 155/75 here. Alert and interacting appropriately. No distress noted. History Provided By: Patient and Patient's Daughter    HPI: Evita Moore, [de-identified] y.o. male presents to the ED with cc of hypertension. Pt brought in by his daughter. . He has a hx of dementia, Daughter stated most recently they had noted that pt had not been taking his medications as prescribed. They had found full pill bottles as well as him taking too much of some medication. Recently he had been seen and they double his Coreg dose for better BP control. When he was taking this dose his daughter noted than he looked better and more energy. He then began appearing less active and this is when she noticed that he had not been taking Coreg. While trying to get him to take his meds today he accused her of trying to poison him. Daughter brought him into the ED in hopes that if he heard how he should be dosing his meds he would take them accordingly. There is no complaints of headache, CP, SOA, n/v. There has been no recent illness. There are no other complaints, changes, or physical findings at this time. PCP: Karla Holcomb MD    No current facility-administered medications on file prior to encounter.      Current Outpatient Medications on File Prior to Encounter   Medication Sig Dispense Refill    predniSONE (DELTASONE) 20 mg tablet Take 20 mg by mouth daily (with breakfast) for 30 days. 30 Tablet 0    amLODIPine (NORVASC) 5 mg tablet Take 1 Tablet by mouth daily for 30 days. 30 Tablet 0    escitalopram oxalate (LEXAPRO) 5 mg tablet Take 1 Tablet by mouth daily for 30 days. Indications: anxiousness associated with depression 30 Tablet 0    aspirin delayed-release 81 mg tablet Take 81 mg by mouth daily.  atorvastatin (LIPITOR) 20 mg tablet Take 2 Tabs by mouth daily. 30 Tab 0    ondansetron hcl (Zofran) 4 mg tablet Take 2 Tabs by mouth two (2) times a day. (Patient taking differently: Take 8 mg by mouth every eight (8) hours as needed.) 30 Tab 0    traZODone (DESYREL) 50 mg tablet Take 50 mg by mouth nightly as needed.  carvediloL (COREG) 12.5 mg tablet Take 2 Tabs by mouth two (2) times daily (with meals). 180 Tab 1    ascorbic acid, vitamin C, (Vitamin C) 500 mg tablet Take  by mouth.  fluticasone propionate (FLONASE) 50 mcg/actuation nasal spray 2 Sprays by Both Nostrils route daily as needed.  psyllium husk (METAMUCIL PO) Take  by mouth.  donepeziL (ARICEPT) 5 mg tablet Take 1 Tab by mouth nightly. 90 Tab 1    pantoprazole (PROTONIX) 40 mg tablet Take 1 Tab by mouth ACB/HS. 90 Tab 1    neomycin-bacitracin-polymyxin (Neosporin, [id number]-towlo,) 3.5mg-400 unit- 5,000 unit/gram ointment Apply  to affected area two (2) times a day.  (Patient taking differently: Apply  to affected area as needed.) 1 Tube 0       Past History     Past Medical History:  Past Medical History:   Diagnosis Date    Arthritis     left arm    Autoimmune hepatitis ([facility] [address]     Bleeding ulcer 07/2019    BPH (benign prostatic hyperplasia)     CAD (coronary artery disease)     s/p stent    Cancer (HCC)     penile squamous carcinoma    Chronic kidney disease     stage 2     Chronic obstructive pulmonary disease (HCC)     Dementia (HCC)     Elevated LFT's     Essential hypertension 9/24/01    GERD (gastroesophageal reflux disease)     hiatal hernia  Ill-defined condition 2016    faint    Nephrosclerosis     Orthostatic hypotension 01    PVC's 01    Sleep apnea     no CPAP    Syncope 01    secondary to venous insuffiency        Past Surgical History:  Past Surgical History:   Procedure Laterality Date    COLONOSCOPY N/A 2016    COL[facility] at [facility]COPY    COLONOSCOPY N/A 2019    COLONOSCOPY performed by Tabatha Barnhart MD at [facility] [employer]; HI RISK IND  2019         ECHO 2D ADULT  12    EF 60 - 65%; mild concentric hypertrophy; pulmonary systolic artery pressure upper limits normal    HX ABDOMINAL WALL DEFECT REPAIR  2019d     Exploratory laparotomy, segmental sigmoid colon resection and primary stapled anastomosis.  HX APPENDECTOMY  1985    HX HEENT  2020    Left temporal artery biopsy    HX HERNIA REPAIR  2018    Davinci hiatal hernia repair- Alicia Franco MD    HX OTHER SURGICAL  2020    penile lesion bx    MA CARDIAC SURG PROCEDURE UNLIST  2019    1 stent    UPPER GI ENDOSCOPY,BIOPSY  2019         UPPER GI ENDOSCOPY,DIAGNOSIS  2019            Family History:  Family History   Problem Relation Age of Onset    Stroke Mother     Stroke Father     Heart Disease Father        Social History:  Social History     Tobacco Use    Smoking status: Former Smoker     Packs/day: 3.50     Quit date: 1980     Years since quittin.5    Smokeless tobacco: Never Used   Substance Use Topics    Alcohol use: Not Currently     Comment: no alcohol since     Drug use: No       Allergies:   Allergies   Allergen Reactions    Ace Inhibitors Other (comments)     Doesn't agree with pt    Demerol [Meperidine] Unknown (comments)     Causes unconsciousness         Review of Systems   Review of Systems   Unable to perform ROS: Dementia       Physical Exam   Physical Exam  Vitals and nursing note reviewed. Constitutional:       General: He is not in acute distress. Appearance: He is well-developed. He is not diaphoretic. HENT:      Head: Normocephalic and atraumatic. Mouth/Throat:      Mouth: Mucous membranes are moist.      Pharynx: No oropharyngeal exudate. Eyes:      Extraocular Movements: Extraocular movements intact. Conjunctiva/sclera: Conjunctivae normal.      Pupils: Pupils are equal, round, and reactive to light. Neck:      Vascular: No JVD. Trachea: No tracheal deviation. Cardiovascular:      Rate and Rhythm: Normal rate and regular rhythm. Heart sounds: Normal heart sounds. No murmur heard. Pulmonary:      Effort: Pulmonary effort is normal. No respiratory distress. Breath sounds: Normal breath sounds. No stridor. No wheezing or rales. Musculoskeletal:         General: No tenderness. Normal range of motion. Cervical back: Normal range of motion and neck supple. Right lower leg: No edema. Left lower leg: No edema. Skin:     General: Skin is warm and dry. Capillary Refill: Capillary refill takes less than 2 seconds. Neurological:      Mental Status: He is alert and oriented to person, place, and time. Cranial Nerves: No cranial nerve deficit. Comments: No gross motor or sensory deficits    Psychiatric:         Behavior: Behavior normal.         Diagnostic Study Results     Labs -     Contains abnormal data URINALYSIS W/ REFLEX CULTURE (Final result)   Component (Lab Inquiry)  Collection Time Result Time COLOR APPRN REFSG URIEL PROTU   06/26/21 21:14:00 06/26/21 21:48:42 YELLOW/STRAW   Color Reference Range:. ..  CLEAR 1.028 5.5 Negative       Collection Time Result Time GLUCU KETU BILU BLDU UROU   06/26/21 21:14:00 06/26/21 21:48:42 >1,000Abnormal  TRACEAbnormal  Negative Negative 1.0       Collection Time Result Time KODI LEUKU WBCU RBCU EPSU   06/26/21 21:14:00 06/26/21 21:48:42 Negative Negative 5-10 0-5 FEW   Epithelial cell catego. ..       Collection Time Result Time Francois DALLAS [facility]   06/26/21 21:14:00 06/26/21 21:48:42 Negative CULTURE NOT INDICATED BY UA RESULT 1+Abnormal  1+Abnormal        Final result                        Contains abnormal data METABOLIC PANEL, COMPREHENSIVE (Final result)   Component (Lab Inquiry)  Collection Time Result Time NA K CL CO2 AGAP   06/26/21 21:14:00 06/26/21 21:59:25 140 3.6 109High  23 8       Collection Time Result Time GLU BUN CREA BUCR GFRAA   06/26/21 21:14:00 06/26/21 21:59:25 132High  20 1.02 20 >60       Collection Time Result Time GFRNA CA TBIL GPT SGOT   06/26/21 21:14:00 06/26/21 21:59:25 >60   Estimated GFR is calcu. .. 8.3Low  0.9 104High  46High        Collection Time Result Time AP TP ALB GLOB AGRAT   06/26/21 21:14:00 06/26/21 21:59:25 150High  6.7 3.4Low  3.3 1.0Low        Final result                        TROPONIN I (Final result)   Component (Lab Inquiry)  Collection Time Result Time TROIQ   06/26/21 21:14:00 06/26/21 21:59:25 <0.05   The presence of detect. ..         Final result             Radiologic Studies -   No orders to display         Medical Decision Making   I am the first provider for this patient. I reviewed the vital signs, available nursing notes, past medical history, past surgical history, family history and social history. Vital Signs-Reviewed the patient's vital signs. Records Reviewed: Nursing Notes, Old Medical Records, Previous Radiology Studies and Previous Laboratory Studies    Provider Notes (Medical Decision Making):   DDx- Dementia, HTN    ED Course:   Initial assessment performed. The patients presenting problems have been discussed, and they are in agreement with the care plan formulated and outlined with them. I have encouraged them to ask questions as they arise throughout their visit. ED w/u unremarkable    Disposition:  DC home     DISCHARGE PLAN:  1.    Discharge Medication List as of 6/26/2021 10:24 PM      No medication changes, educated pt on improtance of taking his meds as prescribed given with correct dosing of his BP meds his BP was within nml limits. I also spoke with his daughter about having patience with him as there may be a component of his loss of independence due to his dementia,   2. Follow-up Information    None       3. Return to ED if worse     Diagnosis     Clinical Impression:   1. Hypertension, unspecified type        Attestations:    Jenice Seip, DO    Please note that this dictation was completed with [employer], the computer voice recognition software. Quite often unanticipated grammatical, syntax, homophones, and other interpretive errors are inadvertently transcribed by the computer software. Please disregard these errors. Please excuse any errors that have escaped final proofreading. Thank you.

## 2021-07-19 NOTE — ROUTINE PROCESS
Shift change report given to Dayanara Chandler Rn, re: SBAR. Medications, and behavior. Roxann Fall Risk Score (5) Detail Level: Detailed Size Of Lesion: 0.4 cm Morphology Per Location (Optional): reddish-yellow papule with central dell Morphology Per Location (Optional): patch with hypopigmented center and slightly tan border Instructions (Optional): likely SK Size Of Lesion: 1.5 cm

## 2021-08-05 ENCOUNTER — TRANSCRIBE ORDER (OUTPATIENT)
Dept: SCHEDULING | Age: 81
End: 2021-08-05

## 2021-08-05 DIAGNOSIS — C60.9 PENILE CANCER (HCC): Primary | ICD-10-CM

## 2021-08-11 ENCOUNTER — OFFICE VISIT (OUTPATIENT)
Dept: HEMATOLOGY | Age: 81
End: 2021-08-11
Payer: MEDICARE

## 2021-08-11 VITALS
DIASTOLIC BLOOD PRESSURE: 83 MMHG | TEMPERATURE: 96.9 F | WEIGHT: 168 LBS | HEIGHT: 64 IN | BODY MASS INDEX: 28.68 KG/M2 | OXYGEN SATURATION: 97 % | RESPIRATION RATE: 17 BRPM | HEART RATE: 70 BPM | SYSTOLIC BLOOD PRESSURE: 122 MMHG

## 2021-08-11 DIAGNOSIS — K75.4 AUTOIMMUNE HEPATITIS (HCC): Primary | ICD-10-CM

## 2021-08-11 DIAGNOSIS — K74.60 CIRRHOSIS OF LIVER WITHOUT ASCITES, UNSPECIFIED HEPATIC CIRRHOSIS TYPE (HCC): ICD-10-CM

## 2021-08-11 PROCEDURE — G8427 DOCREV CUR MEDS BY ELIG CLIN: HCPCS | Performed by: NURSE PRACTITIONER

## 2021-08-11 PROCEDURE — G8754 DIAS BP LESS 90: HCPCS | Performed by: NURSE PRACTITIONER

## 2021-08-11 PROCEDURE — 99213 OFFICE O/P EST LOW 20 MIN: CPT | Performed by: NURSE PRACTITIONER

## 2021-08-11 PROCEDURE — G8419 CALC BMI OUT NRM PARAM NOF/U: HCPCS | Performed by: NURSE PRACTITIONER

## 2021-08-11 PROCEDURE — 1101F PT FALLS ASSESS-DOCD LE1/YR: CPT | Performed by: NURSE PRACTITIONER

## 2021-08-11 PROCEDURE — G8752 SYS BP LESS 140: HCPCS | Performed by: NURSE PRACTITIONER

## 2021-08-11 PROCEDURE — G8536 NO DOC ELDER MAL SCRN: HCPCS | Performed by: NURSE PRACTITIONER

## 2021-08-11 PROCEDURE — G9717 DOC PT DX DEP/BP F/U NT REQ: HCPCS | Performed by: NURSE PRACTITIONER

## 2021-08-11 NOTE — PROGRESS NOTES
Identified pt with two pt identifiers(name and ). Reviewed record in preparation for visit and have obtained necessary documentation. Chief Complaint   Patient presents with    Hepatitis     3 mo f/u      Vitals:    21 1325   BP: 122/83   Pulse: 70   Resp: 17   Temp: 96.9 °F (36.1 °C)   TempSrc: Temporal   SpO2: 97%   Weight: 168 lb (76.2 kg)   Height: 5' 4\" (1.626 m)   PainSc:   0 - No pain       Health Maintenance Review: Patient reminded of \"due or due soon\" health maintenance. I have asked the patient to contact his/her primary care provider (PCP) for follow-up on his/her health maintenance. Coordination of Care Questionnaire:  :   1) Have you been to an emergency room, urgent care, or hospitalized since your last visit? If yes, where when, and reason for visit? [facility] [place]  21 for hypertension      2. Have seen or consulted any other health care provider since your last visit? If yes, where when, and reason for visit? NO      Patient is accompanied by self I have received verbal consent from Wendie Womack to discuss any/all medical information while they are present in the room.

## 2021-08-11 NOTE — PROGRESS NOTES
[address] [facility]      Susan Brownlee MD, Violette Heaton, Jane Day MD, MPH      Melda Sandifer, PA-CECILIA Herron, [facility]-BC     Lillian Gonzalez, [facility]   Malu Whitley FNP-C    Yeyo Stauffer, [facility]       Frida Moses Shalom De Guzman [address]    at [address], [facility], Jennifer  22.    [phone]    FAX: [address] [place] [address] Avenue    [address], [address]    [phone]    FAX: [phone]     Patient Care Team:  Esperanza Day MD as PCP - General (Family Medicine)  Suzie Sevilla MD (Pulmonary Disease)  Laura Avendaño MD as Surgeon (General Surgery)  Laura Avendaño MD (General Surgery)  Jennifer Barba MD as Surgeon (General Surgery)  Bre Banda MD as Surgeon (General Surgery)    Problem List  Date Reviewed: 2/9/2021        Codes Class Noted    Severe recurrent major depression without psychotic features [facility]) ICD-10-CM: F33.2  ICD-9-CM: 296.33  6/1/2021        Dementia ([facility].) ICD-10-CM: F03.90  ICD-9-CM: 294.20  6/1/2021        Autoimmune hepatitis ([facility].) ICD-10-CM: K75.4  ICD-9-CM: 571.42  11/1/2020        History of partial colectomy ICD-10-CM: Z90.49  ICD-9-CM: V45.72  11/1/2020        Diverticulitis ICD-10-CM: K57.92  ICD-9-CM: 562.11  11/1/2020        Penile cancer ([facility].) ICD-10-CM: C60.9  ICD-9-CM: 187.4  8/18/2020        Bilateral carotid artery stenosis ICD-10-CM: I65.23  ICD-9-CM: 433.10, 433.30  10/29/2019        GERD (gastroesophageal reflux disease) ICD-10-CM: K21.9  ICD-9-CM: 530.81  8/29/2018        Hyperlipidemia ICD-10-CM: E78.5  ICD-9-CM: 272.4  4/5/2012        Orthostatic hypotension (Chronic) ICD-10-CM: I95.1  ICD-9-CM: 458.0  Unknown    Overview Signed 12/22/2010 10:14 AM by Jean Bojorquez     Positive tilt test 10/01 PVC (premature ventricular contraction) ICD-10-CM: I49.3  ICD-9-CM: 427.69  12/21/2010    Overview Addendum 12/21/2010 11:09 AM by Jean Claude Garcia LPN     2/7/[id number] PVC with periods of bigeminy. Hypertension ICD-10-CM: I10  ICD-9-CM: 401.9  12/6/2010    Overview Addendum 8/22/2011  2:08 PM by Jean Claude Garcia LPN     5/56/4464-ZABZ 60-65%. Normal.  7/23/2009-ECHO-LVEF 60%. Normal.             TIA (transient ischemic attack) ICD-10-CM: G45.9  ICD-9-CM: 435.9  12/6/2010        Leg edema ICD-10-CM: R60.0  ICD-9-CM: 782.3  12/6/2010            Sergio Cisneros is being seen at [address] for management of autoimmune hepatitis. The active problem list, all pertinent past medical history, medications, liver histology, radiologic findings and laboratory findings related to the liver disorder were reviewed with the patient. The patient is a [de-identified] y.o.  male who was found to have abnormalities in liver transaminases in 2016. The patient was started on prednisone 20 mg every day in 12/2020 for AIH. We were trialing 3 months to see if it impacted his liver enzymes. Last visit, it appeared to have reduced them. Then labs drawn at the end of June showed they were elevated again. The patient has the following symptoms which are thought to be due to the liver disease: fatigue. He is otherwise tolerating prednisone well. The patient is not currently experiencing the following symptoms of liver disease: problems concentrating, swelling of the abdomen, swelling of the lower extremities, hematemesis or hematochezia. The patient has moderate limitations in functional activities which can be attributed to other medical problems not related to the liver disease. ASSESSMENT AND PLAN:  Autoimmune hepatitis   The diagnosis was based upon serology and liver biopsy.     A liver biopsy performed in 2016 is reported to show moderate inflammation and stage 2 septal fibrosis. FibroScan performed in 11/2020 was 13.6 kPa suggesting stage 3 fibrosis    MRI performed in 12/2020 did not show any evidence of PSC. Liver transaminases are elevated. ALP is elevated. Liver function is normal. The platelet count is normal.      If his numbers are the same, I would discontinue the prednisone at this point. I would write instructions for he and his daughter on how to wean off of the medication. Screening for hepatocellular carcinoma  2/9/2021. AFP normal  2/22/2021. Abdominal US. No mass or lesion. Ordered repeat today. Treatment of other medical problems in patients with chronic liver disease  There are no contraindications for the patient to take most medications necessary for treatment of other medical issues. Counseling for alcohol in patients with chronic liver disease  The patient was counseled regarding alcohol consumption and the effect of alcohol on chronic liver disease. The patient does not consume any significant amount of alcohol. Vaccinations   Recommend vaccination against viral hepatitis A and B as there is no documented immunity. Routine vaccinations against other bacterial and viral agents can be performed as indicated. Annual flu vaccination should be administered if indicated. ALLERGIES  Allergies   Allergen Reactions    Ace Inhibitors Other (comments)     Doesn't agree with pt    Demerol [Meperidine] Unknown (comments)     Causes unconsciousness     MEDICATIONS  Current Outpatient Medications   Medication Sig    aspirin delayed-release 81 mg tablet Take 81 mg by mouth daily.  atorvastatin (LIPITOR) 20 mg tablet Take 2 Tabs by mouth daily.  ondansetron hcl (Zofran) 4 mg tablet Take 2 Tabs by mouth two (2) times a day. (Patient taking differently: Take 8 mg by mouth every eight (8) hours as needed.)    traZODone (DESYREL) 50 mg tablet Take 50 mg by mouth nightly as needed.     carvediloL (COREG) 12.5 mg tablet Take 2 Tabs by mouth two (2) times daily (with meals).  ascorbic acid, vitamin C, (Vitamin C) 500 mg tablet Take  by mouth.  fluticasone propionate (FLONASE) 50 mcg/actuation nasal spray 2 Sprays by Both Nostrils route daily as needed.  psyllium husk (METAMUCIL PO) Take  by mouth.  donepeziL (ARICEPT) 5 mg tablet Take 1 Tab by mouth nightly.  pantoprazole (PROTONIX) 40 mg tablet Take 1 Tab by mouth ACB/HS.  neomycin-bacitracin-polymyxin (Neosporin, ufh-ogl-wuslp,) 3.5mg-400 unit- 5,000 unit/gram ointment Apply  to affected area two (2) times a day. (Patient taking differently: Apply  to affected area as needed.)     No current facility-administered medications for this visit. FAMILY HISTORY:  The father  of CVA. The mother  of CVA. There is no family history of liver disease. SOCIAL HISTORY:  The patient is . The patient has 2 children and 2 grandchildren. The patient has never used tobacco products. The patient has never consumed significant amounts of alcohol. The patient used to work as . PHYSICAL EXAMINATION:  Visit Vitals  /83 (BP 1 Location: Left upper arm, BP Patient Position: Sitting, BP Cuff Size: Adult)   Pulse 70   Temp 96.9 °F (36.1 °C) (Temporal)   Resp 17   Ht 5' 4\" (1.626 m)   Wt 168 lb (76.2 kg)   SpO2 97%   BMI 28.84 kg/m²     General: No acute distress. Eyes: Sclera anicteric. ENT: No oral lesions. Nodes: No adenopathy. Skin: No spider angiomata. No jaundice. No palmar erythema. Respiratory: Lungs clear to auscultation. Cardiovascular: Regular heart rate. No murmurs. No JVD. Abdomen: Soft non-tender, liver size normal to percussion/palpation. Spleen not palpable. No obvious ascites. Extremities: No edema. No muscle wasting. No gross arthritic changes. Long fingernails. Neurologic: Alert and oriented. Cranial nerves grossly intact. No asterixis.     LABORATORY STUDIES:  Liver [place] [id number] & Units 6/26/2021 6/1/2021 5/11/2021   WBC 4.1 - 11.1 K/uL  8.0 8.2   ANC 1.8 - 8.0 K/UL  5.9    HGB 12.1 - 17.0 g/dL  15.9 15.7    - 400 K/uL  184 189   INR 0.9 - 1.2   1.0   AST 15 - 37 U/L 46 (H) 31 34   ALT 12 - 78 U/L 104 (H) 91 (H) 76 (H)   Alk Phos 45 - 117 U/L 150 (H) 151 (H) 135 (H)   Bili, Total 0.2 - 1.0 MG/DL 0.9 1.4 (H) 1.0   Bili, Direct 0.00 - 0.40 mg/dL   0.33   Albumin 3.5 - 5.0 g/dL 3.4 (L) 3.7 4.2   BUN 6 - 20 MG/DL 20 21 (H) 20   Creat 0.70 - 1.30 MG/DL 1.02 1.18 1.06   Na 136 - 145 mmol/L 140 138 142   K 3.5 - 5.1 mmol/L 3.6 3.4 (L) 3.8   Cl 97 - 108 mmol/L 109 (H) 107 107 (H)   CO2 21 - 32 mmol/L 23 22 22   Glucose 65 - 100 mg/dL 132 (H) 108 (H) 105 (H)   Magnesium 1.6 - 2.4 mg/dL        SEROLOGIES:  Serologies Latest Ref Rng & Units 10/30/2020   Hep A Ab, Total Negative Negative   Hep B Surface Ag Negative Negative   Hep B Core Ab, Total Negative Negative   Hep B Surface AB QL  Non Reactive   Hep C Ab 0.0 - 0.9 s/co ratio <0.1   Ferritin 30 - 400 ng/mL 475 (H)   Iron % Saturation 15 - 55 % 40   CYRIL, IFA  Negative   ASMCA 0 - 19 Units 95 (H)   Alpha-1 antitrypsin level 101 - 187 mg/dL 154     LIVER HISTOLOGY:  11/2020. FibroScan performed at [facility]. EkPa was 13.6. IQR/med 32%. . The results suggested a fibrosis level of F3. The CAP score suggests no evidence of fatty liver. 10/2016. Chronic hepatitis with moderate activity. Stage 2 fibrosis. Consistent with autoimmune etiology. ENDOSCOPIC PROCEDURES:  12/2019. EGD by Dr Loretta Alberto. Esophagitis. 12/2016. Colonoscopy by Dr Thi Bond. Adenoma polyps. RADIOLOGY:  2/22/2021. Abdominal ultrasound. Nodular border. No mass, lesion or ascites. 12/2020. MRI abd/pelvis. Subcentimeter hepatic hyperplastic nodules in a cirrhotic liver. No evidence of hepatocellular carcinoma. LI-RADS Assessment Category 2: Benign finding. Cholelithiasis. Otherwise normal biliary tree. No evidence of cholangitis or biliary sclerosis.  Small pancreatic body cyst is of doubtful clinical significance in this patient. OTHER TESTING:  Not available or performed    FOLLOW-UP:  All of the issues listed above in the assessment and plan were discussed with the patient. All questions were answered. The patient expressed a clear understanding of the above. [address] [facility]. Labs today. Will send letter with notification of how to wean pred if I discontinue it.      Nhi Da Silva, [facility]-BC  Liver [place] [facility] [address] [facility], [address] Jennifer Mas[place]  22.  [phone]

## 2021-08-12 ENCOUNTER — TELEPHONE (OUTPATIENT)
Dept: HEMATOLOGY | Age: 81
End: 2021-08-12

## 2021-08-13 LAB
ERYTHROCYTE [DISTWIDTH] IN BLOOD BY AUTOMATED COUNT: 12.8 % (ref 11.6–15.4)
HCT VFR BLD AUTO: 44.1 % (ref 37.5–51)
HGB BLD-MCNC: 14.9 G/DL (ref 13–17.7)
MCH RBC QN AUTO: 31 PG (ref 26.6–33)
MCHC RBC AUTO-ENTMCNC: 33.8 G/DL (ref 31.5–35.7)
MCV RBC AUTO: 92 FL (ref 79–97)
PLATELET # BLD AUTO: 174 X10E3/UL (ref 150–450)
RBC # BLD AUTO: 4.81 X10E6/UL (ref 4.14–5.8)
WBC # BLD AUTO: 6.8 X10E3/UL (ref 3.4–10.8)

## 2021-08-14 LAB
ALBUMIN SERPL-MCNC: 4 G/DL (ref 3.7–4.7)
ALP SERPL-CCNC: 171 IU/L (ref 48–121)
ALT SERPL-CCNC: 68 IU/L (ref 0–44)
AST SERPL-CCNC: 58 IU/L (ref 0–40)
BILIRUB DIRECT SERPL-MCNC: 0.35 MG/DL (ref 0–0.4)
BILIRUB SERPL-MCNC: 0.9 MG/DL (ref 0–1.2)
BUN SERPL-MCNC: 18 MG/DL (ref 8–27)
BUN/CREAT SERPL: 17 (ref 10–24)
CALCIUM SERPL-MCNC: 9.2 MG/DL (ref 8.6–10.2)
CHLORIDE SERPL-SCNC: 100 MMOL/L (ref 96–106)
CO2 SERPL-SCNC: 25 MMOL/L (ref 20–29)
CREAT SERPL-MCNC: 1.03 MG/DL (ref 0.76–1.27)
GLUCOSE SERPL-MCNC: 92 MG/DL (ref 65–99)
INR PPP: 1 (ref 0.9–1.2)
POTASSIUM SERPL-SCNC: 3 MMOL/L (ref 3.5–5.2)
PROT SERPL-MCNC: 6.3 G/DL (ref 6–8.5)
PROTHROMBIN TIME: 10.5 SEC (ref 9.1–12)
SODIUM SERPL-SCNC: 141 MMOL/L (ref 134–144)

## 2021-08-17 LAB
AFP L3 MFR SERPL: NORMAL % (ref 0–9.9)
AFP SERPL-MCNC: 4 NG/ML (ref 0–8)

## 2021-08-24 NOTE — PROGRESS NOTES
Wean off prednisone. Called daughter to advise her. Letter includes weaning instructions. Discussed with MLS.

## 2021-11-04 ENCOUNTER — HOSPITAL ENCOUNTER (OUTPATIENT)
Dept: CT IMAGING | Age: 81
Discharge: HOME | End: 2021-11-04
Attending: UROLOGY
Payer: MEDICARE

## 2021-11-04 DIAGNOSIS — C60.9 PENILE CANCER (HCC): ICD-10-CM

## 2021-11-04 PROCEDURE — 74011000636 HC RX REV CODE- 636: Performed by: UROLOGY

## 2021-11-04 PROCEDURE — 74177 CT ABD & PELVIS W/CONTRAST: CPT

## 2021-11-04 PROCEDURE — 74011000250 HC RX REV CODE- 250: Performed by: UROLOGY

## 2021-11-04 RX ORDER — BARIUM SULFATE 20 MG/ML
900 SUSPENSION ORAL
Status: COMPLETED | OUTPATIENT
Start: 2021-11-04 | End: 2021-11-04

## 2021-11-04 RX ADMIN — BARIUM SULFATE 900 ML: 21 SUSPENSION ORAL at 12:59

## 2021-11-04 RX ADMIN — IOPAMIDOL 100 ML: 755 INJECTION, SOLUTION INTRAVENOUS at 12:59

## 2022-01-03 ENCOUNTER — VIRTUAL VISIT (OUTPATIENT)
Dept: HEMATOLOGY | Age: 82
End: 2022-01-03
Payer: MEDICARE

## 2022-01-03 DIAGNOSIS — K74.60 CIRRHOSIS OF LIVER WITHOUT ASCITES, UNSPECIFIED HEPATIC CIRRHOSIS TYPE (HCC): Primary | ICD-10-CM

## 2022-01-03 DIAGNOSIS — K75.4 AUTOIMMUNE HEPATITIS (HCC): ICD-10-CM

## 2022-01-03 PROCEDURE — 99443 PR PHYS/QHP TELEPHONE EVALUATION 21-30 MIN: CPT | Performed by: NURSE PRACTITIONER

## 2022-01-03 NOTE — PROGRESS NOTES
[address] \[facility]\"", MD, [address], [facility], [place]      GREGORIO Neil, [facility]     Lillian Gonzalez, [facility]   ZBIGNIEW Pereira-CECILIA Pinon, [facility]       Frida Moses Shalom De Guzman 136    at [address], [address] [facility], [facility] 22.    [phone]    FAX: [address] [place] [address] [address], [address]    [phone]    FAX: [phone]     Patient Care Team:  Zhane Cuadra MD as PCP - General (Family Medicine)  Skinny López MD (Pulmonary Disease)  Akin Morgan MD as Surgeon (General Surgery)  Akin Morgan MD (General Surgery)  Noe Sahu MD as Surgeon (General Surgery)  Ivon Pickens MD as Surgeon (General Surgery)    Problem List  Date Reviewed: 2/9/2021          Codes Class Noted    Severe recurrent major depression without psychotic features [facility]) ICD-10-CM: F33.2  ICD-9-CM: 296.33  6/1/2021        Dementia ([facility] ICD-10-CM: F03.90  ICD-9-CM: 294.20  6/1/2021        Autoimmune hepatitis ([facility] ICD-10-CM: K75.4  ICD-9-CM: 571.42  11/1/2020        History of partial colectomy ICD-10-CM: Z90.49  ICD-9-CM: V45.72  11/1/2020        Diverticulitis ICD-10-CM: K57.92  ICD-9-CM: 562.11  11/1/2020        Penile cancer ([facility] ICD-10-CM: C60.9  ICD-9-CM: 187.4  8/18/2020        Bilateral carotid artery stenosis ICD-10-CM: I65.23  ICD-9-CM: 433.10, 433.30  10/29/2019        GERD (gastroesophageal reflux disease) ICD-10-CM: K21.9  ICD-9-CM: 530.81  8/29/2018        Hyperlipidemia ICD-10-CM: E78.5  ICD-9-CM: 272.4  4/5/2012        Orthostatic hypotension (Chronic) ICD-10-CM: I95.1  ICD-9-CM: 458.0  Unknown    Overview Signed 12/22/2010 10:14 AM by Doni Grissom     Positive tilt test 10/01             PVC (premature ventricular contraction) ICD-10-CM: I49.3  ICD-9-CM: 427.69  12/21/2010    Overview Addendum 12/21/2010 11:09 AM by Alida Motta LPN     9/8/[id number] PVC with periods of bigeminy. Hypertension ICD-10-CM: I10  ICD-9-CM: 401.9  12/6/2010    Overview Addendum 8/22/2011  2:08 PM by Alida Motta LPN     8/04/7616-YJQQ 60-65%. Normal.  7/23/2009-ECHO-LVEF 60%. Normal.             TIA (transient ischemic attack) ICD-10-CM: G45.9  ICD-9-CM: 435.9  12/6/2010        Leg edema ICD-10-CM: R60.0  ICD-9-CM: 782.3  12/6/2010            Rich Luu is a [de-identified] y.o. male, evaluated via audio-only technology on 1/3/2022 for No chief complaint on file. Assessment & Plan:   LFTs: off prednisone. Will recheck. [facility] screening: ordered today. Told caregiver will send letter with results versus [employer] message which patient does not access. 12  Subjective:   Doing well. No complaints. No hepatic s/s. Spoke with his caregiver today. She said he is on a fluid pill but then stated later it was mag citrate and he takes it when he notices fluid in his belly and for his kidney stone. Prior to Admission medications    Medication Sig Start Date End Date Taking? Authorizing Provider   aspirin delayed-release 81 mg tablet Take 81 mg by mouth daily. Provider, Historical   atorvastatin (LIPITOR) 20 mg tablet Take 2 Tabs by mouth daily. 1/1/21   Cheri Stanford MD   ondansetron hcl (Zofran) 4 mg tablet Take 2 Tabs by mouth two (2) times a day. Patient taking differently: Take 8 mg by mouth every eight (8) hours as needed. 1/1/21   Cheri Stanford MD   traZODone (DESYREL) 50 mg tablet Take 50 mg by mouth nightly as needed. 10/31/20   Provider, Historical   carvediloL (COREG) 12.5 mg tablet Take 2 Tabs by mouth two (2) times daily (with meals). 11/27/20   Gonzalez Khalil MD   ascorbic acid, vitamin C, (Vitamin C) 500 mg tablet Take  by mouth.     Provider, Historical   fluticasone propionate (FLONASE) 50 mcg/actuation nasal spray 2 Sprays by Both Nostrils route daily as needed. Provider, Historical   psyllium husk (METAMUCIL PO) Take  by mouth. Provider, Historical   donepeziL (ARICEPT) 5 mg tablet Take 1 Tab by mouth nightly. 10/8/20   Anjana Garza MD   pantoprazole (PROTONIX) 40 mg tablet Take 1 Tab by mouth ACB/HS. 10/8/20   Anjana Garza MD   neomycin-bacitracin-polymyxin (Neosporin, smx-yaq-ksyzp,) 3.5mg-400 unit- 5,000 unit/gram ointment Apply  to affected area two (2) times a day. Patient taking differently: Apply  to affected area as needed. 8/18/20   Janet Love MD         ROS    Patient-Reported Vitals 10/8/2020   Patient-Reported Weight 160   Patient-Reported Height 54   Patient-Reported Pulse ? Patient-Reported Temperature . Patient-Reported SpO2 ? Patient-Reported Systolic  468   Patient-Reported Diastolic 79   Patient-Reported Peak Flow Na       Kathleen Hubbard, who was evaluated through a patient-initiated, synchronous (real-time) audio only encounter, and/or her healthcare decision maker, is aware that it is a billable service, with coverage as determined by his insurance carrier. He provided verbal consent to proceed: yes. He has not had a related appointment within my department in the past 7 days or scheduled within the next 24 hours. Glendy Pack NP         Kathleen Hubbard is being seen at [address] for management of autoimmune hepatitis. The active problem list, all pertinent past medical history, medications, liver histology, radiologic findings and laboratory findings related to the liver disorder were reviewed with the patient. The patient is a [de-identified] y.o.  male who was found to have abnormalities in liver transaminases in 2016. The patient was started on prednisone 20 mg every day in 12/2020 for AIH. A trial was given with no improvement. It was subsequently weaned off and discontinued.     The patient has the following symptoms which are thought to be due to the liver disease: fatigue. The patient is not currently experiencing the following symptoms of liver disease: problems concentrating, swelling of the abdomen, swelling of the lower extremities, hematemesis or hematochezia. The patient has moderate limitations in functional activities which can be attributed to other medical problems not related to the liver disease. ASSESSMENT AND PLAN:  Autoimmune hepatitis   The diagnosis was based upon serology and liver biopsy. A liver biopsy performed in 2016 is reported to show moderate inflammation and stage 2 septal fibrosis. FibroScan performed in 11/2020 was 13.6 kPa suggesting stage 3 fibrosis    MRI performed in 12/2020 did not show any evidence of PSC. Liver transaminases are elevated. ALP is elevated. Liver function is normal. The platelet count is normal.      After last labs, weaned him off of prednisone. He remains off of it at this time. Screening for hepatocellular carcinoma  8/2021. AFP normal  2/22/2021. Abdominal US. No mass or lesion. Ordered repeat today. Treatment of other medical problems in patients with chronic liver disease  There are no contraindications for the patient to take most medications necessary for treatment of other medical issues. Counseling for alcohol in patients with chronic liver disease  The patient was counseled regarding alcohol consumption and the effect of alcohol on chronic liver disease. The patient does not consume any significant amount of alcohol. Vaccinations   Recommend vaccination against viral hepatitis A and B as there is no documented immunity. Routine vaccinations against other bacterial and viral agents can be performed as indicated. Annual flu vaccination should be administered if indicated.     ALLERGIES  Allergies   Allergen Reactions    Ace Inhibitors Other (comments)     Doesn't agree with pt    Demerol [Meperidine] Unknown (comments)     Causes unconsciousness     MEDICATIONS  Current Outpatient Medications   Medication Sig    aspirin delayed-release 81 mg tablet Take 81 mg by mouth daily.  atorvastatin (LIPITOR) 20 mg tablet Take 2 Tabs by mouth daily.  ondansetron hcl (Zofran) 4 mg tablet Take 2 Tabs by mouth two (2) times a day. (Patient taking differently: Take 8 mg by mouth every eight (8) hours as needed.)    traZODone (DESYREL) 50 mg tablet Take 50 mg by mouth nightly as needed.  carvediloL (COREG) 12.5 mg tablet Take 2 Tabs by mouth two (2) times daily (with meals).  ascorbic acid, vitamin C, (Vitamin C) 500 mg tablet Take  by mouth.  fluticasone propionate (FLONASE) 50 mcg/actuation nasal spray 2 Sprays by Both Nostrils route daily as needed.  psyllium husk (METAMUCIL PO) Take  by mouth.  donepeziL (ARICEPT) 5 mg tablet Take 1 Tab by mouth nightly.  pantoprazole (PROTONIX) 40 mg tablet Take 1 Tab by mouth ACB/HS.  neomycin-bacitracin-polymyxin (Neosporin, rrn-jfg-uphrt,) 3.5mg-400 unit- 5,000 unit/gram ointment Apply  to affected area two (2) times a day. (Patient taking differently: Apply  to affected area as needed.)     No current facility-administered medications for this visit. FAMILY HISTORY:  The father  of CVA. The mother  of CVA. There is no family history of liver disease. SOCIAL HISTORY:  The patient is . The patient has 2 children and 2 grandchildren. The patient has never used tobacco products. The patient has never consumed significant amounts of alcohol. The patient used to work as . PHYSICAL EXAMINATION:  No physical exam performed.     LABORATORY STUDIES:  Liver [place] [address] Units 2021   WBC 3.4 - 10.8 x10E3/uL 6.8  8.0   ANC 1.8 - 8.0 K/UL   5.9   HGB 13.0 - 17.7 g/dL 14.9  15.9    - 450 x10E3/uL 174  184   INR 0.9 - 1.2 1.0     AST 0 - 40 IU/L 58 (H) 46 (H) 31 ALT 0 - 44 IU/L 68 (H) 104 (H) 91 (H)   Alk Phos 48 - 121 IU/L 171 (H) 150 (H) 151 (H)   Bili, Total 0.0 - 1.2 mg/dL 0.9 0.9 1.4 (H)   Bili, Direct 0.00 - 0.40 mg/dL 0.35     Albumin 3.7 - 4.7 g/dL 4.0 3.4 (L) 3.7   BUN 8 - 27 mg/dL 18 20 21 (H)   Creat 0.76 - 1.27 mg/dL 1.03 1.02 1.18   Na 134 - 144 mmol/L 141 140 138   K 3.5 - 5.2 mmol/L 3.0 (L) 3.6 3.4 (L)   Cl 96 - 106 mmol/L 100 109 (H) 107   CO2 20 - 29 mmol/L 25 23 22   Glucose 65 - 99 mg/dL 92 132 (H) 108 (H)   Magnesium 1.6 - 2.4 mg/dL        SEROLOGIES:  Serologies Latest Ref Rng & Units 10/30/2020   Hep A Ab, Total Negative Negative   Hep B Surface Ag Negative Negative   Hep B Core Ab, Total Negative Negative   Hep B Surface AB QL  Non Reactive   Hep C Ab 0.0 - 0.9 s/co ratio <0.1   Ferritin 30 - 400 ng/mL 475 (H)   Iron % Saturation 15 - 55 % 40   CYRIL, IFA  Negative   ASMCA 0 - 19 Units 95 (H)   Alpha-1 antitrypsin level 101 - 187 mg/dL 154     LIVER HISTOLOGY:  11/2020. [facility]. EkPa was 13.6. IQR/med 32%. . The results suggested a fibrosis level of F3. The CAP score suggests no evidence of fatty liver. 10/2016. Chronic hepatitis with moderate activity. Stage 2 fibrosis. Consistent with autoimmune etiology. ENDOSCOPIC PROCEDURES:  12/2019. EGD by Dr Sherre Fothergill. Esophagitis. 12/2016. Colonoscopy by Dr Navid Thomas. Adenoma polyps. RADIOLOGY:  2/22/2021. Abdominal ultrasound. Nodular border. No mass, lesion or ascites. 12/2020. MRI abd/pelvis. Subcentimeter hepatic hyperplastic nodules in a cirrhotic liver. No evidence of hepatocellular carcinoma. LI-RADS Assessment Category 2: Benign finding. Cholelithiasis. Otherwise normal biliary tree. No evidence of cholangitis or biliary sclerosis. Small pancreatic body cyst is of doubtful clinical significance in this patient.     OTHER TESTING:  Not available or performed    FOLLOW-UP:  All of the issues listed above in the assessment and plan were discussed with the patient. All questions were answered. The patient expressed a clear understanding of the above. [address] [facility] 87 in 6 months for [facility] screening and reassessment of liver disease.      LIO Ruano-BC  Liver [facility] [address] [place] [address], [address] [address]  22.  [phone]

## 2022-01-11 ENCOUNTER — HOSPITAL ENCOUNTER (OUTPATIENT)
Dept: ULTRASOUND IMAGING | Age: 82
Discharge: HOME | End: 2022-01-11
Attending: NURSE PRACTITIONER
Payer: MEDICARE

## 2022-01-11 DIAGNOSIS — K75.4 AUTOIMMUNE HEPATITIS (HCC): ICD-10-CM

## 2022-01-11 PROCEDURE — 76700 US EXAM ABDOM COMPLETE: CPT

## 2022-01-12 ENCOUNTER — TELEPHONE (OUTPATIENT)
Dept: HEMATOLOGY | Age: 82
End: 2022-01-12

## 2022-03-09 ENCOUNTER — TELEPHONE (OUTPATIENT)
Dept: HEMATOLOGY | Age: 82
End: 2022-03-09

## 2022-03-09 NOTE — TELEPHONE ENCOUNTER
Daughter said PCP wants patient needs to be back on atorvastatin 40mg would like to discuss with you

## 2022-03-10 NOTE — TELEPHONE ENCOUNTER
[email or web address] Spoke w/patient daughter concerning taking Lipitor 40 mg daily. Per Edny Ceron, NP message from below--patient is okay to take this medication. (KF)

## 2022-03-19 PROBLEM — K75.4 AUTOIMMUNE HEPATITIS (HCC): Status: ACTIVE | Noted: 2020-11-01

## 2022-03-19 PROBLEM — K21.9 GERD (GASTROESOPHAGEAL REFLUX DISEASE): Status: ACTIVE | Noted: 2018-08-29

## 2022-03-19 PROBLEM — Z90.49 HISTORY OF PARTIAL COLECTOMY: Status: ACTIVE | Noted: 2020-11-01

## 2022-03-19 PROBLEM — I65.23 BILATERAL CAROTID ARTERY STENOSIS: Status: ACTIVE | Noted: 2019-10-29

## 2022-03-19 PROBLEM — K57.92 DIVERTICULITIS: Status: ACTIVE | Noted: 2020-11-01

## 2022-03-19 PROBLEM — F03.90 DEMENTIA (HCC): Status: ACTIVE | Noted: 2021-06-01

## 2022-03-19 PROBLEM — F33.2 SEVERE RECURRENT MAJOR DEPRESSION WITHOUT PSYCHOTIC FEATURES (HCC): Status: ACTIVE | Noted: 2021-06-01

## 2022-03-20 PROBLEM — C60.9 PENILE CANCER (HCC): Status: ACTIVE | Noted: 2020-08-18

## 2022-04-21 ENCOUNTER — APPOINTMENT (OUTPATIENT)
Dept: MRI IMAGING | Age: 82
End: 2022-04-21
Attending: PHYSICIAN ASSISTANT
Payer: MEDICARE

## 2022-04-21 ENCOUNTER — HOSPITAL ENCOUNTER (EMERGENCY)
Age: 82
Discharge: HOME | End: 2022-04-21
Attending: EMERGENCY MEDICINE
Payer: MEDICARE

## 2022-04-21 VITALS
TEMPERATURE: 97.5 F | HEART RATE: 80 BPM | BODY MASS INDEX: 26.5 KG/M2 | RESPIRATION RATE: 20 BRPM | HEIGHT: 64 IN | OXYGEN SATURATION: 99 % | SYSTOLIC BLOOD PRESSURE: 137 MMHG | WEIGHT: 155.2 LBS | DIASTOLIC BLOOD PRESSURE: 88 MMHG

## 2022-04-21 DIAGNOSIS — R42 DIZZINESS: Primary | ICD-10-CM

## 2022-04-21 DIAGNOSIS — R33.9 URINARY RETENTION: ICD-10-CM

## 2022-04-21 LAB
ALBUMIN SERPL-MCNC: 2.8 G/DL (ref 3.5–5)
ALBUMIN/GLOB SERPL: 0.5 {RATIO} (ref 1.1–2.2)
ALP SERPL-CCNC: 232 U/L (ref 45–117)
ALT SERPL-CCNC: 46 U/L (ref 12–78)
ANION GAP SERPL CALC-SCNC: 7 MMOL/L (ref 5–15)
APPEARANCE UR: CLEAR
AST SERPL-CCNC: 42 U/L (ref 15–37)
BACTERIA URNS QL MICRO: NEGATIVE /HPF
BASOPHILS # BLD: 0.1 K/UL (ref 0–0.1)
BASOPHILS NFR BLD: 1 % (ref 0–1)
BILIRUB SERPL-MCNC: 1.5 MG/DL (ref 0.2–1)
BILIRUB UR QL CFM: NEGATIVE
BUN SERPL-MCNC: 20 MG/DL (ref 6–20)
BUN/CREAT SERPL: 18 (ref 12–20)
CALCIUM SERPL-MCNC: 9.1 MG/DL (ref 8.5–10.1)
CHLORIDE SERPL-SCNC: 102 MMOL/L (ref 97–108)
CO2 SERPL-SCNC: 27 MMOL/L (ref 21–32)
COLOR UR: ABNORMAL
CREAT SERPL-MCNC: 1.1 MG/DL (ref 0.7–1.3)
DIFFERENTIAL METHOD BLD: ABNORMAL
EOSINOPHIL # BLD: 0.1 K/UL (ref 0–0.4)
EOSINOPHIL NFR BLD: 1 % (ref 0–7)
EPITH CASTS URNS QL MICRO: ABNORMAL /LPF
ERYTHROCYTE [DISTWIDTH] IN BLOOD BY AUTOMATED COUNT: 14.4 % (ref 11.5–14.5)
GLOBULIN SER CALC-MCNC: 5.4 G/DL (ref 2–4)
GLUCOSE SERPL-MCNC: 102 MG/DL (ref 65–100)
GLUCOSE UR STRIP.AUTO-MCNC: NEGATIVE MG/DL
HCT VFR BLD AUTO: 40.1 % (ref 36.6–50.3)
HGB BLD-MCNC: 13.5 G/DL (ref 12.1–17)
HGB UR QL STRIP: ABNORMAL
HYALINE CASTS URNS QL MICRO: ABNORMAL /LPF (ref 0–5)
IMM GRANULOCYTES # BLD AUTO: 0 K/UL (ref 0–0.04)
IMM GRANULOCYTES NFR BLD AUTO: 0 % (ref 0–0.5)
INR PPP: 1.1 (ref 0.9–1.1)
KETONES UR QL STRIP.AUTO: 15 MG/DL
LEUKOCYTE ESTERASE UR QL STRIP.AUTO: ABNORMAL
LYMPHOCYTES # BLD: 1.3 K/UL (ref 0.8–3.5)
LYMPHOCYTES NFR BLD: 14 % (ref 12–49)
MCH RBC QN AUTO: 29.3 PG (ref 26–34)
MCHC RBC AUTO-ENTMCNC: 33.7 G/DL (ref 30–36.5)
MCV RBC AUTO: 87 FL (ref 80–99)
MONOCYTES # BLD: 1.2 K/UL (ref 0–1)
MONOCYTES NFR BLD: 13 % (ref 5–13)
NEUTS SEG # BLD: 6.7 K/UL (ref 1.8–8)
NEUTS SEG NFR BLD: 71 % (ref 32–75)
NITRITE UR QL STRIP.AUTO: NEGATIVE
NRBC # BLD: 0 K/UL (ref 0–0.01)
NRBC BLD-RTO: 0 PER 100 WBC
PH UR STRIP: 6 [PH] (ref 5–8)
PLATELET # BLD AUTO: 206 K/UL (ref 150–400)
PMV BLD AUTO: 10.5 FL (ref 8.9–12.9)
POTASSIUM SERPL-SCNC: 3.4 MMOL/L (ref 3.5–5.1)
PROT SERPL-MCNC: 8.2 G/DL (ref 6.4–8.2)
PROT UR STRIP-MCNC: ABNORMAL MG/DL
PROTHROMBIN TIME: 11 SEC (ref 9–11.1)
RBC # BLD AUTO: 4.61 M/UL (ref 4.1–5.7)
RBC #/AREA URNS HPF: ABNORMAL /HPF (ref 0–5)
SODIUM SERPL-SCNC: 136 MMOL/L (ref 136–145)
SP GR UR REFRACTOMETRY: 1.02 (ref 1–1.03)
UROBILINOGEN UR QL STRIP.AUTO: 2 EU/DL (ref 0.2–1)
WBC # BLD AUTO: 9.4 K/UL (ref 4.1–11.1)
WBC URNS QL MICRO: ABNORMAL /HPF (ref 0–4)

## 2022-04-21 PROCEDURE — 70551 MRI BRAIN STEM W/O DYE: CPT

## 2022-04-21 PROCEDURE — 36415 COLL VENOUS BLD VENIPUNCTURE: CPT

## 2022-04-21 PROCEDURE — 74011000250 HC RX REV CODE- 250: Performed by: PHYSICIAN ASSISTANT

## 2022-04-21 PROCEDURE — 99284 EMERGENCY DEPT VISIT MOD MDM: CPT

## 2022-04-21 PROCEDURE — 80053 COMPREHEN METABOLIC PANEL: CPT

## 2022-04-21 PROCEDURE — 85025 COMPLETE CBC W/AUTO DIFF WBC: CPT

## 2022-04-21 PROCEDURE — 85610 PROTHROMBIN TIME: CPT

## 2022-04-21 PROCEDURE — 51798 US URINE CAPACITY MEASURE: CPT

## 2022-04-21 PROCEDURE — 81001 URINALYSIS AUTO W/SCOPE: CPT

## 2022-04-21 RX ORDER — LIDOCAINE HYDROCHLORIDE 20 MG/ML
JELLY TOPICAL
Status: COMPLETED | OUTPATIENT
Start: 2022-04-21 | End: 2022-04-21

## 2022-04-21 RX ADMIN — LIDOCAINE HYDROCHLORIDE: 20 JELLY TOPICAL at 19:17

## 2022-04-21 NOTE — ED PROVIDER NOTES
EMERGENCY DEPARTMENT HISTORY AND PHYSICAL EXAM      Date: 4/21/2022  Patient Name: Anabel Ovalles    History of Presenting Illness     Chief Complaint   Patient presents with    Urinary Retention     Sent from [facility] for urinary retention and dizziness    Dizziness       History Provided By: Patient    HPI: Anabel Ovalles, [de-identified] y.o. male with past medical history of dementia, hypertension, hyperlipidemia, penile cancer who presents to the emergency department by way of EMS with two complaints. Patient is a difficult historian. He is concerned that he is retaining urine and needs a Lowery. He does report his last urination was at approximately noon today. He otherwise denies any dysuria, hematuria, abdominal pain, nausea, vomiting, diarrhea, penile pain or swelling. Patient also complains of dizziness. He states this occurred after a fall 2 weeks ago where he fell and landed on his right arm. He denies any head trauma or loss of consciousness. He saw his PCP was told he has vertigo. He states his dizziness is constant and nonpositional.  He also reports right arm weakness that began a month ago. He states it is intermittent and most localized in his third and fourth fingers. He has been having generalized intermittent headaches for the past month as well. This was not maximal in onset. he denies any fevers, neck stiffness, vision changes, shortness of breath, chest pain, paralysis or paresthesias otherwise, difficulty walking. There are no other complaints, changes, or physical findings at this time. PCP: Sonya Bain MD    No current facility-administered medications on file prior to encounter. Current Outpatient Medications on File Prior to Encounter   Medication Sig Dispense Refill    aspirin delayed-release 81 mg tablet Take 81 mg by mouth daily.  atorvastatin (LIPITOR) 20 mg tablet Take 2 Tabs by mouth daily.  30 Tab 0    ondansetron hcl (Zofran) 4 mg tablet Take 2 Tabs by mouth two (2) times a day. (Patient taking differently: Take 8 mg by mouth every eight (8) hours as needed.) 30 Tab 0    traZODone (DESYREL) 50 mg tablet Take 50 mg by mouth nightly as needed.  carvediloL (COREG) 12.5 mg tablet Take 2 Tabs by mouth two (2) times daily (with meals). 180 Tab 1    ascorbic acid, vitamin C, (Vitamin C) 500 mg tablet Take  by mouth.  fluticasone propionate (FLONASE) 50 mcg/actuation nasal spray 2 Sprays by Both Nostrils route daily as needed.  psyllium husk (METAMUCIL PO) Take  by mouth.  donepeziL (ARICEPT) 5 mg tablet Take 1 Tab by mouth nightly. 90 Tab 1    pantoprazole (PROTONIX) 40 mg tablet Take 1 Tab by mouth ACB/HS. 90 Tab 1    neomycin-bacitracin-polymyxin (Neosporin, vur-fav-xyerz,) 3.5mg-400 unit- 5,000 unit/gram ointment Apply  to affected area two (2) times a day.  (Patient taking differently: Apply  to affected area as needed.) 1 Tube 0       Past History     Past Medical History:  Past Medical History:   Diagnosis Date    Arthritis     left arm    Autoimmune hepatitis ([facility]     Bleeding ulcer 07/2019    BPH (benign prostatic hyperplasia)     CAD (coronary artery disease)     s/p stent    Cancer (HCC)     penile squamous carcinoma    Chronic kidney disease     stage 2     Chronic obstructive pulmonary disease (HCC)     Dementia (HCC)     Elevated LFT's     Essential hypertension 9/24/01    GERD (gastroesophageal reflux disease)     hiatal hernia    Ill-defined condition 12/02/2016    faint    Nephrosclerosis     Orthostatic hypotension 9/24/01    PVC's 9/24/01    Sleep apnea     no CPAP    Syncope 9/24/01    secondary to venous insuffiency        Past Surgical History:  Past Surgical History:   Procedure Laterality Date    COLONOSCOPY N/A 12/13/2016    COLONOSCOPY [facility]rmelinda Vang [facility] ENDOSCOPY    COLONOSCOPY N/A 12/11/2019    COLONOSCOPY performed by Amirah Pettit MD at [facility] ENDOSCOPY    COLORECTAL SCRN; HI RISK IND  2019         ECHO 2D ADULT  12    EF 60 - 65%; mild concentric hypertrophy; pulmonary systolic artery pressure upper limits normal    HX ABDOMINAL WALL DEFECT REPAIR  2019d     Exploratory laparotomy, segmental sigmoid colon resection and primary stapled anastomosis.  HX APPENDECTOMY  1985    HX HEENT  2020    Left temporal artery biopsy    HX HERNIA REPAIR  2018    Davinci hiatal hernia repair- Chintan Uribe MD    HX OTHER SURGICAL  2020    penile lesion bx    [place] CARDIAC SURG PROCEDURE UNLIST  2019    1 stent    UPPER GI ENDOSCOPY,BIOPSY  2019         UPPER GI ENDOSCOPY,DIAGNOSIS  2019            Family History:  Family History   Problem Relation Age of Onset    Stroke Mother     Stroke Father     Heart Disease Father        Social History:  Social History     Tobacco Use    Smoking status: Former Smoker     Packs/day: 3.50     Quit date: 1980     Years since quittin.3    Smokeless tobacco: Never Used   Vaping Use    Vaping Use: Never used   Substance Use Topics    Alcohol use: Not Currently     Comment: no alcohol since     Drug use: No       Allergies: Allergies   Allergen Reactions    Ace Inhibitors Other (comments)     Doesn't agree with pt    Demerol [Meperidine] Unknown (comments)     Causes unconsciousness         Review of Systems   Review of Systems   Constitutional: Negative for chills and fever. HENT: Negative for congestion and sore throat. Respiratory: Negative for cough and shortness of breath. Cardiovascular: Negative for chest pain. Gastrointestinal: Negative for abdominal pain, diarrhea, nausea and vomiting. Genitourinary: Positive for decreased urine volume and difficulty urinating. Negative for dysuria and hematuria. Musculoskeletal: Negative for myalgias. Skin: Negative for rash. Neurological: Positive for dizziness and headaches.    All other systems reviewed and are negative. Physical Exam   Physical Exam  Vitals and nursing note reviewed. Constitutional:       General: He is not in acute distress. Appearance: He is not toxic-appearing. HENT:      Head: Atraumatic. Right Ear: External ear normal.      Left Ear: External ear normal.      Nose: Nose normal.      Mouth/Throat:      Mouth: Mucous membranes are moist.   Eyes:      Extraocular Movements: Extraocular movements intact. Conjunctiva/sclera: Conjunctivae normal.   Cardiovascular:      Rate and Rhythm: Normal rate and regular rhythm. Pulses: Normal pulses. Heart sounds: Normal heart sounds. No murmur heard. No friction rub. No gallop. Pulmonary:      Effort: Pulmonary effort is normal. No respiratory distress. Breath sounds: Normal breath sounds. No stridor. No wheezing, rhonchi or rales. Abdominal:      General: Abdomen is flat. There is no distension. Palpations: Abdomen is soft. Tenderness: There is no abdominal tenderness. There is no guarding or rebound. Comments: Abdomen soft, nontender with no guarding, rigidity or distention   Musculoskeletal:         General: No swelling. Cervical back: Neck supple. Skin:     General: Skin is warm. Neurological:      Mental Status: He is alert and oriented to person, place, and time. Comments: Mild cognitive delay and slow response to questions. Difficulty following three-step commands. Cranial nerves II through XII intact. Strength 5/5 in upper and lower extremities bilaterally. Sensation intact in face, upper and lower extremities bilaterally. Unable to perform finger-nose bilaterally. No nystagmus, though possible right-sided gaze pulsy. Visual fields intact bilaterally. negative pronator drift. Negative Romberg. No ataxia. Psychiatric:         Mood and Affect: Mood normal.         Behavior: Behavior normal.         Thought Content:  Thought content normal.         Judgment: Judgment normal. Diagnostic Study Results     Labs -   No results found for this or any previous visit (from the past 12 hour(s)). Radiologic Studies -   MRI CODE NEURO BRAIN WO CONT    (Results Pending)     CT Results  (Last 48 hours)    None        CXR Results  (Last 48 hours)    None            Medical Decision Making   I am the first provider for this patient. I reviewed the vital signs, available nursing notes, past medical history, past surgical history, family history and social history. Vital Signs-Reviewed the patient's vital signs. Patient Vitals for the past 12 hrs:   Temp Pulse Resp BP SpO2   04/21/22 1717 97.5 °F (36.4 °C) 80 20 137/88 99 %       Records Reviewed: Nursing Notes    Provider Notes (Medical Decision Making):   Patient evaluated for a 1 day history of urinary retention in the setting of penile cancer and previous episodes of the same. Patient's initial bladder scan was 3 and 330 cc. Patient was given the opportunity to void in the emergency department but since he was unable to void a Lowery catheter was placed, which drained approximately 500 cc of urine. Lowery continues to drain well in the ED and appeared to be functioning well. He denied any gross hematuria, blood clots, or any indication for bladder irrigation. Patient was well-appearing with a benign abdominal exam.  No signs otherwise of an emergent  or intra-abdominal process. Patient advised on follow-up with his established urologist and also provided urology clinic to follow-up within 5 days. Patient and daughter advised on return precautions to the emergency department. Patient and daughter expressed understanding agreement to discharge instructions and treatment plan. Patient also reported a 1 month history of dizziness and concerns for stroke. Patient is a difficult historian secondary to his dementia and his neurologic exam was not conclusive for lack of neurologic deficits.  MRI was ordered to rule out a central neurologic process. However, I did speak with the patient's daughter and PCP who reported that he has a history of reporting concerns for stroke, though has been evaluated by an ENT doctor and diagnosed with vertigo. Per the PCP and the daughter, patient is otherwise at his baseline. Daughter did elect to proceed with neuroimaging since he had not had any of the same in the past.  MRI was negative for any acute findings. ED Course:   Initial assessment performed. The patients presenting problems have been discussed, and they are in agreement with the care plan formulated and outlined with them. I have encouraged them to ask questions as they arise throughout their visit. ED Course as of 04/22/22 0008   Thu Apr 21, 2022   1800 Patient evaluated for concerns for urinary retention as well as dizziness/right upper extremity weakness for the past few weeks. .  Will check postvoid residual, order basic labs, and obtain MRI of head. [AJ]   2013 After speaking with patient's daughter as well as his PCP, Dr. Laura Waters, they both expressed that patient is at his baseline and has a history of complaining of strokelike symptoms, though has been diagnosed with vertigo by an ENT. Daughter did elect to proceed with MRI imaging. [AJ]      ED Course User Index  [AJ] JOSEFINA Patiño       Critical Care Time: None    Disposition:  discharged    PLAN:  1. Current Discharge Medication List        2. Follow-up Information    None       Return to ED if worse     Diagnosis     Clinical Impression: No diagnosis found. Please note that this dictation was completed with [employer], the computer voice recognition software. Quite often unanticipated grammatical, syntax, homophones, and other interpretive errors are inadvertently transcribed by the computer software. Please disregards these errors. Please excuse any errors that have escaped final proofreading.

## 2022-04-21 NOTE — ED NOTES
Bedside and Verbal shift change report given to Jairo Riddle (oncoming nurse) by Shahbaz Wilde (offgoing nurse). Report included the following information SBAR, Kardex, ED Summary and MAR.

## 2022-04-22 NOTE — DISCHARGE INSTRUCTIONS
Call to make the next available appointment with your urologist, preferably in the next 5 days. Return to the emergency department sooner if you have any new or worsening symptoms.

## 2022-05-05 ENCOUNTER — HOSPITAL ENCOUNTER (EMERGENCY)
Age: 82
Discharge: HOME | End: 2022-05-05
Attending: EMERGENCY MEDICINE
Payer: MEDICARE

## 2022-05-05 VITALS
HEIGHT: 64 IN | OXYGEN SATURATION: 98 % | WEIGHT: 153.44 LBS | DIASTOLIC BLOOD PRESSURE: 72 MMHG | BODY MASS INDEX: 26.2 KG/M2 | SYSTOLIC BLOOD PRESSURE: 126 MMHG | TEMPERATURE: 98 F | HEART RATE: 73 BPM | RESPIRATION RATE: 17 BRPM

## 2022-05-05 DIAGNOSIS — N30.00 ACUTE CYSTITIS WITHOUT HEMATURIA: Primary | ICD-10-CM

## 2022-05-05 DIAGNOSIS — E86.0 MILD DEHYDRATION: ICD-10-CM

## 2022-05-05 DIAGNOSIS — R19.7 ACUTE DIARRHEA: ICD-10-CM

## 2022-05-05 LAB
ALBUMIN SERPL-MCNC: 2.8 G/DL (ref 3.5–5)
ALBUMIN/GLOB SERPL: 0.5 {RATIO} (ref 1.1–2.2)
ALP SERPL-CCNC: 290 U/L (ref 45–117)
ALT SERPL-CCNC: 45 U/L (ref 12–78)
ANION GAP SERPL CALC-SCNC: 6 MMOL/L (ref 5–15)
APPEARANCE UR: ABNORMAL
AST SERPL-CCNC: 62 U/L (ref 15–37)
ATRIAL RATE: 82 BPM
BACTERIA URNS QL MICRO: ABNORMAL /HPF
BASOPHILS # BLD: 0.1 K/UL (ref 0–0.1)
BASOPHILS NFR BLD: 1 % (ref 0–1)
BILIRUB SERPL-MCNC: 1.6 MG/DL (ref 0.2–1)
BILIRUB UR QL CFM: NEGATIVE
BUN SERPL-MCNC: 27 MG/DL (ref 6–20)
BUN/CREAT SERPL: 21 (ref 12–20)
CALCIUM SERPL-MCNC: 9.1 MG/DL (ref 8.5–10.1)
CALCULATED R AXIS, ECG10: 23 DEGREES
CALCULATED T AXIS, ECG11: 13 DEGREES
CHLORIDE SERPL-SCNC: 103 MMOL/L (ref 97–108)
CO2 SERPL-SCNC: 25 MMOL/L (ref 21–32)
COLOR UR: ABNORMAL
CREAT SERPL-MCNC: 1.3 MG/DL (ref 0.7–1.3)
DIAGNOSIS, 93000: NORMAL
DIFFERENTIAL METHOD BLD: ABNORMAL
EOSINOPHIL # BLD: 0 K/UL (ref 0–0.4)
EOSINOPHIL NFR BLD: 0 % (ref 0–7)
EPITH CASTS URNS QL MICRO: ABNORMAL /LPF
ERYTHROCYTE [DISTWIDTH] IN BLOOD BY AUTOMATED COUNT: 14.3 % (ref 11.5–14.5)
GLOBULIN SER CALC-MCNC: 5.4 G/DL (ref 2–4)
GLUCOSE SERPL-MCNC: 105 MG/DL (ref 65–100)
GLUCOSE UR STRIP.AUTO-MCNC: NEGATIVE MG/DL
HCT VFR BLD AUTO: 38.5 % (ref 36.6–50.3)
HGB BLD-MCNC: 13.1 G/DL (ref 12.1–17)
HGB UR QL STRIP: ABNORMAL
IMM GRANULOCYTES # BLD AUTO: 0.1 K/UL (ref 0–0.04)
IMM GRANULOCYTES NFR BLD AUTO: 1 % (ref 0–0.5)
KETONES UR QL STRIP.AUTO: 80 MG/DL
LEUKOCYTE ESTERASE UR QL STRIP.AUTO: ABNORMAL
LYMPHOCYTES # BLD: 1.1 K/UL (ref 0.8–3.5)
LYMPHOCYTES NFR BLD: 9 % (ref 12–49)
MCH RBC QN AUTO: 29.7 PG (ref 26–34)
MCHC RBC AUTO-ENTMCNC: 34 G/DL (ref 30–36.5)
MCV RBC AUTO: 87.3 FL (ref 80–99)
MONOCYTES # BLD: 1.1 K/UL (ref 0–1)
MONOCYTES NFR BLD: 9 % (ref 5–13)
NEUTS SEG # BLD: 9.5 K/UL (ref 1.8–8)
NEUTS SEG NFR BLD: 80 % (ref 32–75)
NITRITE UR QL STRIP.AUTO: NEGATIVE
NRBC # BLD: 0 K/UL (ref 0–0.01)
NRBC BLD-RTO: 0 PER 100 WBC
PH UR STRIP: 6 [PH] (ref 5–8)
PLATELET # BLD AUTO: 295 K/UL (ref 150–400)
PMV BLD AUTO: 10.1 FL (ref 8.9–12.9)
POTASSIUM SERPL-SCNC: 4.4 MMOL/L (ref 3.5–5.1)
PROT SERPL-MCNC: 8.2 G/DL (ref 6.4–8.2)
PROT UR STRIP-MCNC: 100 MG/DL
Q-T INTERVAL, ECG07: 376 MS
QRS DURATION, ECG06: 58 MS
QTC CALCULATION (BEZET), ECG08: 444 MS
RBC # BLD AUTO: 4.41 M/UL (ref 4.1–5.7)
RBC #/AREA URNS HPF: >100 /HPF (ref 0–5)
SODIUM SERPL-SCNC: 134 MMOL/L (ref 136–145)
SP GR UR REFRACTOMETRY: 1.02
UA: UC IF INDICATED,UAUC: ABNORMAL
UROBILINOGEN UR QL STRIP.AUTO: 1 EU/DL (ref 0.2–1)
VENTRICULAR RATE, ECG03: 84 BPM
WBC # BLD AUTO: 11.8 K/UL (ref 4.1–11.1)
WBC URNS QL MICRO: >100 /HPF (ref 0–4)

## 2022-05-05 PROCEDURE — 87086 URINE CULTURE/COLONY COUNT: CPT

## 2022-05-05 PROCEDURE — 93005 ELECTROCARDIOGRAM TRACING: CPT

## 2022-05-05 PROCEDURE — 99284 EMERGENCY DEPT VISIT MOD MDM: CPT

## 2022-05-05 PROCEDURE — 87186 SC STD MICRODIL/AGAR DIL: CPT

## 2022-05-05 PROCEDURE — 81001 URINALYSIS AUTO W/SCOPE: CPT

## 2022-05-05 PROCEDURE — 36415 COLL VENOUS BLD VENIPUNCTURE: CPT

## 2022-05-05 PROCEDURE — 80053 COMPREHEN METABOLIC PANEL: CPT

## 2022-05-05 PROCEDURE — 74011250636 HC RX REV CODE- 250/636: Performed by: EMERGENCY MEDICINE

## 2022-05-05 PROCEDURE — 85025 COMPLETE CBC W/AUTO DIFF WBC: CPT

## 2022-05-05 PROCEDURE — 74011250637 HC RX REV CODE- 250/637: Performed by: EMERGENCY MEDICINE

## 2022-05-05 PROCEDURE — 87077 CULTURE AEROBIC IDENTIFY: CPT

## 2022-05-05 PROCEDURE — 96360 HYDRATION IV INFUSION INIT: CPT

## 2022-05-05 RX ORDER — TAMSULOSIN HYDROCHLORIDE 0.4 MG/1
0.4 CAPSULE ORAL DAILY
Qty: 30 CAPSULE | Refills: 0 | Status: SHIPPED | OUTPATIENT
Start: 2022-05-05 | End: 2022-06-04

## 2022-05-05 RX ORDER — LOPERAMIDE HYDROCHLORIDE 2 MG/1
2 CAPSULE ORAL
Status: COMPLETED | OUTPATIENT
Start: 2022-05-05 | End: 2022-05-05

## 2022-05-05 RX ORDER — CEFDINIR 300 MG/1
300 CAPSULE ORAL 2 TIMES DAILY
Qty: 14 CAPSULE | Refills: 0 | Status: SHIPPED | OUTPATIENT
Start: 2022-05-05 | End: 2022-05-22

## 2022-05-05 RX ADMIN — SODIUM CHLORIDE 1000 ML: 9 INJECTION, SOLUTION INTRAVENOUS at 12:11

## 2022-05-05 RX ADMIN — LOPERAMIDE HYDROCHLORIDE 2 MG: 2 CAPSULE ORAL at 12:11

## 2022-05-05 NOTE — ED NOTES
Pt cleaned of feces, placed in fresh gown and linens. Warm blanket provided.     Dirty old catheter removed, straight cath performed

## 2022-05-05 NOTE — ED NOTES
Dr. Gavi Conklin at bedside to review DC plan with patient and daughter. DC papers reviewed and in hand, pt verbalized understanding. Provided wheelchair out of ER for ride home with daughter, changed into paper scrubs, no acute distress noted.

## 2022-05-05 NOTE — ED NOTES
Arrives to ER c/o generalized weakness and diarrhea x 48 hours. Also vomited yesterday and has had abd tenderness over same time period. Pt covered in dry crusted feces from buttocks to heels, states he slept on couch last night because he felt too weak to walk to bed after having BM. States he experienced GLF at some point last night. Bruising and abrasion noted to L elbow. Denies bloodthinners, LOC, head/neck pain. Denies fevers, SOB, cough. Caregiver at bedside states pt more forgetful than usual over past week. Reports catheter was placed last week d/t urinary retention. Arrives to ER with indwelling catheter attached to leg bag containing dark caesar urine. Catheter covered in feces up to insertion point, leg bag not attached to leg.

## 2022-05-05 NOTE — DISCHARGE INSTRUCTIONS
Your laboratories today show signs of a urinary tract infection. We have administered an intravenous antibiotic to you and I have prescribed 1 week of oral antibiotic as well. For your diarrhea, you can use 1 tablet of Imodium (2 mg) once or twice a day to help slow down your bowel movements. Try to stay well-hydrated, and follow-up with your primary care doctor in a week. If you have difficulty passing urine, you should return to the emergency department we will replace your urinary catheter. Take Flomax once a day to help pass your urine.

## 2022-05-05 NOTE — ED PROVIDER NOTES
EMERGENCY DEPARTMENT HISTORY AND PHYSICAL EXAM      Date: 5/5/2022  Patient Name: Tae Haney  Patient Age and Sex: [de-identified] y.o. male  MRN:  [id number]  [facility]:  [id number]    History of Presenting Illness     Chief Complaint   Patient presents with    Diarrhea     x 48 hrs with abd pain; weaker than usual and more forgetful than usual       History Provided By: Patient and Patient's Daughter    Ability to gather history was limited by:     HPI: Tae Haney, [de-identified] y.o. male with history of coronary artery disease, penile cancer, hypertension, brought to emergency department for diarrhea symptoms. Has been feeling weaker and dehydrated, more forgetful than usual.  Symptoms are moderate severity. Worsening for the past 2 days or so. Sounds as though his caregiver administered magnesium citrate a couple days ago to treat constipation and he has had loose stools ever since. Location:    Quality:      Severity:    Duration:   Timing:      Context:    Modifying factors:   Associated symptoms:     Past History      The patient's medical, surgical, and social history on file were reviewed by me today.      The family history was reviewed by me today and was non-contributory, unless otherwise specified below:    Past Medical History:  Past Medical History:   Diagnosis Date    Arthritis     left arm    Autoimmune hepatitis ([facility] [address]     Bleeding ulcer 07/2019    BPH (benign prostatic hyperplasia)     CAD (coronary artery disease)     s/p stent    Cancer ([facility] [address]     penile squamous carcinoma    Chronic kidney disease     stage 2     Chronic obstructive pulmonary disease (HCC)     Dementia ([facility] [address]     Elevated LFT's     Essential hypertension 9/24/01    GERD (gastroesophageal reflux disease)     hiatal hernia    Ill-defined condition 12/02/2016    faint    Nephrosclerosis     Orthostatic hypotension 9/24/01    PVC's 9/24/01    Sleep apnea     no CPAP    Syncope 9/24/01    secondary to venous insuffiency Past Surgical History:  Past Surgical History:   Procedure Laterality Date    COLONOSCOPY N/A 2016    COLONOSCOPY performed [facility] at [facility] ENDOSCOPY    COLONOSCOPY N/A 2019    COLONOSCOPY performed by Farzana Penaloza MD at [facility]; HI RISK IND  2019         ECHO 2D ADULT  12    EF 60 - 65%; mild concentric hypertrophy; pulmonary systolic artery pressure upper limits normal    HX ABDOMINAL WALL DEFECT REPAIR  2019d     Exploratory laparotomy, segmental sigmoid colon resection and primary stapled anastomosis. [address]    HX HEENT  2020    Left temporal artery biopsy    HX HERNIA REPAIR  2018    Davinci hiatal hernia repair- Gabriel Gee MD    HX OTHER SURGICAL  2020    penile lesion bx    NH CARDIAC SURG PROCEDURE UNLIST  2019    1 stent    UPPER GI ENDOSCOPY,BIOPSY  2019         UPPER GI ENDOSCOPY,DIAGNOSIS  2019            Family History:  Family History   Problem Relation Age of Onset    Stroke Mother     Stroke Father     Heart Disease Father        Social History:  Social History     Tobacco Use    Smoking status: Former Smoker     Packs/day: 3.50     Quit date: 1980     Years since quittin.3    Smokeless tobacco: Never Used   Vaping Use    Vaping Use: Never used   Substance Use Topics    Alcohol use: Not Currently     Comment: no alcohol since     Drug use: No       Current Medications:  No current facility-administered medications on file prior to encounter. Current Outpatient Medications on File Prior to Encounter   Medication Sig Dispense Refill    aspirin delayed-release 81 mg tablet Take 81 mg by mouth daily.  atorvastatin (LIPITOR) 20 mg tablet Take 2 Tabs by mouth daily. 30 Tab 0    ondansetron hcl (Zofran) 4 mg tablet Take 2 Tabs by mouth two (2) times a day.  (Patient taking differently: Take 8 mg by mouth every eight (8) hours as needed.) 30 Tab 0    traZODone (DESYREL) 50 mg tablet Take 50 mg by mouth nightly as needed.  carvediloL (COREG) 12.5 mg tablet Take 2 Tabs by mouth two (2) times daily (with meals). 180 Tab 1    ascorbic acid, vitamin C, (Vitamin C) 500 mg tablet Take  by mouth.  fluticasone propionate (FLONASE) 50 mcg/actuation nasal spray 2 Sprays by Both Nostrils route daily as needed.  psyllium husk (METAMUCIL PO) Take  by mouth.  donepeziL (ARICEPT) 5 mg tablet Take 1 Tab by mouth nightly. 90 Tab 1    pantoprazole (PROTONIX) 40 mg tablet Take 1 Tab by mouth ACB/HS. 90 Tab 1    neomycin-bacitracin-polymyxin (Neosporin, [id number],) 3.5mg-400 unit- 5,000 unit/gram ointment Apply  to affected area two (2) times a day. (Patient taking differently: Apply  to affected area as needed.) 1 Tube 0       Allergies: Allergies   Allergen Reactions    Ace Inhibitors Other (comments)     Doesn't agree with pt    Demerol [Meperidine] Unknown (comments)     Causes unconsciousness     Review of Systems    A complete ROS was reviewed by me today and was negative, unless otherwise specified below:    Review of Systems   Constitutional: Positive for fatigue. Negative for fever. Respiratory: Negative for shortness of breath. Cardiovascular: Negative for chest pain. Gastrointestinal: Positive for diarrhea. Negative for abdominal pain. Psychiatric/Behavioral: Positive for confusion. All other systems reviewed and are negative. Physical Exam   Vital Signs  Patient Vitals for the past 8 hrs:   Temp Pulse Resp BP SpO2   05/05/22 1345 -- 73 17 126/72 98 %   05/05/22 1300 -- 77 14 127/70 98 %   05/05/22 1230 -- 80 17 (!) 142/69 98 %   05/05/22 1200 -- 75 15 128/74 98 %   05/05/22 1130 -- 83 21 134/78 99 %   05/05/22 1115 -- 81 27 134/79 99 %   05/05/22 1100 -- 88 16 -- 100 %   05/05/22 1016 98 °F (36.7 °C) 83 20 (!) 150/79 99 %          Physical Exam  Vitals and nursing note reviewed. Constitutional:       General: He is not in acute distress. Appearance: Normal appearance. He is well-developed. He is ill-appearing ( He appears chronically severely ill but no acute distress). Comments: Covered in dried stool   HENT:      Head: Normocephalic and atraumatic. Eyes:      General:         Right eye: No discharge. Left eye: No discharge. Conjunctiva/sclera: Conjunctivae normal.   Cardiovascular:      Rate and Rhythm: Normal rate and regular rhythm. Heart sounds: Normal heart sounds. No murmur heard. Pulmonary:      Effort: Pulmonary effort is normal. No respiratory distress. Breath sounds: Normal breath sounds. No wheezing. Abdominal:      General: There is no distension. Palpations: Abdomen is soft. Tenderness: There is no abdominal tenderness. Genitourinary:     Comments: Lowery catheter in position and patient arrival, which was caked with stool  Musculoskeletal:         General: No deformity. Normal range of motion. Cervical back: Normal range of motion and neck supple. Skin:     General: Skin is warm and dry. Findings: Bruising present. No rash. Neurological:      General: No focal deficit present. Mental Status: He is alert and oriented to person, place, and time. Psychiatric:         Mood and Affect: Mood normal. Affect is flat. Behavior: Behavior is withdrawn. Thought Content: Thought content normal.         Cognition and Memory: Cognition is impaired.          Diagnostic Study Results   Labs  Recent Results (from the past 24 hour(s))   EKG, 12 LEAD, INITIAL    Collection Time: 05/05/22 10:28 AM   Result Value Ref Range    Ventricular Rate 84 BPM    Atrial Rate 82 BPM    QRS Duration 58 ms    Q-T Interval 376 ms    QTC Calculation (Bezet) 444 ms    Calculated R Axis 23 degrees    Calculated T Axis 13 degrees    Diagnosis       Accelerated Junctional rhythm  When compared with ECG of 01-JUN-2021 08:28,  Junctional rhythm has replaced Sinus rhythm  Questionable change in QRS duration  Confirmed by Dennie How ([id number]) on 5/5/2022 2:23:57 PM     CBC WITH AUTOMATED DIFF    Collection Time: 05/05/22 11:06 AM   Result Value Ref Range    WBC 11.8 (H) 4.1 - 11.1 K/uL    RBC 4.41 4.10 - 5.70 M/uL    HGB 13.1 12.1 - 17.0 g/dL    HCT 38.5 36.6 - 50.3 %    MCV 87.3 80.0 - 99.0 FL    MCH 29.7 26.0 - 34.0 PG    MCHC 34.0 30.0 - 36.5 g/dL    RDW 14.3 11.5 - 14.5 %    PLATELET 730 051 - 602 K/uL    MPV 10.1 8.9 - 12.9 FL    NRBC 0.0 0  WBC    ABSOLUTE NRBC 0.00 0.00 - 0.01 K/uL    NEUTROPHILS 80 (H) 32 - 75 %    LYMPHOCYTES 9 (L) 12 - 49 %    MONOCYTES 9 5 - 13 %    EOSINOPHILS 0 0 - 7 %    BASOPHILS 1 0 - 1 %    IMMATURE GRANULOCYTES 1 (H) 0.0 - 0.5 %    ABS. NEUTROPHILS 9.5 (H) 1.8 - 8.0 K/UL    ABS. LYMPHOCYTES 1.1 0.8 - 3.5 K/UL    ABS. MONOCYTES 1.1 (H) 0.0 - 1.0 K/UL    ABS. EOSINOPHILS 0.0 0.0 - 0.4 K/UL    ABS. BASOPHILS 0.1 0.0 - 0.1 K/UL    ABS. IMM. GRANS. 0.1 (H) 0.00 - 0.04 K/UL    DF AUTOMATED     METABOLIC PANEL, COMPREHENSIVE    Collection Time: 05/05/22 11:06 AM   Result Value Ref Range    Sodium 134 (L) 136 - 145 mmol/L    Potassium 4.4 3.5 - 5.1 mmol/L    Chloride 103 97 - 108 mmol/L    CO2 25 21 - 32 mmol/L    Anion gap 6 5 - 15 mmol/L    Glucose 105 (H) 65 - 100 mg/dL    BUN 27 (H) 6 - 20 MG/DL    Creatinine 1.30 0.70 - 1.30 MG/DL    BUN/Creatinine ratio 21 (H) 12 - 20      GFR est AA >60 >60 ml/min/1.73m2    GFR est non-AA 53 (L) >60 ml/min/1.73m2    Calcium 9.1 8.5 - 10.1 MG/DL    Bilirubin, total 1.6 (H) 0.2 - 1.0 MG/DL    ALT (SGPT) 45 12 - 78 U/L    AST (SGOT) 62 (H) 15 - 37 U/L    Alk.  phosphatase 290 (H) 45 - 117 U/L    Protein, total 8.2 6.4 - 8.2 g/dL    Albumin 2.8 (L) 3.5 - 5.0 g/dL    Globulin 5.4 (H) 2.0 - 4.0 g/dL    A-G Ratio 0.5 (L) 1.1 - 2.2     URINALYSIS W/ REFLEX CULTURE    Collection Time: 05/05/22 11:06 AM    Specimen: Miscellaneous sample; Urine    Urine specimen Result Value Ref Range    Color DARK YELLOW      Appearance TURBID (A) CLEAR      Specific gravity 1.021      pH (UA) 6.0 5.0 - 8.0      Protein 100 (A) NEG mg/dL    Glucose Negative NEG mg/dL    Ketone 80 (A) NEG mg/dL    Blood LARGE (A) NEG      Urobilinogen 1.0 0.2 - 1.0 EU/dL    Nitrites Negative NEG      Leukocyte Esterase LARGE (A) NEG      WBC >100 (H) 0 - 4 /hpf    RBC >100 (H) 0 - 5 /hpf    Epithelial cells FEW FEW /lpf    Bacteria 1+ (A) NEG /hpf    UA:UC IF INDICATED URINE CULTURE ORDERED (A) CNI     BILIRUBIN, CONFIRM    Collection Time: 22 11:06 AM   Result Value Ref Range    Bilirubin UA, confirm Negative NEG         Radiologic Studies  No orders to display     CT Results  (Last 48 hours)    None        CXR Results  (Last 48 hours)    None          Billable Procedures   Procedures    Medical Decision Making     I reviewed the patient's most recent Emergency Dept notes and diagnostic tests in formulating my MDM on today's visit. Provider Notes (Medical Decision Making):   27-year-old male with likely dementia, coronary artery disease, presenting with diarrhea. Patient is chronically ill, diffuse bruising. Withdrawn affect. Covered with dried stool. Normal vital signs, afebrile    Labs are reassuring. No significant acute findings by exam.  Abdomen is benign. He does have evidence of urinary tract infection and I prescribed a course of Omnicef. Also recommended that he use Imodium sparingly as needed. I started him on Flomax and he can be safely discharged. We removed the soiled Lowery catheter and patient was able to void on his own in the emergency department. Discharged home without a Lowery in place.     Meghna Petersen MD  3:24 PM  2022     Consults:    Social History     Tobacco Use    Smoking status: Former Smoker     Packs/day: 3.50     Quit date: 1980     Years since quittin.3    Smokeless tobacco: Never Used   Vaping Use    Vaping Use: Never used Substance Use Topics    Alcohol use: Not Currently     Comment: no alcohol since 1999    Drug use: No       Medications Administered during ED course:  Medications   sodium chloride 0.9 % bolus infusion 1,000 mL (0 mL IntraVENous IV Completed 5/5/22 1320)   loperamide (IMODIUM) capsule 2 mg (2 mg Oral Given 5/5/22 1211)          Prescriptions from today's ED visit:  Discharge Medication List as of 5/5/2022  1:48 PM      START taking these medications    Details   cefdinir (OMNICEF) 300 mg capsule Take 1 Capsule by mouth two (2) times a day for 7 days. , Normal, Disp-14 Capsule, R-0      tamsulosin (Flomax) 0.4 mg capsule Take 1 Capsule by mouth daily for 30 days. , Normal, Disp-30 Capsule, R-0         CONTINUE these medications which have NOT CHANGED    Details   aspirin delayed-release 81 mg tablet Take 81 mg by mouth daily. , Historical Med      atorvastatin (LIPITOR) 20 mg tablet Take 2 Tabs by mouth daily. , No Print, Disp-30 Tab, R-0      ondansetron hcl (Zofran) 4 mg tablet Take 2 Tabs by mouth two (2) times a day., No Print, Disp-30 Tab, R-0      traZODone (DESYREL) 50 mg tablet Take 50 mg by mouth nightly as needed., Historical Med      carvediloL (COREG) 12.5 mg tablet Take 2 Tabs by mouth two (2) times daily (with meals). , Normal, Disp-180 Tab,R-1      ascorbic acid, vitamin C, (Vitamin C) 500 mg tablet Take  by mouth., Historical Med      fluticasone propionate (FLONASE) 50 mcg/actuation nasal spray 2 Sprays by Both Nostrils route daily as needed., Historical Med      psyllium husk (METAMUCIL PO) Take  by mouth., Historical Med      donepeziL (ARICEPT) 5 mg tablet Take 1 Tab by mouth nightly., No Print, Disp-90 Tab,R-1      pantoprazole (PROTONIX) 40 mg tablet Take 1 Tab by mouth ACB/HS., No Print, Disp-90 Tab,R-1      neomycin-bacitracin-polymyxin (Neosporin, [id number],) 3.5mg-400 unit- 5,000 unit/gram ointment Apply  to affected area two (2) times a day., Normal, Disp-1 Tube,R-0            Diagnosis and Disposition     Disposition:  Discharged    Clinical Impression:   1. Acute cystitis without hematuria    2. Acute diarrhea    3. Mild dehydration        Attestation:  I personally performed the services described in this documentation on this date 5/5/2022 for patient Jesús Vann. Naheed Vyas MD        I was the first provider for this patient on this visit. To the best of my ability I reviewed relevant prior medical records, electrocardiograms, laboratories, and radiologic studies. The patient's presenting problems were discussed, and the patient was in agreement with the care plan formulated and outlined with them. Naheed Vyas MD    Please note that this dictation was completed with Dragon voice recognition software. Quite often unanticipated grammatical, syntax, homophones, and other interpretive errors are inadvertently transcribed by the computer software. Please disregard these errors and excuse any errors that have escaped final proofreading.

## 2022-05-07 LAB
BACTERIA SPEC CULT: ABNORMAL
BACTERIA SPEC CULT: ABNORMAL
CC UR VC: ABNORMAL
SERVICE CMNT-IMP: ABNORMAL

## 2022-05-08 RX ORDER — LEVOFLOXACIN 750 MG/1
750 TABLET ORAL DAILY
Qty: 10 TABLET | Refills: 0 | Status: SHIPPED | OUTPATIENT
Start: 2022-05-08 | End: 2022-05-22

## 2022-05-08 NOTE — PROGRESS NOTES
Regarding the urine culture results, it is unclear as to whether the Enterococcus is susceptible to third-generation cephalosporins. I call the patient and speak to his daughter and expressed my concern regarding these urine culture results. I recommend to discontinue the Omnicef and to begin levofloxacin. I send the prescription electronically to the pharmacy on record. She is counseled to have her dad follow-up with primary care or if he seems to be worsening, to please return to the emergency department. She thanks me before we get off the phone.

## 2022-05-12 ENCOUNTER — HOSPITAL ENCOUNTER (INPATIENT)
Age: 82
LOS: 10 days | Discharge: HOME HEALTH | DRG: 690 | End: 2022-05-22
Attending: EMERGENCY MEDICINE | Admitting: INTERNAL MEDICINE
Payer: MEDICARE

## 2022-05-12 ENCOUNTER — APPOINTMENT (OUTPATIENT)
Dept: GENERAL RADIOLOGY | Age: 82
DRG: 690 | End: 2022-05-12
Attending: EMERGENCY MEDICINE
Payer: MEDICARE

## 2022-05-12 DIAGNOSIS — R41.82 ACUTE ALTERATION IN MENTAL STATUS: ICD-10-CM

## 2022-05-12 DIAGNOSIS — E87.20 LACTIC ACIDOSIS: ICD-10-CM

## 2022-05-12 DIAGNOSIS — R68.89 RIGORS: ICD-10-CM

## 2022-05-12 DIAGNOSIS — R56.9 CONVULSIONS, UNSPECIFIED CONVULSION TYPE (HCC): ICD-10-CM

## 2022-05-12 DIAGNOSIS — N17.9 AKI (ACUTE KIDNEY INJURY) (HCC): ICD-10-CM

## 2022-05-12 DIAGNOSIS — R41.82 ALTERED MENTAL STATUS, UNSPECIFIED ALTERED MENTAL STATUS TYPE: ICD-10-CM

## 2022-05-12 DIAGNOSIS — E86.0 DEHYDRATION: ICD-10-CM

## 2022-05-12 DIAGNOSIS — N30.01 ACUTE CYSTITIS WITH HEMATURIA: Primary | ICD-10-CM

## 2022-05-12 PROBLEM — N39.0 UTI (URINARY TRACT INFECTION): Status: ACTIVE | Noted: 2022-05-12

## 2022-05-12 LAB
ALBUMIN SERPL-MCNC: 3 G/DL (ref 3.5–5)
ALBUMIN/GLOB SERPL: 0.6 {RATIO} (ref 1.1–2.2)
ALP SERPL-CCNC: 333 U/L (ref 45–117)
ALT SERPL-CCNC: 56 U/L (ref 12–78)
ANION GAP SERPL CALC-SCNC: 10 MMOL/L (ref 5–15)
AST SERPL-CCNC: 58 U/L (ref 15–37)
BASOPHILS # BLD: 0.1 K/UL (ref 0–0.1)
BASOPHILS NFR BLD: 1 % (ref 0–1)
BILIRUB SERPL-MCNC: 0.9 MG/DL (ref 0.2–1)
BUN SERPL-MCNC: 23 MG/DL (ref 6–20)
BUN/CREAT SERPL: 13 (ref 12–20)
CALCIUM SERPL-MCNC: 9.4 MG/DL (ref 8.5–10.1)
CHLORIDE SERPL-SCNC: 106 MMOL/L (ref 97–108)
CO2 SERPL-SCNC: 22 MMOL/L (ref 21–32)
CREAT SERPL-MCNC: 1.73 MG/DL (ref 0.7–1.3)
DIFFERENTIAL METHOD BLD: ABNORMAL
EOSINOPHIL # BLD: 0 K/UL (ref 0–0.4)
EOSINOPHIL NFR BLD: 1 % (ref 0–7)
ERYTHROCYTE [DISTWIDTH] IN BLOOD BY AUTOMATED COUNT: 14.6 % (ref 11.5–14.5)
GLOBULIN SER CALC-MCNC: 5 G/DL (ref 2–4)
GLUCOSE SERPL-MCNC: 128 MG/DL (ref 65–100)
HCT VFR BLD AUTO: 38.3 % (ref 36.6–50.3)
HGB BLD-MCNC: 13.2 G/DL (ref 12.1–17)
IMM GRANULOCYTES # BLD AUTO: 0.1 K/UL (ref 0–0.04)
IMM GRANULOCYTES NFR BLD AUTO: 1 % (ref 0–0.5)
LACTATE BLD-SCNC: 2.25 MMOL/L (ref 0.4–2)
LYMPHOCYTES # BLD: 1.2 K/UL (ref 0.8–3.5)
LYMPHOCYTES NFR BLD: 14 % (ref 12–49)
MCH RBC QN AUTO: 29.5 PG (ref 26–34)
MCHC RBC AUTO-ENTMCNC: 34.5 G/DL (ref 30–36.5)
MCV RBC AUTO: 85.7 FL (ref 80–99)
MONOCYTES # BLD: 1.2 K/UL (ref 0–1)
MONOCYTES NFR BLD: 14 % (ref 5–13)
NEUTS SEG # BLD: 6 K/UL (ref 1.8–8)
NEUTS SEG NFR BLD: 69 % (ref 32–75)
NRBC # BLD: 0 K/UL (ref 0–0.01)
NRBC BLD-RTO: 0 PER 100 WBC
PLATELET # BLD AUTO: 266 K/UL (ref 150–400)
PMV BLD AUTO: 10.5 FL (ref 8.9–12.9)
POTASSIUM SERPL-SCNC: 3.5 MMOL/L (ref 3.5–5.1)
PROCALCITONIN SERPL-MCNC: 0.3 NG/ML
PROT SERPL-MCNC: 8 G/DL (ref 6.4–8.2)
RBC # BLD AUTO: 4.47 M/UL (ref 4.1–5.7)
SODIUM SERPL-SCNC: 138 MMOL/L (ref 136–145)
TROPONIN-HIGH SENSITIVITY: 6 NG/L (ref 0–76)
WBC # BLD AUTO: 8.5 K/UL (ref 4.1–11.1)

## 2022-05-12 PROCEDURE — 36415 COLL VENOUS BLD VENIPUNCTURE: CPT

## 2022-05-12 PROCEDURE — 84484 ASSAY OF TROPONIN QUANT: CPT

## 2022-05-12 PROCEDURE — 71045 X-RAY EXAM CHEST 1 VIEW: CPT

## 2022-05-12 PROCEDURE — 65270000029 HC RM PRIVATE

## 2022-05-12 PROCEDURE — 74011250636 HC RX REV CODE- 250/636: Performed by: EMERGENCY MEDICINE

## 2022-05-12 PROCEDURE — 74011250637 HC RX REV CODE- 250/637: Performed by: INTERNAL MEDICINE

## 2022-05-12 PROCEDURE — 80053 COMPREHEN METABOLIC PANEL: CPT

## 2022-05-12 PROCEDURE — 74011000250 HC RX REV CODE- 250: Performed by: INTERNAL MEDICINE

## 2022-05-12 PROCEDURE — 93005 ELECTROCARDIOGRAM TRACING: CPT

## 2022-05-12 PROCEDURE — 85025 COMPLETE CBC W/AUTO DIFF WBC: CPT

## 2022-05-12 PROCEDURE — 74011000258 HC RX REV CODE- 258: Performed by: EMERGENCY MEDICINE

## 2022-05-12 PROCEDURE — 83605 ASSAY OF LACTIC ACID: CPT

## 2022-05-12 PROCEDURE — 99285 EMERGENCY DEPT VISIT HI MDM: CPT

## 2022-05-12 PROCEDURE — 74011250636 HC RX REV CODE- 250/636: Performed by: INTERNAL MEDICINE

## 2022-05-12 PROCEDURE — 84145 PROCALCITONIN (PCT): CPT

## 2022-05-12 PROCEDURE — 96365 THER/PROPH/DIAG IV INF INIT: CPT

## 2022-05-12 PROCEDURE — 87040 BLOOD CULTURE FOR BACTERIA: CPT

## 2022-05-12 RX ORDER — ACETAMINOPHEN 650 MG/1
650 SUPPOSITORY RECTAL
Status: DISCONTINUED | OUTPATIENT
Start: 2022-05-12 | End: 2022-05-22 | Stop reason: HOSPADM

## 2022-05-12 RX ORDER — POLYETHYLENE GLYCOL 3350 17 G/17G
17 POWDER, FOR SOLUTION ORAL DAILY PRN
Status: DISCONTINUED | OUTPATIENT
Start: 2022-05-12 | End: 2022-05-22 | Stop reason: HOSPADM

## 2022-05-12 RX ORDER — TRAZODONE HYDROCHLORIDE 50 MG/1
50 TABLET ORAL
Status: DISCONTINUED | OUTPATIENT
Start: 2022-05-12 | End: 2022-05-22 | Stop reason: HOSPADM

## 2022-05-12 RX ORDER — SODIUM CHLORIDE 0.9 % (FLUSH) 0.9 %
5-40 SYRINGE (ML) INJECTION AS NEEDED
Status: DISCONTINUED | OUTPATIENT
Start: 2022-05-12 | End: 2022-05-22 | Stop reason: HOSPADM

## 2022-05-12 RX ORDER — ASPIRIN 81 MG/1
81 TABLET ORAL DAILY
Status: DISCONTINUED | OUTPATIENT
Start: 2022-05-13 | End: 2022-05-22 | Stop reason: HOSPADM

## 2022-05-12 RX ORDER — TAMSULOSIN HYDROCHLORIDE 0.4 MG/1
0.4 CAPSULE ORAL DAILY
Status: DISCONTINUED | OUTPATIENT
Start: 2022-05-13 | End: 2022-05-22 | Stop reason: HOSPADM

## 2022-05-12 RX ORDER — SODIUM CHLORIDE 9 MG/ML
75 INJECTION, SOLUTION INTRAVENOUS CONTINUOUS
Status: DISCONTINUED | OUTPATIENT
Start: 2022-05-12 | End: 2022-05-16

## 2022-05-12 RX ORDER — ENOXAPARIN SODIUM 100 MG/ML
30 INJECTION SUBCUTANEOUS DAILY
Status: DISCONTINUED | OUTPATIENT
Start: 2022-05-13 | End: 2022-05-14

## 2022-05-12 RX ORDER — SODIUM CHLORIDE 0.9 % (FLUSH) 0.9 %
5-40 SYRINGE (ML) INJECTION EVERY 8 HOURS
Status: DISCONTINUED | OUTPATIENT
Start: 2022-05-12 | End: 2022-05-22 | Stop reason: HOSPADM

## 2022-05-12 RX ORDER — ONDANSETRON 2 MG/ML
4 INJECTION INTRAMUSCULAR; INTRAVENOUS
Status: DISCONTINUED | OUTPATIENT
Start: 2022-05-12 | End: 2022-05-22 | Stop reason: HOSPADM

## 2022-05-12 RX ORDER — FLUTICASONE PROPIONATE 50 MCG
2 SPRAY, SUSPENSION (ML) NASAL DAILY
Status: DISCONTINUED | OUTPATIENT
Start: 2022-05-13 | End: 2022-05-22 | Stop reason: HOSPADM

## 2022-05-12 RX ORDER — DONEPEZIL HYDROCHLORIDE 5 MG/1
5 TABLET, FILM COATED ORAL
Status: DISCONTINUED | OUTPATIENT
Start: 2022-05-12 | End: 2022-05-22 | Stop reason: HOSPADM

## 2022-05-12 RX ORDER — ONDANSETRON 4 MG/1
4 TABLET, ORALLY DISINTEGRATING ORAL
Status: DISCONTINUED | OUTPATIENT
Start: 2022-05-12 | End: 2022-05-22 | Stop reason: HOSPADM

## 2022-05-12 RX ORDER — PANTOPRAZOLE SODIUM 40 MG/1
40 TABLET, DELAYED RELEASE ORAL
Status: DISCONTINUED | OUTPATIENT
Start: 2022-05-12 | End: 2022-05-22 | Stop reason: HOSPADM

## 2022-05-12 RX ORDER — ACETAMINOPHEN 325 MG/1
650 TABLET ORAL
Status: DISCONTINUED | OUTPATIENT
Start: 2022-05-12 | End: 2022-05-22 | Stop reason: HOSPADM

## 2022-05-12 RX ORDER — SODIUM CHLORIDE 0.9 % (FLUSH) 0.9 %
5-10 SYRINGE (ML) INJECTION AS NEEDED
Status: DISCONTINUED | OUTPATIENT
Start: 2022-05-12 | End: 2022-05-12 | Stop reason: SDUPTHER

## 2022-05-12 RX ADMIN — CEFTRIAXONE 1 G: 1 INJECTION, POWDER, FOR SOLUTION INTRAMUSCULAR; INTRAVENOUS at 20:25

## 2022-05-12 RX ADMIN — SODIUM CHLORIDE, PRESERVATIVE FREE 10 ML: 5 INJECTION INTRAVENOUS at 22:53

## 2022-05-12 RX ADMIN — SODIUM CHLORIDE 125 ML/HR: 9 INJECTION, SOLUTION INTRAVENOUS at 22:52

## 2022-05-12 RX ADMIN — PANTOPRAZOLE SODIUM 40 MG: 40 TABLET, DELAYED RELEASE ORAL at 22:52

## 2022-05-12 RX ADMIN — CEFEPIME HYDROCHLORIDE 2 G: 2 INJECTION, POWDER, FOR SOLUTION INTRAVENOUS at 22:52

## 2022-05-12 RX ADMIN — SODIUM CHLORIDE, POTASSIUM CHLORIDE, SODIUM LACTATE AND CALCIUM CHLORIDE 1000 ML: 600; 310; 30; 20 INJECTION, SOLUTION INTRAVENOUS at 20:07

## 2022-05-12 RX ADMIN — DONEPEZIL HYDROCHLORIDE 5 MG: 5 TABLET, FILM COATED ORAL at 22:52

## 2022-05-12 NOTE — ED PROVIDER NOTES
EMERGENCY DEPARTMENT HISTORY AND PHYSICAL EXAM    Please note that this dictation was completed with [employer], the computer voice recognition software. Quite often unanticipated grammatical, syntax, homophones, and other interpretive errors are inadvertently transcribed by the computer software. Please disregard these errors. Please excuse any errors that have escaped final proofreading. Date: 5/12/2022  Patient Name: Inna Thomas  Patient Age and Sex: [de-identified] y.o. male    History of Presenting Illness     Chief Complaint   Patient presents with    Chills     Patient started shaking today and it was getting worse. History Provided By: Patient , daughter    Chief Complaint: rigors ams      HPI: Inna Thomas, is a [de-identified] y.o. male with history of recent UTI (micro grew enterococcus and pseudomonas, dx made on 5/5, patient started on omnicef and on 5/8 switched to levofloxacin which he is still taking), penile ca, autoimmune hepatitis, who presents to the ED from home with depressed mental status, intermittent rigors, poor po intake. He gets a visiting nurse who first noticed the symptoms this morning. His condition and mental status continued to deteriorate during the day and he was brought in to the ER. Patient himself states he feels chills but has no abdominal pain, headache or any other symptoms. Oriented to self and place, answers basic questions and follows commands although appears weak. No focal weakness. No nausea, vomiting. Recent diarrhea which has resolved. No abdominal pain, no sob, no cough. Pt denies any other alleviating or exacerbating factors. No other associated signs or symptoms. There are no other complaints, changes or physical findings at this time.      PCP: Alee Hooks MD    Past History   All documented elements of the [address] reviewed and verified by me. -Steve Mobley MD    Past Medical History:  Past Medical History:   Diagnosis Date    Arthritis     left arm    Autoimmune hepatitis ([facility] [address]     Bleeding ulcer 2019    BPH (benign prostatic hyperplasia)     CAD (coronary artery disease)     s/p stent    Cancer (HCC)     penile squamous carcinoma    Chronic kidney disease     stage 2     Chronic obstructive pulmonary disease (HCC)     Dementia (HCC)     Elevated LFT's     Essential hypertension 01    GERD (gastroesophageal reflux disease)     hiatal hernia    Ill-defined condition 2016    faint    Nephrosclerosis     Orthostatic hypotension 01    PVC's 01    Sleep apnea     no CPAP    Syncope 01    secondary to venous insuffiency        Past Surgical History:  Past Surgical History:   Procedure Laterality Date    COLONOSCOPY N/A 2016    COLONOSCOPY performed by [facility]jane Zhang MD at [facility] ENDOSCOPY    COLONOSCOPY N/A 2019    COLONOSCOPY performed by Joseph Hammer MD at  [facility]; HI RISK IND  2019         ECHO 2D ADULT  12    EF 60 - 65%; mild concentric hypertrophy; pulmonary systolic artery pressure upper limits normal    HX ABDOMINAL WALL DEFECT REPAIR  2019d     Exploratory laparotomy, segmental sigmoid colon resection and primary stapled anastomosis.     HX APPENDECTOMY  1985    HX HEENT  2020    Left temporal artery biopsy    HX HERNIA REPAIR  2018    Davinci hiatal hernia repair- Leonel Sharp MD    HX OTHER SURGICAL  2020    penile lesion bx    DC CARDIAC SURG PROCEDURE UNLIST  2019    1 stent    UPPER GI ENDOSCOPY,BIOPSY  2019         UPPER GI ENDOSCOPY,DIAGNOSIS  2019            Family History:   Family History   Problem Relation Age of Onset    Stroke Mother     Stroke Father     Heart Disease Father        Social History:  Social History     Tobacco Use    Smoking status: Former Smoker     Packs/day: 3.50     Quit date: 1980     Years since quittin.4    Smokeless tobacco: Never Used   Vaping Use    Vaping Use: Never used   Substance Use Topics    Alcohol use: Not Currently     Comment: no alcohol since 1999    Drug use: No       Current Medications:  No current facility-administered medications on file prior to encounter. Current Outpatient Medications on File Prior to Encounter   Medication Sig Dispense Refill    levoFLOXacin (Levaquin) 750 mg tablet Take 1 Tablet by mouth daily. 10 Tablet 0    cefdinir (OMNICEF) 300 mg capsule Take 1 Capsule by mouth two (2) times a day for 7 days. 14 Capsule 0    tamsulosin (Flomax) 0.4 mg capsule Take 1 Capsule by mouth daily for 30 days. 30 Capsule 0    aspirin delayed-release 81 mg tablet Take 81 mg by mouth daily.  atorvastatin (LIPITOR) 20 mg tablet Take 2 Tabs by mouth daily. 30 Tab 0    ondansetron hcl (Zofran) 4 mg tablet Take 2 Tabs by mouth two (2) times a day. (Patient taking differently: Take 8 mg by mouth every eight (8) hours as needed.) 30 Tab 0    traZODone (DESYREL) 50 mg tablet Take 50 mg by mouth nightly as needed.  carvediloL (COREG) 12.5 mg tablet Take 2 Tabs by mouth two (2) times daily (with meals). 180 Tab 1    ascorbic acid, vitamin C, (Vitamin C) 500 mg tablet Take  by mouth.  fluticasone propionate (FLONASE) 50 mcg/actuation nasal spray 2 Sprays by Both Nostrils route daily as needed.  psyllium husk (METAMUCIL PO) Take  by mouth.  donepeziL (ARICEPT) 5 mg tablet Take 1 Tab by mouth nightly. 90 Tab 1    pantoprazole (PROTONIX) 40 mg tablet Take 1 Tab by mouth ACB/HS. 90 Tab 1    neomycin-bacitracin-polymyxin (Neosporin, [id number],) 3.5mg-400 unit- 5,000 unit/gram ointment Apply  to affected area two (2) times a day. (Patient taking differently: Apply  to affected area as needed.) 1 Tube 0       Allergies:   Allergies   Allergen Reactions    Ace Inhibitors Other (comments)     Doesn't agree with pt    Demerol [Meperidine] Unknown (comments)     Causes unconsciousness       Review of Systems   All other systems reviewed and negative    Review of Systems   Constitutional: Positive for chills. Negative for fever. HENT: Negative for congestion. Eyes: Negative for visual disturbance. Respiratory: Negative for cough and shortness of breath. Cardiovascular: Negative for chest pain. Gastrointestinal: Negative for abdominal pain, diarrhea, nausea and vomiting. Endocrine: Negative. Genitourinary: Negative for dysuria and flank pain. Musculoskeletal: Negative for joint swelling. Skin: Negative. Negative for rash. Neurological: Positive for tremors. Negative for headaches. Psychiatric/Behavioral: Negative. Negative for agitation. All other systems reviewed and are negative. Physical Exam   Reviewed patients vital signs and nursing note    Physical Exam  Vitals and nursing note reviewed. Constitutional:       General: He is awake. Appearance: He is ill-appearing. HENT:      Head: Atraumatic. Nose: Nose normal.      Mouth/Throat:      Mouth: Mucous membranes are dry. Eyes:      Extraocular Movements: Extraocular movements intact. Conjunctiva/sclera: Conjunctivae normal.      Pupils: Pupils are equal, round, and reactive to light. Cardiovascular:      Rate and Rhythm: Normal rate and regular rhythm. Pulses: Normal pulses. Heart sounds: Normal heart sounds. Pulmonary:      Effort: Pulmonary effort is normal.      Breath sounds: Normal breath sounds. Abdominal:      General: Bowel sounds are normal.      Palpations: Abdomen is soft. Tenderness: There is no abdominal tenderness. Genitourinary:     Comments: S/p penile surgery  Musculoskeletal:         General: No tenderness. Normal range of motion. Cervical back: Normal range of motion and neck supple. No rigidity. Skin:     General: Skin is warm and dry. Capillary Refill: Capillary refill takes less than 2 seconds. Findings: No rash. Neurological:      General: No focal deficit present. Mental Status: He is easily aroused. He is lethargic. Cranial Nerves: No facial asymmetry. Sensory: Sensation is intact. Motor: Tremor present. No seizure activity. Deep Tendon Reflexes: Babinski sign absent on the right side. Babinski sign absent on the left side. Comments: No clonus   Psychiatric:         Mood and Affect: Mood normal.         Behavior: Behavior normal.         Diagnostic Study Results     Labs - I have personally reviewed and interpreted all laboratory results. Interpretation of available and pertinent results detailed below in MDM. Joseph Patel MD, MSc  Recent Results (from the past 24 hour(s))   CBC WITH AUTOMATED DIFF    Collection Time: 05/12/22  6:45 PM   Result Value Ref Range    WBC 8.5 4.1 - 11.1 K/uL    RBC 4.47 4.10 - 5.70 M/uL    HGB 13.2 12.1 - 17.0 g/dL    HCT 38.3 36.6 - 50.3 %    MCV 85.7 80.0 - 99.0 FL    MCH 29.5 26.0 - 34.0 PG    MCHC 34.5 30.0 - 36.5 g/dL    RDW 14.6 (H) 11.5 - 14.5 %    PLATELET 070 728 - 410 K/uL    MPV 10.5 8.9 - 12.9 FL    NRBC 0.0 0  WBC    ABSOLUTE NRBC 0.00 0.00 - 0.01 K/uL    NEUTROPHILS 69 32 - 75 %    LYMPHOCYTES 14 12 - 49 %    MONOCYTES 14 (H) 5 - 13 %    EOSINOPHILS 1 0 - 7 %    BASOPHILS 1 0 - 1 %    IMMATURE GRANULOCYTES 1 (H) 0.0 - 0.5 %    ABS. NEUTROPHILS 6.0 1.8 - 8.0 K/UL    ABS. LYMPHOCYTES 1.2 0.8 - 3.5 K/UL    ABS. MONOCYTES 1.2 (H) 0.0 - 1.0 K/UL    ABS. EOSINOPHILS 0.0 0.0 - 0.4 K/UL    ABS. BASOPHILS 0.1 0.0 - 0.1 K/UL    ABS. IMM.  GRANS. 0.1 (H) 0.00 - 0.04 K/UL    DF AUTOMATED     METABOLIC PANEL, COMPREHENSIVE    Collection Time: 05/12/22  6:45 PM   Result Value Ref Range    Sodium 138 136 - 145 mmol/L    Potassium 3.5 3.5 - 5.1 mmol/L    Chloride 106 97 - 108 mmol/L    CO2 22 21 - 32 mmol/L    Anion gap 10 5 - 15 mmol/L    Glucose 128 (H) 65 - 100 mg/dL    BUN 23 (H) 6 - 20 MG/DL    Creatinine 1.73 (H) 0.70 - 1.30 MG/DL    BUN/Creatinine ratio 13 12 - 20      GFR est AA 46 (L) >60 ml/min/1.73m2    GFR est non-AA 38 (L) >60 ml/min/1.73m2    Calcium 9.4 8.5 - 10.1 MG/DL    Bilirubin, total 0.9 0.2 - 1.0 MG/DL    ALT (SGPT) 56 12 - 78 U/L    AST (SGOT) 58 (H) 15 - 37 U/L    Alk. phosphatase 333 (H) 45 - 117 U/L    Protein, total 8.0 6.4 - 8.2 g/dL    Albumin 3.0 (L) 3.5 - 5.0 g/dL    Globulin 5.0 (H) 2.0 - 4.0 g/dL    A-G Ratio 0.6 (L) 1.1 - 2.2     POC LACTIC ACID    Collection Time: 05/12/22  6:54 PM   Result Value Ref Range    Lactic Acid (POC) 2.25 (HH) 0.40 - 2.00 mmol/L   EKG, 12 LEAD, INITIAL    Collection Time: 05/12/22  8:03 PM   Result Value Ref Range    Ventricular Rate 91 BPM    Atrial Rate 91 BPM    P-R Interval 160 ms    QRS Duration 76 ms    Q-T Interval 406 ms    QTC Calculation (Bezet) 499 ms    Calculated P Axis 53 degrees    Calculated R Axis 17 degrees    Calculated T Axis -14 degrees    Diagnosis       Normal sinus rhythm  Cannot rule out Inferior infarct , age undetermined  When compared with ECG of 05-MAY-2022 10:28,  Sinus rhythm has replaced Junctional rhythm  Minimal criteria for Inferior infarct are now present  ST now depressed in Inferior leads  Nonspecific T wave abnormality, worse in Inferior leads  Nonspecific T wave abnormality now evident in Anterolateral leads  QT has lengthened         Radiologic Studies - I have personally reviewed and interpreted (see MDM for brief interpreation of available results) all imaging studies and agree with radiology interpretation and report. - Primo Torrez MD, MSc  XR CHEST PORT   Final Result   No evidence of acute cardiopulmonary process. Medical Decision Making   I am the first provider for this patient. Records Reviewed:   I reviewed our electronic medical record system for any past medical records that were available that may contribute to the patient's current condition, including their PMH, surgical history, social and family history.   This includes most recent ED visits, any available discharge summaries and prior ECGs, which I have reviewed and interpreted personally. I have summarized most salient findings in my HPI and MDM. Cherie Jimenez MD, MSc    I also reviewed the nursing notes and vital signs from today's visit. Nursing notes will be reviewed as they become available in realtime while the pt has been in the ED. Cherie Jimenez MD, MSc      Vital Signs-Reviewed the patient's vital signs. Patient Vitals for the past 24 hrs:   Temp Pulse Resp BP SpO2   05/12/22 1813 98 °F (36.7 °C) 100 18 120/78 96 %       ECG interpretation: Junctional rhythm with rate 91, Qtc is 499, nonspecific t inversions precordial leads, and no changes concerning for acute ischemia. This ECG has been viewed and interpreted by me personally. Cherie Jimenez MD, Msc    Cardiac Monitor: Cardiac monitor interpreted by me. The cardiac monitor revealed normal sinus rhythm. The cardiac monitor was ordered to monitor patient for signs of cardiac dysrhythmia, which they are at risk for based on their history or risk for cardiovascular disease and/or metabolic abnormalities. Cherie Jimenez MD MSc    Pulse ox interpretation: 97% on RA with good pleth. Provider Notes (Medical Decision Making):   Assessment: [de-identified] male with history of dementia and recent UTI dx on levaquin presents from home with worsening mental status, poor po intake, intermittent muscle jerks that are getting worse. Afebrile, normal vital signs here. Non-focal exam.    Ddx: metabolic encephalopathy, worsening dementia, polypharmacy, dehydration, renal failure, liver failure. Does not meet sirs criteria and low clinical suspicion for sepsis or septic shock. Planned Workup and Intervention: full heme and metabolic panel, infectious workup    ED Course:   Initial assessment performed. The patients presenting problems have been discussed, and they are in agreement with the care plan formulated and outlined with them.   I have encouraged them to ask questions as they arise throughout their visit.    I personally reviewed and interpreted the patient's laboratory studies. Lactic acidosis likely secondary to dehydration vs liver dysfunction, but I suspect former. leticia also supportive of intravascular volume depletion. No other significant metabolic abnormalities. LFTs abnormal secondary to autoimmune hepatitis and about his baseline. Given patient's acute decline and inability for family to care for him, severe dehydration, will admit for further mgmt. Consult Note:  Aniyah Roland MD spoke with  hospitalist,   Discussed pt's hx, physical exam and available diagnostic and imaging results. Reviewed care plans. Agree with management and plan thus far. DISPOSITION: ADMIT  Patient is being admitted to the hospital.  Their test results and reasons for admission have been discussed. The patient and/or available family express agreement with and understanding of the need to be admitted and their admission diagnosis. Thank you for resuming the care of this patient. Please don't hesitate to contact me in the emergency department if you  have any additional questions. Aniyah Roland MD, MSc        Rudy Guerra MD, am the attending of record for this patient encounter. Diagnosis     Clinical Impression:   1. Acute cystitis with hematuria    2. Altered mental status, unspecified altered mental status type    3. Rigors    4. LETICIA (acute kidney injury) ([facility] [address]    5. Dehydration    6. Lactic acidosis        Attestation:  I personally performed the services described in this documentation on this date 5/12/2022 for patient Edvin Mensah.   Aniyah Roland MD

## 2022-05-13 LAB
ANION GAP SERPL CALC-SCNC: 10 MMOL/L (ref 5–15)
ATRIAL RATE: 91 BPM
BUN SERPL-MCNC: 24 MG/DL (ref 6–20)
BUN/CREAT SERPL: 14 (ref 12–20)
CALCIUM SERPL-MCNC: 8.8 MG/DL (ref 8.5–10.1)
CALCULATED P AXIS, ECG09: 53 DEGREES
CALCULATED R AXIS, ECG10: 17 DEGREES
CALCULATED T AXIS, ECG11: -14 DEGREES
CHLORIDE SERPL-SCNC: 107 MMOL/L (ref 97–108)
CO2 SERPL-SCNC: 23 MMOL/L (ref 21–32)
CREAT SERPL-MCNC: 1.76 MG/DL (ref 0.7–1.3)
DIAGNOSIS, 93000: NORMAL
ERYTHROCYTE [DISTWIDTH] IN BLOOD BY AUTOMATED COUNT: 14.6 % (ref 11.5–14.5)
GLUCOSE SERPL-MCNC: 127 MG/DL (ref 65–100)
HCT VFR BLD AUTO: 34.8 % (ref 36.6–50.3)
HGB BLD-MCNC: 11.7 G/DL (ref 12.1–17)
LACTATE SERPL-SCNC: 3.2 MMOL/L (ref 0.4–2)
MCH RBC QN AUTO: 29.2 PG (ref 26–34)
MCHC RBC AUTO-ENTMCNC: 33.6 G/DL (ref 30–36.5)
MCV RBC AUTO: 86.8 FL (ref 80–99)
NRBC # BLD: 0 K/UL (ref 0–0.01)
NRBC BLD-RTO: 0 PER 100 WBC
P-R INTERVAL, ECG05: 160 MS
PLATELET # BLD AUTO: 188 K/UL (ref 150–400)
PMV BLD AUTO: 10.2 FL (ref 8.9–12.9)
POTASSIUM SERPL-SCNC: 3.2 MMOL/L (ref 3.5–5.1)
Q-T INTERVAL, ECG07: 406 MS
QRS DURATION, ECG06: 76 MS
QTC CALCULATION (BEZET), ECG08: 499 MS
RBC # BLD AUTO: 4.01 M/UL (ref 4.1–5.7)
SODIUM SERPL-SCNC: 140 MMOL/L (ref 136–145)
VENTRICULAR RATE, ECG03: 91 BPM
WBC # BLD AUTO: 7.9 K/UL (ref 4.1–11.1)

## 2022-05-13 PROCEDURE — 74011000258 HC RX REV CODE- 258: Performed by: INTERNAL MEDICINE

## 2022-05-13 PROCEDURE — 74011250636 HC RX REV CODE- 250/636: Performed by: INTERNAL MEDICINE

## 2022-05-13 PROCEDURE — 92610 EVALUATE SWALLOWING FUNCTION: CPT

## 2022-05-13 PROCEDURE — 97530 THERAPEUTIC ACTIVITIES: CPT

## 2022-05-13 PROCEDURE — 83605 ASSAY OF LACTIC ACID: CPT

## 2022-05-13 PROCEDURE — 36415 COLL VENOUS BLD VENIPUNCTURE: CPT

## 2022-05-13 PROCEDURE — 51798 US URINE CAPACITY MEASURE: CPT

## 2022-05-13 PROCEDURE — 65270000029 HC RM PRIVATE

## 2022-05-13 PROCEDURE — 92526 ORAL FUNCTION THERAPY: CPT

## 2022-05-13 PROCEDURE — 74011250637 HC RX REV CODE- 250/637: Performed by: INTERNAL MEDICINE

## 2022-05-13 PROCEDURE — 97161 PT EVAL LOW COMPLEX 20 MIN: CPT

## 2022-05-13 PROCEDURE — 97165 OT EVAL LOW COMPLEX 30 MIN: CPT | Performed by: OCCUPATIONAL THERAPIST

## 2022-05-13 PROCEDURE — 95816 EEG AWAKE AND DROWSY: CPT | Performed by: INTERNAL MEDICINE

## 2022-05-13 PROCEDURE — 85027 COMPLETE CBC AUTOMATED: CPT

## 2022-05-13 PROCEDURE — 74011250637 HC RX REV CODE- 250/637: Performed by: STUDENT IN AN ORGANIZED HEALTH CARE EDUCATION/TRAINING PROGRAM

## 2022-05-13 PROCEDURE — 74011000250 HC RX REV CODE- 250: Performed by: INTERNAL MEDICINE

## 2022-05-13 PROCEDURE — 80048 BASIC METABOLIC PNL TOTAL CA: CPT

## 2022-05-13 PROCEDURE — 97530 THERAPEUTIC ACTIVITIES: CPT | Performed by: OCCUPATIONAL THERAPIST

## 2022-05-13 RX ORDER — POTASSIUM CHLORIDE 20 MEQ/1
40 TABLET, EXTENDED RELEASE ORAL
Status: COMPLETED | OUTPATIENT
Start: 2022-05-13 | End: 2022-05-13

## 2022-05-13 RX ORDER — POTASSIUM CHLORIDE 750 MG/1
40 TABLET, FILM COATED, EXTENDED RELEASE ORAL
Status: COMPLETED | OUTPATIENT
Start: 2022-05-13 | End: 2022-05-13

## 2022-05-13 RX ORDER — ESCITALOPRAM OXALATE 5 MG/1
5 TABLET ORAL DAILY
COMMUNITY

## 2022-05-13 RX ORDER — LORAZEPAM 0.5 MG/1
0.5 TABLET ORAL
Status: COMPLETED | OUTPATIENT
Start: 2022-05-13 | End: 2022-05-13

## 2022-05-13 RX ADMIN — CEFEPIME HYDROCHLORIDE 1 G: 1 INJECTION, POWDER, FOR SOLUTION INTRAMUSCULAR; INTRAVENOUS at 21:03

## 2022-05-13 RX ADMIN — SODIUM CHLORIDE, PRESERVATIVE FREE 10 ML: 5 INJECTION INTRAVENOUS at 20:14

## 2022-05-13 RX ADMIN — POTASSIUM CHLORIDE 40 MEQ: 20 TABLET, EXTENDED RELEASE ORAL at 05:17

## 2022-05-13 RX ADMIN — DONEPEZIL HYDROCHLORIDE 5 MG: 5 TABLET, FILM COATED ORAL at 20:15

## 2022-05-13 RX ADMIN — POTASSIUM CHLORIDE 40 MEQ: 750 TABLET, EXTENDED RELEASE ORAL at 18:03

## 2022-05-13 RX ADMIN — SODIUM CHLORIDE, PRESERVATIVE FREE 10 ML: 5 INJECTION INTRAVENOUS at 05:18

## 2022-05-13 RX ADMIN — TRAZODONE HYDROCHLORIDE 50 MG: 50 TABLET ORAL at 01:14

## 2022-05-13 RX ADMIN — PANTOPRAZOLE SODIUM 40 MG: 40 TABLET, DELAYED RELEASE ORAL at 20:15

## 2022-05-13 RX ADMIN — SODIUM CHLORIDE, PRESERVATIVE FREE 10 ML: 5 INJECTION INTRAVENOUS at 18:03

## 2022-05-13 RX ADMIN — LORAZEPAM 0.5 MG: 0.5 TABLET ORAL at 02:25

## 2022-05-13 NOTE — ED NOTES
Unable to cath pt due to penal carcinoma with ectomy, attempt unsuccessful, pt has been voiding on his own, has adult brief on, daughter states he recently passed his void trial, placed a urinal into the brief to catch urine, no tip of penis to attach condom cath either

## 2022-05-13 NOTE — PROGRESS NOTES
2205: Verbal shift change report given to Dago Zarate, RN (oncoming nurse) by Lobito Nguyen, RN (offgoing nurse). Report included the following information SBAR, Kardex, ED Summary, Intake/Output, MAR, Accordion, Recent Results, Med Rec Status and Cardiac Rhythm ST .    2222: Patient present in room 35 at this time. Patient repositioned in bed by this RN and offgoing RN. 2308: Spoke to Dr. Yuly Millan in regards to modifying lactic acid lab order. Lactic acid was 2.25 previously. Yuly Millan stated on telephone to place orders for one time lactic acid lab draw. 2340: Patient transferred in new bed with bed alarm on at this time. Door opened to monitor patient. 0041 05/13/2022: Spoke to Dr. Yuly Millan in regards to patient's lactic of 3.2. Yuly Millan stated to recheck tomorrow and also give PRN trazadone and see if will help with anxiety and restlessness. Stated will notify him if not successful.     0103: Patient pulling out lines and this RN has been in room multiple times, every 10-15 minutes to reposition patient and education on not pulling on lines. Patient pulled IV out and tele lines. This RN attempted x2 to place IV, unsuccessul. Patient was given CHG bath, new heart leads placed, gown changed, and oxygen probe on at this time. Called ED Charge to see of having sitter at bedside, stated no available techs at this time, but will send someone to come and place new peripheral IV.       0114: PRN trazadone 50 mg PO given. 0139: Colette Gil, ED Tech placed new peripheral IV. 20 g R wrist. IV fluids restarted at this time. 0219: Dr. Yuly Millan notified in regards to PO trazadone not helping patient sleep, only worked for about 30-45 minutes. Patient is back to pulling lines out. IV still in place. Matt placed order for one time PO ativan 0.5 mg.     0225: PO ativan 0.5mg given in applesauce to patient. Patient tolerated well.     1430: Spoke to Dr. Yuly Millan in regards to patient's K 3.2 and needing speech consult. Orders placed for KlorCon 40 mEq and speech consult am.     8660: Made rounds on patient. Patient sleeping at this time. 4380: End of Shift Note    Bedside shift change report given to Luis Paul RN (oncoming nurse) by Yo Booker (offgoing nurse). Report included the following information SBAR, Kardex, Intake/Output, MAR, Accordion, Recent Results, Med Rec Status and Cardiac Rhythm NSR/ST    Shift worked:  [id number]     Shift summary and any significant changes:     Acute changes documented above. Concerns for physician to address:  Noted above. Repeat lactic? Zone phone for oncoming shift:   XXXX       Activity:     Number times ambulated in hallways past shift: 0  Number of times OOB to chair past shift: 0    Cardiac:   Cardiac Monitoring: Yes      Cardiac Rhythm: Sinus Tachy    Access:   Current line(s): PIV     Genitourinary:   Urinary status: due to void    Respiratory:   O2 Device: None (Room air)  Chronic home O2 use?: NO  Incentive spirometer at bedside: NO       GI:     Current diet:  ADULT DIET Regular; Low Fat/Low Chol/High Fiber/2 gm Na  Passing flatus: YES  Tolerating current diet: NO       Pain Management:   Patient states pain is manageable on current regimen: YES    Skin:  Sergey Score: 18  Interventions: float heels, PT/OT consult and nutritional support     Patient Safety:  Fall Score:  Total Score: 4  Interventions: bed/chair alarm, gripper socks and stay with me (per policy)  High Fall Risk: Yes    Length of Stay:  Expected LOS: - - -  Actual LOS: 1842 [address]

## 2022-05-13 NOTE — PROGRESS NOTES
Problem: Mobility Impaired (Adult and Pediatric)  Goal: *Acute Goals and Plan of Care (Insert Text)  Description: FUNCTIONAL STATUS PRIOR TO ADMISSION: Per CM note, pt lives alone in one story home with 2 step entry. He has a caregiver that comes daily for 4 hrs and his dtr comes 3x/week to supplement but unsure how much help he needed with ADLS/amb. He has a RW and a cane. HOME SUPPORT PRIOR TO ADMISSION: The patient lived alone with caregiver and dtr to provide assistance. Physical Therapy Goals  Initiated 5/13/2022  1. Patient will move from supine to sit and sit to supine , scoot up and down, and roll side to side in bed with modified independence within 7 day(s). 2.  Patient will transfer from bed to chair and chair to bed with modified independence using the least restrictive device within 7 day(s). 3.  Patient will perform sit to stand with modified independence within 7 day(s). 4.  Patient will ambulate with supervision/set-up for 75 feet with the least restrictive device within 7 day(s). 5.  Patient will ascend/descend 2 stairs with handrail(s) with minimal assistance/contact guard assist within 7 day(s). Outcome: Not Met   PHYSICAL THERAPY EVALUATION  Patient: Geraldo Beltran (68 y.o. male)  Date: 5/13/2022  Primary Diagnosis: LETICIA (acute kidney injury) ([facility] [address] [N17.9]  Dehydration [E86.0]  UTI (urinary tract infection) [N39.0]  Lactic acidosis [E87.2]        Precautions:  Fall    ASSESSMENT  Based on the objective data described below, the patient presents with limitations in mobility, balance, gait and activity tolerance exhibiting chills and tremors, weakness all four extremities but presenting R weaker than L and with RUE PROM with cogwheel like rigidity. He requires mod to min A of 2 to come to sitting, mod A of 2 to return to supine. He requires guarding assist in sitting.  He was able to attempt stand from stretcher height with min A of 2 and side step to head of bed with mod A of 2 HHA, posterior lean. Pt was able to follow simple commands with some inconsistency but was oriented only to his first name. He will likely need SNF rehab pending progress. Current Level of Function Impacting Discharge (mobility/balance): min to mod A of 2, only side stepping at edge of bed currently    Functional Outcome Measure: The patient scored 5/100 on the barthel outcome measure which is indicative of 95% functional mobility. Other factors to consider for discharge: lives alone and has part time assist only; high fall risk; limited activity tolerance, confusion     Patient will benefit from skilled therapy intervention to address the above noted impairments. PLAN :  Recommendations and Planned Interventions: bed mobility training, transfer training, gait training, therapeutic exercises, patient and family training/education, and therapeutic activities      Frequency/Duration: Patient will be followed by physical therapy:  4 times a week to address goals. Recommendation for discharge: (in order for the patient to meet his/her long term goals)  Therapy up to 5 days/week in SNF setting    This discharge recommendation:  A follow-up discussion with the attending provider and/or case management is planned    IF patient discharges home will need the following DME: to be determined (TBD)         SUBJECTIVE:   Patient stated (limited talking).     OBJECTIVE DATA SUMMARY:   HISTORY:    Past Medical History:   Diagnosis Date    Arthritis     left arm    Autoimmune hepatitis ([facility] [address]     Bleeding ulcer 07/2019    BPH (benign prostatic hyperplasia)     CAD (coronary artery disease)     s/p stent    Cancer (HCC)     penile squamous carcinoma    Chronic kidney disease     stage 2     Chronic obstructive pulmonary disease (HCC)     Dementia (HCC)     Elevated LFT's     Essential hypertension 9/24/01    GERD (gastroesophageal reflux disease)     hiatal hernia    Ill-defined condition 12/02/2016    faint Nephrosclerosis     Orthostatic hypotension 9/24/01    PVC's 9/24/01    Sleep apnea     no CPAP    Syncope 9/24/01    secondary to venous insuffiency      Past Surgical History:   Procedure Laterality Date    COLONOSCOPY N/A 12/13/2016    COLONOSCOPY performed by Brittni Byrne MD at [facility] ENDOSCOPY    COLONOSCOPY N/A 12/11/2019    COLONOSCOPY performed by Gerardo Yancey MD at [address]; HI RISK IND  12/11/2019         ECHO 2D ADULT  5/24/12    EF 60 - 65%; mild concentric hypertrophy; pulmonary systolic artery pressure upper limits normal    HX ABDOMINAL WALL DEFECT REPAIR  07/01/2019d     Exploratory laparotomy, segmental sigmoid colon resection and primary stapled anastomosis. HX APPENDECTOMY  1985    HX HEENT  02/18/2020    Left temporal artery biopsy    HX HERNIA REPAIR  08/29/2018    Davinci hiatal hernia repair- Sidra Coy MD    HX OTHER SURGICAL  08/2020    penile lesion bx    SC CARDIAC SURG PROCEDURE UNLIST  05/13/2019    1 stent    UPPER GI ENDOSCOPY,BIOPSY  12/11/2019         UPPER GI ENDOSCOPY,DIAGNOSIS  7/18/2019            Personal factors and/or comorbidities impacting plan of care: arthritis, CA hx    Home Situation  Home Environment: Private residence  # Steps to Enter: 2  One/Two Story Residence: One story  Living Alone: Yes  Support Systems: Child(colleen),Other Family Member(s),Caregiver/Home Care Staff (Pt's daughter supports pt 3 days a week; pt has private pay caregiver 4 hours everyday; daughter is looking to bring more support into home.)  Patient Expects to be Discharged to[de-identified] Other: (Home with [place] and caregiver support vs SNF?  Pending hospital course and recommendations)  Current DME Used/Available at Home: Walker, rolling,Cane, straight (per CM note)    EXAMINATION/PRESENTATION/DECISION MAKING:   Critical Behavior:  Neurologic State: Isaura Kim open to voice (Pt responsive when talked to)  Orientation Level: Appropriate for age,Disoriented to time,Disoriented to situation,Disoriented to place,Oriented to person  Cognition: Decreased command following,Decreased attention/concentration  Safety/Judgement: Not assessed         Range Of Motion:  AROM: Grossly decreased, non-functional (limited to command; decreased more on RUE/RLE)           PROM: Generally decreased, functional           Strength:    Strength: Grossly decreased, non-functional (moreso on R; limited AROM to command)                    Tone & Sensation:   Tone: Abnormal (rigid; cogwheel-like on RUE)              Sensation:  (NT)               Coordination:  Coordination: Grossly decreased, non-functional (tremoring and shaky)  Vision:   Acuity:  (appears grossly intact)  Functional Mobility:  Bed Mobility:  Rolling: Moderate assistance  Supine to Sit: Minimum assistance;Assist x2  Sit to Supine: Moderate assistance;Assist x2  Scooting: Maximum assistance  Transfers:  Sit to Stand: Minimum assistance;Assist x2; Other (comment) (from stretcher height)  Stand to Sit: Minimum assistance;Assist x2                       Balance:   Sitting: Impaired  Sitting - Static: Poor (constant support)  Sitting - Dynamic: Poor (constant support)  Standing: Impaired  Standing - Static: Poor;Constant support  Standing - Dynamic : Poor;Constant support  Ambulation/Gait Training:              Gait Description (WDL):  (side steps with mod A of 2 at EOB only )                Functional Measure:  Barthel Index:    Bathin  Bladder: 0  Bowels: 0  Groomin  Dressin  Feedin  Mobility: 0  Stairs: 0  Toilet Use: 0  Transfer (Bed to Chair and Back): 5  Total: 5/100       The Barthel ADL Index: Guidelines  1. The index should be used as a record of what a patient does, not as a record of what a patient could do. 2. The main aim is to establish degree of independence from any help, physical or verbal, however minor and for whatever reason. 3. The need for supervision renders the patient not independent.   4. A patient's performance should be established using the best available evidence. Asking the patient, friends/relatives and nurses are the usual sources, but direct observation and common sense are also important. However direct testing is not needed. 5. Usually the patient's performance over the preceding 24-48 hours is important, but occasionally longer periods will be relevant. 6. Middle categories imply that the patient supplies over 50 per cent of the effort. 7. Use of aids to be independent is allowed. Score Interpretation (from [address])    Independent   60-79 Minimally independent   40-59 Partially dependent   20-39 Very dependent   <20 Totally dependent     -Alyssa Pina., Barthel, D.W. (1965). Functional evaluation: the Barthel Index. [address] ([address] (1997). The Barthel activities of daily living index: self-reporting versus actual performance in the old (> or = 75 years). Journal of [address] [place] [address] 45(7), [address] [facility], JGIGIM.F, Fabrizio Bell., Charla Mena. (1999). Measuring the change in disability after inpatient rehabilitation; comparison of the responsiveness of the Barthel Index and Functional Thornton Measure. Journal of Neurology, Neurosurgery, and Psychiatry, 66(4), 928-525. Jaclyn Kelley, N.J.A, MICHELLE Martin, & Mallory Connor M.A. (2004) Assessment of post-stroke quality of life in cost-effectiveness studies: The usefulness of the Barthel Index and the EuroQoL-5D.  Quality of Life Research, 15, 781-63           Physical Therapy Evaluation Charge Determination   History Examination Presentation Decision-Making   HIGH Complexity :3+ comorbidities / personal factors will impact the outcome/ POC  HIGH Complexity : 4+ Standardized tests and measures addressing body structure, function, activity limitation and / or participation in recreation  MEDIUM Complexity : Evolving with changing characteristics  LOW Complexity : FOTO score of       Based on the above components, the patient evaluation is determined to be of the following complexity level: LOW       Activity Tolerance:   Fair    After treatment patient left in no apparent distress:   Supine in bed, Call bell within reach, and stretcher rails up    COMMUNICATION/EDUCATION:   The patients plan of care was discussed with: Occupational therapist, Registered nurse, and Case management. Patient is unable to participate in goal setting and plan of care.     Thank you for this referral.  Esperanza Lamar, PT   Time Calculation: 14 mins

## 2022-05-13 NOTE — H&P
Hospitalist Admission Note    NAME: Carleen Brush   :  1940   MRN:  [id number]     Date/Time:  2022 9:01 PM    Patient PCP: Fidelia Uribe MD  ______________________________________________________________________  Given the patient's current clinical presentation, I have a high level of concern for decompensation if discharged from the emergency department. Complex decision making was performed, which includes reviewing the patient's available past medical records, laboratory results, and x-ray films. My assessment of this patient's clinical condition and my plan of care is as follows. Assessment / Plan:  Pseudomonal UTI POA  Causing Dehydration/LETICIA/lactic acidosis POA-due to decreased p.o. intake as per family  No sepsis criteria present on admission  Lactate 2.25  WBC 8.5  Afebrile  Creatinine 1.73  Chest x-ray negative acute  Procalcitonin 0.30  EKG normal sinus rhythm 91 bpm    Admit to medical bed  Status post IV fluid bolus in ED, continue IV fluid at 125ml/hr for now  Trend lactate to less than 2.0  UA with reflex urine culture when sample available-voided sample okay as patient has postsurgical anatomy after penile cancer surgery/resection-difficulty straight cath in ED  Check blood culture  Patient had taken PO Omnicef and Levaquin at home as outpatient for enterococcal and pseudomonal UTI on culture from 2022.     Autoimmune hepatitis  Dementia  Penile cancer status post resection  BPH  Continue home meds except statin for now        Code Status: Full code but daughter does not want him to be on life support for too long  Surrogate Decision Maker: Daughter Burnis Back # [phone]    DVT Prophylaxis: Subcu Lovenox  GI Prophylaxis: not indicated    Baseline: Patient is relatively independent at home, has walker and a cane but does not use it much as per the daughter, has a caregiver who stays across this street who checks on him multiple times per day Subjective:   CHIEF COMPLAINT: Chills/shakiness with decreased p.o. intake and generalized weakness for the past couple of days    HISTORY OF PRESENT ILLNESS:     Randy Mccoy is a [de-identified] y.o.  male who presents with above complaint from home with daughter. Patient present with chief complaint of worsening generalized weakness with decreased p.o. intake for the past 2 to 3 days as per the daughter despite taking oral antibiotic for recently diagnosed UTI 5/5-urine culture grew Enterococcus and pansensitive Pseudomonas-patient was initially treated with Omnicef and then changed to Levaquin 3 days ago as per the daughter. Patient has history of dementia and is a poor historian. Denies any obvious high fever but admits to decreased p.o. intake with chills/shaking tremors that is new in the past 2 days as per the daughter. Patient was found to have mild LETICIA with dehydration and lactic acidosis at 2.25 on work-up in the ED with negative chest x-ray and unremarkable CBC. 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 We were asked to admit for work up and evaluation of the above problems.      Past Medical History:   Diagnosis Date    Arthritis     left arm    Autoimmune hepatitis ([address]     Bleeding ulcer 07/2019    BPH (benign prostatic hyperplasia)     CAD (coronary artery disease)     s/p stent    Cancer (HCC)     penile squamous carcinoma    Chronic kidney disease     stage 2     Chronic obstructive pulmonary disease (HCC)     Dementia (HCC)     Elevated LFT's     Essential hypertension 9/24/01    GERD (gastroesophageal reflux disease)     hiatal hernia    Ill-defined condition 12/02/2016    faint    Nephrosclerosis     Orthostatic hypotension 9/24/01    PVC's 9/24/01    Sleep apnea     no CPAP  Syncope 01    secondary to venous insuffiency         Past Surgical History:   Procedure Laterality Date    COLONOSCOPY N/A 2016    COLON[facility]    COLONOSCOPY N/A 2019    COLONOSCOPY performed by Zeinab Winters MD at [address] [place] [address]; HI RISK IND  2019         ECHO 2D ADULT  12    EF 60 - 65%; mild concentric hypertrophy; pulmonary systolic artery pressure upper limits normal    HX ABDOMINAL WALL DEFECT REPAIR  2019d     Exploratory laparotomy, segmental sigmoid colon resection and primary stapled anastomosis.  HX APPENDECTOMY  1985    HX HEENT  2020    Left temporal artery biopsy    HX HERNIA REPAIR  2018    Vikas Silk hiatal hernia repair- Miya Mendiola MD    HX OTHER SURGICAL  2020    penile lesion bx    MT CARDIAC SURG PROCEDURE UNLIST  2019    1 stent    UPPER GI ENDOSCOPY,BIOPSY  2019         UPPER GI ENDOSCOPY,DIAGNOSIS  2019            Social History     Tobacco Use    Smoking status: Former Smoker     Packs/day: 3.50     Quit date: 1980     Years since quittin.4    Smokeless tobacco: Never Used   Substance Use Topics    Alcohol use: Not Currently     Comment: no alcohol since         Family History   Problem Relation Age of Onset    Stroke Mother     Stroke Father     Heart Disease Father      Allergies   Allergen Reactions    Ace Inhibitors Other (comments)     Doesn't agree with pt    Demerol [Meperidine] Unknown (comments)     Causes unconsciousness        Prior to Admission medications    Medication Sig Start Date End Date Taking? Authorizing Provider   levoFLOXacin (Levaquin) 750 mg tablet Take 1 Tablet by mouth daily. 22   JOSEFINA Saleem   cefdinir (OMNICEF) 300 mg capsule Take 1 Capsule by mouth two (2) times a day for 7 days.  22  Jelly Sawyer MD   tamsulosin (Flomax) 0.4 mg capsule Take 1 Capsule by mouth daily for 30 days. 5/5/22 6/4/22  Bin Rodriguez MD   aspirin delayed-release 81 mg tablet Take 81 mg by mouth daily. Provider, Historical   atorvastatin (LIPITOR) 20 mg tablet Take 2 Tabs by mouth daily. 1/1/21   Kana Bell MD   ondansetron hcl (Zofran) 4 mg tablet Take 2 Tabs by mouth two (2) times a day. Patient taking differently: Take 8 mg by mouth every eight (8) hours as needed. 1/1/21   Kana Bell MD   traZODone (DESYREL) 50 mg tablet Take 50 mg by mouth nightly as needed. 10/31/20   Provider, Historical   carvediloL (COREG) 12.5 mg tablet Take 2 Tabs by mouth two (2) times daily (with meals). 11/27/20   Micaela Dakins, MD   ascorbic acid, vitamin C, (Vitamin C) 500 mg tablet Take  by mouth. Provider, Historical   fluticasone propionate (FLONASE) 50 mcg/actuation nasal spray 2 Sprays by Both Nostrils route daily as needed. Provider, Historical   psyllium husk (METAMUCIL PO) Take  by mouth. Provider, Historical   donepeziL (ARICEPT) 5 mg tablet Take 1 Tab by mouth nightly. 10/8/20   Micaela Dakins, MD   pantoprazole (PROTONIX) 40 mg tablet Take 1 Tab by mouth ACB/HS. 10/8/20   Micaela Dakins, MD   neomycin-bacitracin-polymyxin (Neosporin, off-osk-[id number],) 3.5mg-400 unit- 5,000 unit/gram ointment Apply  to affected area two (2) times a day. Patient taking differently: Apply  to affected area as needed. 8/18/20   Irina Angel MD       REVIEW OF SYSTEMS:     I am not able to complete the review of systems because:    The patient is intubated and sedated    The patient has altered mental status due to his acute medical problems    The patient has baseline aphasia from prior stroke(s)   x The patient has baseline dementia and is not reliable historian    The patient is in acute medical distress and unable to provide information             Objective:   VITALS:    Visit Vitals  /78 (BP 1 Location: Left upper arm, BP Patient Position: Sitting) Pulse 100   Temp 98 °F (36.7 °C)   Resp 18   Ht 5' 4\" (1.626 m)   Wt 71.2 kg (157 lb)   SpO2 96%   BMI 26.95 kg/m²       PHYSICAL EXAM:    General:    Alert, cooperative, no distress, appears stated age. Chronically ill and frail looking   chills/rigors noted+  HEENT: Atraumatic, anicteric sclerae, pink conjunctivae     No oral ulcers, mucosa dry+, throat clear, dentition fair  Neck:  Supple, symmetrical,  thyroid: non tender  Lungs:   Clear to auscultation bilaterally. No Wheezing or Rhonchi. No rales. Chest wall:  No tenderness  No Accessory muscle use. Heart:   Regular  rhythm,  No  murmur   No edema  Abdomen:   Soft, non-tender. Not distended. Bowel sounds normal  Extremities: No cyanosis. No clubbing,      Skin turgor normal, Capillary refill normal, Radial dial pulse 2+  Skin:     Not pale. Not Jaundiced  No rashes   Psych:  Good insight. Not depressed. Not anxious or agitated. Dementia noted+  Neurologic: EOMs intact. No facial asymmetry. No aphasia or slurred speech. Symmetrical strength, Sensation grossly intact.  Alert and oriented X 1.     _______________________________________________________________________  Care Plan discussed with:    Comments   Patient x    Family  x  daughter Charlie See x    Care Manager                    Consultant:  holley Posada Rater   _______________________________________________________________________  Expected  Disposition:   Home with Family    HH/PT/OT/RN x   SNF/LTC    [facility]    ________________________________________________________________________  TOTAL TIME:  46 Minutes    Critical Care Provided     Minutes non procedure based      Comments    x Reviewed previous records   >50% of visit spent in counseling and coordination of care x Discussion with patient and family and questions answered       ________________________________________________________________________  Signed: Liyah Del Real MD    Procedures: see electronic medical records for all procedures/Xrays and details which were not copied into this note but were reviewed prior to creation of Plan. LAB DATA REVIEWED:    Recent Results (from the past 24 hour(s))   CBC WITH AUTOMATED DIFF    Collection Time: 05/12/22  6:45 PM   Result Value Ref Range    WBC 8.5 4.1 - 11.1 K/uL    RBC 4.47 4.10 - 5.70 M/uL    HGB 13.2 12.1 - 17.0 g/dL    HCT 38.3 36.6 - 50.3 %    MCV 85.7 80.0 - 99.0 FL    MCH 29.5 26.0 - 34.0 PG    MCHC 34.5 30.0 - 36.5 g/dL    RDW 14.6 (H) 11.5 - 14.5 %    PLATELET 790 183 - 669 K/uL    MPV 10.5 8.9 - 12.9 FL    NRBC 0.0 0  WBC    ABSOLUTE NRBC 0.00 0.00 - 0.01 K/uL    NEUTROPHILS 69 32 - 75 %    LYMPHOCYTES 14 12 - 49 %    MONOCYTES 14 (H) 5 - 13 %    EOSINOPHILS 1 0 - 7 %    BASOPHILS 1 0 - 1 %    IMMATURE GRANULOCYTES 1 (H) 0.0 - 0.5 %    ABS. NEUTROPHILS 6.0 1.8 - 8.0 K/UL    ABS. LYMPHOCYTES 1.2 0.8 - 3.5 K/UL    ABS. MONOCYTES 1.2 (H) 0.0 - 1.0 K/UL    ABS. EOSINOPHILS 0.0 0.0 - 0.4 K/UL    ABS. BASOPHILS 0.1 0.0 - 0.1 K/UL    ABS. IMM. GRANS. 0.1 (H) 0.00 - 0.04 K/UL    DF AUTOMATED     METABOLIC PANEL, COMPREHENSIVE    Collection Time: 05/12/22  6:45 PM   Result Value Ref Range    Sodium 138 136 - 145 mmol/L    Potassium 3.5 3.5 - 5.1 mmol/L    Chloride 106 97 - 108 mmol/L    CO2 22 21 - 32 mmol/L    Anion gap 10 5 - 15 mmol/L    Glucose 128 (H) 65 - 100 mg/dL    BUN 23 (H) 6 - 20 MG/DL    Creatinine 1.73 (H) 0.70 - 1.30 MG/DL    BUN/Creatinine ratio 13 12 - 20      GFR est AA 46 (L) >60 ml/min/1.73m2    GFR est non-AA 38 (L) >60 ml/min/1.73m2    Calcium 9.4 8.5 - 10.1 MG/DL    Bilirubin, total 0.9 0.2 - 1.0 MG/DL    ALT (SGPT) 56 12 - 78 U/L    AST (SGOT) 58 (H) 15 - 37 U/L    Alk.  phosphatase 333 (H) 45 - 117 U/L    Protein, total 8.0 6.4 - 8.2 g/dL    Albumin 3.0 (L) 3.5 - 5.0 g/dL    Globulin 5.0 (H) 2.0 - 4.0 g/dL    A-G Ratio 0.6 (L) 1.1 - 2.2     POC LACTIC ACID    Collection Time: 05/12/22  6:54 PM   Result Value Ref Range    Lactic Acid (POC) 2.25 (HH) 0.40 - 2.00 mmol/L   EKG, 12 LEAD, INITIAL    Collection Time: 05/12/22  8:03 PM   Result Value Ref Range    Ventricular Rate 91 BPM    Atrial Rate 91 BPM    P-R Interval 160 ms    QRS Duration 76 ms    Q-T Interval 406 ms    QTC Calculation (Bezet) 499 ms    Calculated P Axis 53 degrees    Calculated R Axis 17 degrees    Calculated T Axis -14 degrees    Diagnosis       Normal sinus rhythm  Cannot rule out Inferior infarct , age undetermined  When compared with ECG of 05-MAY-2022 10:28,  Sinus rhythm has replaced Junctional rhythm  Minimal criteria for Inferior infarct are now present  ST now depressed in Inferior leads  Nonspecific T wave abnormality, worse in Inferior leads  Nonspecific T wave abnormality now evident in Anterolateral leads  QT has lengthened

## 2022-05-13 NOTE — PROGRESS NOTES
Hospitalist Progress Note    NAME: Esteban Waters   :  1940   MRN:  [id number]       Assessment / Plan:  Pseudomonal UTI POA  Causing Dehydration/LETICIA/lactic acidosis POA-due to decreased p.o. intake as per family  No sepsis criteria present on admission  Lactate 2.25  WBC 8.5  Afebrile  Creatinine 1.73  Chest x-ray negative acute  Procalcitonin 0.30  EKG normal sinus rhythm 91 bpm   Status post IV fluid bolus in ED, continue IV fluid at 125ml/hr for now  Trend lactate to less than 2.0  Status post straight cath by urology, send sample to the lab and follow-up urine culture postsurgical anatomy after penile cancer surgery/resection-difficulty straight cath in ED  Blood culture negative to date  Patient had taken PO Omnicef and Levaquin at home as outpatient for enterococcal and pseudomonal UTI on culture from 2022.     Autoimmune hepatitis  Dementia  Penile cancer status post resection  BPH  Continue home meds except statin for now     Generalized twitching  Family reported twitching of the upper extremity which has started prior to admission  And reporting being new  We will check EEG and consult neurology       Code Status: Full code but daughter does not want him to be on life support for too long  Surrogate Decision Maker: Daughter Allegra # [phone]     DVT Prophylaxis: Subcu Lovenox  GI Prophylaxis: not indicated     Baseline: Patient is relatively independent at home, has walker and a cane but does not use it much as per the daughter, has a caregiver who stays across this street who checks on him multiple times per day    25.0 - 29.9 Overweight / Body mass index is 26.95 kg/m². Estimated discharge date: May 17  Barriers:    Updated family at bedside     Subjective:     Chief Complaint / Reason for Physician Visit  Follow-up pseudomonal UTI. Patient is awake alert   discussed with RN events overnight.      Review of Systems:  Symptom Y/N Comments  Symptom Y/N Comments   Fever/Chills    Chest Pain     Poor Appetite    Edema     Cough    Abdominal Pain     Sputum    Joint Pain     SOB/DE JESUS    Pruritis/Rash     Nausea/vomit    Tolerating PT/OT     Diarrhea    Tolerating Diet     Constipation    Other       Could NOT obtain due to:  Confused     Objective:     VITALS:   Last 24hrs VS reviewed since prior progress note. Most recent are:  Patient Vitals for the past 24 hrs:   Temp Pulse Resp BP SpO2   05/13/22 1313 98.1 °F (36.7 °C) -- 16 137/72 100 %   05/13/22 0932 98 °F (36.7 °C) 85 20 (!) 141/74 99 %   05/13/22 0731 97.1 °F (36.2 °C) 91 19 (!) 145/91 99 %   05/13/22 0416 97.1 °F (36.2 °C) (!) 109 17 98/72 99 %   05/12/22 2230 98.8 °F (37.1 °C) (!) 103 24 124/89 94 %   05/12/22 2031 -- 90 16 (!) 148/122 97 %   05/12/22 2016 -- -- 19 131/83 95 %   05/12/22 1813 98 °F (36.7 °C) 100 18 120/78 96 %       Intake/Output Summary (Last 24 hours) at 5/13/2022 1538  Last data filed at 5/13/2022 0731  Gross per 24 hour   Intake 1050 ml   Output --   Net 1050 ml        I had a face to face encounter and independently examined this patient on 5/13/2022, as outlined below:  PHYSICAL EXAM:  General: WD, WN. Alert, cooperative, no acute distress    EENT:  EOMI. Anicteric sclerae. MMM  Resp:  CTA bilaterally, no wheezing or rales. No accessory muscle use  CV:  Regular  rhythm,  No edema  GI:  Soft, Non distended, Non tender. +Bowel sounds  Neurologic:  Alert and oriented X 1, normal speech,   Psych:   Poor insight. Not anxious nor agitated  Skin:  No rashes.   No jaundice    Reviewed most current lab test results and cultures  YES  Reviewed most current radiology test results   YES  Review and summation of old records today    NO  Reviewed patient's current orders and MAR    YES  PMH/SH reviewed - no change compared to H&P  ________________________________________________________________________  Care Plan discussed with:    Comments   Patient x    Family      RN x    Care Manager     Consultant Multidiciplinary team rounds were held today with , nursing, pharmacist and clinical coordinator. Patient's plan of care was discussed; medications were reviewed and discharge planning was addressed. ________________________________________________________________________  Total NON critical care TIME: 35  Minutes    Total CRITICAL CARE TIME Spent:   Minutes non procedure based      Comments   >50% of visit spent in counseling and coordination of care     ________________________________________________________________________  Cat Pink MD     Procedures: see electronic medical records for all procedures/Xrays and details which were not copied into this note but were reviewed prior to creation of Plan. LABS:  I reviewed today's most current labs and imaging studies.   Pertinent labs include:  Recent Labs     05/13/22  0335 05/12/22  1845   WBC 7.9 8.5   HGB 11.7* 13.2   HCT 34.8* 38.3    266     Recent Labs     05/13/22  0335 05/12/22  1845    138   K 3.2* 3.5    106   CO2 23 22   * 128*   BUN 24* 23*   CREA 1.76* 1.73*   CA 8.8 9.4   ALB  --  3.0*   TBILI  --  0.9   ALT  --  56       Signed: Cat Pink MD

## 2022-05-13 NOTE — PROGRESS NOTES
FUNCTIONAL STATUS PRIOR TO ADMISSION: per chart review (pt is unable to contribute to PLOF), ambulated without assist, performed ADLS? HOME SUPPORT PRIOR TO ADMISSION: The patient lived alone with neighbor whom checks on pt multiple times a day to provide assistance. Occupational Therapy Goals:  Initiated 5/13/2022  1. Patient will perform grooming seated with supervision/set-up within 7 days. 2. Patient will perform dressing with minimal assistance within 7 days. 3. Patient will perform toileting with moderate assistance  within 7 days. 4. Patient will transfer from toilet with minimal assistance/contact guard assist using the least restrictive device and appropriate durable medical equipment within 7 days. OCCUPATIONAL THERAPY EVALUATION  Patient: Nena Grissom (68 y.o. male)  Date: 5/13/2022  Primary Diagnosis: LETICIA (acute kidney injury) ([facility] [address] [N17.9]  Dehydration [E86.0]  UTI (urinary tract infection) [N39.0]  Lactic acidosis [E87.2]       Precautions: fall  Fall    ASSESSMENT  Based on the objective data described below, the patient presents with decreased attention, confusion, full body tremors/startle reflex and decreased overall independence with mobility and ADLS. He was very pleasant but was only oriented to his first name. He had been given a variety of sedatives over the evening due to pulling lines/leads and being very restless. He responds better to physical cues than verbal cues but was able to follow simple one step commands about 40% of the time. Min assist x2 needed for sit to stand and for side stepping to head of stretcher. Pt had decreased ability to weight shift and side step and had posterior lean. Unable to get pt to perform LB ADLS due to decreased seated and standing balance. He indicated increased pain in right UE with attempt of ROM and had cogwheel rigidity. LUE is more functional and has less regidity with AAROM.   Pt was able to reach his face with bilateral hands but was limited by tremors. He currently needs assist with all ADLS and functional tasks. He is at a high risk for falls and will need rehab at discharge. Current Level of Function Impacting Discharge (ADLs/self-care): min assist x2  needed for supine to sit and to briefly side step head over stretcher with hand held assist.  Feeding: Maximum assistance    Oral Facial Hygiene/Grooming: Maximum assistance; Total assistance    Bathing: Total assistance    Upper Body Dressing: Total assistance    Lower Body Dressing: Total assistance    Toileting: Total assistance        Functional Outcome Measure: The patient scored 5/100 on the barthel outcome measure which is indicative of significant decline in mobility and ADLS. Other factors to consider for discharge: none     Patient will benefit from skilled therapy intervention to address the above noted impairments. PLAN :  Recommendations and Planned Interventions: self care training, functional mobility training, therapeutic exercise, balance training, visual/perceptual training, therapeutic activities, cognitive retraining, neuromuscular re-education, patient education and home safety training    Frequency/Duration: Patient will be followed by occupational therapy 3 times a week to address goals.     Recommendation for discharge: (in order for the patient to meet his/her long term goals)  Therapy up to 5 days/week in SNF setting    This discharge recommendation:  Has been made in collaboration with the attending provider and/or case management    IF patient discharges home will need the following DME: needs rehab       SUBJECTIVE:   Patient stated Yes, yes, no.  Only one word responses this session    OBJECTIVE DATA SUMMARY:   HISTORY:   Past Medical History:   Diagnosis Date    Arthritis     left arm    [facility] ([facility] [address]     Bleeding ulcer 07/2019    BPH (benign prostatic hyperplasia)     CAD (coronary artery disease)     s/p stent    Cancer ([facility] [address]     penile squamous carcinoma    Chronic kidney disease     stage 2     Chronic obstructive pulmonary disease (HCC)     Dementia (HCC)     Elevated LFT's     Essential hypertension 9/24/01    GERD (gastroesophageal reflux disease)     hiatal hernia    Ill-defined condition 12/02/2016    faint    Nephrosclerosis     Orthostatic hypotension 9/24/01    PVC's 9/24/01    Sleep apnea     no CPAP    Syncope 9/24/01    secondary to venous insuffiency      Past Surgical History:   Procedure Laterality Date    COLONOSCOPY N/A 12/13/2016    COLONOSCOPY performed by [facility]jane Zhang MD at [facility] ENDOSCOPY    COLONOSCOPY N/A 12/11/2019    COLONOSCOPY performed by Joseph Hammer MD at  [facility]; HI RISK IND  12/11/2019         ECHO 2D ADULT  5/24/12    EF 60 - 65%; mild concentric hypertrophy; pulmonary systolic artery pressure upper limits normal    HX ABDOMINAL WALL DEFECT REPAIR  07/01/2019d     Exploratory laparotomy, segmental sigmoid colon resection and primary stapled anastomosis.  HX APPENDECTOMY  1985    HX HEENT  02/18/2020    Left temporal artery biopsy    HX HERNIA REPAIR  08/29/2018    Davinci hiatal hernia repair- Leonel Sharp MD    HX OTHER SURGICAL  08/2020    penile lesion bx    [facility] CARDI[facility] SURG PROCEDURE UNLIST  05/13/2019    1 stent    UPPER GI ENDOSCOPY,BIOPSY  12/11/2019         UPPER GI ENDOSCOPY,DIAGNOSIS  7/18/2019            Expanded or extensive additional review of patient history:     Home Situation  Living Alone: Yes (per chart review, has caregiver that checks on pt daily)  Support Systems: Child(colleen),Other Family Member(s),Caregiver/Home Care Staff (Pt's daughter supports pt 3 days a week; pt has private pay caregiver 4 hours everyday; daughter is looking to bring more support into home.)  Patient Expects to be Discharged to[de-identified] Other: ([place] and caregiver support vs SNF?  Pending hospital course and recommendations)    Hand dominance: Right    EXAMINATION OF PERFORMANCE DEFICITS:  Cognitive/Behavioral Status:  Neurologic State: Drowsy; Alert;Eyes open to voice (Pt responsive when talked to)  Orientation Level: Appropriate for age;Disoriented to time;Disoriented to situation;Disoriented to place;Oriented to person  Cognition: Decreased command following;Decreased attention/concentration  Perception: Appears intact  Perseveration: No perseveration noted  Safety/Judgement: Not assessed    Vision/Perceptual:                           Acuity:  (appears grossly intact)         Range of Motion:    AROM: Grossly decreased, non-functional (limited to command; decreased more on RUE/RLE)  PROM: Generally decreased, functional                      Strength:    Strength: Grossly decreased, non-functional (moreso on R; limited AROM to command)                Coordination:  Coordination: Grossly decreased, non-functional (tremoring and shaky)  Fine Motor Skills-Upper: Left Impaired;Right Impaired    Gross Motor Skills-Upper: Left Impaired;Right Impaired    Tone & Sensation:    Tone: Abnormal (rigid; cogwheel-like on RUE)  Sensation:  (NT)                      Balance:  Sitting: Impaired  Sitting - Static: Poor (constant support)  Sitting - Dynamic: Poor (constant support)  Standing: Impaired  Standing - Static: Poor;Constant support  Standing - Dynamic : Poor;Constant support    Functional Mobility and Transfers for ADLs:  Bed Mobility:  Rolling: Moderate assistance  Supine to Sit: Minimum assistance;Assist x2  Sit to Supine: Moderate assistance;Assist x2  Scooting: Maximum assistance    Transfers:  Sit to Stand: Minimum assistance;Assist x2; Other (comment) (from stretcher height)  Stand to Sit: Minimum assistance;Assist x2  Bathroom Mobility:  (unable)    ADL Assessment:  Feeding: Maximum assistance    Oral Facial Hygiene/Grooming: Maximum assistance; Total assistance    Bathing: Total assistance    Upper Body Dressing:  Total assistance    Lower Body Dressing: Total assistance    Toileting: Total assistance                ADL Intervention and task modifications:  See assessment  Cognitive Retraining  Safety/Judgement: Not assessed      Functional Measure:    Barthel Index:  Bathin  Bladder: 0  Bowels: 0  Groomin  Dressin  Feedin  Mobility: 0  Stairs: 0  Toilet Use: 0  Transfer (Bed to Chair and Back): 5  Total: 5/100      The Barthel ADL Index: Guidelines  1. The index should be used as a record of what a patient does, not as a record of what a patient could do. 2. The main aim is to establish degree of independence from any help, physical or verbal, however minor and for whatever reason. 3. The need for supervision renders the patient not independent. 4. A patient's performance should be established using the best available evidence. Asking the patient, friends/relatives and nurses are the usual sources, but direct observation and common sense are also important. However direct testing is not needed. 5. Usually the patient's performance over the preceding 24-48 hours is important, but occasionally longer periods will be relevant. 6. Middle categories imply that the patient supplies over 50 per cent of the effort. 7. Use of aids to be independent is allowed. Score Interpretation (from [facility] [address])    Independent   60-79 Minimally independent   40-59 Partially dependent   20-39 Very dependent   <20 Totally dependent     -Alyssa Pina., Barthel, D.W. (1965). Functional evaluation: the Barthel Index. [facility] ([address] (). The Barthel activities of daily living index: self-reporting versus actual performance in the old (> or = 75 years). Journal of [address] [place] [address] 45(7), [facility], J.JRyderMRyderF, Jah Rodriguez., Ki Rothman. (1999).  Measuring the change in disability after inpatient rehabilitation; comparison of the responsiveness of the Barthel Index and Functional [place] Measure. Journal of Neurology, Neurosurgery, and Psychiatry, 66(4), 337-383. ROCCO Stroud, MICHELLE Martin, & Radha Kapadia M.A. (2004) Assessment of post-stroke quality of life in cost-effectiveness studies: The usefulness of the Barthel Index and the EuroQoL-5D. Quality of Life Research, 15, 378-74         Occupational Therapy Evaluation Charge Determination   History Examination Decision-Making   LOW Complexity : Brief history review  HIGH Complexity : 5 or more performance deficits relating to physical, cognitive , or psychosocial skils that result in activity limitations and / or participation restrictions HIGH Complexity : Patient presents with comorbidities that affect occupational performance. Signifigant modification of tasks or assistance (eg, physical or verbal) with assessment (s) is necessary to enable patient to complete evaluation       Based on the above components, the patient evaluation is determined to be of the following complexity level: LOW   Pain Rating:  Pt indicated pain with ROM of right shoulder, unable to rate    Activity Tolerance:   Poor    After treatment patient left in no apparent distress:    Supine in bed, Heels elevated for pressure relief, Call bell within reach and Bed / chair alarm activated    COMMUNICATION/EDUCATION:   The patients plan of care was discussed with: Physical therapist and Registered nurse. Patient is unable to participate in goal setting and plan of care. This patients plan of care is appropriate for delegation to hospitals.     Thank you for this referral.  Troy Rodriguez OTR/L  Time Calculation: 13 mins

## 2022-05-13 NOTE — ED NOTES
Entered pt's room to transfer him to ER 35, pt has pulled off most of the Cardiac monitor leads ,urinal on the floor, has scooted down to middle of the stretcher, red in the face, dried blood noted to inner left thigh groin oozing from tip of penis after urine cath this evening. Placed pt back on CM, groin cleansed, adult brief changed, pulse ox replaced, cleansed pt facial lips with caked dried skin, pt following commands, states full name, denies pain states no when asked if in pain, continues with frequent shivers.

## 2022-05-13 NOTE — PROGRESS NOTES
P&T-Approved DVT Prophylaxis Dosing    Per P&T Committee-approved protocol enoxaparin 40 mg daily has been adjusted to enoxaparin 30 mg daily based on weight and renal function as shown in the table below.          France Villegas, PHARMD

## 2022-05-13 NOTE — PROGRESS NOTES
Problem: Dysphagia (Adult)  Goal: *Acute Goals and Plan of Care (Insert Text)  Description: 5/13/22  Speech path goals  1. Patient will tolerate crushed meds in applesauce with no overt s/s of aspiration. 2. Patient will participate with reeval of swallowing  5/13/2022 0930 by Latasha Dayron, SLP  Outcome: Not Met  SPEECH [address] EVALUATION  Patient: Anabel Ovalles (58 y.o. male)  Date: 5/13/2022  Primary Diagnosis: LETICIA (acute kidney injury) ([facility] [address] [N17.9]  Dehydration [E86.0]  UTI (urinary tract infection) [N39.0]  Lactic acidosis [E87.2]        Precautions: aspiration       ASSESSMENT :  Based on the objective data described below, the patient presents with poor attention and command following. He was OX1. Speech is low volume. He was referred for dysphagia for pills. He was alert enough for thins, ice and purees. Slow oral phase, suspected mild swallow delay and two swallows per bolus. Could be related to pharyngeal residue. No coughing or other overt s/s of aspiration. He has dementia but lives alone with a caregiver who checks on him frequently. Today he is not following commands consistently or very attentive. Communication is significantly impaired. Shaking, tremors noted. ?chills. Due to his mental status would not offer po yet due to high risk of aspiration with his altered mental status. Patient will benefit from skilled intervention to address the above impairments. Patients rehabilitation potential is considered to be Guarded     PLAN :  Recommendations and Planned Interventions: Only meds crushed in applesauce ; otherwise NPO. Occasional ice chips if alert. Frequency/Duration: Patient will be followed by speech-language pathology 2 times a week to address goals. Discharge Recommendations: To Be Determined     SUBJECTIVE:   Patient stated [facility] when asked where we were.      OBJECTIVE:     Past Medical History:   Diagnosis Date    Arthritis     left arm Autoimmune hepatitis ([facility] [address]     Bleeding ulcer 07/2019    BPH (benign prostatic hyperplasia)     CAD (coronary artery disease)     s/p stent    Cancer (HCC)     penile squamous carcinoma    Chronic kidney disease     stage 2     Chronic obstructive pulmonary disease (HCC)     Dementia (HCC)     Elevated LFT's     Essential hypertension 9/24/01    GERD (gastroesophageal reflux disease)     hiatal hernia    Ill-defined condition 12/02/2016    faint    Nephrosclerosis     Orthostatic hypotension 9/24/01    PVC's 9/24/01    Sleep apnea     no CPAP    Syncope 9/24/01    secondary to venous insuffiency      Past Surgical History:   Procedure Laterality Date    COLONOSCOPY N/A 12/13/2016    COLONOSCOPY performed by Jessica Pena MD at \[facility]"" ENDOSCOPY    COLONOSCOPY N/A 12/11/2019    COLONOSCOPY performed by Farzana Penaloza MD at [address]; HI RISK IND  12/11/2019         ECHO 2D ADULT  5/24/12    EF 60 - 65%; mild concentric hypertrophy; pulmonary systolic artery pressure upper limits normal    HX ABDOMINAL WALL DEFECT REPAIR  07/01/2019d     Exploratory laparotomy, segmental sigmoid colon resection and primary stapled anastomosis.     HX APPENDECTOMY  1985    HX HEENT  02/18/2020    Left temporal artery biopsy    HX HERNIA REPAIR  08/29/2018    Davinci hiatal hernia repair- Gabriel Gee MD    HX OTHER SURGICAL  08/2020    penile lesion bx    [place] CARDIAC SURG PROCEDURE UNLIST  05/13/2019    1 stent    UPPER GI ENDOSCOPY,BIOPSY  12/11/2019         UPPER GI ENDOSCOPY,DIAGNOSIS  7/18/2019          Prior Level of Function/Home Situation:      Diet prior to admission: ?   Current Diet:  NPO   Cognitive and Communication Status:  Neurologic State: Alert  Orientation Level: Appropriate for age,Disoriented to time,Disoriented to situation,Disoriented to place,Oriented to person  Cognition: Decreased command following,Decreased attention/concentration  Perception: Appears intact  Perseveration: No perseveration noted     Oral Assessment:  Oral Assessment  Labial: No impairment  Dentition: Natural  Velum: Unable to visualize  Mandible: No impairment  P.O. Trials:  Patient Position: upright in bed  Vocal quality prior to P.O.: No impairment; Low volume  Consistency Presented: Thin liquid;Puree; Ice chips  How Presented: Self-fed/presented;Straw;SLP-fed/presented;Spoon     Bolus Acceptance: No impairment  Bolus Formation/Control: Impaired  Type of Impairment: Delayed  Propulsion: Delayed (# of seconds)  Oral Residue: None  Initiation of Swallow: Delayed (# of seconds)  Laryngeal Elevation: Functional  Aspiration Signs/Symptoms: None  Pharyngeal Phase Characteristics: Double swallow             Oral Phase Severity: Mild-moderate  Pharyngeal Phase Severity : Moderate    NOMS:   The NOMS functional outcome measure was used to quantify this patient's level of swallowing impairment. Based on the NOMS, the patient was determined to be at level 2 for swallow function       NOMS Swallowing Levels:  Level 1 (CN): NPO  Level 2 (CM): NPO but takes consistency in therapy  Level 3 (CL): Takes less than 50% of nutrition p.o. and continues with nonoral feedings; and/or safe with mod cues; and/or max diet restriction  Level 4 (CK): Safe swallow but needs mod cues; and/or mod diet restriction; and/or still requires some nonoral feeding/supplements  Level 5 (CJ): Safe swallow with min diet restriction; and/or needs min cues  Level 6 (CI): Independent with p.o.; rare cues; usually self cues; may need to avoid some foods or needs extra time  Level 7 ([address] [place] [address]): Independent for all p.o.  DEANNA. (2003). National Outcomes Measurement System (NOMS): Adult Speech-Language Pathology User's Guide. Pain:  Pain Scale 1: Numeric (0 - 10)  Pain Intensity 1: 0       After treatment:   Patient left in no apparent distress in bed    COMMUNICATION/EDUCATION:   Patient could not attend to education.    The patient's plan of care including recommendations, planned interventions, and recommended diet changes were discussed with: Registered nurse. Patient is unable to participate in goal setting and plan of care.     Thank you for this referral.  ASIM Faulkner  Time Calculation: 15 mins

## 2022-05-13 NOTE — ED NOTES
TRANSITION OF CARE - SBAR OUT    Patient is being transferred to [facility] Emergency Dept, Room# 35. Report GIVEN TO Michelle Mancini RN on Julian Crawford for routine progression of care. Report is consisted of the following information SBAR, ED Summary, Procedure Summary, Intake/Output, MAR, Recent Results, Med Rec Status and Cardiac Rhythm nsr. Patient transferred to receiving unit by: ed tech (RN or Tech Name)     Called outstanding consults: Yes   Collected routine labs: Yes lactic due at 11p    All current orders reviewed with accepting nurse: Yes    The following personal items will be sent with the patient during transfer to the floor: All valuables:                          CARDIAC MONITORING ORDERED: Yes     The following CURRENT information were reported to the receiving RN:    CODE STATUS: Full Code    NIH SCORE:    SHANIA SCREENING:      NEURO ASSESSMENT:        RESTRAINTS IN USE: No      IS DOCUMENTATION COMPLETE: Yes      Vital Signs  Level of Consciousness: Alert (0) (05/12/22 1813)  Temp: 98 °F (36.7 °C) (05/12/22 1813)  Temp Source: Oral (05/12/22 1813)  Pulse (Heart Rate): 90 (05/12/22 2031)  Heart Rate Source: Monitor (05/12/22 1813)  Resp Rate: 16 (05/12/22 2031)  BP: (!) 148/122 (05/12/22 2031)  MAP (Monitor): 132 (05/12/22 2031)  MAP (Calculated): 131 (05/12/22 2031)  BP 1 Location: Left upper arm (05/12/22 1813)  BP 1 Method: Automatic (05/12/22 1813)  BP Patient Position: Sitting (05/12/22 1813)  MEWS Score: 1 (05/12/22 1813)  Pain 1  Pain Scale 1: Numeric (0 - 10) (05/12/22 1813)  Pain Intensity 1: 0 (05/12/22 1813)  Patient Stated Pain Goal: 0 (05/12/22 1813)      OXYGEN: Oxygen Therapy  O2 Device: None (Room air) (05/12/22 1813)    RACHEAL FALL RISK:        WOUNDS: No      URINARY CATHETER: incontinent    LINE ACCESS:   Peripheral IV 05/12/22 Left Antecubital (Active)        Opportunity for questions and clarification were provided.   Yong Orourke RN

## 2022-05-13 NOTE — PROGRESS NOTES
Problem: Dysphagia (Adult)  Goal: *Acute Goals and Plan of Care (Insert Text)  Description: 5/13/22  Speech path goals  1. Patient will tolerate crushed meds in applesauce with no overt s/s of aspiration. Goal met 5/13. 2. Patient will participate with reeval of swallowing. Goal met 5/13. 3. Patient will tolerate minced diet with thins with no overt s/s of aspiration. 4. Patient will tolerate diet upgrade  5/13/2022 1502 by Bailee Galvez SLP  Outcome: Progressing Towards Goal   SPEECH [address] TREATMENT  Patient: Dorie Kothari (89 y.o. male)  Date: 5/13/2022  Diagnosis: LETICIA (acute kidney injury) ([facility] [address] [N17.9]  Dehydration [E86.0]  UTI (urinary tract infection) [N39.0]  Lactic acidosis [E87.2] <principal problem not specified>       Precautions:  Fall    ASSESSMENT:  The patient was alert this afternoon and was able to tolerate purees, soft solids and thins by straw sip. He accepted sips of thins via straw and masticated the soft solids slowly. May have a mild swallow delay. Two swallows per bolus noted. No coughing or s/s of aspiration. He was alert but he has dementia with language problems per daugter pta and currently has tremors of unknown etiology. Very pleasant. PLAN:  Recommendations and Planned Interventions:  Minced diet with thins   Patient continues to benefit from skilled intervention to address the above impairments. Continue treatment per established plan of care. Discharge Recommendations:  None     SUBJECTIVE:   Patient stated [place] and \"[facility]\".     OBJECTIVE:   Cognitive and Communication Status:  Neurologic State: Alert  Orientation Level: Oriented to person,Oriented to place  Cognition: Follows commands  Perception: Appears intact  Perseveration: No perseveration noted  Safety/Judgement: Not assessed  Dysphagia Treatment:  Oral Assessment:  Oral Assessment  Labial: No impairment  Dentition: Natural  Velum: Unable to visualize  Mandible: No impairment  P.O. Trials:  Patient Position: upright in bed  Vocal quality prior to P.O.: No impairment  Consistency Presented: Thin liquid;Puree;Mechanical soft  How Presented: Self-fed/presented;Straw     Bolus Acceptance: No impairment  Bolus Formation/Control: Impaired  Type of Impairment: Delayed;Mastication  Propulsion: No impairment  Oral Residue: None  Initiation of Swallow: Delayed (# of seconds)  Laryngeal Elevation: Functional  Aspiration Signs/Symptoms: None  Pharyngeal Phase Characteristics: Double swallow  Effective Modifications: None  Cues for Modifications: None       Oral Phase Severity: Mild  Pharyngeal Phase Severity : Mild-moderate           Pain:  Pain Scale 1: Numeric (0 - 10)  Pain Intensity 1: 0       After treatment:   Patient left in no apparent distress in bed    COMMUNICATION/EDUCATION:   Educated the patient and his daughter that he can start a diet. The patient's plan of care including recommendations, planned interventions, and recommended diet changes were discussed with: Registered nurse.      ASIM Qiu  Time Calculation: 15 mins

## 2022-05-13 NOTE — CONSULTS
Urology Consult    Patient: Anabel Ovalles MRN: [id number]  SSN: [id number]    YOB: 1940  Age: [de-identified] y.o. Sex: male          Date of Consultation:  May 13, 2022  Requesting Physician: Unique Meek MD  Reason for Consultation: difficult allen placement            Assessment/Plan:  BPH patient with hx of partial penectomy now in acute urinary retention. Bladder scan close to 500ml. Recent pseudomonal and enterococcal UTI on 5/5/22 PTA, completed course of abx now returning to ED with decreased po intake and generalized weakness    -s/p 14 Jordanian silicone allen placed at the bedside with return on 400ml clear yellow urine. - send urine for culture today, follow cultures, empiric abx initiated by primary team   -continue allen through discharge, will arrange OP follow up with primary urologist for repeat voiding trial. Continue flomax     Supervising MD, Dr. Carolina Galindo          History of Present Illness:  Patient is a [de-identified] y.o. male admitted 5/12/2022 to the hospital for LETICIA (acute kidney injury) ([facility] [address] [N17.9]  Dehydration [E86.0]  UTI (urinary tract infection) [N39.0]  Lactic acidosis [E87.2]. He had a history of recent UTI (micro grew enterococcus and pseudomonas, dx made on 5/5, patient started on omnicef and on 5/8 switched to levofloxacin which he is still taking), penile ca, autoimmune hepatitis, who presents to the ED from home with depressed mental status, intermittent rigors, poor po intake. He gets a visiting nurse who first noticed the symptoms this morning. His condition and mental status continued to deteriorate during the day and he was brought in to the ER. Patient himself states he feels chills but has no abdominal pain, headache or any other symptoms. Oriented to self and place, answers basic questions and follows commands although appears weak. No focal weakness. Pt has a hx of dementia and is a poor historian. Chart reviewed. No fevers at home.  Family reports decreased po intake with noticeable tremors. LETICIA with dehydration and lactic 2.5. creat 1.76, baseline around 1.3. blood cx pending, HD stable. No recent abdominal imaging     Urology consulted for difficult allen placement due to partial penectomy. Current . Pt seen and examined. 14 Cymro allen placed at bedside with return >400ml urine, clear yellow. Urine to be sent for culture. Ros limited as pt AMS with dementia, able to state name and .         ==last  office note from 22==    Genesis Juju is an [de-identified]year old male who presents today for \"f/u hosp visit for retention-cath removed \". Mr. Jacqueline Kemp returns today for a follow up. See bx hx below. He is currently on Finasteride. PSA from 2021 was 0.941, due to the effects of Finasteride it corrects out to 1.882. Mr. Jacqueline Kemp was has continued Tamsulosin but not Finasteride. Mr. 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 He notes that when he voided today his stream was normal. He does experience some constipation. Mr. Jacqueline Kemp passed his VT today. 2020: Biopsy, penile lesion. Path- Moderately differentiated invasive squamous carcinoma. Squamous carcinoma, at least carcinoma in situ        2020: Status post partial penectomy. Path- squamous cell carcinoma moderately differentiated invading the lamina propria and the penile urethra. Margins uninvolved. : Penile biopsy procedure with excision of a penile lesion. Path- Focal high-grade squamous dysplasia. No invasive carcinoma identified. Reactive fibrovascular tissue with hemorrhagic cyst. Reactive fibrovascular tissue with hemosiderin deposition. No malignancy identified. No epithelium identified. See path below. 2021: CT revealed no evidence for metastatic disease. New 6 mm calcified stone at the left ureteropelvic junction without any hydronephrosis or perinephric inflammation. PAST MEDICAL HISTORY:    Allergies: DEMEROL (Severe)  DENIES: Latex, Shellfish, X-Ray Dye, Iodine. Medications: * citalopram tablet   cefdinir 300 mg capsule (cefdinir)   tamsulosin 0.4 mg capsule (tamsulosin) Take 1 capsule by mouth every night 30 min after dinner  * aspirin 81 mg capsule (aspirin)   pantoprazole 40 mg tablet,delayed release (DR/EC) (pantoprazole)   * NEOSPORIN ORIGINAL 3.5-400-5000 OINT (NEOMYCIN-BACITRACIN-POLYMYXIN)   atorvastatin 40 mg tablet (atorvastatin)   hydrochlorothiazide 12.5 mg tablet (hydrochlorothiazide)   carvedilol 25 mg tablet (carvedilol)     Problems: Urinary retention (ICD-788.20) (RMB71-R77.9)  Constipation (ICD-564.00) (GZW60-L72.00)  Flank pain (ICD-789.09) (IIC80-S32.9)  Calculus of ureter (ICD-592.1) (BRD65-U69.1)  Renal calculus (ICD-592.0) (RHB84-C99.0)  Nocturia (ICD-788.43) (RKJ10-A66.1)  Other specified counseling (ICD-V65.49) (SXE16-J63.89)  Penile cancer (ICD-187.4) (SZC52-J43.9)  Penile lesion (ICD-607.9) (JJV12-H54.9)  Pruritus genitalia (ICD-698.1) (FUC88-S25.3)  Meatitis (ICD-597.89) (ZRA19-X09.2)  706.2 CYST, SEBACEOUS (ICD-706.2) (UHB48-Z43. 3)  BPH with obstruction (ICD-600.0) (TNK03-A44.1)  Testicular pain (ICD-608.9) (CVJ15-F15.819)    Illnesses: High Blood Pressure and Other / Not Listed. DENIES: Pacemaker/Defibrillator, Lung Disease, Diabetes, Bowel Problems, Kidney Problems, or HIV. Surgeries: Heart Stent Procedure,Colon Surgery, andColonoscopy. Family History: DENIES: Prostate cancer, Kidney cancer, Kidney disease, Kidney stones. Social History: Retired. . Smoking status: former smoker. Does not drink alcohol. System Review: Admits to: Involuntary Urine Loss, Lower Extremity Weakness, Dry Skin, Easy Bleeding, and Rash. DENIES: Unexplained Weight Loss, Dry Eyes, Dry Mouth, Leg Swelling, Shortness of Breath, Constipation, Difficulty Walking, Psychiatric Problems, Impaired Sex Drive.      EXAMINATION: Vitals: Pulse: 81 BP: 100/58 Weight: 149 lbs Height: 5' 4\" BMI: 25.67 kg/m^2      DIAGNOSTIC STUDIES:  FINAL PATHOLOGIC DIAGNOSIS    1. Penile meatus, biopsy:       Focal high-grade squamous dysplasia  No invasive carcinoma identified    2. Penis, dorsal mass, biopsy:       Reactive fibrovascular tissue with hemorrhagic cyst  No malignancy identified  No epithelium identified    3. Penis, deep dorsal, biopsy:       Reactive fibrovascular tissue with hemosiderin deposition and  calcification  No malignancy identified  No epithelium identified      URINALYSIS    POST-VOID RESIDUAL  US PVR (05/06/2022):  433 ccs   Comment: voided 200 ml with command  Performed by: Fernando Morgan RN - May  6, 2022 9:38 AM    IMPRESSION:    1. PENILE CANCER (GEL02-O11.9) - Unchanged: Mr. Rajeev Rivera will return to clinic for a follow up as priorly scheduled in 1 month, for a repeat exam.     2. BPH WITH OBSTRUCTION (POG61-V64.1) - Unchanged: Mr. Rajeev Rivera was advised to continue Tamsulosin and Finasteride as prescribed. He was advised to avoid bladder irritants including caffiene and alcohol, he was also advised to void at first urge. 3. CONSTIPATION (JTP55-I58.00) - New: Mr. Rajeev Rivera was advised to follow up with his PCP. Discussed constipation's effect on voiding. 4. URINARY RETENTION (TRF77-J32.9) - New: Patient passed his voiding trial today. Advised timed voids double voids as well as continuation of Flomax and finasteride. Also resolution of constipation, he will discuss this with his primary care physician. PLAN: All questions and concerns were addressed prior to the patient leaving the clinic. The patient understands and agrees with the plan.      cc: MD Deangelo Hernandez MD  Transcribed by Speech to Text Technology  Today's Services  E&M Service    Upcoming Orders  Return Office Visit - with  Vale Mishra at previously scheduled appointment  Office UR Mobile          By signing my name below, I, Reuben Cockayne, attest that this documentation has been prepared under the direct and in the presence of Montserrat Stevenson MD.  Electronically Signed: Nae Kristofer   May  6, 2022 9:57 AM    I, Dr. Montserrat Stevenson MD, personally performed the services described in this documentation. All medical record entries made by the scribe were at my direction and in my presence. I have reviewed the chart and agree that the record reflects my personal performance and is accurate and complete. Electronically signed by Montserrat Stevenson MD on 05/06/2022 at 10:27 AM    ________________________________________________________________________            Past Medical History: Allergies   Allergen Reactions    Ace Inhibitors Other (comments)     Doesn't agree with pt    Demerol [Meperidine] Unknown (comments)     Causes unconsciousness      Prior to Admission medications    Medication Sig Start Date End Date Taking? Authorizing Provider   levoFLOXacin (Levaquin) 750 mg tablet Take 1 Tablet by mouth daily. 5/8/22   JOSEFINA Andre   cefdinir (OMNICEF) 300 mg capsule Take 1 Capsule by mouth two (2) times a day for 7 days. 5/5/22 5/12/22  Charlie Jacobson MD   tamsulosin (Flomax) 0.4 mg capsule Take 1 Capsule by mouth daily for 30 days. 5/5/22 6/4/22  Charlie Jacobson MD   aspirin delayed-release 81 mg tablet Take 81 mg by mouth daily. Provider, Historical   atorvastatin (LIPITOR) 20 mg tablet Take 2 Tabs by mouth daily. 1/1/21   Maggie Rojas MD   ondansetron hcl (Zofran) 4 mg tablet Take 2 Tabs by mouth two (2) times a day. Patient taking differently: Take 8 mg by mouth every eight (8) hours as needed. 1/1/21   Maggie Rojas MD   traZODone (DESYREL) 50 mg tablet Take 50 mg by mouth nightly as needed. 10/31/20   Provider, Historical   carvediloL (COREG) 12.5 mg tablet Take 2 Tabs by mouth two (2) times daily (with meals). 11/27/20   Nazario Salas MD   ascorbic acid, vitamin C, (Vitamin C) 500 mg tablet Take  by mouth.     Provider, Historical   fluticasone propionate (FLONASE) 50 mcg/actuation nasal spray 2 Sprays by Both Nostrils route daily as needed. Provider, Historical   psyllium husk (METAMUCIL PO) Take  by mouth. Provider, Historical   donepeziL (ARICEPT) 5 mg tablet Take 1 Tab by mouth nightly. 10/8/20   Ilda Robins MD   pantoprazole (PROTONIX) 40 mg tablet Take 1 Tab by mouth ACB/HS. 10/8/20   Ilda Robins MD   neomycin-bacitracin-polymyxin (Neosporin, [id number]-ktu-plwyf,) 3.5mg-400 unit- 5,000 unit/gram ointment Apply  to affected area two (2) times a day. Patient taking differently: Apply  to affected area as needed. 8/18/20   Destiny Kirk MD      PMHx:  has a past medical history of Arthritis, Autoimmune hepatitis ([facility] [address] Bleeding ulcer (07/2019), BPH (benign prostatic hyperplasia), CAD (coronary artery disease), Cancer ([facility] [address] Chronic kidney disease, Chronic obstructive pulmonary disease ([facility] [address] Dementia ([facility] [address] Elevated LFT's, Essential hypertension (9/24/01), GERD (gastroesophageal reflux disease), Ill-defined condition (12/02/2016), Nephrosclerosis, Orthostatic hypotension (9/24/01), PVC's (9/24/01), Sleep apnea, and Syncope (9/24/01). PSurgHx:  has a past surgical history that includes echo 2d adult (5/24/12); colonoscopy (N/A, 12/13/2016); upper gi endoscopy,diagnosis (7/18/2019); upper gi endoscopy,biopsy (12/11/2019); colorectal scrn; hi risk ind (12/11/2019); colonoscopy (N/A, 12/11/2019); hx appendectomy (1985); hx heent (02/18/2020); hx other surgical (08/2020); pr cardiac surg procedure unlist (05/13/2019); hx abdominal wall defect repair (07/01/2019d); and hx hernia repair (08/29/2018). PSocHx:  reports that he quit smoking about 41 years ago. He smoked 3.50 packs per day. He has never used smokeless tobacco. He reports previous alcohol use. He reports that he does not use drugs.    ROS:  Admission ROS by Charley Bass MD from 5/12/2022 were reviewed with the patient and changes (other than per HPI) include: none.     Physical Exam    General Appearance: NAD, awake  HENT: atraumatic, normal ears  Cardiovascular: not tachycardic, no LE edema  Respiratory: no distress, room air  Abdomen: soft, no suprapubic fullness or tenderness  : no CVA tenderness  Extremities: moves all  Musculoskeletal: normal alignment of neck and head  Neuro: Appropriate, no focal neurological deficits  Mood/Affect: appropriate, A&O x 3      Lab Results   Component Value Date/Time    WBC 7.9 05/13/2022 03:35 AM    HCT 34.8 (L) 05/13/2022 03:35 AM    PLATELET 263 42/47/8158 03:35 AM    Sodium 140 05/13/2022 03:35 AM    Potassium 3.2 (L) 05/13/2022 03:35 AM    Chloride 107 05/13/2022 03:35 AM    CO2 23 05/13/2022 03:35 AM    BUN 24 (H) 05/13/2022 03:35 AM    Creatinine 1.76 (H) 05/13/2022 03:35 AM    Glucose 127 (H) 05/13/2022 03:35 AM    Calcium 8.8 05/13/2022 03:35 AM    Magnesium 1.7 07/19/2019 02:02 AM    INR 1.1 04/21/2022 06:24 PM       UA:   Lab Results   Component Value Date/Time    Color DARK YELLOW 05/05/2022 11:06 AM    Appearance TURBID (A) 05/05/2022 11:06 AM    Specific gravity 1.021 05/05/2022 11:06 AM    Specific gravity >1.030 (H) 07/01/2019 04:34 PM    pH (UA) 6.0 05/05/2022 11:06 AM    Protein 100 (A) 05/05/2022 11:06 AM    Glucose Negative 05/05/2022 11:06 AM    Ketone 80 (A) 05/05/2022 11:06 AM    Bilirubin Negative 06/26/2021 09:14 PM    Urobilinogen 1.0 05/05/2022 11:06 AM    Nitrites Negative 05/05/2022 11:06 AM    Leukocyte Esterase LARGE (A) 05/05/2022 11:06 AM    Epithelial cells FEW 05/05/2022 11:06 AM    Bacteria 1+ (A) 05/05/2022 11:06 AM    WBC >100 (H) 05/05/2022 11:06 AM    RBC >100 (H) 05/05/2022 11:06 AM           Signed By: Verona Guerrero NP  - May 13, 2022

## 2022-05-13 NOTE — PROGRESS NOTES
TRANSFER - OUT REPORT:    Verbal report given to Stephanie Hopson RN(name) on Pratik Fox  being transferred to [facility](unit) for routine progression of care       Report consisted of patients Situation, Background, Assessment and   Recommendations(SBAR). Information from the following report(s) SBAR, Kardex, ED Summary, Procedure Summary, Intake/Output, MAR, Recent Results and Cardiac Rhythm NSR, Sinus tachy was reviewed with the receiving nurse. Lines:   Peripheral IV 05/13/22 Anterior;Right Wrist (Active)   Site Assessment Clean, dry, & intact 05/13/22 0731   Phlebitis Assessment 0 05/13/22 0731   Infiltration Assessment 0 05/13/22 0731   Dressing Status Clean, dry, & intact 05/13/22 0731   Dressing Type Tape;Transparent 05/13/22 0731   Hub Color/Line Status Pink; Infusing 05/13/22 0731        Opportunity for questions and clarification was provided. Patient transported with:   [employer]        Urology consult called for Pt not voiding and bladder scan of 468. Lowery placed by urology prior to Pt being transferred to unit for urinary retention (Not nurse driven). SLP saw Pt and recommended NPO with meds crushed in applesauce and ice chips if alert. Diet order changed.

## 2022-05-13 NOTE — PROGRESS NOTES
Transition of Care Plan:    RUR: 16% moderate risk for readmission  Disposition: Home with HH and Caregiver support vs SNF? Pending therapy recommendations and hospital course   Follow up appointments: PCP, Specialists if indicated  DME needed: Pt owns a walker and cane; therapy consults in place  Transportation at Discharge: Pt's daughter  [address] or means to access home: Daughter will have       IM Medicare Letter: Will need 2nd IMM letter prior to d/c  Is patient a BCPI-A Bundle: N/A          If yes, was Bundle Letter given?:    Is patient a [place] and connected with the [place]? N/A              If yes, was [place] transfer form completed and [facility] notified? Caregiver Contact: Pt's daughter/LNOK: Alessia pacheco [phone]  Discharge Caregiver contacted prior to discharge? Yes  Care Conference needed?:  Not indicated at present                    Reason for Admission:  Pseudomonal UTI, dehydration/LETICIA/lactic acidosis                      RUR Score: 16% moderate risk for readmission                 PCP: First and Last name:   Juan Deleon MD     Name of Practice:    Are you a current patient: Yes/No: Yes   Approximate date of last visit: 2 weeks ago virtually, was supposed to have virtual f/u on today   Can you participate in a virtual visit if needed: Yes    Do you (patient/family) have any concerns for transition/discharge? Pt's daughter would like to arrange more support for pt in home. Resources left at bedside per daughter's request - Resources include Private Duty Caregivers List, A Place for Mom, and Care Patrol. Plan for utilizing home health: Pending hospital course and recommendations; pt has previous [facility] hx coordinated through Mehdi Dines. Current Advanced Directive/Advance Care Plan:  Full Code   Advance Care Planning   Healthcare Decision Maker: Legal NOK    Primary Decision Maker: Jessica Gautam - Daughter - [phone]     Today we documented Decision Maker(s).  The patient will provide ACP documents. Pt's daughter is legal NOK, reviewed NOK hierarchy. Daughter also has documents which she will provide for medical chart. Healthcare Decision Maker:             Primary Decision Maker: Karlie Davidson - Daughter - [phone]    Transition of Care Plan:   Home with [facility] and caregiver support vs SNF? CM reviewed chart. Pt noted to be unable to participate in assessment at this time, CM completed assessment with pt's daughter, Jasmin Santana, via phone. CM introduced self, role of CM, verified demographics, and discussed transition of care planning. Pt resides alone in 1 level home with 2 SHAYLEE. Pt has private duty caregiver support 4 hours everyday, pt's daughter also supplements this and provides support 3 days a week. She is seeking additional support for pt in home. Resources left at bedside. Caregiver helps to support pt with medication management, household cleaning and chores, caring for dog, bathing, and incontinence management. Daughter or caregiver provide transportation, daughter to transport pt at d/c. Pt is a [facility] pt, also utilizes [facility] to appointment or completes virtual appts. Pharmacy preference is [facility] on [address]. Daughter picks up medications from pharmacy for pt. Unit care management will continue to follow for transition of care planning needs. Care Management Interventions  PCP Verified by CM: Yes  Palliative Care Criteria Met (RRAT>21 & CHF Dx)?: No  Mode of Transport at Discharge:  Other (see comment) (Daughter)  Transition of Care Consult (CM Consult): Discharge Planning  Discharge Durable Medical Equipment: No (Pt owns a walker and cane)  Physical Therapy Consult: Yes  Occupational Therapy Consult: Yes  Speech Therapy Consult: Yes  Support Systems: Child(colleen),Other Family Member(s),Caregiver/Home Care Staff (Pt's daughter supports pt 3 days a week; pt has private pay caregiver 4 hours everyday; daughter is looking to bring more support into home.)  Confirm Follow Up Transport: Family (Daughter vs caregiver vs Theotis Abhilash)  Discharge Location  Patient Expects to be Discharged to[de-identified] Other: (Home with [place] and caregiver support vs SNF?  Pending hospital course and recommendations)     Yoli Jarvis [facility] [address]

## 2022-05-14 ENCOUNTER — APPOINTMENT (OUTPATIENT)
Dept: MRI IMAGING | Age: 82
DRG: 690 | End: 2022-05-14
Attending: PSYCHIATRY & NEUROLOGY
Payer: MEDICARE

## 2022-05-14 PROBLEM — R27.8 ASTERIXIS: Status: ACTIVE | Noted: 2022-05-14

## 2022-05-14 PROBLEM — R41.82 ACUTE ALTERATION IN MENTAL STATUS: Status: ACTIVE | Noted: 2022-05-14

## 2022-05-14 PROBLEM — I67.89 CEREBRAL MICROVASCULAR DISEASE: Status: ACTIVE | Noted: 2022-05-14

## 2022-05-14 PROBLEM — R56.9 CONVULSIONS (HCC): Status: ACTIVE | Noted: 2022-05-14

## 2022-05-14 PROBLEM — G30.1 LATE ONSET ALZHEIMER'S DISEASE WITHOUT BEHAVIORAL DISTURBANCE (HCC): Status: ACTIVE | Noted: 2022-05-14

## 2022-05-14 PROBLEM — F02.80 LATE ONSET ALZHEIMER'S DISEASE WITHOUT BEHAVIORAL DISTURBANCE (HCC): Status: ACTIVE | Noted: 2022-05-14

## 2022-05-14 PROBLEM — G25.3 MYOCLONUS: Status: ACTIVE | Noted: 2022-05-14

## 2022-05-14 LAB
AMMONIA PLAS-SCNC: 35 UMOL/L
ANION GAP SERPL CALC-SCNC: 5 MMOL/L (ref 5–15)
BASOPHILS # BLD: 0 K/UL (ref 0–0.1)
BASOPHILS NFR BLD: 1 % (ref 0–1)
BUN SERPL-MCNC: 16 MG/DL (ref 6–20)
BUN/CREAT SERPL: 13 (ref 12–20)
CALCIUM SERPL-MCNC: 8 MG/DL (ref 8.5–10.1)
CHLORIDE SERPL-SCNC: 114 MMOL/L (ref 97–108)
CK SERPL-CCNC: 239 U/L (ref 39–308)
CO2 SERPL-SCNC: 24 MMOL/L (ref 21–32)
CREAT SERPL-MCNC: 1.23 MG/DL (ref 0.7–1.3)
DIFFERENTIAL METHOD BLD: ABNORMAL
EOSINOPHIL # BLD: 0.2 K/UL (ref 0–0.4)
EOSINOPHIL NFR BLD: 4 % (ref 0–7)
ERYTHROCYTE [DISTWIDTH] IN BLOOD BY AUTOMATED COUNT: 15.4 % (ref 11.5–14.5)
ERYTHROCYTE [SEDIMENTATION RATE] IN BLOOD: 67 MM/HR (ref 0–20)
FOLATE SERPL-MCNC: 5.9 NG/ML (ref 5–21)
GLUCOSE SERPL-MCNC: 118 MG/DL (ref 65–100)
HCT VFR BLD AUTO: 30.6 % (ref 36.6–50.3)
HGB BLD-MCNC: 10.1 G/DL (ref 12.1–17)
IMM GRANULOCYTES # BLD AUTO: 0 K/UL (ref 0–0.04)
IMM GRANULOCYTES NFR BLD AUTO: 1 % (ref 0–0.5)
LACTATE SERPL-SCNC: 0.8 MMOL/L (ref 0.4–2)
LACTATE SERPL-SCNC: 1.3 MMOL/L (ref 0.4–2)
LYMPHOCYTES # BLD: 0.8 K/UL (ref 0.8–3.5)
LYMPHOCYTES NFR BLD: 19 % (ref 12–49)
MAGNESIUM SERPL-MCNC: 1.7 MG/DL (ref 1.6–2.4)
MCH RBC QN AUTO: 29.3 PG (ref 26–34)
MCHC RBC AUTO-ENTMCNC: 33 G/DL (ref 30–36.5)
MCV RBC AUTO: 88.7 FL (ref 80–99)
MONOCYTES # BLD: 0.6 K/UL (ref 0–1)
MONOCYTES NFR BLD: 15 % (ref 5–13)
NEUTS SEG # BLD: 2.5 K/UL (ref 1.8–8)
NEUTS SEG NFR BLD: 60 % (ref 32–75)
NRBC # BLD: 0 K/UL (ref 0–0.01)
NRBC BLD-RTO: 0 PER 100 WBC
PLATELET # BLD AUTO: 140 K/UL (ref 150–400)
PMV BLD AUTO: 10.1 FL (ref 8.9–12.9)
POTASSIUM SERPL-SCNC: 3.4 MMOL/L (ref 3.5–5.1)
RBC # BLD AUTO: 3.45 M/UL (ref 4.1–5.7)
SODIUM SERPL-SCNC: 143 MMOL/L (ref 136–145)
TSH SERPL DL<=0.05 MIU/L-ACNC: 0.87 UIU/ML (ref 0.36–3.74)
VIT B12 SERPL-MCNC: 886 PG/ML (ref 193–986)
WBC # BLD AUTO: 4.1 K/UL (ref 4.1–11.1)

## 2022-05-14 PROCEDURE — 95819 EEG AWAKE AND ASLEEP: CPT | Performed by: PSYCHIATRY & NEUROLOGY

## 2022-05-14 PROCEDURE — 86038 ANTINUCLEAR ANTIBODIES: CPT

## 2022-05-14 PROCEDURE — 74011250636 HC RX REV CODE- 250/636: Performed by: INTERNAL MEDICINE

## 2022-05-14 PROCEDURE — 82140 ASSAY OF AMMONIA: CPT

## 2022-05-14 PROCEDURE — 85652 RBC SED RATE AUTOMATED: CPT

## 2022-05-14 PROCEDURE — 74011000250 HC RX REV CODE- 250: Performed by: INTERNAL MEDICINE

## 2022-05-14 PROCEDURE — 36415 COLL VENOUS BLD VENIPUNCTURE: CPT

## 2022-05-14 PROCEDURE — 82306 VITAMIN D 25 HYDROXY: CPT

## 2022-05-14 PROCEDURE — 82746 ASSAY OF FOLIC ACID SERUM: CPT

## 2022-05-14 PROCEDURE — 99223 1ST HOSP IP/OBS HIGH 75: CPT | Performed by: PSYCHIATRY & NEUROLOGY

## 2022-05-14 PROCEDURE — 84443 ASSAY THYROID STIM HORMONE: CPT

## 2022-05-14 PROCEDURE — 83735 ASSAY OF MAGNESIUM: CPT

## 2022-05-14 PROCEDURE — 74011250637 HC RX REV CODE- 250/637: Performed by: INTERNAL MEDICINE

## 2022-05-14 PROCEDURE — 82607 VITAMIN B-12: CPT

## 2022-05-14 PROCEDURE — 92526 ORAL FUNCTION THERAPY: CPT

## 2022-05-14 PROCEDURE — 74011000258 HC RX REV CODE- 258: Performed by: INTERNAL MEDICINE

## 2022-05-14 PROCEDURE — 65270000029 HC RM PRIVATE

## 2022-05-14 PROCEDURE — 85025 COMPLETE CBC W/AUTO DIFF WBC: CPT

## 2022-05-14 PROCEDURE — 74011250637 HC RX REV CODE- 250/637: Performed by: PSYCHIATRY & NEUROLOGY

## 2022-05-14 PROCEDURE — 80048 BASIC METABOLIC PNL TOTAL CA: CPT

## 2022-05-14 PROCEDURE — 83605 ASSAY OF LACTIC ACID: CPT

## 2022-05-14 PROCEDURE — 70551 MRI BRAIN STEM W/O DYE: CPT

## 2022-05-14 PROCEDURE — 82550 ASSAY OF CK (CPK): CPT

## 2022-05-14 RX ORDER — CLONAZEPAM 0.5 MG/1
0.25 TABLET ORAL 2 TIMES DAILY
Status: DISCONTINUED | OUTPATIENT
Start: 2022-05-14 | End: 2022-05-15

## 2022-05-14 RX ORDER — ENOXAPARIN SODIUM 100 MG/ML
40 INJECTION SUBCUTANEOUS DAILY
Status: DISCONTINUED | OUTPATIENT
Start: 2022-05-15 | End: 2022-05-22 | Stop reason: HOSPADM

## 2022-05-14 RX ORDER — GABAPENTIN 100 MG/1
100 CAPSULE ORAL 3 TIMES DAILY
Status: DISCONTINUED | OUTPATIENT
Start: 2022-05-14 | End: 2022-05-16

## 2022-05-14 RX ADMIN — SODIUM CHLORIDE 125 ML/HR: 9 INJECTION, SOLUTION INTRAVENOUS at 00:05

## 2022-05-14 RX ADMIN — CLONAZEPAM 0.25 MG: 0.5 TABLET ORAL at 17:29

## 2022-05-14 RX ADMIN — PANTOPRAZOLE SODIUM 40 MG: 40 TABLET, DELAYED RELEASE ORAL at 22:31

## 2022-05-14 RX ADMIN — DONEPEZIL HYDROCHLORIDE 5 MG: 5 TABLET, FILM COATED ORAL at 22:31

## 2022-05-14 RX ADMIN — GABAPENTIN 100 MG: 100 CAPSULE ORAL at 22:30

## 2022-05-14 RX ADMIN — Medication 1 CAPSULE: at 10:18

## 2022-05-14 RX ADMIN — CEFEPIME HYDROCHLORIDE 1 G: 1 INJECTION, POWDER, FOR SOLUTION INTRAMUSCULAR; INTRAVENOUS at 23:52

## 2022-05-14 RX ADMIN — PANTOPRAZOLE SODIUM 40 MG: 40 TABLET, DELAYED RELEASE ORAL at 10:19

## 2022-05-14 RX ADMIN — SODIUM CHLORIDE, PRESERVATIVE FREE 10 ML: 5 INJECTION INTRAVENOUS at 13:09

## 2022-05-14 RX ADMIN — GABAPENTIN 100 MG: 100 CAPSULE ORAL at 17:29

## 2022-05-14 RX ADMIN — GABAPENTIN 100 MG: 100 CAPSULE ORAL at 11:17

## 2022-05-14 RX ADMIN — ENOXAPARIN SODIUM 30 MG: 30 INJECTION SUBCUTANEOUS at 10:18

## 2022-05-14 RX ADMIN — CEFEPIME HYDROCHLORIDE 1 G: 1 INJECTION, POWDER, FOR SOLUTION INTRAMUSCULAR; INTRAVENOUS at 11:17

## 2022-05-14 RX ADMIN — SODIUM CHLORIDE, PRESERVATIVE FREE 10 ML: 5 INJECTION INTRAVENOUS at 22:31

## 2022-05-14 RX ADMIN — ASPIRIN 81 MG: 81 TABLET, COATED ORAL at 10:18

## 2022-05-14 RX ADMIN — TAMSULOSIN HYDROCHLORIDE 0.4 MG: 0.4 CAPSULE ORAL at 10:18

## 2022-05-14 NOTE — CONSULTS
Consult  REFERRED BY:  Todd Nolen MD    CHIEF COMPLAINT: Persistent muscle tremors and jerks      Subjective:     Carli Guardado is a [de-identified] y.o. right-handed  male, seen as a new patient to me, at the request of the hospitalist, for evaluation of about a 1 week history the patient says of increasing jerks and tremors in his hands and body, worse when he tries to do things, and over the last week he has not been eating or drinking well, and got admitted to the hospital with acute kidney injury and dehydration, and his kidney function has been improving on IV fluids but he still has this type of asterixis and myoclonus, but his EEG shows only mild generalized slowing but no seizure activity, and he had no CT or MRI done on admission, but did have an MRI that just showed microvascular disease and atrophy in April 2022. He has not been given anything for his tremors. He was on baby aspirin prior to admission and Lipitor 20 mg a day, and takes Lexapro 5 mg a day and Levaquin for urinary tract infection and is now on Rocephin for that, and takes cholesterol medication and high blood pressure medication and prostate medication and Protonix for his stomach. He denies any focal weakness or sensory loss at this time. He is eating fairly well but has marked tremor that interferes with his ability to feed himself. Speech therapy is working with him now. He is fluent in his speech but obviously confused and moderately demented but not on any medication for dementia yet. He does not complain of any headache or meningismus at this time. He denies any prior history of previous stroke or TIA or other neurological problems.     Past Medical History:   Diagnosis Date    Arthritis     left arm    Autoimmune hepatitis ([facility] [address]     Bleeding ulcer 07/2019    BPH (benign prostatic hyperplasia)     CAD (coronary artery disease)     s/p stent    Cancer (HCC)     penile squamous carcinoma    Chronic kidney disease stage 2     Chronic obstructive pulmonary disease (HCC)     Dementia (HCC)     Elevated LFT's     Essential hypertension 01    GERD (gastroesophageal reflux disease)     hiatal hernia    Ill-defined condition 2016    faint    Nephrosclerosis     Orthostatic hypotension 01    PVC's 01    Sleep apnea     no CPAP    Syncope 01    secondary to venous insuffiency       Past Surgical History:   Procedure Laterality Date    COLONOSCOPY N/A 2016    COL[facility] at [facility] ENDOSCOPY    COLONOSCOPY N/A 2019    COLONOSCOPY performed by Deepak Valencia MD at [address] [place] [address]; HI RISK IND  2019         ECHO 2D ADULT  12    EF 60 - 65%; mild concentric hypertrophy; pulmonary systolic artery pressure upper limits normal    HX ABDOMINAL WALL DEFECT REPAIR  2019d     Exploratory laparotomy, segmental sigmoid colon resection and primary stapled anastomosis.     HX APPENDECTOMY  1985    HX HEENT  2020    Left temporal artery biopsy    HX HERNIA REPAIR  2018    Davinci hiatal hernia repair- Zaina Ashley MD    HX OTHER SURGICAL  2020    penile lesion bx    [place] CARDIAC SURG PROCEDURE UNLIST  2019    1 stent    UPPER GI ENDOSCOPY,BIOPSY  2019         UPPER GI ENDOSCOPY,DIAGNOSIS  2019          Family History   Problem Relation Age of Onset    Stroke Mother     Stroke Father     Heart Disease Father       Social History     Tobacco Use    Smoking status: Former Smoker     Packs/day: 3.50     Quit date: 1980     Years since quittin.4    Smokeless tobacco: Never Used   Substance Use Topics    Alcohol use: Not Currently     Comment: no alcohol since          Current Facility-Administered Medications:     [START ON 5/15/2022] enoxaparin (LOVENOX) injection 40 mg, 40 mg, SubCUTAneous, DAILY, John Cherry MD    [COMPLETED] cefepime (MAXIPIME) 2 g in 0.9% sodium chloride 10 mL IV syringe, 2 g, IntraVENous, NOW, 2 g at 05/12/22 2252 **FOLLOWED BY** cefepime (MAXIPIME) 1 g in 0.9% sodium chloride (MBP/ADV) 50 mL MBP, 1 g, IntraVENous, Q12H, Jose Cherry President, MD, Last Rate: 12.5 mL/hr at 05/14/22 1117, 1 g at 05/14/22 1117    gabapentin (NEURONTIN) capsule 100 mg, 100 mg, Oral, TID, Bartolo Stephens MD, 100 mg at 05/14/22 1117    clonazePAM (KlonoPIN) tablet 0.25 mg, 0.25 mg, Oral, BID, Bartolo Stephens MD    aspirin delayed-release tablet 81 mg, 81 mg, Oral, DAILY, Al Davey MD, 81 mg at 05/14/22 1018    donepeziL (ARICEPT) tablet 5 mg, 5 mg, Oral, QHS, Al Gutierrez MD, 5 mg at 05/13/22 2015    fluticasone propionate (FLONASE) 50 mcg/actuation nasal spray 2 Spray, 2 Spray, Both Nostrils, DAILY, Al Gutierrez MD    pantoprazole (PROTONIX) tablet 40 mg, 40 mg, Oral, ACB/HS, Al Davey MD, 40 mg at 05/14/22 1019    tamsulosin (FLOMAX) capsule 0.4 mg, 0.4 mg, Oral, DAILY, Al Gutierrez MD, 0.4 mg at 05/14/22 1018    traZODone (DESYREL) tablet 50 mg, 50 mg, Oral, QHS PRN, Nena HICKEY MD, 50 mg at 05/13/22 0114    sodium chloride (NS) flush 5-40 mL, 5-40 mL, IntraVENous, Q8H, Al Gutierrez MD, 10 mL at 05/14/22 1309    sodium chloride (NS) flush 5-40 mL, 5-40 mL, IntraVENous, PRN, Tal Jin MD    acetaminophen (TYLENOL) tablet 650 mg, 650 mg, Oral, Q6H PRN **OR** acetaminophen (TYLENOL) suppository 650 mg, 650 mg, Rectal, Q6H PRN, Al Davey MD    polyethylene glycol (MIRALAX) packet 17 g, 17 g, Oral, DAILY PRN, Al Davey MD    ondansetron (ZOFRAN ODT) tablet 4 mg, 4 mg, Oral, Q8H PRN **OR** ondansetron (ZOFRAN) injection 4 mg, 4 mg, IntraVENous, Q6H PRN, Al Davey MD    0.9% sodium chloride infusion, 125 mL/hr, IntraVENous, CONTINUOUS, Al Gutierrez MD, Last Rate: 125 mL/hr at 05/14/22 0005, 125 mL/hr at 05/14/22 0005   L.acidophilus-paracasei-S.thermophil-bifidobacter (RISAQUAD) 8 billion cell capsule, 1 Capsule, Oral, DAILY, Al Vasquez MD, 1 Capsule at 05/14/22 1018        Allergies   Allergen Reactions    Ace Inhibitors Other (comments)     Doesn't agree with pt    Demerol [Meperidine] Unknown (comments)     Causes unconsciousness      MRI Results (most recent):  Results from Hospital Encounter encounter on 04/21/22    MRI CODE NEURO BRAIN WO CONT    Narrative  BRAIN MRI WITHOUT CONTRAST: 4/21/2022 9:31 PM    INDICATION: Cerebrovascular accident. COMPARISON: 10/29/2019. TECHNIQUE: Images were obtained in multiple planes and sequences to emphasize  T1, T2, and T2* information. Diffusion-weighted images and ADC maps were also  obtained. FINDINGS: There is mild cerebral atrophy. The main intracranial arterial  flow-voids are normal. Scattered periventricular and subcortical white matter  signal abnormalities are consistent with mild, age-concordant, chronic, small  vessel ischemic disease. No restricted diffusion to suggest acute infarction. No  hemorrhage. Impression  No acute intracranial abnormality. Results from [place] encounter on 04/21/22    MRI CODE NEURO BRAIN WO CONT    Narrative  BRAIN MRI WITHOUT CONTRAST: 4/21/2022 9:31 PM    INDICATION: Cerebrovascular accident. COMPARISON: 10/29/2019. TECHNIQUE: Images were obtained in multiple planes and sequences to emphasize  T1, T2, and T2* information. Diffusion-weighted images and ADC maps were also  obtained. FINDINGS: There is mild cerebral atrophy. The main intracranial arterial  flow-voids are normal. Scattered periventricular and subcortical white matter  signal abnormalities are consistent with mild, age-concordant, chronic, small  vessel ischemic disease. No restricted diffusion to suggest acute infarction. No  hemorrhage. Impression  No acute intracranial abnormality.     Review of Systems:  A comprehensive review of systems was negative except for: Constitutional: positive for fevers, fatigue and malaise  Ears, nose, mouth, throat, and face: positive for hearing loss  Genitourinary: positive for nocturia, urinary incontinence and UTI  Musculoskeletal: positive for myalgias, arthralgias, stiff joints and muscle weakness  Neurological: positive for dizziness, memory problems, paresthesia, coordination problems, gait problems and tremor  Behvioral/Psych: positive for Dementia   Vitals:    05/13/22 1313 05/13/22 2010 05/14/22 0026 05/14/22 0341   BP: 137/72 137/80 132/76 (!) 142/74   Pulse:  77 82 68   Resp: 16 18 16 20   Temp: 98.1 °F (36.7 °C) 97.7 °F (36.5 °C) 98.2 °F (36.8 °C) 99 °F (37.2 °C)   SpO2: 100% 97% 99% 96%   Weight:       Height:         Objective:     I      NEUROLOGICAL EXAM:    Appearance: The patient is poorly developed and nourished, provides a poor history and is in no acute distress. Patient with persistent asterixis and myoclonus while he tries to feed himself. Mental Status: Oriented to place and person, and not the date or the president, cognitive function is abnormal and speech is fluent and no aphasia with a mild dysarthria. Mood and affect appropriate but confused and encephalopathic. Cranial Nerves:   Intact visual fields. Fundi are benign, disc are flat, no lesions seen on funduscopy. LIA, EOM's full, no nystagmus, no ptosis. Facial sensation is normal. Corneal reflexes are not tested. Facial movement is symmetric. Hearing is abnormal bilaterally. Palate is midline with normal sternocleidomastoid and trapezius muscles are normal. Tongue is midline. Neck without meningismus or bruits  Temporal arteries are not tender or enlarged  TMJ areas are not tender on palpation   Motor:  3/5 strength in upper and lower proximal and distal muscles. Normal bulk and tone. No fasciculations.   Rapid alternating movement is symmetric and slow bilaterally  Patient with persistence of asterixis and myoclonus in a generalized fashion   Reflexes:   Deep tendon reflexes 1+/4 and symmetrical.  No babinski or clonus present   Sensory:   Normal to touch, pinprick and vibration and temperature are decreased in both feet. DSS is intact   Gait:  Not tested   Tremor: Moderate bilateral myoclonic and asterixis tremor noted. Cerebellar:   Not testable abnormal cerebellar signs present on Romberg and tandem testing and finger-nose-finger exam.   Neurovascular:  Normal heart sounds and regular rhythm, peripheral pulses decreased, and no carotid bruits. Assessment:       ICD-10-CM ICD-9-CM    1. Acute cystitis with hematuria  N30.01 595.0    2. Altered mental status, unspecified altered mental status type  R41.82 780.97    3. Rigors  R68.89 780.99    4. LETICIA (acute kidney injury) ([facility] [address]  N17.9 584.9    5. Dehydration  E86.0 276.51    6. Lactic acidosis  E87.2 276.2      Active Problems:    TIA (transient ischemic attack) (12/6/2010)      Orthostatic hypotension ()      Overview: Positive tilt test 10/01      Bilateral carotid artery stenosis (10/29/2019)      Dementia ([address] (6/1/2021)      Dehydration (5/12/2022)      Lactic acidosis (5/12/2022)      UTI (urinary tract infection) (5/12/2022)      LETICIA (acute kidney injury) ([address] (5/12/2022)      Myoclonus (5/14/2022)      Asterixis (5/14/2022)      Late onset Alzheimer's disease without behavioral disturbance ([address] (5/14/2022)      Cerebral microvascular disease (5/14/2022)        Plan:     Patient with generalized tremors and myoclonus and asterixis that looks most likely metabolic but his metabolic numbers are not that bad now, and he may still just have a mild chemical disequilibrium, we will start him on low-dose gabapentin and low-dose clonazepam to try to stop the tremors and asterixis and myoclonus, that he can better feed himself and take care of himself.   He will need imaging of the brain just for completeness, but his EEG does not show any seizure activity, there is moderate generalized slowing, and he will need metabolic parameters to rule out other causes of his memory loss like B12 and thyroid and magnesium for his tremors, we will follow carefully with you. Probably check a carotid Doppler study on Monday in addition. Difficulties, this is probably a decompensated dementia and metabolic encephalopathy in a patient with Alzheimer's disease and new urinary tract infection, with dehydration and renal failure.     Signed By: Monica Bell MD     May 14, 2022       CC: Anisha Sampson MD  FAX: [phone]

## 2022-05-14 NOTE — PROGRESS NOTES
-Please complete MRI History and Safety Screening Form   - Patient cannot be scanned until this form is completed, including signatures, and reviewed in MRI to ensure patient is SAFE and eligible for MRI. - CALL MRI when this has been successfully completed at [phone].

## 2022-05-14 NOTE — ROUTINE PROCESS

## 2022-05-14 NOTE — PROGRESS NOTES
Problem: Dysphagia (Adult)  Goal: *Acute Goals and Plan of Care (Insert Text)  Description: 5/13/22  Speech path goals  1. Patient will tolerate crushed meds in applesauce with no overt s/s of aspiration. Goal met 5/13. 2. Patient will participate with reeval of swallowing. Goal met 5/13. 3. Patient will tolerate minced diet with thins with no overt s/s of aspiration. Advance to regular/thin within 7 days  4. Patient will tolerate diet upgrade. MET  Outcome: Progressing Towards Goal     SPEECH LANGUAGE PATHOLOGY DYSPHAGIA TREATMENT  Patient: Rita Garcia (89 y.o. male)  Date: 5/14/2022  Diagnosis: LETICIA (acute kidney injury) ([facility] [address] [N17.9]  Dehydration [E86.0]  UTI (urinary tract infection) [N39.0]  Lactic acidosis [E87.2] <principal problem not specified>       Precautions:  Fall    ASSESSMENT:  Patient received upright in bed, alert, and self-feeding minced and moist breakfast tray without utensils. Given jerking motions, patient with difficulty self-feeding. Patient tolerated entire breakfast tray, 2 julius crackers, and 12oz thin liquids with no overt s/s aspiration. Recommend diet liberalization as patient with WFL oral/pharyngeal swallow, and hopeful that larger pieces of food will be easier for patient to self-feed. PLAN:  Recommendations and Planned Interventions:  -Regular/thin liquid diet, finger foods as much as possible, straws ok  -Meds as tolerated; note patient with difficulty with pills yesterday, and may need meds crushed in puree  -SLP to follow for diet tolerance  Patient continues to benefit from skilled intervention to address the above impairments. Continue treatment per established plan of care.   Discharge Recommendations:  None     SUBJECTIVE:   Patient stated Yeah    OBJECTIVE:   Cognitive and Communication Status:  Neurologic State: Alert,Confused  Orientation Level: Oriented to person  Cognition: Decreased command following,Decreased attention/concentration  Perception: Appears intact  Perseveration: No perseveration noted  Safety/Judgement: Not assessed  Dysphagia Treatment:  Oral Assessment:  Oral Assessment  Dentition: Natural;Full  Oral Hygiene: moist  P.O. Trials:  Patient Position: upright in bed  Vocal quality prior to P.O.: No impairment  Consistency Presented: Mechanical soft; Solid; Thin liquid  How Presented: Self-fed/presented;Cup/sip;SLP-fed/presented;Spoon;Straw;Successive swallows     Bolus Acceptance: No impairment  Bolus Formation/Control: No impairment     Propulsion: No impairment           Aspiration Signs/Symptoms: None                   Pain:  Pain Scale 1: Adult Nonverbal Pain Scale          After treatment:   Patient left in no apparent distress in bed and Nursing notified    COMMUNICATION/EDUCATION:   Patient was too confused to benefit from education. The patient's plan of care including recommendations, planned interventions, and recommended diet changes were discussed with: Registered nurse.      Nemesio Kim SLP  Time Calculation: 20 mins

## 2022-05-14 NOTE — PROGRESS NOTES
Hospitalist Progress Note    NAME: Miladys Ravi   :  1940   MRN:  [id number]     I reviewed pertinent labs and imaging, and discussed /agreed on the plan of care with Dr. Masoud Gatica. Assessment / Plan:  Pseudomonal UTI POA  Dehydration/LETICIA/lactic acidosis POA due to decreased p.o. intake as per family - resolved   -Chest x-ray negative acute  -Procalcitonin 0.30  -Appreciate Urology input - d/t BPH and history of penile cancer s/p resection patient has difficulty voiding and will likely need chronic allen cath   -Continue allen - DO NOT REMOVE unless per urology   -Blood culture so far negative   -Urine Culture from ED on  with ENTEROCOCCUS FAECALIS and PSEUDOMONAS   -Patient had taken PO Omnicef and Levaquin at home as outpatient for enterococcal and pseudomonal UTI on culture from 2022.  -Complete course of abx with Cefepime while IP - ending on  at 2300  -Continue flomax   -Continue IVF hydration      Autoimmune hepatitis  Dementia Continue aricept   Penile cancer status post resection  BPH Continue flomax   Hyperlipidemia Hold statin for now      Generalized twitching/involuntary movements   -Family reported twitching of the upper extremity which has started prior to admission  -EEG completed   -Neurology consulted , does not appear to have been called. Discussed with RN, will call today.  -Await Neurology evaluation        Code Status: Full code but daughter does not want him to be on life support for too long  Surrogate Decision Maker: Daughter Dora Massey # [phone]     DVT Prophylaxis: Subcu Lovenox  GI Prophylaxis: not indicated     Dispo: Patient is relatively independent at home, has walker and a cane but does not use it much as per the daughter, has a caregiver who stays across this street who checks on him multiple times per day. PT/OT recommending SNF and plan is to DC to SNF when medically stable. 25.0 - 29.9 Overweight / Body mass index is 26.95 kg/m².     Estimated discharge date: May 17  Barriers: Clinical improvement, SNF placement      Subjective:     Chief Complaint / Reason for Physician Visit  Patient seen at bedside, has no complaints and is somewhat confused per baseline. Discussed with RN events overnight. Review of Systems:  Symptom Y/N Comments  Symptom Y/N Comments   Fever/Chills    Chest Pain     Poor Appetite    Edema     Cough    Abdominal Pain     Sputum    Joint Pain     SOB/DE JESUS    Pruritis/Rash     Nausea/vomit    Tolerating PT/OT     Diarrhea    Tolerating Diet     Constipation    Other       Could NOT obtain due to:  Confused/dementia      Objective:     VITALS:   Last 24hrs VS reviewed since prior progress note. Most recent are:  Patient Vitals for the past 24 hrs:   Temp Pulse Resp BP SpO2   05/14/22 0341 99 °F (37.2 °C) 68 20 (!) 142/74 96 %   05/14/22 0026 98.2 °F (36.8 °C) 82 16 132/76 99 %   05/13/22 2010 97.7 °F (36.5 °C) 77 18 137/80 97 %   05/13/22 1313 98.1 °F (36.7 °C) -- 16 137/72 100 %       Intake/Output Summary (Last 24 hours) at 5/14/2022 1303  Last data filed at 5/14/2022 9081  Gross per 24 hour   Intake 1336.97 ml   Output 1700 ml   Net -363.03 ml        I had a face to face encounter and independently examined this patient on 5/14/2022, as outlined below:  PHYSICAL EXAM:  General: WD, WN. Alert, cooperative, no acute distress    EENT:  EOMI. Anicteric sclerae. MMM  Resp:  CTA bilaterally, no wheezing or rales. No accessory muscle use  CV:  Regular  rhythm,  No edema  GI:  Soft, Non distended, Non tender. +Bowel sounds  Neurologic:  Alert and oriented X 1, normal speech,   Psych:   Poor insight. Not anxious nor agitated  Skin:  No rashes.   No jaundice    Reviewed most current lab test results and cultures  YES  Reviewed most current radiology test results   YES  Review and summation of old records today    NO  Reviewed patient's current orders and MAR    YES  PMH/SH reviewed - no change compared to H&P  ________________________________________________________________________  Care Plan discussed with:    Comments   Patient x    Family      RN x    Care Manager x    Consultant                        Multidiciplinary team rounds were held today with , nursing, pharmacist and clinical coordinator. Patient's plan of care was discussed; medications were reviewed and discharge planning was addressed. ________________________________________________________________________  Total NON critical care TIME: 35  Minutes    Total CRITICAL CARE TIME Spent:   Minutes non procedure based      Comments   >50% of visit spent in counseling and coordination of care x    ________________________________________________________________________  Flory Majors, NP     Procedures: see electronic medical records for all procedures/Xrays and details which were not copied into this note but were reviewed prior to creation of Plan. LABS:  I reviewed today's most current labs and imaging studies.   Pertinent labs include:  Recent Labs     05/14/22  0340 05/13/22  0335 05/12/22  1845   WBC 4.1 7.9 8.5   HGB 10.1* 11.7* 13.2   HCT 30.6* 34.8* 38.3   * 188 266     Recent Labs     05/14/22  1201 05/14/22  0340 05/13/22  0335 05/12/22  1845   NA  --  143 140 138   K  --  3.4* 3.2* 3.5   CL  --  114* 107 106   CO2  --  24 23 22   GLU  --  118* 127* 128*   BUN  --  16 24* 23*   CREA  --  1.23 1.76* 1.73*   CA  --  8.0* 8.8 9.4   MG 1.7  --   --   --    ALB  --   --   --  3.0*   TBILI  --   --   --  0.9   ALT  --   --   --  56       Signed: Flory Rodeny NP

## 2022-05-15 LAB — 25(OH)D3 SERPL-MCNC: 28.8 NG/ML (ref 30–100)

## 2022-05-15 PROCEDURE — 65270000029 HC RM PRIVATE

## 2022-05-15 PROCEDURE — 99232 SBSQ HOSP IP/OBS MODERATE 35: CPT | Performed by: PSYCHIATRY & NEUROLOGY

## 2022-05-15 PROCEDURE — 74011250637 HC RX REV CODE- 250/637: Performed by: PSYCHIATRY & NEUROLOGY

## 2022-05-15 PROCEDURE — 74011250636 HC RX REV CODE- 250/636: Performed by: INTERNAL MEDICINE

## 2022-05-15 PROCEDURE — 74011000250 HC RX REV CODE- 250: Performed by: INTERNAL MEDICINE

## 2022-05-15 PROCEDURE — 74011250637 HC RX REV CODE- 250/637: Performed by: INTERNAL MEDICINE

## 2022-05-15 RX ORDER — LOPERAMIDE HYDROCHLORIDE 2 MG/1
4 CAPSULE ORAL
Status: DISCONTINUED | OUTPATIENT
Start: 2022-05-15 | End: 2022-05-22 | Stop reason: HOSPADM

## 2022-05-15 RX ORDER — CLONAZEPAM 0.5 MG/1
0.25 TABLET ORAL
Status: DISCONTINUED | OUTPATIENT
Start: 2022-05-15 | End: 2022-05-16

## 2022-05-15 RX ORDER — MEMANTINE HYDROCHLORIDE 5 MG/1
5 TABLET ORAL DAILY
Status: DISCONTINUED | OUTPATIENT
Start: 2022-05-16 | End: 2022-05-17

## 2022-05-15 RX ADMIN — PANTOPRAZOLE SODIUM 40 MG: 40 TABLET, DELAYED RELEASE ORAL at 09:34

## 2022-05-15 RX ADMIN — DONEPEZIL HYDROCHLORIDE 5 MG: 5 TABLET, FILM COATED ORAL at 21:29

## 2022-05-15 RX ADMIN — SODIUM CHLORIDE, PRESERVATIVE FREE 10 ML: 5 INJECTION INTRAVENOUS at 17:56

## 2022-05-15 RX ADMIN — PANTOPRAZOLE SODIUM 40 MG: 40 TABLET, DELAYED RELEASE ORAL at 22:00

## 2022-05-15 RX ADMIN — CLONAZEPAM 0.25 MG: 0.5 TABLET ORAL at 09:34

## 2022-05-15 RX ADMIN — CLONAZEPAM 0.25 MG: 0.5 TABLET ORAL at 21:29

## 2022-05-15 RX ADMIN — TAMSULOSIN HYDROCHLORIDE 0.4 MG: 0.4 CAPSULE ORAL at 09:34

## 2022-05-15 RX ADMIN — SODIUM CHLORIDE, PRESERVATIVE FREE 10 ML: 5 INJECTION INTRAVENOUS at 09:35

## 2022-05-15 RX ADMIN — SODIUM CHLORIDE 125 ML/HR: 9 INJECTION, SOLUTION INTRAVENOUS at 09:39

## 2022-05-15 RX ADMIN — ENOXAPARIN SODIUM 40 MG: 100 INJECTION SUBCUTANEOUS at 09:34

## 2022-05-15 RX ADMIN — ASPIRIN 81 MG: 81 TABLET, COATED ORAL at 09:34

## 2022-05-15 RX ADMIN — GABAPENTIN 100 MG: 100 CAPSULE ORAL at 09:34

## 2022-05-15 RX ADMIN — LOPERAMIDE HYDROCHLORIDE 4 MG: 2 CAPSULE ORAL at 14:31

## 2022-05-15 RX ADMIN — SODIUM CHLORIDE 75 ML/HR: 9 INJECTION, SOLUTION INTRAVENOUS at 21:34

## 2022-05-15 RX ADMIN — Medication 1 CAPSULE: at 09:34

## 2022-05-15 RX ADMIN — SODIUM CHLORIDE, PRESERVATIVE FREE 10 ML: 5 INJECTION INTRAVENOUS at 21:36

## 2022-05-15 RX ADMIN — GABAPENTIN 100 MG: 100 CAPSULE ORAL at 17:56

## 2022-05-15 RX ADMIN — GABAPENTIN 100 MG: 100 CAPSULE ORAL at 21:29

## 2022-05-15 NOTE — PROCEDURES
[address]  EEG    Name:  Rashi Rai  MR#:  [id number]  :  1940  ACCOUNT #:  [de-identified]  DATE OF SERVICE:  2022    CLINICAL INDICATION:  The patient is an 78-year-old male with a history of altered mental status and myoclonic jerks and tremors, EEG to rule out seizures, rule out cortical abnormality, or encephalopathy, patient with altered mental status. EEG CLASSIFICATION:  On this patient is dysrhythmia grade II, generalized. DESCRIPTION OF THE RECORD:  The background of this recording contains a posteriorly located occipital alpha rhythm of 8-9 Hz that did attenuate some with eye opening in the awake state. Throughout the recording, there was a moderate increase in some generalized theta slow wave 3-6 Hz activity seen, but no clear areas of focal slowing or spike or spike-and-wave discharges seen, but there were frequent episodes of muscle activity and electrode artifact associated with the patient's tremors coming repeatedly throughout the recording. The patient did enter brief states of sleep with some K-complexes and sleep spindles seen at which time, the tremors got better. Photic stimulation was performed, produced little change in background recording. Hyperventilation was not performed. INTERPRETATION:  This is a moderately abnormal electroencephalogram due to the widespread generalized slow-wave activity seen with no clear areas of focal slowing or spike or spike-and-wave discharges were seen, though there were frequent episodes of the patient having muscle activity and muscle jerks noted in the recording without electrographic changes to suggest these were seizures. Clinical correlation recommended.         MD KASSANDRA [email or web address]  D:  2022 18:54  T:  05/15/2022 1:06  JOB #:  [id number]

## 2022-05-15 NOTE — PROGRESS NOTES
Hospitalist Progress Note    NAME: Pradeep Edmondson   :  1940   MRN:  [id number]       Assessment / Plan:  Pseudomonal UTI POA  Dehydration/LETICIA/lactic acidosis POA due to decreased p.o. intake as per family - resolved now, eating well now  -Chest x-ray negative acute  -Procalcitonin 0.30  -Appreciate Urology input - d/t BPH and history of penile cancer s/p resection patient has difficulty voiding and will likely need chronic allen cath   -Continue allen - DO NOT REMOVE unless per urology   -Blood culture so far negative   -Urine Culture from ED on  with ENTEROCOCCUS FAECALIS and PSEUDOMONAS   -Patient had taken PO Omnicef and Levaquin at home as outpatient for enterococcal and pseudomonal UTI on culture from 2022.  -Completed course of abx with Cefepime while IP - ended   -Continue flomax   -Continue IVF hydration - decrease to 75ml/hr     Autoimmune hepatitis  Dementia Continue aricept   Penile cancer status post resection  BPH Continue flomax   Hyperlipidemia Hold statin for now      Generalized twitching/involuntary movements   -Family reported twitching of the upper extremity which has started prior to admission  -EEG completed - negative  -Neurology consulted ,- following  MRI negative acute, Chronic Volume loss noted, dementia noted  Started on low dose Klonopin and Neurontin ()         Code Status: Full code but daughter does not want him to be on life support for too long  Surrogate Decision Maker: Daughter Tosin Estevez # [phone]     DVT Prophylaxis: Subcu Lovenox  GI Prophylaxis: not indicated     Dispo: Patient is relatively independent at home, has walker and a cane but does not use it much as per the daughter, has a caregiver who stays across this street who checks on him multiple times per day. PT/OT recommending SNF and plan is to DC to SNF when medically stable. 25.0 - 29.9 Overweight / Body mass index is 26.95 kg/m².     Estimated discharge date: May 17  Barriers: Clinical improvement, SNF placement      Subjective:     Chief Complaint / Reason for Physician Visit  Patient seen at bedside, has no complaints and is somewhat confused per baseline. Discussed with RN events overnight. Review of Systems:  Symptom Y/N Comments  Symptom Y/N Comments   Fever/Chills    Chest Pain     Poor Appetite    Edema     Cough    Abdominal Pain     Sputum    Joint Pain     SOB/DE JESUS    Pruritis/Rash     Nausea/vomit    Tolerating PT/OT     Diarrhea    Tolerating Diet     Constipation    Other       Could NOT obtain due to:  Confused/dementia      Objective:     VITALS:   Last 24hrs VS reviewed since prior progress note. Most recent are:  Patient Vitals for the past 24 hrs:   Temp Pulse Resp BP SpO2   05/15/22 0932 98 °F (36.7 °C) 72 18 109/64 96 %   05/15/22 0015 98 °F (36.7 °C) 70 18 125/72 96 %   05/14/22 1500 97.9 °F (36.6 °C) 75 18 115/70 97 %       Intake/Output Summary (Last 24 hours) at 5/15/2022 1004  Last data filed at 5/15/2022 0173  Gross per 24 hour   Intake --   Output 3000 ml   Net -3000 ml        I had a face to face encounter and independently examined this patient on 5/15/2022, as outlined below:  PHYSICAL EXAM:  General: WD, WN. Alert, cooperative, no acute distress    EENT:  EOMI. Anicteric sclerae. MMM  Resp:  CTA bilaterally, no wheezing or rales. No accessory muscle use  CV:  Regular  rhythm,  No edema  GI:  Soft, Non distended, Non tender. +Bowel sounds  Neurologic:  Alert and oriented X 1, normal speech,   Psych:   Poor insight. Not anxious nor agitated  Skin:  No rashes.   No jaundice    Reviewed most current lab test results and cultures  YES  Reviewed most current radiology test results   YES  Review and summation of old records today    NO  Reviewed patient's current orders and MAR    YES  PMH/SH reviewed - no change compared to H&P  ________________________________________________________________________  Care Plan discussed with:    Comments   Patient x Family      RN x    Care Manager x    Consultant                        Multidiciplinary team rounds were held today with , nursing, pharmacist and clinical coordinator. Patient's plan of care was discussed; medications were reviewed and discharge planning was addressed. ________________________________________________________________________  Total NON critical care TIME: 26  Minutes    Total CRITICAL CARE TIME Spent:   Minutes non procedure based      Comments   >50% of visit spent in counseling and coordination of care     ________________________________________________________________________  Ignacio Gentile MD     Procedures: see electronic medical records for all procedures/Xrays and details which were not copied into this note but were reviewed prior to creation of Plan. LABS:  I reviewed today's most current labs and imaging studies.   Pertinent labs include:  Recent Labs     05/14/22  0340 05/13/22  0335 05/12/22  1845   WBC 4.1 7.9 8.5   HGB 10.1* 11.7* 13.2   HCT 30.6* 34.8* 38.3   * 188 266     Recent Labs     05/14/22  1201 05/14/22  0340 05/13/22  0335 05/12/22  1845   NA  --  143 140 138   K  --  3.4* 3.2* 3.5   CL  --  114* 107 106   CO2  --  24 23 22   GLU  --  118* 127* 128*   BUN  --  16 24* 23*   CREA  --  1.23 1.76* 1.73*   CA  --  8.0* 8.8 9.4   MG 1.7  --   --   --    ALB  --   --   --  3.0*   TBILI  --   --   --  0.9   ALT  --   --   --  56       Signed: Ignacio Gentile MD

## 2022-05-15 NOTE — PROGRESS NOTES
End of Shift Note    Bedside shift change report given to Jing Johnson RN (oncoming nurse) by Antelmo Capone RN (offgoing nurse). Report included the following information Intake/Output, MAR, Recent Results, Alarm Parameters  and Quality Measures    Shift worked:  7 am-7pm      Shift summary and any significant changes:     loose stools x 2    Concerns for physician to address: Loose stools MD notified order for immodium 4 mg every 6 hours as needed      Zone phone for oncoming shift:  [phone]     Activity:  Activity Level: Up with Assistance  Number times ambulated in hallways past shift: 0  Number of times OOB to chair past shift: 2    Cardiac:   Cardiac Monitoring: No      Cardiac Rhythm: Sinus Tachy    Access:   Current line(s): PIV     Genitourinary:   Urinary status: allen    Respiratory:   O2 Device: None (Room air)  Chronic home O2 use?: NO  Incentive spirometer at bedside: NO       GI:  Last Bowel Movement Date: 05/15/22  Current diet:  ADULT DIET Regular  Passing flatus: YES  Tolerating current diet: YES       Pain Management:   Patient states pain is manageable on current regimen: YES    Skin:  Sergey Score: 19  Interventions: float heels, increase time out of bed, PT/OT consult, limit briefs, internal/external urinary devices and nutritional support     Patient Safety:  Fall Score:  Total Score: 4  Interventions: bed/chair alarm, assistive device (walker, cane, etc), gripper socks, pt to call before getting OOB and stay with me (per policy)  High Fall Risk: Yes    Length of Stay:  Expected LOS: 2d 21h  Actual LOS: Mitch Márquez RN

## 2022-05-15 NOTE — PROGRESS NOTES
Consult  REFERRED BY:  Oz Jean MD    CHIEF COMPLAINT: Persistent muscle tremors and jerks      Subjective:     Patricia Pete is a [de-identified] y.o. right-handed  male, seen as a new patient to me, at the request of the hospitalist, for evaluation of about a 1 week history the patient says of increasing jerks and tremors in his hands and body, worse when he tries to do things, and over the last week he has not been eating or drinking well, and got admitted to the hospital with acute kidney injury and dehydration, and his kidney function has been improving on IV fluids but he still has this type of asterixis and myoclonus, but his EEG shows only mild generalized slowing but no seizure activity, and he had no CT or MRI done on admission, but did have an MRI that just showed microvascular disease and atrophy in April 2022. He has not been given anything for his tremors. He was on baby aspirin prior to admission and Lipitor 20 mg a day, and takes Lexapro 5 mg a day and Levaquin for urinary tract infection and is now on Rocephin for that, and takes cholesterol medication and high blood pressure medication and prostate medication and Protonix for his stomach. He denies any focal weakness or sensory loss at this time. He is eating fairly well but has marked tremor that interferes with his ability to feed himself. Speech therapy is working with him now. He is fluent in his speech but obviously confused and moderately demented but not on any medication for dementia yet. He does not complain of any headache or meningismus at this time. He denies any prior history of previous stroke or TIA or other neurological problems. His tremors are better today, but he seems a little drowsy on the clonazepam, so we will reduce his dose down to just 1 at night and consider tapering off the Neurontin next and stopping the clonazepam once he has recovered a little more.     Past Medical History:   Diagnosis Date    Arthritis left arm    Autoimmune hepatitis ([facility] [address]     Bleeding ulcer 2019    BPH (benign prostatic hyperplasia)     CAD (coronary artery disease)     s/p stent    Cancer (HCC)     penile squamous carcinoma    Chronic kidney disease     stage 2     Chronic obstructive pulmonary disease (HCC)     Dementia (HCC)     Elevated LFT's     Essential hypertension 01    GERD (gastroesophageal reflux disease)     hiatal hernia    Ill-defined condition 2016    faint    Nephrosclerosis     Orthostatic hypotension 01    PVC's 01    Sleep apnea     no CPAP    Syncope 01    secondary to venous insuffiency       Past Surgical History:   Procedure Laterality Date    COLONOSCOPY N/A 2016    COLONOS[facility]ni Byrne [facility]COPY    COLONOSCOPY N/A 2019    COLONOSCOPY performed by Gerardo Yancey MD at  [facility]; HI RISK IND  2019         ECHO 2D ADULT  12    EF 60 - 65%; mild concentric hypertrophy; pulmonary systolic artery pressure upper limits normal    HX ABDOMINAL WALL DEFECT REPAIR  2019d     Exploratory laparotomy, segmental sigmoid colon resection and primary stapled anastomosis.     HX APPENDECTOMY  1985    HX HEENT  2020    Left temporal artery biopsy    HX HERNIA REPAIR  2018    Davinci hiatal hernia repair- Sidra Coy MD    HX OTHER SURGICAL  2020    penile lesion bx    CA CARDIAC SURG PROCEDURE UNLIST  2019    1 stent    UPPER GI ENDOSCOPY,BIOPSY  2019         UPPER GI ENDOSCOPY,DIAGNOSIS  2019          Family History   Problem Relation Age of Onset    Stroke Mother     Stroke Father     Heart Disease Father       Social History     Tobacco Use    Smoking status: Former Smoker     Packs/day: 3.50     Quit date: 1980     Years since quittin.4    Smokeless tobacco: Never Used   Substance Use Topics    Alcohol use: Not Currently Comment: no alcohol since 1999         Current Facility-Administered Medications:     loperamide (IMODIUM) capsule 4 mg, 4 mg, Oral, Q6H PRN, Al Foley MD    clonazePAM (KlonoPIN) tablet 0.25 mg, 0.25 mg, Oral, QHS, Blaise Khalil MD    [START ON 5/16/2022] memantine (NAMENDA) tablet 5 mg, 5 mg, Oral, DAILY, Blaise Khalil MD    enoxaparin (LOVENOX) injection 40 mg, 40 mg, SubCUTAneous, DAILY, Dara Mckinney MD, 40 mg at 05/15/22 0934    gabapentin (NEURONTIN) capsule 100 mg, 100 mg, Oral, TID, Blaise Khalil MD, 100 mg at 05/15/22 5072    aspirin delayed-release tablet 81 mg, 81 mg, Oral, DAILY, Al Foley MD, 81 mg at 05/15/22 0934    donepeziL (ARICEPT) tablet 5 mg, 5 mg, Oral, QHS, Al Gutierrez MD, 5 mg at 05/14/22 2231    fluticasone propionate (FLONASE) 50 mcg/actuation nasal spray 2 Spray, 2 Spray, Both Nostrils, DAILY, Al Gutierrez MD    pantoprazole (PROTONIX) tablet 40 mg, 40 mg, Oral, ACB/HS, Joby HICKEY MD, 40 mg at 05/15/22 0934    tamsulosin (FLOMAX) capsule 0.4 mg, 0.4 mg, Oral, DAILY, Al Foley MD, 0.4 mg at 05/15/22 0934    traZODone (DESYREL) tablet 50 mg, 50 mg, Oral, QHS PRN, Joby HICKEY MD, 50 mg at 05/13/22 0114    sodium chloride (NS) flush 5-40 mL, 5-40 mL, IntraVENous, Q8H, Al Gutierrez MD, 10 mL at 05/15/22 0935    sodium chloride (NS) flush 5-40 mL, 5-40 mL, IntraVENous, PRN, Alecia Hernandez MD    acetaminophen (TYLENOL) tablet 650 mg, 650 mg, Oral, Q6H PRN **OR** acetaminophen (TYLENOL) suppository 650 mg, 650 mg, Rectal, Q6H PRN, Al Foley MD    polyethylene glycol (MIRALAX) packet 17 g, 17 g, Oral, DAILY PRN, Al Foley MD    ondansetron (ZOFRAN ODT) tablet 4 mg, 4 mg, Oral, Q8H PRN **OR** ondansetron (ZOFRAN) injection 4 mg, 4 mg, IntraVENous, Q6H PRN, Al Foley MD    0.9% sodium chloride infusion, 75 mL/hr, IntraVENous, CONTINUOUS, Al Foley MD, Last Rate: 75 mL/hr at 05/15/22 1007, 75 mL/hr at 05/15/22 1007    L.acidophilus-paracasei-S.thermophil-bifidobacter (RISAQUAD) 8 billion cell capsule, 1 Capsule, Oral, DAILY, Popeye HICKEY MD, 1 Capsule at 05/15/22 6372        Allergies   Allergen Reactions    Ace Inhibitors Other (comments)     Doesn't agree with pt    Demerol [Meperidine] Unknown (comments)     Causes unconsciousness      MRI Results (most recent):  Results from [place] encounter on 05/12/22    MRI BRAIN WO CONT    Narrative  EXAM: MRI BRAIN WO CONT    INDICATION: Altered mental status and generalized tremors    COMPARISON: MRI brain 4/21/2022. CONTRAST: None. TECHNIQUE:  Multiplanar multisequence acquisition without contrast of the brain. FINDINGS:  Unchanged generalized parenchymal volume loss with commensurate dilation of the  sulci and ventricular system. Unchanged scattered periventricular and deep white  matter T2/FLAIR hyperintensities, confluent in regions, and mild patchy T2/FLAIR  hyperintensity in the flip, consistent with mild to moderate chronic  microvascular ischemic disease. There is no acute infarct, hemorrhage,  extra-axial fluid collection, or mass effect. There is no cerebellar tonsillar  herniation. Expected arterial flow-voids are present. The paranasal sinuses, mastoid air cells, and middle ears are clear. The orbital  contents are within normal limits with bilateral lens implants. No significant  osseous or scalp lesions are identified. Impression  1. No acute intracranial abnormality. 2. Unchanged generalized parenchymal volume loss and mild to moderate chronic  microvascular ischemic disease. Results from [place] encounter on 05/12/22    MRI BRAIN WO CONT    Narrative  EXAM: MRI BRAIN WO CONT    INDICATION: Altered mental status and generalized tremors    COMPARISON: MRI brain 4/21/2022. CONTRAST: None.     TECHNIQUE:  Multiplanar multisequence acquisition without contrast of the brain. FINDINGS:  Unchanged generalized parenchymal volume loss with commensurate dilation of the  sulci and ventricular system. Unchanged scattered periventricular and deep white  matter T2/FLAIR hyperintensities, confluent in regions, and mild patchy T2/FLAIR  hyperintensity in the flip, consistent with mild to moderate chronic  microvascular ischemic disease. There is no acute infarct, hemorrhage,  extra-axial fluid collection, or mass effect. There is no cerebellar tonsillar  herniation. Expected arterial flow-voids are present. The paranasal sinuses, mastoid air cells, and middle ears are clear. The orbital  contents are within normal limits with bilateral lens implants. No significant  osseous or scalp lesions are identified. Impression  1. No acute intracranial abnormality. 2. Unchanged generalized parenchymal volume loss and mild to moderate chronic  microvascular ischemic disease. Review of Systems:  A comprehensive review of systems was negative except for: Constitutional: positive for fevers, fatigue and malaise  Ears, nose, mouth, throat, and face: positive for hearing loss  Genitourinary: positive for nocturia, urinary incontinence and UTI  Musculoskeletal: positive for myalgias, arthralgias, stiff joints and muscle weakness  Neurological: positive for dizziness, memory problems, paresthesia, coordination problems, gait problems and tremor  Behvioral/Psych: positive for Dementia   Vitals:    05/14/22 0730 05/14/22 1500 05/15/22 0015 05/15/22 0932   BP: 138/76 115/70 125/72 109/64   Pulse: 70 75 70 72   Resp: 20 18 18 18   Temp: 98.9 °F (37.2 °C) 97.9 °F (36.6 °C) 98 °F (36.7 °C) 98 °F (36.7 °C)   SpO2: 96% 97% 96% 96%   Weight:       Height:         Objective:     I      NEUROLOGICAL EXAM:    Appearance: The patient is poorly developed and nourished, provides a poor history and is in no acute distress.   Patient with much improved tremors and asterixis   Mental Status: Oriented to place and person, and not the date or the president, cognitive function is abnormal and speech is fluent and no aphasia with a mild dysarthria. Mood and affect appropriate but confused and encephalopathic. Cranial Nerves:   Intact visual fields. Fundi are benign, disc are flat, no lesions seen on funduscopy. LIA, EOM's full, no nystagmus, no ptosis. Facial sensation is normal. Corneal reflexes are not tested. Facial movement is symmetric. Hearing is abnormal bilaterally. Palate is midline with normal sternocleidomastoid and trapezius muscles are normal. Tongue is midline. Neck without meningismus or bruits  Temporal arteries are not tender or enlarged  TMJ areas are not tender on palpation   Motor:  3/5 strength in upper and lower proximal and distal muscles. Normal bulk and tone. No fasciculations. Rapid alternating movement is symmetric and slow bilaterally  Patient with much improved tremors and asterixis   Reflexes:   Deep tendon reflexes 1+/4 and symmetrical.  No babinski or clonus present   Sensory:   Normal to touch, pinprick and vibration and temperature are decreased in both feet. DSS is intact   Gait:  Not tested   Tremor: Moderate bilateral myoclonic and asterixis tremor noted. Cerebellar:   Not testable abnormal cerebellar signs present on Romberg and tandem testing and finger-nose-finger exam.   Neurovascular:  Normal heart sounds and regular rhythm, peripheral pulses decreased, and no carotid bruits. Assessment:       ICD-10-CM ICD-9-CM    1. Acute cystitis with hematuria  N30.01 595.0    2. Altered mental status, unspecified altered mental status type  R41.82 780.97    3. Rigors  R68.89 780.99    4. LETICIA (acute kidney injury) ([facility] [address]  N17.9 584.9    5. Dehydration  E86.0 276.51    6. Lactic acidosis  E87.2 276.2    7. Acute alteration in mental status  R41.82 780.97    8.  Convulsions, unspecified convulsion type ([facility] [address]  R56.9 780.39      Active Problems:    TIA (transient ischemic attack) (12/6/2010)      Orthostatic hypotension ()      Overview: Positive tilt test 10/01      Bilateral carotid artery stenosis (10/29/2019)      Dementia ([address] (6/1/2021)      Dehydration (5/12/2022)      Lactic acidosis (5/12/2022)      UTI (urinary tract infection) (5/12/2022)      LETICIA (acute kidney injury) ([address] (5/12/2022)      Myoclonus (5/14/2022)      Asterixis (5/14/2022)      Late onset Alzheimer's disease without behavioral disturbance ([address] (5/14/2022)      Cerebral microvascular disease (5/14/2022)      Acute alteration in mental status (5/14/2022)      Convulsions ([address] (5/14/2022)        Plan:     Patient with generalized tremors and myoclonus and asterixis that looks most likely metabolic but his metabolic numbers are not that bad now, and he may still just have a mild chemical disequilibrium, we will start him on low-dose gabapentin and low-dose clonazepam to try to stop the tremors and asterixis and myoclonus, that he can better feed himself and take care of himself. He will need imaging of the brain just for completeness, but his EEG does not show any seizure activity, there is moderate generalized slowing, and he will need metabolic parameters to rule out other causes of his memory loss like B12 and thyroid and magnesium for his tremors, we will follow carefully with you. Probably check a carotid Doppler study on Monday in addition. Difficulties, this is probably a decompensated dementia and metabolic encephalopathy in a patient with Alzheimer's disease and new urinary tract infection, with dehydration and renal failure. His tremors are better today, but he seems a little drowsy on the clonazepam, so we will reduce his dose down to just 1 at night and consider tapering off the Neurontin next and stopping the clonazepam once he has recovered a little more.     Signed By: Kip Jimenez MD     May 15, 2022       CC: Sarwat Prescott MD  FAX: [phone]

## 2022-05-15 NOTE — PROGRESS NOTES
Transition of Care Plan:     RUR: 16% moderate risk for readmission  Disposition: Home with HH and Caregiver support vs SNF? Pending therapy recommendations and hospital course   Follow up appointments: PCP, Specialists if indicated  DME needed: Pt owns a walker and cane; therapy consults in place  Transportation at Discharge: Pt's daughter  Lonzulma December or means to access home: Daughter will have       IM Medicare Letter: Will need 2nd [facility] letter prior to d/c  Is patient a BCPI-A Bundle: N/A                     If yes, was Bundle Letter given?:    Is patient a [place] and connected with the [place]? N/A              If yes, was [employer] transfer form completed and [facility] notified? Caregiver Contact: Pt's daughter/LNOK: Carmen Mar p) [phone]  Discharge Caregiver contacted prior to discharge? Yes  Care Conference needed?:  Not indicated at present                       CM notified patient in need of SNF placement for anticipated discharge on 5/17/202. PT/OT recommending therapy up to 5 days/week in SNF setting. CM called patient daughter Carmen Mar to discuss SNF options. Daughter stated she reviewed a [employer] list but did not have a preferred facility. CM placed a referral to [address] [place] [address]. Primary CM will continue to follow patient for discharge planning needs and arrange for services as deemed necessary.      Sonia Chavez, MSN, RN  Care Manager

## 2022-05-16 LAB — ANA SER QL: NEGATIVE

## 2022-05-16 PROCEDURE — 99232 SBSQ HOSP IP/OBS MODERATE 35: CPT | Performed by: PSYCHIATRY & NEUROLOGY

## 2022-05-16 PROCEDURE — 74011250637 HC RX REV CODE- 250/637: Performed by: INTERNAL MEDICINE

## 2022-05-16 PROCEDURE — 74011250637 HC RX REV CODE- 250/637: Performed by: PSYCHIATRY & NEUROLOGY

## 2022-05-16 PROCEDURE — 74011000250 HC RX REV CODE- 250: Performed by: INTERNAL MEDICINE

## 2022-05-16 PROCEDURE — 97116 GAIT TRAINING THERAPY: CPT

## 2022-05-16 PROCEDURE — 65270000029 HC RM PRIVATE

## 2022-05-16 PROCEDURE — 97530 THERAPEUTIC ACTIVITIES: CPT

## 2022-05-16 PROCEDURE — 92526 ORAL FUNCTION THERAPY: CPT

## 2022-05-16 PROCEDURE — 74011250636 HC RX REV CODE- 250/636: Performed by: INTERNAL MEDICINE

## 2022-05-16 RX ADMIN — SODIUM CHLORIDE, PRESERVATIVE FREE 10 ML: 5 INJECTION INTRAVENOUS at 06:45

## 2022-05-16 RX ADMIN — PANTOPRAZOLE SODIUM 40 MG: 40 TABLET, DELAYED RELEASE ORAL at 06:35

## 2022-05-16 RX ADMIN — SODIUM CHLORIDE, PRESERVATIVE FREE 10 ML: 5 INJECTION INTRAVENOUS at 14:00

## 2022-05-16 RX ADMIN — ACETAMINOPHEN 650 MG: 325 TABLET ORAL at 03:57

## 2022-05-16 RX ADMIN — DONEPEZIL HYDROCHLORIDE 5 MG: 5 TABLET, FILM COATED ORAL at 22:14

## 2022-05-16 RX ADMIN — TAMSULOSIN HYDROCHLORIDE 0.4 MG: 0.4 CAPSULE ORAL at 08:43

## 2022-05-16 RX ADMIN — LOPERAMIDE HYDROCHLORIDE 4 MG: 2 CAPSULE ORAL at 12:24

## 2022-05-16 RX ADMIN — Medication 1 CAPSULE: at 08:43

## 2022-05-16 RX ADMIN — PANTOPRAZOLE SODIUM 40 MG: 40 TABLET, DELAYED RELEASE ORAL at 22:14

## 2022-05-16 RX ADMIN — ENOXAPARIN SODIUM 40 MG: 100 INJECTION SUBCUTANEOUS at 08:42

## 2022-05-16 RX ADMIN — SODIUM CHLORIDE, PRESERVATIVE FREE 10 ML: 5 INJECTION INTRAVENOUS at 22:14

## 2022-05-16 RX ADMIN — GABAPENTIN 100 MG: 100 CAPSULE ORAL at 08:43

## 2022-05-16 RX ADMIN — ACETAMINOPHEN 650 MG: 325 TABLET ORAL at 17:48

## 2022-05-16 RX ADMIN — GABAPENTIN 100 MG: 100 CAPSULE ORAL at 17:48

## 2022-05-16 RX ADMIN — TRAZODONE HYDROCHLORIDE 50 MG: 50 TABLET ORAL at 03:57

## 2022-05-16 RX ADMIN — ASPIRIN 81 MG: 81 TABLET, COATED ORAL at 08:42

## 2022-05-16 RX ADMIN — MEMANTINE HYDROCHLORIDE 5 MG: 5 TABLET ORAL at 08:43

## 2022-05-16 NOTE — PROGRESS NOTES
Physician Progress Note      Benedicto Blanc  [facility] #:                  [id number]  :                       1940  ADMIT DATE:       2022 7:25 PM  [address] [place] DATE:  RESPONDING  PROVIDER #:        Joyce Fihs MD          QUERY TEXT:    Pt admitted with Pseudomonal UTI. Pt noted to be oriented x 1 only, AMS with Dehydration/LETICIA/lactic acidosis POA. If possible, please document in the progress notes and discharge summary if you are evaluating and / or treating any of the following: The medical record reflects the following:  Risk Factors: 81yoM w/ UTI, acidosis, dehydration, LETICIA, dementia, poor po intake  Clinical Indicators: Acidosis, dehydration, LETICIA  Creat 1.76 ( Bl Creat 1.1)  Lactic acid  2.25 - 3.2 High Panic  on  Neurology consult :  '. ..eating fairly well but has marked tremor that interferes with his ability to feed himself. Speech therapy is working with him now. He is fluent in his speech but obviously confused and moderately demented but not on any medication for dementia yet. Difficulties, this is probably a decompensated dementia and metabolic encephalopathy in a patient with Alzheimer's disease and new urinary tract infection, with dehydration and renal failure.'      SLP  eval:  has dementia but lives alone with a caregiver who checks on him frequently. Today he is not following commands consistently or very attentive. Communication is significantly impaired. Shaking, tremors noted. ?chills. Due to his mental status would not offer po yet due to high risk of aspiration with his altered mental statu  Treatment: IVF, IV ABX, Neurology consult, EEG, monitor renal function & lactic acid, SLP consult.   Options provided:  -- Metabolic encephalopathy  -- Toxic encephalopathy  -- Other - I will add my own diagnosis  -- Disagree - Not applicable / Not valid  -- Disagree - Clinically unable to determine / Unknown  -- Refer to Clinical Documentation Reviewer    PROVIDER RESPONSE TEXT:    Dementia related AMS    Query created by: Fawn Soler on 5/16/2022 12:44 PM      Electronically signed by:  Dallin Angulo MD 5/16/2022 1:33 PM

## 2022-05-16 NOTE — PROGRESS NOTES
Problem: Mobility Impaired (Adult and Pediatric)  Goal: *Acute Goals and Plan of Care (Insert Text)  Description: FUNCTIONAL STATUS PRIOR TO ADMISSION: Per CM note, pt lives alone in one story home with 2 step entry. He has a caregiver that comes daily for 4 hrs and his dtr comes 3x/week to supplement but unsure how much help he needed with ADLS/amb. He has a RW and a cane. HOME SUPPORT PRIOR TO ADMISSION: The patient lived alone with caregiver and dtr to provide assistance. Physical Therapy Goals  Initiated 5/13/2022  1. Patient will move from supine to sit and sit to supine , scoot up and down, and roll side to side in bed with modified independence within 7 day(s). 2.  Patient will transfer from bed to chair and chair to bed with modified independence using the least restrictive device within 7 day(s). 3.  Patient will perform sit to stand with modified independence within 7 day(s). 4.  Patient will ambulate with supervision/set-up for 75 feet with the least restrictive device within 7 day(s). 5.  Patient will ascend/descend 2 stairs with handrail(s) with minimal assistance/contact guard assist within 7 day(s). Outcome: Progressing Towards Goal  PHYSICAL THERAPY TREATMENT  Patient: Guadalupe Senely (7 y.o. male)  Date: 5/16/2022  Diagnosis: LETICIA (acute kidney injury) ([facility] [address] [N17.9]  Dehydration [E86.0]  UTI (urinary tract infection) [N39.0]  Lactic acidosis [E87.2] <principal problem not specified>       Precautions: Fall  Chart, physical therapy assessment, plan of care and goals were reviewed. ASSESSMENT  Patient continues with skilled PT services and is progressing towards goals. Patient with improved alertness and decreased tremors noted. He tolerated improved activity this date. Tolerated ambulation to/from bathroom commode. Continues to present with posterior lean, requires intermittent cues and physical assistance to correct. Fatigues quickly limiting further activity.  Noted patient with limited use of RUE and his daughter reports this has been a recent baseline. He presents with decreased motor planning, safety awareness and command following. He remains below baseline and with a high fall risk. Current Level of Function Impacting Discharge (mobility/balance): Min-Mod A    Other factors to consider for discharge: confusion, fall risk         PLAN :  Patient continues to benefit from skilled intervention to address the above impairments. Continue treatment per established plan of care. to address goals. Recommendation for discharge: (in order for the patient to meet his/her long term goals)  Therapy up to 5 days/week in SNF setting    This discharge recommendation:  Has been made in collaboration with the attending provider and/or case management    IF patient discharges home will need the following DME: to be determined (TBD)       SUBJECTIVE:   Patient pleasant and agreeable to therapy    OBJECTIVE DATA SUMMARY:   Critical Behavior:  Neurologic State: Alert  Orientation Level: Oriented to person,Oriented to place,Oriented to time  Cognition: Decreased attention/concentration  Safety/Judgement: Not assessed  Functional Mobility Training:  Bed Mobility:  Rolling: Minimum assistance  Supine to Sit: Moderate assistance; Additional time     Scooting: Contact guard assistance; Additional time        Transfers:  Sit to Stand: Minimum assistance; Additional time  Stand to Sit: Minimum assistance; Additional time (cues for safety)      From bed and bathroom commode; to bathroom commode and chair    Balance:  Sitting: Impaired  Sitting - Static: Good (unsupported)  Sitting - Dynamic: Fair (occasional); Good (unsupported)  Standing: Impaired  Standing - Static: Fair  Standing - Dynamic : Fair;Poor  Ambulation/Gait Training:  Distance (ft): 12 Feet (ft) (x 2 weeks)  Assistive Device: Cane, quad;Gait belt  Ambulation - Level of Assistance: Minimal assistance; Moderate assistance; Additional time;Assist x1;Assist x2        Gait Abnormalities: Decreased step clearance;Shuffling gait (increased trunk flexion with fatigue)              Speed/Jessica: Pace decreased (<100 feet/min); Slow  Step Length: Left shortened;Right shortened                  Increased assistance with posterior lean/LOB. Cues for safety with quad cane placement/use. Distance limited secondary to fatigue/weakness. Stairs:   Unable to safely attempt      Pain Rating:  No verbalization of pain    Activity Tolerance:   Fair    After treatment patient left in no apparent distress:   Sitting in chair, Call bell within reach, Bed / chair alarm activated, and Caregiver / family present    COMMUNICATION/COLLABORATION:   The patients plan of care was discussed with: Registered nurse.      Ld Laughlin, PT, DPT   Time Calculation: 23 mins

## 2022-05-16 NOTE — PROGRESS NOTES
Hospitalist Progress Note    NAME: Josep Daniels   :  1940   MRN:  [id number]       Assessment / Plan:  Pseudomonal UTI POA  Dehydration/LETICIA/lactic acidosis POA due to decreased p.o. intake as per family - resolved now, eating well now  -Chest x-ray negative acute  -Procalcitonin 0.30  -Appreciate Urology input - d/t BPH and history of penile cancer s/p resection patient has difficulty voiding and will likely need chronic allen cath   -Continue allen - DO NOT REMOVE unless per urology   -Blood culture so far negative   -Urine Culture from ED on  with ENTEROCOCCUS FAECALIS and PSEUDOMONAS   -Patient had taken PO Omnicef and Levaquin at home as outpatient for enterococcal and pseudomonal UTI on culture from 2022.  -Completed course of abx with Cefepime while IP - ended   -Continue flomax   DC IVF hydration today     Autoimmune hepatitis  Dementia Continue aricept   Penile cancer status post resection  BPH Continue flomax   Hyperlipidemia Hold statin for now      Generalized twitching/involuntary movements   -Family reported twitching of the upper extremity which has started prior to admission  -EEG completed - negative  -Neurology consulted ,- following  MRI negative acute, Chronic Volume loss noted, dementia noted  Started on low dose Klonopin and Neurontin ()  Added Namenda 5/15         Code Status: Full code but daughter does not want him to be on life support for too long  Surrogate Decision Maker: Daughter Linda Courts # [phone]     DVT Prophylaxis: Subcu Lovenox  GI Prophylaxis: not indicated     Dispo: Patient is relatively independent at home, has walker and a cane but does not use it much as per the daughter, has a caregiver who stays across this street who checks on him multiple times per day. PT/OT recommending SNF and plan is to DC to SNF when medically stable. 25.0 - 29.9 Overweight / Body mass index is 26.95 kg/m².     Estimated discharge date: May 17  Barriers: Clinical improvement, SNF placement      Subjective:     Chief Complaint / Reason for Physician Visit  Patient seen at bedside, has no complaints and is somewhat confused per baseline. Discussed with RN events overnight. Review of Systems:  Symptom Y/N Comments  Symptom Y/N Comments   Fever/Chills    Chest Pain     Poor Appetite    Edema     Cough    Abdominal Pain     Sputum    Joint Pain     SOB/DE JESUS    Pruritis/Rash     Nausea/vomit    Tolerating PT/OT     Diarrhea    Tolerating Diet     Constipation    Other       Could NOT obtain due to:  Confused/dementia      Objective:     VITALS:   Last 24hrs VS reviewed since prior progress note. Most recent are:  Patient Vitals for the past 24 hrs:   Temp Pulse Resp BP SpO2   05/16/22 0815 98.3 °F (36.8 °C) (!) 53 18 (!) 149/70 94 %   05/15/22 2345 99.4 °F (37.4 °C) 72 18 (!) 147/69 95 %   05/15/22 1950 98 °F (36.7 °C) 63 18 (!) 149/72 96 %   05/15/22 1600 97.8 °F (36.6 °C) 65 18 (!) 146/72 96 %   05/15/22 0932 98 °F (36.7 °C) 72 18 109/64 96 %       Intake/Output Summary (Last 24 hours) at 5/16/2022 3231  Last data filed at 5/15/2022 1950  Gross per 24 hour   Intake --   Output 1200 ml   Net -1200 ml        I had a face to face encounter and independently examined this patient on 5/16/2022, as outlined below:  PHYSICAL EXAM:  General: WD, WN. Alert, cooperative, no acute distress    EENT:  EOMI. Anicteric sclerae. MMM  Resp:  CTA bilaterally, no wheezing or rales. No accessory muscle use  CV:  Regular  rhythm,  No edema  GI:  Soft, Non distended, Non tender. +Bowel sounds  Neurologic:  Alert and oriented X 1, normal speech,   Psych:   Poor insight. Not anxious nor agitated  Skin:  No rashes.   No jaundice    Reviewed most current lab test results and cultures  YES  Reviewed most current radiology test results   YES  Review and summation of old records today    NO  Reviewed patient's current orders and MAR    YES  PMH/SH reviewed - no change compared to H&P  ________________________________________________________________________  Care Plan discussed with:    Comments   Patient x    Family  x Daughter Sam Soto on phone   RN x    Care Manager x    Consultant                       x Multidiciplinary team rounds were held today with , nursing, pharmacist and clinical coordinator. Patient's plan of care was discussed; medications were reviewed and discharge planning was addressed. ________________________________________________________________________  Total NON critical care TIME: 26  Minutes    Total CRITICAL CARE TIME Spent:   Minutes non procedure based      Comments   >50% of visit spent in counseling and coordination of care     ________________________________________________________________________  Erin Slater MD     Procedures: see electronic medical records for all procedures/Xrays and details which were not copied into this note but were reviewed prior to creation of Plan. LABS:  I reviewed today's most current labs and imaging studies.   Pertinent labs include:  Recent Labs     05/14/22  0340   WBC 4.1   HGB 10.1*   HCT 30.6*   *     Recent Labs     05/14/22  1201 05/14/22  0340   NA  --  143   K  --  3.4*   CL  --  114*   CO2  --  24   GLU  --  118*   BUN  --  16   CREA  --  1.23   CA  --  8.0*   MG 1.7  --        Signed: Erin Slater MD

## 2022-05-16 NOTE — PROGRESS NOTES
Problem: Dysphagia (Adult)  Goal: *Acute Goals and Plan of Care (Insert Text)  Description: 5/13/22  Speech path goals  1. Patient will tolerate crushed meds in applesauce with no overt s/s of aspiration. Goal met 5/13. 2. Patient will participate with reeval of swallowing. Goal met 5/13. 3. Patient will tolerate minced diet with thins with no overt s/s of aspiration. Advance to regular/thin within 7 days  4. Patient will tolerate diet upgrade. MET  Outcome: Resolved/Met   SPEECH LANGUAGE PATHOLOGY DYSPHAGIA TREATMENT/DISCHARGE  Patient: Carli Guardado (55 y.o. male)  Date: 5/16/2022  Diagnosis: LETICIA (acute kidney injury) ([facility] [address] [N17.9]  Dehydration [E86.0]  UTI (urinary tract infection) [N39.0]  Lactic acidosis [E87.2] <principal problem not specified>       Precautions:  Fall    ASSESSMENT:  Patient tolerating regular diet/ thin liquids without issue. Observed patient with chicken and potatoes off lunch tray along with iced tea. Timely mastication of solid. No overt s/s aspiration. Patient does better with finger foods as he is not using his R arm to feed himself. PLAN:  Regular diet/ thin liquids   Finger food when possible    Patient will be discharged from acute skilled speech therapy at this time. Rationale for discharge:  Goals achieved    Discharge Recommendations:  None     SUBJECTIVE:   Patient's daughter at bedside. OBJECTIVE:   Cognitive and Communication Status:  Neurologic State: Alert  Orientation Level: Oriented to person,Oriented to place,Oriented to time  Cognition: Decreased attention/concentration    Perception: Appears intact    Perseveration: No perseveration noted    Safety/Judgement: Not assessed  Dysphagia Treatment:    P.O. Trials:  Patient Position: up in chair  Vocal quality prior to P.O.: No impairment  Consistency Presented: Thin liquid; Solid  How Presented: Self-fed/presented; Successive swallows     Bolus Acceptance: No impairment  Bolus Formation/Control: No impairment Propulsion: No impairment  Oral Residue: None  Initiation of Swallow: No impairment  Laryngeal Elevation: Functional  Aspiration Signs/Symptoms: None     NOMS:   The NOMS functional outcome measure was used to quantify this patient's level of swallowing impairment. Based on the NOMS, the patient was determined to be at level 7 for swallow function     NOMS Swallowing Levels:  Level 1 (CN): NPO  Level 2 (CM): NPO but takes consistency in therapy  Level 3 (CL): Takes less than 50% of nutrition p.o. and continues with nonoral feedings; and/or safe with mod cues; and/or max diet restriction  Level 4 (CK): Safe swallow but needs mod cues; and/or mod diet restriction; and/or still requires some nonoral feeding/supplements  Level 5 (CJ): Safe swallow with min diet restriction; and/or needs min cues  Level 6 (CI): Independent with p.o.; rare cues; usually self cues; may need to avoid some foods or needs extra time  Level 7 ([address] [place] [address]): Independent for all p.o.  DEANNA. (2003). National Outcomes Measurement System (NOMS): Adult Speech-Language Pathology User's Guide. Pain:  Pain Scale 1: Numeric (0 - 10)  Pain Intensity 1: 0       After treatment:   Patient left in no apparent distress in bed, Call bell within reach, and Nursing notified    COMMUNICATION/EDUCATION:     The patient's plan of care including recommendations, planned interventions, and recommended diet changes were discussed with: Registered nurse.      ASIM Montoya  Time Calculation: 10 mins

## 2022-05-17 LAB
ANION GAP SERPL CALC-SCNC: 5 MMOL/L (ref 5–15)
BUN SERPL-MCNC: 18 MG/DL (ref 6–20)
BUN/CREAT SERPL: 18 (ref 12–20)
CALCIUM SERPL-MCNC: 7.8 MG/DL (ref 8.5–10.1)
CHLORIDE SERPL-SCNC: 113 MMOL/L (ref 97–108)
CO2 SERPL-SCNC: 25 MMOL/L (ref 21–32)
CREAT SERPL-MCNC: 1 MG/DL (ref 0.7–1.3)
ERYTHROCYTE [DISTWIDTH] IN BLOOD BY AUTOMATED COUNT: 15.9 % (ref 11.5–14.5)
GLUCOSE SERPL-MCNC: 129 MG/DL (ref 65–100)
HCT VFR BLD AUTO: 31.4 % (ref 36.6–50.3)
HGB BLD-MCNC: 10.4 G/DL (ref 12.1–17)
MCH RBC QN AUTO: 30 PG (ref 26–34)
MCHC RBC AUTO-ENTMCNC: 33.1 G/DL (ref 30–36.5)
MCV RBC AUTO: 90.5 FL (ref 80–99)
NRBC # BLD: 0 K/UL (ref 0–0.01)
NRBC BLD-RTO: 0 PER 100 WBC
PLATELET # BLD AUTO: 135 K/UL (ref 150–400)
PMV BLD AUTO: 10.5 FL (ref 8.9–12.9)
POTASSIUM SERPL-SCNC: 3.4 MMOL/L (ref 3.5–5.1)
RBC # BLD AUTO: 3.47 M/UL (ref 4.1–5.7)
SODIUM SERPL-SCNC: 143 MMOL/L (ref 136–145)
WBC # BLD AUTO: 3.4 K/UL (ref 4.1–11.1)

## 2022-05-17 PROCEDURE — 65270000029 HC RM PRIVATE

## 2022-05-17 PROCEDURE — 97535 SELF CARE MNGMENT TRAINING: CPT | Performed by: OCCUPATIONAL THERAPIST

## 2022-05-17 PROCEDURE — 74011250636 HC RX REV CODE- 250/636: Performed by: INTERNAL MEDICINE

## 2022-05-17 PROCEDURE — 36415 COLL VENOUS BLD VENIPUNCTURE: CPT

## 2022-05-17 PROCEDURE — 85027 COMPLETE CBC AUTOMATED: CPT

## 2022-05-17 PROCEDURE — 99232 SBSQ HOSP IP/OBS MODERATE 35: CPT | Performed by: PSYCHIATRY & NEUROLOGY

## 2022-05-17 PROCEDURE — 74011250637 HC RX REV CODE- 250/637: Performed by: PSYCHIATRY & NEUROLOGY

## 2022-05-17 PROCEDURE — 80048 BASIC METABOLIC PNL TOTAL CA: CPT

## 2022-05-17 PROCEDURE — 74011000250 HC RX REV CODE- 250: Performed by: INTERNAL MEDICINE

## 2022-05-17 PROCEDURE — 97530 THERAPEUTIC ACTIVITIES: CPT

## 2022-05-17 PROCEDURE — 74011250637 HC RX REV CODE- 250/637: Performed by: INTERNAL MEDICINE

## 2022-05-17 PROCEDURE — 74011250637 HC RX REV CODE- 250/637: Performed by: HOSPITALIST

## 2022-05-17 RX ORDER — POTASSIUM CHLORIDE 750 MG/1
40 TABLET, FILM COATED, EXTENDED RELEASE ORAL
Status: COMPLETED | OUTPATIENT
Start: 2022-05-17 | End: 2022-05-17

## 2022-05-17 RX ORDER — MEMANTINE HYDROCHLORIDE 5 MG/1
5 TABLET ORAL 2 TIMES DAILY
Status: DISCONTINUED | OUTPATIENT
Start: 2022-05-18 | End: 2022-05-22 | Stop reason: HOSPADM

## 2022-05-17 RX ADMIN — SODIUM CHLORIDE, PRESERVATIVE FREE 10 ML: 5 INJECTION INTRAVENOUS at 21:09

## 2022-05-17 RX ADMIN — ENOXAPARIN SODIUM 40 MG: 100 INJECTION SUBCUTANEOUS at 10:03

## 2022-05-17 RX ADMIN — PANTOPRAZOLE SODIUM 40 MG: 40 TABLET, DELAYED RELEASE ORAL at 21:09

## 2022-05-17 RX ADMIN — DONEPEZIL HYDROCHLORIDE 5 MG: 5 TABLET, FILM COATED ORAL at 21:09

## 2022-05-17 RX ADMIN — SODIUM CHLORIDE, PRESERVATIVE FREE 10 ML: 5 INJECTION INTRAVENOUS at 06:57

## 2022-05-17 RX ADMIN — FLUTICASONE PROPIONATE 2 SPRAY: 50 SPRAY, METERED NASAL at 09:00

## 2022-05-17 RX ADMIN — PANTOPRAZOLE SODIUM 40 MG: 40 TABLET, DELAYED RELEASE ORAL at 06:57

## 2022-05-17 RX ADMIN — ASPIRIN 81 MG: 81 TABLET, COATED ORAL at 10:03

## 2022-05-17 RX ADMIN — Medication 1 CAPSULE: at 10:03

## 2022-05-17 RX ADMIN — POTASSIUM CHLORIDE 40 MEQ: 750 TABLET, EXTENDED RELEASE ORAL at 10:11

## 2022-05-17 RX ADMIN — TAMSULOSIN HYDROCHLORIDE 0.4 MG: 0.4 CAPSULE ORAL at 10:03

## 2022-05-17 RX ADMIN — TRAZODONE HYDROCHLORIDE 50 MG: 50 TABLET ORAL at 21:09

## 2022-05-17 RX ADMIN — MEMANTINE HYDROCHLORIDE 5 MG: 5 TABLET ORAL at 10:03

## 2022-05-17 NOTE — PROGRESS NOTES
Transition of Care Plan:     RUR: 16% moderate risk for readmission  Disposition: SNF - [facility] pending insurance auth  Follow up appointments: PCP, Specialists if indicated  DME needed: Pt owns a walker and cane; therapy consults in place  Transportation at Discharge: Pt's daughter  Luis A Solano or means to access home: Daughter will have       IM Medicare Letter: Will need 2nd IMM letter prior to d/c  Is patient a BCPI-A Bundle: N/A                     If yes, was Bundle Letter given?:    Is patient a [place] and connected with the [facility]?  N/A              If yes, was [employer] transfer form completed and [facility] notified? Caregiver Contact: Pt's daughter/LNOK: Naif Chin p) [phone]  Discharge Caregiver contacted prior to [address] needed?:  Not indicated at present           CM reviewed pt's chart. Pt has been accepted to [id number] [facility] faxed clinicals to [employer] for insurance auth. CM updated pt's daughter. CM will continue to follow for discharge planning while patient is admitted on current unit. Please contact this CM with questions or issues related to discharge.      DORENE Merritt  Care Manager [facility]  [phone]

## 2022-05-17 NOTE — PROGRESS NOTES
Consult  REFERRED BY:  Lupe Mcdonnell MD    CHIEF COMPLAINT: Persistent muscle tremors and jerks      Subjective:     Lanny Rivera is a [de-identified] y.o. right-handed  male, seen as a new patient to me, at the request of the hospitalist, for evaluation of about a 1 week history the patient says of increasing jerks and tremors in his hands and body, worse when he tries to do things, and over the last week he has not been eating or drinking well, and got admitted to the hospital with acute kidney injury and dehydration, and his kidney function has been improving on IV fluids but he still has this type of asterixis and myoclonus, but his EEG shows only mild generalized slowing but no seizure activity, and he had no CT or MRI done on admission, but did have an MRI that just showed microvascular disease and atrophy in April 2022. He has not been given anything for his tremors. He was on baby aspirin prior to admission and Lipitor 20 mg a day, and takes Lexapro 5 mg a day and Levaquin for urinary tract infection and is now on Rocephin for that, and takes cholesterol medication and high blood pressure medication and prostate medication and Protonix for his stomach. He denies any focal weakness or sensory loss at this time. He is eating fairly well but has marked tremor that interferes with his ability to feed himself. Speech therapy is working with him now. He is fluent in his speech but obviously confused and moderately demented but not on any medication for dementia yet. He does not complain of any headache or meningismus at this time. He denies any prior history of previous stroke or TIA or other neurological problems. His tremors are better today, but he says he still feels a little drowsy, so we will stop the Neurontin and the clonazepam, he does not seem to be having any more asterixis or tremors now as he medically improves and he seems more awake and cooperative.   He is also tolerating the Namenda well so far and that may be helping some to for his memory. Past Medical History:   Diagnosis Date    Arthritis     left arm    Autoimmune hepatitis ([facility] [address]     Bleeding ulcer 07/2019    BPH (benign prostatic hyperplasia)     CAD (coronary artery disease)     s/p stent    Cancer (HCC)     penile squamous carcinoma    Chronic kidney disease     stage 2     Chronic obstructive pulmonary disease (HCC)     Dementia (HCC)     Elevated LFT's     Essential hypertension 9/24/01    GERD (gastroesophageal reflux disease)     hiatal hernia    Ill-defined condition 12/02/2016    faint    Nephrosclerosis     Orthostatic hypotension 9/24/01    PVC's 9/24/01    Sleep apnea     no CPAP    Syncope 9/24/01    secondary to venous insuffiency       Past Surgical History:   Procedure Laterality Date    COLONOSCOPY N/A 12/13/2016    COLONOSCOPY [facility] Rosario [facility]jdaa [facility] ENDOSCOPY    COLONOSCOPY N/A 12/11/2019    COLONOSCOPY performed by Robert Ford MD at  [facility]; HI RISK IND  12/11/2019         ECHO 2D ADULT  5/24/12    EF 60 - 65%; mild concentric hypertrophy; pulmonary systolic artery pressure upper limits normal    HX ABDOMINAL WALL DEFECT REPAIR  07/01/2019d     Exploratory laparotomy, segmental sigmoid colon resection and primary stapled anastomosis.     HX APPENDECTOMY  1985    HX HEENT  02/18/2020    Left temporal artery biopsy    HX HERNIA REPAIR  08/29/2018    Davinci hiatal hernia repair- Isaiah Maddox MD    HX OTHER SURGICAL  08/2020    penile lesion bx    DC CARDIAC SURG PROCEDURE UNLIST  05/13/2019    1 stent    UPPER GI ENDOSCOPY,BIOPSY  12/11/2019         UPPER GI ENDOSCOPY,DIAGNOSIS  7/18/2019          Family History   Problem Relation Age of Onset    Stroke Mother     Stroke Father     Heart Disease Father       Social History     Tobacco Use    Smoking status: Former Smoker     Packs/day: 3.50     Quit date: 12/22/1980     Years since quittin.4    Smokeless tobacco: Never Used   Substance Use Topics    Alcohol use: Not Currently     Comment: no alcohol since          Current Facility-Administered Medications:     loperamide (IMODIUM) capsule 4 mg, 4 mg, Oral, Q6H PRN, Marian HICKEY MD, 4 mg at 22 1224    memantine (NAMENDA) tablet 5 mg, 5 mg, Oral, DAILY, Nate Huggins MD, 5 mg at 22 0843    enoxaparin (LOVENOX) injection 40 mg, 40 mg, SubCUTAneous, DAILY, Tyson Payton MD, 40 mg at 22 5400    aspirin delayed-release tablet 81 mg, 81 mg, Oral, DAILY, Al Bearden MD, 81 mg at 22 0842    donepeziL (ARICEPT) tablet 5 mg, 5 mg, Oral, QHS, Al Gutierrez MD, 5 mg at 05/15/22 2129    fluticasone propionate (FLONASE) 50 mcg/actuation nasal spray 2 Spray, 2 Spray, Both Nostrils, DAILY, Al Gutierrez MD    pantoprazole (PROTONIX) tablet 40 mg, 40 mg, Oral, ACB/HS, Marian HICKEY MD, 40 mg at 22 0635    tamsulosin (FLOMAX) capsule 0.4 mg, 0.4 mg, Oral, DAILY, Al Bearden MD, 0.4 mg at 22 0843    traZODone (DESYREL) tablet 50 mg, 50 mg, Oral, QHS PRN, Marian HICKEY MD, 50 mg at 22 0357    sodium chloride (NS) flush 5-40 mL, 5-40 mL, IntraVENous, Q8H, Al Gutierrez MD, 10 mL at 22 1400    sodium chloride (NS) flush 5-40 mL, 5-40 mL, IntraVENous, PRN, Jesse Pteers MD    acetaminophen (TYLENOL) tablet 650 mg, 650 mg, Oral, Q6H PRN, 650 mg at 22 2528 **OR** acetaminophen (TYLENOL) suppository 650 mg, 650 mg, Rectal, Q6H PRN, Al Bearden MD    polyethylene glycol (MIRALAX) packet 17 g, 17 g, Oral, DAILY PRN, Al Bearden MD    ondansetron (ZOFRAN ODT) tablet 4 mg, 4 mg, Oral, Q8H PRN **OR** ondansetron (ZOFRAN) injection 4 mg, 4 mg, IntraVENous, Q6H PRN, Jesse Peters MD    L.acidophilus-paracasei-S.thermophil-bifidobacter (RISAQUAD) 8 billion cell capsule, 1 Capsule, Oral, DAILY, Al Gutierrez, MD, 1 Capsule at 05/16/22 0843        Allergies   Allergen Reactions    Ace Inhibitors Other (comments)     Doesn't agree with pt    Demerol [Meperidine] Unknown (comments)     Causes unconsciousness      MRI Results (most recent):  Results from Hospital Encounter encounter on 05/12/22    MRI BRAIN WO CONT    Narrative  EXAM: MRI BRAIN WO CONT    INDICATION: Altered mental status and generalized tremors    COMPARISON: MRI brain 4/21/2022. CONTRAST: None. TECHNIQUE:  Multiplanar multisequence acquisition without contrast of the brain. FINDINGS:  Unchanged generalized parenchymal volume loss with commensurate dilation of the  sulci and ventricular system. Unchanged scattered periventricular and deep white  matter T2/FLAIR hyperintensities, confluent in regions, and mild patchy T2/FLAIR  hyperintensity in the flip, consistent with mild to moderate chronic  microvascular ischemic disease. There is no acute infarct, hemorrhage,  extra-axial fluid collection, or mass effect. There is no cerebellar tonsillar  herniation. Expected arterial flow-voids are present. The paranasal sinuses, mastoid air cells, and middle ears are clear. The orbital  contents are within normal limits with bilateral lens implants. No significant  osseous or scalp lesions are identified. Impression  1. No acute intracranial abnormality. 2. Unchanged generalized parenchymal volume loss and mild to moderate chronic  microvascular ischemic disease. Results from [place] encounter on 05/12/22    MRI BRAIN WO CONT    Narrative  EXAM: MRI BRAIN WO CONT    INDICATION: Altered mental status and generalized tremors    COMPARISON: MRI brain 4/21/2022. CONTRAST: None. TECHNIQUE:  Multiplanar multisequence acquisition without contrast of the brain. FINDINGS:  Unchanged generalized parenchymal volume loss with commensurate dilation of the  sulci and ventricular system.  Unchanged scattered periventricular and deep white  matter T2/FLAIR hyperintensities, confluent in regions, and mild patchy T2/FLAIR  hyperintensity in the flip, consistent with mild to moderate chronic  microvascular ischemic disease. There is no acute infarct, hemorrhage,  extra-axial fluid collection, or mass effect. There is no cerebellar tonsillar  herniation. Expected arterial flow-voids are present. The paranasal sinuses, mastoid air cells, and middle ears are clear. The orbital  contents are within normal limits with bilateral lens implants. No significant  osseous or scalp lesions are identified. Impression  1. No acute intracranial abnormality. 2. Unchanged generalized parenchymal volume loss and mild to moderate chronic  microvascular ischemic disease. Review of Systems:  A comprehensive review of systems was negative except for: Constitutional: positive for fevers, fatigue and malaise  Ears, nose, mouth, throat, and face: positive for hearing loss  Genitourinary: positive for nocturia, urinary incontinence and UTI  Musculoskeletal: positive for myalgias, arthralgias, stiff joints and muscle weakness  Neurological: positive for dizziness, memory problems, paresthesia, coordination problems, gait problems and tremor  Behvioral/Psych: positive for Dementia   Vitals:    05/15/22 2345 05/16/22 0815 05/16/22 1448 05/16/22 1459   BP:  (!) 149/70 (!) 174/75 (!) 127/59   Pulse:  (!) 53 (!) 53 (!) 55   Resp: 18 18 17    Temp: 99.4 °F (37.4 °C) 98.3 °F (36.8 °C) 97.6 °F (36.4 °C)    SpO2: 95% 94% 98%    Weight:       Height:         Objective:     I      NEUROLOGICAL EXAM:    Appearance: The patient is poorly developed and nourished, provides a poor history and is in no acute distress. Patient with much improved tremors and asterixis   Mental Status: Oriented to place and person, and not the date or the president, cognitive function is abnormal and speech is fluent and no aphasia with a mild dysarthria.  Mood and affect appropriate but confused and encephalopathic. Cranial Nerves:   Intact visual fields. Fundi are benign, disc are flat, no lesions seen on funduscopy. LIA, EOM's full, no nystagmus, no ptosis. Facial sensation is normal. Corneal reflexes are not tested. Facial movement is symmetric. Hearing is abnormal bilaterally. Palate is midline with normal sternocleidomastoid and trapezius muscles are normal. Tongue is midline. Neck without meningismus or bruits  Temporal arteries are not tender or enlarged  TMJ areas are not tender on palpation   Motor:  3/5 strength in upper and lower proximal and distal muscles. Normal bulk and tone. No fasciculations. Rapid alternating movement is symmetric and slow bilaterally  Patient with much improved tremors and asterixis   Reflexes:   Deep tendon reflexes 1+/4 and symmetrical.  No babinski or clonus present   Sensory:   Normal to touch, pinprick and vibration and temperature are decreased in both feet. DSS is intact   Gait:  Not tested   Tremor: Moderate bilateral myoclonic and asterixis tremor noted. Cerebellar:   Not testable abnormal cerebellar signs present on Romberg and tandem testing and finger-nose-finger exam.   Neurovascular:  Normal heart sounds and regular rhythm, peripheral pulses decreased, and no carotid bruits. Assessment:       ICD-10-CM ICD-9-CM    1. Acute cystitis with hematuria  N30.01 595.0    2. Altered mental status, unspecified altered mental status type  R41.82 780.97    3. Rigors  R68.89 780.99    4. LETICIA (acute kidney injury) ([facility] [address]  N17.9 584.9    5. Dehydration  E86.0 276.51    6. Lactic acidosis  E87.2 276.2    7. Acute alteration in mental status  R41.82 780.97    8.  Convulsions, unspecified convulsion type ([facility] [address]  R56.9 780.39      Active Problems:    TIA (transient ischemic attack) (12/6/2010)      Orthostatic hypotension ()      Overview: Positive tilt test 10/01      Bilateral carotid artery stenosis (10/29/2019)      Dementia ([facility] [address] (6/1/2021)      Dehydration (5/12/2022)      Lactic acidosis (5/12/2022)      UTI (urinary tract infection) (5/12/2022)      LETICIA (acute kidney injury) ([address] (5/12/2022)      Myoclonus (5/14/2022)      Asterixis (5/14/2022)      Late onset Alzheimer's disease without behavioral disturbance ([address] (5/14/2022)      Cerebral microvascular disease (5/14/2022)      Acute alteration in mental status (5/14/2022)      Convulsions ([address] (5/14/2022)        Plan:     Patient with generalized tremors and myoclonus and asterixis that looks most likely metabolic but his metabolic numbers are not that bad now, and he may still just have a mild chemical disequilibrium, we will start him on low-dose gabapentin and low-dose clonazepam to try to stop the tremors and asterixis and myoclonus, that he can better feed himself and take care of himself. He will need imaging of the brain just for completeness, but his EEG does not show any seizure activity, there is moderate generalized slowing, and he will need metabolic parameters to rule out other causes of his memory loss like B12 and thyroid and magnesium for his tremors, we will follow carefully with you. Probably check a carotid Doppler study on Monday in addition. Difficulties, this is probably a decompensated dementia and metabolic encephalopathy in a patient with Alzheimer's disease and new urinary tract infection, with dehydration and renal failure. His tremors are better today, but he says he still feels a little drowsy, so we will stop the Neurontin and the clonazepam, he does not seem to be having any more asterixis or tremors now as he medically improves and he seems more awake and cooperative. He is also tolerating the Namenda well so far and that may be helping some to for his memory.       Signed By: Kip Jimenez MD     May 16, 2022       CC: Sarwat Precsott MD  FAX: [phone]

## 2022-05-17 NOTE — PROGRESS NOTES
Hospitalist Progress Note    NAME: Bri Rojas   :  1940   MRN:  [id number]       Assessment / Plan:  Pseudomonal UTI POA  Dehydration/LETICIA/lactic acidosis POA due to decreased p.o. intake as per family - resolved now, eating well now  -Chest x-ray negative acute  -Procalcitonin 0.30  -Appreciate Urology input - d/t BPH and history of penile cancer s/p resection patient has difficulty voiding and will likely need chronic allen cath   -Continue allen - DO NOT REMOVE unless per urology   -Blood culture so far negative   -Urine Culture from ED on  with ENTEROCOCCUS FAECALIS and PSEUDOMONAS   -Patient had taken PO Omnicef and Levaquin at home as outpatient for enterococcal and pseudomonal UTI on culture from 2022.  -Completed course of abx with Cefepime while IP - ended   -Continue flomax   DC IVF hydration today     Autoimmune hepatitis  Dementia Continue aricept   Penile cancer status post resection  BPH Continue flomax   Hyperlipidemia Hold statin for now      Generalized twitching/involuntary movements   -Family reported twitching of the upper extremity which has started prior to admission  -EEG completed - negative  -Neurology consulted ,- following  MRI negative acute, Chronic Volume loss noted, dementia noted  Started on low dose Klonopin and Neurontin ()  Added Namenda 5/15         Code Status: Full code but daughter does not want him to be on life support for too long  Surrogate Decision Maker: Daughter Robin Langford # [phone]     DVT Prophylaxis: Subcu Lovenox  GI Prophylaxis: not indicated     Dispo: Patient is relatively independent at home, has walker and a cane but does not use it much as per the daughter, has a caregiver who stays across this street who checks on him multiple times per day. PT/OT recommending SNF and plan is to DC to SNF when medically stable. 25.0 - 29.9 Overweight / Body mass index is 26.95 kg/m².     Estimated discharge date: May 17  Barriers: Pending SNF placement      Subjective:     Chief Complaint / Reason for Physician Visit  Patient seen at bedside. No new complaints  Discussed with RN events overnight. Review of Systems:  Symptom Y/N Comments  Symptom Y/N Comments   Fever/Chills    Chest Pain     Poor Appetite    Edema     Cough    Abdominal Pain     Sputum    Joint Pain     SOB/DE JESUS    Pruritis/Rash     Nausea/vomit    Tolerating PT/OT     Diarrhea    Tolerating Diet     Constipation    Other       Could NOT obtain due to:  Confused/dementia      Objective:     VITALS:   Last 24hrs VS reviewed since prior progress note. Most recent are:  Patient Vitals for the past 24 hrs:   Temp Pulse Resp BP SpO2   05/17/22 1448 98.3 °F (36.8 °C) 81 18 (!) 131/52 97 %   05/17/22 1055 97.8 °F (36.6 °C) 83 18 134/73 94 %   05/17/22 0805 97.6 °F (36.4 °C) 63 18 (!) 150/74 95 %   05/16/22 2234 98.3 °F (36.8 °C) 79 18 114/71 95 %   05/16/22 2105 -- -- 18 -- --       Intake/Output Summary (Last 24 hours) at 5/17/2022 1537  Last data filed at 5/17/2022 2280  Gross per 24 hour   Intake 300 ml   Output 1400 ml   Net -1100 ml        I had a face to face encounter and independently examined this patient on 5/17/2022, as outlined below:  PHYSICAL EXAM:  General: WD, WN. Alert, cooperative, no acute distress    EENT:  EOMI. Anicteric sclerae. MMM  Resp:  CTA bilaterally, no wheezing or rales. No accessory muscle use  CV:  Regular  rhythm,  No edema  GI:  Soft, Non distended, Non tender. +Bowel sounds  Neurologic:  Alert and oriented X 1, normal speech,   Psych:   Poor insight. Not anxious nor agitated  Skin:  No rashes.   No jaundice    Reviewed most current lab test results and cultures  YES  Reviewed most current radiology test results   YES  Review and summation of old records today    NO  Reviewed patient's current orders and MAR    YES  PMH/SH reviewed - no change compared to H&P  ________________________________________________________________________  Care Plan discussed with:    Comments   Patient x    Family  x Daughter Alxeia Pace on phone   RN x    Care Manager x    Consultant                       x Multidiciplinary team rounds were held today with , nursing, pharmacist and clinical coordinator. Patient's plan of care was discussed; medications were reviewed and discharge planning was addressed. ________________________________________________________________________  Total NON critical care TIME: 26  Minutes    Total CRITICAL CARE TIME Spent:   Minutes non procedure based      Comments   >50% of visit spent in counseling and coordination of care     ________________________________________________________________________  Trisha Diaz MD     Procedures: see electronic medical records for all procedures/Xrays and details which were not copied into this note but were reviewed prior to creation of Plan. LABS:  I reviewed today's most current labs and imaging studies.   Pertinent labs include:  Recent Labs     05/17/22 0147   WBC 3.4*   HGB 10.4*   HCT 31.4*   *     Recent Labs     05/17/22 0147      K 3.4*   *   CO2 25   *   BUN 18   CREA 1.00   CA 7.8*       Signed: Trisha Diaz MD

## 2022-05-17 NOTE — PROGRESS NOTES
Problem: Self Care Deficits Care Plan (Adult)  Goal: *Acute Goals and Plan of Care (Insert Text)  Description: FUNCTIONAL STATUS PRIOR TO ADMISSION: per chart review (pt is unable to contribute to PLOF), ambulated without assist, performed ADLS? HOME SUPPORT PRIOR TO ADMISSION: The patient lived alone with neighbor whom checks on pt multiple times a day to provide assistance. Occupational Therapy Goals:  Initiated 5/13/2022  1. Patient will perform grooming seated with supervision/set-up within 7 days. 2. Patient will perform dressing with minimal assistance within 7 days. 3. Patient will perform toileting with moderate assistance  within 7 days. 4. Patient will transfer from toilet with minimal assistance/contact guard assist using the least restrictive device and appropriate durable medical equipment within 7 days. Outcome: Progressing Towards Goal   OCCUPATIONAL THERAPY TREATMENT  Patient: Robin Garcia (17 y.o. male)  Date: 5/17/2022  Diagnosis: LETICIA (acute kidney injury) ([facility] [address] [N17.9]  Dehydration [E86.0]  UTI (urinary tract infection) [N39.0]  Lactic acidosis [E87.2] <principal problem not specified>       Precautions: Fall  Chart, occupational therapy assessment, plan of care, and goals were reviewed. ASSESSMENT  Patient continues with skilled OT services and is progressing towards goals. Pt was able to more redillay follow cues and commands as compared to previous session. Attempted to have pt use his quad cane for mobility to bathroom but pt was carrying the cane off the floor. He reported that he thought that walker would help more but he was unable to manage walker due to decreased cognition/attention and it was more of a hindrance than a help. He was incontinent of BM upon arrival and was unaware. Mobilized to bathroom with min assist and pt needed multimodal cues to sit down on commode to finish BM.   Once seated pt sat for a moment and then stood back up reporting he still needed to use the restroom. Therapist informed pt to sit back down to finish and he did. He thought he was in the bedroom when he was in the bathroom even with having pt look around environment to problem solve through. He reached to perform BM clean up but didn't accomplish task. Total assist for BM clean up and max assist for clothing management. Max assist to turn to wash hands at sink due to pt forgetting to perform task after toileting. Hand over hand assist and multimodal cues needed to break down task. Min assist needed to use soap and pt would have forgotten to turn off the running water if therapist hadn't interceded. He is not redially using RUE during functional ADL task and per PT note yesterday this has been a recent occurrence per family. MRI was negative for CVA. He was able to self feed with set up and cues to use correct utensils. Recommend SNF for rehab at discharge and pt ultimately will need 24/7 assist due to decreased mental status. Current Level of Function Impacting Discharge (ADLs): min assist mobility and transfers  Feeding  Container Management: Set-up  Cutting Food: Set-up  Utensil Management: Set-up (initally using spoon when fork would have been easier)  Food to Mouth: Supervision  Drink to Mouth: Supervision  Cues: Physical assistance;Verbal cues provided; Tactile cues provided    Grooming  Washing Hands: Minimum assistance (breaking task down, hand over hand sequencing and attention)        Toileting  Bowel Hygiene: Total assistance (dependent) (pt performed the motion but unable to correclty perform)  Clothing Management: Maximum assistance  Cues: Physical assistance for pants up;Physical assistance for pants down; Tactile cues provided;Verbal cues provided;Visual cues provided    Other factors to consider for discharge: decreased cognition         PLAN :  Patient continues to benefit from skilled intervention to address the above impairments.   Continue treatment per established plan of care to address goals. Recommend with staff: frequent rounding on pt, continence checks, set up pts food     Recommend next OT session: mobility, ADLs    Recommendation for discharge: (in order for the patient to meet his/her long term goals)  Therapy up to 5 days/week in SNF setting    This discharge recommendation:  Has been made in collaboration with the attending provider and/or case management    IF patient discharges home will need the following DME: none       SUBJECTIVE:   Patient stated Yes, yes. Can you find a women.     OBJECTIVE DATA SUMMARY:   Cognitive/Behavioral Status:  Neurologic State: Alert;Confused  Orientation Level: Oriented to person;Disoriented to situation;Disoriented to place; Disoriented to time  Cognition: Decreased attention/concentration;Decreased command following; Impaired decision making;Memory loss  Perception: Cues to maintain midline in standing  Perseveration: No perseveration noted  Safety/Judgement: Fall prevention;Decreased insight into deficits; Decreased awareness of need for safety    Functional Mobility and Transfers for ADLs:  Bed Mobility:  Supine to Sit: Minimum assistance; Additional time  Scooting: Contact guard assistance; Additional time    Transfers:  Sit to Stand: Minimum assistance; Additional time  Functional Transfers  Bathroom Mobility: Minimum assistance (with hand held assist)  Toilet Transfer : Minimum assistance (assist to complete turning prior to sitting)       Balance:  Sitting: Impaired  Sitting - Static: Good (unsupported)  Sitting - Dynamic: Good (unsupported); Fair (occasional)  Standing: Impaired  Standing - Static: Fair  Standing - Dynamic : Fair    ADL Intervention:  Feeding  Container Management: Set-up  Cutting Food: Set-up  Utensil Management: Set-up (initally using spoon when fork would have been easier)  Food to Mouth: Supervision  Drink to Mouth: Supervision  Cues: Physical assistance;Verbal cues provided; Tactile cues provided    Grooming  Washing Hands: Minimum assistance (breaking task down, hand over hand sequencing and attention)        Toileting  Bowel Hygiene: Total assistance (dependent) (pt performed the motion but unable to correclty perform)  Clothing Management: Maximum assistance  Cues: Physical assistance for pants up;Physical assistance for pants down; Tactile cues provided;Verbal cues provided;Visual cues provided    Cognitive Retraining  Orientation Retraining: Reorienting  Problem Solving: Identifying the problem; Identifying the task;General alternative solution  Attention to Task: Single task  Following Commands: Follows two step commands/directions  Safety/Judgement: Fall prevention;Decreased insight into deficits; Decreased awareness of need for safety        Pain:  0/10    Activity Tolerance:   Fair    After treatment patient left in no apparent distress:   Sitting in chair, Call bell within reach, and Bed / chair alarm activated    COMMUNICATION/COLLABORATION:   The patients plan of care was discussed with: Physical therapist and Registered nurse.      Geovani Wilson OTR/L  Time Calculation: 23 mins

## 2022-05-17 NOTE — PROGRESS NOTES
Problem: Mobility Impaired (Adult and Pediatric)  Goal: *Acute Goals and Plan of Care (Insert Text)  Description: FUNCTIONAL STATUS PRIOR TO ADMISSION: Per CM note, pt lives alone in one story home with 2 step entry. He has a caregiver that comes daily for 4 hrs and his dtr comes 3x/week to supplement but unsure how much help he needed with ADLS/amb. He has a RW and a cane. HOME SUPPORT PRIOR TO ADMISSION: The patient lived alone with caregiver and dtr to provide assistance. Physical Therapy Goals  Initiated 5/13/2022  1. Patient will move from supine to sit and sit to supine , scoot up and down, and roll side to side in bed with modified independence within 7 day(s). 2.  Patient will transfer from bed to chair and chair to bed with modified independence using the least restrictive device within 7 day(s). 3.  Patient will perform sit to stand with modified independence within 7 day(s). 4.  Patient will ambulate with supervision/set-up for 75 feet with the least restrictive device within 7 day(s). 5.  Patient will ascend/descend 2 stairs with handrail(s) with minimal assistance/contact guard assist within 7 day(s). Outcome: Progressing Towards Goal     PHYSICAL THERAPY TREATMENT  Patient: Duane Burows (26 y.o. male)  Date: 5/17/2022  Diagnosis: LETICIA (acute kidney injury) ([facility] [address] [N17.9]  Dehydration [E86.0]  UTI (urinary tract infection) [N39.0]  Lactic acidosis [E87.2] <principal problem not specified>       Precautions: Fall  Chart, physical therapy assessment, plan of care and goals were reviewed. ASSESSMENT  Patient continues with skilled PT services and is progressing towards goals. Ambulated to bathroom and back to chair with overall minimal assistance.  Trialed quad cane and rolling walker but patient with difficulty managing device(s) likely in setting of cognitive status and did best with handheld assist. He is limited by his impaired cognitive status with impaired safety awareness and insight into deficits. He did attempt to stand impulsively from toilet and he is a high fall risk. He lives alone with intermittent caregiver assist and would really need nearly 24/7 assist at home to be successful. At this time, recommend SNF. Acute PT will continue to follow and progress as able. Current Level of Function Impacting Discharge (mobility/balance): minimal assist with handheld assist to ambulate 15ft x2    Other factors to consider for discharge: lives alone, dementia         PLAN :  Patient continues to benefit from skilled intervention to address the above impairments. Continue treatment per established plan of care. to address goals. Recommendation for discharge: (in order for the patient to meet his/her long term goals)  Therapy up to 5 days/week in SNF setting    This discharge recommendation:  Has been made in collaboration with the attending provider and/or case management    IF patient discharges home will need the following DME: to be determined (TBD)     SUBJECTIVE:   Patient stated Yes.     OBJECTIVE DATA SUMMARY:   Critical Behavior:  Neurologic State: Alert,Confused  Orientation Level: Oriented to person,Disoriented to situation,Disoriented to place,Disoriented to time  Cognition: Decreased attention/concentration,Decreased command following,Impaired decision making,Memory loss  Safety/Judgement: Fall prevention,Decreased insight into deficits,Decreased awareness of need for safety  Functional Mobility Training:  Bed Mobility:  Supine to Sit: Minimum assistance; Additional time  Scooting: Contact guard assistance; Additional time  Transfers:  Sit to Stand: Minimum assistance; Additional time  Stand to Sit: Minimum assistance; Additional time (cues for safety)  Balance:  Sitting: Impaired  Sitting - Static: Good (unsupported)  Sitting - Dynamic: Good (unsupported); Fair (occasional)  Standing: Impaired  Standing - Static: Fair  Standing - Dynamic : Fair  Ambulation/Gait Training:  Distance (ft): 15 Feet (ft) (x2)  Assistive Device: Gait belt (trialed quad cane and rolling walker & HHA)  Ambulation - Level of Assistance: Minimal assistance; Additional time;Assist x1  Gait Abnormalities: Decreased step clearance;Shuffling gait (increased trunk flexion with fatigue)  Speed/Jessica: Shuffled;Pace decreased (<100 feet/min)  Step Length: Left shortened;Right shortened    Therapeutic Exercises:   Standing balance/tolerance performing ADLs at sink  Pain Rating:  No complaints    Activity Tolerance:   Fair    After treatment patient left in no apparent distress:   Sitting in chair, Call bell within reach, and Bed / chair alarm activated    COMMUNICATION/COLLABORATION:   The patients plan of care was discussed with: Occupational therapist and Registered nurse.      Ivana Greene, PT   Time Calculation: 17 mins

## 2022-05-18 LAB
BACTERIA SPEC CULT: NORMAL
BACTERIA SPEC CULT: NORMAL
SERVICE CMNT-IMP: NORMAL
SERVICE CMNT-IMP: NORMAL

## 2022-05-18 PROCEDURE — 65270000029 HC RM PRIVATE

## 2022-05-18 PROCEDURE — 74011000250 HC RX REV CODE- 250: Performed by: INTERNAL MEDICINE

## 2022-05-18 PROCEDURE — 74011250637 HC RX REV CODE- 250/637: Performed by: INTERNAL MEDICINE

## 2022-05-18 PROCEDURE — 74011250637 HC RX REV CODE- 250/637: Performed by: PSYCHIATRY & NEUROLOGY

## 2022-05-18 PROCEDURE — 97530 THERAPEUTIC ACTIVITIES: CPT

## 2022-05-18 PROCEDURE — 74011250636 HC RX REV CODE- 250/636: Performed by: INTERNAL MEDICINE

## 2022-05-18 RX ADMIN — MEMANTINE HYDROCHLORIDE 5 MG: 5 TABLET ORAL at 08:06

## 2022-05-18 RX ADMIN — SODIUM CHLORIDE, PRESERVATIVE FREE 5 ML: 5 INJECTION INTRAVENOUS at 21:26

## 2022-05-18 RX ADMIN — MEMANTINE HYDROCHLORIDE 5 MG: 5 TABLET ORAL at 17:04

## 2022-05-18 RX ADMIN — SODIUM CHLORIDE, PRESERVATIVE FREE 10 ML: 5 INJECTION INTRAVENOUS at 14:50

## 2022-05-18 RX ADMIN — ENOXAPARIN SODIUM 40 MG: 100 INJECTION SUBCUTANEOUS at 08:06

## 2022-05-18 RX ADMIN — TAMSULOSIN HYDROCHLORIDE 0.4 MG: 0.4 CAPSULE ORAL at 08:06

## 2022-05-18 RX ADMIN — FLUTICASONE PROPIONATE 2 SPRAY: 50 SPRAY, METERED NASAL at 08:06

## 2022-05-18 RX ADMIN — SODIUM CHLORIDE, PRESERVATIVE FREE 10 ML: 5 INJECTION INTRAVENOUS at 05:21

## 2022-05-18 RX ADMIN — PANTOPRAZOLE SODIUM 40 MG: 40 TABLET, DELAYED RELEASE ORAL at 21:26

## 2022-05-18 RX ADMIN — ASPIRIN 81 MG: 81 TABLET, COATED ORAL at 08:06

## 2022-05-18 RX ADMIN — DONEPEZIL HYDROCHLORIDE 5 MG: 5 TABLET, FILM COATED ORAL at 21:26

## 2022-05-18 RX ADMIN — PANTOPRAZOLE SODIUM 40 MG: 40 TABLET, DELAYED RELEASE ORAL at 08:06

## 2022-05-18 RX ADMIN — Medication 1 CAPSULE: at 08:06

## 2022-05-18 NOTE — PROGRESS NOTES
Consult  REFERRED BY:  Sin Camacho MD    CHIEF COMPLAINT: Persistent muscle tremors and jerks      Subjective:     Farideh Leslie is a [de-identified] y.o. right-handed  male, seen as a new patient to me, at the request of the hospitalist, for evaluation of about a 1 week history the patient says of increasing jerks and tremors in his hands and body, worse when he tries to do things, and over the last week he has not been eating or drinking well, and got admitted to the hospital with acute kidney injury and dehydration, and his kidney function has been improving on IV fluids but he still has this type of asterixis and myoclonus, but his EEG shows only mild generalized slowing but no seizure activity, and he had no CT or MRI done on admission, but did have an MRI that just showed microvascular disease and atrophy in April 2022. He has not been given anything for his tremors. He was on baby aspirin prior to admission and Lipitor 20 mg a day, and takes Lexapro 5 mg a day and Levaquin for urinary tract infection and is now on Rocephin for that, and takes cholesterol medication and high blood pressure medication and prostate medication and Protonix for his stomach. He denies any focal weakness or sensory loss at this time. He is eating fairly well but has marked tremor that interferes with his ability to feed himself. Speech therapy is working with him now. He is fluent in his speech but obviously confused and moderately demented but not on any medication for dementia yet. He does not complain of any headache or meningismus at this time. He denies any prior history of previous stroke or TIA or other neurological problems. His tremors are better today, but he says he still feels a little drowsy, so we will stop the Neurontin and the clonazepam, he does not seem to be having any more asterixis or tremors now as he medically improves and he seems more awake and cooperative.   He is also tolerating the Namenda well so far and that may be helping some to for his memory. His tremors are still doing well off Neurontin and clonazepam, and he is tolerating Namenda well so we will increase the dose to 5 mg twice a day and see how he does. We will follow as needed for now, call if we can help  Past Medical History:   Diagnosis Date    Arthritis     left arm    [facility]     Bleeding ulcer 07/2019    BPH (benign prostatic hyperplasia)     CAD (coronary artery disease)     s/p stent    Cancer ([facility]     penile squamous carcinoma    Chronic kidney disease     stage 2     Chronic obstructive pulmonary disease (HCC)     Dementia ([facility]     Elevated LFT's     Essential hypertension 9/24/01    GERD (gastroesophageal reflux disease)     hiatal hernia    Ill-defined condition 12/02/2016    faint    Nephrosclerosis     Orthostatic hypotension 9/24/01    PVC's 9/24/01    Sleep apnea     no CPAP    Syncope 9/24/01    secondary to venous insuffiency       Past Surgical History:   Procedure Laterality Date    COLONOSCOPY N/A 12/13/2016    COLONOSCOPY performed by Andrés Francis MD at \[facility]"" ENDOSCOPY    COLONOSCOPY N/A 12/11/2019    COLONOSCOPY performed by Jackie Garnett MD at [address]; HI RISK IND  12/11/2019         ECHO 2D ADULT  5/24/12    EF 60 - 65%; mild concentric hypertrophy; pulmonary systolic artery pressure upper limits normal    HX ABDOMINAL WALL DEFECT REPAIR  07/01/2019d     Exploratory laparotomy, segmental sigmoid colon resection and primary stapled anastomosis.     HX APPENDECTOMY  1985    HX HEENT  02/18/2020    Left temporal artery biopsy    HX HERNIA REPAIR  08/29/2018    Davinci hiatal hernia repair- Tomeka Cramer MD    HX OTHER SURGICAL  08/2020    penile lesion bx    [place] CARDIAC SURG PROCEDURE UNLIST  05/13/2019    1 stent    UPPER GI ENDOSCOPY,BIOPSY  12/11/2019         UPPER GI ENDOSCOPY,DIAGNOSIS  7/18/2019          Family History Problem Relation Age of Onset    Stroke Mother     Stroke Father     Heart Disease Father       Social History     Tobacco Use    Smoking status: Former Smoker     Packs/day: 3.50     Quit date: 1980     Years since quittin.4    Smokeless tobacco: Never Used   Substance Use Topics    Alcohol use: Not Currently     Comment: no alcohol since          Current Facility-Administered Medications:     loperamide (IMODIUM) capsule 4 mg, 4 mg, Oral, Q6H PRN, Al Gillette MD, 4 mg at 22 1224    memantine (NAMENDA) tablet 5 mg, 5 mg, Oral, DAILY, Mat Ramos MD, 5 mg at 22 1003    enoxaparin (LOVENOX) injection 40 mg, 40 mg, SubCUTAneous, DAILY, John Cherry MD, 40 mg at 22 1003    aspirin delayed-release tablet 81 mg, 81 mg, Oral, DAILY, Al Gutierrez MD, 81 mg at 22 1003    donepeziL (ARICEPT) tablet 5 mg, 5 mg, Oral, QHS, Al Gutierrez MD, 5 mg at 22 2214    fluticasone propionate (FLONASE) 50 mcg/actuation nasal spray 2 Spray, 2 Spray, Both Nostrils, DAILY, Al Gutierrez MD, 2 [place] at 22 0900    pantoprazole (PROTONIX) tablet 40 mg, 40 mg, Oral, ACB/HS, Al Gillette MD, 40 mg at 22 0657    tamsulosin (FLOMAX) capsule 0.4 mg, 0.4 mg, Oral, DAILY, Al Gutierrez MD, 0.4 mg at 22 1003    traZODone (DESYREL) tablet 50 mg, 50 mg, Oral, QHS PRN, Gay HICKEY MD, 50 mg at 22 0357    sodium chloride (NS) flush 5-40 mL, 5-40 mL, IntraVENous, Q8H, Al Gutierrez MD, 10 mL at 22 0657    sodium chloride (NS) flush 5-40 mL, 5-40 mL, IntraVENous, PRN, Jaxson Mccormack MD    acetaminophen (TYLENOL) tablet 650 mg, 650 mg, Oral, Q6H PRN, 650 mg at 22 1748 **OR** acetaminophen (TYLENOL) suppository 650 mg, 650 mg, Rectal, Q6H PRN, Al Gillette MD    polyethylene glycol (MIRALAX) packet 17 g, 17 g, Oral, DAILY PRN, Al Gillette MD    ondansetron (ZOFRAN ODT) tablet 4 mg, 4 mg, Oral, Q8H PRN **OR** ondansetron (ZOFRAN) injection 4 mg, 4 mg, IntraVENous, Q6H PRN, Sindhu Pinon MD    L.acidophilus-paracasei-S.thermophil-bifidobacter (RISAQUAD) 8 billion cell capsule, 1 Capsule, Oral, DAILY, Al Crespo MD, 1 Capsule at 05/17/22 1003        Allergies   Allergen Reactions    Ace Inhibitors Other (comments)     Doesn't agree with pt    Demerol [Meperidine] Unknown (comments)     Causes unconsciousness      MRI Results (most recent):  Results from [place] encounter on 05/12/22    MRI BRAIN WO CONT    Narrative  EXAM: MRI BRAIN WO CONT    INDICATION: Altered mental status and generalized tremors    COMPARISON: MRI brain 4/21/2022. CONTRAST: None. TECHNIQUE:  Multiplanar multisequence acquisition without contrast of the brain. FINDINGS:  Unchanged generalized parenchymal volume loss with commensurate dilation of the  sulci and ventricular system. Unchanged scattered periventricular and deep white  matter T2/FLAIR hyperintensities, confluent in regions, and mild patchy T2/FLAIR  hyperintensity in the flip, consistent with mild to moderate chronic  microvascular ischemic disease. There is no acute infarct, hemorrhage,  extra-axial fluid collection, or mass effect. There is no cerebellar tonsillar  herniation. Expected arterial flow-voids are present. The paranasal sinuses, mastoid air cells, and middle ears are clear. The orbital  contents are within normal limits with bilateral lens implants. No significant  osseous or scalp lesions are identified. Impression  1. No acute intracranial abnormality. 2. Unchanged generalized parenchymal volume loss and mild to moderate chronic  microvascular ischemic disease. Results from [place] encounter on 05/12/22    MRI BRAIN WO CONT    Narrative  EXAM: MRI BRAIN WO CONT    INDICATION: Altered mental status and generalized tremors    COMPARISON: MRI brain 4/21/2022.     CONTRAST: None.    TECHNIQUE:  Multiplanar multisequence acquisition without contrast of the brain. FINDINGS:  Unchanged generalized parenchymal volume loss with commensurate dilation of the  sulci and ventricular system. Unchanged scattered periventricular and deep white  matter T2/FLAIR hyperintensities, confluent in regions, and mild patchy T2/FLAIR  hyperintensity in the flip, consistent with mild to moderate chronic  microvascular ischemic disease. There is no acute infarct, hemorrhage,  extra-axial fluid collection, or mass effect. There is no cerebellar tonsillar  herniation. Expected arterial flow-voids are present. The paranasal sinuses, mastoid air cells, and middle ears are clear. The orbital  contents are within normal limits with bilateral lens implants. No significant  osseous or scalp lesions are identified. Impression  1. No acute intracranial abnormality. 2. Unchanged generalized parenchymal volume loss and mild to moderate chronic  microvascular ischemic disease. Review of Systems:  A comprehensive review of systems was negative except for: Constitutional: positive for fevers, fatigue and malaise  Ears, nose, mouth, throat, and face: positive for hearing loss  Genitourinary: positive for nocturia, urinary incontinence and UTI  Musculoskeletal: positive for myalgias, arthralgias, stiff joints and muscle weakness  Neurological: positive for dizziness, memory problems, paresthesia, coordination problems, gait problems and tremor  Behvioral/Psych: positive for Dementia   Vitals:    05/17/22 0805 05/17/22 1055 05/17/22 1448 05/17/22 1942   BP: (!) 150/74 134/73 (!) 131/52 (!) 161/80   Pulse: 63 83 81 68   Resp: 18 18 18 18   Temp: 97.6 °F (36.4 °C) 97.8 °F (36.6 °C) 98.3 °F (36.8 °C) 97.8 °F (36.6 °C)   SpO2: 95% 94% 97% 95%   Weight:       Height:         Objective:     I      NEUROLOGICAL EXAM:    Appearance:   The patient is poorly developed and nourished, provides a poor history and is in no acute distress. Patient with much improved tremors and asterixis   Mental Status: Oriented to place and person, and not the date or the president, cognitive function is abnormal and speech is fluent and no aphasia with a mild dysarthria. Mood and affect appropriate but confused and encephalopathic. Cranial Nerves:   Intact visual fields. Fundi are benign, disc are flat, no lesions seen on funduscopy. LIA, EOM's full, no nystagmus, no ptosis. Facial sensation is normal. Corneal reflexes are not tested. Facial movement is symmetric. Hearing is abnormal bilaterally. Palate is midline with normal sternocleidomastoid and trapezius muscles are normal. Tongue is midline. Neck without meningismus or bruits  Temporal arteries are not tender or enlarged  TMJ areas are not tender on palpation   Motor:  3/5 strength in upper and lower proximal and distal muscles. Normal bulk and tone. No fasciculations. Rapid alternating movement is symmetric and slow bilaterally  Patient with much improved tremors and asterixis   Reflexes:   Deep tendon reflexes 1+/4 and symmetrical.  No babinski or clonus present   Sensory:   Normal to touch, pinprick and vibration and temperature are decreased in both feet. DSS is intact   Gait:  Not tested   Tremor: Moderate bilateral myoclonic and asterixis tremor noted. Cerebellar:   Not testable abnormal cerebellar signs present on Romberg and tandem testing and finger-nose-finger exam.   Neurovascular:  Normal heart sounds and regular rhythm, peripheral pulses decreased, and no carotid bruits. Assessment:       ICD-10-CM ICD-9-CM    1. Acute cystitis with hematuria  N30.01 595.0    2. Altered mental status, unspecified altered mental status type  R41.82 780.97    3. Rigors  R68.89 780.99    4. LETICIA (acute kidney injury) ([facility]  N17.9 584.9    5. Dehydration  E86.0 276.51    6. Lactic acidosis  E87.2 276.2    7. Acute alteration in mental status  R41.82 780.97    8.  Convulsions, unspecified convulsion type ([address]  R56.9 780.39      Active Problems:    TIA (transient ischemic attack) (12/6/2010)      Orthostatic hypotension ()      Overview: Positive tilt test 10/01      Bilateral carotid artery stenosis (10/29/2019)      Dementia ([address] (6/1/2021)      Dehydration (5/12/2022)      Lactic acidosis (5/12/2022)      UTI (urinary tract infection) (5/12/2022)      LETICIA (acute kidney injury) ([address] (5/12/2022)      Myoclonus (5/14/2022)      Asterixis (5/14/2022)      Late onset Alzheimer's disease without behavioral disturbance ([address] (5/14/2022)      Cerebral microvascular disease (5/14/2022)      Acute alteration in mental status (5/14/2022)      Convulsions ([address] (5/14/2022)        Plan:     Patient with generalized tremors and myoclonus and asterixis that looks most likely metabolic but his metabolic numbers are not that bad now, and he may still just have a mild chemical disequilibrium, we will start him on low-dose gabapentin and low-dose clonazepam to try to stop the tremors and asterixis and myoclonus, that he can better feed himself and take care of himself. He will need imaging of the brain just for completeness, but his EEG does not show any seizure activity, there is moderate generalized slowing, and he will need metabolic parameters to rule out other causes of his memory loss like B12 and thyroid and magnesium for his tremors, we will follow carefully with you. Probably check a carotid Doppler study on Monday in addition. Difficulties, this is probably a decompensated dementia and metabolic encephalopathy in a patient with Alzheimer's disease and new urinary tract infection, with dehydration and renal failure. His tremors are better today, but he says he still feels a little drowsy, so we will stop the Neurontin and the clonazepam, he does not seem to be having any more asterixis or tremors now as he medically improves and he seems more awake and cooperative.   He is also tolerating the Namenda well so far and that may be helping some to for his memory. His tremors are still doing well off Neurontin and clonazepam, and he is tolerating Namenda well so we will increase the dose to 5 mg twice a day and see how he does.   We will follow as needed for now, call if we can help    Signed By: Edith Soliman MD     May 17, 2022       CC: Brannon Norman, [facility] [address]: [phone]

## 2022-05-18 NOTE — PROGRESS NOTES
Problem: Pressure Injury - Risk of  Goal: *Prevention of pressure injury  Description: Document Sergey Scale and appropriate interventions in the flowsheet. Outcome: Progressing Towards Goal  Note: Pressure Injury Interventions:  Sensory Interventions: Check visual cues for pain    Moisture Interventions: Internal/External urinary devices    Activity Interventions: Increase time out of bed    Mobility Interventions: Turn and reposition approx.  every two hours(pillow and wedges)    Nutrition Interventions: Offer support with meals,snacks and hydration    Friction and Shear Interventions: Minimize layers

## 2022-05-18 NOTE — PROGRESS NOTES
Hospitalist Progress Note    NAME: Alysa Bay   :  1940   MRN:  [id number]       Assessment / Plan:  Pseudomonal UTI POA  Dehydration/LETICIA/lactic acidosis POA due to decreased p.o. intake as per family - resolved now, eating well now  -Chest x-ray negative acute  -Procalcitonin 0.30  -Appreciate Urology input - d/t BPH and history of penile cancer s/p resection patient has difficulty voiding and will likely need chronic allen cath   -Continue allen - DO NOT REMOVE unless per urology   -Blood culture so far negative   -Urine Culture from ED on  with ENTEROCOCCUS FAECALIS and PSEUDOMONAS   -Patient had taken PO Omnicef and Levaquin at home as outpatient for enterococcal and pseudomonal UTI on culture from 2022.  -Completed course of abx with Cefepime while IP - ended   -Continue flomax   - IVF discontinued     Autoimmune hepatitis  Dementia Continue aricept   Penile cancer status post resection  BPH Continue flomax   Hyperlipidemia Hold statin for now      Generalized twitching/involuntary movements   -Family reported twitching of the upper extremity which has started prior to admission  -EEG completed - negative  -Neurology consulted ,- following  MRI negative acute, Chronic Volume loss noted, dementia noted  Started on low dose Klonopin and Neurontin ()  Added Namenda 5/15      Code Status: Full code but daughter does not want him to be on life support for too long  Surrogate Decision Maker: Daughter [facility]ia [facility] # [phone]     DVT Prophylaxis: Subcu Lovenox  GI Prophylaxis: not indicated     Dispo: Patient is relatively independent at home, has walker and a cane but does not use it much as per the daughter, has a caregiver who stays across this street who checks on him multiple times per day. PT/OT recommending SNF and plan is to DC to SNF when medically stable. 25.0 - 29.9 Overweight / Body mass index is 26.95 kg/m².     Estimated discharge date: May 17  Barriers: Pending SNF placement      Subjective:     Chief Complaint / Reason for Physician Visit  Patient seen at bedside. No new complaints  Discussed with RN events overnight. Review of Systems:  Symptom Y/N Comments  Symptom Y/N Comments   Fever/Chills    Chest Pain     Poor Appetite    Edema     Cough    Abdominal Pain     Sputum    Joint Pain     SOB/DE JESUS    Pruritis/Rash     Nausea/vomit    Tolerating PT/OT     Diarrhea    Tolerating Diet     Constipation    Other       Could NOT obtain due to:  Confused/dementia      Objective:     VITALS:   Last 24hrs VS reviewed since prior progress note. Most recent are:  Patient Vitals for the past 24 hrs:   Temp Pulse Resp BP SpO2   05/18/22 0810 98.5 °F (36.9 °C) 75 18 (!) 155/74 92 %   05/17/22 2301 97.9 °F (36.6 °C) 72 18 (!) 161/75 93 %   05/17/22 1942 97.8 °F (36.6 °C) 68 18 (!) 161/80 95 %   05/17/22 1448 98.3 °F (36.8 °C) 81 18 (!) 131/52 97 %       Intake/Output Summary (Last 24 hours) at 5/18/2022 1355  Last data filed at 5/18/2022 5609  Gross per 24 hour   Intake 120 ml   Output 2450 ml   Net -2330 ml        I had a face to face encounter and independently examined this patient on 5/18/2022, as outlined below:  PHYSICAL EXAM:  General: WD, WN. Alert, cooperative, no acute distress    EENT:  EOMI. Anicteric sclerae. MMM  Resp:  CTA bilaterally, no wheezing or rales. No accessory muscle use  CV:  Regular  rhythm,  No edema  GI:  Soft, Non distended, Non tender. +Bowel sounds  Neurologic:  Alert and oriented X 1, normal speech,   Psych:   Poor insight. Not anxious nor agitated  Skin:  No rashes.   No jaundice    Reviewed most current lab test results and cultures  YES  Reviewed most current radiology test results   YES  Review and summation of old records today    NO  Reviewed patient's current orders and MAR    YES  PMH/SH reviewed - no change compared to H&P  ________________________________________________________________________  Care Plan discussed with:    Comments   Patient x    Family  x Daughter Robin Langford on phone   RN x    Care Manager x    Consultant                       x Multidiciplinary team rounds were held today with , nursing, pharmacist and clinical coordinator. Patient's plan of care was discussed; medications were reviewed and discharge planning was addressed. ________________________________________________________________________  Total NON critical care TIME: 26  Minutes    Total CRITICAL CARE TIME Spent:   Minutes non procedure based      Comments   >50% of visit spent in counseling and coordination of care     ________________________________________________________________________  Cherelle Mejia MD     Procedures: see electronic medical records for all procedures/Xrays and details which were not copied into this note but were reviewed prior to creation of Plan. LABS:  I reviewed today's most current labs and imaging studies.   Pertinent labs include:  Recent Labs     05/17/22 0147   WBC 3.4*   HGB 10.4*   HCT 31.4*   *     Recent Labs     05/17/22 0147      K 3.4*   *   CO2 25   *   BUN 18   CREA 1.00   CA 7.8*       Signed: Cherelle Mejia MD

## 2022-05-18 NOTE — PROGRESS NOTES
Problem: Mobility Impaired (Adult and Pediatric)  Goal: *Acute Goals and Plan of Care (Insert Text)  Description: FUNCTIONAL STATUS PRIOR TO ADMISSION: Per CM note, pt lives alone in one story home with 2 step entry. He has a caregiver that comes daily for 4 hrs and his dtr comes 3x/week to supplement but unsure how much help he needed with ADLS/amb. He has a RW and a cane. HOME SUPPORT PRIOR TO ADMISSION: The patient lived alone with caregiver and dtr to provide assistance. Physical Therapy Goals  Initiated 5/13/2022  1. Patient will move from supine to sit and sit to supine , scoot up and down, and roll side to side in bed with modified independence within 7 day(s). 2.  Patient will transfer from bed to chair and chair to bed with modified independence using the least restrictive device within 7 day(s). 3.  Patient will perform sit to stand with modified independence within 7 day(s). 4.  Patient will ambulate with supervision/set-up for 75 feet with the least restrictive device within 7 day(s). 5.  Patient will ascend/descend 2 stairs with handrail(s) with minimal assistance/contact guard assist within 7 day(s). Outcome: Not Progressing Towards Goal   PHYSICAL THERAPY TREATMENT  Patient: Julian Crawford (50 y.o. male)  Date: 5/18/2022  Diagnosis: LETICIA (acute kidney injury) ([facility] [N17.9]  Dehydration [E86.0]  UTI (urinary tract infection) [N39.0]  Lactic acidosis [E87.2] <principal problem not specified>       Precautions: Fall  Chart, physical therapy assessment, plan of care and goals were reviewed. ASSESSMENT  Patient continues with skilled PT services and is not progressing towards goals. Pt presents with decreased strength and endurance. Pt slumped down to the bottom of the bed. Pt requiring max a x2 to scoot to [facility]. Pt requiring max A /mod A to roll to to each side to fix linens. Pts head elevated to try to eat lunch.      Current Level of Function Impacting Discharge (mobility/balance): bed mobility at max A/mod A    Other factors to consider for discharge: decreased strength and endurance          PLAN :  Patient continues to benefit from skilled intervention to address the above impairments. Continue treatment per established plan of care. to address goals. Recommendation for discharge: (in order for the patient to meet his/her long term goals)  Therapy up to 5 days/week in SNF setting    This discharge recommendation:  Has been made in collaboration with the attending provider and/or case management    IF patient discharges home will need the following DME: to be determined (TBD)       SUBJECTIVE:   Patient stated  I'm sleepy but I'm hungry.     OBJECTIVE DATA SUMMARY:   Critical Behavior:  Neurologic State: Alert  Orientation Level: Disoriented to time,Oriented to place,Oriented to person,Disoriented to situation  Cognition: Decreased attention/concentration,Follows commands  Safety/Judgement: Fall prevention,Decreased insight into deficits,Decreased awareness of need for safety  Functional Mobility Training:  Bed Mobility:  Rolling: Maximum assistance;Assist x2; Moderate assistance        Scooting: Maximum assistance;Assist x2     Pain Rating:  Pt with no complaints of pain     Activity Tolerance:   Fair    After treatment patient left in no apparent distress:   Supine in bed, Call bell within reach, Bed / chair alarm activated, and Caregiver / family present    COMMUNICATION/COLLABORATION:   The patients plan of care was discussed with: Registered nurse.      Nayla Sanchez PTA   Time Calculation: 12 mins

## 2022-05-18 NOTE — PROGRESS NOTES
Spiritual Care Assessment/Progress Note  [facility]      NAME: Robin Garcia      MRN: [id number]  AGE: [de-identified] y.o. SEX: male  Latter day Affiliation: Holiness   Language: English     5/18/2022     Total Time (in minutes): 10     Spiritual Assessment begun in [facility] 2 MED TELE through conversation with:         [x]Patient        [] Family    [] Friend(s)        Reason for Consult: Initial/Spiritual assessment, patient floor     Spiritual beliefs: (Please include comment if needed)     [] Identifies with a charly tradition:         [] Supported by a charly community:            [] Claims no spiritual orientation:           [] Seeking spiritual identity:                [] Adheres to an individual form of spirituality:           [x] Not able to assess:                           Identified resources for coping:      [] Prayer                               [] Music                  [] Guided Imagery     [] Family/friends                 [] Pet visits     [] Devotional reading                         [x] Unknown     [] Other:                                               Interventions offered during this visit: (See comments for more details)    Patient Interventions: Other (comment) (Attempt)           Plan of Care:     [] Support spiritual and/or cultural needs    [] Support AMD and/or advance care planning process      [] Support grieving process   [] Coordinate Rites and/or Rituals    [] Coordination with community clergy   [] No spiritual needs identified at this time   [] Detailed Plan of Care below (See Comments)  [] Make referral to Music Therapy  [] Make referral to Pet Therapy     [] Make referral to Addiction services  [] Make referral to [facility]  [] Make referral to Spiritual Care Partner  [] No future visits requested        [x] Contact Spiritual Care for further referrals     Comments:   reviewed patient's chart and attempted visiting for initial spiritual assessment in room 2111. Patient appeared to be sleeping comfortably, no family present. Please contact spiritual care for further referrals. Visited by: Alicia Hyman.    Paging Service: [phone])

## 2022-05-19 LAB
ANION GAP SERPL CALC-SCNC: 5 MMOL/L (ref 5–15)
BUN SERPL-MCNC: 13 MG/DL (ref 6–20)
BUN/CREAT SERPL: 14 (ref 12–20)
CALCIUM SERPL-MCNC: 8.3 MG/DL (ref 8.5–10.1)
CHLORIDE SERPL-SCNC: 110 MMOL/L (ref 97–108)
CO2 SERPL-SCNC: 26 MMOL/L (ref 21–32)
CREAT SERPL-MCNC: 0.92 MG/DL (ref 0.7–1.3)
GLUCOSE SERPL-MCNC: 99 MG/DL (ref 65–100)
POTASSIUM SERPL-SCNC: 3.5 MMOL/L (ref 3.5–5.1)
SODIUM SERPL-SCNC: 141 MMOL/L (ref 136–145)

## 2022-05-19 PROCEDURE — 74011250637 HC RX REV CODE- 250/637: Performed by: INTERNAL MEDICINE

## 2022-05-19 PROCEDURE — 74011000250 HC RX REV CODE- 250: Performed by: INTERNAL MEDICINE

## 2022-05-19 PROCEDURE — 97530 THERAPEUTIC ACTIVITIES: CPT

## 2022-05-19 PROCEDURE — 97535 SELF CARE MNGMENT TRAINING: CPT | Performed by: OCCUPATIONAL THERAPIST

## 2022-05-19 PROCEDURE — 97116 GAIT TRAINING THERAPY: CPT

## 2022-05-19 PROCEDURE — 36415 COLL VENOUS BLD VENIPUNCTURE: CPT

## 2022-05-19 PROCEDURE — 65270000029 HC RM PRIVATE

## 2022-05-19 PROCEDURE — 74011250636 HC RX REV CODE- 250/636: Performed by: INTERNAL MEDICINE

## 2022-05-19 PROCEDURE — 80048 BASIC METABOLIC PNL TOTAL CA: CPT

## 2022-05-19 PROCEDURE — 74011250637 HC RX REV CODE- 250/637: Performed by: PSYCHIATRY & NEUROLOGY

## 2022-05-19 RX ORDER — MEMANTINE HYDROCHLORIDE 5 MG/1
5 TABLET ORAL 2 TIMES DAILY
Qty: 10 TABLET | Refills: 0 | Status: SHIPPED
Start: 2022-05-19

## 2022-05-19 RX ORDER — FLUTICASONE PROPIONATE 50 MCG
2 SPRAY, SUSPENSION (ML) NASAL DAILY
Qty: 1 EACH | Refills: 0 | Status: SHIPPED
Start: 2022-05-19

## 2022-05-19 RX ORDER — PANTOPRAZOLE SODIUM 40 MG/1
40 TABLET, DELAYED RELEASE ORAL
Qty: 90 TABLET | Refills: 1 | Status: SHIPPED
Start: 2022-05-19

## 2022-05-19 RX ADMIN — MEMANTINE HYDROCHLORIDE 5 MG: 5 TABLET ORAL at 08:30

## 2022-05-19 RX ADMIN — PANTOPRAZOLE SODIUM 40 MG: 40 TABLET, DELAYED RELEASE ORAL at 08:30

## 2022-05-19 RX ADMIN — Medication 1 CAPSULE: at 08:30

## 2022-05-19 RX ADMIN — MEMANTINE HYDROCHLORIDE 5 MG: 5 TABLET ORAL at 17:26

## 2022-05-19 RX ADMIN — TAMSULOSIN HYDROCHLORIDE 0.4 MG: 0.4 CAPSULE ORAL at 08:30

## 2022-05-19 RX ADMIN — FLUTICASONE PROPIONATE 2 SPRAY: 50 SPRAY, METERED NASAL at 08:31

## 2022-05-19 RX ADMIN — SODIUM CHLORIDE, PRESERVATIVE FREE 10 ML: 5 INJECTION INTRAVENOUS at 21:59

## 2022-05-19 RX ADMIN — ENOXAPARIN SODIUM 40 MG: 100 INJECTION SUBCUTANEOUS at 08:31

## 2022-05-19 RX ADMIN — DONEPEZIL HYDROCHLORIDE 5 MG: 5 TABLET, FILM COATED ORAL at 21:58

## 2022-05-19 RX ADMIN — ASPIRIN 81 MG: 81 TABLET, COATED ORAL at 08:30

## 2022-05-19 RX ADMIN — PANTOPRAZOLE SODIUM 40 MG: 40 TABLET, DELAYED RELEASE ORAL at 21:59

## 2022-05-19 NOTE — PROGRESS NOTES
Problem: Pressure Injury - Risk of  Goal: *Prevention of pressure injury  Description: Document Sergey Scale and appropriate interventions in the flowsheet. Outcome: Progressing Towards Goal  Note: Pressure Injury Interventions:  Sensory Interventions: Check visual cues for pain    Moisture Interventions: Absorbent underpads,Check for incontinence Q2 hours and as needed,Minimize layers,Limit adult briefs,Internal/External urinary devices    Activity Interventions: Pressure redistribution bed/mattress(bed type),PT/OT evaluation,Increase time out of bed    Mobility Interventions: Pressure redistribution bed/mattress (bed type),PT/OT evaluation,Turn and reposition approx. every two hours(pillow and wedges),Float heels,HOB 30 degrees or less    Nutrition Interventions: Document food/fluid/supplement intake,Offer support with meals,snacks and hydration    Friction and Shear Interventions: Apply protective barrier, creams and emollients,Lift sheet,HOB 30 degrees or less,Minimize layers                Problem: Patient Education: Go to Patient Education Activity  Goal: Patient/Family Education  Outcome: Progressing Towards Goal     Problem: Patient Education: Go to Patient Education Activity  Goal: Patient/Family Education  Outcome: Progressing Towards Goal     Problem: Falls - Risk of  Goal: *Absence of Falls  Description: Document Roxann Fall Risk and appropriate interventions in the flowsheet.   Outcome: Progressing Towards Goal  Note: Fall Risk Interventions:  Mobility Interventions: OT consult for ADLs,Bed/chair exit alarm,Patient to call before getting OOB,PT Consult for mobility concerns,PT Consult for assist device competence,Utilize walker, cane, or other assistive device    Mentation Interventions: Adequate sleep, hydration, pain control,Bed/chair exit alarm,Door open when patient unattended,More frequent rounding,Reorient patient,Room close to nurse's station,Toileting rounds    Medication Interventions: Teach patient to arise slowly,Patient to call before getting OOB,Bed/chair exit alarm    Elimination Interventions: Bed/chair exit alarm,Call light in reach,Toileting schedule/hourly rounds,Patient to call for help with toileting needs

## 2022-05-19 NOTE — PROGRESS NOTES
Hospitalist Progress Note    NAME: Rita Garcia   :  1940   MRN:  [id number]       Assessment / Plan:  Pseudomonal UTI POA  Dehydration/LETICIA/lactic acidosis POA due to decreased p.o. intake as per family - resolved now, eating well now  -Chest x-ray negative acute  -Procalcitonin 0.30  -Appreciate Urology input - d/t BPH and history of penile cancer s/p resection patient has difficulty voiding and will likely need chronic allen cath   -Continue allen - DO NOT REMOVE unless per urology   -Blood culture so far negative   -Urine Culture from ED on  with ENTEROCOCCUS FAECALIS and PSEUDOMONAS   -Patient had taken PO Omnicef and Levaquin at home as outpatient for enterococcal and pseudomonal UTI on culture from 2022.  -Completed course of abx with Cefepime while IP - ended   -Continue flomax   - IVF discontinued     Autoimmune hepatitis  Dementia Continue aricept   Penile cancer status post resection  BPH Continue flomax   Hyperlipidemia *Hold statin for now      Generalized twitching/involuntary movements   -Family reported twitching of the upper extremity which has started prior to admission  -EEG completed - negative  -Neurology consulted ,- following  MRI negative acute, Chronic Volume loss noted, dementia noted  Started on low dose Klonopin and Neurontin ()  Added Namenda 5/15      Code Status: Full code but daughter does not want him to be on life support for too long  Surrogate Decision Maker: Daughter Dora Massey # [phone]     DVT Prophylaxis: Subcu Lovenox  GI Prophylaxis: not indicated     Dispo: Patient is relatively independent at home, has walker and a cane but does not use it much as per the daughter, has a caregiver who stays across this street who checks on him multiple times per day. PT/OT recommending SNF and plan is to DC to SNF when medically stable. 25.0 - 29.9 Overweight / Body mass index is 26.95 kg/m².     Estimated discharge date: May 17  Barriers: Pending SNF placement      Subjective:     Chief Complaint / Reason for Physician Visit for UTI   Patient seen for the first time and chart reviewed. Per staff no acute events. He is able to tell his name and location. Objective:     VITALS:   Last 24hrs VS reviewed since prior progress note. Most recent are:  Patient Vitals for the past 24 hrs:   Temp Pulse Resp BP SpO2   05/18/22 2322 97.8 °F (36.6 °C) 73 16 (!) 149/62 97 %   05/18/22 1522 98.7 °F (37.1 °C) 84 18 (!) 148/78 91 %       Intake/Output Summary (Last 24 hours) at 5/19/2022 0829  Last data filed at 5/19/2022 0353  Gross per 24 hour   Intake --   Output 1300 ml   Net -1300 ml        I had a face to face encounter and independently examined this patient on 5/19/2022, as outlined below:  PHYSICAL EXAM:  General: WD  no acute distress    EENT:    MMM  Resp:    No accessory muscle use  CV:  Regular  Rhythm,   GI:  Soft, Non distended,   Neurologic:  Alert and   normal speech,   Psych:          Poor insight. Not anxious nor agitated  Skin:  No rashes.   No jaundice       LABS:    Pertinent labs include:  Recent Labs     05/17/22  0147   WBC 3.4*   HGB 10.4*   HCT 31.4*   *     Recent Labs     05/19/22 0354 05/17/22 0147    143   K 3.5 3.4*   * 113*   CO2 26 25   GLU 99 129*   BUN 13 18   CREA 0.92 1.00   CA 8.3* 7.8*       Signed: Carter Wong MD

## 2022-05-19 NOTE — PROGRESS NOTES
Problem: Self Care Deficits Care Plan (Adult)  Goal: *Acute Goals and Plan of Care (Insert Text)  Description: FUNCTIONAL STATUS PRIOR TO ADMISSION: per chart review (pt is unable to contribute to PLOF), ambulated without assist, performed ADLS? HOME SUPPORT PRIOR TO ADMISSION: The patient lived alone with neighbor whom checks on pt multiple times a day to provide assistance. Occupational Therapy Goals:  Initiated 5/13/2022  1. Patient will perform grooming seated with supervision/set-up within 7 days. 2. Patient will perform dressing with minimal assistance within 7 days. 3. Patient will perform toileting with moderate assistance  within 7 days. 4. Patient will transfer from toilet with minimal assistance/contact guard assist using the least restrictive device and appropriate durable medical equipment within 7 days. Outcome: Progressing Towards Goal   OCCUPATIONAL THERAPY TREATMENT  Patient: Alvaro Conley (9 y.o. male)  Date: 5/19/2022  Diagnosis: LETICIA (acute kidney injury) ([facility] [address] [N17.9]  Dehydration [E86.0]  UTI (urinary tract infection) [N39.0]  Lactic acidosis [E87.2] <principal problem not specified>       Precautions: Fall  Chart, occupational therapy assessment, plan of care, and goals were reviewed. ASSESSMENT  Patient continues with skilled OT services and is progressing towards goals. Pt was supine upon arrival and was calling out asking when he was \"getting out of here. \" He was easily re-directed. CGA for bed mobility and with mobility to bathroom with hand held assist.  Pt reported he needed to use the restroom but once seated on commode didn't have BM. Unable to redirect pt to stand at sink to wash hands. Assisted pt back to supine in bed. Set up needed for opening packaging on brownie (he was unable to problem solve how to open the cellophane). He was able to self feed with supervision. Continue to recommend SNF for rehab at discharge.        Current Level of Function Impacting Discharge (ADLs): CGA for mobility short distances, setup washing hands, moderate assist clothing management     Other factors to consider for discharge: fall risk, making gains with therapy, will need supervision at discharge         PLAN :  Patient continues to benefit from skilled intervention to address the above impairments. Continue treatment per established plan of care to address goals. Recommend next OT session: mobility/ADLS    Recommendation for discharge: (in order for the patient to meet his/her long term goals)  Therapy up to 5 days/week in SNF setting    This discharge recommendation:  Has been made in collaboration with the attending provider and/or case management    IF patient discharges home will need the following DME: shower chair       SUBJECTIVE:   Patient stated When I am I getting out of here?     OBJECTIVE DATA SUMMARY:   Cognitive/Behavioral Status:  Neurologic State: Alert;Confused  Orientation Level: Oriented to person;Disoriented to situation;Disoriented to time  Cognition: Impaired decision making; Impulsive;Memory loss;Poor safety awareness  Perception: Appears intact  Perseveration: Perseverates during conversation (\"when am I getting out of here? \")  Safety/Judgement: Fall prevention;Decreased awareness of need for safety;Decreased insight into deficits    Functional Mobility and Transfers for ADLs:  Bed Mobility:  Rolling: Contact guard assistance  Supine to Sit: Contact guard assistance  Sit to Supine: Contact guard assistance  Scooting: Stand-by assistance    Transfers:  Sit to Stand: Contact guard assistance; Additional time  Functional Transfers  Bathroom Mobility: Contact guard assistance (without assist devices)  Toilet Transfer : Minimum assistance (to fully turn and sit on commode)       Balance:  Sitting: Intact  Sitting - Static: Good (unsupported)  Sitting - Dynamic: Good (unsupported)  Standing: Impaired; With support  Standing - Static: Constant support;Good  Standing - Dynamic : Constant support; Fair    ADL Intervention:  Feeding  Food to Mouth: Supervision  Drink to Mouth: Supervision    Grooming  Washing Face:  (seated as was unable to be redirected to perform at sink)       Toileting  Clothing Management: Moderate assistance    Cognitive Retraining  Problem Solving: Identifying the problem; Identifying the task;General alternative solution  Safety/Judgement: Fall prevention;Decreased awareness of need for safety;Decreased insight into deficits        Activity Tolerance:   Fair and requires rest breaks    After treatment patient left in no apparent distress:   Supine in bed, Call bell within reach, Bed / chair alarm activated, and Side rails x 3    COMMUNICATION/COLLABORATION:   The patients plan of care was discussed with: Registered nurse and patient .      Tiana Birmingham OTR/L  Time Calculation: 25 mins

## 2022-05-19 NOTE — DISCHARGE INSTRUCTIONS
Diet regular  Activity as tolerated  Cont allen care until seen at Urology office  Strict fall/aspiration/pressure ulcer precautions  Check cbc/bmp Monday at [facility]  Return to ER or call 911 immediately if symptoms reoccur or get worse

## 2022-05-19 NOTE — PROGRESS NOTES
Transition of Care Plan:     RUR: 16% moderate risk for readmission  Disposition: SNF - [address] [place] [address] pending insurance auth  Follow up appointments: PCP, Specialists if indicated  DME needed: Pt owns a walker and cane; therapy consults in place  Transportation at Discharge: Pt's daughter  Juan Jose Brown or means to access home: Daughter will have       IM Medicare Letter: Will need 2nd IMM letter prior to d/c  Is patient a BCPI-A Bundle: N/A                     If yes, was Bundle Letter given?:    Is patient a  and connected with the [facility]?  N/A              If yes, was [place] transfer form completed and VA notified? Caregiver Contact: Pt's daughter/LNOK: Jazmin Nari p) [phone]  Discharge Caregiver contacted prior to [address] needed?:  Not indicated at present            CM contacted [facility] to inquire about insurance auth status. [facility] report pt is not covered by them. CM reviewed pt's chart and pt is a [facility] pt. CM contacted [address] [place] [address] and they are going to initiate auth.     Jigar Mei MSW  Care Manager [facility]  [phone]

## 2022-05-19 NOTE — PROGRESS NOTES
Problem: Mobility Impaired (Adult and Pediatric)  Goal: *Acute Goals and Plan of Care (Insert Text)  Description: FUNCTIONAL STATUS PRIOR TO ADMISSION: Per CM note, pt lives alone in one story home with 2 step entry. He has a caregiver that comes daily for 4 hrs and his dtr comes 3x/week to supplement but unsure how much help he needed with ADLS/amb. He has a RW and a cane. HOME SUPPORT PRIOR TO ADMISSION: The patient lived alone with caregiver and dtr to provide assistance. Physical Therapy Goals  Initiated 5/13/2022  1. Patient will move from supine to sit and sit to supine , scoot up and down, and roll side to side in bed with modified independence within 7 day(s). 2.  Patient will transfer from bed to chair and chair to bed with modified independence using the least restrictive device within 7 day(s). 3.  Patient will perform sit to stand with modified independence within 7 day(s). 4.  Patient will ambulate with supervision/set-up for 75 feet with the least restrictive device within 7 day(s). 5.  Patient will ascend/descend 2 stairs with handrail(s) with minimal assistance/contact guard assist within 7 day(s). Outcome: Progressing Towards Goal    PHYSICAL THERAPY TREATMENT  Patient: Yarely Meza (55 y.o. male)  Date: 5/19/2022  Diagnosis: LETICIA (acute kidney injury) ([facility] [address] [N17.9]  Dehydration [E86.0]  UTI (urinary tract infection) [N39.0]  Lactic acidosis [E87.2] <principal problem not specified>       Precautions: Fall  Chart, physical therapy assessment, plan of care and goals were reviewed. ASSESSMENT  Patient continues with skilled PT services and is progressing towards goals. Pt received supine in bed, willing to work with therapy. Pt presents positioned at the bottom of the bed with feet hang off the EOB. Pt continues with mild confusion, but able to answer simple questions at time, and reported that he needed to be cleaned up.  With assistance of PCT, pt was cleaned from him having a BM, able to roll R/L with CGA. Pt continued with bed mobility to R side EOB with CGA and time. After gown change from EOB, noted RUE unable to perform full elbow ext. Pt continued with gait training in hallway and back to room up to 100' with RW, pt was able to manage RW fairly well with min A for management. Once returned to room pt reported to return to bed, performed with CGA. Pt completed session in modified chair position with call within reach and all needs met at the time. Rn notified of session. Current Level of Function Impacting Discharge (mobility/balance): CGA for all mobility, amb up to 100' with RW with min A for RW management. Other factors to consider for discharge: fall risk, confusion         PLAN :  Patient continues to benefit from skilled intervention to address the above impairments. Continue treatment per established plan of care. to address goals. Recommendation for discharge: (in order for the patient to meet his/her long term goals)  Therapy up to 5 days/week in SNF setting    This discharge recommendation:  Has not yet been discussed the attending provider and/or case management    IF patient discharges home will need the following DME: to be determined (TBD)       SUBJECTIVE:   Patient stated I need to be cleaned.     OBJECTIVE DATA SUMMARY:   Critical Behavior:  Neurologic State: Alert  Orientation Level: Oriented to person,Oriented to place  Cognition: Decreased attention/concentration,Impaired decision making,Poor safety awareness  Safety/Judgement: Fall prevention,Decreased insight into deficits,Decreased awareness of need for safety  Functional Mobility Training:  Bed Mobility:  Rolling: Contact guard assistance  Supine to Sit: Contact guard assistance  Sit to Supine: Contact guard assistance  Scooting: Stand-by assistance     Transfers:  Sit to Stand: Contact guard assistance; Additional time  Stand to Sit: Contact guard assistance; Additional time     Balance:  Sitting: Intact  Standing: Impaired; With support  Standing - Static: Constant support;Good  Standing - Dynamic : Constant support; Fair    Ambulation/Gait Training:  Distance (ft): 100 Feet (ft)  Assistive Device: Gait belt;Walker, rolling  Ambulation - Level of Assistance: Contact guard assistance;Minimal assistance  Gait Abnormalities: Decreased step clearance;Shuffling gait  Speed/Jessica: Pace decreased (<100 feet/min)  Step Length: Left shortened;Right shortened    Pain Rating:  No reported pain     Activity Tolerance:   Fair    After treatment patient left in no apparent distress:   Supine in bed, Call bell within reach, and Bed / chair alarm activated    COMMUNICATION/COLLABORATION:   The patients plan of care was discussed with: Registered nurse and Certified nursing assistant/patient care technician.      Lobo Harper PTA   Time Calculation: 35 mins

## 2022-05-20 PROBLEM — G25.3 MYOCLONUS: Status: RESOLVED | Noted: 2022-05-14 | Resolved: 2022-05-20

## 2022-05-20 PROBLEM — R56.9 CONVULSIONS (HCC): Status: RESOLVED | Noted: 2022-05-14 | Resolved: 2022-05-20

## 2022-05-20 PROBLEM — R27.8 ASTERIXIS: Status: RESOLVED | Noted: 2022-05-14 | Resolved: 2022-05-20

## 2022-05-20 PROBLEM — N17.9 AKI (ACUTE KIDNEY INJURY) (HCC): Status: RESOLVED | Noted: 2022-05-12 | Resolved: 2022-05-20

## 2022-05-20 PROBLEM — N39.0 UTI (URINARY TRACT INFECTION): Status: RESOLVED | Noted: 2022-05-12 | Resolved: 2022-05-20

## 2022-05-20 PROBLEM — R41.82 ACUTE ALTERATION IN MENTAL STATUS: Status: RESOLVED | Noted: 2022-05-14 | Resolved: 2022-05-20

## 2022-05-20 PROBLEM — E87.20 LACTIC ACIDOSIS: Status: RESOLVED | Noted: 2022-05-12 | Resolved: 2022-05-20

## 2022-05-20 PROBLEM — E86.0 DEHYDRATION: Status: RESOLVED | Noted: 2022-05-12 | Resolved: 2022-05-20

## 2022-05-20 PROCEDURE — 65270000029 HC RM PRIVATE

## 2022-05-20 PROCEDURE — 74011000250 HC RX REV CODE- 250: Performed by: INTERNAL MEDICINE

## 2022-05-20 PROCEDURE — 74011250637 HC RX REV CODE- 250/637: Performed by: INTERNAL MEDICINE

## 2022-05-20 PROCEDURE — 74011250637 HC RX REV CODE- 250/637: Performed by: PSYCHIATRY & NEUROLOGY

## 2022-05-20 PROCEDURE — 74011250636 HC RX REV CODE- 250/636: Performed by: INTERNAL MEDICINE

## 2022-05-20 RX ADMIN — DONEPEZIL HYDROCHLORIDE 5 MG: 5 TABLET, FILM COATED ORAL at 22:27

## 2022-05-20 RX ADMIN — MEMANTINE HYDROCHLORIDE 5 MG: 5 TABLET ORAL at 08:16

## 2022-05-20 RX ADMIN — TAMSULOSIN HYDROCHLORIDE 0.4 MG: 0.4 CAPSULE ORAL at 08:16

## 2022-05-20 RX ADMIN — PANTOPRAZOLE SODIUM 40 MG: 40 TABLET, DELAYED RELEASE ORAL at 22:27

## 2022-05-20 RX ADMIN — MEMANTINE HYDROCHLORIDE 5 MG: 5 TABLET ORAL at 18:00

## 2022-05-20 RX ADMIN — TRAZODONE HYDROCHLORIDE 50 MG: 50 TABLET ORAL at 22:27

## 2022-05-20 RX ADMIN — PANTOPRAZOLE SODIUM 40 MG: 40 TABLET, DELAYED RELEASE ORAL at 08:16

## 2022-05-20 RX ADMIN — ENOXAPARIN SODIUM 40 MG: 100 INJECTION SUBCUTANEOUS at 08:16

## 2022-05-20 RX ADMIN — ASPIRIN 81 MG: 81 TABLET, COATED ORAL at 08:16

## 2022-05-20 RX ADMIN — SODIUM CHLORIDE, PRESERVATIVE FREE 10 ML: 5 INJECTION INTRAVENOUS at 14:48

## 2022-05-20 RX ADMIN — Medication 1 CAPSULE: at 08:16

## 2022-05-20 RX ADMIN — FLUTICASONE PROPIONATE 2 SPRAY: 50 SPRAY, METERED NASAL at 08:17

## 2022-05-20 NOTE — PROGRESS NOTES
Hospitalist Progress Note    NAME: Patricia Pete   :  1940   MRN:  [id number]       Assessment / Plan:  Pseudomonal UTI POA  Dehydration/LETICIA/lactic acidosis POA due to decreased p.o. intake as per family - resolved now, eating well now  -CXR negative   -Procalcitonin 0.30  -Appreciate Urology input - d/t BPH and history of penile cancer s/p resection patient has difficulty voiding and will likely need chronic allen cath   -Continue allen - DO NOT REMOVE unless per urology   -Blood culture so far negative   -Urine Culture from ED on  with ENTEROCOCCUS FAECALIS and PSEUDOMONAS   -Patient had taken PO Omnicef and Levaquin at home as outpatient for enterococcal and pseudomonal UTI on culture from 2022.  -Completed course of abx with Cefepime while IP - ended   -Continue flomax   - IVF discontinued     Autoimmune hepatitis  Dementia Continue aricept   Penile cancer status post resection  BPH Continue flomax   Hyperlipidemia *Hold statin for now      Generalized twitching/involuntary movements   -Family reported twitching of the upper extremity which has started prior to admission  -EEG completed - negative  -Neurology consulted ,- following  MRI negative acute, Chronic Volume loss noted, dementia noted  Started on low dose Klonopin and Neurontin ()  Added Namenda 5/15      Code Status: Full code but daughter does not want him to be on life support for too long  Surrogate Decision Maker: Daughter Blake Cuadra # [phone]     DVT Prophylaxis: Subcu Lovenox  GI Prophylaxis: not indicated     Dispo: Patient is relatively independent at home, has walker and a cane but does not use it much as per the daughter, has a caregiver who stays across this street who checks on him multiple times per day. PT/OT recommending SNF and plan is to DC to SNF when medically stable.      Barriers: Pending SNF placement      Subjective:     Chief Complaint / Reason for Physician Visit for UTI   Patient seen and examined, chart was reviewed. Case discussed with nursing staff. Patient alert but confused. No acute issues reported to me. Waiting for placement. Called  [employer] for P2P [phone] ext. [phone]. Ref number: [id number] multiple times but unable to reach anybody so far, will try again later. Objective:     VITALS:   Last 24hrs VS reviewed since prior progress note. Most recent are:  Patient Vitals for the past 24 hrs:   Temp Pulse Resp BP SpO2   05/20/22 0027 98.3 °F (36.8 °C) 78 16 (!) 171/94 92 %   05/19/22 1746 97.7 °F (36.5 °C) (!) 101 18 (!) 159/69 94 %       Intake/Output Summary (Last 24 hours) at 5/20/2022 8667  Last data filed at 5/20/2022 3605  Gross per 24 hour   Intake --   Output 2200 ml   Net -2200 ml        I had a face to face encounter and independently examined this patient on 5/20/2022, as outlined below:  PHYSICAL EXAM:  General: WD  no acute distress    EENT:    MMM  Resp:    No accessory muscle use  CV:  Regular  Rhythm,   GI:  Soft, Non distended,   Neurologic:  Alert and   normal speech,   Psych:          Poor insight. Not anxious nor agitated  Skin:  No rashes. No jaundice       LABS:    Pertinent labs include:  No results for input(s): WBC, HGB, HCT, PLT, HGBEXT, HCTEXT, PLTEXT, HGBEXT, HCTEXT, PLTEXT in the last 72 hours.   Recent Labs     05/19/22  0354      K 3.5   *   CO2 26   GLU 99   BUN 13   CREA 0.92   CA 8.3*       Signed: Boby Carmichael MD

## 2022-05-20 NOTE — PROGRESS NOTES
Transition of Care Plan:     RUR: 16% moderate risk for readmission  Disposition: Home with [facility] referrals sent then transition to LTC  Follow up appointments: PCP, Specialists if indicated  DME needed: Pt owns a walker and cane; therapy consults in place  Transportation at Discharge: Pt's daughter  Cesar Sorensen or means to access home: Daughter will have       IM Medicare Letter: Will need 2nd IMM letter prior to d/c  Is patient a BCPI-A Bundle: N/A                     If yes, was Bundle Letter given?:    Is patient a [place] and connected with the [facility]?  N/A              If yes, was [employer] transfer form completed and VA notified? Caregiver Contact: Pt's daughter/LNOK: Ivana Sayer p) [phone]  Discharge Caregiver contacted prior to [address] needed?:  Not indicated at present     4:12 PM  JOSE sent [facility] referrals to [place] at [address] Pt's daughter prepared to transport pt home at d/c.    3:15 PM  CM spoke to [address] [employer] with [address], who reports pt's P2P will likely not get approved due to pt's high level of functioning. Pt is walking 100 ft with contact guard assistance. 10:36 AM  CM received an update from [facility] that insurance has offered a P2P for authorization. Peer to peer information: [phone] ext. [phone]. Ref number: [id number]. The P2P needs to be done before Monday at noon. JOSE updated MD via perfect serve. If P2P is denied, pt will d/c home with family and HH.     Keith Leger, MSW  Care Manager [address]  [phone]

## 2022-05-20 NOTE — PROGRESS NOTES
Comprehensive Nutrition Assessment    Type and Reason for Visit: Initial,RD nutrition re-screen/LOS    Nutrition Recommendations/Plan:   1. Continue Regular diet     Nutrition Assessment:    Patient medically noted for LETICIA, UTI, and dehydration. PMH Dementia, HTN, and GERD. Chart reviewed for length of stay. Receiving a regular diet. Good PO intake and appetite noted per flowsheets. No significant weight changes. Discharge pending placement/insurance authorization. Patient Vitals for the past 168 hrs:   % Diet Eaten   05/19/22 0800 51 - 75%   05/18/22 1523 76 - 100%   05/18/22 0807 76 - 100%   05/16/22 0930 76 - 100%   05/14/22 1117 51 - 75%   05/14/22 0830 76 - 100%     Wt Readings from Last 10 Encounters:   05/12/22 71.2 kg (157 lb)   05/05/22 69.6 kg (153 lb 7 oz)   04/21/22 70.4 kg (155 lb 3.3 oz)   08/11/21 76.2 kg (168 lb)   06/26/21 76.3 kg (168 lb 3.4 oz)        Nutrition Related Findings:    Labs reviewed  Aricept, Namenda, Protonix  BM 5/19    Wound Type: Skin tears    Current Nutrition Intake & Therapies:  Average Meal Intake: 51-75%     ADULT DIET Regular    Anthropometric Measures:  Height: 5' 4\" (162.6 cm)  Ideal Body Weight (IBW): 130 lbs (59 kg)     Current Body Wt:  71.2 kg (156 lb 15.5 oz), 120.7 % IBW. Current BMI (kg/m2): 26.9                          BMI Category: Overweight (BMI 25.0-29. 9)    Estimated Daily Nutrient Needs:  Energy Requirements Based On: Formula  Weight Used for Energy Requirements: Current  Energy (kcal/day): 1730 kcal (BMR 1331 x 1. 3AF)  Weight Used for Protein Requirements: Current  Protein (g/day): 71g (1.0 g/kg bw)  Method Used for Fluid Requirements: 1 ml/kcal  Fluid (ml/day): 1750 mL    Nutrition Diagnosis:   No nutrition diagnosis at this time     Nutrition Interventions:   Food and/or Nutrient Delivery: Continue current diet  Nutrition Education/Counseling: No recommendations at this time  Coordination of Nutrition Care: Continue to monitor while inpatient Goals:     Goals:  (PO intake >70% of meals next 5-7 days)       Nutrition Monitoring and Evaluation:   Behavioral-Environmental Outcomes: None identified  Food/Nutrient Intake Outcomes: Food and nutrient intake  Physical Signs/Symptoms Outcomes: Biochemical data,Weight    Discharge Planning:    Continue current diet    Otto Plata RD  Contact: ext [phone]

## 2022-05-20 NOTE — DISCHARGE SUMMARY
Hospitalist Discharge Summary     Patient ID:  Tae Haney  [id number]  31 y.o.  1940 5/12/2022    PCP on record: Mercedes Wallace MD    Admit date: 5/12/2022  Discharge date and time: 5/22/22    DISCHARGE DIAGNOSIS:  LETICIA  Lactic acidosis  Dehydration  Pseudomonal UTI  Dementia  Autoimmune hepatitis    CONSULTATIONS:  IP CONSULT TO UROLOGY  IP CONSULT TO NEUROLOGY    Excerpted HPI from H&P of Aaron Boone MD:  CHIEF COMPLAINT: Chills/shakiness with decreased p.o. intake and generalized weakness for the past couple of days     HISTORY OF PRESENT ILLNESS:     Ramakrishna Nogueira is a [de-identified] y.o.  male who presents with above complaint from home with daughter. Patient present with chief complaint of worsening generalized weakness with decreased p.o. intake for the past 2 to 3 days as per the daughter despite taking oral antibiotic for recently diagnosed UTI 5/5-urine culture grew Enterococcus and pansensitive Pseudomonas-patient was initially treated with Omnicef and then changed to Levaquin 3 days ago as per the daughter. Patient has history of dementia and is a poor historian. Denies any obvious high fever but admits to decreased p.o. intake with chills/shaking tremors that is new in the past 2 days as per the daughter.     Patient was found to have mild LETICIA with dehydration and lactic acidosis at 2.25 on work-up in the ED with negative chest x-ray and unremarkable CBC. 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     We were asked to admit for work up and evaluation of the above problems. ______________________________________________________________________  DISCHARGE SUMMARY/HOSPITAL COURSE:  for full details see H&P, daily progress notes, labs, consult notes.      Pseudomonal UTI POA  Dehydration/LETICIA/lactic acidosis POA due to decreased p.o. intake as per family - resolved now, eating well now  -CXR negative   -Procalcitonin 0.30  -Appreciate Urology input - d/t BPH and history of penile cancer s/p resection patient has difficulty voiding and will likely need chronic allen cath   -Continue allen - DO NOT REMOVE unless per urology   -Blood culture so far negative   -Urine Culture from ED on 5/5 with ENTEROCOCCUS FAECALIS and PSEUDOMONAS   -Patient had taken PO Omnicef and Levaquin at home as outpatient for enterococcal and pseudomonal UTI on culture from 5/5/2022.  -Completed course of abx with Cefepime while IP - ended 5/14  -Continue flomax   - IVF discontinued     Autoimmune hepatitis  Dementia Continue aricept   Penile cancer status post resection  BPH Continue flomax   Hyperlipidemia *Hold statin for now      Generalized twitching/involuntary movements   -Family reported twitching of the upper extremity which has started prior to admission  -EEG completed - negative  -Neurology consulted 5/13,- following  MRI negative acute, Chronic Volume loss noted, dementia noted  Started on low dose Klonopin and Neurontin (5/14) now off  Symptoms resovled resolved  Added Namenda 5/15  stable      Code Status: Full code but daughter does not want him to be on life support for too long  Surrogate Decision Maker: Daughter Trish Alvarez # [phone]     DVT Prophylaxis: Subcu Lovenox  GI Prophylaxis: not indicated     Dispo: Patient is relatively independent at home, has walker and a cane but does not use it much as per the daughter, has a caregiver who stays across this street who checks on him multiple times per day. PT/OT recommending SNF and plan is to DC to SNF when medically stable. Barriers:     Patient seen and examined at bedside comfortable no events overnight.   Patient was discharged 2 days ago but unable to obtain bed finally patient is being discharged today at 11:00 no changes in medications please discharge summary remained.    _______________________________________________________________________  Patient seen and examined by me on discharge day. Patient maximized inpatient benefit stay and can be discharged to LTC if bed available today.  _____________________________________________________________  DISCHARGE MEDICATIONS:   Current Discharge Medication List      START taking these medications    Details   L.acid,para-B. bifidum-S.therm (RISAQUAD) 8 billion cell cap cap Take 1 Capsule by mouth daily. Qty: 5 Capsule, Refills: 0  Start date: 5/20/2022      memantine (NAMENDA) 5 mg tablet Take 1 Tablet by mouth two (2) times a day. Qty: 10 Tablet, Refills: 0  Start date: 5/19/2022         CONTINUE these medications which have CHANGED    Details   fluticasone propionate (FLONASE) 50 mcg/actuation nasal spray 2 Sprays by Both Nostrils route daily. Qty: 1 Each, Refills: 0  Start date: 5/19/2022      pantoprazole (PROTONIX) 40 mg tablet Take 1 Tablet by mouth ACB/HS. Qty: 90 Tablet, Refills: 1  Start date: 5/19/2022         CONTINUE these medications which have NOT CHANGED    Details   escitalopram oxalate (Lexapro) 5 mg tablet Take 5 mg by mouth daily. tamsulosin (Flomax) 0.4 mg capsule Take 1 Capsule by mouth daily for 30 days. Qty: 30 Capsule, Refills: 0      aspirin delayed-release 81 mg tablet Take 81 mg by mouth daily. carvediloL (COREG) 12.5 mg tablet Take 2 Tabs by mouth two (2) times daily (with meals).   Qty: 180 Tab, Refills: 1    Associated Diagnoses: Essential hypertension         STOP taking these medications       levoFLOXacin (Levaquin) 750 mg tablet Comments:   Reason for Stopping:         atorvastatin (LIPITOR) 20 mg tablet Comments:   Reason for Stopping:         cefdinir (OMNICEF) 300 mg capsule Comments:   Reason for Stopping:         ondansetron hcl (Zofran) 4 mg tablet Comments:   Reason for Stopping:         traZODone (DESYREL) 50 mg tablet Comments:   Reason for Stopping:         psyllium husk (METAMUCIL PO) Comments:   Reason for Stopping:         donepeziL (ARICEPT) 5 mg tablet Comments:   Reason for Stopping:                 Patient Follow Up Instructions:   Diet regular  Activity as tolerated  Cont allen care until seen at Urology office  Strict fall/aspiration/pressure ulcer precautions  Check cbc/bmp Monday at [facility]  Return to ER or call 911 immediately if symptoms reoccur or get worse      Follow-up Information     Follow up With Specialties Details Why [address] [facility]  On 5/27/2022 8:30 am follow up at [facility] for follow up with dr Kevon Adams on this date [address]    Mone Blank MD Neurology In 4 weeks for dementia [address]  [address]  [phone]      Carlos Alberto Gatica MD Hepatology  for liver issues [address]  [phone]      MD at [facility] 1-2 days            ________________________________________________________________    Risk of deterioration: Moderate due to dementia    Condition at Discharge:  Stable  __________________________________________________________________    Disposition  [facility]    ____________________________________________________________________    Code Status: Full Code  ___________________________________________________________________      Total time in minutes spent coordinating this discharge 37  minutes    Signed:  Placido Serrato MD .

## 2022-05-21 PROCEDURE — 74011250636 HC RX REV CODE- 250/636: Performed by: INTERNAL MEDICINE

## 2022-05-21 PROCEDURE — 74011250637 HC RX REV CODE- 250/637: Performed by: PSYCHIATRY & NEUROLOGY

## 2022-05-21 PROCEDURE — 74011000250 HC RX REV CODE- 250: Performed by: INTERNAL MEDICINE

## 2022-05-21 PROCEDURE — 74011250637 HC RX REV CODE- 250/637: Performed by: INTERNAL MEDICINE

## 2022-05-21 PROCEDURE — 65270000029 HC RM PRIVATE

## 2022-05-21 RX ADMIN — SODIUM CHLORIDE, PRESERVATIVE FREE 10 ML: 5 INJECTION INTRAVENOUS at 21:24

## 2022-05-21 RX ADMIN — MEMANTINE HYDROCHLORIDE 5 MG: 5 TABLET ORAL at 17:07

## 2022-05-21 RX ADMIN — DONEPEZIL HYDROCHLORIDE 5 MG: 5 TABLET, FILM COATED ORAL at 21:24

## 2022-05-21 RX ADMIN — ENOXAPARIN SODIUM 40 MG: 100 INJECTION SUBCUTANEOUS at 08:13

## 2022-05-21 RX ADMIN — MEMANTINE HYDROCHLORIDE 5 MG: 5 TABLET ORAL at 08:13

## 2022-05-21 RX ADMIN — PANTOPRAZOLE SODIUM 40 MG: 40 TABLET, DELAYED RELEASE ORAL at 21:24

## 2022-05-21 RX ADMIN — ASPIRIN 81 MG: 81 TABLET, COATED ORAL at 08:13

## 2022-05-21 RX ADMIN — FLUTICASONE PROPIONATE 2 SPRAY: 50 SPRAY, METERED NASAL at 08:13

## 2022-05-21 RX ADMIN — Medication 1 CAPSULE: at 08:13

## 2022-05-21 RX ADMIN — TAMSULOSIN HYDROCHLORIDE 0.4 MG: 0.4 CAPSULE ORAL at 08:13

## 2022-05-21 RX ADMIN — PANTOPRAZOLE SODIUM 40 MG: 40 TABLET, DELAYED RELEASE ORAL at 06:45

## 2022-05-21 RX ADMIN — SODIUM CHLORIDE, PRESERVATIVE FREE 10 ML: 5 INJECTION INTRAVENOUS at 05:34

## 2022-05-21 RX ADMIN — SODIUM CHLORIDE, PRESERVATIVE FREE 10 ML: 5 INJECTION INTRAVENOUS at 13:49

## 2022-05-21 NOTE — PROGRESS NOTES
Hospitalist Progress Note    NAME: Elizabet Ballesteros   :  1940   MRN:  [id number]       Assessment / Plan:  Discharge summary and discharge order was placed yesterday    LETICIA resolved    Lactic acidosis    Dehydration    Pseudomonas UTI    Dementia    Autoimmune hepatitis    Clinical stable continue current regimen follow-up with case management regarding peer to peer. Case management also talk to family is unlikely patient will be approved. Body mass index is 26.95 kg/m². Estimated discharge date: 2022  Barriers: Placement    Code status: Full code  Prophylaxis: Lovenox  Recommended Disposition: Home with services family requesting for [id number] for placement likely SNF     Subjective:     Chief Complaint / Reason for Physician Visit    Discussed with RN events overnight. Patient seen and examined at bedside comfortable no fever no chills no nausea no vomiting. Review of Systems:  Symptom Y/N Comments  Symptom Y/N Comments   Fever/Chills    Chest Pain     Poor Appetite    Edema     Cough    Abdominal Pain     Sputum    Joint Pain     SOB/DE JESUS    Pruritis/Rash     Nausea/vomit    Tolerating PT/OT     Diarrhea    Tolerating Diet     Constipation    Other       Could NOT obtain due to:      Objective:     VITALS:   Last 24hrs VS reviewed since prior progress note. Most recent are:  Patient Vitals for the past 24 hrs:   Temp Pulse Resp BP SpO2   22 0810 98.3 °F (36.8 °C) 69 16 (!) 160/86 98 %   22 0340 98.2 °F (36.8 °C) 81 18 (!) 142/70 95 %   22 2349 97.9 °F (36.6 °C) 72 18 (!) 149/73 96 %   22 1545 97.7 °F (36.5 °C) 77 17 (!) 148/67 98 %       Intake/Output Summary (Last 24 hours) at 2022 1418  Last data filed at 2022 0810  Gross per 24 hour   Intake 260 ml   Output 700 ml   Net -440 ml        I had a face to face encounter and independently examined this patient on 2022, as outlined below:  PHYSICAL EXAM:  General: WD, WN.  Alert, cooperative, no acute distress    EENT:  EOMI. Anicteric sclerae. MMM  Resp:  CTA bilaterally, no wheezing or rales. No accessory muscle use  CV:  Regular  rhythm,  No edema  GI:  Soft, Non distended, Non tender. +Bowel sounds  Neurologic:  Alert and oriented X 3, normal speech,   Psych:   Good insight. Not anxious nor agitated  Skin:  No rashes. No jaundice    Reviewed most current lab test results and cultures  YES  Reviewed most current radiology test results   YES  Review and summation of old records today    NO  Reviewed patient's current orders and MAR    YES  PMH/SH reviewed - no change compared to H&P  ________________________________________________________________________  Care Plan discussed with:    Comments   Patient     Family      RN     Care Manager     Consultant                        Multidiciplinary team rounds were held today with , nursing, pharmacist and clinical coordinator. Patient's plan of care was discussed; medications were reviewed and discharge planning was addressed. ________________________________________________________________________  Total NON critical care TIME:     Total CRITICAL CARE TIME Spent:   Minutes non procedure based      Comments   >50% of visit spent in counseling and coordination of care     ________________________________________________________________________  Mg Alvarez MD     Procedures: see electronic medical records for all procedures/Xrays and details which were not copied into this note but were reviewed prior to creation of Plan. LABS:  I reviewed today's most current labs and imaging studies. Pertinent labs include:  No results for input(s): WBC, HGB, HCT, PLT, HGBEXT, HCTEXT, PLTEXT in the last 72 hours.   Recent Labs     05/19/22  0354      K 3.5   *   CO2 26   GLU 99   BUN 13   CREA 0.92   CA 8.3*       Signed: Mg Alvarez MD

## 2022-05-21 NOTE — PROGRESS NOTES
CM met with the patient's daughter at bedside to discuss dispo plan. The patient's daughter stated that she has arranged for assisted living placement for the patient and she wants the patient to d/c from the hospital to his [facility]. The patient's daughter provided the contact information for the liaison from the [facility]. The patient's daughter also requested for medical transport to the [facility] tomorrow, 5-22-22. CM spoke with admissions coordinator at [address] [place] [address] (cell phone: [phone]), and she stated that they are willing to accept the patient for admission tomorrow, 5-22-22. She requested that CM fax her a copy of the patient's d/c summary to [phone]. She also requested that the patient be sent with a hard copy of his d/c summary. CM has faxed a copy of the patient's d/c summary. JOSE confirmed that the address to [facility] is [address] [facility], [address] [place] [address]. The facility's , Dorota, can be contacted for report. Her phone number is [phone]. CM contacted [facility] and arranged for d/c transportation for the  tomorrow, 5-22-22, at 11 AM. CM placed [facility] paperwork on the patient's chart. CM contacted [address] [place] [address] and informed them of the patient's new address for home health services. [address] [place] [address] is willing to follow the patient at his [facility].      Frida Dudley 833, 692 [facility]

## 2022-05-22 VITALS
BODY MASS INDEX: 26.8 KG/M2 | RESPIRATION RATE: 16 BRPM | TEMPERATURE: 98.4 F | WEIGHT: 157 LBS | OXYGEN SATURATION: 97 % | DIASTOLIC BLOOD PRESSURE: 69 MMHG | HEIGHT: 64 IN | HEART RATE: 86 BPM | SYSTOLIC BLOOD PRESSURE: 145 MMHG

## 2022-05-22 PROCEDURE — 74011250637 HC RX REV CODE- 250/637: Performed by: PSYCHIATRY & NEUROLOGY

## 2022-05-22 PROCEDURE — 74011000250 HC RX REV CODE- 250: Performed by: INTERNAL MEDICINE

## 2022-05-22 PROCEDURE — 74011250636 HC RX REV CODE- 250/636: Performed by: INTERNAL MEDICINE

## 2022-05-22 PROCEDURE — 74011250637 HC RX REV CODE- 250/637: Performed by: INTERNAL MEDICINE

## 2022-05-22 RX ADMIN — PANTOPRAZOLE SODIUM 40 MG: 40 TABLET, DELAYED RELEASE ORAL at 06:20

## 2022-05-22 RX ADMIN — SODIUM CHLORIDE, PRESERVATIVE FREE 10 ML: 5 INJECTION INTRAVENOUS at 06:20

## 2022-05-22 RX ADMIN — ASPIRIN 81 MG: 81 TABLET, COATED ORAL at 10:23

## 2022-05-22 RX ADMIN — TAMSULOSIN HYDROCHLORIDE 0.4 MG: 0.4 CAPSULE ORAL at 10:23

## 2022-05-22 RX ADMIN — FLUTICASONE PROPIONATE 2 SPRAY: 50 SPRAY, METERED NASAL at 10:23

## 2022-05-22 RX ADMIN — MEMANTINE HYDROCHLORIDE 5 MG: 5 TABLET ORAL at 10:23

## 2022-05-22 RX ADMIN — Medication 1 CAPSULE: at 10:23

## 2022-05-22 RX ADMIN — ENOXAPARIN SODIUM 40 MG: 100 INJECTION SUBCUTANEOUS at 10:23

## 2022-05-22 NOTE — PROGRESS NOTES
The patient is being discharged today, 5-22-22, to [facility]. CM met with the patient's daughter at bedside and the patient's daughter signed the patient's 2nd IM letter as the patient has dementia and is unable to sign. CM confirmed with [facility] that the patient's transportation is scheduled for 11 AM. CM also provided the patient's daughter with a copy of the patient's d/c summary to take to the [facility]. From CM perspective, the patient can be discharged.      Frida Dudley 798, 809 [facility]

## 2022-05-22 NOTE — PROGRESS NOTES
[id number] report to [facility], spoke to Gokul carrillo for report on Pt. Family in room and discharge paperwork reviewed and signed. Copies of Discharge paperwork to go to ility signed and with [facility] paperwork to go to facilty with Pt. Pt IV removed and Pt dressed in street clothes. Currently awaiting transport from [facility]. [address] [facility] arrival to  Pt for transport. Pt leaving unit with all possessions via stretcher.

## 2022-05-22 NOTE — PROGRESS NOTES
End of Shift Note    Bedside shift change report given to Evette Parsons (oncoming nurse) by Randee Aviles (offgoing nurse).   Report included the following information SBAR, Kardex, Intake/Output, MAR and Recent Results    Shift worked:  [id number]     Shift summary and any significant changes:     No significant changes, pt anticipating discharge this AM.     Concerns for physician to address:  none     Zone phone for oncoming shift:            Randee Aviles

## 2022-08-03 ENCOUNTER — TELEPHONE (OUTPATIENT)
Dept: HEMATOLOGY | Age: 82
End: 2022-08-03

## 2022-08-03 NOTE — TELEPHONE ENCOUNTER
Faxed per request last OV and labs to [facility].  Patient has a CRULLS procedure scheduled with Dr Suly Dong 8/9/22

## 2022-10-19 ENCOUNTER — HOSPITAL ENCOUNTER (INPATIENT)
Age: 82
LOS: 15 days | Discharge: NURSING, REHAB OR LONG-TERM CARE | DRG: 184 | End: 2022-11-03
Attending: STUDENT IN AN ORGANIZED HEALTH CARE EDUCATION/TRAINING PROGRAM | Admitting: HOSPITALIST
Payer: MEDICARE

## 2022-10-19 ENCOUNTER — APPOINTMENT (OUTPATIENT)
Dept: CT IMAGING | Age: 82
DRG: 184 | End: 2022-10-19
Attending: STUDENT IN AN ORGANIZED HEALTH CARE EDUCATION/TRAINING PROGRAM
Payer: MEDICARE

## 2022-10-19 DIAGNOSIS — J90 PLEURAL EFFUSION: ICD-10-CM

## 2022-10-19 DIAGNOSIS — S22.43XA CLOSED FRACTURE OF MULTIPLE RIBS OF BOTH SIDES, INITIAL ENCOUNTER: ICD-10-CM

## 2022-10-19 DIAGNOSIS — S22.42XA CLOSED FRACTURE OF MULTIPLE RIBS OF LEFT SIDE, INITIAL ENCOUNTER: Primary | ICD-10-CM

## 2022-10-19 PROBLEM — S22.49XA RIBS, MULTIPLE FRACTURES: Status: ACTIVE | Noted: 2022-10-19

## 2022-10-19 LAB
ALBUMIN SERPL-MCNC: 2.9 G/DL (ref 3.5–5)
ALBUMIN/GLOB SERPL: 0.6 {RATIO} (ref 1.1–2.2)
ALP SERPL-CCNC: 193 U/L (ref 45–117)
ALT SERPL-CCNC: 46 U/L (ref 12–78)
ANION GAP SERPL CALC-SCNC: 7 MMOL/L (ref 5–15)
AST SERPL-CCNC: 34 U/L (ref 15–37)
BASOPHILS # BLD: 0 K/UL (ref 0–0.1)
BASOPHILS NFR BLD: 1 % (ref 0–1)
BILIRUB SERPL-MCNC: 0.8 MG/DL (ref 0.2–1)
BUN SERPL-MCNC: 41 MG/DL (ref 6–20)
BUN/CREAT SERPL: 29 (ref 12–20)
CALCIUM SERPL-MCNC: 8.6 MG/DL (ref 8.5–10.1)
CHLORIDE SERPL-SCNC: 108 MMOL/L (ref 97–108)
CK SERPL-CCNC: 104 U/L (ref 39–308)
CO2 SERPL-SCNC: 25 MMOL/L (ref 21–32)
CREAT SERPL-MCNC: 1.41 MG/DL (ref 0.7–1.3)
DIFFERENTIAL METHOD BLD: ABNORMAL
EOSINOPHIL # BLD: 0 K/UL (ref 0–0.4)
EOSINOPHIL NFR BLD: 0 % (ref 0–7)
ERYTHROCYTE [DISTWIDTH] IN BLOOD BY AUTOMATED COUNT: 15.2 % (ref 11.5–14.5)
GLOBULIN SER CALC-MCNC: 4.8 G/DL (ref 2–4)
GLUCOSE SERPL-MCNC: 129 MG/DL (ref 65–100)
HCT VFR BLD AUTO: 30.4 % (ref 36.6–50.3)
HGB BLD-MCNC: 9.9 G/DL (ref 12.1–17)
IMM GRANULOCYTES # BLD AUTO: 0 K/UL (ref 0–0.04)
IMM GRANULOCYTES NFR BLD AUTO: 0 % (ref 0–0.5)
LYMPHOCYTES # BLD: 1.3 K/UL (ref 0.8–3.5)
LYMPHOCYTES NFR BLD: 15 % (ref 12–49)
MCH RBC QN AUTO: 28.7 PG (ref 26–34)
MCHC RBC AUTO-ENTMCNC: 32.6 G/DL (ref 30–36.5)
MCV RBC AUTO: 88.1 FL (ref 80–99)
MONOCYTES # BLD: 1.2 K/UL (ref 0–1)
MONOCYTES NFR BLD: 14 % (ref 5–13)
NEUTS SEG # BLD: 6.1 K/UL (ref 1.8–8)
NEUTS SEG NFR BLD: 70 % (ref 32–75)
NRBC # BLD: 0 K/UL (ref 0–0.01)
NRBC BLD-RTO: 0 PER 100 WBC
PLATELET # BLD AUTO: 154 K/UL (ref 150–400)
PMV BLD AUTO: 11.2 FL (ref 8.9–12.9)
POTASSIUM SERPL-SCNC: 4.3 MMOL/L (ref 3.5–5.1)
PROT SERPL-MCNC: 7.7 G/DL (ref 6.4–8.2)
RBC # BLD AUTO: 3.45 M/UL (ref 4.1–5.7)
SODIUM SERPL-SCNC: 140 MMOL/L (ref 136–145)
WBC # BLD AUTO: 8.8 K/UL (ref 4.1–11.1)

## 2022-10-19 PROCEDURE — 74177 CT ABD & PELVIS W/CONTRAST: CPT

## 2022-10-19 PROCEDURE — 74011250637 HC RX REV CODE- 250/637: Performed by: HOSPITALIST

## 2022-10-19 PROCEDURE — 72125 CT NECK SPINE W/O DYE: CPT

## 2022-10-19 PROCEDURE — 71260 CT THORAX DX C+: CPT

## 2022-10-19 PROCEDURE — 74011250636 HC RX REV CODE- 250/636: Performed by: HOSPITALIST

## 2022-10-19 PROCEDURE — 82550 ASSAY OF CK (CPK): CPT

## 2022-10-19 PROCEDURE — 99285 EMERGENCY DEPT VISIT HI MDM: CPT

## 2022-10-19 PROCEDURE — 74011000250 HC RX REV CODE- 250: Performed by: STUDENT IN AN ORGANIZED HEALTH CARE EDUCATION/TRAINING PROGRAM

## 2022-10-19 PROCEDURE — 93005 ELECTROCARDIOGRAM TRACING: CPT

## 2022-10-19 PROCEDURE — 74011250637 HC RX REV CODE- 250/637: Performed by: STUDENT IN AN ORGANIZED HEALTH CARE EDUCATION/TRAINING PROGRAM

## 2022-10-19 PROCEDURE — 80053 COMPREHEN METABOLIC PANEL: CPT

## 2022-10-19 PROCEDURE — 74011250636 HC RX REV CODE- 250/636: Performed by: STUDENT IN AN ORGANIZED HEALTH CARE EDUCATION/TRAINING PROGRAM

## 2022-10-19 PROCEDURE — 85025 COMPLETE CBC W/AUTO DIFF WBC: CPT

## 2022-10-19 PROCEDURE — 74011000636 HC RX REV CODE- 636: Performed by: STUDENT IN AN ORGANIZED HEALTH CARE EDUCATION/TRAINING PROGRAM

## 2022-10-19 PROCEDURE — 36415 COLL VENOUS BLD VENIPUNCTURE: CPT

## 2022-10-19 PROCEDURE — 70450 CT HEAD/BRAIN W/O DYE: CPT

## 2022-10-19 PROCEDURE — 65270000029 HC RM PRIVATE

## 2022-10-19 RX ORDER — PANTOPRAZOLE SODIUM 40 MG/1
40 TABLET, DELAYED RELEASE ORAL
Status: DISCONTINUED | OUTPATIENT
Start: 2022-10-19 | End: 2022-11-03 | Stop reason: HOSPADM

## 2022-10-19 RX ORDER — OXYCODONE HYDROCHLORIDE 5 MG/1
2.5 TABLET ORAL
Status: COMPLETED | OUTPATIENT
Start: 2022-10-19 | End: 2022-10-28

## 2022-10-19 RX ORDER — TAMSULOSIN HYDROCHLORIDE 0.4 MG/1
0.4 CAPSULE ORAL DAILY
Status: DISCONTINUED | OUTPATIENT
Start: 2022-10-20 | End: 2022-11-03 | Stop reason: HOSPADM

## 2022-10-19 RX ORDER — ONDANSETRON 2 MG/ML
4 INJECTION INTRAMUSCULAR; INTRAVENOUS
Status: DISCONTINUED | OUTPATIENT
Start: 2022-10-19 | End: 2022-11-03 | Stop reason: HOSPADM

## 2022-10-19 RX ORDER — ACETAMINOPHEN 325 MG/1
650 TABLET ORAL
Status: DISCONTINUED | OUTPATIENT
Start: 2022-10-19 | End: 2022-10-19

## 2022-10-19 RX ORDER — ACETAMINOPHEN 325 MG/1
650 TABLET ORAL EVERY 6 HOURS
Status: DISCONTINUED | OUTPATIENT
Start: 2022-10-20 | End: 2022-11-03 | Stop reason: HOSPADM

## 2022-10-19 RX ORDER — FLUTICASONE PROPIONATE 50 MCG
2 SPRAY, SUSPENSION (ML) NASAL DAILY
Status: DISCONTINUED | OUTPATIENT
Start: 2022-10-20 | End: 2022-11-03 | Stop reason: HOSPADM

## 2022-10-19 RX ORDER — ESCITALOPRAM OXALATE 10 MG/1
5 TABLET ORAL DAILY
Status: DISCONTINUED | OUTPATIENT
Start: 2022-10-20 | End: 2022-11-03 | Stop reason: HOSPADM

## 2022-10-19 RX ORDER — CARVEDILOL 12.5 MG/1
25 TABLET ORAL 2 TIMES DAILY WITH MEALS
Status: DISCONTINUED | OUTPATIENT
Start: 2022-10-20 | End: 2022-11-03 | Stop reason: HOSPADM

## 2022-10-19 RX ORDER — TAMSULOSIN HYDROCHLORIDE 0.4 MG/1
0.4 CAPSULE ORAL DAILY
COMMUNITY

## 2022-10-19 RX ORDER — KETOROLAC TROMETHAMINE 30 MG/ML
15 INJECTION, SOLUTION INTRAMUSCULAR; INTRAVENOUS
Status: COMPLETED | OUTPATIENT
Start: 2022-10-19 | End: 2022-10-19

## 2022-10-19 RX ORDER — OXYCODONE HYDROCHLORIDE 5 MG/1
5 TABLET ORAL
Status: DISCONTINUED | OUTPATIENT
Start: 2022-10-19 | End: 2022-11-03 | Stop reason: HOSPADM

## 2022-10-19 RX ORDER — SODIUM CHLORIDE 9 MG/ML
50 INJECTION, SOLUTION INTRAVENOUS CONTINUOUS
Status: DISCONTINUED | OUTPATIENT
Start: 2022-10-19 | End: 2022-10-20

## 2022-10-19 RX ORDER — ACETAMINOPHEN 325 MG/1
975 TABLET ORAL
Status: COMPLETED | OUTPATIENT
Start: 2022-10-19 | End: 2022-10-19

## 2022-10-19 RX ORDER — MEMANTINE HYDROCHLORIDE 5 MG/1
5 TABLET ORAL 2 TIMES DAILY
Status: DISCONTINUED | OUTPATIENT
Start: 2022-10-19 | End: 2022-11-03 | Stop reason: HOSPADM

## 2022-10-19 RX ORDER — LIDOCAINE 4 G/100G
1 PATCH TOPICAL EVERY 24 HOURS
Status: DISCONTINUED | OUTPATIENT
Start: 2022-10-19 | End: 2022-11-03 | Stop reason: HOSPADM

## 2022-10-19 RX ADMIN — IOPAMIDOL 100 ML: 755 INJECTION, SOLUTION INTRAVENOUS at 15:52

## 2022-10-19 RX ADMIN — SODIUM CHLORIDE 1000 ML: 9 INJECTION, SOLUTION INTRAVENOUS at 15:09

## 2022-10-19 RX ADMIN — MEMANTINE 5 MG: 5 TABLET ORAL at 21:36

## 2022-10-19 RX ADMIN — SODIUM CHLORIDE 50 ML/HR: 9 INJECTION, SOLUTION INTRAVENOUS at 21:35

## 2022-10-19 RX ADMIN — ACETAMINOPHEN 975 MG: 325 TABLET ORAL at 16:59

## 2022-10-19 RX ADMIN — KETOROLAC TROMETHAMINE 15 MG: 30 INJECTION, SOLUTION INTRAMUSCULAR at 16:58

## 2022-10-19 RX ADMIN — PANTOPRAZOLE SODIUM 40 MG: 40 TABLET, DELAYED RELEASE ORAL at 21:36

## 2022-10-19 NOTE — ED PROVIDER NOTES
EMERGENCY DEPARTMENT HISTORY AND PHYSICAL EXAM      Date: 10/19/2022  Patient Name: Rush Harris    History of Presenting Illness     Chief Complaint   Patient presents with    Abdominal Pain     Arrives by rescue has not passed urine in unknown amount of time. Pt is coming from [facility]. Bgl 150; rigid abdomen. History Provided By: Patient    HPI: Rush Harris, 81 y.o. male with a past medical history significant for medical problems as stated below presents to the ED with cc of left-sided flank pain. He lives at a assisted living facility and when he was seen by his family today he was noted to have some bruising on his left side. He reports pain in this area. When talking with the assisted living facility he was found on the ground yesterday. He had his fall was unwitnessed. He reports that he hit his left side, but did not hit his head or his neck. He does report a pain here, but he does not have any other complaints. His daughter reports that he has been urinating, and having normal bowel movements today. There are no associated symptoms. No other exacerbating or ameliorating factors. PCP: Halie Pizarro MD    No current facility-administered medications on file prior to encounter. Current Outpatient Medications on File Prior to Encounter   Medication Sig Dispense Refill    tamsulosin (Flomax) 0.4 mg capsule Take 0.4 mg by mouth daily. fluticasone propionate (FLONASE) 50 mcg/actuation nasal spray 2 Sprays by Both Nostrils route daily. 1 Each 0    pantoprazole (PROTONIX) 40 mg tablet Take 1 Tablet by mouth ACB/HS. 90 Tablet 1    L.acid,para-B. bifidum-S.therm (RISAQUAD) 8 billion cell cap cap Take 1 Capsule by mouth daily. 5 Capsule 0    memantine (NAMENDA) 5 mg tablet Take 1 Tablet by mouth two (2) times a day. 10 Tablet 0    escitalopram oxalate (LEXAPRO) 5 mg tablet Take 5 mg by mouth daily. aspirin delayed-release 81 mg tablet Take 81 mg by mouth daily.       carvediloL (COREG) 12.5 mg tablet Take 2 Tabs by mouth two (2) times daily (with meals). 180 Tab 1       Past History     Past Medical History:  Past Medical History:   Diagnosis Date    Arthritis     left arm    Autoimmune hepatitis ([address]     Bleeding ulcer 07/2019    BPH (benign prostatic hyperplasia)     CAD (coronary artery disease)     s/p stent    Cancer (HCC)     penile squamous carcinoma    Chronic kidney disease     stage 2     Chronic obstructive pulmonary disease (HCC)     Dementia (HCC)     Elevated LFT's     Essential hypertension 9/24/01    GERD (gastroesophageal reflux disease)     hiatal hernia    Ill-defined condition 12/02/2016    faint    Nephrosclerosis     Orthostatic hypotension 9/24/01    PVC's 9/24/01    Sleep apnea     no CPAP    Syncope 9/24/01    secondary to venous insuffiency        Past Surgical History:  Past Surgical History:   Procedure Laterality Date    COLONOSCOPY N/A 12/13/2016    COLONOSCOPY performed by Vanessa Muro MD at [facility] ENDOSCOPY    COLONOSCOPY N/A 12/11/2019    COLONOSCOPY performed by Yaneli Mcguire MD at [address]; HI RISK IND  12/11/2019         ECHO 2D ADULT  5/24/12    EF 60 - 65%; mild concentric hypertrophy; pulmonary systolic artery pressure upper limits normal    HX ABDOMINAL WALL DEFECT REPAIR  07/01/2019d     Exploratory laparotomy, segmental sigmoid colon resection and primary stapled anastomosis.     HX APPENDECTOMY  1985    HX HEENT  02/18/2020    Left temporal artery biopsy    HX HERNIA REPAIR  08/29/2018    Davinci hiatal hernia repair- Sharmin Mares MD    HX OTHER SURGICAL  08/2020    penile lesion bx    [place] CARDIAC SURG PROCEDURE UNLIST  05/13/2019    1 stent    UPPER GI ENDOSCOPY,BIOPSY  12/11/2019         UPPER GI ENDOSCOPY,DIAGNOSIS  7/18/2019            Family History:  Family History   Problem Relation Age of Onset    Stroke Mother     Stroke Father     Heart Disease Father        Social History:  Social History Tobacco Use    Smoking status: Former     Packs/day: 3.50     Types: Cigarettes     Quit date: 1980     Years since quittin.8    Smokeless tobacco: Never   Vaping Use    Vaping Use: Never used   Substance Use Topics    Alcohol use: Not Currently     Comment: no alcohol since     Drug use: No       Allergies: Allergies   Allergen Reactions    Ace Inhibitors Other (comments)     Doesn't agree with pt    Demerol [Meperidine] Unknown (comments)     Causes unconsciousness         Review of Systems   Review of Systems   Constitutional:  Negative for appetite change. HENT:  Negative for sore throat. Eyes:  Negative for visual disturbance. Respiratory:  Negative for cough and shortness of breath. Cardiovascular:  Negative for chest pain. Gastrointestinal:  Negative for abdominal pain, diarrhea, nausea and vomiting. Genitourinary:  Negative for difficulty urinating. Musculoskeletal:  Negative for myalgias. Left flank pain   Skin:  Negative for color change. Bruising   Neurological:  Negative for dizziness and headaches. Psychiatric/Behavioral:  Negative for agitation. Physical Exam   Physical Exam  Constitutional:       Appearance: Normal appearance. HENT:      Head: Normocephalic and atraumatic. Mouth/Throat:      Comments: Dry  Eyes:      Conjunctiva/sclera: Conjunctivae normal.      Pupils: Pupils are equal, round, and reactive to light. Cardiovascular:      Rate and Rhythm: Normal rate and regular rhythm. Pulmonary:      Effort: Pulmonary effort is normal.      Breath sounds: Normal breath sounds. Abdominal:      General: Abdomen is flat. There is no distension. Palpations: Abdomen is soft. Tenderness: There is no abdominal tenderness. There is no guarding or rebound. Genitourinary:     Comments: Bruising and tenderness to palpation over left flank and ribs  Musculoskeletal:         General: No swelling. Normal range of motion. Cervical back: Normal range of motion. Skin:     General: Skin is warm and dry. Capillary Refill: Capillary refill takes less than 2 seconds. Neurological:      General: No focal deficit present. Mental Status: He is alert. Psychiatric:         Mood and Affect: Mood normal.       Diagnostic Study Results     Labs -     Recent Results (from the past 24 hour(s))   CBC WITH AUTOMATED DIFF    Collection Time: 10/19/22  2:24 PM   Result Value Ref Range    WBC 8.8 4.1 - 11.1 K/uL    RBC 3.45 (L) 4.10 - 5.70 M/uL    HGB 9.9 (L) 12.1 - 17.0 g/dL    HCT 30.4 (L) 36.6 - 50.3 %    MCV 88.1 80.0 - 99.0 FL    MCH 28.7 26.0 - 34.0 PG    MCHC 32.6 30.0 - 36.5 g/dL    RDW 15.2 (H) 11.5 - 14.5 %    PLATELET 739 376 - 681 K/uL    MPV 11.2 8.9 - 12.9 FL    NRBC 0.0 0  WBC    ABSOLUTE NRBC 0.00 0.00 - 0.01 K/uL    NEUTROPHILS 70 32 - 75 %    LYMPHOCYTES 15 12 - 49 %    MONOCYTES 14 (H) 5 - 13 %    EOSINOPHILS 0 0 - 7 %    BASOPHILS 1 0 - 1 %    IMMATURE GRANULOCYTES 0 0.0 - 0.5 %    ABS. NEUTROPHILS 6.1 1.8 - 8.0 K/UL    ABS. LYMPHOCYTES 1.3 0.8 - 3.5 K/UL    ABS. MONOCYTES 1.2 (H) 0.0 - 1.0 K/UL    ABS. EOSINOPHILS 0.0 0.0 - 0.4 K/UL    ABS. BASOPHILS 0.0 0.0 - 0.1 K/UL    ABS. IMM. GRANS. 0.0 0.00 - 0.04 K/UL    DF AUTOMATED     METABOLIC PANEL, COMPREHENSIVE    Collection Time: 10/19/22  2:24 PM   Result Value Ref Range    Sodium 140 136 - 145 mmol/L    Potassium 4.3 3.5 - 5.1 mmol/L    Chloride 108 97 - 108 mmol/L    CO2 25 21 - 32 mmol/L    Anion gap 7 5 - 15 mmol/L    Glucose 129 (H) 65 - 100 mg/dL    BUN 41 (H) 6 - 20 MG/DL    Creatinine 1.41 (H) 0.70 - 1.30 MG/DL    BUN/Creatinine ratio 29 (H) 12 - 20      eGFR 50 (L) >60 ml/min/1.73m2    Calcium 8.6 8.5 - 10.1 MG/DL    Bilirubin, total 0.8 0.2 - 1.0 MG/DL    ALT (SGPT) 46 12 - 78 U/L    AST (SGOT) 34 15 - 37 U/L    Alk.  phosphatase 193 (H) 45 - 117 U/L    Protein, total 7.7 6.4 - 8.2 g/dL    Albumin 2.9 (L) 3.5 - 5.0 g/dL    Globulin 4.8 (H) 2.0 - 4.0 g/dL    A-G Ratio 0.6 (L) 1.1 - 2.2     CK    Collection Time: 10/19/22  2:24 PM   Result Value Ref Range     39 - 308 U/L   EKG, 12 LEAD, INITIAL    Collection Time: 10/19/22  5:18 PM   Result Value Ref Range    Ventricular Rate 78 BPM    Atrial Rate 78 BPM    P-R Interval 172 ms    QRS Duration 60 ms    Q-T Interval 386 ms    QTC Calculation (Bezet) 440 ms    Calculated P Axis 109 degrees    Calculated R Axis 44 degrees    Calculated T Axis 47 degrees    Diagnosis       Normal sinus rhythm  ST elevation, consider early repolarization, pericarditis, or injury  Nonspecific ST and T wave abnormality  When compared with ECG of 12-MAY-2022 20:03,  Minimal criteria for Inferior infarct are no longer present  ST elevation has replaced ST depression in Inferior leads  ST now depressed in Anterior leads  ST more elevated in Lateral leads  Nonspecific T wave abnormality no longer evident in Inferior leads  QT has shortened         Radiologic Studies -   CT ABD PELV W CONT   Final Result      1. Acute, minimally displaced fractures of the LEFT fifth through ninth ribs. There are segmental fractures of the LEFT sixth through eighth ribs. 2. There is a large left-sided pleural effusion with associated LEFT lower lobe   atelectasis. There is also a small LEFT chest subpleural hematoma. 3. No pneumothorax. 4. No acute, traumatic findings in the abdomen or pelvis. CT CHEST W CONT   Final Result      1. Acute, minimally displaced fractures of the LEFT fifth through ninth ribs. There are segmental fractures of the LEFT sixth through eighth ribs. 2. There is a large left-sided pleural effusion with associated LEFT lower lobe   atelectasis. There is also a small LEFT chest subpleural hematoma. 3. No pneumothorax. 4. No acute, traumatic findings in the abdomen or pelvis.       CT HEAD WO CONT    (Results Pending)   CT SPINE CERV WO CONT    (Results Pending)     CT Results  (Last 48 hours) 10/19/22 1556  CT ABD PELV W CONT Final result    Impression:      1. Acute, minimally displaced fractures of the LEFT fifth through ninth ribs. There are segmental fractures of the LEFT sixth through eighth ribs. 2. There is a large left-sided pleural effusion with associated LEFT lower lobe   atelectasis. There is also a small LEFT chest subpleural hematoma. 3. No pneumothorax. 4. No acute, traumatic findings in the abdomen or pelvis. Narrative:  EXAM: CT CHEST W CONT, CT ABD PELV W CONT       INDICATION: eval left sided rib fracturs       COMPARISON: CT chest arch 23rd 2016. Chest radiograph May 12, 2022       IV CONTRAST: 100 mL of Isovue-370. ORAL CONTRAST: None       TECHNIQUE:    Following the uneventful intravenous administration of contrast, thin axial   images were obtained through the chest, abdomen and pelvis. Coronal and sagittal   reformats were generated. CT dose reduction was achieved through use of a   standardized protocol tailored for this examination and automatic exposure   control for dose modulation. FINDINGS:        CHEST WALL: No mass or axillary lymphadenopathy. THYROID: No nodule. MEDIASTINUM: No mass or lymphadenopathy. There is no pericardial effusion. EV: No mass or lymphadenopathy. THORACIC AORTA: No dissection or aneurysm. MAIN PULMONARY ARTERY: Normal in caliber. TRACHEA/BRONCHI: Patent. ESOPHAGUS: No wall thickening or dilatation. HEART: Normal in size. Coronary vessel atheromatous calcifications are noted. PLEURA: There is a large left-sided pleural effusion with associated   atelectasis. LUNGS: There is LEFT lower lobe volume loss and mild LEFT upper lobe   atelectasis. No lung infiltrate is seen. There is mild RIGHT lower lobe   atelectasis. LIVER: No mass. There is a nodular liver capsule. BILIARY TREE: There is a calcified gallstone. No evidence of acute   cholecystitis. CBD is not dilated. SPLEEN: within normal limits. PANCREAS: No mass or ductal dilatation. ADRENALS: Unremarkable. KIDNEYS: No mass, calculus, or hydronephrosis. STOMACH: Unremarkable. SMALL BOWEL: No dilatation or wall thickening. COLON: Status post partial resection of the RIGHT colon with ileocolic   anastomosis. APPENDIX: Resected   PERITONEUM: No ascites or pneumoperitoneum. RETROPERITONEUM: No lymphadenopathy or aortic aneurysm. REPRODUCTIVE ORGANS: Normal size prostate gland. Scrotal hydroceles suspected. URINARY BLADDER: No mass or calculus. BONES: There are acute, minimally displaced fractures of the LEFT fifth through   ninth ribs. There are segmental fractures of the LEFT sixth through eighth ribs. There is a small subpleural chest wall hematoma. There is multilevel   degenerative disc disease of the lumbar spine. ABDOMINAL WALL: No mass or hernia. ADDITIONAL COMMENTS: N/A           10/19/22 5072  CT CHEST W CONT Final result    Impression:      1. Acute, minimally displaced fractures of the LEFT fifth through ninth ribs. There are segmental fractures of the LEFT sixth through eighth ribs. 2. There is a large left-sided pleural effusion with associated LEFT lower lobe   atelectasis. There is also a small LEFT chest subpleural hematoma. 3. No pneumothorax. 4. No acute, traumatic findings in the abdomen or pelvis. Narrative:  EXAM: CT CHEST W CONT, CT ABD PELV W CONT       INDICATION: eval left sided rib fracturs       COMPARISON: CT chest arch 23rd 2016. Chest radiograph May 12, 2022       IV CONTRAST: 100 mL of Isovue-370. ORAL CONTRAST: None       TECHNIQUE:    Following the uneventful intravenous administration of contrast, thin axial   images were obtained through the chest, abdomen and pelvis. Coronal and sagittal   reformats were generated.  CT dose reduction was achieved through use of a   standardized protocol tailored for this examination and automatic exposure   control for dose modulation. FINDINGS:        CHEST WALL: No mass or axillary lymphadenopathy. THYROID: No nodule. MEDIASTINUM: No mass or lymphadenopathy. There is no pericardial effusion. EV: No mass or lymphadenopathy. THORACIC AORTA: No dissection or aneurysm. MAIN PULMONARY ARTERY: Normal in caliber. TRACHEA/BRONCHI: Patent. ESOPHAGUS: No wall thickening or dilatation. HEART: Normal in size. Coronary vessel atheromatous calcifications are noted. PLEURA: There is a large left-sided pleural effusion with associated   atelectasis. LUNGS: There is LEFT lower lobe volume loss and mild LEFT upper lobe   atelectasis. No lung infiltrate is seen. There is mild RIGHT lower lobe   atelectasis. LIVER: No mass. There is a nodular liver capsule. BILIARY TREE: There is a calcified gallstone. No evidence of acute   cholecystitis. CBD is not dilated. SPLEEN: within normal limits. PANCREAS: No mass or ductal dilatation. ADRENALS: Unremarkable. KIDNEYS: No mass, calculus, or hydronephrosis. STOMACH: Unremarkable. SMALL BOWEL: No dilatation or wall thickening. COLON: Status post partial resection of the RIGHT colon with ileocolic   anastomosis. APPENDIX: Resected   PERITONEUM: No ascites or pneumoperitoneum. RETROPERITONEUM: No lymphadenopathy or aortic aneurysm. REPRODUCTIVE ORGANS: Normal size prostate gland. Scrotal hydroceles suspected. URINARY BLADDER: No mass or calculus. BONES: There are acute, minimally displaced fractures of the LEFT fifth through   ninth ribs. There are segmental fractures of the LEFT sixth through eighth ribs. There is a small subpleural chest wall hematoma. There is multilevel   degenerative disc disease of the lumbar spine. ABDOMINAL WALL: No mass or hernia. ADDITIONAL COMMENTS: N/A                 CXR Results  (Last 48 hours)      None              Medical Decision Making   I am the first provider for this patient.     I reviewed the vital signs, available nursing notes, past medical history, past surgical history, family history and social history. Vital Signs-Reviewed the patient's vital signs. Patient Vitals for the past 12 hrs:   Temp Pulse Resp BP SpO2   10/19/22 1356 98 °F (36.7 °C) 73 16 (!) 128/55 95 %       Records Reviewed: Nursing records and medical records reviewed    Provider Notes (Medical Decision Making):   Patient presents to the emergency department with pain of his left flank and bruising after unwitnessed fall. Suspect possible rib fractures v contusion based on exam.  Patient with dementia - will obtain CT imaging of chest and abdomen. He reported that he did nto hit his head or lose consciousness, but with dementia will also obtain CT head and neck. Pain control with PO tylenol, IV toradol, and lidocaine patch. No hypoxia or fever to suggest oneumonia. Will check CK since he was found on the ground. ED Course:   Initial assessment performed. The patients presenting problems have been discussed, and they are in agreement with the care plan formulated and outlined with them. I have encouraged them to ask questions as they arise throughout their visit. ED Course as of 10/19/22 1952   Wed Oct 19, 2022   1447 Patient urinated this morning had a bowel movement this morning. With standing he had some pain and new bruise was seen. He lives in a nursing home lives in assisted living facility they report that he was on the ground yesterday so presumed fall. Patient does not take blood thinners other than aspirin. [WB]   1947 Patient found to have multiple left-sided rib fractures with associated left-sided pleural effusion. While patient's still on room air at this point he is at high risk for decompensation with his multiple rib fractures and requires admission to medicine for further monitoring. No need for drainage of his pleural effusion here in the emergency department.   Gave IV medications and PO medications for pain control in the ED, but suspect he will need continued management to control his pain as an inpatient. [WB]      ED Course User Index  [WB] Hank Loyd MD       Medications Administered       iopamidoL (ISOVUE-370) 76 % injection 100 mL       Admin Date  10/19/2022 Action  Given Dose  100 mL Route  IntraVENous Administered By  Alex Romero              sodium chloride 0.9 % bolus infusion 1,000 mL       Admin Date  10/19/2022 Action  New Bag Dose  1,000 mL Route  IntraVENous Administered By  Payam Hernandez RN                  Critical Care:  None      Disposition:  Admission    Diagnosis     Clinical Impression:   1. Closed fracture of multiple ribs of left side, initial encounter    2. Pleural effusion        Attestations:    Jose Gavin MD    Please note that this dictation was completed with SIVI, the computer voice recognition software. Quite often unanticipated grammatical, syntax, homophones, and other interpretive errors are inadvertently transcribed by the computer software. Please disregard these errors. Please excuse any errors that have escaped final proofreading. Thank you.

## 2022-10-20 LAB
ABO + RH BLD: NORMAL
ANION GAP SERPL CALC-SCNC: 7 MMOL/L (ref 5–15)
APPEARANCE UR: ABNORMAL
ATRIAL RATE: 78 BPM
BACTERIA URNS QL MICRO: ABNORMAL /HPF
BILIRUB UR QL: NEGATIVE
BLOOD GROUP ANTIBODIES SERPL: NORMAL
BUN SERPL-MCNC: 34 MG/DL (ref 6–20)
BUN/CREAT SERPL: 34 (ref 12–20)
CALCIUM SERPL-MCNC: 6.5 MG/DL (ref 8.5–10.1)
CALCULATED P AXIS, ECG09: 109 DEGREES
CALCULATED R AXIS, ECG10: 44 DEGREES
CALCULATED T AXIS, ECG11: 47 DEGREES
CHLORIDE SERPL-SCNC: 120 MMOL/L (ref 97–108)
CO2 SERPL-SCNC: 19 MMOL/L (ref 21–32)
COLOR UR: ABNORMAL
CREAT SERPL-MCNC: 1.01 MG/DL (ref 0.7–1.3)
DIAGNOSIS, 93000: NORMAL
EPITH CASTS URNS QL MICRO: ABNORMAL /LPF
ERYTHROCYTE [DISTWIDTH] IN BLOOD BY AUTOMATED COUNT: 15.4 % (ref 11.5–14.5)
GLUCOSE SERPL-MCNC: 76 MG/DL (ref 65–100)
GLUCOSE UR STRIP.AUTO-MCNC: NEGATIVE MG/DL
HCT VFR BLD AUTO: 26.6 % (ref 36.6–50.3)
HGB BLD-MCNC: 8.7 G/DL (ref 12.1–17)
HGB UR QL STRIP: ABNORMAL
KETONES UR QL STRIP.AUTO: NEGATIVE MG/DL
LEUKOCYTE ESTERASE UR QL STRIP.AUTO: ABNORMAL
MAGNESIUM SERPL-MCNC: 1.6 MG/DL (ref 1.6–2.4)
MCH RBC QN AUTO: 29.4 PG (ref 26–34)
MCHC RBC AUTO-ENTMCNC: 32.7 G/DL (ref 30–36.5)
MCV RBC AUTO: 89.9 FL (ref 80–99)
NITRITE UR QL STRIP.AUTO: POSITIVE
NRBC # BLD: 0 K/UL (ref 0–0.01)
NRBC BLD-RTO: 0 PER 100 WBC
P-R INTERVAL, ECG05: 172 MS
PH UR STRIP: 6.5 [PH] (ref 5–8)
PHOSPHATE SERPL-MCNC: 2.4 MG/DL (ref 2.6–4.7)
PLATELET # BLD AUTO: 136 K/UL (ref 150–400)
PMV BLD AUTO: 11.2 FL (ref 8.9–12.9)
POTASSIUM SERPL-SCNC: 2.9 MMOL/L (ref 3.5–5.1)
PROT UR STRIP-MCNC: ABNORMAL MG/DL
Q-T INTERVAL, ECG07: 386 MS
QRS DURATION, ECG06: 60 MS
QTC CALCULATION (BEZET), ECG08: 440 MS
RBC # BLD AUTO: 2.96 M/UL (ref 4.1–5.7)
RBC #/AREA URNS HPF: ABNORMAL /HPF (ref 0–5)
SODIUM SERPL-SCNC: 146 MMOL/L (ref 136–145)
SP GR UR REFRACTOMETRY: 1.01 (ref 1–1.03)
SPECIMEN EXP DATE BLD: NORMAL
UA: UC IF INDICATED,UAUC: ABNORMAL
UROBILINOGEN UR QL STRIP.AUTO: 0.2 EU/DL (ref 0.2–1)
VENTRICULAR RATE, ECG03: 78 BPM
WBC # BLD AUTO: 5.7 K/UL (ref 4.1–11.1)
WBC URNS QL MICRO: ABNORMAL /HPF (ref 0–4)

## 2022-10-20 PROCEDURE — 83735 ASSAY OF MAGNESIUM: CPT

## 2022-10-20 PROCEDURE — 97530 THERAPEUTIC ACTIVITIES: CPT

## 2022-10-20 PROCEDURE — 97535 SELF CARE MNGMENT TRAINING: CPT | Performed by: OCCUPATIONAL THERAPIST

## 2022-10-20 PROCEDURE — 74011000258 HC RX REV CODE- 258: Performed by: INTERNAL MEDICINE

## 2022-10-20 PROCEDURE — 74011250637 HC RX REV CODE- 250/637: Performed by: HOSPITALIST

## 2022-10-20 PROCEDURE — 74011000250 HC RX REV CODE- 250: Performed by: STUDENT IN AN ORGANIZED HEALTH CARE EDUCATION/TRAINING PROGRAM

## 2022-10-20 PROCEDURE — 86900 BLOOD TYPING SEROLOGIC ABO: CPT

## 2022-10-20 PROCEDURE — 36415 COLL VENOUS BLD VENIPUNCTURE: CPT

## 2022-10-20 PROCEDURE — 80048 BASIC METABOLIC PNL TOTAL CA: CPT

## 2022-10-20 PROCEDURE — 84100 ASSAY OF PHOSPHORUS: CPT

## 2022-10-20 PROCEDURE — 74011250636 HC RX REV CODE- 250/636: Performed by: INTERNAL MEDICINE

## 2022-10-20 PROCEDURE — 85027 COMPLETE CBC AUTOMATED: CPT

## 2022-10-20 PROCEDURE — 74011250637 HC RX REV CODE- 250/637: Performed by: STUDENT IN AN ORGANIZED HEALTH CARE EDUCATION/TRAINING PROGRAM

## 2022-10-20 PROCEDURE — 81001 URINALYSIS AUTO W/SCOPE: CPT

## 2022-10-20 PROCEDURE — 97165 OT EVAL LOW COMPLEX 30 MIN: CPT | Performed by: OCCUPATIONAL THERAPIST

## 2022-10-20 PROCEDURE — 65270000029 HC RM PRIVATE

## 2022-10-20 PROCEDURE — 97161 PT EVAL LOW COMPLEX 20 MIN: CPT

## 2022-10-20 RX ORDER — SODIUM CHLORIDE AND POTASSIUM CHLORIDE 150; 450 MG/100ML; MG/100ML
INJECTION, SOLUTION INTRAVENOUS CONTINUOUS
Status: DISCONTINUED | OUTPATIENT
Start: 2022-10-20 | End: 2022-10-26

## 2022-10-20 RX ADMIN — PANTOPRAZOLE SODIUM 40 MG: 40 TABLET, DELAYED RELEASE ORAL at 06:41

## 2022-10-20 RX ADMIN — SODIUM CHLORIDE 1 G: 900 INJECTION INTRAVENOUS at 09:07

## 2022-10-20 RX ADMIN — ACETAMINOPHEN 650 MG: 325 TABLET ORAL at 06:41

## 2022-10-20 RX ADMIN — CARVEDILOL 25 MG: 12.5 TABLET, FILM COATED ORAL at 16:40

## 2022-10-20 RX ADMIN — FLUTICASONE PROPIONATE 2 SPRAY: 50 SPRAY, METERED NASAL at 09:12

## 2022-10-20 RX ADMIN — CARVEDILOL 25 MG: 12.5 TABLET, FILM COATED ORAL at 09:16

## 2022-10-20 RX ADMIN — POTASSIUM CHLORIDE AND SODIUM CHLORIDE: 450; 150 INJECTION, SOLUTION INTRAVENOUS at 09:11

## 2022-10-20 RX ADMIN — ESCITALOPRAM OXALATE 5 MG: 10 TABLET ORAL at 09:17

## 2022-10-20 RX ADMIN — ACETAMINOPHEN 650 MG: 325 TABLET ORAL at 17:26

## 2022-10-20 RX ADMIN — MEMANTINE 5 MG: 5 TABLET ORAL at 09:12

## 2022-10-20 RX ADMIN — MEMANTINE 5 MG: 5 TABLET ORAL at 17:26

## 2022-10-20 RX ADMIN — OXYCODONE 5 MG: 5 TABLET ORAL at 16:39

## 2022-10-20 RX ADMIN — OXYCODONE 2.5 MG: 5 TABLET ORAL at 09:24

## 2022-10-20 RX ADMIN — TAMSULOSIN HYDROCHLORIDE 0.4 MG: 0.4 CAPSULE ORAL at 09:12

## 2022-10-20 NOTE — PROGRESS NOTES
End of Shift Note    Bedside shift change report given to [address] [place] [address] (oncoming nurse) by Lb Simmons RN (offgoing nurse).   Report included the following information SBAR, Kardex, MAR, and Recent Results    Shift worked:  Days   Shift summary and any significant changes:     New Admission       Concerns for physician to address:  none   Zone phone for oncoming shift:   [phone]     Patient Information  Demetrius Pedraza  81 y.o.  10/19/2022  2:00 PM by Krystina Joy MD. Demetrius Pedraza was admitted from Assisted Living    Problem List  Patient Active Problem List    Diagnosis Date Noted    Ribs, multiple fractures 10/19/2022    Late onset Alzheimer's disease without behavioral disturbance ([address] 05/14/2022    Cerebral microvascular disease 05/14/2022    Severe recurrent major depression without psychotic features ([address] 06/01/2021    Dementia ([address] 06/01/2021    Autoimmune hepatitis ([address] 11/01/2020    History of partial colectomy 11/01/2020    Diverticulitis 11/01/2020    Penile cancer ([address] 08/18/2020    Bilateral carotid artery stenosis 10/29/2019    GERD (gastroesophageal reflux disease) 08/29/2018    Hyperlipidemia 04/05/2012    PVC (premature ventricular contraction) 12/21/2010    Hypertension 12/06/2010    TIA (transient ischemic attack) 12/06/2010    Leg edema 12/06/2010     Past Medical History:   Diagnosis Date    Arthritis     left arm    Autoimmune hepatitis ([address]     Bleeding ulcer 07/2019    BPH (benign prostatic hyperplasia)     CAD (coronary artery disease)     s/p stent    Cancer (HCC)     penile squamous carcinoma    Chronic kidney disease     stage 2     Chronic obstructive pulmonary disease ([address]     Dementia ([address]     Elevated LFT's     Essential hypertension 9/24/01    GERD (gastroesophageal reflux disease)     hiatal hernia    Ill-defined condition 12/02/2016    faint    Nephrosclerosis     Orthostatic hypotension 9/24/01    PVC's 9/24/01    Sleep apnea     no CPAP    Syncope 9/24/01 secondary to venous insuffiency        Core Measures:  CVA: No No  CHF:No No  PNA:No No    Activity:  Activity Level: Bed Rest  Number times ambulated in hallways past shift: 0  Number of times OOB to chair past shift: 0    Cardiac:   Cardiac Monitoring: No           Access:   Current line(s): PIV   Central Line? No Placement date na Reason Medically Necessary na  PICC LINE? No Placement date na Reason Medically Necessary na    Genitourinary:   Urinary status: voiding and incontinent  Urinary Catheter? No Placement Date na Reason Medically Necessary na    Respiratory:   O2 Device: None (Room air)  Chronic home O2 use?: NO  Incentive spirometer at bedside: NO       GI:  Last Bowel Movement Date: 10/20/22  Current diet:  ADULT DIET Regular  Passing flatus: YES  Tolerating current diet: YES       Pain Management:   Patient states pain is manageable on current regimen: YES    Skin:  Sergey Score: 15  Interventions: PT/OT consult    Patient Safety:  Fall Score:  Total Score: 5  Interventions: bed/chair alarm  High Fall Risk: Yes  @Rollbelt  @dexterity to release roll belt  Yes/No ( must document dexterity  here by stating Yes or No here, otherwise this is a restraint and must follow restraint documentation policy.)    DVT prophylaxis:  DVT prophylaxis Med- No  DVT prophylaxis SCD or CHARLEEN- No     Wounds: (If Applicable)  Wounds- No  Location na    Active Consults:  IP CONSULT TO HOSPITALIST  IP CONSULT TO PULMONOLOGY    Length of Stay:  Expected LOS: 2d 21h  Actual LOS: 1  Discharge Plan: Yes NENITA Rossi RN

## 2022-10-20 NOTE — PROGRESS NOTES
FUNCTIONAL STATUS PRIOR TO ADMISSION:  unknown baseline, from [facility]    [address]:  unknown    Occupational Therapy Goals:  Initiated 10/20/2022  1. Patient will perform self-feeding with minimal assistance within 7 days. 2. Patient will perform upper body dressing with maximal assistance within 7 days. 3. Patient will perform bathing UB with max assist within 7 days. 4. Patient will transfer from bedside commode or toilet with minimal using the least restrictive device and appropriate durable medical equipment within 7 days. OCCUPATIONAL THERAPY EVALUATION  Patient: Priya Bell (17 y.o. male)  Date: 10/20/2022  Primary Diagnosis: Ribs, multiple fractures [S22.49XA]       Precautions:  Fall    ASSESSMENT  Based on the objective data described below, the patient presents with flat affect, confusion, decreased attention, command following, initiation, motor planning, UE coordination, balance and independence with all functional tasks. Pt was supine on stretcher upon arrival and was initially drowsy. All responses to questions were \"yeah. \"  Per chart review pt was at [facility] prior to admit and PLOF is unknown. Max assist x2 for supine to sit edge of stretcher and min assist to remain seated. Pt immediately stood up with CGA and was given RW for support. CGA to side step edge of stretcher with RW and didn't attempt mobility forward due to pts decreased command following. Assisted with self feeding and pt was only able to finger feed and was dropping food. He didn't appear to be aware of this. Min assist needed to drink liquids from carton or cup with straw as pt was tipping the cup to far and would have spilled. Attempted hand over hand feeding with utensils, but pt was unable to attend to utensils. He wasn't orthostatic this session. I  am unsure of the level of assist pt can have at [facility].   He would most likely do better in a familiar environment, but currently needs significant assist with all mobility and ADLS\. Current Level of Function Impacting Discharge (ADLs/self-care): total assist ADLS, max assist x2 bed mobility,     Functional Outcome Measure: The patient scored 0/100 on the wicho\hel outcome measure which is indicative of significant decline in mobility and ADLS. Other factors to consider for discharge: from memory care, PLOF is unknown\     Patient will benefit from skilled therapy intervention to address the above noted impairments. PLAN :  Recommendations and Planned Interventions: self care training, functional mobility training, therapeutic exercise, visual/perceptual training, cognitive retraining, neuromuscular re-education, and patient education    Frequency/Duration: Patient will be followed by occupational therapy 2 times a week to address goals. Recommendation for discharge: (in order for the patient to meet his/her long term goals)  Back to memory care with significant assist or Therapy up to 5 days/week in SNF setting if memory care cannot manage pts needs    This discharge recommendation:  Has been made in collaboration with the attending provider and/or case management    IF patient discharges home will need the following DME: wheelchair, hospital bed       SUBJECTIVE:   Patient stated Dagoberto Armstrong.     OBJECTIVE DATA SUMMARY:   HISTORY:   Past Medical History:   Diagnosis Date    Arthritis     left arm    Autoimmune hepatitis ([address]     Bleeding ulcer 07/2019    BPH (benign prostatic hyperplasia)     CAD (coronary artery disease)     s/p stent    Cancer (HCC)     penile squamous carcinoma    Chronic kidney disease     stage 2     Chronic obstructive pulmonary disease (HCC)     Dementia (HCC)     Elevated LFT's     Essential hypertension 9/24/01    GERD (gastroesophageal reflux disease)     hiatal hernia    Ill-defined condition 12/02/2016    faint    Nephrosclerosis     Orthostatic hypotension 9/24/01    PVC's 9/24/01 Sleep apnea     no CPAP    Syncope 9/24/01    secondary to venous insuffiency      Past Surgical History:   Procedure Laterality Date    COLONOSCOPY N/A 12/13/2016    COLONOSCOPY performed by Myah Rain MD at [facility]OSCOPY    COLONOSCOPY N/A 12/11/2019    COLONOSCOPY performed by Danisha Barrios MD at [address] [place] [address]; HI RISK IND  12/11/2019         ECHO 2D ADULT  5/24/12    EF 60 - 65%; mild concentric hypertrophy; pulmonary systolic artery pressure upper limits normal    HX ABDOMINAL WALL DEFECT REPAIR  07/01/2019d     Exploratory laparotomy, segmental sigmoid colon resection and primary stapled anastomosis. HX APPENDECTOMY  1985    HX HEENT  02/18/2020    Left temporal artery biopsy    HX HERNIA REPAIR  08/29/2018    Davinci hiatal hernia repair- Sho Hicks MD    HX OTHER SURGICAL  08/2020    penile lesion bx    GA CARDIAC SURG PROCEDURE UNLIST  05/13/2019    1 stent    UPPER GI ENDOSCOPY,BIOPSY  12/11/2019         UPPER GI ENDOSCOPY,DIAGNOSIS  7/18/2019            Expanded or extensive additional review of patient history:     Home Situation  Home Environment: Long term care  [address] [place] [address] Name: Memory Care  Current DME Used/Available at Home: Shower chair, Walker, rolling    Hand dominance: unknown pt uses both inconsistently when attempting to feed    EXAMINATION OF PERFORMANCE DEFICITS:  Cognitive/Behavioral Status:  Neurologic State: Alert;Confused  Orientation Level: Oriented to person;Disoriented to time;Disoriented to situation;Disoriented to place  Cognition: Decreased attention/concentration;Decreased command following; Impaired decision making;Memory loss; Impulsive;Poor safety awareness  Perception: Cues to maintain midline in sitting;Cues to maintain midline in standing  Perseveration: Perseverates during conversation (all responses \"yeah\")  Safety/Judgement: Lack of insight into deficits    Hearing:   Auditory  Auditory Impairment: None    Vision/Perceptual: Acuity:  (grossly intact)    Corrective Lenses: Glasses    Range of Motion:    AROM: Generally decreased, functional (tends to hold RUE up off bed, unaware; very slowed movement)  PROM: Generally decreased, functional                      Strength:    Strength: Generally decreased, functional                Coordination:  Coordination: Generally decreased, functional  Fine Motor Skills-Upper: Left Impaired;Right Impaired    Gross Motor Skills-Upper: Left Impaired;Right Impaired    Tone & Sensation:    Tone: Abnormal (+rigidity overall)  Sensation:  (unable to test due to cognition)                      Balance:  Sitting: Impaired  Sitting - Static: Fair (occasional); Other (comment) (did not sit long, wanted to stand quickly)  Sitting - Dynamic: Not tested  Standing: Impaired  Standing - Static: Fair;Constant support; Other (comment) (with RW)  Standing - Dynamic : Fair;Constant support; Other (comment) (with RW)    Functional Mobility and Transfers for ADLs:  Bed Mobility:  Rolling: Moderate assistance  Supine to Sit: Maximum assistance;Assist x2  Sit to Supine: Maximum assistance;Assist x2  Scooting: Moderate assistance    Transfers:  Sit to Stand: Contact guard assistance; Other (comment) (from stretcher height)  Stand to Sit: Minimum assistance; Other (comment) (needs manual cues to initiate flexion)  Bed to Chair:  (CGA to side step head of stretcher with RW)    ADL Assessment:  Feeding: Total assistance (able to finger feed only but dropping objects)    Oral Facial Hygiene/Grooming: Total assistance    Bathing: Total assistance         Upper Body Dressing: Total assistance    Lower Body Dressing: Total assistance    Toileting: Total assistance                  ADL Intervention and task modifications:  Min assist to drink from carton of milk or cup with straw.   Pt needs top on cup due to spillage without assist.  Able to finger feed only but dropd of items and doesn't attempt to retrieve them.    Cognitive Retraining  Safety/Judgement: Lack of insight into deficits      Functional Measure:    Barthel Index:  Bathin  Bladder: 0  Bowels: 0  Groomin  Dressin  Feedin  Mobility: 0  Stairs: 0  Toilet Use: 0  Transfer (Bed to Chair and Back): 0  Total: 0/100      The Barthel ADL Index: Guidelines  1. The index should be used as a record of what a patient does, not as a record of what a patient could do. 2. The main aim is to establish degree of independence from any help, physical or verbal, however minor and for whatever reason. 3. The need for supervision renders the patient not independent. 4. A patient's performance should be established using the best available evidence. Asking the patient, friends/relatives and nurses are the usual sources, but direct observation and common sense are also important. However direct testing is not needed. 5. Usually the patient's performance over the preceding 24-48 hours is important, but occasionally longer periods will be relevant. 6. Middle categories imply that the patient supplies over 50 per cent of the effort. 7. Use of aids to be independent is allowed. Score Interpretation (from [address] [address])    Independent   60-79 Minimally independent   40-59 Partially dependent   20-39 Very dependent   <20 Totally dependent     -Alyssa Pina., Barthel, D.W. (1965). Functional evaluation: the Barthel Index. [facility] ([address] (1997). The Barthel activities of daily living index: self-reporting versus actual performance in the old (> or = 75 years). Journal of [address] [place] [address] 45(7), [facility], J.J.M., Adam Huntman., Conchis Rutledge. (). Measuring the change in disability after inpatient rehabilitation; comparison of the responsiveness of the Barthel Index and Functional [place] Measure. Journal of Neurology, Neurosurgery, and Psychiatry, 66(4), 750-659.   -ROCCO Skinner, Cresencio Sun M.A. (2004) Assessment of post-stroke quality of life in cost-effectiveness studies: The usefulness of the Barthel Index and the EuroQoL-5D. Quality of Life Research, 15, 709-27         Occupational Therapy Evaluation Charge Determination   History Examination Decision-Making   LOW Complexity : Brief history review  HIGH Complexity : 5 or more performance deficits relating to physical, cognitive , or psychosocial skils that result in activity limitations and / or participation restrictions MEDIUM Complexity : Patient may present with comorbidities that affect occupational performnce. Miniml to moderate modification of tasks or assistance (eg, physical or verbal ) with assesment(s) is necessary to enable patient to complete evaluation       Based on the above components, the patient evaluation is determined to be of the following complexity level: LOW   Pain Rating:  Reported \"a lot of pain\" unable to rate    Activity Tolerance:   Fair    After treatment patient left in no apparent distress:    Supine on stretcher with bilateral rails up and call bell in reach    COMMUNICATION/EDUCATION:   The patients plan of care was discussed with: Physical therapist and Registered nurse. Patient is unable to participate in goal setting and plan of care. This patients plan of care is appropriate for delegation to [facility].     Thank you for this referral.  Christiano Sultana OTR/L  Time Calculation: 24 mins

## 2022-10-20 NOTE — H&P
Hospitalist Admission Note    NAME: Jeanine Rodriguez   :  1940   MRN:  [id number]     Date/Time:  10/19/2022 8:18 PM    Patient PCP: Acosta Hoskins MD  ______________________________________________________________________  Given the patient's current clinical presentation, I have a high level of concern for decompensation if discharged from the emergency department. Complex decision making was performed, which includes reviewing the patient's available past medical records, laboratory results, and x-ray films. My assessment of this patient's clinical condition and my plan of care is as follows. Assessment / Plan:  Left 5-9th Ribs fracture s/p GLF   Admit to the hospital for close monitoring  Pain management: Multimodal, will schedule Tylenol and use low-dose oxy as needed; lidocaine patch  Ro underlying uti -- fu ua ( Hx UTI in the past)   PT/OT  FU CT head and neck  CT:Acute, minimally displaced fractures of the LEFT fifth through ninth ribs. There are segmental fractures of the LEFT sixth through eighth ribs. LETICIA  Baseline creatinine 0.9, admission 1.4  Hydration  monitor closely. Avoid nephrotoxic drugs, adjust all meds to GFR.      Large left-sided pleural effusion  small left chest subpleural hematoma  CT: Large left-sided pleural effusion with associated left lower lobe atelectasis, there is also a small left chest subpleural hematoma  Hold ASA   Will ask pulmonary to help with management  IS to prevent pneumonia    Anemia of chronic disease  Hemoglobin stable  Monitor    Autoimmune hepatitis  Dementia Continue Namenda  Penile cancer status post resection  BPH Continue flomax   Hyperlipidemia       Code Status: Full code  Surrogate Decision Maker: Daughter    DVT Prophylaxis: SCD, no AC with small hematoma on CT  GI Prophylaxis: not indicated    Baseline: Dementia, [facility] memory care unit       Subjective:   CHIEF COMPLAINT: fall     HISTORY OF PRESENT ILLNESS:     Castro Omalley is a 81 y.o.  male who presents with above complaints. Patient is very poor historian due to underlying dementia and cannot contribute much to the history. History was obtained from ED documentation and connect care records. Patient resides at the memory care unit of the assisted living facility. Family was visiting him today and noticed some bruising on his left side. Patient also was complaining to pain to this area. Apparently, he sustained an unwitnessed fall yesterday. She reports not hitting head or loss of consciousness, however, he is very poor historian. Imaging in ED revealed rib fractures and left pleural effusion. We were asked to admit for work up and evaluation of the above problems.      Past Medical History:   Diagnosis Date    Arthritis     left arm    Autoimmune hepatitis ([address]     Bleeding ulcer 07/2019    BPH (benign prostatic hyperplasia)     CAD (coronary artery disease)     s/p stent    Cancer (HCC)     penile squamous carcinoma    Chronic kidney disease     stage 2     Chronic obstructive pulmonary disease (HCC)     Dementia (HCC)     Elevated LFT's     Essential hypertension 9/24/01    GERD (gastroesophageal reflux disease)     hiatal hernia    Ill-defined condition 12/02/2016    faint    Nephrosclerosis     Orthostatic hypotension 9/24/01    PVC's 9/24/01    Sleep apnea     no CPAP    Syncope 9/24/01    secondary to venous insuffiency         Past Surgical History:   Procedure Laterality Date    COLONOSCOPY N/A 12/13/2016    COLONOSCOPY performed by Glynn Gentile MD at [facility] ENDOSCOPY    COLONOSCOPY N/A 12/11/2019    COLONOSCOPY performed by Lesli Dutta MD at [address]; HI RISK IND  12/11/2019         ECHO 2D ADULT  5/24/12    EF 60 - 65%; mild concentric hypertrophy; pulmonary systolic artery pressure upper limits normal    HX ABDOMINAL WALL DEFECT REPAIR  07/01/2019d     Exploratory laparotomy, segmental sigmoid colon resection and primary stapled anastomosis. HX APPENDECTOMY  1985    HX HEENT  2020    Left temporal artery biopsy    HX HERNIA REPAIR  2018    Davinci hiatal hernia repair- Cecilio Gómez MD    HX OTHER SURGICAL  2020    penile lesion bx    AR CARDIAC SURG PROCEDURE UNLIST  2019    1 stent    UPPER GI ENDOSCOPY,BIOPSY  2019         UPPER GI ENDOSCOPY,DIAGNOSIS  2019            Social History     Tobacco Use    Smoking status: Former     Packs/day: 3.50     Types: Cigarettes     Quit date: 1980     Years since quittin.8    Smokeless tobacco: Never   Substance Use Topics    Alcohol use: Not Currently     Comment: no alcohol since         Family History   Problem Relation Age of Onset    Stroke Mother     Stroke Father     Heart Disease Father      Allergies   Allergen Reactions    Ace Inhibitors Other (comments)     Doesn't agree with pt    Demerol [Meperidine] Unknown (comments)     Causes unconsciousness        Prior to Admission medications    Medication Sig Start Date End Date Taking? Authorizing Provider   tamsulosin (Flomax) 0.4 mg capsule Take 0.4 mg by mouth daily. Yes Other, MD Luciana   fluticasone propionate (FLONASE) 50 mcg/actuation nasal spray 2 Sprays by Both Nostrils route daily. 22  Yes Placido Sullivan MD   pantoprazole (PROTONIX) 40 mg tablet Take 1 Tablet by mouth ACB/HS. 22  Yes Placido Sullivan MD   L.acid,para-B. bifidum-S.therm (RISAQUAD) 8 billion cell cap cap Take 1 Capsule by mouth daily. 22  Yes Placido Sullivan MD   memantine (NAMENDA) 5 mg tablet Take 1 Tablet by mouth two (2) times a day. 22  Yes Placido Sullivan MD   escitalopram oxalate (LEXAPRO) 5 mg tablet Take 5 mg by mouth daily. Yes Provider, Historical   aspirin delayed-release 81 mg tablet Take 81 mg by mouth daily. Yes Provider, Historical   carvediloL (COREG) 12.5 mg tablet Take 2 Tabs by mouth two (2) times daily (with meals).  20  Yes Emeka Patricio MD REVIEW OF SYSTEMS:     I am not able to complete the review of systems because: The patient is intubated and sedated    The patient has altered mental status due to his acute medical problems    The patient has baseline aphasia from prior stroke(s)   y The patient has baseline dementia and is not reliable historian    The patient is in acute medical distress and unable to provide information             Objective:   VITALS:    Visit Vitals  BP (!) 128/55   Pulse 73   Temp 98 °F (36.7 °C)   Resp 16   Ht 5' 4\" (1.626 m)   Wt 71.2 kg (156 lb 15.5 oz)   SpO2 90%   BMI 26.94 kg/m²       PHYSICAL EXAM:    General:    Alert, cooperative, no distress, appears stated age. chronically debilitated  HEENT: Atraumatic, anicteric sclerae, pink conjunctivae     No oral ulcers, mucosa moist, throat clear, dentition fair  Neck:  Supple, symmetrical,  thyroid: non tender  Lungs:   Clear to auscultation bilaterally. No Wheezing or Rhonchi. No rales. Chest wall:  + L side tenderness  No Accessory muscle use. Heart:   Regular  rhythm,  No  murmur   No edema  Abdomen:   Soft, non-tender. Not distended. Bowel sounds normal  Extremities: No cyanosis. No clubbing,      Skin turgor normal, Capillary refill normal, Radial dial pulse 2+  Skin:     Not pale. Not Jaundiced  No rashes   Psych:  poor insight. Not depressed. Not anxious or agitated. Neurologic: EOMs intact. No facial asymmetry. No aphasia or slurred speech. Generally weak. + tremor.   Alert and oriented X 1     _______________________________________________________________________  Care Plan discussed with:    Comments   Patient y    Family      RN y    Care Manager                    Consultant:  julito ED provider    _______________________________________________________________________  Expected  Disposition:   Home with Family    HH/PT/OT/RN    SNF/LTC y   [de-identified]    ________________________________________________________________________  TOTAL TIME:  72 Minutes    Critical Care Provided     Minutes non procedure based      Comments    y Reviewed previous records   >50% of visit spent in counseling and coordination of care y Discussion with patient and/or family and questions answered       ________________________________________________________________________  Signed: Xenia Martel MD    Procedures: see electronic medical records for all procedures/Xrays and details which were not copied into this note but were reviewed prior to creation of Plan. LAB DATA REVIEWED:    Recent Results (from the past 24 hour(s))   CBC WITH AUTOMATED DIFF    Collection Time: 10/19/22  2:24 PM   Result Value Ref Range    WBC 8.8 4.1 - 11.1 K/uL    RBC 3.45 (L) 4.10 - 5.70 M/uL    HGB 9.9 (L) 12.1 - 17.0 g/dL    HCT 30.4 (L) 36.6 - 50.3 %    MCV 88.1 80.0 - 99.0 FL    MCH 28.7 26.0 - 34.0 PG    MCHC 32.6 30.0 - 36.5 g/dL    RDW 15.2 (H) 11.5 - 14.5 %    PLATELET 642 403 - 678 K/uL    MPV 11.2 8.9 - 12.9 FL    NRBC 0.0 0  WBC    ABSOLUTE NRBC 0.00 0.00 - 0.01 K/uL    NEUTROPHILS 70 32 - 75 %    LYMPHOCYTES 15 12 - 49 %    MONOCYTES 14 (H) 5 - 13 %    EOSINOPHILS 0 0 - 7 %    BASOPHILS 1 0 - 1 %    IMMATURE GRANULOCYTES 0 0.0 - 0.5 %    ABS. NEUTROPHILS 6.1 1.8 - 8.0 K/UL    ABS. LYMPHOCYTES 1.3 0.8 - 3.5 K/UL    ABS. MONOCYTES 1.2 (H) 0.0 - 1.0 K/UL    ABS. EOSINOPHILS 0.0 0.0 - 0.4 K/UL    ABS. BASOPHILS 0.0 0.0 - 0.1 K/UL    ABS. IMM.  GRANS. 0.0 0.00 - 0.04 K/UL    DF AUTOMATED     METABOLIC PANEL, COMPREHENSIVE    Collection Time: 10/19/22  2:24 PM   Result Value Ref Range    Sodium 140 136 - 145 mmol/L    Potassium 4.3 3.5 - 5.1 mmol/L    Chloride 108 97 - 108 mmol/L    CO2 25 21 - 32 mmol/L    Anion gap 7 5 - 15 mmol/L    Glucose 129 (H) 65 - 100 mg/dL    BUN 41 (H) 6 - 20 MG/DL    Creatinine 1.41 (H) 0.70 - 1.30 MG/DL    BUN/Creatinine ratio 29 (H) 12 - 20      eGFR 50 (L) >60 ml/min/1.73m2    Calcium 8.6 8.5 - 10.1 MG/DL    Bilirubin, total 0.8 0.2 - 1.0 MG/DL ALT (SGPT) 46 12 - 78 U/L    AST (SGOT) 34 15 - 37 U/L    Alk.  phosphatase 193 (H) 45 - 117 U/L    Protein, total 7.7 6.4 - 8.2 g/dL    Albumin 2.9 (L) 3.5 - 5.0 g/dL    Globulin 4.8 (H) 2.0 - 4.0 g/dL    A-G Ratio 0.6 (L) 1.1 - 2.2     CK    Collection Time: 10/19/22  2:24 PM   Result Value Ref Range     39 - 308 U/L   EKG, 12 LEAD, INITIAL    Collection Time: 10/19/22  5:18 PM   Result Value Ref Range    Ventricular Rate 78 BPM    Atrial Rate 78 BPM    P-R Interval 172 ms    QRS Duration 60 ms    Q-T Interval 386 ms    QTC Calculation (Bezet) 440 ms    Calculated P Axis 109 degrees    Calculated R Axis 44 degrees    Calculated T Axis 47 degrees    Diagnosis       Normal sinus rhythm  ST elevation, consider early repolarization, pericarditis, or injury  Nonspecific ST and T wave abnormality  When compared with ECG of 12-MAY-2022 20:03,  Minimal criteria for Inferior infarct are no longer present  ST elevation has replaced ST depression in Inferior leads  ST now depressed in Anterior leads  ST more elevated in Lateral leads  Nonspecific T wave abnormality no longer evident in Inferior leads  QT has shortened

## 2022-10-20 NOTE — ED NOTES
Removed patient's brief and replaced with new brief, changed patient's sheets at this time. Repositioned patient on stretcher. Side rails in upright position.

## 2022-10-20 NOTE — ED NOTES
Patient resting on stretcher with eyes closed at this time. Respirations present, equal bilaterally and unlabored. Side rails in upright position.

## 2022-10-20 NOTE — ED NOTES
Bedside and Verbal shift change report given to Savannah Barreto RN and Soheila Brush RN (oncoming nurse) by Marquise Dumont RN  (offgoing nurse). Report included the following information SBAR, Kardex, ED Summary, MAR, and Recent Results.

## 2022-10-20 NOTE — ED NOTES
Changed patient's soiled brief at this time, replaced with clean brief and linens changed. Patient repositioned in stretcher, side rails in upright position. Patient remains in stretcher in front of nurse's station for visualization, patient instructed to verbalize any needs.

## 2022-10-20 NOTE — ED NOTES
Bedside shift change report [facility] [address] (on[facility] nurse) by Princella Severance (offgoing nurse). Report included the following information SBAR, Kardex and ED Summary.

## 2022-10-20 NOTE — ED NOTES
Patient resting with eyes closed on stretcher at this time. Respirations are observed, equal bilaterally and unlabored. Side rails in upright position.

## 2022-10-20 NOTE — ED NOTES
Patient resting with eyes closed on stretcher at this time, side rails in upright position. Patient's respirations are present, equal bilaterally, and unlabored.

## 2022-10-20 NOTE — ED NOTES
Patient is being transferred to [facility], Room # 7153. Report given to Ze Simmons RN on Lisa Beltran for routine progression of care. Report consisted of the following information SBAR, Kardex, ED Summary, Intake/Output, MAR, Recent Results, Med Rec Status and Cardiac Rhythm . Maria Eugenia Gregorio Patient transferred to receiving unit by: [employer]. Outstanding consults needed: No     Next labs due: No     The following personal items will be sent with the patient during transfer to the floor:     All valuables:    Cardiac monitoring ordered: No     The following CURRENT information was reported to the receiving RN:    Code status: Full Code at time of transfer    Last set of vital signs:  Vital Signs  Level of Consciousness: Alert (0) (10/20/22 0851)  Temp: 97.6 °F (36.4 °C) (10/20/22 0851)  Temp Source: Oral (10/20/22 0851)  Pulse (Heart Rate): 62 (10/20/22 0916)  Heart Rate Source: Monitor (10/20/22 0851)  Resp Rate: 16 (10/20/22 0851)  BP: (!) 142/87 (10/20/22 1018)  MAP (Monitor): 96 (10/20/22 0851)  MAP (Calculated): 105 (10/20/22 1018)  BP 1 Location: Right upper arm (10/19/22 2012)  BP 1 Method: Automatic (10/19/22 2012)  BP Patient Position: Standing (10/20/22 1018)  MEWS Score: 1 (10/20/22 0851)         Oxygen Therapy  O2 Sat (%): 93 % (10/20/22 1018)  Pulse via Oximetry: 68 beats per minute (10/20/22 0657)  O2 Device: None (Room air) (10/20/22 1018)      Last pain assessment:  Pain 1  Pain Scale 1: Numeric (0 - 10)  Pain Intensity 1: 5  Patient Stated Pain Goal: 0  Pain Reassessment 1: Yes  Pain Location 1: Rib cage  Pain Orientation 1: Left  Pain Description 1: Aching  Pain Intervention(s) 1: Medication (see MAR)      Wounds: Yes     Urinary catheter: voiding  Is there a allen order: No     LDAs:       Peripheral IV 10/19/22 Left Forearm (Active)   Site Assessment Clean, dry, & intact 10/19/22 1508   Phlebitis Assessment 0 10/19/22 1508   Infiltration Assessment 0 10/19/22 1508   Dressing Status Clean, dry, & intact 10/19/22 1506         Opportunity for questions and clarification was provided.     Maegan Loaiza RN

## 2022-10-20 NOTE — ED NOTES
Patient resting with eyes closed on stretcher at this time. Side rails are in upright position; respirations observed, equal bilaterally and unlabored.

## 2022-10-20 NOTE — PROGRESS NOTES
Hospitalist Progress Note    NAME: Corrinne Burke   :  1940   MRN:  [id number]     Interim Hospital Summary: 81 y.o. male whom presented on 10/19/2022 with      Assessment / Plan:  CHELO:  10/21  Barriers:      Left 5-9th Ribs fracture s/p GLF   -CT head nothing acute  -CT C spine:  No acute fracture. Multilevel mod to severe canal and foraminal stenosis  -CTchest and pelvis:  No pelvic fracture. Acute min displaced fractures of LEFT fifth through ninth ribs. There are segmental fracture of the LEFT sixth through eighth ribs  -Pain management: Multimodal, will schedule Tylenol and use low-dose oxy as needed; lidocaine patch  -PT OT eval.  May benefit from SNF rehab before returning to his memory unit    -8:20am updated daughter by phone    UTI  -UA with  wbc, 2+ bacteria.   -given he fell, will treat with Rocephin      LETICIA, resolved  Hypokalemia  Baseline creatinine 0.9, admission 1.4  -resolved with hydration. Change IVFs to half NS with Kcl     Large left-sided pleural effusion  small left chest subpleural hematoma  CT: Large left-sided pleural effusion with associated left lower lobe atelectasis, there is also a small left chest subpleural hematoma  -continue to hold ASA   -incentive spirometry  -Pulmonary input requested     Anemia of chronic disease  -Hg trending down likely from hemodilution. S/P bolus in ED. Will repeat in AM     Autoimmune hepatitis  Dementia Continue Namenda  Penile cancer status post resection  BPH Continue flomax   Hyperlipidemia         Code Status: Full code  Surrogate Decision Maker: Daughter     DVT Prophylaxis: SCD, no AC with small hematoma on CT  GI Prophylaxis: not indicated     Baseline: Dementia, [facility] memory care unit   Recommended Disposition: SNF/LTC    25.0 - 29.9 Overweight / Body mass index is 26.94 kg/m².        Subjective:     Chief Complaint / Reason for Physician Visit  Follow up of rib fractures, s/p fall, LETICIA CALZADA  Chart reviewed in detail. Discussed with RN events overnight. Sleepy    Review of Systems:  Symptom Y/N Comments  Symptom Y/N Comments   Fever/Chills    Chest Pain     Poor Appetite    Edema     Cough    Abdominal Pain     Sputum    Joint Pain     SOB/DE JESUS    Pruritis/Rash     Nausea/vomit    Tolerating PT/OT     Diarrhea    Tolerating Diet     Constipation    Other       Could NOT obtain due to: Sleepy, dementia     PO intake: No data found. Objective:     VITALS:   Last 24hrs VS reviewed since prior progress note.  Most recent are:  Patient Vitals for the past 24 hrs:   Temp Pulse Resp BP SpO2   10/20/22 0657 -- 68 -- 138/69 93 %   10/20/22 0625 -- 73 -- 135/68 92 %   10/20/22 0555 -- 63 -- 127/67 92 %   10/20/22 0525 -- 62 -- (!) 118/58 93 %   10/20/22 0512 -- 63 -- -- 92 %   10/20/22 0510 -- -- -- 121/61 92 %   10/20/22 0443 -- 63 -- (!) 113/53 93 %   10/20/22 0442 -- 63 -- (!) 140/66 93 %   10/20/22 0412 -- 60 -- 129/68 92 %   10/20/22 0357 -- (!) 58 -- 130/75 91 %   10/20/22 0342 -- (!) 59 -- 120/64 91 %   10/20/22 0327 -- 64 -- 109/70 91 %   10/20/22 0258 -- (!) 58 -- (!) 111/53 92 %   10/20/22 0243 -- 67 -- 120/64 91 %   10/20/22 0228 -- 65 -- 122/82 94 %   10/20/22 0213 -- 63 -- (!) 127/59 92 %   10/20/22 0158 -- (!) 59 -- 115/68 91 %   10/20/22 0140 -- 63 -- 134/63 92 %   10/20/22 0134 -- 64 -- 130/64 92 %   10/20/22 0119 -- 61 -- (!) 118/52 92 %   10/20/22 0104 -- 60 -- 114/68 91 %   10/20/22 0049 -- 74 -- (!) 102/58 92 %   10/20/22 0034 -- 71 -- (!) 94/59 91 %   10/20/22 0019 -- 63 -- (!) 139/56 90 %   10/20/22 0004 -- 62 -- (!) 101/55 91 %   10/19/22 2334 -- 79 -- -- 92 %   10/19/22 2319 -- 79 -- -- 92 %   10/19/22 2234 -- 71 -- -- 92 %   10/19/22 2204 -- 71 -- -- 91 %   10/19/22 2134 -- 71 -- -- 92 %   10/19/22 2105 -- 71 -- (!) 109/52 91 %   10/19/22 2050 -- 69 -- 103/69 92 %   10/19/22 2048 -- 69 -- 103/69 93 %   10/19/22 2012 98.3 °F (36.8 °C) 74 15 (!) 119/51 92 %   10/19/22 1852 -- -- -- -- 90 %   10/19/22 1356 98 °F (36.7 °C) 73 16 (!) 128/55 95 %     No intake or output data in the 24 hours ending 10/20/22 0704     I had a face to face encounter, and independently examined this patient on 10/20/2022, as outlined below:  PHYSICAL EXAM:  General: WD, WN. Alert, cooperative, no acute distress    EENT:  EOMI. Anicteric sclerae. MMM  Resp:  CTA bilaterally, no wheezing or rales. No accessory muscle use  CV:  Regular  rhythm,  No edema  GI:  Soft, Non distended, Non tender. +Bowel sounds  Neurologic:  Alert and oriented X self, normal speech, weak but non focal  Psych:   Poor insight. Not anxious nor agitated  Skin:  No rashes. No jaundice  (+) left chest wall bruise    Reviewed most current lab test results and cultures  YES  Reviewed most current radiology test results   YES  Review and summation of old records today    NO  Reviewed patient's current orders and MAR    YES  PMH/ reviewed - no change compared to H&P  ________________________________________________________________________  Care Plan discussed with:    Comments   Patient x    Family  x    RN     Care Manager     Consultant                        Multidiciplinary team rounds were held today with , nursing, pharmacist and clinical coordinator. Patient's plan of care was discussed; medications were reviewed and discharge planning was addressed.      ________________________________________________________________________  Total NON critical care TIME:  25   Minutes    Total CRITICAL CARE TIME Spent:   Minutes non procedure based      Comments   >50% of visit spent in counseling and coordination of care x     This includes time during multidisciplinary rounds if indicated above   ________________________________________________________________________  Zaina Rose MD     Procedures: see electronic medical records for all procedures/Xrays and details which were not copied into this note but were reviewed prior to creation of Plan. LABS:  I reviewed today's most current labs and imaging studies.   Pertinent labs include:  Recent Labs     10/20/22  0426 10/19/22  1424   WBC 5.7 8.8   HGB 8.7* 9.9*   HCT 26.6* 30.4*   * 154     Recent Labs     10/20/22  0426 10/19/22  1424   * 140   K 2.9* 4.3   * 108   CO2 19* 25   GLU 76 129*   BUN 34* 41*   CREA 1.01 1.41*   CA 6.5* 8.6   MG 1.6  --    PHOS 2.4*  --    ALB  --  2.9*   TBILI  --  0.8   ALT  --  46

## 2022-10-20 NOTE — PROGRESS NOTES
Problem: Mobility Impaired (Adult and Pediatric)  Goal: *Acute Goals and Plan of Care (Insert Text)  Description: FUNCTIONAL STATUS PRIOR TO ADMISSION: Pt lives in [address], unsure of facility from current notes. He is unable to fill in prior level of function but has arrived to ED with a RW and during session appears he is familiar with it. HOME SUPPORT PRIOR TO ADMISSION: [facility], unclear baseline    Physical Therapy Goals  Initiated 10/20/2022  1. Patient will move from supine to sit and sit to supine , scoot up and down, and roll side to side in bed with minimal assistance/contact guard assist within 7 day(s). 2.  Patient will transfer from bed to chair and chair to bed with supervision/set-up using the least restrictive device within 7 day(s). 3.  Patient will perform sit to stand with supervision/set-up within 7 day(s). 4.  Patient will ambulate with supervision/set-up for 100 feet with the least restrictive device within 7 day(s). Outcome: Not Met   PHYSICAL THERAPY EVALUATION  Patient: Molly Ferrer (87 y.o. male)  Date: 10/20/2022  Primary Diagnosis: Ribs, multiple fractures [S22.49XA]       Precautions:  Fall    ASSESSMENT  Based on the objective data described below, the patient presents with limitations in gait, functional mobility, balance and activity tolerance with overall rigidity and hx of Parkinson's with very slowed movement and processing now s/p fall with L 5-9 rib fxs. He is able to follow some commands but responds verbally most of the time with \"yeah\". He does indicate pain as \"a lot\". He has been medicated by RN. Pt is requiring mod A to roll, max A of 2 to and from edge of bed. He is able to stand from stretcher height (does so almost automatically upon sitting) with CGA to support of rolling walker and side steps with CGA of 2 at edge of bed with the walker. Pt BP obtained in supine and stand and he was not found to be orthostatic.  Recommendations will depend upon level of care able to be provided at [facility]. If they can provide increased care, he may be able to return with HHPT/OT but if he needs to be closer to S or independence, he would need SNF first.     Current Level of Function Impacting Discharge (mobility/balance):see above details    Functional Outcome Measure: The patient scored 0/100 on the barthel outcome measure which is indicative of 100% functional impairment. Other factors to consider for discharge: Pt's PLOF unclear, high fall risk, needs A for mobility     Patient will benefit from skilled therapy intervention to address the above noted impairments. PLAN :  Recommendations and Planned Interventions: bed mobility training, transfer training, gait training, therapeutic exercises, patient and family training/education, and therapeutic activities      Frequency/Duration: Patient will be followed by physical therapy:  4 times a week to address goals. Recommendation for discharge: (in order for the patient to meet his/her long term goals)  Recommendations will depend upon level of care able to be provided at [facility].  If they can provide increased care, he may be able to return with HHPT/OT but if he needs to be closer to S or independence, he would need SNF first.     This discharge recommendation:  Has been made in collaboration with the attending provider and/or case management    IF patient discharges home will need the following DME: to be determined (TBD)         SUBJECTIVE:   Patient stated Yeah.    OBJECTIVE DATA SUMMARY:   HISTORY:    Past Medical History:   Diagnosis Date    Arthritis     left arm    Autoimmune hepatitis ([facility]     Bleeding ulcer 07/2019    BPH (benign prostatic hyperplasia)     CAD (coronary artery disease)     s/p stent    Cancer ([facility]     penile squamous carcinoma    Chronic kidney disease     stage 2     Chronic obstructive pulmonary disease ([facility]     Dementia ([facility]     Elevated LFT's     Essential hypertension 9/24/01    GERD (gastroesophageal reflux disease)     hiatal hernia    Ill-defined condition 12/02/2016    faint    Nephrosclerosis     Orthostatic hypotension 9/24/01    PVC's 9/24/01    Sleep apnea     no CPAP    Syncope 9/24/01    secondary to venous insuffiency      Past Surgical History:   Procedure Laterality Date    COLONOSCOPY N/A 12/13/2016    COLONOSCOPY performed by Kamilah Pollard MD at [facility] ENDOSCOPY    COLONOSCOPY N/A 12/11/2019    COLONOSCOPY performed by Monster Roberto MD at [address] [facility]; HI RISK IND  12/11/2019         ECHO 2D ADULT  5/24/12    EF 60 - 65%; mild concentric hypertrophy; pulmonary systolic artery pressure upper limits normal    HX ABDOMINAL WALL DEFECT REPAIR  07/01/2019d     Exploratory laparotomy, segmental sigmoid colon resection and primary stapled anastomosis. HX APPENDECTOMY  1985    HX HEENT  02/18/2020    Left temporal artery biopsy    HX HERNIA REPAIR  08/29/2018    Davinci hiatal hernia repair- Mann Ovalles MD    HX OTHER SURGICAL  08/2020    penile lesion bx    VT CARDIAC SURG PROCEDURE UNLIST  05/13/2019    1 stent    UPPER GI ENDOSCOPY,BIOPSY  12/11/2019         UPPER GI ENDOSCOPY,DIAGNOSIS  7/18/2019            Personal factors and/or comorbidities impacting plan of care: Parkinsons    Home Situation  Home Environment: Long term care   [facility]: [facility] Care  Current DME Used/Available at Home: Shower chair, Walker, rolling    EXAMINATION/PRESENTATION/DECISION MAKING:   Critical Behavior:  Neurologic State: Alert, Confused  Orientation Level: Oriented to person, Disoriented to time, Disoriented to situation, Disoriented to place  Cognition: Decreased attention/concentration, Decreased command following, Impaired decision making, Memory loss, Impulsive, Poor safety awareness  Safety/Judgement: Lack of insight into deficits  Hearing:   Auditory  Auditory Impairment: None    Range Of Motion:  AROM: Generally decreased, functional (tends to hold RUE up off bed, unaware; very slowed movement)           PROM: Generally decreased, functional           Strength:    Strength: Generally decreased, functional                    Tone & Sensation:   Tone: Abnormal (+rigidity overall)              Sensation:  (unable to test due to cognition)               Coordination:  Coordination: Generally decreased, functional  Vision:   Acuity:  (grossly intact)  Corrective Lenses: Glasses  Functional Mobility:  Bed Mobility:  Rolling: Moderate assistance  Supine to Sit: Maximum assistance;Assist x2  Sit to Supine: Maximum assistance;Assist x2  Scooting: Moderate assistance  Transfers:  Sit to Stand: Contact guard assistance; Other (comment) (from stretcher height)  Stand to Sit: Minimum assistance; Other (comment) (needs manual cues to initiate flexion)        Bed to Chair:  (CGA to side step head of stretcher with RW)              Balance:   Sitting: Impaired  Sitting - Static: Fair (occasional); Other (comment) (did not sit long, wanted to stand quickly)  Sitting - Dynamic: Not tested  Standing: Impaired  Standing - Static: Fair;Constant support; Other (comment) (with RW)  Standing - Dynamic : Fair;Constant support; Other (comment) (with RW)  Ambulation/Gait Training:              Gait Description (WDL):  (side stepped at edge of bed with RW with CGA of 2)                        Functional Measure:  Barthel Index:    Bathin  Bladder: 0  Bowels: 0  Groomin  Dressin  Feedin  Mobility: 0  Stairs: 0  Toilet Use: 0  Transfer (Bed to Chair and Back): 0  Total: 0/100       The Barthel ADL Index: Guidelines  1. The index should be used as a record of what a patient does, not as a record of what a patient could do. 2. The main aim is to establish degree of independence from any help, physical or verbal, however minor and for whatever reason. 3. The need for supervision renders the patient not independent.   4. A patient's performance should be established using the best available evidence. Asking the patient, friends/relatives and nurses are the usual sources, but direct observation and common sense are also important. However direct testing is not needed. 5. Usually the patient's performance over the preceding 24-48 hours is important, but occasionally longer periods will be relevant. 6. Middle categories imply that the patient supplies over 50 per cent of the effort. 7. Use of aids to be independent is allowed. Score Interpretation (from [address] [facility])    Independent   60-79 Minimally independent   40-59 Partially dependent   20-39 Very dependent   <20 Totally dependent     -Alyssa Pina., Barthel, D.W. (1965). Functional evaluation: the Barthel Index. [address] ([address] (1997). The Barthel activities of daily living index: self-reporting versus actual performance in the old (> or = 75 years). Journal of [address] [place] [address] 45(7), [facility], SEB, Bill Ashley., Henry Johnson. (1999). Measuring the change in disability after inpatient rehabilitation; comparison of the responsiveness of the Barthel Index and Functional [place] Measure. Journal of Neurology, Neurosurgery, and Psychiatry, 66(4), 214-072. Reji Anthony, N.J.A, MICHELLE Martin, & Darya Lamar, MRyderA. (2004) Assessment of post-stroke quality of life in cost-effectiveness studies: The usefulness of the Barthel Index and the EuroQoL-5D.  Quality of Life Research, 15, 936-39           Physical Therapy Evaluation Charge Determination   History Examination Presentation Decision-Making   HIGH Complexity :3+ comorbidities / personal factors will impact the outcome/ POC  HIGH Complexity : 4+ Standardized tests and measures addressing body structure, function, activity limitation and / or participation in recreation  MEDIUM Complexity : Evolving with changing characteristics  LOW Complexity : FOTO score of       Based on the above components, the patient evaluation is determined to be of the following complexity level: LOW     Pain Rating:  Unable to rate; \"a lot\"    Activity Tolerance:   Fair    After treatment patient left in no apparent distress:   Supine in bed, Call bell within reach, and stretcher rails up    COMMUNICATION/EDUCATION:   The patients plan of care was discussed with: Occupational therapist, Registered nurse, and Case management. Patient is unable to participate in goal setting and plan of care.     Thank you for this referral.  Lanette Garcia, PT   Time Calculation: 12 mins

## 2022-10-21 LAB
ANION GAP SERPL CALC-SCNC: 7 MMOL/L (ref 5–15)
BASOPHILS # BLD: 0.1 K/UL (ref 0–0.1)
BASOPHILS NFR BLD: 1 % (ref 0–1)
BUN SERPL-MCNC: 35 MG/DL (ref 6–20)
BUN/CREAT SERPL: 30 (ref 12–20)
CALCIUM SERPL-MCNC: 8.2 MG/DL (ref 8.5–10.1)
CHLORIDE SERPL-SCNC: 110 MMOL/L (ref 97–108)
CO2 SERPL-SCNC: 22 MMOL/L (ref 21–32)
CREAT SERPL-MCNC: 1.16 MG/DL (ref 0.7–1.3)
DIFFERENTIAL METHOD BLD: ABNORMAL
EOSINOPHIL # BLD: 0.2 K/UL (ref 0–0.4)
EOSINOPHIL NFR BLD: 4 % (ref 0–7)
ERYTHROCYTE [DISTWIDTH] IN BLOOD BY AUTOMATED COUNT: 15.5 % (ref 11.5–14.5)
GLUCOSE SERPL-MCNC: 91 MG/DL (ref 65–100)
HCT VFR BLD AUTO: 26.2 % (ref 36.6–50.3)
HGB BLD-MCNC: 8.4 G/DL (ref 12.1–17)
IMM GRANULOCYTES # BLD AUTO: 0 K/UL (ref 0–0.04)
IMM GRANULOCYTES NFR BLD AUTO: 1 % (ref 0–0.5)
LYMPHOCYTES # BLD: 1.1 K/UL (ref 0.8–3.5)
LYMPHOCYTES NFR BLD: 25 % (ref 12–49)
MAGNESIUM SERPL-MCNC: 2 MG/DL (ref 1.6–2.4)
MCH RBC QN AUTO: 29.2 PG (ref 26–34)
MCHC RBC AUTO-ENTMCNC: 32.1 G/DL (ref 30–36.5)
MCV RBC AUTO: 91 FL (ref 80–99)
MONOCYTES # BLD: 0.6 K/UL (ref 0–1)
MONOCYTES NFR BLD: 15 % (ref 5–13)
NEUTS SEG # BLD: 2.4 K/UL (ref 1.8–8)
NEUTS SEG NFR BLD: 55 % (ref 32–75)
NRBC # BLD: 0 K/UL (ref 0–0.01)
NRBC BLD-RTO: 0 PER 100 WBC
PLATELET # BLD AUTO: 143 K/UL (ref 150–400)
PMV BLD AUTO: 11 FL (ref 8.9–12.9)
POTASSIUM SERPL-SCNC: 3.9 MMOL/L (ref 3.5–5.1)
RBC # BLD AUTO: 2.88 M/UL (ref 4.1–5.7)
SODIUM SERPL-SCNC: 139 MMOL/L (ref 136–145)
WBC # BLD AUTO: 4.4 K/UL (ref 4.1–11.1)

## 2022-10-21 PROCEDURE — 74011250637 HC RX REV CODE- 250/637: Performed by: STUDENT IN AN ORGANIZED HEALTH CARE EDUCATION/TRAINING PROGRAM

## 2022-10-21 PROCEDURE — 85025 COMPLETE CBC W/AUTO DIFF WBC: CPT

## 2022-10-21 PROCEDURE — 74011250637 HC RX REV CODE- 250/637: Performed by: HOSPITALIST

## 2022-10-21 PROCEDURE — 74011250636 HC RX REV CODE- 250/636: Performed by: INTERNAL MEDICINE

## 2022-10-21 PROCEDURE — 65270000029 HC RM PRIVATE

## 2022-10-21 PROCEDURE — 74011000258 HC RX REV CODE- 258: Performed by: INTERNAL MEDICINE

## 2022-10-21 PROCEDURE — 83735 ASSAY OF MAGNESIUM: CPT

## 2022-10-21 PROCEDURE — 80048 BASIC METABOLIC PNL TOTAL CA: CPT

## 2022-10-21 PROCEDURE — 74011000250 HC RX REV CODE- 250: Performed by: STUDENT IN AN ORGANIZED HEALTH CARE EDUCATION/TRAINING PROGRAM

## 2022-10-21 PROCEDURE — 36415 COLL VENOUS BLD VENIPUNCTURE: CPT

## 2022-10-21 RX ADMIN — TAMSULOSIN HYDROCHLORIDE 0.4 MG: 0.4 CAPSULE ORAL at 08:33

## 2022-10-21 RX ADMIN — OXYCODONE 5 MG: 5 TABLET ORAL at 15:29

## 2022-10-21 RX ADMIN — MEMANTINE 5 MG: 5 TABLET ORAL at 08:33

## 2022-10-21 RX ADMIN — MEMANTINE 5 MG: 5 TABLET ORAL at 17:18

## 2022-10-21 RX ADMIN — ESCITALOPRAM OXALATE 5 MG: 10 TABLET ORAL at 08:33

## 2022-10-21 RX ADMIN — CARVEDILOL 25 MG: 12.5 TABLET, FILM COATED ORAL at 08:33

## 2022-10-21 RX ADMIN — CARVEDILOL 25 MG: 12.5 TABLET, FILM COATED ORAL at 17:18

## 2022-10-21 RX ADMIN — ACETAMINOPHEN 650 MG: 325 TABLET ORAL at 00:44

## 2022-10-21 RX ADMIN — ACETAMINOPHEN 650 MG: 325 TABLET ORAL at 17:18

## 2022-10-21 RX ADMIN — ACETAMINOPHEN 650 MG: 325 TABLET ORAL at 05:09

## 2022-10-21 RX ADMIN — ACETAMINOPHEN 650 MG: 325 TABLET ORAL at 12:23

## 2022-10-21 RX ADMIN — PANTOPRAZOLE SODIUM 40 MG: 40 TABLET, DELAYED RELEASE ORAL at 08:33

## 2022-10-21 RX ADMIN — PANTOPRAZOLE SODIUM 40 MG: 40 TABLET, DELAYED RELEASE ORAL at 21:22

## 2022-10-21 RX ADMIN — PANTOPRAZOLE SODIUM 40 MG: 40 TABLET, DELAYED RELEASE ORAL at 00:44

## 2022-10-21 RX ADMIN — SODIUM CHLORIDE 1 G: 900 INJECTION INTRAVENOUS at 08:34

## 2022-10-21 NOTE — PROGRESS NOTES
End of Shift Note    Bedside shift change report given to [address] (oncoming nurse) by Zaire Lei RN (offgoing nurse). Report included the following information SBAR, Kardex, MAR, and Recent Results    Shift worked: [id number]   Shift summary and any significant changes:    Pt more alert than the start of shift. No complaints of pain.        Concerns for physician to address:  none   Zone phone for oncoming shift:   [phone]     Patient Information  Carmen Tolliver  81 y.o.  10/19/2022  2:00 PM by Lupe Hensley MD. 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/14/2022    Severe recurrent major depression without psychotic features ([address] 06/01/2021    Dementia ([address] 06/01/2021    Autoimmune hepatitis ([address] 11/01/2020    History of partial colectomy 11/01/2020    Diverticulitis 11/01/2020    Penile cancer ([address] 08/18/2020    Bilateral carotid artery stenosis 10/29/2019    GERD (gastroesophageal reflux disease) 08/29/2018    Hyperlipidemia 04/05/2012    PVC (premature ventricular contraction) 12/21/2010    Hypertension 12/06/2010    TIA (transient ischemic attack) 12/06/2010    Leg edema 12/06/2010     Past Medical History:   Diagnosis Date    Arthritis     left arm    Autoimmune hepatitis ([address]     Bleeding ulcer 07/2019    BPH (benign prostatic hyperplasia)     CAD (coronary artery disease)     s/p stent    Cancer ([facility])     penile squamous carcinoma    Chronic kidney disease     stage 2     Chronic obstructive pulmonary disease (H[facility])     Dementia (H[facility])     Elevated LFT's     Essential hypertension 9/24/01    GERD (gastroesophageal reflux disease)     hiatal hernia    Ill-defined condition 12/02/2016    faint    Nephrosclerosis     Orthostatic hypotension 9/24/01    PVC's 9/24/01 Sleep apnea     no CPAP    Syncope 9/24/01    secondary to venous insuffiency        Core Measures:  CVA: No No  CHF:No No  PNA:No No    Activity:  Activity Level: Bed Rest  Number times ambulated in hallways past shift: 0  Number of times OOB to chair past shift: 0    Cardiac:   Cardiac Monitoring: No           Access:   Current line(s): PIV   Central Line? No Placement date na Reason Medically Necessary na  PICC LINE? No Placement date na Reason Medically Necessary na    Genitourinary:   Urinary status: voiding and incontinent  Urinary Catheter? No Placement Date na Reason Medically Necessary na    Respiratory:   O2 Device: None (Room air)  Chronic home O2 use?: NO  Incentive spirometer at bedside: NO       GI:  Last Bowel Movement Date: 10/20/22  Current diet:  ADULT DIET Regular  Passing flatus: YES  Tolerating current diet: YES       Pain Management:   Patient states pain is manageable on current regimen: YES    Skin:  Sergey Score: 15  Interventions: PT/OT consult    Patient Safety:  Fall Score:  Total Score: 5  Interventions: bed/chair alarm  High Fall Risk: Yes  @Rollbelt  @dexterity to release roll belt  Yes/No ( must document dexterity  here by stating Yes or No here, otherwise this is a restraint and must follow restraint documentation policy.)    DVT prophylaxis:  DVT prophylaxis Med- No  DVT prophylaxis SCD or CHARLEEN- No     Wounds: (If Applicable)  Wounds- No  Location na    Active Consults:  IP CONSULT TO HOSPITALIST  IP CONSULT TO PULMONOLOGY    Length of Stay:  Expected LOS: 2d 21h  Actual LOS: 2  Discharge Plan: Yes NENITA Lindsey RN

## 2022-10-21 NOTE — PROGRESS NOTES
Problem: Patient Education: Go to Patient Education Activity  Goal: Patient/Family Education  Outcome: Progressing Towards Goal     Problem: Patient Education: Go to Patient Education Activity  Goal: Patient/Family Education  Outcome: Progressing Towards Goal     Problem: Pressure Injury - Risk of  Goal: *Prevention of pressure injury  Description: Document Sergey Scale and appropriate interventions in the flowsheet. Outcome: Progressing Towards Goal  Note: Pressure Injury Interventions:  Sensory Interventions: Assess changes in LOC, Check visual cues for pain    Moisture Interventions: Absorbent underpads    Activity Interventions: PT/OT evaluation    Mobility Interventions: HOB 30 degrees or less    Nutrition Interventions: Document food/fluid/supplement intake    Friction and Shear Interventions: Minimize layers                Problem: Patient Education: Go to Patient Education Activity  Goal: Patient/Family Education  Outcome: Progressing Towards Goal     Problem: Falls - Risk of  Goal: *Absence of Falls  Description: Document Roxann Fall Risk and appropriate interventions in the flowsheet.   Outcome: Progressing Towards Goal  Note: Fall Risk Interventions:  Mobility Interventions: Bed/chair exit alarm    Mentation Interventions: Reorient patient    Medication Interventions: Patient to call before getting OOB    Elimination Interventions: Call light in reach, Bed/chair exit alarm    History of Falls Interventions: Bed/chair exit alarm         Problem: Patient Education: Go to Patient Education Activity  Goal: Patient/Family Education  Outcome: Progressing Towards Goal

## 2022-10-21 NOTE — PROGRESS NOTES
Hospitalist Progress Note    NAME: Molly Ferrer   :  1940   MRN:  [id number]     Interim Hospital Summary: 81 y.o. male whom presented on 10/19/2022 with      Assessment / Plan:  CHELO:  patient medically clear for discharge,  Barriers: Safe dispo    Left 5-9th Ribs fracture s/p GLF   -CT head nothing acute  -CT C spine:  No acute fracture. Multilevel mod to severe canal and foraminal stenosis  -CTchest and pelvis:  No pelvic fracture. Acute min displaced fractures of LEFT fifth through ninth ribs. There are segmental fracture of the LEFT sixth through eighth ribs  -Pain management: Multimodal, will schedule Tylenol and use low-dose oxy as needed; lidocaine patch  -PT OT eval.  May benefit from SNF rehab before returning to his memory unit        UTI  -UA with  wbc, 2+ bacteria.   -given he fell, will treat with Rocephin, continue     LETICIA, resolved  Hypokalemia  Baseline creatinine 0.9, admission 1.4  -resolved with hydration. Continue IVF     Large left-sided pleural effusion  small left chest subpleural hematoma  CT: Large left-sided pleural effusion with associated left lower lobe atelectasis, there is also a small left chest subpleural hematoma  -continue to hold ASA   -incentive spirometry  -Pulmonary input requested. Anemia of chronic disease  -Hg trending down likely from hemodilution. S/P bolus in ED. Autoimmune hepatitis  Dementia Continue Namenda  Penile cancer status post resection  BPH Continue flomax   Hyperlipidemia         Code Status: Full code  Surrogate Decision Maker: Daughter     DVT Prophylaxis: SCD, no AC with small hematoma on CT  GI Prophylaxis: not indicated     Baseline: Dementia, [facility] memory care unit   Recommended Disposition: SNF/LTC    25.0 - 29.9 Overweight / Body mass index is 26.94 kg/m².        Subjective:     Chief Complaint / Reason for Physician Visit  Follow up of rib fractures, s/p fall, ABLA, LETICIA  Chart reviewed in detail. Discussed with RN events overnight. Review of Systems:  Symptom Y/N Comments  Symptom Y/N Comments   Fever/Chills    Chest Pain     Poor Appetite    Edema     Cough    Abdominal Pain     Sputum    Joint Pain     SOB/DE JESUS    Pruritis/Rash     Nausea/vomit    Tolerating PT/OT     Diarrhea    Tolerating Diet     Constipation    Other       Could NOT obtain due to: Sleepy, dementia     PO intake: No data found. Objective:     VITALS:   Last 24hrs VS reviewed since prior progress note. Most recent are:  Patient Vitals for the past 24 hrs:   Temp Pulse Resp BP SpO2   10/21/22 0739 98.5 °F (36.9 °C) 66 18 (!) 154/69 90 %   10/20/22 2338 98.4 °F (36.9 °C) 64 18 131/64 92 %   10/20/22 1703 97.9 °F (36.6 °C) 69 18 133/63 92 %       No intake or output data in the 24 hours ending 10/21/22 1400     I had a face to face encounter, and independently examined this patient on 10/21/2022, as outlined below:  PHYSICAL EXAM:  General: WD, WN. Alert, cooperative, no acute distress    EENT:  EOMI. Anicteric sclerae. MMM  Resp:  CTA bilaterally, no wheezing or rales. No accessory muscle use  CV:  Regular  rhythm,  No edema  GI:  Soft, Non distended, Non tender. +Bowel sounds  Neurologic:  Alert and oriented X self, normal speech, weak but non focal  Psych:   Poor insight. Not anxious nor agitated  Skin:  No rashes. No jaundice  (+) left chest wall bruise    Reviewed most current lab test results and cultures  YES  Reviewed most current radiology test results   YES  Review and summation of old records today    NO  Reviewed patient's current orders and MAR    YES  PMH/SH reviewed - no change compared to H&P  ________________________________________________________________________  Care Plan discussed with:    Comments   Patient x    Family  x    RN     Care Manager     Consultant                        Multidiciplinary team rounds were held today with , nursing, pharmacist and clinical coordinator. Patient's plan of care was discussed; medications were reviewed and discharge planning was addressed. ________________________________________________________________________  Total NON critical care TIME:  25   Minutes    Total CRITICAL CARE TIME Spent:   Minutes non procedure based      Comments   >50% of visit spent in counseling and coordination of care x     This includes time during multidisciplinary rounds if indicated above   ________________________________________________________________________  Yessica Sweeney MD     Procedures: see electronic medical records for all procedures/Xrays and details which were not copied into this note but were reviewed prior to creation of Plan. LABS:  I reviewed today's most current labs and imaging studies.   Pertinent labs include:  Recent Labs     10/21/22  0035 10/20/22  0426 10/19/22  1424   WBC 4.4 5.7 8.8   HGB 8.4* 8.7* 9.9*   HCT 26.2* 26.6* 30.4*   * 136* 154       Recent Labs     10/21/22  0035 10/20/22  0426 10/19/22  1424    146* 140   K 3.9 2.9* 4.3   * 120* 108   CO2 22 19* 25   GLU 91 76 129*   BUN 35* 34* 41*   CREA 1.16 1.01 1.41*   CA 8.2* 6.5* 8.6   MG 2.0 1.6  --    PHOS  --  2.4*  --    ALB  --   --  2.9*   TBILI  --   --  0.8   ALT  --   --  46

## 2022-10-21 NOTE — PROGRESS NOTES
Problem: Patient Education: Go to Patient Education Activity  Goal: Patient/Family Education  Outcome: Progressing Towards Goal     Problem: Patient Education: Go to Patient Education Activity  Goal: Patient/Family Education  Outcome: Progressing Towards Goal     Problem: Pressure Injury - Risk of  Goal: *Prevention of pressure injury  Description: Document Sergey Scale and appropriate interventions in the flowsheet.   Outcome: Progressing Towards Goal  Note: Pressure Injury Interventions:  Sensory Interventions: Assess changes in LOC, Assess need for specialty bed    Moisture Interventions: Absorbent underpads    Activity Interventions: PT/OT evaluation    Mobility Interventions: HOB 30 degrees or less    Nutrition Interventions: Document food/fluid/supplement intake, Offer support with meals,snacks and hydration    Friction and Shear Interventions: HOB 30 degrees or less                Problem: Patient Education: Go to Patient Education Activity  Goal: Patient/Family Education  Outcome: Progressing Towards Goal

## 2022-10-21 NOTE — PROGRESS NOTES
Initial note: Chart reviewed. PT/OT recommendations for return to senior living w/ HH vs SNF placement acknowledged. Pt will likely need SNF intervention prior to returning to [facility] (PT note from 10/20/22 details pt is requiring mod assistance to roll, max assistance of 2 to and from edge of bed). Pt will require insurance auth for SNF; updated PT/OT notes needed until auth is obtained. CM attempted to meet with pt at bedside to introduce role, complete inital assessment, and discuss direction of disposition. Pt presenting alert but confused; no family present at bedside. CM will follow up with pt's daughter Jem Sanchez: [phone]) to complete initial assessment & discuss d/c plan. Updates to be provided once available.     Mone Stevenson, MSW  Care Manager, [facility]

## 2022-10-21 NOTE — CONSULTS
Pulmonary, Critical Care, and Sleep Medicine     Patient Consult    Name: Meet Lazaro MRN: [id number]   : 1940 Hospital: [facility]   Date: 10/21/2022        IMPRESSION:   Encephalopathy, seem much better today. s/p Fall hit his left chest, had Had 5-9 rib fractures on left side. He does state he fell and struck his left chest.   Has Left multiple rib fractures  Large left side pleural effusion  ? Hemothorax. Anemia: hgb of 8.7  CAD  GED  Syncope      RECOMMENDATIONS:   Will watch Hgb  He appears comfortable when seen   If breathing worsens would needed thoracentesis. Would agree he appears stable from pulmonary standpoint for discharge. Will see again as needed. Subjective:     Pt has been feeling better. Has moderate left sided chest pain. He was eating breakfast when seen this am.   No coughing.       --  Cc: Abdominal pain    This patient has been seen and evaluated at the request of Dr. Janine Pfeiffer for above. Patient is a 81 y.o. male who was sent over by pt first. For abdominal pain. Was noted to have pleural effusion, rib fractures. Left sided pain. He lives as an assisted living facility. Had bruising on left sided. He had an unwitnessed fall. Not able to get hx from pt.    Discussed with Dr. Janine Pfeiffer       Past Medical History:   Diagnosis Date    Arthritis     left arm    Autoimmune hepatitis ([facility] [address]     Bleeding ulcer 2019    BPH (benign prostatic hyperplasia)     CAD (coronary artery disease)     s/p stent    Cancer (HCC)     penile squamous carcinoma    Chronic kidney disease     stage 2     Chronic obstructive pulmonary disease (HCC)     Dementia (HCC)     Elevated LFT's     Essential hypertension 01    GERD (gastroesophageal reflux disease)     hiatal hernia    Ill-defined condition 2016    faint    Nephrosclerosis     Orthostatic hypotension 01    PVC's 01    Sleep apnea     no CPAP    Syncope 01    secondary to venous insuffiency Past Surgical History:   Procedure Laterality Date    COLONOSCOPY N/A 12/13/2016    COLONOSCOPY performed by Luisa Diaz MD at [facility] ENDOSCOPY    COLONOSCOPY N/A 12/11/2019    COLONOSCOPY performed by Tremayne Khan MD at [address]; HI RISK IND  12/11/2019         ECHO 2D ADULT  5/24/12    EF 60 - 65%; mild concentric hypertrophy; pulmonary systolic artery pressure upper limits normal    HX ABDOMINAL WALL DEFECT REPAIR  07/01/2019d     Exploratory laparotomy, segmental sigmoid colon resection and primary stapled anastomosis. HX APPENDECTOMY  1985    HX HEENT  02/18/2020    Left temporal artery biopsy    HX HERNIA REPAIR  08/29/2018    Davinci hiatal hernia repair- Silvia Pham MD    HX OTHER SURGICAL  08/2020    penile lesion bx    MD CARDIAC SURG PROCEDURE UNLIST  05/13/2019    1 stent    UPPER GI ENDOSCOPY,BIOPSY  12/11/2019         UPPER GI ENDOSCOPY,DIAGNOSIS  7/18/2019           Prior to Admission medications    Medication Sig Start Date End Date Taking? Authorizing Provider   tamsulosin (Flomax) 0.4 mg capsule Take 0.4 mg by mouth daily. Yes Other, MD Luciana   fluticasone propionate (FLONASE) 50 mcg/actuation nasal spray 2 Sprays by Both Nostrils route daily. 5/19/22  Yes Placido Sullivan MD   pantoprazole (PROTONIX) 40 mg tablet Take 1 Tablet by mouth ACB/HS. 5/19/22  Yes Placido Sullivan MD   L.acid,para-B. bifidum-S.therm (RISAQUAD) 8 billion cell cap cap Take 1 Capsule by mouth daily. 5/20/22  Yes Placido Sullivan MD   memantine (NAMENDA) 5 mg tablet Take 1 Tablet by mouth two (2) times a day. 5/19/22  Yes Placido Sullivan MD   escitalopram oxalate (LEXAPRO) 5 mg tablet Take 5 mg by mouth daily. Yes Provider, Historical   aspirin delayed-release 81 mg tablet Take 81 mg by mouth daily. Yes Provider, Historical   carvediloL (COREG) 12.5 mg tablet Take 2 Tabs by mouth two (2) times daily (with meals).  11/27/20  Yes Fransisco Lira MD     Allergies   Allergen Reactions Ace Inhibitors Other (comments)     Doesn't agree with pt    Demerol [Meperidine] Unknown (comments)     Causes unconsciousness      Social History     Tobacco Use    Smoking status: Former     Packs/day: 3.50     Types: Cigarettes     Quit date: 1980     Years since quittin.8    Smokeless tobacco: Never   Substance Use Topics    Alcohol use: Not Currently     Comment: no alcohol since       Family History   Problem Relation Age of Onset    Stroke Mother     Stroke Father     Heart Disease Father         Current Facility-Administered Medications   Medication Dose Route Frequency    0.45% sodium chloride with KCl 20 mEq/L infusion   IntraVENous CONTINUOUS    cefTRIAXone (ROCEPHIN) 1 g in 0.9% sodium chloride (MBP/ADV) 50 mL MBP  1 g IntraVENous Q24H    lidocaine 4 % patch 1 Patch  1 Patch TransDERmal Q24H    carvediloL (COREG) tablet 25 mg  25 mg Oral BID WITH MEALS    escitalopram oxalate (LEXAPRO) tablet 5 mg  5 mg Oral DAILY    fluticasone propionate (FLONASE) 50 mcg/actuation nasal spray 2 Spray  2 Spray Both Nostrils DAILY    memantine (NAMENDA) tablet 5 mg  5 mg Oral BID    pantoprazole (PROTONIX) tablet 40 mg  40 mg Oral ACB/HS    tamsulosin (FLOMAX) capsule 0.4 mg  0.4 mg Oral DAILY    acetaminophen (TYLENOL) tablet 650 mg  650 mg Oral Q6H       Review of Systems:  Review of systems not obtained due to patient factors. Objective:   Vital Signs:    Visit Vitals  BP (!) 154/69   Pulse 66   Temp 98.5 °F (36.9 °C)   Resp 18   Ht 5' 4\" (1.626 m)   Wt 71.2 kg (156 lb 15.5 oz)   SpO2 90%   BMI 26.94 kg/m²       O2 Device: None (Room air)       Temp (24hrs), Av.3 °F (36.8 °C), Min:97.9 °F (36.6 °C), Max:98.5 °F (36.9 °C)       Intake/Output:   Last shift:      No intake/output data recorded.   Last 3 shifts: 10/19 1901 - 10/21 0700  In: 478.3 [I.V.:478.3]  Out: -   No intake or output data in the 24 hours ending 10/21/22 0914     Physical Exam:   General:  Sleeping, no cooperative, no distress, appears stated age. ON stretcher in ER. Head:  Normocephalic, without obvious abnormality, atraumatic. Eyes:  Conjunctivae/corneas clear. PERRL, EOMs intact. Nose: Nares normal. Septum midline. Mucosa normal. No drainage or sinus tenderness. Throat: Lips, mucosa, and tongue normal. Teeth and gums normal.   Neck: Supple, symmetrical, trachea midline, no adenopathy, thyroid: no enlargment/tenderness/nodules, no carotid bruit and no JVD. Back:   Symmetric, no curvature. ROM normal.   Lungs:   Clear to auscultation bilaterally. Decreased BS of the left base. Chest wall:  No tenderness or deformity. Heart:  Regular rate and rhythm, S1, S2 normal, no murmur, click, rub or gallop. Abdomen:   Soft, non-tender. Bowel sounds normal. No masses,  No organomegaly. Extremities: Extremities normal, atraumatic, no cyanosis or edema. Pulses: 2+ and symmetric all extremities.    Skin: Skin color, texture, turgor normal. No rashes or lesions   Lymph nodes: Cervical, supraclavicular, and axillary nodes normal.   Neurologic: Grossly nonfocal, not able to fully evaluate      Data review:     Recent Results (from the past 24 hour(s))   TYPE & SCREEN    Collection Time: 10/20/22 11:44 AM   Result Value Ref Range    Crossmatch Expiration 10/23/2022,2359     ABO/Rh(D) Lakia Hardin POSITIVE     Antibody screen NEG    METABOLIC PANEL, BASIC    Collection Time: 10/21/22 12:35 AM   Result Value Ref Range    Sodium 139 136 - 145 mmol/L    Potassium 3.9 3.5 - 5.1 mmol/L    Chloride 110 (H) 97 - 108 mmol/L    CO2 22 21 - 32 mmol/L    Anion gap 7 5 - 15 mmol/L    Glucose 91 65 - 100 mg/dL    BUN 35 (H) 6 - 20 MG/DL    Creatinine 1.16 0.70 - 1.30 MG/DL    BUN/Creatinine ratio 30 (H) 12 - 20      eGFR >60 >60 ml/min/1.73m2    Calcium 8.2 (L) 8.5 - 10.1 MG/DL   MAGNESIUM    Collection Time: 10/21/22 12:35 AM   Result Value Ref Range    Magnesium 2.0 1.6 - 2.4 mg/dL   CBC WITH AUTOMATED DIFF    Collection Time: 10/21/22 12:35 AM   Result Value Ref Range    WBC 4.4 4.1 - 11.1 K/uL    RBC 2.88 (L) 4.10 - 5.70 M/uL    HGB 8.4 (L) 12.1 - 17.0 g/dL    HCT 26.2 (L) 36.6 - 50.3 %    MCV 91.0 80.0 - 99.0 FL    MCH 29.2 26.0 - 34.0 PG    MCHC 32.1 30.0 - 36.5 g/dL    RDW 15.5 (H) 11.5 - 14.5 %    PLATELET 985 (L) 451 - 400 K/uL    MPV 11.0 8.9 - 12.9 FL    NRBC 0.0 0  WBC    ABSOLUTE NRBC 0.00 0.00 - 0.01 K/uL    NEUTROPHILS 55 32 - 75 %    LYMPHOCYTES 25 12 - 49 %    MONOCYTES 15 (H) 5 - 13 %    EOSINOPHILS 4 0 - 7 %    BASOPHILS 1 0 - 1 %    IMMATURE GRANULOCYTES 1 (H) 0.0 - 0.5 %    ABS. NEUTROPHILS 2.4 1.8 - 8.0 K/UL    ABS. LYMPHOCYTES 1.1 0.8 - 3.5 K/UL    ABS. MONOCYTES 0.6 0.0 - 1.0 K/UL    ABS. EOSINOPHILS 0.2 0.0 - 0.4 K/UL    ABS. BASOPHILS 0.1 0.0 - 0.1 K/UL    ABS. IMM. GRANS. 0.0 0.00 - 0.04 K/UL    DF AUTOMATED         Imaging:  I have personally reviewed the patients radiographs and have reviewed the reports:  10-19-22: CT of chest:   IMPRESSION     1. Acute, minimally displaced fractures of the LEFT fifth through ninth ribs. There are segmental fractures of the LEFT sixth through eighth ribs. 2. There is a large left-sided pleural effusion with associated LEFT lower lobe  atelectasis. There is also a small LEFT chest subpleural hematoma. 3. No pneumothorax. 4. No acute, traumatic findings in the abdomen or pelvis.         Celestine Schafer MD

## 2022-10-21 NOTE — PROGRESS NOTES
End of Shift Note     Bedside shift change report given to Janalee Crigler (oncoming nurse) by Perla Bentley RN (offgoing nurse). Report included the following information SBAR, Kardex, MAR, and Recent Results     Shift worked:  Days   Shift summary and any significant changes:      continues on pain management with improvement. Pt appears more alert and oriented today.          Concerns for physician to address:  none   Zone phone for oncoming shift:   [phone]      Patient Information  Ray Cuello  81 y.o.  10/19/2022  2:00 PM by Shayna Salazar MD. Ray Cuello was admitted from Assisted Living     Problem List       Patient Active Problem List     Diagnosis Date Noted    Ribs, multiple fractures 10/19/2022    Late onset Alzheimer's disease without behavioral disturbance ([address] 05/14/2022    Cerebral microvascular disease 05/14/2022    Severe recurrent major depression without psychotic features ([address] 06/01/2021    Dementia ([address] 06/01/2021    Autoimmune hepatitis ([address] 11/01/2020    History of partial colectomy 11/01/2020    Diverticulitis 11/01/2020    Penile cancer ([address] 08/18/2020    Bilateral carotid artery stenosis 10/29/2019    GERD (gastroesophageal reflux disease) 08/29/2018    Hyperlipidemia 04/05/2012    PVC (premature ventricular contraction) 12/21/2010    Hypertension 12/06/2010    TIA (transient ischemic attack) 12/06/2010    Leg edema 12/06/2010           Past Medical History:   Diagnosis Date    Arthritis       left arm    Autoimmune hepatitis ([address]      Bleeding ulcer 07/2019    BPH (benign prostatic hyperplasia)      CAD (coronary artery disease)       s/p stent    Cancer (HCC)       penile squamous carcinoma    Chronic kidney disease       stage 2     Chronic obstructive pulmonary disease (HCC)      Dementia (HCC)      Elevated LFT's      Essential hypertension 9/24/01    GERD (gastroesophageal reflux disease)       hiatal hernia    Ill-defined condition 12/02/2016     faint Nephrosclerosis      Orthostatic hypotension 9/24/01    PVC's 9/24/01    Sleep apnea       no CPAP    Syncope 9/24/01     secondary to venous insuffiency          Core Measures:  CVA: No No  CHF:No No  PNA:No No     Activity:  Activity Level: Bed Rest  Number times ambulated in hallways past shift: 0  Number of times OOB to chair past shift: 0     Cardiac:   Cardiac Monitoring: No         Access:   Current line(s): PIV   Central Line? No Placement date na Reason Medically Necessary na  PICC LINE? No Placement date na Reason Medically Necessary na     Genitourinary:   Urinary status: voiding and incontinent  Urinary Catheter? No Placement Date na Reason Medically Necessary na     Respiratory:   O2 Device: None (Room air)  Chronic home O2 use?: NO  Incentive spirometer at bedside: NO     GI:  Last Bowel Movement Date: 10/20/22  Current diet:  ADULT DIET Regular  Passing flatus: YES  Tolerating current diet: YES     Pain Management:   Patient states pain is manageable on current regimen: YES     Skin:  Sergey Score: 15  Interventions: PT/OT consult    Patient Safety:  Fall Score:  Total Score: 5  Interventions: bed/chair alarm  High Fall Risk: Yes  @Rollbelt  @dexterity to release roll belt  Yes/No ( must document dexterity  here by stating Yes or No here, otherwise this is a restraint and must follow restraint documentation policy.)     DVT prophylaxis:  DVT prophylaxis Med- No  DVT prophylaxis SCD or CHARLEEN- No      Wounds: (If Applicable)  Wounds- No  Location na     Active Consults:  IP CONSULT TO HOSPITALIST  IP CONSULT TO PULMONOLOGY     Length of Stay:  Expected LOS: 2d 21h  Actual LOS: 1  Discharge Plan: Yes NENITA Hutton RN

## 2022-10-21 NOTE — WOUND CARE
Wound Care Nurse consult: consult placed by staff nurse stating \"new admit\". Wound flowsheet shows:  Groin - moisture associated skin damage  Left flank trauma  Left arm skin tear  (No description of any thing)    Patient is a 82 y/o CM admitted for GLF with ribs, multiple fractures    Patient is incontinent of urine and has a brief on and there is Zinc protective cream in room and on patient. No evidence of open skin in groins. Left flank trauma is ecchymosis - no open skin  Left arm skin tear are old, established scabs. He does have thin fragile skin to arms, but no new wounds. Nursing miscellaneous orders placed for MASD and any skin tears that may occur.     Mee Ochoa RN, [employer]

## 2022-10-22 PROCEDURE — 74011000258 HC RX REV CODE- 258: Performed by: INTERNAL MEDICINE

## 2022-10-22 PROCEDURE — 74011000250 HC RX REV CODE- 250: Performed by: STUDENT IN AN ORGANIZED HEALTH CARE EDUCATION/TRAINING PROGRAM

## 2022-10-22 PROCEDURE — 74011250637 HC RX REV CODE- 250/637: Performed by: STUDENT IN AN ORGANIZED HEALTH CARE EDUCATION/TRAINING PROGRAM

## 2022-10-22 PROCEDURE — 65270000029 HC RM PRIVATE

## 2022-10-22 PROCEDURE — 74011250637 HC RX REV CODE- 250/637: Performed by: HOSPITALIST

## 2022-10-22 PROCEDURE — 74011250636 HC RX REV CODE- 250/636: Performed by: INTERNAL MEDICINE

## 2022-10-22 RX ADMIN — ACETAMINOPHEN 650 MG: 325 TABLET ORAL at 12:08

## 2022-10-22 RX ADMIN — ACETAMINOPHEN 650 MG: 325 TABLET ORAL at 02:28

## 2022-10-22 RX ADMIN — MEMANTINE 5 MG: 5 TABLET ORAL at 17:14

## 2022-10-22 RX ADMIN — OXYCODONE 5 MG: 5 TABLET ORAL at 22:17

## 2022-10-22 RX ADMIN — ESCITALOPRAM OXALATE 5 MG: 10 TABLET ORAL at 08:47

## 2022-10-22 RX ADMIN — CARVEDILOL 25 MG: 12.5 TABLET, FILM COATED ORAL at 17:14

## 2022-10-22 RX ADMIN — MEMANTINE 5 MG: 5 TABLET ORAL at 08:47

## 2022-10-22 RX ADMIN — TAMSULOSIN HYDROCHLORIDE 0.4 MG: 0.4 CAPSULE ORAL at 08:46

## 2022-10-22 RX ADMIN — POTASSIUM CHLORIDE AND SODIUM CHLORIDE: 450; 150 INJECTION, SOLUTION INTRAVENOUS at 09:36

## 2022-10-22 RX ADMIN — SODIUM CHLORIDE 1 G: 900 INJECTION INTRAVENOUS at 08:47

## 2022-10-22 RX ADMIN — ACETAMINOPHEN 650 MG: 325 TABLET ORAL at 17:14

## 2022-10-22 RX ADMIN — PANTOPRAZOLE SODIUM 40 MG: 40 TABLET, DELAYED RELEASE ORAL at 22:17

## 2022-10-22 RX ADMIN — CARVEDILOL 25 MG: 12.5 TABLET, FILM COATED ORAL at 08:47

## 2022-10-22 RX ADMIN — ACETAMINOPHEN 650 MG: 325 TABLET ORAL at 06:34

## 2022-10-22 RX ADMIN — PANTOPRAZOLE SODIUM 40 MG: 40 TABLET, DELAYED RELEASE ORAL at 06:34

## 2022-10-22 RX ADMIN — FLUTICASONE PROPIONATE 2 SPRAY: 50 SPRAY, METERED NASAL at 11:11

## 2022-10-22 RX ADMIN — OXYCODONE 5 MG: 5 TABLET ORAL at 14:43

## 2022-10-22 NOTE — PROGRESS NOTES
End of Shift Note     Bedside shift change report given to [address] [place] [address] 107 (oncoming nurse) by Niko Mcconnell RN (offgoing nurse). Report included the following information SBAR, Kardex, MAR, and Recent Results     Shift worked:  Days   Shift summary and any significant changes:     Continues on pain management with improvement. Patient continues to be confused.       Concerns for physician to address:  none   Zone phone for oncoming shift:   [phone]      Patient Information  Rush Harris  81 y.o.  10/19/2022  2:00 PM by Toshia Steele MD. Rush Harris was admitted from Assisted Living     Problem List          Patient Active Problem List     Diagnosis Date Noted    Ribs, multiple fractures 10/19/2022    Late onset Alzheimer's disease without behavioral disturbance ([address] 05/14/2022    Cerebral microvascular disease 05/14/2022    Severe recurrent major depression without psychotic features ([address] 06/01/2021    Dementia ([address] 06/01/2021    Autoimmune hepatitis ([address] 11/01/2020    History of partial colectomy 11/01/2020    Diverticulitis 11/01/2020    Penile cancer ([address] 08/18/2020    Bilateral carotid artery stenosis 10/29/2019    GERD (gastroesophageal reflux disease) 08/29/2018    Hyperlipidemia 04/05/2012    PVC (premature ventricular contraction) 12/21/2010    Hypertension 12/06/2010    TIA (transient ischemic attack) 12/06/2010    Leg edema 12/06/2010              Past Medical History:   Diagnosis Date    Arthritis       left arm    Autoimmune hepatitis ([address]      Bleeding ulcer 07/2019    BPH (benign prostatic hyperplasia)      CAD (coronary artery disease)       s/p stent    Cancer (HCC)       penile squamous carcinoma    Chronic kidney disease       stage 2     Chronic obstructive pulmonary disease (HCC)      Dementia (HCC)      Elevated LFT's      Essential hypertension 9/24/01    GERD (gastroesophageal reflux disease)       hiatal hernia    Ill-defined condition 12/02/2016     faint    Nephrosclerosis Orthostatic hypotension 9/24/01    PVC's 9/24/01    Sleep apnea       no CPAP    Syncope 9/24/01     secondary to venous insuffiency          Core Measures:  CVA: No No  CHF:No No  PNA:No No     Activity:  Activity Level: Bed Rest  Number times ambulated in hallways past shift: 0  Number of times OOB to chair past shift: 0     Cardiac:   Cardiac Monitoring: No         Access:   Current line(s): PIV   Central Line? No Placement date na Reason Medically Necessary na  PICC LINE? No Placement date na Reason Medically Necessary na     Genitourinary:   Urinary status: voiding and incontinent  Urinary Catheter? No Placement Date na Reason Medically Necessary na     Respiratory:   O2 Device: None (Room air)  Chronic home O2 use?: NO  Incentive spirometer at bedside: NO     GI:  Last Bowel Movement Date: 10/20/22  Current diet:  ADULT DIET Regular  Passing flatus: YES  Tolerating current diet: YES     Pain Management:   Patient states pain is manageable on current regimen: YES     Skin:  Sergey Score: 15  Interventions: PT/OT consult    Patient Safety:  Fall Score:  Total Score: 5  Interventions: bed/chair alarm  High Fall Risk: Yes  @Rollbelt  @dexterity to release roll belt  Yes/No ( must document dexterity  here by stating Yes or No here, otherwise this is a restraint and must follow restraint documentation policy.)     DVT prophylaxis:  DVT prophylaxis Med- No  DVT prophylaxis SCD or CHARLEEN- No      Wounds: (If Applicable)  Wounds- No  Location na     Active Consults:  IP CONSULT TO HOSPITALIST  IP CONSULT TO PULMONOLOGY     Length of Stay:  Expected LOS: 2d 21h  Actual LOS: 3  Discharge Plan: Yes To SNF, pending placement         Nelia Wick RN

## 2022-10-22 NOTE — PROGRESS NOTES
End of Shift Note     Bedside shift change report given to Nickie RAZA (oncoming nurse) by Daniela Thorne RN (offgoing nurse). Report included the following information SBAR, Kardex, MAR, and Recent Results     Shift worked: night   Shift summary and any significant changes:      Uneventful night, call bell and phone within reach of patient. Bed alarm on zone 2 .  Made comfortable          Concerns for physician to address:  none   Zone phone for oncoming shift:   [phone]      Patient Information  Horacio Merritt  81 y.o.  10/19/2022  2:00 PM by Laurent Oconnor MD. Horacio Merritt was admitted from Assisted Living     Problem List       Patient Active Problem List     Diagnosis Date Noted    Ribs, multiple fractures 10/19/2022    Late onset Alzheimer's disease without behavioral disturbance ([address] 05/14/2022    Cerebral microvascular disease 05/14/2022    Severe recurrent major depression without psychotic features ([address] 06/01/2021    Dementia ([address] 06/01/2021    Autoimmune hepatitis ([address] 11/01/2020    History of partial colectomy 11/01/2020    Diverticulitis 11/01/2020    Penile cancer ([address] 08/18/2020    Bilateral carotid artery stenosis 10/29/2019    GERD (gastroesophageal reflux disease) 08/29/2018    Hyperlipidemia 04/05/2012    PVC (premature ventricular contraction) 12/21/2010    Hypertension 12/06/2010    TIA (transient ischemic attack) 12/06/2010    Leg edema 12/06/2010           Past Medical History:   Diagnosis Date    Arthritis       left arm    Autoimmune hepatitis ([address]      Bleeding ulcer 07/2019    BPH (benign prostatic hyperplasia)      CAD (coronary artery disease)       s/p stent    Cancer (HCC)       penile squamous carcinoma    Chronic kidney disease       stage 2     Chronic obstructive pulmonary disease (HCC)      Dementia (HCC)      Elevated LFT's      Essential hypertension 9/24/01    GERD (gastroesophageal reflux disease)       hiatal hernia    Ill-defined condition 12/02/2016     faint Nephrosclerosis      Orthostatic hypotension 9/24/01    PVC's 9/24/01    Sleep apnea       no CPAP    Syncope 9/24/01     secondary to venous insuffiency          Core Measures:  CVA: No No  CHF:No No  PNA:No No     Activity:  Activity Level: Bed Rest  Number times ambulated in hallways past shift: 0  Number of times OOB to chair past shift: 0     Cardiac:   Cardiac Monitoring: No         Access:   Current line(s): PIV   Central Line? No Placement date na Reason Medically Necessary na  PICC LINE? No Placement date na Reason Medically Necessary na     Genitourinary:   Urinary status: voiding and incontinent  Urinary Catheter? No Placement Date na Reason Medically Necessary na     Respiratory:   O2 Device: None (Room air)  Chronic home O2 use?: NO  Incentive spirometer at bedside: NO     GI:  Last Bowel Movement Date: 10/20/22  Current diet:  ADULT DIET Regular  Passing flatus: YES  Tolerating current diet: YES     Pain Management:   Patient states pain is manageable on current regimen: YES     Skin:  Sergey Score: 15  Interventions: PT/OT consult    Patient Safety:  Fall Score:  Total Score: 5  Interventions: bed/chair alarm  High Fall Risk: Yes  @Rollbelt  @dexterity to release roll belt  Yes/No ( must document dexterity  here by stating Yes or No here, otherwise this is a restraint and must follow restraint documentation policy.)     DVT prophylaxis:  DVT prophylaxis Med- No  DVT prophylaxis SCD or CHARLEEN- No      Wounds: (If Applicable)  Wounds- No  Location na     Active Consults:  IP CONSULT TO HOSPITALIST  IP CONSULT TO PULMONOLOGY     Length of Stay:  Expected LOS: 2d 21h  Actual LOS: 1  Discharge Plan: Yes NENITA      [facility], RN

## 2022-10-22 NOTE — PROGRESS NOTES
Transition of Care Plan:    RUR: 16%  Disposition: [facility]   Follow up appointments: PCP  DME needed: None  Transportation at Discharge: Pt will need medical transport at d/c.   [place] or means to access home:  Pt has access      IM Medicare Letter: 2nd IM Letter needed  Is patient a  and connected with the [place]? No             If yes, was [place] transfer form completed and [facility] notified? Caregiver Contact: Grant Basurto- Daughter [phone]  Discharge Caregiver contacted prior to discharge? CM will notify caregiver at d/c. Care Conference needed?: No    Reason for Admission:   Ribs, multiple fractures                    RUR Score:   16%               PCP: First and Last name:   Zeb Dhaliwal NP     Name of Practice:     Are you a current patient: Yes/No:  Yes   Approximate date of last visit: Unknown   Can you participate in a virtual visit if needed: No    Do you (patient/family) have any concerns for transition/discharge? None              Plan for utilizing home health: Pt's daughter is receptive to home health at d/c. Current Advanced Directive/Advance Care Plan:  Full Code; No ACP on file. Healthcare Decision Maker:   Click here to complete Bacula including selection of the Healthcare Decision Maker Relationship (ie \"Primary\")            Primary Decision Maker: Alejandra Paulson Daughter - [phone]      CM called pt's daughter via phone to complete initial assessment. Pt is confused. CM verified pt's demographics, insurance and PCP. Pt is a 80 y/o  male admitted to [facility] on 10/19/2022 for Rib and multiple fractures. Pt's NP is Zeb Dhaliwal NP at the [facility]. Pt uses the pharmacy at the [facility]. Pt resides at assisted living facility [facility]. Pt needs assistance with ADL's and IADL's. Pt does not drive. Pt has a rollator.  Pt has had home health in the past with [facility] and been in [facility] in the past. Pt is FULL code status. No ACP on file. Pt will need medical transport at d/c.       CM discussed with pt's daughter PT/OT recommendations. Pt's daughter was in agreement to SNF. Pt's daughter stated that she would like pt to go to [facility]. Pt's daughter provided verbal consent for [facility] document and FOC document was placed in pt's bedside chart. Referral sent to SNF via [facility]. Care Management Interventions  PCP Verified by CM: Yes (Pt's NP is Honor Najjar, NP at the [facility])  Palliative Care Criteria Met (RRAT>21 & CHF Dx)?: No  Transition of Care Consult (CM Consult): Discharge Planning ([facility] )  Discharge Durable Medical Equipment: No (Pt has a rollator.)  Physical Therapy Consult: Yes  Occupational Therapy Consult: Yes  Speech Therapy Consult: No  Support Systems: Child(colleen) (Pt resides at assisted living facility [facility] )  Confirm Follow Up Transport:  (Pt will need medical transport at d/c. )  The Plan for Transition of Care is Related to the Following Treatment Goals : SNF  The Patient and/or Patient Representative was Provided with a Choice of Provider and Agrees with the Discharge Plan?: Yes  Name of the Patient Representative Who was Provided with a Choice of Provider and Agrees with the Discharge Plan: Pt's daughter  Freedom of Choice List was Provided with Basic Dialogue that Supports the Patient's Individualized Plan of Care/Goals, Treatment Preferences and Shares the Quality Data Associated with the Providers?: Yes   Resource Information Provided?: No  Discharge Location  Patient Expects to be Discharged to[de-identified]  ([facility] )    CM will continue to follow patient for discharge planning needs and arrange for services as deemed necessary.     Elizabeth Johns [address]  [phone]

## 2022-10-22 NOTE — PROGRESS NOTES
Hospitalist Progress Note    NAME: Marilu Lara   :  1940   MRN:  [id number]     Interim Hospital Summary: 81 y.o. male whom presented on 10/19/2022 with      Assessment / Plan:  CHELO:  patient medically clear for discharge,  Barriers: Safe dispo    Left 5-9th Ribs fracture s/p GLF   -CT head nothing acute  -CT C spine:  No acute fracture. Multilevel mod to severe canal and foraminal stenosis  -CTchest and pelvis:  No pelvic fracture. Acute min displaced fractures of LEFT fifth through ninth ribs. There are segmental fracture of the LEFT sixth through eighth ribs  -Pain management: Multimodal, will schedule Tylenol and use low-dose oxy as needed; lidocaine patch  Will benefit from SNF  -Add incentive spirometer        UTI  -UA with  wbc, 2+ bacteria.   -given he fell, will treat with Rocephin, continue     LETICIA, resolved  Hypokalemia  Baseline creatinine 0.9, admission 1.4  -resolved with hydration. Continue IVF     Large left-sided pleural effusion  small left chest subpleural hematoma  CT: Large left-sided pleural effusion with associated left lower lobe atelectasis, there is also a small left chest subpleural hematoma  -continue to hold ASA   -incentive spirometry  -Pulmonary input appreciated     Anemia of chronic disease  -Hg trending down likely from hemodilution. S/P bolus in ED. Autoimmune hepatitis  Dementia Continue Namenda  Penile cancer status post resection  BPH Continue flomax   Hyperlipidemia         Code Status: Full code  Surrogate Decision Maker: Daughter     DVT Prophylaxis: SCD, no AC with small hematoma on CT  GI Prophylaxis: not indicated     Baseline: Dementia, [facility] memory care unit   Recommended Disposition: SNF/LTC    25.0 - 29.9 Overweight / Body mass index is 26.94 kg/m². Subjective:     Chief Complaint / Reason for Physician Visit  Follow up of rib fractures, s/p fall, ABLA, LETICIA  Chart reviewed in detail.   Discussed with RN events overnight. Remains on room air, no chest pain. Review of Systems:  Symptom Y/N Comments  Symptom Y/N Comments   Fever/Chills    Chest Pain     Poor Appetite    Edema     Cough    Abdominal Pain     Sputum    Joint Pain     SOB/DE JESUS    Pruritis/Rash     Nausea/vomit    Tolerating PT/OT     Diarrhea    Tolerating Diet     Constipation    Other       Could NOT obtain due to:      PO intake: No data found. Objective:     VITALS:   Last 24hrs VS reviewed since prior progress note. Most recent are:  Patient Vitals for the past 24 hrs:   Temp Pulse Resp BP SpO2   10/22/22 1026 98.6 °F (37 °C) 68 17 (!) 146/71 --   10/21/22 2330 97.8 °F (36.6 °C) 64 18 (!) 157/77 95 %   10/21/22 1523 98.6 °F (37 °C) 69 18 (!) 157/74 95 %       No intake or output data in the 24 hours ending 10/22/22 1138     I had a face to face encounter, and independently examined this patient on 10/22/2022, as outlined below:  PHYSICAL EXAM:  General: WD, WN. Alert, cooperative, no acute distress    EENT:  EOMI. Anicteric sclerae. MMM  Resp:  CTA bilaterally, no wheezing or rales. No accessory muscle use  CV:  Regular  rhythm,  No edema  GI:  Soft, Non distended, Non tender. +Bowel sounds  Neurologic:  Alert and oriented X self, normal speech, weak but non focal  Psych:   Poor insight. Not anxious nor agitated  Skin:  No rashes. No jaundice  (+) left chest wall bruise    Reviewed most current lab test results and cultures  YES  Reviewed most current radiology test results   YES  Review and summation of old records today    NO  Reviewed patient's current orders and MAR    YES  PMH/SH reviewed - no change compared to H&P  ________________________________________________________________________  Care Plan discussed with:    Comments   Patient x    Family  x    RN     Care Manager     Consultant                        Multidiciplinary team rounds were held today with , nursing, pharmacist and clinical coordinator. Patient's plan of care was discussed; medications were reviewed and discharge planning was addressed. ________________________________________________________________________  Total NON critical care TIME:  25   Minutes    Total CRITICAL CARE TIME Spent:   Minutes non procedure based      Comments   >50% of visit spent in counseling and coordination of care x     This includes time during multidisciplinary rounds if indicated above   ________________________________________________________________________  Douglas Barraza MD     Procedures: see electronic medical records for all procedures/Xrays and details which were not copied into this note but were reviewed prior to creation of Plan. LABS:  I reviewed today's most current labs and imaging studies.   Pertinent labs include:  Recent Labs     10/21/22  0035 10/20/22  0426 10/19/22  1424   WBC 4.4 5.7 8.8   HGB 8.4* 8.7* 9.9*   HCT 26.2* 26.6* 30.4*   * 136* 154       Recent Labs     10/21/22  0035 10/20/22  0426 10/19/22  1424    146* 140   K 3.9 2.9* 4.3   * 120* 108   CO2 22 19* 25   GLU 91 76 129*   BUN 35* 34* 41*   CREA 1.16 1.01 1.41*   CA 8.2* 6.5* 8.6   MG 2.0 1.6  --    PHOS  --  2.4*  --    ALB  --   --  2.9*   TBILI  --   --  0.8   ALT  --   --  46

## 2022-10-23 PROCEDURE — 74011000258 HC RX REV CODE- 258: Performed by: INTERNAL MEDICINE

## 2022-10-23 PROCEDURE — 74011000250 HC RX REV CODE- 250: Performed by: STUDENT IN AN ORGANIZED HEALTH CARE EDUCATION/TRAINING PROGRAM

## 2022-10-23 PROCEDURE — 65270000029 HC RM PRIVATE

## 2022-10-23 PROCEDURE — 74011250637 HC RX REV CODE- 250/637: Performed by: HOSPITALIST

## 2022-10-23 PROCEDURE — 74011250636 HC RX REV CODE- 250/636: Performed by: INTERNAL MEDICINE

## 2022-10-23 RX ORDER — OXYCODONE HYDROCHLORIDE 5 MG/1
2.5 TABLET ORAL
Qty: 5 TABLET | Refills: 0 | Status: SHIPPED | OUTPATIENT
Start: 2022-10-23 | End: 2022-10-28

## 2022-10-23 RX ADMIN — ESCITALOPRAM OXALATE 5 MG: 10 TABLET ORAL at 09:15

## 2022-10-23 RX ADMIN — MEMANTINE 5 MG: 5 TABLET ORAL at 09:15

## 2022-10-23 RX ADMIN — MEMANTINE 5 MG: 5 TABLET ORAL at 17:10

## 2022-10-23 RX ADMIN — TAMSULOSIN HYDROCHLORIDE 0.4 MG: 0.4 CAPSULE ORAL at 09:15

## 2022-10-23 RX ADMIN — PANTOPRAZOLE SODIUM 40 MG: 40 TABLET, DELAYED RELEASE ORAL at 21:38

## 2022-10-23 RX ADMIN — ACETAMINOPHEN 650 MG: 325 TABLET ORAL at 17:10

## 2022-10-23 RX ADMIN — CARVEDILOL 25 MG: 12.5 TABLET, FILM COATED ORAL at 17:10

## 2022-10-23 RX ADMIN — PANTOPRAZOLE SODIUM 40 MG: 40 TABLET, DELAYED RELEASE ORAL at 09:15

## 2022-10-23 RX ADMIN — ACETAMINOPHEN 650 MG: 325 TABLET ORAL at 00:59

## 2022-10-23 RX ADMIN — ACETAMINOPHEN 650 MG: 325 TABLET ORAL at 13:46

## 2022-10-23 RX ADMIN — POTASSIUM CHLORIDE AND SODIUM CHLORIDE: 450; 150 INJECTION, SOLUTION INTRAVENOUS at 04:42

## 2022-10-23 RX ADMIN — ACETAMINOPHEN 650 MG: 325 TABLET ORAL at 06:56

## 2022-10-23 RX ADMIN — SODIUM CHLORIDE 1 G: 900 INJECTION INTRAVENOUS at 09:18

## 2022-10-23 RX ADMIN — FLUTICASONE PROPIONATE 2 SPRAY: 50 SPRAY, METERED NASAL at 10:35

## 2022-10-23 RX ADMIN — CARVEDILOL 25 MG: 12.5 TABLET, FILM COATED ORAL at 09:15

## 2022-10-23 NOTE — PROGRESS NOTES
End of Shift Note     Bedside shift change report given to Luba Aaron LPN (oncoming nurse) by Jyotsna Mejia RN (offgoing nurse). Report included the following information SBAR, Kardex, MAR, and Recent Results     Shift worked:  Days   Shift summary and any significant changes:     Continues on pain management with improvement. Patient continues to be confused.       Concerns for physician to address:  none   Zone phone for oncoming shift:   [phone]      Patient Information  Addy Harrison  81 y.o.  10/19/2022  2:00 PM by Keyon Emmanuel MD. Addy Harrison was admitted from Assisted Living     Problem List          Patient Active Problem List     Diagnosis Date Noted    Ribs, multiple fractures 10/19/2022    Late onset Alzheimer's disease without behavioral disturbance ([address] 05/14/2022    Cerebral microvascular disease 05/14/2022    Severe recurrent major depression without psychotic features ([address] 06/01/2021    Dementia ([address] 06/01/2021    Autoimmune hepatitis ([address] 11/01/2020    History of partial colectomy 11/01/2020    Diverticulitis 11/01/2020    Penile cancer ([address] 08/18/2020    Bilateral carotid artery stenosis 10/29/2019    GERD (gastroesophageal reflux disease) 08/29/2018    Hyperlipidemia 04/05/2012    PVC (premature ventricular contraction) 12/21/2010    Hypertension 12/06/2010    TIA (transient ischemic attack) 12/06/2010    Leg edema 12/06/2010              Past Medical History:   Diagnosis Date    Arthritis       left arm    Autoimmune hepatitis ([address]      Bleeding ulcer 07/2019    BPH (benign prostatic hyperplasia)      CAD (coronary artery disease)       s/p stent    Cancer (HCC)       penile squamous carcinoma    Chronic kidney disease       stage 2     Chronic obstructive pulmonary disease (HCC)      Dementia (HCC)      Elevated LFT's      Essential hypertension 9/24/01    GERD (gastroesophageal reflux disease)       hiatal hernia    Ill-defined condition 12/02/2016     faint    Nephrosclerosis Orthostatic hypotension 9/24/01    PVC's 9/24/01    Sleep apnea       no CPAP    Syncope 9/24/01     secondary to venous insuffiency          Core Measures:  CVA: No No  CHF:No No  PNA:No No     Activity:  Activity Level: Bed Rest  Number times ambulated in hallways past shift: 0  Number of times OOB to chair past shift: 0     Cardiac:   Cardiac Monitoring: No         Access:   Current line(s): PIV   Central Line? No Placement date na Reason Medically Necessary na  PICC LINE? No Placement date na Reason Medically Necessary na     Genitourinary:   Urinary status: voiding and incontinent  Urinary Catheter? No Placement Date na Reason Medically Necessary na     Respiratory:   O2 Device: None (Room air)  Chronic home O2 use?: NO  Incentive spirometer at bedside: NO     GI:  Last Bowel Movement Date: 10/20/22  Current diet:  ADULT DIET Regular  Passing flatus: YES  Tolerating current diet: YES     Pain Management:   Patient states pain is manageable on current regimen: YES     Skin:  Sergey Score: 15  Interventions: PT/OT consult    Patient Safety:  Fall Score:  Total Score: 4  Interventions: bed/chair alarm  High Fall Risk: Yes  @Rollbelt  @dexterity to release roll belt  Yes/No ( must document dexterity  here by stating Yes or No here, otherwise this is a restraint and must follow restraint documentation policy.)     DVT prophylaxis:  DVT prophylaxis Med- No  DVT prophylaxis SCD or CHARLEEN- No      Wounds: (If Applicable)  Wounds- No  Location na     Active Consults:  IP CONSULT TO HOSPITALIST  IP CONSULT TO PULMONOLOGY     Length of Stay:  Expected LOS: 2d 21h  Actual LOS: 4  Discharge Plan: Yes To SNF, pending placement           Garrett Del Cid RN

## 2022-10-23 NOTE — PROGRESS NOTES
Problem: Patient Education: Go to Patient Education Activity  Goal: Patient/Family Education  Outcome: Progressing Towards Goal     Problem: Patient Education: Go to Patient Education Activity  Goal: Patient/Family Education  Outcome: Progressing Towards Goal     Problem: Pressure Injury - Risk of  Goal: *Prevention of pressure injury  Description: Document Sergey Scale and appropriate interventions in the flowsheet. Outcome: Progressing Towards Goal  Note: Pressure Injury Interventions:  Sensory Interventions: Assess changes in LOC    Moisture Interventions: Absorbent underpads    Activity Interventions: PT/OT evaluation    Mobility Interventions: Pressure redistribution bed/mattress (bed type)    Nutrition Interventions: Offer support with meals,snacks and hydration    Friction and Shear Interventions: Minimize layers                Problem: Patient Education: Go to Patient Education Activity  Goal: Patient/Family Education  Outcome: Progressing Towards Goal     Problem: Falls - Risk of  Goal: *Absence of Falls  Description: Document Roxann Fall Risk and appropriate interventions in the flowsheet.   Outcome: Progressing Towards Goal  Note: Fall Risk Interventions:  Mobility Interventions: Bed/chair exit alarm    Mentation Interventions: Bed/chair exit alarm    Medication Interventions: Bed/chair exit alarm    Elimination Interventions: Call light in reach, Bed/chair exit alarm    History of Falls Interventions: Bed/chair exit alarm         Problem: Patient Education: Go to Patient Education Activity  Goal: Patient/Family Education  Outcome: Progressing Towards Goal

## 2022-10-23 NOTE — DISCHARGE SUMMARY
Hospitalist Discharge Summary     Patient ID:  Norita Sandhoff  [id number]  79 y.o.  1940  10/19/2022    PCP on record: Thomas Love MD    Admit date: 10/19/2022  Discharge date and time: 10/24/2022    DISCHARGE DIAGNOSIS:       Left 5-9th Ribs fracture s/p GLF   UTI  LETICIA, resolved  Hypokalemia  Large left-sided pleural effusion  small left chest subpleural hematoma      CONSULTATIONS:  IP CONSULT TO HOSPITALIST  IP CONSULT TO PULMONOLOGY    Excerpted HPI from H&P of Lauryn Isidro MD:  HISTORY OF PRESENT ILLNESS:     Bayron Fleming is a 81 y.o.  male who presents with above complaints. Patient is very poor historian due to underlying dementia and cannot contribute much to the history. History was obtained from ED documentation and connect care records. Patient resides at the memory care unit of the assisted living facility. Family was visiting him today and noticed some bruising on his left side. Patient also was complaining to pain to this area. Apparently, he sustained an unwitnessed fall yesterday. She reports not hitting head or loss of consciousness, however, he is very poor historian. Imaging in ED revealed rib fractures and left pleural effusion. ______________________________________________________________________  DISCHARGE SUMMARY/HOSPITAL COURSE:  for full details see H&P, daily progress notes, labs, consult notes. Left 5-9th Ribs fracture s/p GLF   -CT head nothing acute  -CT C spine:  No acute fracture. Multilevel mod to severe canal and foraminal stenosis  -CTchest and pelvis:  No pelvic fracture. Acute min displaced fractures of LEFT fifth through ninth ribs.   There are segmental fracture of the LEFT sixth through eighth ribs  -Pain management: Multimodal, will schedule Tylenol and use low-dose oxy as needed; lidocaine patch  Will benefit from SNF             UTI  -UA with  wbc, 2+ bacteria.   -given he fell, will treat with Rocephin, continue     LETICIA, resolved  Hypokalemia  Baseline creatinine 0.9, admission 1.4  -resolved with hydration. Continue IVF     Large left-sided pleural effusion  small left chest subpleural hematoma  CT: Large left-sided pleural effusion with associated left lower lobe atelectasis, there is also a small left chest subpleural hematoma  -continue to hold ASA   -incentive spirometry  -Pulmonary input appreciated     Anemia of chronic disease  -Hg trending down likely from hemodilution. S/P bolus in ED. Autoimmune hepatitis  Dementia Continue Namenda  Penile cancer status post resection  BPH Continue flomax   Hyperlipidemia             _______________________________________________________________________  Patient seen and examined by me on discharge day. Pertinent Findings:  Gen:    Not in distress  Chest: Clear lungs  CVS:   Regular rhythm. No edema  Abd:  Soft, not distended, not tender  Neuro:  Alert,   _______________________________________________________________________  DISCHARGE MEDICATIONS:   Current Discharge Medication List        START taking these medications    Details   oxyCODONE IR (ROXICODONE) 5 mg immediate release tablet Take 0.5 Tablets by mouth every six (6) hours as needed for Pain for up to 5 days. Max Daily Amount: 10 mg.  Qty: 5 Tablet, Refills: 0  Start date: 10/23/2022, End date: 10/28/2022    Associated Diagnoses: Closed fracture of multiple ribs of both sides, initial encounter           CONTINUE these medications which have NOT CHANGED    Details   tamsulosin (Flomax) 0.4 mg capsule Take 0.4 mg by mouth daily. fluticasone propionate (FLONASE) 50 mcg/actuation nasal spray 2 Sprays by Both Nostrils route daily. Qty: 1 Each, Refills: 0      pantoprazole (PROTONIX) 40 mg tablet Take 1 Tablet by mouth ACB/HS. Qty: 90 Tablet, Refills: 1      L.acid,para-B. bifidum-S.therm (RISAQUAD) 8 billion cell cap cap Take 1 Capsule by mouth daily.   Qty: 5 Capsule, Refills: 0 memantine (NAMENDA) 5 mg tablet Take 1 Tablet by mouth two (2) times a day. Qty: 10 Tablet, Refills: 0      escitalopram oxalate (LEXAPRO) 5 mg tablet Take 5 mg by mouth daily. aspirin delayed-release 81 mg tablet Take 81 mg by mouth daily. carvediloL (COREG) 12.5 mg tablet Take 2 Tabs by mouth two (2) times daily (with meals). Qty: 180 Tab, Refills: 1    Associated Diagnoses: Essential hypertension               Patient Follow Up Instructions:    Activity: Activity as tolerated  Diet: Cardiac Diet  Wound Care: None needed      Follow-up Information       Follow up With Specialties Details Why Contact Anabella Hoskins MD [facility] Medicine Schedule an appointment as soon as possible for a visit in 2 week(s)  [address]  [phone]            ________________________________________________________________    Risk of deterioration: Low    Condition at Discharge:  Stable  __________________________________________________________________    Disposition  SNF/LTC    ____________________________________________________________________    Code Status: Full Code  ___________________________________________________________________      Total time in minutes spent coordinating this discharge (includes going over instructions, follow-up, prescriptions, and preparing report for sign off to her PCP) :  >30 minutes    Signed:  Otilia Villegas MD

## 2022-10-23 NOTE — PROGRESS NOTES
Transition of Care Plan:  DISCHARGE ORDERS WRITTEN    RUR: 15% GLOS:    2.9  LOS:    3   Disposition:   SNF/[facility]  Follow up appointments:  PCP  DME needed:   Rollator at baseline  Transportation at Discharge:   Medical transport  [address] or means to access home:   n/a     IM Medicare Letter:     2nd IM letter Completed  Is patient a [place] and connected with the [place]? No            If yes, was [place] transfer form completed and VA notified? Caregiver Contact:  Moss Ahumada Daughter - [phone]  Discharge Caregiver contacted prior to discharge? Yes  Care Conference needed?:   No             Medicare pt has received, reviewed, and signed 2nd IM letter informing them of their right to appeal the discharge. Signed copied has been placed on pt bedside chart. Discharge orders written. Captured delay in CC/Q. 11AM  CM placed call to [facility], left voice message. CM sent fax to [facility] [phone] Received call from [facility], patient is not managed under them, will need to get auth directly from [facility]. Spoke with [facility]. They are at capacity today, they will re look at tomorrow. Will need auth through [facility]. [phone] Spoke with sister, Kalie Fitzpatrick, discussed freedom of choice and the need to expand search for skilled nursing facilities. She is agreeable. She asked that referrals be sent to: [facility],[address] [facility], [facility], [address] [facility] CM will send referrals via [facility]. Placed stretcher transport on [address] & [facility] with [facility].     [facility] ([facility]) phone [phone]    Melinda Fillms  [facility]  [phone]/Available on [employer]

## 2022-10-23 NOTE — PROGRESS NOTES
End of Shift Note     Bedside shift change report given to Nickie RAZA (oncoming nurse) by Marcia Wren RN (offgoing nurse). Report included the following information SBAR, Kardex, MAR, and Recent Results     Shift worked: night   Shift summary and any significant changes:      Uneventful night, call bell and phone within reach of patient. Bed alarm on zone 2 . Made comfortable. CHG bathed.           Concerns for physician to address:  none   Zone phone for oncoming shift:   [phone]      Patient Information  Priya Bell  81 y.o.  10/19/2022  2:00 PM by Ana Hobson MD. Priya Bell was admitted from Assisted Living     Problem List       Patient Active Problem List     Diagnosis Date Noted    Ribs, multiple fractures 10/19/2022    Late onset Alzheimer's disease without behavioral disturbance ([address] 05/14/2022    Cerebral microvascular disease 05/14/2022    Severe recurrent major depression without psychotic features ([address] 06/01/2021    Dementia ([address] 06/01/2021    Autoimmune hepatitis ([address] 11/01/2020    History of partial colectomy 11/01/2020    Diverticulitis 11/01/2020    Penile cancer ([address] 08/18/2020    Bilateral carotid artery stenosis 10/29/2019    GERD (gastroesophageal reflux disease) 08/29/2018    Hyperlipidemia 04/05/2012    PVC (premature ventricular contraction) 12/21/2010    Hypertension 12/06/2010    TIA (transient ischemic attack) 12/06/2010    Leg edema 12/06/2010           Past Medical History:   Diagnosis Date    Arthritis       left arm    Autoimmune hepatitis ([address]      Bleeding ulcer 07/2019    BPH (benign prostatic hyperplasia)      CAD (coronary artery disease)       s/p stent    Cancer (HCC)       penile squamous carcinoma    Chronic kidney disease       stage 2     Chronic obstructive pulmonary disease (HCC)      Dementia (HCC)      Elevated LFT's      Essential hypertension 9/24/01    GERD (gastroesophageal reflux disease)       hiatal hernia    Ill-defined condition 12/02/2016     faint Nephrosclerosis      Orthostatic hypotension 9/24/01    PVC's 9/24/01    Sleep apnea       no CPAP    Syncope 9/24/01     secondary to venous insuffiency          Core Measures:  CVA: No No  CHF:No No  PNA:No No     Activity:  Activity Level: Bed Rest  Number times ambulated in hallways past shift: 0  Number of times OOB to chair past shift: 0     Cardiac:   Cardiac Monitoring: No         Access:   Current line(s): PIV   Central Line? No Placement date na Reason Medically Necessary na  PICC LINE? No Placement date na Reason Medically Necessary na     Genitourinary:   Urinary status: voiding and incontinent  Urinary Catheter? No Placement Date na Reason Medically Necessary na     Respiratory:   O2 Device: None (Room air)  Chronic home O2 use?: NO  Incentive spirometer at bedside: NO     GI:  Last Bowel Movement Date: 10/20/22  Current diet:  ADULT DIET Regular  Passing flatus: YES  Tolerating current diet: YES     Pain Management:   Patient states pain is manageable on current regimen: YES     Skin:  Sergey Score: 15  Interventions: PT/OT consult    Patient Safety:  Fall Score:  Total Score: 5  Interventions: bed/chair alarm  High Fall Risk: Yes  @Rollbelt  @dexterity to release roll belt  Yes/No ( must document dexterity  here by stating Yes or No here, otherwise this is a restraint and must follow restraint documentation policy.)     DVT prophylaxis:  DVT prophylaxis Med- No  DVT prophylaxis SCD or CHARLEEN- No      Wounds: (If Applicable)  Wounds- No  Location na     Active Consults:  IP CONSULT TO HOSPITALIST  IP CONSULT TO PULMONOLOGY     Length of Stay:  Expected LOS: 2d 21h  Actual LOS: 1  Discharge Plan: Yes NENITA Solis RN

## 2022-10-24 PROCEDURE — 65270000029 HC RM PRIVATE

## 2022-10-24 PROCEDURE — 74011250637 HC RX REV CODE- 250/637: Performed by: HOSPITALIST

## 2022-10-24 PROCEDURE — 74011000250 HC RX REV CODE- 250: Performed by: STUDENT IN AN ORGANIZED HEALTH CARE EDUCATION/TRAINING PROGRAM

## 2022-10-24 PROCEDURE — 74011250636 HC RX REV CODE- 250/636: Performed by: INTERNAL MEDICINE

## 2022-10-24 PROCEDURE — 97530 THERAPEUTIC ACTIVITIES: CPT

## 2022-10-24 PROCEDURE — 74011000258 HC RX REV CODE- 258: Performed by: INTERNAL MEDICINE

## 2022-10-24 PROCEDURE — 97535 SELF CARE MNGMENT TRAINING: CPT

## 2022-10-24 RX ADMIN — ACETAMINOPHEN 650 MG: 325 TABLET ORAL at 12:53

## 2022-10-24 RX ADMIN — MEMANTINE 5 MG: 5 TABLET ORAL at 08:13

## 2022-10-24 RX ADMIN — ACETAMINOPHEN 650 MG: 325 TABLET ORAL at 02:37

## 2022-10-24 RX ADMIN — CARVEDILOL 25 MG: 12.5 TABLET, FILM COATED ORAL at 08:13

## 2022-10-24 RX ADMIN — MEMANTINE 5 MG: 5 TABLET ORAL at 17:22

## 2022-10-24 RX ADMIN — PANTOPRAZOLE SODIUM 40 MG: 40 TABLET, DELAYED RELEASE ORAL at 08:13

## 2022-10-24 RX ADMIN — TAMSULOSIN HYDROCHLORIDE 0.4 MG: 0.4 CAPSULE ORAL at 08:13

## 2022-10-24 RX ADMIN — ACETAMINOPHEN 650 MG: 325 TABLET ORAL at 17:22

## 2022-10-24 RX ADMIN — ESCITALOPRAM OXALATE 5 MG: 10 TABLET ORAL at 08:13

## 2022-10-24 RX ADMIN — SODIUM CHLORIDE 1 G: 900 INJECTION INTRAVENOUS at 08:13

## 2022-10-24 RX ADMIN — PANTOPRAZOLE SODIUM 40 MG: 40 TABLET, DELAYED RELEASE ORAL at 22:45

## 2022-10-24 RX ADMIN — FLUTICASONE PROPIONATE 2 SPRAY: 50 SPRAY, METERED NASAL at 08:21

## 2022-10-24 RX ADMIN — ACETAMINOPHEN 650 MG: 325 TABLET ORAL at 23:02

## 2022-10-24 RX ADMIN — CARVEDILOL 25 MG: 12.5 TABLET, FILM COATED ORAL at 17:22

## 2022-10-24 RX ADMIN — ACETAMINOPHEN 650 MG: 325 TABLET ORAL at 06:59

## 2022-10-24 NOTE — PROGRESS NOTES
Hospitalist Progress Note    NAME: Doug Landin   :  1940   MRN:  [id number]     Interim Hospital Summary: 81 y.o. male whom presented on 10/19/2022 with      Assessment / Plan:  CHELO:  patient medically clear for discharge,  Barriers: Safe dispo    Left 5-9th Ribs fracture s/p GLF   -CT head nothing acute  -CT C spine:  No acute fracture. Multilevel mod to severe canal and foraminal stenosis  -CTchest and pelvis:  No pelvic fracture. Acute min displaced fractures of LEFT fifth through ninth ribs. There are segmental fracture of the LEFT sixth through eighth ribs  -Pain management: Multimodal, will schedule Tylenol and use low-dose oxy as needed; lidocaine patch  Will benefit from SNF  -Add incentive spirometer        UTI  -UA with  wbc, 2+ bacteria.   -given he fell, will treat with Rocephin, continue     LETICIA, resolved  Hypokalemia  Baseline creatinine 0.9, admission 1.4  -resolved with hydration. Continue IVF     Large left-sided pleural effusion  small left chest subpleural hematoma  CT: Large left-sided pleural effusion with associated left lower lobe atelectasis, there is also a small left chest subpleural hematoma  -continue to hold ASA   -incentive spirometry  -Pulmonary input appreciated     Anemia of chronic disease  -Hg trending down likely from hemodilution. S/P bolus in ED. Autoimmune hepatitis  Dementia Continue Namenda  Penile cancer status post resection  BPH Continue flomax   Hyperlipidemia         Code Status: Full code  Surrogate Decision Maker: Daughter     DVT Prophylaxis: SCD, no AC with small hematoma on CT  GI Prophylaxis: not indicated     Baseline: Dementia, [facility] memory care unit   Recommended Disposition: SNF/LTC    25.0 - 29.9 Overweight / Body mass index is 26.94 kg/m². Subjective:     Chief Complaint / Reason for Physician Visit  Follow up of rib fractures, s/p fall, ABLA, LETICIA  Chart reviewed in detail.   Discussed with RN events overnight. Remains on room air, no chest pain. Review of Systems:  Symptom Y/N Comments  Symptom Y/N Comments   Fever/Chills    Chest Pain     Poor Appetite    Edema     Cough    Abdominal Pain     Sputum    Joint Pain     SOB/DE JESUS    Pruritis/Rash     Nausea/vomit    Tolerating PT/OT     Diarrhea    Tolerating Diet     Constipation    Other       Could NOT obtain due to:      PO intake: No data found. Objective:     VITALS:   Last 24hrs VS reviewed since prior progress note. Most recent are:  Patient Vitals for the past 24 hrs:   Temp Pulse Resp BP SpO2   10/23/22 2320 97.2 °F (36.2 °C) 63 18 (!) 175/76 98 %   10/23/22 1507 97.9 °F (36.6 °C) 65 16 117/79 95 %   10/23/22 0809 97.8 °F (36.6 °C) 65 16 139/67 93 %       No intake or output data in the 24 hours ending 10/24/22 0756     I had a face to face encounter, and independently examined this patient on 10/24/2022, as outlined below:  PHYSICAL EXAM:  General: WD, WN. Alert, cooperative, no acute distress    EENT:  EOMI. Anicteric sclerae. MMM  Resp:  CTA bilaterally, no wheezing or rales. No accessory muscle use  CV:  Regular  rhythm,  No edema  GI:  Soft, Non distended, Non tender. +Bowel sounds  Neurologic:  Alert and oriented X self, normal speech, weak but non focal  Psych:   Poor insight. Not anxious nor agitated  Skin:  No rashes. No jaundice  (+) left chest wall bruise    Reviewed most current lab test results and cultures  YES  Reviewed most current radiology test results   YES  Review and summation of old records today    NO  Reviewed patient's current orders and MAR    YES  PMH/SH reviewed - no change compared to H&P  ________________________________________________________________________  Care Plan discussed with:    Comments   Patient x    Family  x    RN     Care Manager     Consultant                        Multidiciplinary team rounds were held today with , nursing, pharmacist and clinical coordinator.   Patient's plan of care was discussed; medications were reviewed and discharge planning was addressed. ________________________________________________________________________  Total NON critical care TIME:  25   Minutes    Total CRITICAL CARE TIME Spent:   Minutes non procedure based      Comments   >50% of visit spent in counseling and coordination of care x     This includes time during multidisciplinary rounds if indicated above   ________________________________________________________________________  Lauro Ayala MD     Procedures: see electronic medical records for all procedures/Xrays and details which were not copied into this note but were reviewed prior to creation of Plan. LABS:  I reviewed today's most current labs and imaging studies. Pertinent labs include:  No results for input(s): WBC, HGB, HCT, PLT, HGBEXT, HCTEXT, PLTEXT, HGBEXT, HCTEXT, PLTEXT in the last 72 hours. No results for input(s): NA, K, CL, CO2, GLU, BUN, CREA, CA, MG, PHOS, ALB, TBIL, TBILI, ALT, INR, INREXT, INREXT in the last 72 hours.     No lab exists for component: SGOT

## 2022-10-24 NOTE — PROGRESS NOTES
End of Shift Note     Bedside shift change report given to Aniceto Crowley (oncoming nurse) by Zaria Fall (offgoing nurse).   Report included the following information SBAR, Kardex, MAR, and Recent Results     Shift worked: 7p-7a   Shift summary and any significant changes:     None       Concerns for physician to address:  none   Zone phone for oncoming shift:   [phone]      Patient Information  Doug Landin  81 y.o.  10/19/2022  2:00 PM by Shahana Christian MD. Doug Landin was admitted from Assisted Living     Problem List          Patient Active Problem List     Diagnosis Date Noted    Ribs, multiple fractures 10/19/2022    Late onset Alzheimer's disease without behavioral disturbance ([address] 05/14/2022    Cerebral microvascular disease 05/14/2022    Severe recurrent major depression without psychotic features ([address] 06/01/2021    Dementia ([address] 06/01/2021    Autoimmune hepatitis ([address] 11/01/2020    History of partial colectomy 11/01/2020    Diverticulitis 11/01/2020    Penile cancer ([address] 08/18/2020    Bilateral carotid artery stenosis 10/29/2019    GERD (gastroesophageal reflux disease) 08/29/2018    Hyperlipidemia 04/05/2012    PVC (premature ventricular contraction) 12/21/2010    Hypertension 12/06/2010    TIA (transient ischemic attack) 12/06/2010    Leg edema 12/06/2010              Past Medical History:   Diagnosis Date    Arthritis       left arm    Autoimmune hepatitis ([address]      Bleeding ulcer 07/2019    BPH (benign prostatic hyperplasia)      CAD (coronary artery disease)       s/p stent    Cancer (HCC)       penile squamous carcinoma    Chronic kidney disease       stage 2     Chronic obstructive pulmonary disease ([address]      Dementia ([address]      Elevated LFT's      Essential hypertension 9/24/01    GERD (gastroesophageal reflux disease)       hiatal hernia    Ill-defined condition 12/02/2016     faint    Nephrosclerosis      Orthostatic hypotension 9/24/01    PVC's 9/24/01    Sleep apnea       no CPAP    Syncope 9/24/01     secondary to venous insuffiency          Core Measures:  CVA: No No  CHF:No No  PNA:No No     Activity:  Activity Level: Bed Rest  Number times ambulated in hallways past shift: 0  Number of times OOB to chair past shift: 0     Cardiac:   Cardiac Monitoring: No         Access:   Current line(s): PIV   Central Line? No Placement date na Reason Medically Necessary na  PICC LINE? No Placement date na Reason Medically Necessary na     Genitourinary:   Urinary status: voiding and incontinent  Urinary Catheter? No Placement Date na Reason Medically Necessary na     Respiratory:   O2 Device: None (Room air)  Chronic home O2 use?: NO  Incentive spirometer at bedside: NO     GI:  Last Bowel Movement Date: 10/20/22  Current diet:  ADULT DIET Regular  Passing flatus: YES  Tolerating current diet: YES     Pain Management:   Patient states pain is manageable on current regimen: YES     Skin:  Sergey Score: 15  Interventions: PT/OT consult    Patient Safety:  Fall Score:  Total Score: 4  Interventions: bed/chair alarm  High Fall Risk: Yes  @Rollbelt  @dexterity to release roll belt  Yes/No ( must document dexterity  here by stating Yes or No here, otherwise this is a restraint and must follow restraint documentation policy.)     DVT prophylaxis:  DVT prophylaxis Med- No  DVT prophylaxis SCD or CHARLEEN- No      Wounds: (If Applicable)  Wounds- No  Location na     Active Consults:  IP CONSULT TO HOSPITALIST  IP CONSULT TO PULMONOLOGY     Length of Stay:  Expected LOS: 2d 21h  Actual LOS: 4  Discharge Plan: Yes To SNF, pending placement           Jyotsna Mejia RN

## 2022-10-24 NOTE — PROGRESS NOTES
Transition of Care Plan:    RUR: 10% - \"low risk\"  LOS: 5 days  Disposition: SNF- [address] [facility] (pending insurance [employer] Kayla to be initiated by facility 10/24/22)  Follow up appointments: PCP & specialist as indicated  DME needed: Pt likely transitioning to SNF at d/c  Transportation at Discharge: BLS transport needed  [address] or means to access home: N/A- pt likely transitioning to SNF at d/c     IM Medicare Letter: 2nd IM needed prior to d/c  Is patient a [place] and connected with the [place]? No          Caregiver Contact: Pt's daughter Corona Ceron ([phone]  Discharge Caregiver contacted prior to discharge? To be contacted prior d/c  Care Conference needed?: Not at this time    Initial note: Chart reviewed for updates. CM contacted Maliha Pinedaews Nichelle: [phone]) to follow up on the referal sent to [address] Jodee Barney confirmed the facility accepted the referral pending insurance auth. CM requested for Brandon Colmenares to initiate insurance auth in anticipation for CHELO within the next 24/48 hours. Brandon Colmenares reported that she will initiate insurance auth before end of the day. CM will continue to follow.      DORENE Fuentes, [address] [place] [address]

## 2022-10-24 NOTE — PROGRESS NOTES
Problem: Self Care Deficits Care Plan (Adult)  Goal: *Acute Goals and Plan of Care (Insert Text)  Description: FUNCTIONAL STATUS PRIOR TO ADMISSION:  unknown baseline, from memory care     HOME SUPPORT PRIOR TO ADMISSION:  unknown     Occupational Therapy Goals:  Initiated 10/20/2022  1. Patient will perform self-feeding with minimal assistance within 7 days. 2. Patient will perform upper body dressing with maximal assistance within 7 days. 3. Patient will perform bathing UB with max assist within 7 days. 4. Patient will transfer from bedside commode or toilet with minimal using the least restrictive device and appropriate durable medical equipment within 7 days. Outcome: Progressing Towards Goal     OCCUPATIONAL THERAPY TREATMENT  Patient: Trinity Rodriguez (37 y.o. male)  Date: 10/24/2022  Diagnosis: Ribs, multiple fractures [S22.49XA] <principal problem not specified>      Precautions: Fall  Chart, occupational therapy assessment, plan of care, and goals were reviewed. ASSESSMENT  Patient continues with skilled OT services and is progressing towards goals. Pt presented in supine and agreeable to therapy session. Performed bed mobilities with mod-maxA and sat EOB with constant assist for balance/safety. Pt req'd maxA to don socks. Poor balance noted as pt attempted to bend forward to assist with socks. Not receptive to verbal cueing and unable to self correct when off balance, in sitting. Pt receptive to BUE ROM/strengthening exercises with max verbal and gestural cueing. Pt below functional baseline and would benefit from continued OT to increase independence with ADLs. Current Level of Function Impacting Discharge (ADLs): maxA overall    Other factors to consider for discharge: dementia          PLAN :  Patient continues to benefit from skilled intervention to address the above impairments. Continue treatment per established plan of care to address goals.     Recommendation for discharge: (in order for the patient to meet his/her long term goals)  To be determined: return to memory care assisted living facility with increased support and 24/7 supervision/assist for safety      OR    SNF     This discharge recommendation:  Has been made in collaboration with the attending provider and/or case management    IF patient discharges home will need the following DME: TBD       SUBJECTIVE:   Patient stated Yes.  [answered \"yes\" to most questions]    OBJECTIVE DATA SUMMARY:   Cognitive/Behavioral Status:  Neurologic State: Confused; Alert  Orientation Level: Oriented to person;Disoriented to place; Disoriented to situation;Disoriented to time  Cognition: Decreased attention/concentration;Decreased command following;Memory loss;Poor safety awareness  Perception: Cues to maintain midline in sitting  Perseveration: No perseveration noted  Safety/Judgement: Lack of insight into deficits; Decreased awareness of need for assistance;Decreased awareness of need for safety    Functional Mobility and Transfers for ADLs:  Bed Mobility:  Rolling: Moderate assistance  Supine to Sit: Maximum assistance  Sit to Supine: Maximum assistance  Scooting: Moderate assistance    Transfers:  Sit to Stand:  (deferred)  Functional Transfers  Bathroom Mobility:  (deferred)    Balance:  Sitting: Impaired  Sitting - Static: Fair (occasional)  Sitting - Dynamic: Poor (constant support)  Standing:  (deferred)    ADL Intervention:  Grooming  Grooming Assistance: Moderate assistance  Position Performed: Seated edge of bed  Washing Face: Moderate assistance  Washing Hands: Moderate assistance    Lower Body Dressing Assistance  Socks: Maximum assistance    Toileting  Bladder Hygiene: Total assistance (dependent)    Cognitive Retraining  Safety/Judgement: Lack of insight into deficits; Decreased awareness of need for assistance;Decreased awareness of need for safety    Therapeutic Exercises:   BUE strengthening/ROM exercises while sitting EOB    Pain:  No c/o pain  Nurse stated pt received pain med before OT session    Activity Tolerance:   Fair    After treatment patient left in no apparent distress:   Supine in bed, Call bell within reach, Bed / chair alarm activated, and Side rails x 3    COMMUNICATION/COLLABORATION:   The patients plan of care was discussed with: Certified nursing assistant/patient care technician.      Sigifredo Barillas OT  Time Calculation: 25 mins

## 2022-10-24 NOTE — PROGRESS NOTES
End of Shift Note     Bedside shift change report given to Alfonso Fofana Dr (oncoming nurse) by Donna Hubbard RN (offgoing nurse). Report included the following information SBAR, Kardex, MAR, and Recent Results     Shift worked:  Days   Shift summary and any significant changes:     Continues on pain management with improvement. Patient continues to be confused.       Concerns for physician to address:  none   Zone phone for oncoming shift:   [phone]      Patient Information  Priya Bell  81 y.o.  10/19/2022  2:00 PM by Ana Hobson MD. Priya Bell was admitted from Assisted Living     Problem List          Patient Active Problem List     Diagnosis Date Noted    Ribs, multiple fractures 10/19/2022    Late onset Alzheimer's disease without behavioral disturbance ([address] 05/14/2022    Cerebral microvascular disease 05/14/2022    Severe recurrent major depression without psychotic features ([address] 06/01/2021    Dementia ([address] 06/01/2021    Autoimmune hepatitis ([address] 11/01/2020    History of partial colectomy 11/01/2020    Diverticulitis 11/01/2020    Penile cancer ([address] 08/18/2020    Bilateral carotid artery stenosis 10/29/2019    GERD (gastroesophageal reflux disease) 08/29/2018    Hyperlipidemia 04/05/2012    PVC (premature ventricular contraction) 12/21/2010    Hypertension 12/06/2010    TIA (transient ischemic attack) 12/06/2010    Leg edema 12/06/2010              Past Medical History:   Diagnosis Date    Arthritis       left arm    Autoimmune hepatitis ([address]      Bleeding ulcer 07/2019    BPH (benign prostatic hyperplasia)      CAD (coronary artery disease)       s/p stent    Cancer (HCC)       penile squamous carcinoma    Chronic kidney disease       stage 2     Chronic obstructive pulmonary disease (HCC)      Dementia (HCC)      Elevated LFT's      Essential hypertension 9/24/01    GERD (gastroesophageal reflux disease)       hiatal hernia    Ill-defined condition 12/02/2016     faint    Nephrosclerosis Orthostatic hypotension 9/24/01    PVC's 9/24/01    Sleep apnea       no CPAP    Syncope 9/24/01     secondary to venous insuffiency          Core Measures:  CVA: No No  CHF:No No  PNA:No No     Activity:  Activity Level: Bed Rest  Number times ambulated in hallways past shift: 0  Number of times OOB to chair past shift: 0     Cardiac:   Cardiac Monitoring: No         Access:   Current line(s): PIV   Central Line? No Placement date na Reason Medically Necessary na  PICC LINE? No Placement date na Reason Medically Necessary na     Genitourinary:   Urinary status: voiding and incontinent  Urinary Catheter? No Placement Date na Reason Medically Necessary na     Respiratory:   O2 Device: None (Room air)  Chronic home O2 use?: NO  Incentive spirometer at bedside: NO     GI:  Last Bowel Movement Date: 10/20/22  Current diet:  ADULT DIET Regular  Passing flatus: YES  Tolerating current diet: YES     Pain Management:   Patient states pain is manageable on current regimen: YES     Skin:  Sergey Score: 15  Interventions: PT/OT consult    Patient Safety:  Fall Score:  Total Score: 5  Interventions: bed/chair alarm  High Fall Risk: Yes  @Rollbelt  @dexterity to release roll belt  Yes/No ( must document dexterity  here by stating Yes or No here, otherwise this is a restraint and must follow restraint documentation policy.)     DVT prophylaxis:  DVT prophylaxis Med- No  DVT prophylaxis SCD or CHARLEEN- No      Wounds: (If Applicable)  Wounds- No  Location na     Active Consults:  IP CONSULT TO HOSPITALIST  IP CONSULT TO PULMONOLOGY     Length of Stay:  Expected LOS: 2d 21h  Actual LOS: 3  Discharge Plan: Yes To SNF, pending placement           Ayse Cuadra RN

## 2022-10-25 PROCEDURE — 74011250636 HC RX REV CODE- 250/636: Performed by: INTERNAL MEDICINE

## 2022-10-25 PROCEDURE — 74011000250 HC RX REV CODE- 250: Performed by: STUDENT IN AN ORGANIZED HEALTH CARE EDUCATION/TRAINING PROGRAM

## 2022-10-25 PROCEDURE — 74011250637 HC RX REV CODE- 250/637: Performed by: HOSPITALIST

## 2022-10-25 PROCEDURE — 65270000029 HC RM PRIVATE

## 2022-10-25 RX ADMIN — ACETAMINOPHEN 650 MG: 325 TABLET ORAL at 17:30

## 2022-10-25 RX ADMIN — PANTOPRAZOLE SODIUM 40 MG: 40 TABLET, DELAYED RELEASE ORAL at 21:44

## 2022-10-25 RX ADMIN — MEMANTINE 5 MG: 5 TABLET ORAL at 17:30

## 2022-10-25 RX ADMIN — ACETAMINOPHEN 650 MG: 325 TABLET ORAL at 23:06

## 2022-10-25 RX ADMIN — ACETAMINOPHEN 650 MG: 325 TABLET ORAL at 05:05

## 2022-10-25 RX ADMIN — CARVEDILOL 25 MG: 12.5 TABLET, FILM COATED ORAL at 17:30

## 2022-10-25 RX ADMIN — PANTOPRAZOLE SODIUM 40 MG: 40 TABLET, DELAYED RELEASE ORAL at 11:38

## 2022-10-25 RX ADMIN — CARVEDILOL 25 MG: 12.5 TABLET, FILM COATED ORAL at 11:38

## 2022-10-25 RX ADMIN — FLUTICASONE PROPIONATE 2 SPRAY: 50 SPRAY, METERED NASAL at 11:39

## 2022-10-25 RX ADMIN — MEMANTINE 5 MG: 5 TABLET ORAL at 11:38

## 2022-10-25 RX ADMIN — POTASSIUM CHLORIDE AND SODIUM CHLORIDE: 450; 150 INJECTION, SOLUTION INTRAVENOUS at 06:08

## 2022-10-25 RX ADMIN — TAMSULOSIN HYDROCHLORIDE 0.4 MG: 0.4 CAPSULE ORAL at 11:37

## 2022-10-25 RX ADMIN — ACETAMINOPHEN 650 MG: 325 TABLET ORAL at 11:38

## 2022-10-25 RX ADMIN — ESCITALOPRAM OXALATE 5 MG: 10 TABLET ORAL at 11:37

## 2022-10-25 NOTE — PROGRESS NOTES
Hospitalist Progress Note    NAME: Selena Christian   :  1940   MRN:  [id number]     Interim Hospital Summary: 81 y.o. male whom presented on 10/19/2022 with      Assessment / Plan:  CHELO:  patient medically clear for discharge,  Barriers: Safe dispo    Left 5-9th Ribs fracture s/p GLF   -CT head nothing acute  -CT C spine:  No acute fracture. Multilevel mod to severe canal and foraminal stenosis  -CTchest and pelvis:  No pelvic fracture. Acute min displaced fractures of LEFT fifth through ninth ribs. There are segmental fracture of the LEFT sixth through eighth ribs  -Pain management: Multimodal, will schedule Tylenol and use low-dose oxy as needed; lidocaine patch  Will benefit from SNF  -Add incentive spirometer        UTI  -UA with  wbc, 2+ bacteria.   -given he fell, will treat with Rocephin, continue     LETICIA, resolved  Hypokalemia  Baseline creatinine 0.9, admission 1.4  -resolved with hydration. Continue IVF     Large left-sided pleural effusion  small left chest subpleural hematoma  CT: Large left-sided pleural effusion with associated left lower lobe atelectasis, there is also a small left chest subpleural hematoma  -continue to hold ASA   -incentive spirometry  -Pulmonary input appreciated     Anemia of chronic disease  -Hg trending down likely from hemodilution. S/P bolus in ED. Autoimmune hepatitis  Dementia Continue Namenda  Penile cancer status post resection  BPH Continue flomax   Hyperlipidemia         Code Status: Full code  Surrogate Decision Maker: Daughter     DVT Prophylaxis: SCD, no AC with small hematoma on CT  GI Prophylaxis: not indicated     Baseline: Dementia, [facility] memory care unit   Recommended Disposition: SNF/LTC    25.0 - 29.9 Overweight / Body mass index is 26.94 kg/m².   Awaiting placcement     Subjective:     Chief Complaint / Reason for Physician Visit  Follow up of rib fractures, s/p fall, ABLA, LETICIA  Chart reviewed in detail. Discussed with RN events overnight. Remains on room air, no chest pain. Review of Systems:  Symptom Y/N Comments  Symptom Y/N Comments   Fever/Chills    Chest Pain     Poor Appetite    Edema     Cough    Abdominal Pain     Sputum    Joint Pain     SOB/DE JESUS    Pruritis/Rash     Nausea/vomit    Tolerating PT/OT     Diarrhea    Tolerating Diet     Constipation    Other       Could NOT obtain due to:      PO intake: No data found. Objective:     VITALS:   Last 24hrs VS reviewed since prior progress note. Most recent are:  Patient Vitals for the past 24 hrs:   Temp Pulse Resp BP SpO2   10/25/22 0148 -- -- -- (!) 163/76 --   10/24/22 2300 97.5 °F (36.4 °C) 68 18 (!) 173/91 95 %   10/24/22 1558 98 °F (36.7 °C) 67 18 (!) 142/61 94 %   10/24/22 0836 97.9 °F (36.6 °C) 64 18 (!) 148/70 92 %         Intake/Output Summary (Last 24 hours) at 10/25/2022 0751  Last data filed at 10/24/2022 1030  Gross per 24 hour   Intake --   Output 900 ml   Net -900 ml          I had a face to face encounter, and independently examined this patient on 10/25/2022, as outlined below:  PHYSICAL EXAM:  General: WD, WN. Alert, cooperative, no acute distress    EENT:  EOMI. Anicteric sclerae. MMM  Resp:  CTA bilaterally, no wheezing or rales. No accessory muscle use  CV:  Regular  rhythm,  No edema  GI:  Soft, Non distended, Non tender. +Bowel sounds  Neurologic:  Alert and oriented X self, normal speech, weak but non focal  Psych:   Poor insight. Not anxious nor agitated  Skin:  No rashes.   No jaundice  (+) left chest wall bruise    Reviewed most current lab test results and cultures  YES  Reviewed most current radiology test results   YES  Review and summation of old records today    NO  Reviewed patient's current orders and MAR    YES  PMH/SH reviewed - no change compared to H&P  ________________________________________________________________________  Care Plan discussed with:    Comments   Patient x    Family  x    RN     Care Manager     Consultant                        Multidiciplinary team rounds were held today with , nursing, pharmacist and clinical coordinator. Patient's plan of care was discussed; medications were reviewed and discharge planning was addressed. ________________________________________________________________________  Total NON critical care TIME:  25   Minutes    Total CRITICAL CARE TIME Spent:   Minutes non procedure based      Comments   >50% of visit spent in counseling and coordination of care x     This includes time during multidisciplinary rounds if indicated above   ________________________________________________________________________  Kimberley Lira MD     Procedures: see electronic medical records for all procedures/Xrays and details which were not copied into this note but were reviewed prior to creation of Plan. LABS:  I reviewed today's most current labs and imaging studies. Pertinent labs include:  No results for input(s): WBC, HGB, HCT, PLT, HGBEXT, HCTEXT, PLTEXT, HGBEXT, HCTEXT, PLTEXT in the last 72 hours. No results for input(s): NA, K, CL, CO2, GLU, BUN, CREA, CA, MG, PHOS, ALB, TBIL, TBILI, ALT, INR, INREXT, INREXT in the last 72 hours.     No lab exists for component: SGOT

## 2022-10-25 NOTE — PROGRESS NOTES
End of Shift Note     Bedside shift change report given to [facility], RN (oncoming nurse) by Bryson Pinon RN (offgoing nurse). Report included the following information SBAR, Kardex, MAR, and Recent Results     Shift worked: Nights   Shift summary and any significant changes:     pain management .         Concerns for physician to address:  none   Zone phone for oncoming shift:   [phone]      Patient Information  Akash Albrecht  81 y.o.  10/19/2022  2:00 PM by Manisha Pringle MD. Akash Albrecht was admitted from Assisted Living     Problem List          Patient Active Problem List     Diagnosis Date Noted    Ribs, multiple fractures 10/19/2022    Late onset Alzheimer's disease without behavioral disturbance ([address] 05/14/2022    Cerebral microvascular disease 05/14/2022    Severe recurrent major depression without psychotic features ([address] 06/01/2021    Dementia ([address] 06/01/2021    Autoimmune hepatitis ([address] 11/01/2020    History of partial colectomy 11/01/2020    Diverticulitis 11/01/2020    Penile cancer ([address] 08/18/2020    Bilateral carotid artery stenosis 10/29/2019    GERD (gastroesophageal reflux disease) 08/29/2018    Hyperlipidemia 04/05/2012    PVC (premature ventricular contraction) 12/21/2010    Hypertension 12/06/2010    TIA (transient ischemic attack) 12/06/2010    Leg edema 12/06/2010              Past Medical History:   Diagnosis Date    Arthritis       left arm    Autoimmune hepatitis ([address]      Bleeding ulcer 07/2019    BPH (benign prostatic hyperplasia)      CAD (coronary artery disease)       s/p stent    Cancer (HCC)       penile squamous carcinoma    Chronic kidney disease       stage 2     Chronic obstructive pulmonary disease (HCC)      Dementia (HCC)      Elevated LFT's      Essential hypertension 9/24/01    GERD (gastroesophageal reflux disease)       hiatal hernia    Ill-defined condition 12/02/2016     faint    Nephrosclerosis      Orthostatic hypotension 9/24/01    PVC's 9/24/01    Sleep apnea no CPAP    Syncope 9/24/01     secondary to venous insuffiency          Core Measures:  CVA: No No  CHF:No No  PNA:No No     Activity:  Activity Level: Bed Rest  Number times ambulated in hallways past shift: 0  Number of times OOB to chair past shift: 0     Cardiac:   Cardiac Monitoring: No         Access:   Current line(s): PIV   Central Line? No Placement date na Reason Medically Necessary na  PICC LINE? No Placement date na Reason Medically Necessary na     Genitourinary:   Urinary status: voiding and incontinent  Urinary Catheter? No Placement Date na Reason Medically Necessary na     Respiratory:   O2 Device: None (Room air)  Chronic home O2 use?: NO  Incentive spirometer at bedside: NO     GI:  Last Bowel Movement Date: 10/20/22  Current diet:  ADULT DIET Regular  Passing flatus: YES  Tolerating current diet: YES     Pain Management:   Patient states pain is manageable on current regimen: YES     Skin:  Sergey Score: 15  Interventions: PT/OT consult    Patient Safety:  Fall Score:  Total Score: 5  Interventions: bed/chair alarm  High Fall Risk: Yes  @Rollbelt  @dexterity to release roll belt  Yes/No ( must document dexterity  here by stating Yes or No here, otherwise this is a restraint and must follow restraint documentation policy.)     DVT prophylaxis:  DVT prophylaxis Med- No  DVT prophylaxis SCD or CHARLEEN- No      Wounds: (If Applicable)  Wounds- No  Location na     Active Consults:  IP CONSULT TO HOSPITALIST  IP CONSULT TO PULMONOLOGY     Length of Stay:  Expected LOS: 2d 21h  Actual LOS: 3  Discharge Plan: Yes To SNF, pending placement           Mimi England

## 2022-10-25 NOTE — PROGRESS NOTES
Problem: Patient Education: Go to Patient Education Activity  Goal: Patient/Family Education  Outcome: Progressing Towards Goal     Problem: Pressure Injury - Risk of  Goal: *Prevention of pressure injury  Description: Document Sergey Scale and appropriate interventions in the flowsheet. Outcome: Progressing Towards Goal  Note: Pressure Injury Interventions:  Sensory Interventions: Assess changes in LOC    Moisture Interventions: Absorbent underpads, Check for incontinence Q2 hours and as needed, Apply protective barrier, creams and emollients    Activity Interventions: PT/OT evaluation    Mobility Interventions: HOB 30 degrees or less    Nutrition Interventions: Document food/fluid/supplement intake    Friction and Shear Interventions: Apply protective barrier, creams and emollients                Problem: Falls - Risk of  Goal: *Absence of Falls  Description: Document Roxann Fall Risk and appropriate interventions in the flowsheet.   Outcome: Progressing Towards Goal  Note: Fall Risk Interventions:  Mobility Interventions: Bed/chair exit alarm    Mentation Interventions: Bed/chair exit alarm    Medication Interventions: Bed/chair exit alarm    Elimination Interventions: Bed/chair exit alarm, Call light in reach    History of Falls Interventions: Bed/chair exit alarm

## 2022-10-25 NOTE — DISCHARGE SUMMARY
Hospitalist Discharge Summary     Patient ID:  Michele Trevizo  [id number]  88 y.o.  1940  10/19/2022    PCP on record: Edgard Duval MD    Admit date: 10/19/2022  Discharge date and time: 10/25/2022    DISCHARGE DIAGNOSIS:       Left 5-9th Ribs fracture s/p GLF   UTI  LETICIA, resolved  Hypokalemia  Large left-sided pleural effusion  small left chest subpleural hematoma      CONSULTATIONS:  IP CONSULT TO HOSPITALIST  IP CONSULT TO PULMONOLOGY    Excerpted HPI from H&P of Mary Rivera MD:  HISTORY OF PRESENT ILLNESS:     Dago Villagomez is a 81 y.o.  male who presents with above complaints. Patient is very poor historian due to underlying dementia and cannot contribute much to the history. History was obtained from ED documentation and connect care records. Patient resides at the memory care unit of the assisted living facility. Family was visiting him today and noticed some bruising on his left side. Patient also was complaining to pain to this area. Apparently, he sustained an unwitnessed fall yesterday. She reports not hitting head or loss of consciousness, however, he is very poor historian. Imaging in ED revealed rib fractures and left pleural effusion. ______________________________________________________________________  DISCHARGE SUMMARY/HOSPITAL COURSE:  for full details see H&P, daily progress notes, labs, consult notes. Left 5-9th Ribs fracture s/p GLF   -CT head nothing acute  -CT C spine:  No acute fracture. Multilevel mod to severe canal and foraminal stenosis  -CTchest and pelvis:  No pelvic fracture. Acute min displaced fractures of LEFT fifth through ninth ribs.   There are segmental fracture of the LEFT sixth through eighth ribs  -Pain management: Multimodal, will schedule Tylenol and use low-dose oxy as needed; lidocaine patch  Will benefit from SNF             UTI  -UA with  wbc, 2+ bacteria.   -given he fell, will treat with Rocephin, continue     LETICIA, resolved  Hypokalemia  Baseline creatinine 0.9, admission 1.4  -resolved with hydration. Continue IVF     Large left-sided pleural effusion  small left chest subpleural hematoma  CT: Large left-sided pleural effusion with associated left lower lobe atelectasis, there is also a small left chest subpleural hematoma  -continue to hold ASA   -incentive spirometry  -Pulmonary input appreciated     Anemia of chronic disease  -Hg trending down likely from hemodilution. S/P bolus in ED. Autoimmune hepatitis  Dementia Continue Namenda  Penile cancer status post resection  BPH Continue flomax   Hyperlipidemia         Pt will be discharged to SNF once insure auth obtained    _______________________________________________________________________  Patient seen and examined by me on discharge day. Pertinent Findings:  Gen:    Not in distress  Chest: Clear lungs  CVS:   Regular rhythm. No edema  Abd:  Soft, not distended, not tender  Neuro:  Alert,   _______________________________________________________________________  DISCHARGE MEDICATIONS:   Current Discharge Medication List        START taking these medications    Details   oxyCODONE IR (ROXICODONE) 5 mg immediate release tablet Take 0.5 Tablets by mouth every six (6) hours as needed for Pain for up to 5 days. Max Daily Amount: 10 mg.  Qty: 5 Tablet, Refills: 0  Start date: 10/23/2022, End date: 10/28/2022    Associated Diagnoses: Closed fracture of multiple ribs of both sides, initial encounter           CONTINUE these medications which have NOT CHANGED    Details   tamsulosin (Flomax) 0.4 mg capsule Take 0.4 mg by mouth daily. fluticasone propionate (FLONASE) 50 mcg/actuation nasal spray 2 Sprays by Both Nostrils route daily. Qty: 1 Each, Refills: 0      pantoprazole (PROTONIX) 40 mg tablet Take 1 Tablet by mouth ACB/HS. Qty: 90 Tablet, Refills: 1      L.acid,para-B. bifidum-S.therm (RISAQUAD) 8 billion cell cap cap Take 1 Capsule by mouth daily. Qty: 5 Capsule, Refills: 0      memantine (NAMENDA) 5 mg tablet Take 1 Tablet by mouth two (2) times a day. Qty: 10 Tablet, Refills: 0      escitalopram oxalate (LEXAPRO) 5 mg tablet Take 5 mg by mouth daily. aspirin delayed-release 81 mg tablet Take 81 mg by mouth daily. carvediloL (COREG) 12.5 mg tablet Take 2 Tabs by mouth two (2) times daily (with meals). Qty: 180 Tab, Refills: 1    Associated Diagnoses: Essential hypertension               Patient Follow Up Instructions:    Activity: Activity as tolerated  Diet: Cardiac Diet  Wound Care: None needed      Follow-up Information       Follow up With Specialties Details Why Contact Info    Dave Espinosa MD [facility] Schedule an appointment as soon as possible for a visit in 2 week(s)  [address]  [phone]            ________________________________________________________________    Risk of deterioration: Low    Condition at Discharge:  Stable  __________________________________________________________________    Disposition  SNF/LTC    ____________________________________________________________________    Code Status: Full Code  ___________________________________________________________________      Total time in minutes spent coordinating this discharge (includes going over instructions, follow-up, prescriptions, and preparing report for sign off to her PCP) :  >30 minutes    Signed:  Sheryl Forte MD

## 2022-10-25 NOTE — PROGRESS NOTES
End of Shift Note     Bedside shift change report given to GABY Dumont (oncoming nurse) by Ephraim Padilla RN (offgoing nurse).   Report included the following information SBAR, Kardex, MAR, and Recent Results     Shift worked: 3P-7P   Shift summary and any significant changes:     No complaints of pain or significant changes to condition          Concerns for physician to address:  none   Zone phone for oncoming shift:   [phone]      Patient Information  Dick Espinoza  81 y.o.  10/19/2022  2:00 PM by Lauryn Isidro MD. Dick Espinoza was admitted from Assisted Living     Problem List          Patient Active Problem List     Diagnosis Date Noted    Ribs, multiple fractures 10/19/2022    Late onset Alzheimer's disease without behavioral disturbance ([address] 05/14/2022    Cerebral microvascular disease 05/14/2022    Severe recurrent major depression without psychotic features ([address] 06/01/2021    Dementia ([address] 06/01/2021    Autoimmune hepatitis ([address] 11/01/2020    History of partial colectomy 11/01/2020    Diverticulitis 11/01/2020    Penile cancer ([address] 08/18/2020    Bilateral carotid artery stenosis 10/29/2019    GERD (gastroesophageal reflux disease) 08/29/2018    Hyperlipidemia 04/05/2012    PVC (premature ventricular contraction) 12/21/2010    Hypertension 12/06/2010    TIA (transient ischemic attack) 12/06/2010    Leg edema 12/06/2010              Past Medical History:   Diagnosis Date    Arthritis       left arm    Autoimmune hepatitis ([address]      Bleeding ulcer 07/2019    BPH (benign prostatic hyperplasia)      CAD (coronary artery disease)       s/p stent    Cancer (HCC)       penile squamous carcinoma    Chronic kidney disease       stage 2     Chronic obstructive pulmonary disease ([address]      Dementia ([address]      Elevated LFT's      Essential hypertension 9/24/01    GERD (gastroesophageal reflux disease)       hiatal hernia    Ill-defined condition 12/02/2016     faint    Nephrosclerosis      Orthostatic hypotension 9/24/01    PVC's 9/24/01    Sleep apnea       no CPAP    Syncope 9/24/01     secondary to venous insuffiency          Core Measures:  CVA: No No  CHF:No No  PNA:No No     Activity:  Activity Level: Bed Rest  Number times ambulated in hallways past shift: 0  Number of times OOB to chair past shift: 0     Cardiac:   Cardiac Monitoring: No         Access:   Current line(s): PIV   Central Line? No Placement date na Reason Medically Necessary na  PICC LINE? No Placement date na Reason Medically Necessary na     Genitourinary:   Urinary status: voiding and incontinent  Urinary Catheter? No Placement Date na Reason Medically Necessary na     Respiratory:   O2 Device: None (Room air)  Chronic home O2 use?: NO  Incentive spirometer at bedside: NO     GI:  Last Bowel Movement Date: 10/20/22  Current diet:  ADULT DIET Regular  Passing flatus: YES  Tolerating current diet: YES     Pain Management:   Patient states pain is manageable on current regimen: YES     Skin:  Sergey Score: 15  Interventions: PT/OT consult    Patient Safety:  Fall Score:  Total Score: 5  Interventions: bed/chair alarm  High Fall Risk: Yes  @Rollbelt  @dexterity to release roll belt  Yes/No ( must document dexterity  here by stating Yes or No here, otherwise this is a restraint and must follow restraint documentation policy.)     DVT prophylaxis:  DVT prophylaxis Med- No  DVT prophylaxis SCD or CHARLEEN- No      Wounds: (If Applicable)  Wounds- No  Location na     Active Consults:  IP CONSULT TO HOSPITALIST  IP CONSULT TO PULMONOLOGY     Length of Stay:  Expected LOS: 2d 21h  Actual LOS: 6  Discharge Plan: Yes To SNF, pending placement         Keshia Pérez RN

## 2022-10-25 NOTE — PROGRESS NOTES
Transition of Care Plan:     RUR: 10% - \"low risk\"  LOS: 6 days  Disposition: SNF- [facility] (pending insurance auth - auth initiated by facility 10/24/22, pending as of 10/25/22, auth reference #; [id number])  Follow up appointments: PCP & specialist as indicated  DME needed: Pt likely transitioning to SNF at d/c  Transportation at Discharge: BLS transport needed  Alayna Martinez or means to access home: N/A- pt likely transitioning to SNF at d/c     IM Medicare Letter: 2nd IM needed prior to d/c  Is patient a  and connected with the [place]? No          Caregiver Contact: Pt's daughter Tiny Carrillo ([phone])  Discharge Caregiver contacted prior to discharge? To be contacted prior d/c  Care Conference needed?: Not at this time    Update 0350pm: CM received call from [facility] admission liaison requesting updated PT/O notes to submit to pt's insurance provider ([address]). CM will request updated PT/OT notes in IDR 10/26/22. Initial note: Chart reviewed for updates. CM received update from [address] Pending sale to [facility] [phone]) confirming that insurance Iris Likens was initiated 10/24/22; Iris Likens pending updates as of currently. Alan to call CM with auth reference number. CM will report updates to pt and daughter Tiny Carrillo ([phone]). Updates to be provided once available.       DORENE Palma, [address]

## 2022-10-26 PROCEDURE — 65270000029 HC RM PRIVATE

## 2022-10-26 PROCEDURE — 97116 GAIT TRAINING THERAPY: CPT

## 2022-10-26 PROCEDURE — 74011250636 HC RX REV CODE- 250/636: Performed by: INTERNAL MEDICINE

## 2022-10-26 PROCEDURE — 74011250637 HC RX REV CODE- 250/637: Performed by: HOSPITALIST

## 2022-10-26 PROCEDURE — 97530 THERAPEUTIC ACTIVITIES: CPT

## 2022-10-26 PROCEDURE — 97535 SELF CARE MNGMENT TRAINING: CPT

## 2022-10-26 PROCEDURE — 74011000250 HC RX REV CODE- 250: Performed by: STUDENT IN AN ORGANIZED HEALTH CARE EDUCATION/TRAINING PROGRAM

## 2022-10-26 RX ADMIN — ACETAMINOPHEN 650 MG: 325 TABLET ORAL at 12:29

## 2022-10-26 RX ADMIN — CARVEDILOL 25 MG: 12.5 TABLET, FILM COATED ORAL at 09:12

## 2022-10-26 RX ADMIN — ACETAMINOPHEN 650 MG: 325 TABLET ORAL at 05:14

## 2022-10-26 RX ADMIN — CARVEDILOL 25 MG: 12.5 TABLET, FILM COATED ORAL at 18:25

## 2022-10-26 RX ADMIN — ACETAMINOPHEN 650 MG: 325 TABLET ORAL at 18:25

## 2022-10-26 RX ADMIN — FLUTICASONE PROPIONATE 2 SPRAY: 50 SPRAY, METERED NASAL at 09:14

## 2022-10-26 RX ADMIN — POTASSIUM CHLORIDE AND SODIUM CHLORIDE: 450; 150 INJECTION, SOLUTION INTRAVENOUS at 06:04

## 2022-10-26 RX ADMIN — ESCITALOPRAM OXALATE 5 MG: 10 TABLET ORAL at 09:12

## 2022-10-26 RX ADMIN — PANTOPRAZOLE SODIUM 40 MG: 40 TABLET, DELAYED RELEASE ORAL at 22:00

## 2022-10-26 RX ADMIN — ACETAMINOPHEN 650 MG: 325 TABLET ORAL at 23:13

## 2022-10-26 RX ADMIN — PANTOPRAZOLE SODIUM 40 MG: 40 TABLET, DELAYED RELEASE ORAL at 09:12

## 2022-10-26 RX ADMIN — TAMSULOSIN HYDROCHLORIDE 0.4 MG: 0.4 CAPSULE ORAL at 09:12

## 2022-10-26 RX ADMIN — MEMANTINE 5 MG: 5 TABLET ORAL at 18:25

## 2022-10-26 RX ADMIN — MEMANTINE 5 MG: 5 TABLET ORAL at 09:12

## 2022-10-26 NOTE — PROGRESS NOTES
Transition of Care Plan:    RUR: 10% - \"low risk\"  LOS: 7 days  Disposition: SNF- [facility] (pending insurance auth - auth initiated by facility 10/24/22, pending as of 10/26/22, auth reference #; [id number])  Follow up appointments: PCP & specialist as indicated  DME needed: Pt likely transitioning to SNF at d/c  Transportation at Discharge: BLS transport needed  Kim Glow or means to access home: N/A- pt likely transitioning to SNF at d/c     IM Medicare Letter: 2nd IM needed prior to d/c  Is patient a  and connected with the [place]? No          Caregiver Contact: Pt's daughter Aj Landaverde ([phone])  Discharge Caregiver contacted prior to discharge? To be contacted prior d/c  Care Conference needed?: Not at this time    Update 1024 am: CM received call from [address] requesting updated PT/OT notes. PT/OT notified during IDR. CM will continue to follow. Initial note: Chart reviewed for updates. CM contacted A liaison Sabrina Silva ([phone]) to follow up on insurance authRyder Bills to call CM with updates after morning meeting.        Bozena Shelton MSW, [facility]

## 2022-10-26 NOTE — PROGRESS NOTES
Problem: Mobility Impaired (Adult and Pediatric)  Goal: *Acute Goals and Plan of Care (Insert Text)  Description: FUNCTIONAL STATUS PRIOR TO ADMISSION: Pt lives in [address], unsure of facility from current notes. He is unable to fill in prior level of function but has arrived to ED with a RW and during session appears he is familiar with it. HOME SUPPORT PRIOR TO ADMISSION: [facility], unclear baseline    Physical Therapy Goals  Initiated 10/20/2022  1. Patient will move from supine to sit and sit to supine , scoot up and down, and roll side to side in bed with minimal assistance/contact guard assist within 7 day(s). 2.  Patient will transfer from bed to chair and chair to bed with supervision/set-up using the least restrictive device within 7 day(s). 3.  Patient will perform sit to stand with supervision/set-up within 7 day(s). 4.  Patient will ambulate with supervision/set-up for 100 feet with the least restrictive device within 7 day(s). Outcome: Progressing Towards Goal     PHYSICAL THERAPY TREATMENT  Patient: Doug Landin (56 y.o. male)  Date: 10/26/2022  Diagnosis: Ribs, multiple fractures [S22.49XA] <principal problem not specified>      Precautions: Fall  Chart, physical therapy assessment, plan of care and goals were reviewed. ASSESSMENT  Patient continues with skilled PT services and is progressing towards goals. Able to progress to out of bed to chair and to bathroom with rolling walker and min to contact guard assist of 2 persons. Performing sit to stand fairly automatically with contact guard assist. Patient with shuffling gait and needing assist to manage RW - would likely do better with assist of 2 for stand pivot to chair or bedside commode with nursing due to high fall risk. Came from [facility] with a fall. Unclear prior level of function but does has his own personal RW at bedside and it is noted he had fallen at facility.  Appears somewhat below whatever his baseline is in setting of pain from rib fractures and hospital related deconditioning. Currently recommend SNF unless Memory care able to provide level of assist required. Current Level of Function Impacting Discharge (mobility/balance): minimal assist of 2 with RW    Other factors to consider for discharge: dementia, came from Memory Care         PLAN :  Patient continues to benefit from skilled intervention to address the above impairments. Continue treatment per established plan of care. to address goals. Recommendation for discharge: (in order for the patient to meet his/her long term goals)  Therapy up to 5 days/week in SNF setting    This discharge recommendation:  Has been made in collaboration with the attending provider and/or case management    IF patient discharges home will need the following DME: to be determined (TBD)     SUBJECTIVE:   Patient stated Yeah.    OBJECTIVE DATA SUMMARY:   Critical Behavior:  Neurologic State: Alert, Confused  Orientation Level: Oriented to person, Disoriented to place, Disoriented to situation, Disoriented to time  Cognition: Decreased attention/concentration, Follows commands, Memory loss  Safety/Judgement: Lack of insight into deficits, Decreased awareness of need for assistance, Decreased awareness of need for safety  Functional Mobility Training:  Bed Mobility:  Supine to Sit: Moderate assistance (bed elevated, tactile cues)  Transfers:  Sit to Stand: Contact guard assistance (standing up automatically with B UEs)  Stand to Sit: Minimum assistance; Adaptive equipment (verbal and tactile cues)  Bed to Chair: Minimum assistance; Adaptive equipment;Assist x2  Balance:  Sitting: Impaired  Sitting - Static: Good (unsupported)  Sitting - Dynamic: Fair (occasional)  Standing: Impaired; With support  Standing - Static: Fair;Constant support  Standing - Dynamic : Fair;Constant support  Ambulation/Gait Training:  Distance (ft): 10 Feet (ft) (x2)  Assistive Device: Richard Teague rolling;Gait belt  Ambulation - Level of Assistance: Minimal assistance;Assist x2;Adaptive equipment (assist to manage RW)  Gait Abnormalities: Decreased step clearance;Shuffling gait (significant forward trunk flexion with RW with increased distance between device and patient)  Base of Support: Center of gravity altered;Narrowed  Speed/Jessica: Shuffled; Slow  Step Length: Right shortened;Left shortened    Pain Rating:  Pain from rib fractures but able to participate    Activity Tolerance:   Fair    After treatment patient left in no apparent distress:   Sitting in chair, Call bell within reach, and Bed / chair alarm activated    COMMUNICATION/COLLABORATION:   The patients plan of care was discussed with: Occupational therapist, Registered nurse, Physician, Case management, and patient discussed in [address] [place] [address] .      Dariana Wasserman, PT   Time Calculation: 32 mins

## 2022-10-26 NOTE — PROGRESS NOTES
End of Shift Note     Bedside shift change report given to ANJANA Fonseca (oncoming nurse) by Tomy Rome (offgoing nurse).   Report included the following information SBAR, Kardex, MAR, and Recent Results     Shift worked: Nights   Shift summary and any significant changes:     No complaints of pain or significant changes to condition          Concerns for physician to address:  none   Zone phone for oncoming shift:   [phone]      Patient Information  Alyx Nunez  81 y.o.  10/19/2022  2:00 PM by Mumtaz Rodriguez MD. Alyx Nunez was admitted from Assisted Living     Problem List          Patient Active Problem List     Diagnosis Date Noted    Ribs, multiple fractures 10/19/2022    Late onset Alzheimer's disease without behavioral disturbance ([address] 05/14/2022    Cerebral microvascular disease 05/14/2022    Severe recurrent major depression without psychotic features ([address] 06/01/2021    Dementia ([address] 06/01/2021    Autoimmune hepatitis ([address] 11/01/2020    History of partial colectomy 11/01/2020    Diverticulitis 11/01/2020    Penile cancer ([address] 08/18/2020    Bilateral carotid artery stenosis 10/29/2019    GERD (gastroesophageal reflux disease) 08/29/2018    Hyperlipidemia 04/05/2012    PVC (premature ventricular contraction) 12/21/2010    Hypertension 12/06/2010    TIA (transient ischemic attack) 12/06/2010    Leg edema 12/06/2010              Past Medical History:   Diagnosis Date    Arthritis       left arm    Autoimmune hepatitis ([address]      Bleeding ulcer 07/2019    BPH (benign prostatic hyperplasia)      CAD (coronary artery disease)       s/p stent    Cancer (HCC)       penile squamous carcinoma    Chronic kidney disease       stage 2     Chronic obstructive pulmonary disease ([address]      Dementia ([address]      Elevated LFT's      Essential hypertension 9/24/01    GERD (gastroesophageal reflux disease)       hiatal hernia    Ill-defined condition 12/02/2016     faint    Nephrosclerosis      Orthostatic hypotension 9/24/01    PVC's 9/24/01    Sleep apnea       no CPAP    Syncope 9/24/01     secondary to venous insuffiency          Core Measures:  CVA: No No  CHF:No No  PNA:No No     Activity:  Activity Level: Bed Rest  Number times ambulated in hallways past shift: 0  Number of times OOB to chair past shift: 0     Cardiac:   Cardiac Monitoring: No         Access:   Current line(s): PIV   Central Line? No Placement date na Reason Medically Necessary na  PICC LINE? No Placement date na Reason Medically Necessary na     Genitourinary:   Urinary status: voiding and incontinent  Urinary Catheter? No Placement Date na Reason Medically Necessary na     Respiratory:   O2 Device: None (Room air)  Chronic home O2 use?: NO  Incentive spirometer at bedside: NO     GI:  Last Bowel Movement Date: 10/20/22  Current diet:  ADULT DIET Regular  Passing flatus: YES  Tolerating current diet: YES     Pain Management:   Patient states pain is manageable on current regimen: YES     Skin:  Sergey Score: 15  Interventions: PT/OT consult    Patient Safety:  Fall Score:  Total Score: 5  Interventions: bed/chair alarm  High Fall Risk: Yes  @Rollbelt  @dexterity to release roll belt  Yes/No ( must document dexterity  here by stating Yes or No here, otherwise this is a restraint and must follow restraint documentation policy.)     DVT prophylaxis:  DVT prophylaxis Med- No  DVT prophylaxis SCD or CHARLEEN- No      Wounds: (If Applicable)  Wounds- No  Location na     Active Consults:  IP CONSULT TO HOSPITALIST  IP CONSULT TO PULMONOLOGY     Length of Stay:  Expected LOS: 2d 21h  Actual LOS: 6  Discharge Plan: Yes To SNF, pending placement       Papi Portillo

## 2022-10-26 NOTE — PROGRESS NOTES
End of Shift Note     Bedside shift change report given to [address] [place] [address] (oncoming nurse) by Pierre Arriaga RN (offgoing nurse). Report included the following information SBAR, Kardex, MAR, and Recent Results     Shift worked: 7a-7p   Shift summary and any significant changes:     Pt abdomen a little firm. Eating diet with no issues. Had BM this AM. Pt has some c/o pain but not consistent and unable to always give location of pain. Tylenol administered as scheduled.           Concerns for physician to address:  none   Zone phone for oncoming shift:   [phone]      Patient Information  Alyx Pattenkeeper  81 y.o.  10/19/2022  2:00 PM by Mumtaz Rodriguez MD. Alyx Pattenkeeper was admitted from Assisted Living     Problem List          Patient Active Problem List     Diagnosis Date Noted    Ribs, multiple fractures 10/19/2022    Late onset Alzheimer's disease without behavioral disturbance ([address] 05/14/2022    Cerebral microvascular disease 05/14/2022    Severe recurrent major depression without psychotic features ([address] 06/01/2021    Dementia ([address] 06/01/2021    Autoimmune hepatitis ([address] 11/01/2020    History of partial colectomy 11/01/2020    Diverticulitis 11/01/2020    Penile cancer ([address] 08/18/2020    Bilateral carotid artery stenosis 10/29/2019    GERD (gastroesophageal reflux disease) 08/29/2018    Hyperlipidemia 04/05/2012    PVC (premature ventricular contraction) 12/21/2010    Hypertension 12/06/2010    TIA (transient ischemic attack) 12/06/2010    Leg edema 12/06/2010              Past Medical History:   Diagnosis Date    Arthritis       left arm    Autoimmune hepatitis ([address]      Bleeding ulcer 07/2019    BPH (benign prostatic hyperplasia)      CAD (coronary artery disease)       s/p stent    Cancer (HCC)       penile squamous carcinoma    Chronic kidney disease       stage 2     Chronic obstructive pulmonary disease (HCC)      Dementia (HCC)      Elevated LFT's      Essential hypertension 9/24/01    GERD (gastroesophageal reflux disease)       hiatal hernia    Ill-defined condition 12/02/2016     faint    Nephrosclerosis      Orthostatic hypotension 9/24/01    PVC's 9/24/01    Sleep apnea       no CPAP    Syncope 9/24/01     secondary to venous insuffiency          Core Measures:  CVA: No No  CHF:No No  PNA:No No     Activity:  Activity Level: Bed Rest  Number times ambulated in hallways past shift: 0  Number of times OOB to chair past shift: 0     Cardiac:   Cardiac Monitoring: No         Access:   Current line(s): PIV   Central Line? No Placement date na Reason Medically Necessary na  PICC LINE? No Placement date na Reason Medically Necessary na     Genitourinary:   Urinary status: voiding and incontinent  Urinary Catheter? No Placement Date na Reason Medically Necessary na     Respiratory:   O2 Device: None (Room air)  Chronic home O2 use?: NO  Incentive spirometer at bedside: NO     GI:  Last Bowel Movement Date: 10/20/22  Current diet:  ADULT DIET Regular  Passing flatus: YES  Tolerating current diet: YES     Pain Management:   Patient states pain is manageable on current regimen: YES     Skin:  Sergey Score: 15  Interventions: PT/OT consult    Patient Safety:  Fall Score:  Total Score: 5  Interventions: bed/chair alarm  High Fall Risk: Yes  @Rollbelt  @dexterity to release roll belt  Yes/No ( must document dexterity  here by stating Yes or No here, otherwise this is a restraint and must follow restraint documentation policy.)     DVT prophylaxis:  DVT prophylaxis Med- No  DVT prophylaxis SCD or CHARLEEN- No      Wounds: (If Applicable)  Wounds- No  Location na     Active Consults:  IP CONSULT TO HOSPITALIST  IP CONSULT TO PULMONOLOGY     Length of Stay:  Expected LOS: 2d 21h  Actual LOS: 6  Discharge Plan: Yes To SNF, pending placement       Daija Munoz

## 2022-10-26 NOTE — PROGRESS NOTES
Problem: Self Care Deficits Care Plan (Adult)  Goal: *Acute Goals and Plan of Care (Insert Text)  Description: FUNCTIONAL STATUS PRIOR TO ADMISSION:  unknown baseline, from memory care     HOME SUPPORT PRIOR TO ADMISSION:  unknown     Occupational Therapy Goals:  Initiated 10/20/2022  1. Patient will perform self-feeding with minimal assistance within 7 days. 2. Patient will perform upper body dressing with maximal assistance within 7 days. 3. Patient will perform bathing UB with max assist within 7 days. 4. Patient will transfer from bedside commode or toilet with minimal using the least restrictive device and appropriate durable medical equipment within 7 days. 10/26/2022 1510 by Pia Bartlett, OT  Outcome: Progressing Towards Goal  10/26/2022 1510 by Pia Bartlett, OT  Outcome: Progressing Towards Goal   OCCUPATIONAL THERAPY TREATMENT  Patient: Tex Barber (30 y.o. male)  Date: 10/26/2022  Diagnosis: Ribs, multiple fractures [S22.49XA] <principal problem not specified>      Precautions: Fall  Chart, occupational therapy assessment, plan of care, and goals were reviewed. ASSESSMENT  Patient continues with skilled OT services and is progressing towards goals. Patient demonstrates improved functional mobility and balance today. Upon transitioning to sitting EOB, patient maintains fair-good balance for several minutes in prep for OOB activity. Patient does well with automatic movements, performing sit<>stand with overall CGA. He does require 2 person assist for safe functional mobility into bathroom for attempted toileting, requiring max assist for RW positioning and brief management.  Patient participates in donning new brief, threading distally with max assist and holding onto gown in standing to facilitate dependent size adjustment, maintaining static unsupported position with min assist. Patient progressing well, however needs 24/7 hands-on supervision and assist with all mobility and self-care. Anticipate best outcome with discharge to SNF prior to return to memory care unit. Current Level of Function Impacting Discharge (ADLs): up to max A for lower-body self-care; assist x2 for bed<>BSC<>chair    Other factors to consider for discharge: baseline cognitive impairments         PLAN :  Patient continues to benefit from skilled intervention to address the above impairments. Continue treatment per established plan of care to address goals. Recommendation for discharge: (in order for the patient to meet his/her long term goals)  Therapy up to 5 days/week in SNF setting    This discharge recommendation:  Has been made in collaboration with the attending provider and/or case management    IF patient discharges home will need the following DME: TBD       SUBJECTIVE:   Patient stated Home\" when asked where he currently is located. Reoriented patient with no carryover. OBJECTIVE DATA SUMMARY:   Cognitive/Behavioral Status:  Neurologic State: Alert;Confused  Orientation Level: Oriented to person  Cognition: Follows commands;Decreased attention/concentration; Impaired decision making;Poor safety awareness  Perception: Verbal;Visual;Tactile;Cues to maintain midline in standing  Perseveration: Perseverates during conversation  Safety/Judgement: Decreased awareness of environment;Decreased insight into deficits; Fall prevention    Functional Mobility and Transfers for ADLs:  Bed Mobility:  Supine to Sit: Moderate assistance (bed elevated, tactile cues)    Transfers:  Sit to Stand: Contact guard assistance (standing up automatically with B UEs)  Functional Transfers  Bathroom Mobility: Minimum assistance (x2)  Toilet Transfer : Contact guard assistance;Minimum assistance  Cues: Verbal cues provided;Visual cues provided  Bed to Chair: Minimum assistance; Adaptive equipment;Assist x2    Balance:  Sitting: Impaired  Sitting - Static: Good (unsupported)  Sitting - Dynamic: Fair (occasional)  Standing: Impaired; With support  Standing - Static: Fair;Constant support  Standing - Dynamic : Fair;Constant support    ADL Intervention:  Feeding  Feeding Assistance: Stand-by assistance  Container Management: Standing-by assistance (for twist-top soda bottle)  Drink to Mouth: Stand-by assistance    Lower Body Bathing  Bathing Assistance: Total assistance(dependent)  Perineal  : Total assistance (dependent)  Position Performed: Standing    Lower Body Dressing Assistance  Dressing Assistance: Maximum assistance  Protective Undergarmet: Maximum assistance  Position Performed: Seated in chair;Standing  Cues: Verbal cues provided;Visual cues provided;Physical assistance    Toileting  Toileting Assistance: Total assistance(dependent)  Bladder Hygiene: Total assistance (dependent)  Clothing Management: Total assistance (dependent)    Cognitive Retraining  Orientation Retraining: Reorienting;Situation;Place; Awareness of environment  Problem Solving: Identifying the task  Executive Functions: Executing cognitive plans  Organizing/Sequencing: Breaking task down  Attention to Task: Single task  Safety/Judgement: Decreased awareness of environment;Decreased insight into deficits; Fall prevention    Pain:  Pain at rib fracture sites, tolerates session well. RN following. Activity Tolerance:   Fair and requires rest breaks    After treatment patient left in no apparent distress:   Sitting in chair, Heels elevated for pressure relief, Call bell within reach, and Bed / chair alarm activated    COMMUNICATION/COLLABORATION:   The patients plan of care was discussed with: Physical therapist and Registered nurse.      Zach Coleman OT  Time Calculation: 29 mins

## 2022-10-26 NOTE — PROGRESS NOTES
Problem: Patient Education: Go to Patient Education Activity  Goal: Patient/Family Education  Outcome: Progressing Towards Goal     Problem: Patient Education: Go to Patient Education Activity  Goal: Patient/Family Education  Outcome: Progressing Towards Goal     Problem: Pressure Injury - Risk of  Goal: *Prevention of pressure injury  Description: Document Sergey Scale and appropriate interventions in the flowsheet. Outcome: Progressing Towards Goal  Note: Pressure Injury Interventions:  Sensory Interventions: Assess changes in LOC    Moisture Interventions: Absorbent underpads, Apply protective barrier, creams and emollients, Check for incontinence Q2 hours and as needed, Internal/External urinary devices    Activity Interventions: PT/OT evaluation    Mobility Interventions: HOB 30 degrees or less    Nutrition Interventions: Document food/fluid/supplement intake    Friction and Shear Interventions: Apply protective barrier, creams and emollients                Problem: Falls - Risk of  Goal: *Absence of Falls  Description: Document Roxann Fall Risk and appropriate interventions in the flowsheet.   Outcome: Progressing Towards Goal  Note: Fall Risk Interventions:  Mobility Interventions: Bed/chair exit alarm    Mentation Interventions: Bed/chair exit alarm    Medication Interventions: Bed/chair exit alarm    Elimination Interventions: Bed/chair exit alarm, Call light in reach    History of Falls Interventions: Bed/chair exit alarm

## 2022-10-27 LAB
ALBUMIN SERPL-MCNC: 2.2 G/DL (ref 3.5–5)
ALBUMIN/GLOB SERPL: 0.5 {RATIO} (ref 1.1–2.2)
ALP SERPL-CCNC: 300 U/L (ref 45–117)
ALT SERPL-CCNC: 37 U/L (ref 12–78)
ANION GAP SERPL CALC-SCNC: 6 MMOL/L (ref 5–15)
AST SERPL-CCNC: 35 U/L (ref 15–37)
BASOPHILS # BLD: 0.1 K/UL (ref 0–0.1)
BASOPHILS NFR BLD: 1 % (ref 0–1)
BILIRUB SERPL-MCNC: 0.6 MG/DL (ref 0.2–1)
BUN SERPL-MCNC: 17 MG/DL (ref 6–20)
BUN/CREAT SERPL: 17 (ref 12–20)
CALCIUM SERPL-MCNC: 8.3 MG/DL (ref 8.5–10.1)
CHLORIDE SERPL-SCNC: 109 MMOL/L (ref 97–108)
CO2 SERPL-SCNC: 23 MMOL/L (ref 21–32)
CREAT SERPL-MCNC: 1.02 MG/DL (ref 0.7–1.3)
DIFFERENTIAL METHOD BLD: ABNORMAL
EOSINOPHIL # BLD: 0.5 K/UL (ref 0–0.4)
EOSINOPHIL NFR BLD: 11 % (ref 0–7)
ERYTHROCYTE [DISTWIDTH] IN BLOOD BY AUTOMATED COUNT: 15.5 % (ref 11.5–14.5)
GLOBULIN SER CALC-MCNC: 4.5 G/DL (ref 2–4)
GLUCOSE SERPL-MCNC: 116 MG/DL (ref 65–100)
HCT VFR BLD AUTO: 30.1 % (ref 36.6–50.3)
HGB BLD-MCNC: 9.7 G/DL (ref 12.1–17)
IMM GRANULOCYTES # BLD AUTO: 0 K/UL (ref 0–0.04)
IMM GRANULOCYTES NFR BLD AUTO: 0 % (ref 0–0.5)
LACTATE SERPL-SCNC: 1.3 MMOL/L (ref 0.4–2)
LYMPHOCYTES # BLD: 1 K/UL (ref 0.8–3.5)
LYMPHOCYTES NFR BLD: 22 % (ref 12–49)
MCH RBC QN AUTO: 29 PG (ref 26–34)
MCHC RBC AUTO-ENTMCNC: 32.2 G/DL (ref 30–36.5)
MCV RBC AUTO: 89.9 FL (ref 80–99)
MONOCYTES # BLD: 0.7 K/UL (ref 0–1)
MONOCYTES NFR BLD: 15 % (ref 5–13)
NEUTS SEG # BLD: 2.3 K/UL (ref 1.8–8)
NEUTS SEG NFR BLD: 51 % (ref 32–75)
NRBC # BLD: 0 K/UL (ref 0–0.01)
NRBC BLD-RTO: 0 PER 100 WBC
PLATELET # BLD AUTO: 214 K/UL (ref 150–400)
PMV BLD AUTO: 10 FL (ref 8.9–12.9)
POTASSIUM SERPL-SCNC: 4.2 MMOL/L (ref 3.5–5.1)
PROT SERPL-MCNC: 6.7 G/DL (ref 6.4–8.2)
RBC # BLD AUTO: 3.35 M/UL (ref 4.1–5.7)
SODIUM SERPL-SCNC: 138 MMOL/L (ref 136–145)
WBC # BLD AUTO: 4.6 K/UL (ref 4.1–11.1)

## 2022-10-27 PROCEDURE — 83605 ASSAY OF LACTIC ACID: CPT

## 2022-10-27 PROCEDURE — 74011000250 HC RX REV CODE- 250: Performed by: STUDENT IN AN ORGANIZED HEALTH CARE EDUCATION/TRAINING PROGRAM

## 2022-10-27 PROCEDURE — 80053 COMPREHEN METABOLIC PANEL: CPT

## 2022-10-27 PROCEDURE — 65270000029 HC RM PRIVATE

## 2022-10-27 PROCEDURE — 36415 COLL VENOUS BLD VENIPUNCTURE: CPT

## 2022-10-27 PROCEDURE — 74011250637 HC RX REV CODE- 250/637: Performed by: HOSPITALIST

## 2022-10-27 PROCEDURE — 85025 COMPLETE CBC W/AUTO DIFF WBC: CPT

## 2022-10-27 RX ADMIN — MEMANTINE 5 MG: 5 TABLET ORAL at 09:43

## 2022-10-27 RX ADMIN — MEMANTINE 5 MG: 5 TABLET ORAL at 18:04

## 2022-10-27 RX ADMIN — PANTOPRAZOLE SODIUM 40 MG: 40 TABLET, DELAYED RELEASE ORAL at 23:15

## 2022-10-27 RX ADMIN — TAMSULOSIN HYDROCHLORIDE 0.4 MG: 0.4 CAPSULE ORAL at 09:43

## 2022-10-27 RX ADMIN — ESCITALOPRAM OXALATE 5 MG: 10 TABLET ORAL at 09:43

## 2022-10-27 RX ADMIN — ACETAMINOPHEN 650 MG: 325 TABLET ORAL at 18:04

## 2022-10-27 RX ADMIN — ACETAMINOPHEN 650 MG: 325 TABLET ORAL at 05:00

## 2022-10-27 RX ADMIN — CARVEDILOL 25 MG: 12.5 TABLET, FILM COATED ORAL at 09:43

## 2022-10-27 RX ADMIN — ACETAMINOPHEN 650 MG: 325 TABLET ORAL at 12:53

## 2022-10-27 RX ADMIN — PANTOPRAZOLE SODIUM 40 MG: 40 TABLET, DELAYED RELEASE ORAL at 09:43

## 2022-10-27 RX ADMIN — ACETAMINOPHEN 650 MG: 325 TABLET ORAL at 23:15

## 2022-10-27 RX ADMIN — FLUTICASONE PROPIONATE 2 SPRAY: 50 SPRAY, METERED NASAL at 11:03

## 2022-10-27 RX ADMIN — CARVEDILOL 25 MG: 12.5 TABLET, FILM COATED ORAL at 18:04

## 2022-10-27 NOTE — PROGRESS NOTES
Comprehensive Nutrition Assessment    Type and Reason for Visit: Initial, RD nutrition re-screen/LOS    Nutrition Recommendations/Plan:   Continue Regular diet      Nutrition Assessment:    Patient medically noted for fall and multiple rib fractures. PMH Dementia, GERD, and HTN. Chart reviewed for length of stay. Patient sleeping at time of attempted visit; didn't wake to knock on door. No family at bedside. No weight loss noted on chart review. No recent documented PO intakes. Stable for d/c pending insurance authorization. Continue liberalized diet. Encourage intake of meals. Wt Readings from Last 5 Encounters:   10/19/22 71.2 kg (156 lb 15.5 oz)   05/12/22 71.2 kg (157 lb)   05/05/22 69.6 kg (153 lb 7 oz)   04/21/22 70.4 kg (155 lb 3.3 oz)   08/11/21 76.2 kg (168 lb)     Nutrition Related Findings:    labs reviewed   BM 10/27   Coreg, Lexapro, namenda, Protonix   Wound Type: Moisture associate skin damage, Skin tears    Current Nutrition Intake & Therapies:        ADULT DIET Regular    Anthropometric Measures:  Height: 5' 4\" (162.6 cm)  Ideal Body Weight (IBW): 130 lbs (59 kg)     Current Body Wt:  71.2 kg (156 lb 15.5 oz), 120.7 % IBW. Current BMI (kg/m2): 26.9                          BMI Category: Overweight (BMI 25.0-29. 9)    Estimated Daily Nutrient Needs:  Energy Requirements Based On: Formula  Weight Used for Energy Requirements: Current  Energy (kcal/day): 1724 kcals (BMR 1326 x 1. 3AF)  Weight Used for Protein Requirements: Current  Protein (g/day): 71g (1.0 g/kg bw)  Method Used for Fluid Requirements: 1 ml/kcal  Fluid (ml/day): 1700 mL    Nutrition Diagnosis:   No nutrition diagnosis at this time       Nutrition Interventions:   Food and/or Nutrient Delivery: Continue current diet  Nutrition Education/Counseling: No recommendations at this time  Coordination of Nutrition Care: Continue to monitor while inpatient       Goals:     Goals: PO intake 75% or greater, by next RD assessment Nutrition Monitoring and Evaluation:   Behavioral-Environmental Outcomes: None identified  Food/Nutrient Intake Outcomes: Food and nutrient intake  Physical Signs/Symptoms Outcomes: Biochemical data, Weight    Discharge Planning:    Continue current diet    Gil Holder RD  Contact: ext [phone]

## 2022-10-27 NOTE — PROGRESS NOTES
End of Shift Note     Bedside shift change report given to RY Fu (oncoming nurse) by Joseph Collins (offgoing nurse).   Report included the following information SBAR, Kardex, MAR, and Recent Results     Shift worked: Nights   Shift summary and any significant changes:     none         Concerns for physician to address:  none   Zone phone for oncoming shift:   [phone]      Patient Information  Zoila Nagy  81 y.o.  10/19/2022  2:00 PM by Xenia Martel MD. Zoila Nagy was admitted from [facility]isted [facility]     Problem List          Patient Active Problem List     Diagnosis Date Noted    Ribs, multiple fractures 10/19/2022    Late onset Alzheimer's disease without behavioral disturbance ([address] 05/14/2022    Cerebral microvascular disease 05/14/2022    Severe recurrent major depression without psychotic features ([address] 06/01/2021    Dementia ([address] 06/01/2021    Autoimmune hepatitis ([address] 11/01/2020    History of partial colectomy 11/01/2020    Diverticulitis 11/01/2020    Penile cancer ([address] 08/18/2020    Bilateral carotid artery stenosis 10/29/2019    GERD (gastroesophageal reflux disease) 08/29/2018    Hyperlipidemia 04/05/2012    PVC (premature ventricular contraction) 12/21/2010    Hypertension 12/06/2010    TIA (transient ischemic attack) 12/06/2010    Leg edema 12/06/2010              Past Medical History:   Diagnosis Date    Arthritis       left arm    Autoimmune hepatitis ([address]      Bleeding ulcer 07/2019    BPH (benign prostatic hyperplasia)      CAD (coronary artery disease)       s/p stent    Cancer (HCC)       penile squamous carcinoma    Chronic kidney disease       stage 2     Chronic obstructive pulmonary disease ([address]      Dementia ([address]      Elevated LFT's      Essential hypertension 9/24/01    GERD (gastroesophageal reflux disease)       hiatal hernia    Ill-defined condition 12/02/2016     faint    Nephrosclerosis      Orthostatic hypotension 9/24/01    PVC's 9/24/01    Sleep apnea       no CPAP Syncope 9/24/01     secondary to venous insuffiency          Core Measures:  CVA: No No  CHF:No No  PNA:No No     Activity:  Activity Level: Bed Rest  Number times ambulated in hallways past shift: 0  Number of times OOB to chair past shift: 0     Cardiac:   Cardiac Monitoring: No         Access:   Current line(s): PIV   Central Line? No Placement date na Reason Medically Necessary na  PICC LINE? No Placement date na Reason Medically Necessary na     Genitourinary:   Urinary status: voiding and incontinent  Urinary Catheter? No Placement Date na Reason Medically Necessary na     Respiratory:   O2 Device: None (Room air)  Chronic home O2 use?: NO  Incentive spirometer at bedside: NO     GI:  Last Bowel Movement Date: 10/20/22  Current diet:  ADULT DIET Regular  Passing flatus: YES  Tolerating current diet: YES     Pain Management:   Patient states pain is manageable on current regimen: YES     Skin:  Sergey Score: 15  Interventions: PT/OT consult    Patient Safety:  Fall Score:  Total Score: 5  Interventions: bed/chair alarm  High Fall Risk: Yes  @Rollbelt  @dexterity to release roll belt  Yes/No ( must document dexterity  here by stating Yes or No here, otherwise this is a restraint and must follow restraint documentation policy.)     DVT prophylaxis:  DVT prophylaxis Med- No  DVT prophylaxis SCD or CHARLEEN- No      Wounds: (If Applicable)  Wounds- No  Location na     Active Consults:  IP CONSULT TO HOSPITALIST  IP CONSULT TO PULMONOLOGY     Length of Stay:  Expected LOS: 2d 21h  Actual LOS: 6  Discharge Plan: Yes To SNF, pending placement       Heber Nguyen

## 2022-10-27 NOTE — PROGRESS NOTES
Problem: Patient Education: Go to Patient Education Activity  Goal: Patient/Family Education  Outcome: Progressing Towards Goal     Problem: Pressure Injury - Risk of  Goal: *Prevention of pressure injury  Description: Document Sergey Scale and appropriate interventions in the flowsheet. Outcome: Progressing Towards Goal  Note: Pressure Injury Interventions:  Sensory Interventions: Assess changes in LOC, Assess need for specialty bed, Turn and reposition approx. every two hours (pillows and wedges if needed)    Moisture Interventions: Check for incontinence Q2 hours and as needed, Absorbent underpads    Activity Interventions: PT/OT evaluation    Mobility Interventions: HOB 30 degrees or less, PT/OT evaluation    Nutrition Interventions: Document food/fluid/supplement intake    Friction and Shear Interventions: HOB 30 degrees or less                Problem: Falls - Risk of  Goal: *Absence of Falls  Description: Document Roxann Fall Risk and appropriate interventions in the flowsheet.   Outcome: Progressing Towards Goal  Note: Fall Risk Interventions:  Mobility Interventions: Bed/chair exit alarm    Mentation Interventions: Bed/chair exit alarm    Medication Interventions: Bed/chair exit alarm    Elimination Interventions: Bed/chair exit alarm, Call light in reach    History of Falls Interventions: Bed/chair exit alarm

## 2022-10-27 NOTE — PROGRESS NOTES
Hospitalist Progress Note    NAME: Doug Landin   :  1940   MRN:  [id number]     Assessment / Plan:  CHELO:  patient medically clear for discharge  Barriers: Safe dispo    Left 5-9th Ribs fracture s/p GLF POA  Left pleural effusion POA  -CT head nothing acute  -CT C spine:  No acute fracture. Multilevel mod to severe canal and foraminal stenosis  -CTchest and pelvis:  No pelvic fracture. Acute min displaced fractures of LEFT fifth through ninth ribs. There are segmental fracture of the LEFT sixth through eighth ribs  -Pain management: Multimodal, will schedule Tylenol and use low-dose oxy as needed; lidocaine patch  - Pulmonary followed, cleared for discharge  - Will benefit from SNF  -Add incentive spirometer    UTI  -UA with  wbc, 2+ bacteria.   -given he fell, will treat with Rocephin  - No urine culture sent     Acute kidney injury POA resolved  Hypokalemia  Baseline creatinine 0.9, admission 1.4  -resolved with hydration. Continue IVF     Large left-sided pleural effusion  small left chest subpleural hematoma  CT: Large left-sided pleural effusion with associated left lower lobe atelectasis, there is also a small left chest subpleural hematoma  -continue to hold ASA   -incentive spirometry  -Pulmonary input appreciated, supportive care     Anemia of chronic disease  -Hg trending down likely from hemodilution. S/P bolus in ED. Autoimmune hepatitis  Dementia Continue Namenda  Penile cancer status post resection  BPH Continue flomax   Hyperlipidemia         Code Status: Full code  Surrogate Decision Maker: Daughter     DVT Prophylaxis: SCD, no AC with small hematoma on CT  GI Prophylaxis: not indicated     Baseline: Dementia, [facility] memory care unit   Recommended Disposition: SNF/LTC    25.0 - 29.9 Overweight / Body mass index is 26.94 kg/m².   Awaiting placcement     Subjective:     Chief Complaint / Reason for Physician Visit  Follow up of rib fractures, s/p fall, ABLA, LETICIA  Chart reviewed in detail. Discussed with RN events overnight. Remains on room air, + intermittent CP  NS notes tense abdomen, denies abdominal pain, buit confused   BM earlier today, no N/V, eating okay  Spoke with daughter at bedside    Review of Systems:  Symptom Y/N Comments  Symptom Y/N Comments   Fever/Chills    Chest Pain     Poor Appetite    Edema     Cough    Abdominal Pain     Sputum    Joint Pain     SOB/DE JESUS    Pruritis/Rash     Nausea/vomit    Tolerating PT/OT     Diarrhea    Tolerating Diet     Constipation    Other       Could NOT obtain due to: Confusion     PO intake: No data found. Objective:     VITALS:   Last 24hrs VS reviewed since prior progress note. Most recent are:  Patient Vitals for the past 24 hrs:   Temp Pulse Resp BP SpO2   10/26/22 1530 97.9 °F (36.6 °C) 63 20 (!) 149/67 95 %   10/26/22 0756 97.9 °F (36.6 °C) 65 16 (!) 160/81 94 %   10/25/22 2305 98.9 °F (37.2 °C) 73 16 (!) 153/77 94 %         Intake/Output Summary (Last 24 hours) at 10/26/2022 2001  Last data filed at 10/26/2022 0604  Gross per 24 hour   Intake --   Output 300 ml   Net -300 ml          I had a face to face encounter, and independently examined this patient on 10/26/2022, as outlined below:  PHYSICAL EXAM:  General: WD, WN. Alert, cooperative, no acute distress    EENT:  Anicteric sclerae. MMM  Resp:  CTA bilaterally, no wheezing or rales. No accessory muscle use  CV:  Regular  rhythm,  No edema  GI:  Soft, Non distended, no clear tenderness. +Bowel sounds  Neurologic:  Alert and oriented X self, normal speech, weak but non focal  Psych:   Poor insight. Not anxious nor agitated  Skin:  No rashes.   No jaundice  (+) left chest wall bruise    Reviewed most current lab test results and cultures  YES  Reviewed most current radiology test results   YES  Review and summation of old records today    NO  Reviewed patient's current orders and MAR    YES  PMH/SH reviewed - no change compared to H&P  ________________________________________________________________________  Care Plan discussed with:    Comments   Patient x    Family  x    RN     Care Manager     Consultant                        Multidiciplinary team rounds were held today with , nursing, pharmacist and clinical coordinator. Patient's plan of care was discussed; medications were reviewed and discharge planning was addressed. ________________________________________________________________________      Comments   >50% of visit spent in counseling and coordination of care x     This includes time during multidisciplinary rounds if indicated above   ________________________________________________________________________  Adali Pugh MD     Procedures: see electronic medical records for all procedures/Xrays and details which were not copied into this note but were reviewed prior to creation of Plan. LABS:  I reviewed today's most current labs and imaging studies. Pertinent labs include:  No results for input(s): WBC, HGB, HCT, PLT, HGBEXT, HCTEXT, PLTEXT, HGBEXT, HCTEXT, PLTEXT in the last 72 hours. No results for input(s): NA, K, CL, CO2, GLU, BUN, CREA, CA, MG, PHOS, ALB, TBIL, TBILI, ALT, INR, INREXT, INREXT in the last 72 hours.     No lab exists for component: SGOT

## 2022-10-27 NOTE — PROGRESS NOTES
Transition of Care Plan:     RUR: 13% - \"low risk\"  LOS: 8 days  Disposition: SNF- [facility] (pending insurance auth - auth initiated by facility 10/24/22, pending as of 10/26/22, auth reference #; [id number])  Follow up appointments: PCP & specialist as indicated  DME needed: Pt likely transitioning to SNF at d/c  Transportation at Discharge: BLS transport needed  Murriel Blitz or means to access home: N/A- pt likely transitioning to SNF at d/c     IM Medicare Letter: 2nd IM needed prior to d/c  Is patient a [place] and connected with the [place]? No          Caregiver Contact: Pt's daughter Osvaldo Hammer ([phone])  Discharge Caregiver contacted prior to discharge? To be contacted prior d/c  Care Conference needed?: Not at this time    Update 1112am: CM received call from [address] [place] [address] reporting that pt's insurance is still pending. CM will continue to follow. Initial note: CM acknowledged d/c. Chart reviewed for updates. CM contacted A liaison Mian Beltre ([phone])  to follow up on the status of the insurance auth. CM unable to reach [facility]. CM left a  requesting call back.       Kunal Gan, MSW, [address] [place] [address]

## 2022-10-27 NOTE — PROGRESS NOTES
End of Shift Note     Bedside shift change report given to Luisa Hernandez RN (oncoming nurse) by Wu Walsh RN (offgoing nurse). Report included the following information SBAR, Kardex, MAR, and Recent Results     Shift worked: 7a-7p   Shift summary and any significant changes: So significant changes. Patient occasionally complains of pain, on scheduled tylenol.          Concerns for physician to address:  none   Zone phone for oncoming shift:   [phone]      Patient Information  Akash Albrecht  81 y.o.  10/19/2022  2:00 PM by Manisha Pringle MD. Akash Albrecht was admitted from [facility]     Problem List          Patient Active Problem List     Diagnosis Date Noted    Ribs, multiple fractures 10/19/2022    Late onset Alzheimer's disease without behavioral disturbance ([address] 05/14/2022    Cerebral microvascular disease 05/14/2022    Severe recurrent major depression without psychotic features ([address] 06/01/2021    Dementia ([address] 06/01/2021    Autoimmune hepatitis ([address] 11/01/2020    History of partial colectomy 11/01/2020    Diverticulitis 11/01/2020    Penile cancer ([address] 08/18/2020    Bilateral carotid artery stenosis 10/29/2019    GERD (gastroesophageal reflux disease) 08/29/2018    Hyperlipidemia 04/05/2012    PVC (premature ventricular contraction) 12/21/2010    Hypertension 12/06/2010    TIA (transient ischemic attack) 12/06/2010    Leg edema 12/06/2010              Past Medical History:   Diagnosis Date    Arthritis       left arm    Autoimmune hepatitis ([address]      Bleeding ulcer 07/2019    BPH (benign prostatic hyperplasia)      CAD (coronary artery disease)       s/p stent    Cancer (HCC)       penile squamous carcinoma    Chronic kidney disease       stage 2     Chronic obstructive pulmonary disease (HCC)      Dementia (HCC)      Elevated LFT's      Essential hypertension 9/24/01    GERD (gastroesophageal reflux disease)       hiatal hernia    Ill-defined condition 12/02/2016     faint    Nephrosclerosis Orthostatic hypotension 9/24/01    PVC's 9/24/01    Sleep apnea       no CPAP    Syncope 9/24/01     secondary to venous insuffiency          Core Measures:  CVA: No No  CHF:No No  PNA:No No     Activity:  Activity Level: Bed Rest  Number times ambulated in hallways past shift: 0  Number of times OOB to chair past shift: 0     Cardiac:   Cardiac Monitoring: No         Access:   Current line(s): PIV   Central Line? No Placement date na Reason Medically Necessary na  PICC LINE? No Placement date na Reason Medically Necessary na     Genitourinary:   Urinary status: voiding and incontinent  Urinary Catheter? No Placement Date na Reason Medically Necessary na     Respiratory:   O2 Device: None (Room air)  Chronic home O2 use?: NO  Incentive spirometer at bedside: NO     GI:  Last Bowel Movement Date: 10/20/22  Current diet:  ADULT DIET Regular  Passing flatus: YES  Tolerating current diet: YES     Pain Management:   Patient states pain is manageable on current regimen: YES     Skin:  Sergey Score: 15  Interventions: PT/OT consult    Patient Safety:  Fall Score:  Total Score: 5  Interventions: bed/chair alarm  High Fall Risk: Yes  @Rollbelt  @dexterity to release roll belt  Yes/No ( must document dexterity  here by stating Yes or No here, otherwise this is a restraint and must follow restraint documentation policy.)     DVT prophylaxis:  DVT prophylaxis Med- No  DVT prophylaxis SCD or CHARLEEN- No      Wounds: (If Applicable)  Wounds- No  Location na     Active Consults:  IP CONSULT TO HOSPITALIST  IP CONSULT TO PULMONOLOGY     Length of Stay:  Expected LOS: 2d 21h  Actual LOS: 6  Discharge Plan: Yes To SNF, pending placement

## 2022-10-27 NOTE — PROGRESS NOTES
Hospitalist Progress Note    NAME: Danilo Price   :  1940   MRN:  [id number]     Estimated discharge date:     Barriers: medically stable, awaiting SNF approval from insurance    Assessment / Plan:    Left 5-9th Ribs fracture s/p GLF POA  -CT head nothing acute  -CT C spine:  No acute fracture. Multilevel mod to severe canal and foraminal stenosis  -CTchest and pelvis:  No pelvic fracture. Acute min displaced fractures of LEFT fifth through ninth ribs. There are segmental fracture of the LEFT sixth through eighth ribs  -Pain management: Multimodal, will schedule Tylenol and use low-dose oxy as needed; lidocaine patch  - Pulmonary followed, cleared for discharge  - Will benefit from SNF  -Add incentive spirometer    UTI  -UA with  wbc, 2+ bacteria. - course of Rocephin  - No urine culture sent     Acute kidney injury POA resolved  Hypokalemia  Baseline creatinine 0.9, admission 1.4  -resolved with hydration. Continue IVF     Large left-sided pleural effusion  small left chest subpleural hematoma  CT: Large left-sided pleural effusion with associated left lower lobe atelectasis, there is also a small left chest subpleural hematoma  -continue to hold ASA   -incentive spirometry  -Pulmonary input appreciated, supportive care     Anemia of chronic disease  -Hg trending down likely from hemodilution. S/P bolus in ED. Autoimmune hepatitis  Dementia Continue Namenda  Penile cancer status post resection  BPH Continue flomax   Hyperlipidemia      Code Status: Full code  Surrogate Decision Maker: Daughter     DVT Prophylaxis: SCD, no AC with small hematoma on CT  GI Prophylaxis: not indicated     Baseline: Dementia, assisted memory care unit   Recommended Disposition: SNF/LTC    25.0 - 29.9 Overweight / Body mass index is 26.94 kg/m².      Subjective:     Chief Complaint / Reason for Physician Visit  Follow up of rib fractures, s/p fall, ABLA, LETICIA  Discussed with RN events overnight. Remains on room air, + intermittent CP  Spoke with daughter at bedside    Review of Systems:  Symptom Y/N Comments  Symptom Y/N Comments   Fever/Chills    Chest Pain     Poor Appetite    Edema     Cough    Abdominal Pain     Sputum    Joint Pain     SOB/DE JESUS    Pruritis/Rash     Nausea/vomit    Tolerating PT/OT     Diarrhea    Tolerating Diet     Constipation    Other       Could NOT obtain due to: Confusion     PO intake: No data found. Objective:     VITALS:   Last 24hrs VS reviewed since prior progress note. Most recent are:  Patient Vitals for the past 24 hrs:   Temp Pulse Resp BP SpO2   10/27/22 1530 97.7 °F (36.5 °C) 69 18 (!) 141/73 94 %   10/27/22 0730 98.2 °F (36.8 °C) 69 16 (!) 155/82 96 %   10/26/22 2320 98.3 °F (36.8 °C) 72 20 138/78 94 %       No intake or output data in the 24 hours ending 10/27/22 1634       I had a face to face encounter, and independently examined this patient on 10/27/2022, as outlined below:  PHYSICAL EXAM:  General: WD, WN. Alert, cooperative, no acute distress    EENT:  Anicteric sclerae. MMM  Resp:  Decreased BS left base bilaterally, no wheezing or rales. No accessory muscle use  CV:  Regular  rhythm,  No edema  GI:  Soft, Non distended, no clear tenderness. +Bowel sounds  Neurologic:  Alert and oriented X self, normal speech, weak but non focal  Psych:   Poor insight. Not anxious nor agitated  Skin:  No rashes.   No jaundice  (+) left chest wall bruise    Reviewed most current lab test results and cultures  YES  Reviewed most current radiology test results   YES  Review and summation of old records today    NO  Reviewed patient's current orders and MAR    YES  PMH/SH reviewed - no change compared to H&P  ________________________________________________________________________  Care Plan discussed with:    Comments   Patient x    Family  x    RN x    Care Manager     Consultant                        Multidiciplinary team rounds were held today with , nursing, pharmacist and clinical coordinator. Patient's plan of care was discussed; medications were reviewed and discharge planning was addressed. ________________________________________________________________________      Comments   >50% of visit spent in counseling and coordination of care x     This includes time during multidisciplinary rounds if indicated above   ________________________________________________________________________  Kamala Brandon MD     Procedures: see electronic medical records for all procedures/Xrays and details which were not copied into this note but were reviewed prior to creation of Plan. LABS:  I reviewed today's most current labs and imaging studies.   Pertinent labs include:  Recent Labs     10/27/22  0028   WBC 4.6   HGB 9.7*   HCT 30.1*          Recent Labs     10/27/22  0028      K 4.2   *   CO2 23   *   BUN 17   CREA 1.02   CA 8.3*   ALB 2.2*   TBILI 0.6   ALT 37

## 2022-10-28 LAB
COVID-19 RAPID TEST, COVR: NOT DETECTED
SOURCE, COVRS: NORMAL

## 2022-10-28 PROCEDURE — 74011250637 HC RX REV CODE- 250/637: Performed by: STUDENT IN AN ORGANIZED HEALTH CARE EDUCATION/TRAINING PROGRAM

## 2022-10-28 PROCEDURE — 65270000029 HC RM PRIVATE

## 2022-10-28 PROCEDURE — 74011250637 HC RX REV CODE- 250/637: Performed by: HOSPITALIST

## 2022-10-28 PROCEDURE — 87635 SARS-COV-2 COVID-19 AMP PRB: CPT

## 2022-10-28 PROCEDURE — 74011000250 HC RX REV CODE- 250: Performed by: STUDENT IN AN ORGANIZED HEALTH CARE EDUCATION/TRAINING PROGRAM

## 2022-10-28 PROCEDURE — 97116 GAIT TRAINING THERAPY: CPT

## 2022-10-28 PROCEDURE — 97535 SELF CARE MNGMENT TRAINING: CPT

## 2022-10-28 RX ADMIN — CARVEDILOL 25 MG: 12.5 TABLET, FILM COATED ORAL at 09:27

## 2022-10-28 RX ADMIN — MEMANTINE 5 MG: 5 TABLET ORAL at 17:24

## 2022-10-28 RX ADMIN — PANTOPRAZOLE SODIUM 40 MG: 40 TABLET, DELAYED RELEASE ORAL at 23:25

## 2022-10-28 RX ADMIN — TAMSULOSIN HYDROCHLORIDE 0.4 MG: 0.4 CAPSULE ORAL at 09:27

## 2022-10-28 RX ADMIN — ACETAMINOPHEN 650 MG: 325 TABLET ORAL at 17:24

## 2022-10-28 RX ADMIN — OXYCODONE 2.5 MG: 5 TABLET ORAL at 11:10

## 2022-10-28 RX ADMIN — ESCITALOPRAM OXALATE 5 MG: 10 TABLET ORAL at 09:27

## 2022-10-28 RX ADMIN — ACETAMINOPHEN 650 MG: 325 TABLET ORAL at 11:10

## 2022-10-28 RX ADMIN — CARVEDILOL 25 MG: 12.5 TABLET, FILM COATED ORAL at 17:25

## 2022-10-28 RX ADMIN — PANTOPRAZOLE SODIUM 40 MG: 40 TABLET, DELAYED RELEASE ORAL at 06:14

## 2022-10-28 RX ADMIN — ACETAMINOPHEN 650 MG: 325 TABLET ORAL at 23:25

## 2022-10-28 RX ADMIN — FLUTICASONE PROPIONATE 2 SPRAY: 50 SPRAY, METERED NASAL at 10:59

## 2022-10-28 RX ADMIN — ACETAMINOPHEN 650 MG: 325 TABLET ORAL at 06:14

## 2022-10-28 RX ADMIN — MEMANTINE 5 MG: 5 TABLET ORAL at 09:27

## 2022-10-28 NOTE — PROGRESS NOTES
Problem: Self Care Deficits Care Plan (Adult)  Goal: *Acute Goals and Plan of Care (Insert Text)  Description: FUNCTIONAL STATUS PRIOR TO ADMISSION:  unknown baseline, from memory care; arrives with RW with his name on it     [address]:  unknown; living in memory care unit     Occupational Therapy Goals:  Initiated 10/20/2022  goals reviewed and updated 10-28  1. Patient will perform self-feeding with minimal assistance within 7 days. Met; advance to Set up in 7 dats  2. Patient will perform upper body dressing with maximal assistance within 7 days. cont  3. Patient will perform bathing UB with max assist within 7 days. Met, advance to mod A within 7 days  4. Patient will transfer from bedside commode or toilet with minimal using the least restrictive device and appropriate durable medical equipment within 7 days. cont  Outcome: Progressing Towards Goal   OCCUPATIONAL THERAPY TREATMENT/WEEKLY RE-EVALUATION  Patient: Trista Johnson (5 y.o. male)  Date: 10/28/2022  Diagnosis: Ribs, multiple fractures [S22.49XA] <principal problem not specified>      Precautions: Fall  Chart, occupational therapy assessment, plan of care, and goals were reviewed. ASSESSMENT  Based on the objective data described below, Patient made slow steady progress in all areas of self care despite marked L flank pain with multiple rib fractures; extensive bruising remains present. Patient not able to verbalize his needs; may benefit from scheduled pain patch or Tylenol for pain management. Able to follow familiar commands with hand over hand cues, 75% this session. 10% without cues. Incontinent this session, able to assist with rolling and bridging on command consistently for clean/up, despite obvious pain in L flank. Current Level of Function Impacting Discharge (ADLs): S-D self care depending on pain, attention to task and time allowed for processing with benefit of Verbal Cues, Tactile Cues and visual cues. Other factors to consider for discharge: will benefit from return to familiar environment if they are able to provide his current level of assist; Need to clarify with his memory care unit         PLAN :  Goals have been updated based on progression since last assessment. Patient continues to benefit from skilled intervention to address the above impairments. Continue to follow patient 3 times a week to address goals. Recommend with staff: up to chair for 2 meals a day with chair alarm     Recommend next OT session: clarify baseline status while with patient/with use of phone    Recommendation for discharge: (in order for the patient to meet his/her long term goals)  Therapy up to 5 days/week in SNF setting    This discharge recommendation:  Has been made in collaboration with the attending provider and/or case management    Equipment recommendations for successful discharge (if) home: transfer bench, walker: rolling, and wheelchair       SUBJECTIVE:   Patient stated [facility].     OBJECTIVE DATA SUMMARY:   Cognitive/Behavioral Status:  Neurologic State: Alert;Confused  Orientation Level: Oriented to person;Disoriented to time;Disoriented to situation;Disoriented to place (\"therapy place\")  Cognition: Follows commands;Decreased command following; Impaired decision making;Decreased attention/concentration;Poor safety awareness;Memory loss (follows commands with verbal, visual and tactile cues)  Perception: Appears intact  Perseveration: Perseverates during ADLS (washing same place on hands over and over)  Safety/Judgement: Decreased insight into deficits; Decreased awareness of need for safety;Decreased awareness of need for assistance;Decreased awareness of environment; Fall prevention (did say \"therapy place\" when asked what kind of place this is)    Functional Mobility and Transfers for ADLs:  Bed Mobility:  Rolling: Moderate assistance; Additional time; Adaptive equipment;Assist x1 (bed rail used; patient yelled out w/obvious pain rolling B, L worse than R; mild pain expression with bridging but able to do consistently on command)  Supine to Sit: Moderate assistance; Additional time;Assist x1;Bed Modified; Adaptive equipment  Sit to Supine: Moderate assistance;Assist x1;Additional time;Bed Modified  Scooting: Moderate assistance;Assist x1;Additional time    Transfers:     Functional Transfers  Toilet Transfer :  (note min A with RW last session)       Balance:  Sitting: Impaired; With support  Sitting - Static: Fair (occasional); Unsupported  Sitting - Dynamic: Fair (occasional); Poor (constant support) (limited by L rib regional pain)  Standing:  (NT)  Standing - Dynamic :  (NT this session; note fair with RW last session)    ADL Intervention:  Feeding  Feeding Assistance: Stand-by assistance;Set-up; Contact guard assistance    Grooming  Grooming Assistance: Moderate assistance;Minimum assistance  Position Performed: Seated edge of bed  Washing Face: Minimum assistance  Washing Hands: Moderate assistance  Brushing Teeth: Maximum assistance; Total assistance (dependent)  Shaving: Total assistance (dependent) (inferred)  Brushing/Combing Hair:  (NA; unable to reach all areas of head; bald)    Upper Body Bathing  Bathing Assistance: Maximum assistance (tolerates hand over hand cues)  Position Performed:  (semi fowlers)    Lower Body Bathing  Bathing Assistance: Total assistance(dependent) (able to wash B things hand over hand, no attention to task quality)    Upper Body Dressing Assistance  Dressing Assistance: Total assistance(dependent) (very limited L rib pain reaching with L UE)    Lower Body Dressing Assistance  Dressing Assistance: Total assistance(dependent); Maximum assistance (able to bridge on command)    Toileting  Toileting Assistance:  Total assistance(dependent) (able to assist with hygiene, roll and bridge on command; clearly opainful to roll L and brige, painful to R as well but not AS much pain behaviors)  Bladder Hygiene: Total assistance (dependent)  Bowel Hygiene: Total assistance (dependent)  Clothing Management: Total assistance (dependent)    Cognitive Retraining  Attention to Task: Single task  Maintains Attention For (Time): 1 minute  Following Commands: Follows one step commands/directions (75% of the time with tactile, verbal; and visual cues)  Safety/Judgement: Decreased insight into deficits; Decreased awareness of need for safety;Decreased awareness of need for assistance;Decreased awareness of environment; Fall prevention (did say \"therapy place\" when asked what kind of place this is)    Pain:  Pain verbalizes with groaning with bed mobility, LE ADL efforts    Activity Tolerance:   Fair, SpO2 stable on RA, requires frequent rest breaks, and limited by rib pain    After treatment patient left in no apparent distress:   Heels elevated for pressure relief, Call bell within reach, Bed / chair alarm activated, Side rails x 3, and patient unable to make needs known/use call bell    COMMUNICATION/COLLABORATION:   The patients plan of care was discussed with: Physical Therapist and Registered Nurse    Yun Miller OTR/L  Time Calculation: 26 mins

## 2022-10-28 NOTE — PROGRESS NOTES
Transition of Care Plan:     RUR: 13% - \"low risk\"  LOS: 8 days  Disposition: SNF- [address] [facility] (pending insurance auth - Teryl Gess initiated by facility 10/2422, pending as of 10/28/22, auth reference #; [id number])  Follow up appointments: PCP & specialist as indicated  DME needed: Pt likely transitioning to SNF at d/c  Transportation at Discharge: BLS transport needed  [address] or means to access home: N/A- pt likely transitioning to SNF at d/c     IM Medicare Letter: 2nd IM needed prior to d/c  Is patient a  and connected with the [place]? No          Caregiver Contact: Pt's daughter Shannon Marcelo ([phone])  Discharge Caregiver contacted prior to discharge? To be contacted prior d/c  Care Conference needed?: Not at this time    Update 1140am: CM contacted Pt's daughter Shannon Marcelo ([phone]) to give updates. Pt's daughter informed that pt was accepted to [facility] [address] and [address] [place] [address] pending insurance Teryl Gess. CM provided pt's daughter with admission liaison Yue Molina) phone number in case of any additional questions and concerns regarding insurance auth. CM will continue to follow. Initial note: CM acknowledged d/c. Chart reviewed for updates. CM contacted  A liaison Roxie English ([phone]) to follow up on whether the insurance auth was approved/denied. Doc Jenkins reports insurance Teryl Gess is still pending. CM will continue to follow up.     DORENE Person, [address] [place] [address]

## 2022-10-28 NOTE — PROGRESS NOTES
Problem: Mobility Impaired (Adult and Pediatric)  Goal: *Acute Goals and Plan of Care (Insert Text)  Description: FUNCTIONAL STATUS PRIOR TO ADMISSION: Pt lives in [address], unsure of facility from current notes. He is unable to fill in prior level of function but has arrived to ED with a RW and during session appears he is familiar with it. HOME SUPPORT PRIOR TO ADMISSION: care home, unclear baseline    Physical Therapy Goals  Re-assessed 10/28/2022 and goals updated  1. Patient will move from supine to sit and sit to supine , scoot up and down, and roll side to side in bed with minimal assistance/contact guard assist within 7 day(s). 2.  Patient will transfer from bed to chair and chair to bed with stand-by-assist using the least restrictive device within 7 day(s). 3.  Patient will perform sit to stand with supervision/set-up within 7 day(s). 4.  Patient will ambulate with contact guard assist/minimal assist for 75 feet with the least restrictive device within 7 day(s). Initiated 10/20/2022  1. Patient will move from supine to sit and sit to supine , scoot up and down, and roll side to side in bed with minimal assistance/contact guard assist within 7 day(s). 2.  Patient will transfer from bed to chair and chair to bed with supervision/set-up using the least restrictive device within 7 day(s). 3.  Patient will perform sit to stand with supervision/set-up within 7 day(s). 4.  Patient will ambulate with supervision/set-up for 100 feet with the least restrictive device within 7 day(s). 10/28/2022 1357 by Brenda Noland PT  Outcome: Progressing Towards Goal     PHYSICAL THERAPY TREATMENT: WEEKLY REASSESSMENT  Patient: Trinity Rodriguez (48 y.o. male)  Date: 10/28/2022  Primary Diagnosis: Ribs, multiple fractures [S22.49XA]       Precautions:   Fall    ASSESSMENT  Patient continues with skilled PT services and is slowly progressing towards goals. Does best with functional, automatic tasks.  Able to progress gait distance. Following simple commands. Anticipate slow progress towards goals with cognition being a barrier. Remains unsteady with significant distance between patient and walker. Recommend discharge to SNF. Acute PT will continue to follow and progress as able. Patient's progression toward goals since last assessment: progressing but has not met goals    Current Level of Function Impacting Discharge (mobility/balance): min to mod A to ambulate in room with RW    Other factors to consider for discharge: from half-way         PLAN :  Goals have been updated based on progression since last assessment. Patient continues to benefit from skilled intervention to address the above impairments. Recommendations and Planned Interventions: bed mobility training, transfer training, gait training, therapeutic exercises, neuromuscular re-education, patient and family training/education, and therapeutic activities      Frequency/Duration: Patient will be followed by physical therapy:  3 times a week to address goals.     Recommendation for discharge: (in order for the patient to meet his/her long term goals)  Therapy up to 5 days/week in SNF setting    This discharge recommendation:  Has been made in collaboration with the attending provider and/or case management    IF patient discharges home will need the following DME: to be determined (TBD)     SUBJECTIVE:   Patient stated Yeah.    OBJECTIVE DATA SUMMARY:   HISTORY:    Past Medical History:   Diagnosis Date    Arthritis     left arm    Autoimmune hepatitis ([facility]     Bleeding ulcer 07/2019    BPH (benign prostatic hyperplasia)     CAD (coronary artery disease)     s/p stent    Cancer (HCC)     penile squamous carcinoma    Chronic kidney disease     stage 2     Chronic obstructive pulmonary disease ([facility]     Dementia ([facility]     Elevated LFT's     Essential hypertension 9/24/01    GERD (gastroesophageal reflux disease)     hiatal hernia    Ill-defined condition 12/02/2016    faint    Nephrosclerosis     Orthostatic hypotension 9/24/01    PVC's 9/24/01    Sleep apnea     no CPAP    Syncope 9/24/01    secondary to venous insuffiency      Past Surgical History:   Procedure Laterality Date    COLONOSCOPY N/A 12/13/2016    COLONOSCOPY performed by Lex Hammond MD at [facility]    COLONOSCOPY N/A 12/11/2019    COLONOSCOPY performed by Carlos A Carmen MD at [address]; HI RISK IND  12/11/2019         ECHO 2D ADULT  5/24/12    EF 60 - 65%; mild concentric hypertrophy; pulmonary systolic artery pressure upper limits normal    HX ABDOMINAL WALL DEFECT REPAIR  07/01/2019d     Exploratory laparotomy, segmental sigmoid colon resection and primary stapled anastomosis.     HX APPENDECTOMY  1985    HX HEENT  02/18/2020    Left temporal artery biopsy    HX HERNIA REPAIR  08/29/2018    Davinci hiatal hernia repair- Vivek Davis MD    HX OTHER SURGICAL  08/2020    penile lesion bx    UT CARDIAC SURG PROCEDURE UNLIST  05/13/2019    1 stent    UPPER GI ENDOSCOPY,BIOPSY  12/11/2019         UPPER GI ENDOSCOPY,DIAGNOSIS  7/18/2019          Personal factors and/or comorbidities impacting plan of care: dementia, Parkinsons    Home Situation  Home Environment: Long term care   [facility]  One/Two Story Residence: One story  Living Alone: No  Support Systems: Child(colleen)  Patient Expects to be Discharged to[de-identified] Skilled nursing facility  Current DME Used/Available at Home: None    EXAMINATION/PRESENTATION/DECISION MAKING:   Critical Behavior:  Neurologic State: Alert, Confused  Orientation Level: Oriented to person, Disoriented to time, Disoriented to situation, Disoriented to place (\"therapy place\")  Cognition: Follows commands, Decreased command following, Impaired decision making, Decreased attention/concentration, Poor safety awareness, Memory loss (follows commands with verbal, visual and tactile cues)  Safety/Judgement: Decreased insight into deficits, Decreased awareness of need for safety, Decreased awareness of need for assistance, Decreased awareness of environment, Fall prevention (did say \"therapy place\" when asked what kind of place this is)  Hearing: Auditory  Auditory Impairment: None  Skin:  abrasions B shins and contusions L trunk  Functional Mobility:  Bed Mobility:  Rolling: Moderate assistance; Additional time; Adaptive equipment;Assist x1 (bed rail used; patient yelled out w/obvious pain rolling B, L worse than R; mild pain expression with bridging but able to do consistently on command)  Supine to Sit: Moderate assistance; Additional time  Sit to Supine: Moderate assistance;Assist x1;Additional time;Bed Modified  Scooting: Moderate assistance;Assist x1;Additional time  Transfers:  Sit to Stand: Contact guard assistance; Adaptive equipment  Stand to Sit: Minimum assistance; Adaptive equipment (cues)  Balance:   Sitting: Impaired; With support  Sitting - Static: Good (unsupported)  Sitting - Dynamic: Fair (occasional)  Standing: Impaired; With support  Standing - Static: Fair;Constant support  Standing - Dynamic : Fair;Constant support  Ambulation/Gait Training:  Distance (ft): 20 Feet (ft)  Assistive Device: Walker, rolling;Gait belt  Ambulation - Level of Assistance: Minimal assistance; Moderate assistance;Assist x1;Adaptive equipment (RW, assist to manage RW, unsteady)  Gait Abnormalities: Decreased step clearance;Shuffling gait (increased distance between patient and RW, increased trunk flexion)  Base of Support: Center of gravity altered  Speed/Jessica: Shuffled; Slow  Step Length: Left shortened;Right shortened    Pain Rating:  Did not interfere    Activity Tolerance:   Fair, dyspnea on exertion.  Vitals signs stable    After treatment patient left in no apparent distress:   Sitting in chair, Call bell within reach, and Bed / chair alarm activated    COMMUNICATION/EDUCATION:   The patients plan of care was discussed with: Occupational therapist, Registered nurse, Physician, Case management, Rehabilitation technician, and discussed in [facility] . Patient is unable to participate in goal setting and plan of care.     Thank you for this referral.  Linn Pereira, PT   Time Calculation: 21 mins

## 2022-10-28 NOTE — PROGRESS NOTES
End of Shift Note     Bedside shift change report given to Nickie RN (oncoming nurse) by Ida Barrera RN (offgoing nurse).   Report included the following information SBAR, Kardex, MAR, and Recent Results     Shift worked: 7p-7a   Shift summary and any significant changes:     N/a         Concerns for physician to address:  none   Zone phone for oncoming shift:   [phone]      Patient Information  Corrinne Burke  81 y.o.  10/19/2022  2:00 PM by Elda Willoughby MD. Corrinne Burke was admitted from Assisted Living     Problem List          Patient Active Problem List     Diagnosis Date Noted    Ribs, multiple fractures 10/19/2022    Late onset Alzheimer's disease without behavioral disturbance ([address] 05/14/2022    Cerebral microvascular disease 05/14/2022    Severe recurrent major depression without psychotic features ([address] 06/01/2021    Dementia ([address] 06/01/2021    Autoimmune hepatitis ([address] 11/01/2020    History of partial colectomy 11/01/2020    Diverticulitis 11/01/2020    Penile cancer ([address] 08/18/2020    Bilateral carotid artery stenosis 10/29/2019    GERD (gastroesophageal reflux disease) 08/29/2018    Hyperlipidemia 04/05/2012    PVC (premature ventricular contraction) 12/21/2010    Hypertension 12/06/2010    TIA (transient ischemic attack) 12/06/2010    Leg edema 12/06/2010              Past Medical History:   Diagnosis Date    Arthritis       left arm    Autoimmune hepatitis ([address]      Bleeding ulcer 07/2019    BPH (benign prostatic hyperplasia)      CAD (coronary artery disease)       s/p stent    Cancer (HCC)       penile squamous carcinoma    Chronic kidney disease       stage 2     Chronic obstructive pulmonary disease ([address]      Dementia ([address]      Elevated LFT's      Essential hypertension 9/24/01    GERD (gastroesophageal reflux disease)       hiatal hernia    Ill-defined condition 12/02/2016     faint    Nephrosclerosis      Orthostatic hypotension 9/24/01    PVC's 9/24/01    Sleep apnea       no CPAP Syncope 9/24/01     secondary to venous insuffiency          Core Measures:  CVA: No No  CHF:No No  PNA:No No     Activity:  Activity Level: Bed Rest  Number times ambulated in hallways past shift: 0  Number of times OOB to chair past shift: 0     Cardiac:   Cardiac Monitoring: No         Access:   Current line(s): PIV   Central Line? No Placement date na Reason Medically Necessary na  PICC LINE? No Placement date na Reason Medically Necessary na     Genitourinary:   Urinary status: voiding and incontinent  Urinary Catheter? No Placement Date na Reason Medically Necessary na     Respiratory:   O2 Device: None (Room air)  Chronic home O2 use?: NO  Incentive spirometer at bedside: NO     GI:  Last Bowel Movement Date: 10/20/22  Current diet:  ADULT DIET Regular  Passing flatus: YES  Tolerating current diet: YES     Pain Management:   Patient states pain is manageable on current regimen: YES     Skin:  Sergey Score: 15  Interventions: PT/OT consult    Patient Safety:  Fall Score:  Total Score: 5  Interventions: bed/chair alarm  High Fall Risk: Yes  @Rollbelt  @dexterity to release roll belt  Yes/No ( must document dexterity  here by stating Yes or No here, otherwise this is a restraint and must follow restraint documentation policy.)     DVT prophylaxis:  DVT prophylaxis Med- No  DVT prophylaxis SCD or CHARLEEN- No      Wounds: (If Applicable)  Wounds- No  Location na     Active Consults:  IP CONSULT TO HOSPITALIST  IP CONSULT TO PULMONOLOGY     Length of Stay:  Expected LOS: 2d 21h  Actual LOS: 6  Discharge Plan: Yes To SNF, pending placement       Jannet Vernon

## 2022-10-28 NOTE — PROGRESS NOTES
End of Shift Note     Bedside shift change report given to Gabriel Trejo RN (oncoming nurse) by Garrett Del Cid RN (offgoing nurse). Report included the following information SBAR, Kardex, MAR, and Recent Results     Shift worked: 7a-7p   Shift summary and any significant changes: So significant changes. Patient occasionally complains of pain, on scheduled tylenol. Patient got up to chair with PT.       Concerns for physician to address:  none   Zone phone for oncoming shift:   [phone]      Patient Information  Dana Hernandez  81 y.o.  10/19/2022  2:00 PM by Artie Boast, MD. Dana Hernandez was admitted from Assisted Living     Problem List          Patient Active Problem List     Diagnosis Date Noted    Ribs, multiple fractures 10/19/2022    Late onset Alzheimer's disease without behavioral disturbance ([address] 05/14/2022    Cerebral microvascular disease 05/14/2022    Severe recurrent major depression without psychotic features ([address] 06/01/2021    Dementia ([address] 06/01/2021    Autoimmune hepatitis ([address] 11/01/2020    History of partial colectomy 11/01/2020    Diverticulitis 11/01/2020    Penile cancer ([address] 08/18/2020    Bilateral carotid artery stenosis 10/29/2019    GERD (gastroesophageal reflux disease) 08/29/2018    Hyperlipidemia 04/05/2012    PVC (premature ventricular contraction) 12/21/2010    Hypertension 12/06/2010    TIA (transient ischemic attack) 12/06/2010    Leg edema 12/06/2010              Past Medical History:   Diagnosis Date    Arthritis       left arm    Autoimmune hepatitis ([address]      Bleeding ulcer 07/2019    BPH (benign prostatic hyperplasia)      CAD (coronary artery disease)       s/p stent    Cancer (HCC)       penile squamous carcinoma    Chronic kidney disease       stage 2     Chronic obstructive pulmonary disease (HCC)      Dementia (HCC)      Elevated LFT's      Essential hypertension 9/24/01    GERD (gastroesophageal reflux disease)       hiatal hernia    Ill-defined condition 12/02/2016 faint    Nephrosclerosis      Orthostatic hypotension 9/24/01    PVC's 9/24/01    Sleep apnea       no CPAP    Syncope 9/24/01     secondary to venous insuffiency          Core Measures:  CVA: No No  CHF:No No  PNA:No No     Activity:  Activity Level: Bed Rest  Number times ambulated in hallways past shift: 0  Number of times OOB to chair past shift: 0     Cardiac:   Cardiac Monitoring: No         Access:   Current line(s): PIV   Central Line? No Placement date na Reason Medically Necessary na  PICC LINE? No Placement date na Reason Medically Necessary na     Genitourinary:   Urinary status: voiding and incontinent  Urinary Catheter? No Placement Date na Reason Medically Necessary na     Respiratory:   O2 Device: None (Room air)  Chronic home O2 use?: NO  Incentive spirometer at bedside: NO     GI:  Last Bowel Movement Date: 10/20/22  Current diet:  ADULT DIET Regular  Passing flatus: YES  Tolerating current diet: YES     Pain Management:   Patient states pain is manageable on current regimen: YES     Skin:  Sergey Score: 15  Interventions: PT/OT consult    Patient Safety:  Fall Score:  Total Score: 5  Interventions: bed/chair alarm  High Fall Risk: Yes  @Rollbelt  @dexterity to release roll belt  Yes/No ( must document dexterity  here by stating Yes or No here, otherwise this is a restraint and must follow restraint documentation policy.)     DVT prophylaxis:  DVT prophylaxis Med- No  DVT prophylaxis SCD or CHARLEEN- No      Wounds: (If Applicable)  Wounds- No  Location na     Active Consults:  IP CONSULT TO HOSPITALIST  IP CONSULT TO PULMONOLOGY     Length of Stay:  Expected LOS: 2d 21h  Actual LOS: 9  Discharge Plan: Yes To SNF, pending placement       Lynn Anaya RN

## 2022-10-29 PROCEDURE — 65270000029 HC RM PRIVATE

## 2022-10-29 PROCEDURE — 74011250637 HC RX REV CODE- 250/637: Performed by: HOSPITALIST

## 2022-10-29 PROCEDURE — 74011000250 HC RX REV CODE- 250: Performed by: STUDENT IN AN ORGANIZED HEALTH CARE EDUCATION/TRAINING PROGRAM

## 2022-10-29 RX ADMIN — PANTOPRAZOLE SODIUM 40 MG: 40 TABLET, DELAYED RELEASE ORAL at 06:19

## 2022-10-29 RX ADMIN — MEMANTINE 5 MG: 5 TABLET ORAL at 08:36

## 2022-10-29 RX ADMIN — TAMSULOSIN HYDROCHLORIDE 0.4 MG: 0.4 CAPSULE ORAL at 08:36

## 2022-10-29 RX ADMIN — ESCITALOPRAM OXALATE 5 MG: 10 TABLET ORAL at 08:36

## 2022-10-29 RX ADMIN — CARVEDILOL 25 MG: 12.5 TABLET, FILM COATED ORAL at 17:31

## 2022-10-29 RX ADMIN — PANTOPRAZOLE SODIUM 40 MG: 40 TABLET, DELAYED RELEASE ORAL at 21:28

## 2022-10-29 RX ADMIN — ACETAMINOPHEN 650 MG: 325 TABLET ORAL at 06:19

## 2022-10-29 RX ADMIN — CARVEDILOL 25 MG: 12.5 TABLET, FILM COATED ORAL at 08:36

## 2022-10-29 RX ADMIN — FLUTICASONE PROPIONATE 2 SPRAY: 50 SPRAY, METERED NASAL at 08:36

## 2022-10-29 RX ADMIN — MEMANTINE 5 MG: 5 TABLET ORAL at 17:31

## 2022-10-29 RX ADMIN — ACETAMINOPHEN 650 MG: 325 TABLET ORAL at 17:31

## 2022-10-29 RX ADMIN — ACETAMINOPHEN 650 MG: 325 TABLET ORAL at 12:24

## 2022-10-29 NOTE — PROGRESS NOTES
Spiritual Care Assessment/Progress Note  [facility]      NAME: Helyn Ormond      MRN: [id number]  AGE: 81 y.o. SEX: male  [employer] Affiliation: Methodist   Language: English     10/29/2022     Total Time (in minutes): 10     Spiritual Assessment begun in [facility] through conversation with:         []Patient        [] Family    [] Friend(s)        Reason for Consult: Initial/Spiritual assessment, patient floor     Spiritual beliefs: (Please include comment if needed)     [] Identifies with a charly tradition:         [] Supported by a charly community:            [] Claims no spiritual orientation:           [] Seeking spiritual identity:                [] Adheres to an individual form of spirituality:           [x] Not able to assess:                           Identified resources for coping:      [] Prayer                               [] Music                  [] Guided Imagery     [] Family/friends                 [] Pet visits     [] Devotional reading                         [x] Unknown     [] Other:                                                Interventions offered during this visit: (See comments for more details)    Patient Interventions: Initial visit           Plan of Care:     [] Support spiritual and/or cultural needs    [] Support AMD and/or advance care planning process      [] Support grieving process   [] Coordinate Rites and/or Rituals    [] Coordination with community clergy   [] No spiritual needs identified at this time   [] Detailed Plan of Care below (See Comments)  [] Make referral to Music Therapy  [] Make referral to Pet Therapy     [] Make referral to Addiction services  [] Make referral to [facility]  [] Make referral to Spiritual Care Partner  [] No future visits requested        [x] Contact Spiritual Care for further referrals     Comments:  Patient was in bed when  visited for initial spiritual assessment in 3111.  He made good eye contact but appeared confused and was unable to engage  in conversation. Consulted with his nurse who affirmed that patient was confused at this time. Please contact spiritual care for further referrals.        Visited by: Jenaro Mercado  To page : Claudene Grime  ([phone])

## 2022-10-29 NOTE — PROGRESS NOTES
End of Shift Note     Bedside shift change report given to Ila Field RN (oncoming nurse) by Cora Esparza (offgoing nurse). Report included the following information SBAR, Kardex, MAR, and Recent Results     Shift worked: 7p-7a   Shift summary and any significant changes: So significant changes.        Concerns for physician to address:  none   Zone phone for oncoming shift:   [phone]      Patient Information  Corrinne Burke  81 y.o.  10/19/2022  2:00 PM by Elda Willoughby MD. Corrinne Burke was admitted from Assisted Living     Problem List          Patient Active Problem List     Diagnosis Date Noted    Ribs, multiple fractures 10/19/2022    Late onset Alzheimer's disease without behavioral disturbance ([address] 05/14/2022    Cerebral microvascular disease 05/14/2022    Severe recurrent major depression without psychotic features ([address] 06/01/2021    Dementia ([address] 06/01/2021    Autoimmune hepatitis ([address] 11/01/2020    History of partial colectomy 11/01/2020    Diverticulitis 11/01/2020    Penile cancer ([address] 08/18/2020    Bilateral carotid artery stenosis 10/29/2019    GERD (gastroesophageal reflux disease) 08/29/2018    Hyperlipidemia 04/05/2012    PVC (premature ventricular contraction) 12/21/2010    Hypertension 12/06/2010    TIA (transient ischemic attack) 12/06/2010    Leg edema 12/06/2010              Past Medical History:   Diagnosis Date    Arthritis       left arm    Autoimmune hepatitis ([address]      Bleeding ulcer 07/2019    BPH (benign prostatic hyperplasia)      CAD (coronary artery disease)       s/p stent    Cancer (HCC)       penile squamous carcinoma    Chronic kidney disease       stage 2     Chronic obstructive pulmonary disease (HCC)      Dementia (HCC)      Elevated LFT's      Essential hypertension 9/24/01    GERD (gastroesophageal reflux disease)       hiatal hernia    Ill-defined condition 12/02/2016     faint    Nephrosclerosis      Orthostatic hypotension 9/24/01    PVC's 9/24/01    Sleep apnea no CPAP    Syncope 9/24/01     secondary to venous insuffiency          Core Measures:  CVA: No No  CHF:No No  PNA:No No     Activity:  Activity Level: Bed Rest  Number times ambulated in hallways past shift: 0  Number of times OOB to chair past shift: 0     Cardiac:   Cardiac Monitoring: No         Access:   Current line(s): PIV   Central Line? No Placement date na Reason Medically Necessary na  PICC LINE? No Placement date na Reason Medically Necessary na     Genitourinary:   Urinary status: voiding and incontinent  Urinary Catheter? No Placement Date na Reason Medically Necessary na     Respiratory:   O2 Device: None (Room air)  Chronic home O2 use?: NO  Incentive spirometer at bedside: NO     GI:  Last Bowel Movement Date: 10/20/22  Current diet:  ADULT DIET Regular  Passing flatus: YES  Tolerating current diet: YES     Pain Management:   Patient states pain is manageable on current regimen: YES     Skin:  Sergey Score: 15  Interventions: PT/OT consult    Patient Safety:  Fall Score:  Total Score: 5  Interventions: bed/chair alarm  High Fall Risk: Yes  @Rollbelt  @dexterity to release roll belt  Yes/No ( must document dexterity  here by stating Yes or No here, otherwise this is a restraint and must follow restraint documentation policy.)     DVT prophylaxis:  DVT prophylaxis Med- No  DVT prophylaxis SCD or CHARLEEN- No      Wounds: (If Applicable)  Wounds- No  Location na     Active Consults:  IP CONSULT TO HOSPITALIST  IP CONSULT TO PULMONOLOGY     Length of Stay:  Expected LOS: 2d 21h  Actual LOS: 9  Discharge Plan: Yes To SNF, pending placement       Yenny Chan RN

## 2022-10-29 NOTE — PROGRESS NOTES
Hospitalist Progress Note    NAME: Doug Landin   :  1940   MRN:  [id number]     Estimated discharge date:     Barriers: medically stable, awaiting SNF approval from insurance    Assessment / Plan:    Left 5-9th Ribs fracture s/p GLF POA  -CT head nothing acute  -CT C spine:  No acute fracture. Multilevel mod to severe canal and foraminal stenosis  -CTchest and pelvis:  No pelvic fracture. Acute min displaced fractures of LEFT fifth through ninth ribs. There are segmental fracture of the LEFT sixth through eighth ribs  -Pain management: Multimodal, will schedule Tylenol and use low-dose oxy as needed; lidocaine patch  - Pulmonary followed, cleared for discharge  - Will benefit from SNF  -Add incentive spirometer    UTI  -UA with  wbc, 2+ bacteria. - course of Rocephin  - No urine culture sent     Acute kidney injury POA resolved  Hypokalemia  Baseline creatinine 0.9, admission 1.4  -resolved with hydration. Continue IVF     Large left-sided pleural effusion  small left chest subpleural hematoma  CT: Large left-sided pleural effusion with associated left lower lobe atelectasis, there is also a small left chest subpleural hematoma  -continue to hold ASA   -incentive spirometry  -Pulmonary input appreciated, supportive care     Anemia of chronic disease  -Hg trending down likely from hemodilution. S/P bolus in ED. Autoimmune hepatitis  Dementia Continue Namenda  Penile cancer status post resection  BPH Continue flomax   Hyperlipidemia      Code Status: Full code  Surrogate Decision Maker: Daughter     DVT Prophylaxis: SCD, no AC with small hematoma on CT  GI Prophylaxis: not indicated     Baseline: Dementia, [facility] memory care unit   Recommended Disposition: SNF/LTC    25.0 - 29.9 Overweight / Body mass index is 26.94 kg/m².      Subjective:     Chief Complaint / Reason for Physician Visit  Follow up of rib fractures, s/p fall, ABLA, LETICIA  Discussed with RN events overnight. Remains on room air, + intermittent CP  Spoke with daughter at bedside    Review of Systems:  Symptom Y/N Comments  Symptom Y/N Comments   Fever/Chills    Chest Pain     Poor Appetite    Edema     Cough    Abdominal Pain     Sputum    Joint Pain     SOB/DE JESUS    Pruritis/Rash     Nausea/vomit    Tolerating PT/OT     Diarrhea    Tolerating Diet     Constipation    Other       Could NOT obtain due to: Confusion     PO intake: No data found. Objective:     VITALS:   Last 24hrs VS reviewed since prior progress note. Most recent are:  Patient Vitals for the past 24 hrs:   Temp Pulse Resp BP SpO2   10/28/22 1538 97.8 °F (36.6 °C) 69 18 118/64 93 %   10/28/22 0742 97.7 °F (36.5 °C) 68 16 (!) 163/76 94 %   10/28/22 0159 97.5 °F (36.4 °C) 68 18 (!) 154/72 --   10/27/22 2313 97.5 °F (36.4 °C) 68 18 (!) 154/72 94 %       No intake or output data in the 24 hours ending 10/28/22 2150       I had a face to face encounter, and independently examined this patient on 10/28/2022, as outlined below:  PHYSICAL EXAM:  General: WD, WN. Alert, cooperative, no acute distress    EENT:  Anicteric sclerae. MMM  Resp:  Decreased BS left base bilaterally, no wheezing or rales. No accessory muscle use  CV:  Regular  rhythm,  No edema  GI:  Soft, Non distended, no clear tenderness. +Bowel sounds  Neurologic:  Alert and oriented X self, normal speech, weak but non focal  Psych:   Poor insight. Not anxious nor agitated  Skin:  No rashes.   No jaundice  (+) left chest wall bruise    Reviewed most current lab test results and cultures  YES  Reviewed most current radiology test results   YES  Review and summation of old records today    NO  Reviewed patient's current orders and MAR    YES  PMH/SH reviewed - no change compared to H&P  ________________________________________________________________________  Care Plan discussed with:    Comments   Patient x    Family  x    RN x    Care Manager     Consultant Multidiciplinary team rounds were held today with , nursing, pharmacist and clinical coordinator. Patient's plan of care was discussed; medications were reviewed and discharge planning was addressed. ________________________________________________________________________      Comments   >50% of visit spent in counseling and coordination of care x     This includes time during multidisciplinary rounds if indicated above   ________________________________________________________________________  Augusto Holloway MD     Procedures: see electronic medical records for all procedures/Xrays and details which were not copied into this note but were reviewed prior to creation of Plan. LABS:  I reviewed today's most current labs and imaging studies.   Pertinent labs include:  Recent Labs     10/27/22  0028   WBC 4.6   HGB 9.7*   HCT 30.1*          Recent Labs     10/27/22  0028      K 4.2   *   CO2 23   *   BUN 17   CREA 1.02   CA 8.3*   ALB 2.2*   TBILI 0.6   ALT 37

## 2022-10-29 NOTE — ROUTINE PROCESS
Bedside shift change report [facility] to [facility] (oncoming nurse) by Dahiana MCCLELLAN RN (offgoing nurse). Report included the following information SBAR, Kardex and MAR.

## 2022-10-30 LAB
ALBUMIN SERPL-MCNC: 2.4 G/DL (ref 3.5–5)
ALBUMIN/GLOB SERPL: 0.5 {RATIO} (ref 1.1–2.2)
ALP SERPL-CCNC: 402 U/L (ref 45–117)
ALT SERPL-CCNC: 45 U/L (ref 12–78)
ANION GAP SERPL CALC-SCNC: 5 MMOL/L (ref 5–15)
AST SERPL-CCNC: 44 U/L (ref 15–37)
BASOPHILS # BLD: 0.1 K/UL (ref 0–0.1)
BASOPHILS NFR BLD: 1 % (ref 0–1)
BILIRUB SERPL-MCNC: 0.6 MG/DL (ref 0.2–1)
BUN SERPL-MCNC: 21 MG/DL (ref 6–20)
BUN/CREAT SERPL: 20 (ref 12–20)
CALCIUM SERPL-MCNC: 8.5 MG/DL (ref 8.5–10.1)
CHLORIDE SERPL-SCNC: 109 MMOL/L (ref 97–108)
CO2 SERPL-SCNC: 25 MMOL/L (ref 21–32)
CREAT SERPL-MCNC: 1.03 MG/DL (ref 0.7–1.3)
DIFFERENTIAL METHOD BLD: ABNORMAL
EOSINOPHIL # BLD: 0.6 K/UL (ref 0–0.4)
EOSINOPHIL NFR BLD: 12 % (ref 0–7)
ERYTHROCYTE [DISTWIDTH] IN BLOOD BY AUTOMATED COUNT: 15.3 % (ref 11.5–14.5)
GLOBULIN SER CALC-MCNC: 4.5 G/DL (ref 2–4)
GLUCOSE SERPL-MCNC: 125 MG/DL (ref 65–100)
HCT VFR BLD AUTO: 30.6 % (ref 36.6–50.3)
HGB BLD-MCNC: 10 G/DL (ref 12.1–17)
IMM GRANULOCYTES # BLD AUTO: 0 K/UL (ref 0–0.04)
IMM GRANULOCYTES NFR BLD AUTO: 0 % (ref 0–0.5)
LYMPHOCYTES # BLD: 1 K/UL (ref 0.8–3.5)
LYMPHOCYTES NFR BLD: 20 % (ref 12–49)
MCH RBC QN AUTO: 29.2 PG (ref 26–34)
MCHC RBC AUTO-ENTMCNC: 32.7 G/DL (ref 30–36.5)
MCV RBC AUTO: 89.5 FL (ref 80–99)
MONOCYTES # BLD: 0.6 K/UL (ref 0–1)
MONOCYTES NFR BLD: 12 % (ref 5–13)
NEUTS SEG # BLD: 2.8 K/UL (ref 1.8–8)
NEUTS SEG NFR BLD: 55 % (ref 32–75)
NRBC # BLD: 0 K/UL (ref 0–0.01)
NRBC BLD-RTO: 0 PER 100 WBC
PLATELET # BLD AUTO: 243 K/UL (ref 150–400)
PMV BLD AUTO: 10 FL (ref 8.9–12.9)
POTASSIUM SERPL-SCNC: 3.9 MMOL/L (ref 3.5–5.1)
PROT SERPL-MCNC: 6.9 G/DL (ref 6.4–8.2)
RBC # BLD AUTO: 3.42 M/UL (ref 4.1–5.7)
SODIUM SERPL-SCNC: 139 MMOL/L (ref 136–145)
WBC # BLD AUTO: 5.2 K/UL (ref 4.1–11.1)

## 2022-10-30 PROCEDURE — 74011250637 HC RX REV CODE- 250/637: Performed by: HOSPITALIST

## 2022-10-30 PROCEDURE — 65270000029 HC RM PRIVATE

## 2022-10-30 PROCEDURE — 74011000250 HC RX REV CODE- 250: Performed by: STUDENT IN AN ORGANIZED HEALTH CARE EDUCATION/TRAINING PROGRAM

## 2022-10-30 PROCEDURE — 36415 COLL VENOUS BLD VENIPUNCTURE: CPT

## 2022-10-30 PROCEDURE — 80053 COMPREHEN METABOLIC PANEL: CPT

## 2022-10-30 PROCEDURE — 85025 COMPLETE CBC W/AUTO DIFF WBC: CPT

## 2022-10-30 RX ADMIN — TAMSULOSIN HYDROCHLORIDE 0.4 MG: 0.4 CAPSULE ORAL at 08:36

## 2022-10-30 RX ADMIN — PANTOPRAZOLE SODIUM 40 MG: 40 TABLET, DELAYED RELEASE ORAL at 21:51

## 2022-10-30 RX ADMIN — CARVEDILOL 25 MG: 12.5 TABLET, FILM COATED ORAL at 17:10

## 2022-10-30 RX ADMIN — ACETAMINOPHEN 650 MG: 325 TABLET ORAL at 01:30

## 2022-10-30 RX ADMIN — ESCITALOPRAM OXALATE 5 MG: 10 TABLET ORAL at 08:36

## 2022-10-30 RX ADMIN — MEMANTINE 5 MG: 5 TABLET ORAL at 17:10

## 2022-10-30 RX ADMIN — ACETAMINOPHEN 650 MG: 325 TABLET ORAL at 11:13

## 2022-10-30 RX ADMIN — CARVEDILOL 25 MG: 12.5 TABLET, FILM COATED ORAL at 08:36

## 2022-10-30 RX ADMIN — FLUTICASONE PROPIONATE 2 SPRAY: 50 SPRAY, METERED NASAL at 08:38

## 2022-10-30 RX ADMIN — PANTOPRAZOLE SODIUM 40 MG: 40 TABLET, DELAYED RELEASE ORAL at 08:36

## 2022-10-30 RX ADMIN — ACETAMINOPHEN 650 MG: 325 TABLET ORAL at 23:59

## 2022-10-30 RX ADMIN — MEMANTINE 5 MG: 5 TABLET ORAL at 08:36

## 2022-10-30 RX ADMIN — ACETAMINOPHEN 650 MG: 325 TABLET ORAL at 17:10

## 2022-10-30 RX ADMIN — ACETAMINOPHEN 650 MG: 325 TABLET ORAL at 05:16

## 2022-10-30 NOTE — PROGRESS NOTES
End of Shift Note     Bedside shift change report given to Oskar Rivera RN (oncoming nurse) by Jens Gruber RN (offgoing nurse).   Report included the following information SBAR, Kardex, MAR, and Recent Results     Shift worked: 7-7   Shift summary and any significant changes:     None   Concerns for physician to address:  none   Zone phone for oncoming shift:   [phone]      Patient Information  Epi Riley  81 y.o.  10/19/2022  2:00 PM by Amish Allen MD. Epi Riley was admitted from [facility]isted Living     Problem List          Patient Active Problem List     Diagnosis Date Noted    Ribs, multiple fractures 10/19/2022    Late onset Alzheimer's disease without behavioral disturbance ([address] 05/14/2022    Cerebral microvascular disease 05/14/2022    Severe recurrent major depression without psychotic features ([address] 06/01/2021    Dementia ([address] 06/01/2021    Autoimmune hepatitis ([address] 11/01/2020    History of partial colectomy 11/01/2020    Diverticulitis 11/01/2020    Penile cancer ([address] 08/18/2020    Bilateral carotid artery stenosis 10/29/2019    GERD (gastroesophageal reflux disease) 08/29/2018    Hyperlipidemia 04/05/2012    PVC (premature ventricular contraction) 12/21/2010    Hypertension 12/06/2010    TIA (transient ischemic attack) 12/06/2010    Leg edema 12/06/2010              Past Medical History:   Diagnosis Date    Arthritis       left arm    Autoimmune hepatitis ([address]      Bleeding ulcer 07/2019    BPH (benign prostatic hyperplasia)      CAD (coronary artery disease)       s/p stent    Cancer (HCC)       penile squamous carcinoma    Chronic kidney disease       stage 2     Chronic obstructive pulmonary disease ([address]      Dementia ([address]      Elevated LFT's      Essential hypertension 9/24/01    GERD (gastroesophageal reflux disease)       hiatal hernia    Ill-defined condition 12/02/2016     faint    Nephrosclerosis      Orthostatic hypotension 9/24/01    PVC's 9/24/01    Sleep apnea       no CPAP Syncope 9/24/01     secondary to venous insuffiency          Core Measures:  CVA: No No  CHF:No No  PNA:No No     Activity:  Activity Level: Bed Rest  Number times ambulated in hallways past shift: 0  Number of times OOB to chair past shift: 0     Cardiac:   Cardiac Monitoring: No         Access:   Current line(s): PIV   Central Line? No Placement date na Reason Medically Necessary na  PICC LINE? No Placement date na Reason Medically Necessary na     Genitourinary:   Urinary status: voiding and incontinent  Urinary Catheter? No Placement Date na Reason Medically Necessary na     Respiratory:   O2 Device: None (Room air)  Chronic home O2 use?: NO  Incentive spirometer at bedside: NO     GI:  Last Bowel Movement Date: 10/20/22  Current diet:  ADULT DIET Regular  Passing flatus: YES  Tolerating current diet: YES     Pain Management:   Patient states pain is manageable on current regimen: YES     Skin:  Sergey Score: 15  Interventions: PT/OT consult    Patient Safety:  Fall Score:  Total Score: 5  Interventions: bed/chair alarm  High Fall Risk: Yes  @Rollbelt  @dexterity to release roll belt  Yes/No ( must document dexterity  here by stating Yes or No here, otherwise this is a restraint and must follow restraint documentation policy.)     DVT prophylaxis:  DVT prophylaxis Med- No  DVT prophylaxis SCD or CHARLEEN- No      Wounds: (If Applicable)  Wounds- No  Location na     Active Consults:  IP CONSULT TO HOSPITALIST  IP CONSULT TO PULMONOLOGY     Length of Stay:  Expected LOS: 2d 21h  Actual LOS: 9  Discharge Plan: Yes To SNF, pending placement       Daniel Will RN

## 2022-10-30 NOTE — PROGRESS NOTES
Hospitalist Progress Note    NAME: Dick Espinoza   :  1940   MRN:  [id number]     Estimated discharge date: whenever bed found    Barriers: medically stable, awaiting SNF approval from insurance    Assessment / Plan:    Left 5-9th Ribs fracture s/p GLF POA  -CT head nothing acute  -CT C spine:  No acute fracture. Multilevel mod to severe canal and foraminal stenosis  -CTchest and pelvis:  No pelvic fracture. Acute min displaced fractures of LEFT fifth through ninth ribs. There are segmental fracture of the LEFT sixth through eighth ribs  -Pain management: Multimodal, will schedule Tylenol and use low-dose oxy as needed; lidocaine patch  - Pulmonary followed, cleared for discharge  - Will benefit from SNF  -Add incentive spirometer    UTI  -UA with  wbc, 2+ bacteria.   - s/p course of Rocephin  - No urine culture sent     Acute kidney injury POA resolved  Hypokalemia  Baseline creatinine 0.9, admission 1.4  -resolved with hydration. Continue IVF     Large left-sided pleural effusion  small left chest subpleural hematoma  CT: Large left-sided pleural effusion with associated left lower lobe atelectasis, there is also a small left chest subpleural hematoma  -continue to hold ASA   -incentive spirometry  -Pulmonary input appreciated, supportive care     Anemia of chronic disease  -Hg trending down likely from hemodilution. S/P bolus in ED. Autoimmune hepatitis  Dementia Continue Namenda  Penile cancer status post resection  BPH Continue flomax   Hyperlipidemia      Code Status: Full code  Surrogate Decision Maker: Daughter     DVT Prophylaxis: SCD, no AC with small hematoma on CT  GI Prophylaxis: not indicated     Baseline: Dementia, [facility] memory care unit   Recommended Disposition: SNF/LTC    25.0 - 29.9 Overweight / Body mass index is 26.94 kg/m².      Subjective:     Chief Complaint / Reason for Physician Visit  Follow up of rib fractures, s/p fall, LETICIA CALZADA  Discussed with RN events overnight. Remains on room air, + intermittent CP  Awake, no new complaints    Review of Systems:  Symptom Y/N Comments  Symptom Y/N Comments   Fever/Chills    Chest Pain     Poor Appetite    Edema     Cough    Abdominal Pain     Sputum    Joint Pain     SOB/DE JESUS    Pruritis/Rash     Nausea/vomit    Tolerating PT/OT     Diarrhea    Tolerating Diet     Constipation    Other       Could NOT obtain due to: Confusion     PO intake: No data found. Objective:     VITALS:   Last 24hrs VS reviewed since prior progress note. Most recent are:  Patient Vitals for the past 24 hrs:   Temp Pulse Resp BP SpO2   10/29/22 1618 98.3 °F (36.8 °C) 82 18 135/82 94 %   10/29/22 0824 97.7 °F (36.5 °C) 64 18 (!) 146/72 --   10/28/22 2336 97.7 °F (36.5 °C) 70 18 (!) 143/59 94 %       No intake or output data in the 24 hours ending 10/29/22 2213       I had a face to face encounter, and independently examined this patient on 10/29/2022, as outlined below:  PHYSICAL EXAM:  General: WD, WN. Alert, cooperative, no acute distress    EENT:  Anicteric sclerae. MMM  Resp:  Decreased BS left base bilaterally, no wheezing or rales. No accessory muscle use  CV:  Regular  rhythm,  No edema  GI:  Soft, Non distended, no clear tenderness. +Bowel sounds  Neurologic:  Alert and oriented X self, normal speech, weak but non focal  Psych:   Poor insight. Not anxious nor agitated  Skin:  No rashes.   No jaundice  (+) left chest wall bruise    Reviewed most current lab test results and cultures  YES  Reviewed most current radiology test results   YES  Review and summation of old records today    NO  Reviewed patient's current orders and MAR    YES  PMH/SH reviewed - no change compared to H&P  ________________________________________________________________________  Care Plan discussed with:    Comments   Patient x    Family  x    RN x    Care Manager     Consultant                        Multidiciplinary team rounds were held today with , nursing, pharmacist and clinical coordinator. Patient's plan of care was discussed; medications were reviewed and discharge planning was addressed. ________________________________________________________________________      Comments   >50% of visit spent in counseling and coordination of care x     This includes time during multidisciplinary rounds if indicated above   ________________________________________________________________________  Ruel Jenkins MD     Procedures: see electronic medical records for all procedures/Xrays and details which were not copied into this note but were reviewed prior to creation of Plan. LABS:  I reviewed today's most current labs and imaging studies.   Pertinent labs include:  Recent Labs     10/27/22  0028   WBC 4.6   HGB 9.7*   HCT 30.1*          Recent Labs     10/27/22  0028      K 4.2   *   CO2 23   *   BUN 17   CREA 1.02   CA 8.3*   ALB 2.2*   TBILI 0.6   ALT 37

## 2022-10-30 NOTE — PROGRESS NOTES
End of Shift Note     Bedside shift change report given to Indigo Molina RN (oncoming nurse) by Bossman Beaulieu RN (offgoing nurse).   Report included the following information SBAR, Kardex, MAR, and Recent Results     Shift worked: 7p - 7a   Shift summary and any significant changes:     None   Concerns for physician to address:  none   Zone phone for oncoming shift:   [phone]      Patient Information  Danilo Price  81 y.o.  10/19/2022  2:00 PM by Kyra Sorensen MD. Danilo Price was admitted from [facility]isted Living     Problem List          Patient Active Problem List     Diagnosis Date Noted    Ribs, multiple fractures 10/19/2022    Late onset Alzheimer's disease without behavioral disturbance ([address] 05/14/2022    Cerebral microvascular disease 05/14/2022    Severe recurrent major depression without psychotic features ([address] 06/01/2021    Dementia ([address] 06/01/2021    Autoimmune hepatitis ([address] 11/01/2020    History of partial colectomy 11/01/2020    Diverticulitis 11/01/2020    Penile cancer ([address] 08/18/2020    Bilateral carotid artery stenosis 10/29/2019    GERD (gastroesophageal reflux disease) 08/29/2018    Hyperlipidemia 04/05/2012    PVC (premature ventricular contraction) 12/21/2010    Hypertension 12/06/2010    TIA (transient ischemic attack) 12/06/2010    Leg edema 12/06/2010              Past Medical History:   Diagnosis Date    Arthritis       left arm    Autoimmune hepatitis ([address]      Bleeding ulcer 07/2019    BPH (benign prostatic hyperplasia)      CAD (coronary artery disease)       s/p stent    Cancer (HCC)       penile squamous carcinoma    Chronic kidney disease       stage 2     Chronic obstructive pulmonary disease ([address]      Dementia ([address]      Elevated LFT's      Essential hypertension 9/24/01    GERD (gastroesophageal reflux disease)       hiatal hernia    Ill-defined condition 12/02/2016     faint    Nephrosclerosis      Orthostatic hypotension 9/24/01    PVC's 9/24/01    Sleep apnea       no CPAP Syncope 9/24/01     secondary to venous insuffiency          Core Measures:  CVA: No No  CHF:No No  PNA:No No     Activity:  Activity Level: Bed Rest  Number times ambulated in hallways past shift: 0  Number of times OOB to chair past shift: 0     Cardiac:   Cardiac Monitoring: No         Access:   Current line(s): PIV   Central Line? No Placement date na Reason Medically Necessary na  PICC LINE? No Placement date na Reason Medically Necessary na     Genitourinary:   Urinary status: voiding and incontinent  Urinary Catheter? No Placement Date na Reason Medically Necessary na     Respiratory:   O2 Device: None (Room air)  Chronic home O2 use?: NO  Incentive spirometer at bedside: NO     GI:  Last Bowel Movement Date: 10/20/22  Current diet:  ADULT DIET Regular  Passing flatus: YES  Tolerating current diet: YES     Pain Management:   Patient states pain is manageable on current regimen: YES     Skin:  Sergey Score: 15  Interventions: PT/OT consult    Patient Safety:  Fall Score:  Total Score: 5  Interventions: bed/chair alarm  High Fall Risk: Yes  @Rollbelt  @dexterity to release roll belt  Yes/No ( must document dexterity  here by stating Yes or No here, otherwise this is a restraint and must follow restraint documentation policy.)     DVT prophylaxis:  DVT prophylaxis Med- No  DVT prophylaxis SCD or CHARLEEN- No      Wounds: (If Applicable)  Wounds- No  Location na     Active Consults:  IP CONSULT TO HOSPITALIST  IP CONSULT TO PULMONOLOGY     Length of Stay:  Expected LOS: 2d 21h  Actual LOS: 9  Discharge Plan: Yes To SNF, pending placement       Yris Srivastava RN

## 2022-10-30 NOTE — PROGRESS NOTES
Hospitalist Progress Note    NAME: Carmen Tolliver   :  1940   MRN:  [id number]     Estimated discharge date: whenever bed found    Barriers: medically stable, awaiting SNF approval from insurance    Assessment / Plan:    Left 5-9th Ribs fracture s/p GLF POA  -CT head nothing acute  -CT C spine:  No acute fracture. Multilevel mod to severe canal and foraminal stenosis  -CTchest and pelvis:  No pelvic fracture. Acute min displaced fractures of LEFT fifth through ninth ribs. There are segmental fracture of the LEFT sixth through eighth ribs  -Pain management: Multimodal, will schedule Tylenol and use low-dose oxy as needed; lidocaine patch  - Pulmonary followed, cleared for discharge  - Will benefit from SNF  -Add incentive spirometer    UTI  -UA with  wbc, 2+ bacteria.   - s/p course of Rocephin  - No urine culture sent     Acute kidney injury POA resolved  Hypokalemia  Baseline creatinine 0.9, admission 1.4  -resolved with hydration. Continue IVF     Large left-sided pleural effusion  small left chest subpleural hematoma  CT: Large left-sided pleural effusion with associated left lower lobe atelectasis, there is also a small left chest subpleural hematoma  -continue to hold ASA   -incentive spirometry  -Pulmonary input appreciated, supportive care     Anemia of chronic disease  -Hg trending down likely from hemodilution. S/P bolus in ED. Autoimmune hepatitis  Dementia Continue Namenda  Penile cancer status post resection  BPH Continue flomax   Hyperlipidemia      Code Status: Full code  Surrogate Decision Maker: Daughter     DVT Prophylaxis: SCD, no AC with small hematoma on CT  GI Prophylaxis: not indicated     Baseline: Dementia, [facility] memory care unit   Recommended Disposition: SNF/LTC    25.0 - 29.9 Overweight / Body mass index is 26.94 kg/m².      Subjective:     Chief Complaint / Reason for Physician Visit  Follow up of rib fractures, s/p fall, ABLA, LETICIA  \"My chest is sore\"  Discussed with RN events overnight. Remains on room air, + intermittent CP  Awake, no new complaints    Review of Systems:  Symptom Y/N Comments  Symptom Y/N Comments   Fever/Chills n   Chest Pain y    Poor Appetite    Edema     Cough n   Abdominal Pain n    Sputum    Joint Pain     SOB/DE JESUS n   Pruritis/Rash     Nausea/vomit n   Tolerating PT/OT     Diarrhea n   Tolerating Diet y    Constipation    Other       Could NOT obtain due to: Confusion     PO intake: No data found. Objective:     VITALS:   Last 24hrs VS reviewed since prior progress note. Most recent are:  Patient Vitals for the past 24 hrs:   Temp Pulse Resp BP SpO2   10/30/22 0843 97.4 °F (36.3 °C) 65 -- 132/82 96 %   10/29/22 2325 97.5 °F (36.4 °C) 61 18 129/85 97 %   10/29/22 1618 98.3 °F (36.8 °C) 82 18 135/82 94 %       No intake or output data in the 24 hours ending 10/30/22 1344       I had a face to face encounter, and independently examined this patient on 10/30/2022, as outlined below:  PHYSICAL EXAM:  General: WD, WN. Alert, cooperative, no acute distress    EENT:  Anicteric sclerae. MMM  Resp:  Decreased BS left base bilaterally, no wheezing or rales. No accessory muscle use  CV:  Regular  rhythm,  No edema  GI:  Soft, Non distended, no clear tenderness. +Bowel sounds  Neurologic:  Alert and oriented X self, normal speech, weak but non focal  Psych:   Poor insight. Not anxious nor agitated  Skin:  No rashes.   No jaundice  (+) left chest wall bruise    Reviewed most current lab test results and cultures  YES  Reviewed most current radiology test results   YES  Review and summation of old records today    NO  Reviewed patient's current orders and MAR    YES  PMH/SH reviewed - no change compared to H&P  ________________________________________________________________________  Care Plan discussed with:    Comments   Patient x    Family  x    RN x    Care Manager     Consultant Multidiciplinary team rounds were held today with , nursing, pharmacist and clinical coordinator. Patient's plan of care was discussed; medications were reviewed and discharge planning was addressed. ________________________________________________________________________      Comments   >50% of visit spent in counseling and coordination of care x     This includes time during multidisciplinary rounds if indicated above   ________________________________________________________________________  Bria Crawford MD     Procedures: see electronic medical records for all procedures/Xrays and details which were not copied into this note but were reviewed prior to creation of Plan. LABS:  I reviewed today's most current labs and imaging studies.   Pertinent labs include:  Recent Labs     10/30/22  0229   WBC 5.2   HGB 10.0*   HCT 30.6*          Recent Labs     10/30/22  0229      K 3.9   *   CO2 25   *   BUN 21*   CREA 1.03   CA 8.5   ALB 2.4*   TBILI 0.6   ALT 45

## 2022-10-31 PROCEDURE — 74011250637 HC RX REV CODE- 250/637: Performed by: HOSPITALIST

## 2022-10-31 PROCEDURE — 65270000029 HC RM PRIVATE

## 2022-10-31 PROCEDURE — 74011000250 HC RX REV CODE- 250: Performed by: STUDENT IN AN ORGANIZED HEALTH CARE EDUCATION/TRAINING PROGRAM

## 2022-10-31 PROCEDURE — 74011250637 HC RX REV CODE- 250/637: Performed by: STUDENT IN AN ORGANIZED HEALTH CARE EDUCATION/TRAINING PROGRAM

## 2022-10-31 RX ADMIN — ACETAMINOPHEN 650 MG: 325 TABLET ORAL at 14:20

## 2022-10-31 RX ADMIN — ESCITALOPRAM OXALATE 5 MG: 10 TABLET ORAL at 08:53

## 2022-10-31 RX ADMIN — CARVEDILOL 25 MG: 12.5 TABLET, FILM COATED ORAL at 17:41

## 2022-10-31 RX ADMIN — ACETAMINOPHEN 650 MG: 325 TABLET ORAL at 06:02

## 2022-10-31 RX ADMIN — MEMANTINE 5 MG: 5 TABLET ORAL at 17:41

## 2022-10-31 RX ADMIN — ACETAMINOPHEN 650 MG: 325 TABLET ORAL at 17:41

## 2022-10-31 RX ADMIN — PANTOPRAZOLE SODIUM 40 MG: 40 TABLET, DELAYED RELEASE ORAL at 08:53

## 2022-10-31 RX ADMIN — MEMANTINE 5 MG: 5 TABLET ORAL at 08:53

## 2022-10-31 RX ADMIN — ACETAMINOPHEN 650 MG: 325 TABLET ORAL at 23:03

## 2022-10-31 RX ADMIN — CARVEDILOL 25 MG: 12.5 TABLET, FILM COATED ORAL at 08:53

## 2022-10-31 RX ADMIN — FLUTICASONE PROPIONATE 2 SPRAY: 50 SPRAY, METERED NASAL at 08:54

## 2022-10-31 RX ADMIN — PANTOPRAZOLE SODIUM 40 MG: 40 TABLET, DELAYED RELEASE ORAL at 23:03

## 2022-10-31 RX ADMIN — OXYCODONE 5 MG: 5 TABLET ORAL at 09:54

## 2022-10-31 RX ADMIN — TAMSULOSIN HYDROCHLORIDE 0.4 MG: 0.4 CAPSULE ORAL at 08:53

## 2022-10-31 NOTE — PROGRESS NOTES
Transition of Care Plan:     RUR: 12% - \"low risk\"  LOS: 12 days  Disposition: SNF- [facility] (pending insurance auth - Emry Massed initiated by facility 10/2422, pending as of 10/31/22, auth reference #; [id number])  Follow up appointments: PCP & specialist as indicated  DME needed: Pt likely transitioning to SNF at d/c  Transportation at Discharge: BLS transport needed  [address] or means to access home: N/A- pt likely transitioning to SNF at d/c     IM Medicare Letter: 2nd IM needed prior to d/c  Is patient a  and connected with the [place]? No          Caregiver Contact: Pt's daughter Isaias Frank ([phone])  Discharge Caregiver contacted prior to discharge? To be contacted prior d/c  Care Conference needed?: Not at this time    Initial note: CM acknowledged d/c. Chart reviewed for updates. CM contacted  A liaison Bakari Mak ([phone]) to follow up on whether the insurance auth was approved/denied. Nicol Saul reports insurance Emry Massed is still pending. CM will continue to follow up.       Jenny Kidd, MSW, [facility]

## 2022-10-31 NOTE — PROGRESS NOTES
Problem: Patient Education: Go to Patient Education Activity  Goal: Patient/Family Education  Outcome: Progressing Towards Goal     Problem: Patient Education: Go to Patient Education Activity  Goal: Patient/Family Education  Outcome: Progressing Towards Goal     Problem: Pressure Injury - Risk of  Goal: *Prevention of pressure injury  Description: Document Sergey Scale and appropriate interventions in the flowsheet. Outcome: Progressing Towards Goal  Note: Pressure Injury Interventions:  Sensory Interventions: Assess changes in LOC, Avoid rigorous massage over bony prominences, Float heels, Keep linens dry and wrinkle-free, Minimize linen layers, Monitor skin under medical devices, Pad between skin to skin, Use 30-degree side-lying position    Moisture Interventions: Absorbent underpads    Activity Interventions: Increase time out of bed    Mobility Interventions: HOB 30 degrees or less    Nutrition Interventions: Document food/fluid/supplement intake    Friction and Shear Interventions: Feet elevated on foot rest                Problem: Patient Education: Go to Patient Education Activity  Goal: Patient/Family Education  Outcome: Progressing Towards Goal     Problem: Falls - Risk of  Goal: *Absence of Falls  Description: Document Roxann Fall Risk and appropriate interventions in the flowsheet.   Outcome: Progressing Towards Goal  Note: Fall Risk Interventions:  Mobility Interventions: Bed/chair exit alarm    Mentation Interventions: Bed/chair exit alarm    Medication Interventions: Bed/chair exit alarm    Elimination Interventions: Call light in reach, Bed/chair exit alarm    History of Falls Interventions: Bed/chair exit alarm         Problem: Patient Education: Go to Patient Education Activity  Goal: Patient/Family Education  Outcome: Progressing Towards Goal

## 2022-10-31 NOTE — PROGRESS NOTES
End of Shift Note     Bedside shift change report given to Nickie RAZA (oncoming nurse) by Danette Corrigan RN (offgoing nurse). Report included the following information SBAR, Kardex, MAR, and Recent Results     Shift worked: night   Shift summary and any significant changes:      Uneventful night, call bell and phone within reach of patient. Bed alarm on zone 2 . Made comfortable. CHG bathed.           Concerns for physician to address:  none   Zone phone for oncoming shift:   [phone]      Patient Information  Rosmery Brooks  81 y.o.  10/19/2022  2:00 PM by Pal Orozco MD. Rosmery Brooks was admitted from Assisted Living     Problem List       Patient Active Problem List     Diagnosis Date Noted    Ribs, multiple fractures 10/19/2022    Late onset Alzheimer's disease without behavioral disturbance ([address] 05/14/2022    Cerebral microvascular disease 05/14/2022    Severe recurrent major depression without psychotic features ([address] 06/01/2021    Dementia ([address] 06/01/2021    Autoimmune hepatitis ([address] 11/01/2020    History of partial colectomy 11/01/2020    Diverticulitis 11/01/2020    Penile cancer ([address] 08/18/2020    Bilateral carotid artery stenosis 10/29/2019    GERD (gastroesophageal reflux disease) 08/29/2018    Hyperlipidemia 04/05/2012    PVC (premature ventricular contraction) 12/21/2010    Hypertension 12/06/2010    TIA (transient ischemic attack) 12/06/2010    Leg edema 12/06/2010           Past Medical History:   Diagnosis Date    Arthritis       left arm    Autoimmune hepatitis ([address]      Bleeding ulcer 07/2019    BPH (benign prostatic hyperplasia)      CAD (coronary artery disease)       s/p stent    Cancer (HCC)       penile squamous carcinoma    Chronic kidney disease       stage 2     Chronic obstructive pulmonary disease (HCC)      Dementia (HCC)      Elevated LFT's      Essential hypertension 9/24/01    GERD (gastroesophageal reflux disease)       hiatal hernia    Ill-defined condition 12/02/2016     faint Nephrosclerosis      Orthostatic hypotension 9/24/01    PVC's 9/24/01    Sleep apnea       no CPAP    Syncope 9/24/01     secondary to venous insuffiency          Core Measures:  CVA: No No  CHF:No No  PNA:No No     Activity:  Activity Level: Bed Rest  Number times ambulated in hallways past shift: 0  Number of times OOB to chair past shift: 0     Cardiac:   Cardiac Monitoring: No         Access:   Current line(s): PIV   Central Line? No Placement date na Reason Medically Necessary na  PICC LINE? No Placement date na Reason Medically Necessary na     Genitourinary:   Urinary status: voiding and incontinent  Urinary Catheter? No Placement Date na Reason Medically Necessary na     Respiratory:   O2 Device: None (Room air)  Chronic home O2 use?: NO  Incentive spirometer at bedside: NO     GI:  Last Bowel Movement Date: 10/20/22  Current diet:  ADULT DIET Regular  Passing flatus: YES  Tolerating current diet: YES     Pain Management:   Patient states pain is manageable on current regimen: YES     Skin:  Sergey Score: 15  Interventions: PT/OT consult    Patient Safety:  Fall Score:  Total Score: 5  Interventions: bed/chair alarm  High Fall Risk: Yes  @Rollbelt  @dexterity to release roll belt  Yes/No ( must document dexterity  here by stating Yes or No here, otherwise this is a restraint and must follow restraint documentation policy.)     DVT prophylaxis:  DVT prophylaxis Med- No  DVT prophylaxis SCD or CHARLEEN- No      Wounds: (If Applicable)  Wounds- No  Location na     Active Consults:  IP CONSULT TO HOSPITALIST  IP CONSULT TO PULMONOLOGY     Length of Stay:  Expected LOS: 2d 21h  Actual LOS: 1  Discharge Plan: Yes NENITA Solis RN

## 2022-10-31 NOTE — PROGRESS NOTES
End of Shift Note     Bedside shift change report given to Chiquis Stack (oncoming nurse) by Beck Kenny RN (offgoing nurse).   Report included the following information SBAR, Kardex, MAR, and Recent Results     Shift worked: 7a-7p   Shift summary and any significant changes:     No significant changes   Concerns for physician to address:  none   Zone phone for oncoming shift:   [phone]      Patient Information  Corin Velázquez  81 y.o.  10/19/2022  2:00 PM by Krystina Joy MD. Corin Velázquez was admitted from Assisted Living     Problem List          Patient Active Problem List     Diagnosis Date Noted    Ribs, multiple fractures 10/19/2022    Late onset Alzheimer's disease without behavioral disturbance ([address] 05/14/2022    Cerebral microvascular disease 05/14/2022    Severe recurrent major depression without psychotic features ([address] 06/01/2021    Dementia ([address] 06/01/2021    Autoimmune hepatitis ([address] 11/01/2020    History of partial colectomy 11/01/2020    Diverticulitis 11/01/2020    Penile cancer ([address] 08/18/2020    Bilateral carotid artery stenosis 10/29/2019    GERD (gastroesophageal reflux disease) 08/29/2018    Hyperlipidemia 04/05/2012    PVC (premature ventricular contraction) 12/21/2010    Hypertension 12/06/2010    TIA (transient ischemic attack) 12/06/2010    Leg edema 12/06/2010              Past Medical History:   Diagnosis Date    Arthritis       left arm    Autoimmune hepatitis ([address]      Bleeding ulcer 07/2019    BPH (benign prostatic hyperplasia)      CAD (coronary artery disease)       s/p stent    Cancer (HCC)       penile squamous carcinoma    Chronic kidney disease       stage 2     Chronic obstructive pulmonary disease ([address]      Dementia ([address]      Elevated LFT's      Essential hypertension 9/24/01    GERD (gastroesophageal reflux disease)       hiatal hernia    Ill-defined condition 12/02/2016     faint    Nephrosclerosis      Orthostatic hypotension 9/24/01    PVC's 9/24/01    Sleep apnea no CPAP    Syncope 9/24/01     secondary to venous insuffiency          Core Measures:  CVA: No No  CHF:No No  PNA:No No     Activity:  Activity Level: Bed Rest  Number times ambulated in hallways past shift: 0  Number of times OOB to chair past shift: 0     Cardiac:   Cardiac Monitoring: No         Access:   Current line(s): PIV   Central Line? No Placement date na Reason Medically Necessary na  PICC LINE? No Placement date na Reason Medically Necessary na     Genitourinary:   Urinary status: voiding and incontinent  Urinary Catheter? No Placement Date na Reason Medically Necessary na     Respiratory:   O2 Device: None (Room air)  Chronic home O2 use?: NO  Incentive spirometer at bedside: NO     GI:  Last Bowel Movement Date: 10/20/22  Current diet:  ADULT DIET Regular  Passing flatus: YES  Tolerating current diet: YES     Pain Management:   Patient states pain is manageable on current regimen: YES     Skin:  Sergey Score: 15  Interventions: PT/OT consult    Patient Safety:  Fall Score:  Total Score: 5  Interventions: bed/chair alarm  High Fall Risk: Yes  @Rollbelt  @dexterity to release roll belt  Yes/No ( must document dexterity  here by stating Yes or No here, otherwise this is a restraint and must follow restraint documentation policy.)     DVT prophylaxis:  DVT prophylaxis Med- No  DVT prophylaxis SCD or CHARLEEN- No      Wounds: (If Applicable)  Wounds- No  Location na     Active Consults:  IP CONSULT TO HOSPITALIST  IP CONSULT TO PULMONOLOGY     Length of Stay:  Expected LOS: 2d 21h  Actual LOS: 12  Discharge Plan: Yes To SNF, pending placement       Chapis Tapia RN

## 2022-11-01 PROCEDURE — 74011250637 HC RX REV CODE- 250/637: Performed by: STUDENT IN AN ORGANIZED HEALTH CARE EDUCATION/TRAINING PROGRAM

## 2022-11-01 PROCEDURE — 74011000250 HC RX REV CODE- 250: Performed by: STUDENT IN AN ORGANIZED HEALTH CARE EDUCATION/TRAINING PROGRAM

## 2022-11-01 PROCEDURE — 97116 GAIT TRAINING THERAPY: CPT

## 2022-11-01 PROCEDURE — 74011250637 HC RX REV CODE- 250/637: Performed by: HOSPITALIST

## 2022-11-01 PROCEDURE — 65270000029 HC RM PRIVATE

## 2022-11-01 RX ADMIN — FLUTICASONE PROPIONATE 2 SPRAY: 50 SPRAY, METERED NASAL at 08:30

## 2022-11-01 RX ADMIN — MEMANTINE 5 MG: 5 TABLET ORAL at 17:46

## 2022-11-01 RX ADMIN — OXYCODONE 5 MG: 5 TABLET ORAL at 17:46

## 2022-11-01 RX ADMIN — PANTOPRAZOLE SODIUM 40 MG: 40 TABLET, DELAYED RELEASE ORAL at 08:28

## 2022-11-01 RX ADMIN — ACETAMINOPHEN 650 MG: 325 TABLET ORAL at 05:19

## 2022-11-01 RX ADMIN — ESCITALOPRAM OXALATE 5 MG: 10 TABLET ORAL at 08:28

## 2022-11-01 RX ADMIN — MEMANTINE 5 MG: 5 TABLET ORAL at 08:28

## 2022-11-01 RX ADMIN — ACETAMINOPHEN 650 MG: 325 TABLET ORAL at 17:46

## 2022-11-01 RX ADMIN — CARVEDILOL 25 MG: 12.5 TABLET, FILM COATED ORAL at 08:28

## 2022-11-01 RX ADMIN — CARVEDILOL 25 MG: 12.5 TABLET, FILM COATED ORAL at 17:46

## 2022-11-01 RX ADMIN — TAMSULOSIN HYDROCHLORIDE 0.4 MG: 0.4 CAPSULE ORAL at 08:28

## 2022-11-01 RX ADMIN — ACETAMINOPHEN 650 MG: 325 TABLET ORAL at 13:16

## 2022-11-01 NOTE — PROGRESS NOTES
Hospitalist Progress Note    NAME: Marilu Lara   :  1940   MRN:  [id number]     Estimated discharge date: whenever bed found    Barriers: medically stable, awaiting SNF approval from insurance    Assessment / Plan:    Left 5-9th Ribs fracture s/p GLF POA  -CT head nothing acute  -CT C spine:  No acute fracture. Multilevel mod to severe canal and foraminal stenosis  -CTchest and pelvis:  No pelvic fracture. Acute min displaced fractures of LEFT fifth through ninth ribs. There are segmental fracture of the LEFT sixth through eighth ribs  -Pain management: Multimodal, will schedule Tylenol and use low-dose oxy as needed; lidocaine patch  - Pulmonary followed, cleared for discharge  - Will benefit from SNF  -Add incentive spirometer    UTI  -UA with  wbc, 2+ bacteria.   - s/p course of Rocephin  - No urine culture sent     Acute kidney injury POA resolved  Hypokalemia  Baseline creatinine 0.9, admission 1.4  -resolved with hydration. Continue IVF     Large left-sided pleural effusion  small left chest subpleural hematoma  CT: Large left-sided pleural effusion with associated left lower lobe atelectasis, there is also a small left chest subpleural hematoma  -continue to hold ASA   -incentive spirometry  -Pulmonary input appreciated, supportive care     Anemia of chronic disease  -Hg trending down likely from hemodilution. S/P bolus in ED. Autoimmune hepatitis  Dementia Continue Namenda  Penile cancer status post resection  BPH Continue flomax   Hyperlipidemia      Code Status: Full code  Surrogate Decision Maker: Daughter     DVT Prophylaxis: SCD, no AC with small hematoma on CT  GI Prophylaxis: not indicated     Baseline: Dementia, longterm memory care unit   Recommended Disposition: SNF/LTC    25.0 - 29.9 Overweight / Body mass index is 26.94 kg/m².      Subjective:     Chief Complaint / Reason for Physician Visit  Follow up of rib fractures, s/p fall, ABLA, LETICIA  \"My chest is sore\"  Discussed with RN events overnight. Remains on room air, + intermittent CP  Awake, no new complaints    Review of Systems:  Symptom Y/N Comments  Symptom Y/N Comments   Fever/Chills n   Chest Pain y    Poor Appetite    Edema     Cough n   Abdominal Pain n    Sputum    Joint Pain     SOB/DE JESUS n   Pruritis/Rash     Nausea/vomit n   Tolerating PT/OT     Diarrhea n   Tolerating Diet y    Constipation    Other       Could NOT obtain due to: Confusion     PO intake: No data found. Objective:     VITALS:   Last 24hrs VS reviewed since prior progress note. Most recent are:  Patient Vitals for the past 24 hrs:   Temp Pulse Resp BP SpO2   10/31/22 2301 98.2 °F (36.8 °C) (!) 58 18 (!) 154/73 95 %   10/31/22 1510 98.5 °F (36.9 °C) 70 16 126/69 93 %   10/31/22 0843 98.2 °F (36.8 °C) 66 17 (!) 151/75 94 %       No intake or output data in the 24 hours ending 11/01/22 0003       I had a face to face encounter, and independently examined this patient on 11/1/2022, as outlined below:  PHYSICAL EXAM:  General: WD, WN. Alert, cooperative, no acute distress    EENT:  Anicteric sclerae. MMM  Resp:  Decreased BS left base bilaterally, no wheezing or rales. No accessory muscle use  CV:  Regular  rhythm,  No edema  GI:  Soft, Non distended, no clear tenderness. +Bowel sounds  Neurologic:  Alert and oriented X self, normal speech, weak but non focal  Psych:   Poor insight. Not anxious nor agitated  Skin:  No rashes.   No jaundice  (+) left chest wall bruise    Reviewed most current lab test results and cultures  YES  Reviewed most current radiology test results   YES  Review and summation of old records today    NO  Reviewed patient's current orders and MAR    YES  PMH/SH reviewed - no change compared to H&P  ________________________________________________________________________  Care Plan discussed with:    Comments   Patient x    Family  x    RN x    Care Manager     Consultant Multidiciplinary team rounds were held today with , nursing, pharmacist and clinical coordinator. Patient's plan of care was discussed; medications were reviewed and discharge planning was addressed. ________________________________________________________________________      Comments   >50% of visit spent in counseling and coordination of care x     This includes time during multidisciplinary rounds if indicated above   ________________________________________________________________________  Aubrey Sofia MD     Procedures: see electronic medical records for all procedures/Xrays and details which were not copied into this note but were reviewed prior to creation of Plan. LABS:  I reviewed today's most current labs and imaging studies.   Pertinent labs include:  Recent Labs     10/30/22  0229   WBC 5.2   HGB 10.0*   HCT 30.6*          Recent Labs     10/30/22  0229      K 3.9   *   CO2 25   *   BUN 21*   CREA 1.03   CA 8.5   ALB 2.4*   TBILI 0.6   ALT 45

## 2022-11-01 NOTE — PROGRESS NOTES
Transition of Care Plan:     RUR: 12% - \"low risk\"  LOS: 13 days  Disposition: Return to [address] with [facility] (RN/PT/OT), DME, & follow up apts  Follow up appointments: PCP & specialist as indicated  DME needed: TBD pending direction of the disposition   Transportation at Discharge: BLS transport needed  [place] or means to access home: N/A - [facility] resident    IM Medicare Letter: 2nd IM needed prior to d/c  Is patient a [place] and connected with the [place]? No          Caregiver Contact: Pt's daughter Rollemely Resides: [phone])  Discharge Caregiver contacted prior to discharge? To be contacted prior d/c  Care Conference needed?: Not at this time  COVID test: Rapid COVID test completed 10/28/22; results negative     Update - 3:32 PM: CM contacted pt's daughter, introduced role, and provided updates regarding disposition. Daughter reported she spoke with someone at [address] [place] [address] who reported they're willing to accept pt back with [facility] & DME. Per daughter, [facility] is working to order a hospital bed and bed alarm. Daughter requested for CM to contact [facility] to determine where they're at in the referral process for DME; CM to complete request. No immediate questions/concerns identified. Initial note: Chart reviewed. CM received update from [facility] Almaraz: [phone]) that pt's insurance Hetal Fayek was denied for SNF 10/31/22; no P2P option offered. Pt and/or daughter Rollemely Plummer) can initiate a fast track appeal by calling [phone]. CM sent perfect serve message to attending MD reporting update detailed above. CM will contact pt's daughter to report update and discuss direction of the disposition. Updates to be provided once available.     Taylor Palomares, MSW  Care Manager, [facility]

## 2022-11-01 NOTE — PROGRESS NOTES
Problem: Mobility Impaired (Adult and Pediatric)  Goal: *Acute Goals and Plan of Care (Insert Text)  Description: FUNCTIONAL STATUS PRIOR TO ADMISSION: Pt lives in [address], unsure of facility from current notes. He is unable to fill in prior level of function but has arrived to ED with a RW and during session appears he is familiar with it. HOME SUPPORT PRIOR TO ADMISSION: [facility], unclear baseline    Physical Therapy Goals  Re-assessed 10/28/2022 and goals updated  1. Patient will move from supine to sit and sit to supine , scoot up and down, and roll side to side in bed with minimal assistance/contact guard assist within 7 day(s). 2.  Patient will transfer from bed to chair and chair to bed with stand-by-assist using the least restrictive device within 7 day(s). 3.  Patient will perform sit to stand with supervision/set-up within 7 day(s). 4.  Patient will ambulate with contact guard assist/minimal assist for 75 feet with the least restrictive device within 7 day(s). Initiated 10/20/2022  1. Patient will move from supine to sit and sit to supine , scoot up and down, and roll side to side in bed with minimal assistance/contact guard assist within 7 day(s). 2.  Patient will transfer from bed to chair and chair to bed with supervision/set-up using the least restrictive device within 7 day(s). 3.  Patient will perform sit to stand with supervision/set-up within 7 day(s). 4.  Patient will ambulate with supervision/set-up for 100 feet with the least restrictive device within 7 day(s). Outcome: Progressing Towards Goal     PHYSICAL THERAPY TREATMENT  Patient: Meet Lazaro (2 y.o. male)  Date: 11/1/2022  Diagnosis: Ribs, multiple fractures [S22.49XA] <principal problem not specified>      Precautions: Fall  Chart, physical therapy assessment, plan of care and goals were reviewed. ASSESSMENT  Patient continues with skilled PT services and is progressing towards goals.  Demonstrating improved gait without use of rolling walker this date ambulating at contact guard assist to min assist. Remains unsteady on feet and rather impulsive. Follows simple commands for functional tasks and moves very automatically. Reports soreness at L ribs but did not interfere with session. Improved bed mobility as well. Recommend SNF vs [facility] pending [facility] ability to provide necessary assist with mobility. Acute PT will continue to follow and progress as able     Current Level of Function Impacting Discharge (mobility/balance): min A with gait    Other factors to consider for discharge: came from [facility] memory care         PLAN :  Patient continues to benefit from skilled intervention to address the above impairments. Continue treatment per established plan of care. to address goals. Recommendation for discharge: (in order for the patient to meet his/her long term goals)  SNF vs return to [facility] pending [facility] ability to provide necessary assist for patient (1 assist with gait belt CGA to min A)    This discharge recommendation:  Has been made in collaboration with the attending provider and/or case management    IF patient discharges home will need the following DME: to be determined (TBD)     SUBJECTIVE:   Patient stated It's a little sore.  \"scratch my back\"    OBJECTIVE DATA SUMMARY:   Critical Behavior:  Neurologic State: Confused, Alert  Orientation Level: Disoriented to time, Disoriented to situation, Disoriented to place  Cognition: Decreased attention/concentration, Impaired decision making, Poor safety awareness  Safety/Judgement: Decreased insight into deficits, Decreased awareness of need for safety, Decreased awareness of need for assistance, Decreased awareness of environment, Fall prevention (did say \"therapy place\" when asked what kind of place this is)  Functional Mobility Training:  Bed Mobility:  Supine to Sit: Stand-by assistance; Additional time;Bed Modified  Scooting: Contact guard assistance  Transfers:  Sit to Stand: Stand-by assistance;Contact guard assistance (perfomring automatically pushing up with B UEs, needing contact guard assist once standing)  Stand to Sit: Contact guard assistance  Balance:  Sitting: Impaired  Sitting - Static: Good (unsupported)  Sitting - Dynamic: Fair (occasional)  Standing: Impaired  Standing - Static: Fair  Standing - Dynamic : Fair  Ambulation/Gait Training:  Distance (ft): 30 Feet (ft)  Assistive Device: Gait belt  Ambulation - Level of Assistance: Contact guard assistance;Assist x1  Gait Abnormalities: Decreased step clearance;Shuffling gait  Base of Support: Center of gravity altered  Speed/Ernst: Shuffled; Slow (decreased ernst with further gait)  Step Length: Left shortened;Right shortened      Therapeutic Exercises:   Performed a few sit to stands to remove/replace brief due to incontinence     Pain Rating:  Did not interfere, does endorse some L rib soreness when asked    Activity Tolerance:   Fair, some dyspnea on exertion    After treatment patient left in no apparent distress:   Sitting in chair, Call bell within reach, and Bed / chair alarm activated - RN aware    COMMUNICATION/COLLABORATION:   The patients plan of care was discussed with: Occupational therapist and Registered nurse.      Dariana Wasserman, PT   Time Calculation: 19 mins

## 2022-11-01 NOTE — PROGRESS NOTES
Problem: Patient Education: Go to Patient Education Activity  Goal: Patient/Family Education  Outcome: Progressing Towards Goal     Problem: Patient Education: Go to Patient Education Activity  Goal: Patient/Family Education  Outcome: Progressing Towards Goal     Problem: Pressure Injury - Risk of  Goal: *Prevention of pressure injury  Description: Document Sergey Scale and appropriate interventions in the flowsheet. Outcome: Progressing Towards Goal  Note: Pressure Injury Interventions:  Sensory Interventions: Assess changes in LOC    Moisture Interventions: Absorbent underpads    Activity Interventions: Increase time out of bed    Mobility Interventions: HOB 30 degrees or less    Nutrition Interventions: Document food/fluid/supplement intake, Offer support with meals,snacks and hydration    Friction and Shear Interventions: Feet elevated on foot rest                Problem: Patient Education: Go to Patient Education Activity  Goal: Patient/Family Education  Outcome: Progressing Towards Goal     Problem: Falls - Risk of  Goal: *Absence of Falls  Description: Document Roxann Fall Risk and appropriate interventions in the flowsheet.   Outcome: Progressing Towards Goal  Note: Fall Risk Interventions:  Mobility Interventions: Bed/chair exit alarm    Mentation Interventions: Bed/chair exit alarm    Medication Interventions: Bed/chair exit alarm    Elimination Interventions: Call light in reach, Bed/chair exit alarm    History of Falls Interventions: Bed/chair exit alarm         Problem: Patient Education: Go to Patient Education Activity  Goal: Patient/Family Education  Outcome: Progressing Towards Goal

## 2022-11-01 NOTE — PROGRESS NOTES
Hospitalist Progress Note    NAME: Doug Landin   :  1940   MRN:  [id number]     Estimated discharge date: whenever bed found    Barriers: medically stable, SNF denied, no peer to peer options   CM working on placement    Assessment / Plan:    Left 5-9th Ribs fracture s/p GLF POA  -CT head nothing acute  -CT C spine:  No acute fracture. Multilevel mod to severe canal and foraminal stenosis  -CTchest and pelvis:  No pelvic fracture. Acute min displaced fractures of LEFT fifth through ninth ribs. There are segmental fracture of the LEFT sixth through eighth ribs  -Pain management: Multimodal, will schedule Tylenol and use low-dose oxy as needed; lidocaine patch  - Pulmonary followed, cleared for discharge  - Will benefit from SNF  -Add incentive spirometer    UTI  -UA with  wbc, 2+ bacteria.   - s/p course of Rocephin  - No urine culture sent     Acute kidney injury POA resolved  Hypokalemia  Baseline creatinine 0.9, admission 1.4  -resolved with hydration. Continue IVF     Large left-sided pleural effusion  small left chest subpleural hematoma  CT: Large left-sided pleural effusion with associated left lower lobe atelectasis, there is also a small left chest subpleural hematoma  -continue to hold ASA   -incentive spirometry  -Pulmonary input appreciated, supportive care     Anemia of chronic disease  -Hg trending down likely from hemodilution. S/P bolus in ED. Autoimmune hepatitis  Dementia Continue Namenda  Penile cancer status post resection  BPH Continue flomax   Hyperlipidemia      Code Status: Full code  Surrogate Decision Maker: Daughter     DVT Prophylaxis: SCD, no AC with small hematoma on CT  GI Prophylaxis: not indicated     Baseline: Dementia, [facility]   Recommended Disposition: SNF/LTC    25.0 - 29.9 Overweight / Body mass index is 26.94 kg/m².      Subjective:     Chief Complaint / Reason for Physician Visit  Follow up of rib fractures, s/p fall, ABLA, LETICIA  \"My chest is sore\"  No new complaints  Discussed with RN events overnight. Remains on room air    Review of Systems:  Symptom Y/N Comments  Symptom Y/N Comments   Fever/Chills n   Chest Pain y    Poor Appetite    Edema     Cough n   Abdominal Pain n    Sputum    Joint Pain     SOB/DE JESUS n   Pruritis/Rash     Nausea/vomit n   Tolerating PT/OT     Diarrhea n   Tolerating Diet y    Constipation    Other       Could NOT obtain due to: Confusion     PO intake: No data found. Objective:     VITALS:   Last 24hrs VS reviewed since prior progress note. Most recent are:  Patient Vitals for the past 24 hrs:   Temp Pulse Resp BP SpO2   11/01/22 0800 98.4 °F (36.9 °C) 73 16 (!) 144/86 94 %   10/31/22 2301 98.2 °F (36.8 °C) (!) 58 18 (!) 154/73 95 %       No intake or output data in the 24 hours ending 11/01/22 1912       I had a face to face encounter, and independently examined this patient on 11/1/2022, as outlined below:  PHYSICAL EXAM:  General: WD, WN. Alert, cooperative, no acute distress    EENT:  Anicteric sclerae. MMM  Resp:  Decreased BS left base bilaterally, no wheezing or rales. No accessory muscle use  CV:  Regular  rhythm,  No edema  GI:  Soft, Non distended, no clear tenderness. +Bowel sounds  Neurologic:  Alert and oriented X self, normal speech, weak but non focal  Psych:   Poor insight. Not anxious nor agitated  Skin:  No rashes.   No jaundice  (+) left chest wall bruise    Reviewed most current lab test results and cultures  YES  Reviewed most current radiology test results   YES  Review and summation of old records today    NO  Reviewed patient's current orders and MAR    YES  PMH/SH reviewed - no change compared to H&P  ________________________________________________________________________  Care Plan discussed with:    Comments   Patient x    Family      RN x    Care Manager     Consultant                        Multidiciplinary team rounds were held today with case manager, nursing, pharmacist and clinical coordinator. Patient's plan of care was discussed; medications were reviewed and discharge planning was addressed. ________________________________________________________________________      Comments   >50% of visit spent in counseling and coordination of care x     This includes time during multidisciplinary rounds if indicated above   ________________________________________________________________________  Rolene MD Alexandru     Procedures: see electronic medical records for all procedures/Xrays and details which were not copied into this note but were reviewed prior to creation of Plan. LABS:  I reviewed today's most current labs and imaging studies.   Pertinent labs include:  Recent Labs     10/30/22  0229   WBC 5.2   HGB 10.0*   HCT 30.6*          Recent Labs     10/30/22  0229      K 3.9   *   CO2 25   *   BUN 21*   CREA 1.03   CA 8.5   ALB 2.4*   TBILI 0.6   ALT 45

## 2022-11-01 NOTE — PROGRESS NOTES
End of Shift Note     Bedside shift change report given to Marina Santo RN (oncoming nurse) by Francisco Edmondson RN (offgoing nurse).   Report included the following information SBAR, Kardex, MAR, and Recent Results     Shift worked: [id number]   Shift summary and any significant changes:     No significant changes   Concerns for physician to address:  none   Zone phone for oncoming shift:   [phone]      Patient Information  Zoila Nagy  81 y.o.  10/19/2022  2:00 PM by Xenia Martel MD. [facility]y [facility] from [facility]     Problem List          Patient Active Problem List     Diagnosis Date Noted    Ribs, multiple fractures 10/19/2022    Late onset Alzheimer's disease without behavioral disturbance ([address] 05/14/2022    Cerebral microvascular disease 05/14/2022    Severe recurrent major depression without psychotic features ([address] 06/01/2021    Dementia ([address] 06/01/2021    Autoimmune hepatitis ([address] 11/01/2020    History of partial colectomy 11/01/2020    Diverticulitis 11/01/2020    Penile cancer ([address] 08/18/2020    Bilateral carotid artery stenosis 10/29/2019    GERD (gastroesophageal reflux disease) 08/29/2018    Hyperlipidemia 04/05/2012    PVC (premature ventricular contraction) 12/21/2010    Hypertension 12/06/2010    TIA (transient ischemic attack) 12/06/2010    Leg edema 12/06/2010              Past Medical History:   Diagnosis Date    Arthritis       left arm    Autoimmune hepatitis ([address]      Bleeding ulcer 07/2019    BPH (benign prostatic hyperplasia)      CAD (coronary artery disease)       s/p stent    Cancer (HCC)       penile squamous carcinoma    Chronic kidney disease       stage 2     Chronic obstructive pulmonary disease ([address]      Dementia ([address]      Elevated LFT's      Essential hypertension 9/24/01    GERD (gastroesophageal reflux disease)       hiatal hernia    Ill-defined condition 12/02/2016     faint    Nephrosclerosis      Orthostatic hypotension 9/24/01    PVC's 9/24/01    Sleep apnea no CPAP    Syncope 9/24/01     secondary to venous insuffiency          Core Measures:  CVA: No No  CHF:No No  PNA:No No     Activity:  Activity Level: Bed Rest  Number times ambulated in hallways past shift: 0  Number of times OOB to chair past shift: 0     Cardiac:   Cardiac Monitoring: No         Access:   Current line(s): PIV   Central Line? No Placement date na Reason Medically Necessary na  PICC LINE? No Placement date na Reason Medically Necessary na     Genitourinary:   Urinary status: voiding and incontinent  Urinary Catheter? No Placement Date na Reason Medically Necessary na     Respiratory:   O2 Device: None (Room air)  Chronic home O2 use?: NO  Incentive spirometer at bedside: NO     GI:  Last Bowel Movement Date: 10/20/22  Current diet:  ADULT DIET Regular  Passing flatus: YES  Tolerating current diet: YES     Pain Management:   Patient states pain is manageable on current regimen: YES     Skin:  Sergey Score: 15  Interventions: PT/OT consult    Patient Safety:  Fall Score:  Total Score: 5  Interventions: bed/chair alarm  High Fall Risk: Yes  @Rollbelt  @dexterity to release roll belt  Yes/No ( must document dexterity  here by stating Yes or No here, otherwise this is a restraint and must follow restraint documentation policy.)     DVT prophylaxis:  DVT prophylaxis Med- No  DVT prophylaxis SCD or CHARLEEN- No      Wounds: (If Applicable)  Wounds- No  Location na     Active Consults:  IP CONSULT TO HOSPITALIST  IP CONSULT TO PULMONOLOGY     Length of Stay:  Expected LOS: 2d 21h  Actual LOS: 12  Discharge Plan: Yes To SNF, pending placement       China Laureano RN

## 2022-11-02 LAB
ALBUMIN SERPL-MCNC: 2.4 G/DL (ref 3.5–5)
ALBUMIN/GLOB SERPL: 0.5 {RATIO} (ref 1.1–2.2)
ALP SERPL-CCNC: 405 U/L (ref 45–117)
ALT SERPL-CCNC: 40 U/L (ref 12–78)
ANION GAP SERPL CALC-SCNC: 8 MMOL/L (ref 5–15)
AST SERPL-CCNC: 32 U/L (ref 15–37)
BASOPHILS # BLD: 0.1 K/UL (ref 0–0.1)
BASOPHILS NFR BLD: 2 % (ref 0–1)
BILIRUB SERPL-MCNC: 0.7 MG/DL (ref 0.2–1)
BUN SERPL-MCNC: 22 MG/DL (ref 6–20)
BUN/CREAT SERPL: 21 (ref 12–20)
CALCIUM SERPL-MCNC: 8.7 MG/DL (ref 8.5–10.1)
CHLORIDE SERPL-SCNC: 108 MMOL/L (ref 97–108)
CO2 SERPL-SCNC: 22 MMOL/L (ref 21–32)
CREAT SERPL-MCNC: 1.06 MG/DL (ref 0.7–1.3)
DIFFERENTIAL METHOD BLD: ABNORMAL
EOSINOPHIL # BLD: 0.8 K/UL (ref 0–0.4)
EOSINOPHIL NFR BLD: 16 % (ref 0–7)
ERYTHROCYTE [DISTWIDTH] IN BLOOD BY AUTOMATED COUNT: 15.2 % (ref 11.5–14.5)
GLOBULIN SER CALC-MCNC: 5 G/DL (ref 2–4)
GLUCOSE SERPL-MCNC: 138 MG/DL (ref 65–100)
HCT VFR BLD AUTO: 34.1 % (ref 36.6–50.3)
HGB BLD-MCNC: 11 G/DL (ref 12.1–17)
IMM GRANULOCYTES # BLD AUTO: 0 K/UL (ref 0–0.04)
IMM GRANULOCYTES NFR BLD AUTO: 0 % (ref 0–0.5)
LYMPHOCYTES # BLD: 1.1 K/UL (ref 0.8–3.5)
LYMPHOCYTES NFR BLD: 24 % (ref 12–49)
MCH RBC QN AUTO: 29.3 PG (ref 26–34)
MCHC RBC AUTO-ENTMCNC: 32.3 G/DL (ref 30–36.5)
MCV RBC AUTO: 90.7 FL (ref 80–99)
MONOCYTES # BLD: 0.5 K/UL (ref 0–1)
MONOCYTES NFR BLD: 11 % (ref 5–13)
NEUTS SEG # BLD: 2.2 K/UL (ref 1.8–8)
NEUTS SEG NFR BLD: 47 % (ref 32–75)
NRBC # BLD: 0 K/UL (ref 0–0.01)
NRBC BLD-RTO: 0 PER 100 WBC
PLATELET # BLD AUTO: 266 K/UL (ref 150–400)
PMV BLD AUTO: 9.6 FL (ref 8.9–12.9)
POTASSIUM SERPL-SCNC: 3.9 MMOL/L (ref 3.5–5.1)
PROT SERPL-MCNC: 7.4 G/DL (ref 6.4–8.2)
RBC # BLD AUTO: 3.76 M/UL (ref 4.1–5.7)
RBC MORPH BLD: ABNORMAL
SODIUM SERPL-SCNC: 138 MMOL/L (ref 136–145)
WBC # BLD AUTO: 4.7 K/UL (ref 4.1–11.1)

## 2022-11-02 PROCEDURE — 85025 COMPLETE CBC W/AUTO DIFF WBC: CPT

## 2022-11-02 PROCEDURE — 97530 THERAPEUTIC ACTIVITIES: CPT

## 2022-11-02 PROCEDURE — 74011000250 HC RX REV CODE- 250: Performed by: STUDENT IN AN ORGANIZED HEALTH CARE EDUCATION/TRAINING PROGRAM

## 2022-11-02 PROCEDURE — 65270000029 HC RM PRIVATE

## 2022-11-02 PROCEDURE — 36415 COLL VENOUS BLD VENIPUNCTURE: CPT

## 2022-11-02 PROCEDURE — 74011250637 HC RX REV CODE- 250/637: Performed by: HOSPITALIST

## 2022-11-02 PROCEDURE — 97535 SELF CARE MNGMENT TRAINING: CPT

## 2022-11-02 PROCEDURE — 80053 COMPREHEN METABOLIC PANEL: CPT

## 2022-11-02 RX ADMIN — ACETAMINOPHEN 650 MG: 325 TABLET ORAL at 17:46

## 2022-11-02 RX ADMIN — FLUTICASONE PROPIONATE 2 SPRAY: 50 SPRAY, METERED NASAL at 09:08

## 2022-11-02 RX ADMIN — MEMANTINE 5 MG: 5 TABLET ORAL at 09:07

## 2022-11-02 RX ADMIN — ESCITALOPRAM OXALATE 5 MG: 10 TABLET ORAL at 09:07

## 2022-11-02 RX ADMIN — CARVEDILOL 25 MG: 12.5 TABLET, FILM COATED ORAL at 17:46

## 2022-11-02 RX ADMIN — PANTOPRAZOLE SODIUM 40 MG: 40 TABLET, DELAYED RELEASE ORAL at 00:25

## 2022-11-02 RX ADMIN — CARVEDILOL 25 MG: 12.5 TABLET, FILM COATED ORAL at 09:07

## 2022-11-02 RX ADMIN — TAMSULOSIN HYDROCHLORIDE 0.4 MG: 0.4 CAPSULE ORAL at 09:07

## 2022-11-02 RX ADMIN — ACETAMINOPHEN 650 MG: 325 TABLET ORAL at 12:43

## 2022-11-02 RX ADMIN — ACETAMINOPHEN 650 MG: 325 TABLET ORAL at 00:25

## 2022-11-02 RX ADMIN — ACETAMINOPHEN 650 MG: 325 TABLET ORAL at 07:54

## 2022-11-02 RX ADMIN — PANTOPRAZOLE SODIUM 40 MG: 40 TABLET, DELAYED RELEASE ORAL at 07:54

## 2022-11-02 RX ADMIN — MEMANTINE 5 MG: 5 TABLET ORAL at 17:46

## 2022-11-02 RX ADMIN — PANTOPRAZOLE SODIUM 40 MG: 40 TABLET, DELAYED RELEASE ORAL at 22:47

## 2022-11-02 NOTE — PROGRESS NOTES
End of Shift Note     Bedside shift change report given to [address]e (oncoming nurse) by Yoni RN (offgoing nurse). Report included the following information SBAR, Kardex, MAR, and Recent Results     Shift worked: [address]   Shift summary and any significant changes:     No significant changes.       Concerns for physician to address:  none   Zone phone for oncoming shift:   [phone]      Patient Information  Tata Benitez  81 y.o.  10/19/2022  2:00 PM by Amish Allen MD. Tata Benitez was admitted from [facility]     Problem List          Patient Active Problem List     Diagnosis Date Noted    Ribs, multiple fractures 10/19/2022    Late onset Alzheimer's disease without behavioral disturbance ([address] 05/14/2022    Cerebral microvascular disease 05/14/2022    Severe recurrent major depression without psychotic features ([address] 06/01/2021    Dementia ([address] 06/01/2021    Autoimmune hepatitis ([address] 11/01/2020    History of partial colectomy 11/01/2020    Diverticulitis 11/01/2020    Penile cancer ([address] 08/18/2020    Bilateral carotid artery stenosis 10/29/2019    GERD (gastroesophageal reflux disease) 08/29/2018    Hyperlipidemia 04/05/2012    PVC (premature ventricular contraction) 12/21/2010    Hypertension 12/06/2010    TIA (transient ischemic attack) 12/06/2010    Leg edema 12/06/2010              Past Medical History:   Diagnosis Date    Arthritis       left arm    Autoimmune hepatitis ([address]      Bleeding ulcer 07/2019    BPH (benign prostatic hyperplasia)      CAD (coronary artery disease)       s/p stent    Cancer (HCC)       penile squamous carcinoma    Chronic kidney disease       stage 2     Chronic obstructive pulmonary disease (HCC)      Dementia (HCC)      Elevated LFT's      Essential hypertension 9/24/01    GERD (gastroesophageal reflux disease)       hiatal hernia    Ill-defined condition 12/02/2016     faint    Nephrosclerosis      Orthostatic hypotension 9/24/01    PVC's 9/24/01    Sleep apnea no CPAP    Syncope 9/24/01     secondary to venous insuffiency          Core Measures:  CVA: No No  CHF:No No  PNA:No No     Activity:  Activity Level: Bed Rest  Number times ambulated in hallways past shift: 0  Number of times OOB to chair past shift: 0     Cardiac:   Cardiac Monitoring: No         Access:   Current line(s): PIV      Genitourinary:   Urinary status: voiding and incontinent    Respiratory:   O2 Device: None (Room air)  Chronic home O2 use?: NO  Incentive spirometer at bedside: NO     GI:  Current diet:  ADULT DIET Regular       Pain Management:   Patient states pain is manageable on current regimen: YES     Skin:  Seregy Score: 15  Interventions: PT/OT consult    Patient Safety:  Fall Score:  Total Score: 5  Interventions: bed/chair alarm  High Fall Risk: Yes    DVT prophylaxis:  DVT prophylaxis Med- No  DVT prophylaxis SCD or CHARLEEN- No      Wounds: (If Applicable)  Wounds- No  Location na     Active Consults:  IP CONSULT TO HOSPITALIST  IP CONSULT TO PULMONOLOGY     Length of Stay:  Expected LOS: 2d 21h  Actual LOS: 13  Discharge Plan: Yes To SNF, pending placement        [facility] [place] RN

## 2022-11-02 NOTE — PROGRESS NOTES
End of Shift Note     Bedside shift change report given to Yoly Armenta [facility] (oncoming nurse) by Viktoriya Dueñas RN (offgoing nurse). Report included the following information SBAR, Kardex, MAR, and Recent Results     Shift worked: 7a-7p   Shift summary and any significant changes:     No significant changes. PRN pain medications given.      Concerns for physician to address:  none   Zone phone for oncoming shift:   [phone]      Patient Information  Cristino Sandhoff  81 y.o.  10/19/2022  2:00 PM by Shalonda Torres MD. Cristino Sandhoff was admitted from Assisted Living     Problem List          Patient Active Problem List     Diagnosis Date Noted    Ribs, multiple fractures 10/19/2022    Late onset Alzheimer's disease without behavioral disturbance ([address] 05/14/2022    Cerebral microvascular disease 05/14/2022    Severe recurrent major depression without psychotic features ([address] 06/01/2021    Dementia ([address] 06/01/2021    Autoimmune hepatitis ([address] 11/01/2020    History of partial colectomy 11/01/2020    Diverticulitis 11/01/2020    Penile cancer ([address] 08/18/2020    Bilateral carotid artery stenosis 10/29/2019    GERD (gastroesophageal reflux disease) 08/29/2018    Hyperlipidemia 04/05/2012    PVC (premature ventricular contraction) 12/21/2010    Hypertension 12/06/2010    TIA (transient ischemic attack) 12/06/2010    Leg edema 12/06/2010              Past Medical History:   Diagnosis Date    Arthritis       left arm    Autoimmune hepatitis ([address]      Bleeding ulcer 07/2019    BPH (benign prostatic hyperplasia)      CAD (coronary artery disease)       s/p stent    Cancer (HCC)       penile squamous carcinoma    Chronic kidney disease       stage 2     Chronic obstructive pulmonary disease (HCC)      Dementia (HCC)      Elevated LFT's      Essential hypertension 9/24/01    GERD (gastroesophageal reflux disease)       hiatal hernia    Ill-defined condition 12/02/2016     faint    Nephrosclerosis      Orthostatic hypotension 9/24/01 PVC's 9/24/01    Sleep apnea       no CPAP    Syncope 9/24/01     secondary to venous insuffiency          Core Measures:  CVA: No No  CHF:No No  PNA:No No     Activity:  Activity Level: Bed Rest  Number times ambulated in hallways past shift: 0  Number of times OOB to chair past shift: 0     Cardiac:   Cardiac Monitoring: No         Access:   Current line(s): PIV   Central Line? No Placement date na Reason Medically Necessary na  PICC LINE? No Placement date na Reason Medically Necessary na     Genitourinary:   Urinary status: voiding and incontinent  Urinary Catheter? No Placement Date na Reason Medically Necessary na     Respiratory:   O2 Device: None (Room air)  Chronic home O2 use?: NO  Incentive spirometer at bedside: NO     GI:  Last Bowel Movement Date: 10/20/22  Current diet:  ADULT DIET Regular  Passing flatus: YES  Tolerating current diet: YES     Pain Management:   Patient states pain is manageable on current regimen: YES     Skin:  Sergey Score: 15  Interventions: PT/OT consult    Patient Safety:  Fall Score:  Total Score: 5  Interventions: bed/chair alarm  High Fall Risk: Yes  @Rollbelt  @dexterity to release roll belt  Yes/No ( must document dexterity  here by stating Yes or No here, otherwise this is a restraint and must follow restraint documentation policy.)     DVT prophylaxis:  DVT prophylaxis Med- No  DVT prophylaxis SCD or CHARLEEN- No      Wounds: (If Applicable)  Wounds- No  Location na     Active Consults:  IP CONSULT TO HOSPITALIST  IP CONSULT TO PULMONOLOGY     Length of Stay:  Expected LOS: 2d 21h  Actual LOS: 13  Discharge Plan: Yes To SNF, pending placement        Brooke Hebert RN

## 2022-11-02 NOTE — PROGRESS NOTES
Hospitalist Progress Note    NAME: Oneida Castillo   :  1940   MRN:  [id number]     Estimated discharge date: whenever bed found    Barriers: medically stable, SNF denied, no peer to peer options   CM working on placement    Assessment / Plan:    Left 5-9th Ribs fracture s/p GLF POA  -CT head nothing acute  -CT C spine:  No acute fracture. Multilevel mod to severe canal and foraminal stenosis  -CTchest and pelvis:  No pelvic fracture. Acute min displaced fractures of LEFT fifth through ninth ribs. There are segmental fracture of the LEFT sixth through eighth ribs  -Pain management: Multimodal, will schedule Tylenol and use low-dose oxy as needed; lidocaine patch  - Pulmonary followed, cleared for discharge  -Patient is medically stable to be discharged to SNF once available    UTI  -UA with  wbc, 2+ bacteria.   - s/p course of Rocephin  - No urine culture sent     Acute kidney injury POA resolved  Hypokalemia  Baseline creatinine 0.9, admission 1.4  -resolved with hydration. Status post IV fluids     Large left-sided pleural effusion  small left chest subpleural hematoma  CT: Large left-sided pleural effusion with associated left lower lobe atelectasis, there is also a small left chest subpleural hematoma  -continue to hold ASA   -incentive spirometry  -Pulmonary input appreciated, supportive care     Anemia of chronic disease  -Hg trending down likely from hemodilution. S/P bolus in ED. Autoimmune hepatitis  Dementia Continue Namenda  Penile cancer status post resection  BPH Continue flomax   Hyperlipidemia      Code Status: Full code  Surrogate Decision Maker: Daughter     DVT Prophylaxis: SCD, no AC with small hematoma on CT  GI Prophylaxis: not indicated     Patient is medically stable to be discharged to SNF once available  Recommended Disposition: SNF/LTC    25.0 - 29.9 Overweight / Body mass index is 26.94 kg/m².      Subjective:     Patient was seen and examined. No acute events overnight. Discussed with RN overnight events. All patient's questions were answered. \"I am so sleepy\"    Review of Systems:  Symptom Y/N Comments  Symptom Y/N Comments   Fever/Chills n   Chest Pain y    Poor Appetite    Edema     Cough n   Abdominal Pain n    Sputum    Joint Pain     SOB/DE JESUS n   Pruritis/Rash     Nausea/vomit n   Tolerating PT/OT     Diarrhea n   Tolerating Diet y    Constipation    Other       Could NOT obtain due to: Confusion     PO intake: No data found. Objective:     VITALS:   Last 24hrs VS reviewed since prior progress note. Most recent are:  Patient Vitals for the past 24 hrs:   Temp Pulse Resp BP SpO2   11/02/22 1527 97.3 °F (36.3 °C) 68 17 117/66 96 %   11/02/22 0816 97.3 °F (36.3 °C) 60 16 (!) 149/82 97 %   11/01/22 2210 97.4 °F (36.3 °C) 68 18 132/64 96 %         Intake/Output Summary (Last 24 hours) at 11/2/2022 1535  Last data filed at 11/2/2022 1303  Gross per 24 hour   Intake 700 ml   Output --   Net 700 ml          I had a face to face encounter, and independently examined this patient on 11/2/2022, as outlined below:  PHYSICAL EXAM:  General: WD, WN. Alert, cooperative, no acute distress    EENT:  Anicteric sclerae. MMM  Resp:  Decreased BS left base bilaterally, no wheezing or rales. No accessory muscle use  CV:  Regular  rhythm,  No edema  GI:  Soft, Non distended, no clear tenderness. +Bowel sounds  Neurologic:  Alert and oriented X only to self, normal speech, weak but non focal, sensations are intact, pupils are reactive bilaterally  Psych:   Poor insight. Not anxious nor agitated  Skin:  No rashes.   No jaundice  (+) left chest wall bruise    Reviewed most current lab test results and cultures  YES  Reviewed most current radiology test results   YES  Review and summation of old records today    NO  Reviewed patient's current orders and MAR    YES  PMH/SH reviewed - no change compared to H&P  ________________________________________________________________________  Care Plan discussed with:    Comments   Patient x    Family      RN x    Care Manager     Consultant                        Multidiciplinary team rounds were held today with , nursing, pharmacist and clinical coordinator. Patient's plan of care was discussed; medications were reviewed and discharge planning was addressed. ________________________________________________________________________      Comments   >50% of visit spent in counseling and coordination of care x     This includes time during multidisciplinary rounds if indicated above   ________________________________________________________________________  Paulina Brennan MD     Procedures: see electronic medical records for all procedures/Xrays and details which were not copied into this note but were reviewed prior to creation of Plan. LABS:  I reviewed today's most current labs and imaging studies.   Pertinent labs include:  Recent Labs     11/02/22  0955   WBC 4.7   HGB 11.0*   HCT 34.1*          Recent Labs     11/02/22  0955      K 3.9      CO2 22   *   BUN 22*   CREA 1.06   CA 8.7   ALB 2.4*   TBILI 0.7   ALT 40

## 2022-11-02 NOTE — PROGRESS NOTES
Problem: Self Care Deficits Care Plan (Adult)  Goal: *Acute Goals and Plan of Care (Insert Text)  Description: FUNCTIONAL STATUS PRIOR TO ADMISSION:  unknown baseline, from memory care; arrives with RW with his name on it     [address]:  unknown; living in memory care unit     Occupational Therapy Goals:  Initiated 10/20/2022  goals reviewed and updated 10-28  1. Patient will perform self-feeding with minimal assistance within 7 days. Met; advance to Set up in 7 dats  2. Patient will perform upper body dressing with maximal assistance within 7 days. cont  3. Patient will perform bathing UB with max assist within 7 days. Met, advance to mod A within 7 days  4. Patient will transfer from bedside commode or toilet with minimal using the least restrictive device and appropriate durable medical equipment within 7 days. cont  Outcome: Progressing Towards Goal    OCCUPATIONAL THERAPY TREATMENT  Patient: Vivi Aase (19 y.o. male)  Date: 11/2/2022  Diagnosis: Ribs, multiple fractures [S22.49XA] <principal problem not specified>      Precautions: Fall  Chart, occupational therapy assessment, plan of care, and goals were reviewed. ASSESSMENT  Patient continues with skilled OT services and is slowly progressing towards goals. Pt presented supine in bed. Pt presents with general weakness, impulsive movements, impaired balance, decreased standing tolerance, activity tolerance, safety awareness, and deficits with executive functioning. Pt performed bed mobility ranging from stand-by assistance to moderate assistance level, functional transfers at a contact guard assistance level, and ADLs performed on this date ranging from contact guard assistance to total assistance level. Pt did not ambulate with a RW, however, pt walks in an automatic fashion and is impulsive with performing transfers.  While attempting donning of hospital gown like a jacket seated EOB, pt demonstrated difficulties with initiating, planning, sequencing of activity, and with following commands. While seated EOB, pt was able to don socks with therapist assistance of placing sock over toes and pt completing activity. While standing, pt did state dizziness and VSS stable in standing and throughout session. Pt requires verbal cues for attending to task, terminating task, and with safety in the environment. Pt was originally left sitting upright in chair with chair alarm on and instructed on using call bell when needed to go back to bed. However, pt displayed impulsive movements with standing up, chair alarm going off and required moderate assistance to get into bed due to call bell and phone cord being wrapped around waist. Pt continues to benefit from acute care OT services. Recommend pt be discharged to SNF when medically appropriate. PLAN :  Patient continues to benefit from skilled intervention to address the above impairments. Continue treatment per established plan of care to address goals. Recommendation for discharge: (in order for the patient to meet his/her long term goals)  Therapy up to 5 days/week in SNF setting    This discharge recommendation:  Has not yet been discussed the attending provider and/or case management    IF patient discharges home will need the following DME: TBD closer to discharge       SUBJECTIVE:   Patient stated Yeah.    OBJECTIVE DATA SUMMARY:   Cognitive/Behavioral Status:  Neurologic State: Confused; Alert  Orientation Level: Oriented to person;Oriented to place  Cognition: Decreased attention/concentration; Impulsive;Decreased command following        Safety/Judgement: Lack of insight into deficits    Functional Mobility and Transfers for ADLs:  Bed Mobility:  Rolling: Moderate assistance  Supine to Sit: Stand-by assistance  Sit to Supine:  Moderate assistance (Pt performed this once therapy team had left room and pt stood up from chair and started to ambulate to bed with chair alarm going off. OT assisted pt with untangling call bell and phone cord from around there waist, and helping pt posistion in bed.)    Transfers:  Sit to Stand: Contact guard assistance  Stand to sit: Contact guard assistance  Functional Transfers  Bathroom Mobility: Contact guard assistance  Bed to Chair: Contact guard assistance to chair. Moderate assistance from chair to bed due to pt getting tangled up with call bell and phone cord around waist.  Pt ambulated to bathroom and chair without RW. Balance:  Sitting: Impaired  Sitting - Static: Good (unsupported)  Sitting - Dynamic: Fair (occasional)  Standing: Impaired  Standing - Static: Fair  Standing - Dynamic : Fair    ADL Intervention:    Grooming  Position Performed: Standing  Washing Face: Contact guard assistance  Washing Hands: Contact guard assistance            Upper Body dressing; Total Assist for donning gown as a robe. Lower Body Dressing Assistance  Socks: Minimum assistance  Leg Crossed Method Used: Yes  Position Performed: Seated edge of bed      Cognitive Retraining  Safety/Judgement: Lack of insight into deficits    Pain:  Pt stated no pain on this date    Activity Tolerance:   Fair, requires rest breaks, and observed SOB with activity    After treatment patient left in no apparent distress:   Supine in bed, Call bell within reach, and Bed / chair alarm activated    COMMUNICATION/COLLABORATION:   The patients plan of care was discussed with: Occupational therapist and Registered nurse.      Phi Hewitt OT  Time Calculation: 32 mins

## 2022-11-03 VITALS
RESPIRATION RATE: 18 BRPM | TEMPERATURE: 98.3 F | OXYGEN SATURATION: 95 % | HEART RATE: 68 BPM | SYSTOLIC BLOOD PRESSURE: 131 MMHG | WEIGHT: 156.97 LBS | HEIGHT: 64 IN | BODY MASS INDEX: 26.8 KG/M2 | DIASTOLIC BLOOD PRESSURE: 60 MMHG

## 2022-11-03 PROCEDURE — 74011250637 HC RX REV CODE- 250/637: Performed by: HOSPITALIST

## 2022-11-03 RX ADMIN — TAMSULOSIN HYDROCHLORIDE 0.4 MG: 0.4 CAPSULE ORAL at 08:29

## 2022-11-03 RX ADMIN — ACETAMINOPHEN 650 MG: 325 TABLET ORAL at 13:09

## 2022-11-03 RX ADMIN — ACETAMINOPHEN 650 MG: 325 TABLET ORAL at 06:38

## 2022-11-03 RX ADMIN — ESCITALOPRAM OXALATE 5 MG: 10 TABLET ORAL at 08:29

## 2022-11-03 RX ADMIN — CARVEDILOL 25 MG: 12.5 TABLET, FILM COATED ORAL at 08:29

## 2022-11-03 RX ADMIN — FLUTICASONE PROPIONATE 2 SPRAY: 50 SPRAY, METERED NASAL at 08:33

## 2022-11-03 RX ADMIN — MEMANTINE 5 MG: 5 TABLET ORAL at 08:29

## 2022-11-03 RX ADMIN — PANTOPRAZOLE SODIUM 40 MG: 40 TABLET, DELAYED RELEASE ORAL at 06:38

## 2022-11-03 NOTE — PROGRESS NOTES
Problem: Patient Education: Go to Patient Education Activity  Goal: Patient/Family Education  Outcome: Progressing Towards Goal     Problem: Patient Education: Go to Patient Education Activity  Goal: Patient/Family Education  Outcome: Progressing Towards Goal     Problem: Pressure Injury - Risk of  Goal: *Prevention of pressure injury  Description: Document Sergey Scale and appropriate interventions in the flowsheet. Outcome: Progressing Towards Goal  Note: Pressure Injury Interventions:  Sensory Interventions: Assess changes in LOC    Moisture Interventions: Absorbent underpads    Activity Interventions: Assess need for specialty bed    Mobility Interventions: Assess need for specialty bed    Nutrition Interventions: Document food/fluid/supplement intake    Friction and Shear Interventions: Apply protective barrier, creams and emollients                Problem: Patient Education: Go to Patient Education Activity  Goal: Patient/Family Education  Outcome: Progressing Towards Goal     Problem: Falls - Risk of  Goal: *Absence of Falls  Description: Document Wadell Beer Fall Risk and appropriate interventions in the flowsheet.   Outcome: Progressing Towards Goal  Note: Fall Risk Interventions:  Mobility Interventions: Bed/chair exit alarm    Mentation Interventions: Adequate sleep, hydration, pain control    Medication Interventions: Bed/chair exit alarm    Elimination Interventions: Bed/chair exit alarm, Call light in reach    History of Falls Interventions: Bed/chair exit alarm         Problem: Patient Education: Go to Patient Education Activity  Goal: Patient/Family Education  Outcome: Progressing Towards Goal

## 2022-11-03 NOTE — PROGRESS NOTES
End of Shift Note     Bedside shift change report given to Nickie RN (oncoming nurse) by Yoni RN (offgoing nurse). Report included the following information SBAR, Kardex, MAR, and Recent Results     Shift worked: [address]   Shift summary and any significant changes:     No significant changes.       Concerns for physician to address:  none   Zone phone for oncoming shift:   [phone]      Patient Information  Lupe Beltran  81 y.o.  10/19/2022  2:00 PM by Laurent Oconnor MD. Lupe Beltran was admitted from Assisted Living     Problem List          Patient Active Problem List     Diagnosis Date Noted    Ribs, multiple fractures 10/19/2022    Late onset Alzheimer's disease without behavioral disturbance ([address] 05/14/2022    Cerebral microvascular disease 05/14/2022    Severe recurrent major depression without psychotic features ([address] 06/01/2021    Dementia ([address] 06/01/2021    Autoimmune hepatitis ([address] 11/01/2020    History of partial colectomy 11/01/2020    Diverticulitis 11/01/2020    Penile cancer ([address] 08/18/2020    Bilateral carotid artery stenosis 10/29/2019    GERD (gastroesophageal reflux disease) 08/29/2018    Hyperlipidemia 04/05/2012    PVC (premature ventricular contraction) 12/21/2010    Hypertension 12/06/2010    TIA (transient ischemic attack) 12/06/2010    Leg edema 12/06/2010              Past Medical History:   Diagnosis Date    Arthritis       left arm    Autoimmune hepatitis ([address]      Bleeding ulcer 07/2019    BPH (benign prostatic hyperplasia)      CAD (coronary artery disease)       s/p stent    Cancer (HCC)       penile squamous carcinoma    Chronic kidney disease       stage 2     Chronic obstructive pulmonary disease (HCC)      Dementia (HCC)      Elevated LFT's      Essential hypertension 9/24/01    GERD (gastroesophageal reflux disease)       hiatal hernia    Ill-defined condition 12/02/2016     faint    Nephrosclerosis      Orthostatic hypotension 9/24/01    PVC's 9/24/01    Sleep apnea no CPAP    Syncope 9/24/01     secondary to venous insuffiency          Core Measures:  CVA: No No  CHF:No No  PNA:No No     Activity:  Activity Level: Bed Rest  Number times ambulated in hallways past shift: 0  Number of times OOB to chair past shift: 0     Cardiac:   Cardiac Monitoring: No         Access:   Current line(s): PIV      Genitourinary:   Urinary status: voiding and incontinent     Respiratory:   O2 Device: None (Room air)  Chronic home O2 use?: NO  Incentive spirometer at bedside: NO     GI:  Current diet:  ADULT DIET Regular        Pain Management:   Patient states pain is manageable on current regimen: YES     Skin:  Sergey Score: 17  Interventions: PT/OT consult    Patient Safety:  Fall Score:  Total Score: 5  Interventions: bed/chair alarm  High Fall Risk: Yes     DVT prophylaxis:  DVT prophylaxis Med- No  DVT prophylaxis SCD or HCARLEEN- No      Wounds: (If Applicable)  Wounds- No  Location na     Active Consults:  IP CONSULT TO HOSPITALIST  IP CONSULT TO PULMONOLOGY     Length of Stay:  Expected LOS: 2d 21h  Actual LOS: 14  Discharge Plan: Yes To SNF, pending placement        [facility] [place] [place] RN

## 2022-11-03 NOTE — PROGRESS NOTES
Attempted to schedule hospital follow up PCP appointment. PCP staff stated patient is no longer a patient with [facility]. CM notified. Pending patient discharge.  Pinky Dinero, Care Management Assistant

## 2022-11-03 NOTE — PROGRESS NOTES
Transition of Care Plan:     RUR: 12% - \"low risk\"  LOS: 13 days  Disposition: Return to [address] [place] [address] 349 with [facility] (RN/PT/OT), DME, & follow up apts  Follow up appointments: PCP & specialist as indicated  DME needed: TBD pending direction of the disposition   Transportation at Discharge: BLS transport needed  [address] or means to access home: N/A - care home resident    IM Medicare Letter: 2nd IM verbally accepted by Wilfred Israel and copy emailed. Is patient a [place] and connected with the [place]? No          Caregiver Contact: Pt's daughter Adam Valerio: [phone])  Discharge Caregiver contacted prior to discharge? Yes  Care Conference needed?: Not at this time  COVID test: Rapid COVID test completed 10/28/22; results negative      DIscharge Plan:  After pt was denied SNF by insurance, kiko agreed for pt to return to previous location at [facility], [address] [place] [address] in [place]. The [facility] ([phone]) is aware that pt would benefit from hospital bed and bed alarm. - she will work w/ [facility] and [employer] to get those items. Pt previously followed by Mackenzie madera sent via [address]. Transport (stretcher due to weakness/pain) arranged with [facility] ([phone]) - 2:00 PM pickup. Enzo Alamo aware of all - CM spoke w/ her by phone to explain 2nd IM letter which was verbally accepted then emailed to:  [email or web address] Application    Pt's PCP is Dr. Rajendra Joel who is not seeing patients for next 2 months. Pt is followed at [facility] by NP, Floridalma Kohler, for any primary care needs. DIscharge Check List completed. Nursing is asked to call Report to [facility] at [phone]. OK to leave your ext on VM if she does not answer - she will call back. [facility] knows pt well. Care Management Interventions  PCP Verified by CM:  Yes  Palliative Care Criteria Met (RRAT>21 & CHF Dx)?: No  Mode of Transport at Discharge: S  Transition of Care Consult (CM Consult): Home Health, Assisted Living  [address]: No  Reason Outside [place]: Patient already serviced by other home care/hospice agency  Discharge Durable Medical Equipment: No  Physical Therapy Consult: Yes  Occupational Therapy Consult: Yes  Speech Therapy Consult: Yes  Support Systems: Child(colleen)  Confirm Follow Up Transport: Family  The Plan for Transition of Care is Related to the Following Treatment Goals : SNF  The Patient and/or Patient Representative was Provided with a Choice of Provider and Agrees with the Discharge Plan?: Yes  Name of the Patient Representative Who was Provided with a Choice of Provider and Agrees with the Discharge Plan: Pt's daughter  Freedom of Choice List was Provided with Basic Dialogue that Supports the Patient's Individualized Plan of Care/Goals, Treatment Preferences and Shares the Quality Data Associated with the Providers?: Yes  [place] Resource Information Provided?: No  Discharge Location  Patient Expects to be Discharged to[de-identified] Home with home health     [facility], [facility]

## 2025-03-03 NOTE — PROGRESS NOTES
Anesthesia reports 300mg Propofol, 100mg Lidocaine and 600mL NS given during procedure. Received report from anesthesia staff on vital signs and status of patient. [FreeTextEntry1] : Pt is 68 year old female with PMH of HL, DM, HTN.   Pt  was seen  due to palpitations that happened when she  was anxious due to stress with her family. Also episodes of left sided chest discomfort and left shoulder/arm pain.  Pt denies SOB, no edema.   Pt is active all day without cardiac issues.  Pt was followed by cardiologist in [facility] and  tests showed some " plaques in the heart arteries" and was not followed further.    Pt reports less palpitations.  Had episode of epigastric pain at night that happened once.  Non-exertional.   No new symptoms.  12/06/24 Chol 132  Trig 47  LDL 27  On Atorvastatin 20mg. HgbA1C 7 08/04/23 Exercise Stress Test  Achieved 8.9 Mets  No Ischemia  08/04/23 TTE EF 63%

## 2025-03-19 NOTE — CONSULTS
Pulmonary, Critical Care, and Sleep Medicine    Initial Patient Consult    Name: Doug Landin MRN: [id number]   : 1940 Hospital: [facility]   Date: 10/20/2022        IMPRESSION:   Very sleepy, Encephalopathy?  s/p Fall hit his left chest, had Had 5-9 rib fractures on left side. Has Left multiple rib fractures  Large left side pleural effusion  ? Hemothorax. Anemia: hgb of 8.7  CAD  GED  Syncope      RECOMMENDATIONS:   Will watch Hgb  He appears comfortable when seen   If breathing worsens would needed thoracentesis. Subjective:     Cc: Abdominal pain    This patient has been seen and evaluated at the request of Dr. Drew Moss for above. Patient is a 81 y.o. male who was sent over by pt first. For abdominal pain. Was noted to have pleural effusion, rib fractures. Left sided pain. He lives as an assisted living facility. Had bruising on left sided. He had an unwitnessed fall. Not able to get hx from pt.    Discussed with Dr. Drew Moss       Past Medical History:   Diagnosis Date    Arthritis     left arm    Autoimmune hepatitis ([address]     Bleeding ulcer 2019    BPH (benign prostatic hyperplasia)     CAD (coronary artery disease)     s/p stent    Cancer (HCC)     penile squamous carcinoma    Chronic kidney disease     stage 2     Chronic obstructive pulmonary disease (HCC)     Dementia (HCC)     Elevated LFT's     Essential hypertension 01    GERD (gastroesophageal reflux disease)     hiatal hernia    Ill-defined condition 2016    faint    Nephrosclerosis     Orthostatic hypotension 01    PVC's 01    Sleep apnea     no CPAP    Syncope 01    secondary to venous insuffiency       Past Surgical History:   Procedure Laterality Date    COLONOSCOPY N/A 2016    COLONOSCOPY performed by Poppy England MD at [facility] ENDOSCOPY    COLONOSCOPY N/A 2019    COLONOSCOPY performed by Danisha Eaton MD [facility]; HI RISK IND Script sent to [facility].    2019         ECHO 2D ADULT  12    EF 60 - 65%; mild concentric hypertrophy; pulmonary systolic artery pressure upper limits normal    HX ABDOMINAL WALL DEFECT REPAIR  2019d     Exploratory laparotomy, segmental sigmoid colon resection and primary stapled anastomosis. HX APPENDECTOMY  1985    HX HEENT  2020    Left temporal artery biopsy    HX HERNIA REPAIR  2018    Davinci hiatal hernia repair- Negar Rose MD    HX OTHER SURGICAL  2020    penile lesion bx    ME CARDIAC SURG PROCEDURE UNLIST  2019    1 stent    UPPER GI ENDOSCOPY,BIOPSY  2019         UPPER GI ENDOSCOPY,DIAGNOSIS  2019           Prior to Admission medications    Medication Sig Start Date End Date Taking? Authorizing Provider   tamsulosin (Flomax) 0.4 mg capsule Take 0.4 mg by mouth daily. Yes Luciana Sofia MD   fluticasone propionate (FLONASE) 50 mcg/actuation nasal spray 2 Sprays by Both Nostrils route daily. 22  Yes Placido Sullivan MD   pantoprazole (PROTONIX) 40 mg tablet Take 1 Tablet by mouth ACB/HS. 22  Yes Placido Sullivan MD   L.acid,para-B. bifidum-S.therm (RISAQUAD) 8 billion cell cap cap Take 1 Capsule by mouth daily. 22  Yes Placido Sullivan MD   memantine (NAMENDA) 5 mg tablet Take 1 Tablet by mouth two (2) times a day. 22  Yes Placido Sullivan MD   escitalopram oxalate (LEXAPRO) 5 mg tablet Take 5 mg by mouth daily. Yes Provider, Historical   aspirin delayed-release 81 mg tablet Take 81 mg by mouth daily. Yes Provider, Historical   carvediloL (COREG) 12.5 mg tablet Take 2 Tabs by mouth two (2) times daily (with meals).  20  Yes Donald Smith MD     Allergies   Allergen Reactions    Ace Inhibitors Other (comments)     Doesn't agree with pt    Demerol [Meperidine] Unknown (comments)     Causes unconsciousness      Social History     Tobacco Use    Smoking status: Former     Packs/day: 3.50     Types: Cigarettes     Quit date: 1980     Years since quittin.8 Smokeless tobacco: Never   Substance Use Topics    Alcohol use: Not Currently     Comment: no alcohol since       Family History   Problem Relation Age of Onset    Stroke Mother     Stroke Father     Heart Disease Father         Current Facility-Administered Medications   Medication Dose Route Frequency    0.45% sodium chloride with KCl 20 mEq/L infusion   IntraVENous CONTINUOUS    cefTRIAXone (ROCEPHIN) 1 g in 0.9% sodium chloride (MBP/ADV) 50 mL MBP  1 g IntraVENous Q24H    lidocaine 4 % patch 1 Patch  1 Patch TransDERmal Q24H    carvediloL (COREG) tablet 25 mg  25 mg Oral BID WITH MEALS    escitalopram oxalate (LEXAPRO) tablet 5 mg  5 mg Oral DAILY    fluticasone propionate (FLONASE) 50 mcg/actuation nasal spray 2 Spray  2 Spray Both Nostrils DAILY    memantine (NAMENDA) tablet 5 mg  5 mg Oral BID    pantoprazole (PROTONIX) tablet 40 mg  40 mg Oral ACB/HS    tamsulosin (FLOMAX) capsule 0.4 mg  0.4 mg Oral DAILY    acetaminophen (TYLENOL) tablet 650 mg  650 mg Oral Q6H       Review of Systems:  Review of systems not obtained due to patient factors. Objective:   Vital Signs:    Visit Vitals  BP (!) 142/87 (BP Patient Position: Standing)   Pulse 62   Temp 97.6 °F (36.4 °C)   Resp 16   Ht 5' 4\" (1.626 m)   Wt 71.2 kg (156 lb 15.5 oz)   SpO2 93%   BMI 26.94 kg/m²       O2 Device: None (Room air)       Temp (24hrs), Av °F (36.7 °C), Min:97.6 °F (36.4 °C), Max:98.3 °F (36.8 °C)       Intake/Output:   Last shift:      10/20 0701 - 10/20 1900  In: 478.3 [I.V.:478.3]  Out: -   Last 3 shifts: No intake/output data recorded. Intake/Output Summary (Last 24 hours) at 10/20/2022 1425  Last data filed at 10/20/2022 0709  Gross per 24 hour   Intake 478.33 ml   Output --   Net 478.33 ml      Physical Exam:   General:  Sleeping, no cooperative, no distress, appears stated age. ON stretcher in ER. Head:  Normocephalic, without obvious abnormality, atraumatic. Eyes:  Conjunctivae/corneas clear. PERRL, EOMs intact. Nose: Nares normal. Septum midline. Mucosa normal. No drainage or sinus tenderness. Throat: Lips, mucosa, and tongue normal. Teeth and gums normal.   Neck: Supple, symmetrical, trachea midline, no adenopathy, thyroid: no enlargment/tenderness/nodules, no carotid bruit and no JVD. Back:   Symmetric, no curvature. ROM normal.   Lungs:   Clear to auscultation bilaterally. Decreased BS of the left base. Chest wall:  No tenderness or deformity. Heart:  Regular rate and rhythm, S1, S2 normal, no murmur, click, rub or gallop. Abdomen:   Soft, non-tender. Bowel sounds normal. No masses,  No organomegaly. Extremities: Extremities normal, atraumatic, no cyanosis or edema. Pulses: 2+ and symmetric all extremities.    Skin: Skin color, texture, turgor normal. No rashes or lesions   Lymph nodes: Cervical, supraclavicular, and axillary nodes normal.   Neurologic: Grossly nonfocal, not able to fully evaluate      Data review:     Recent Results (from the past 24 hour(s))   EKG, 12 LEAD, INITIAL    Collection Time: 10/19/22  5:18 PM   Result Value Ref Range    Ventricular Rate 78 BPM    Atrial Rate 78 BPM    P-R Interval 172 ms    QRS Duration 60 ms    Q-T Interval 386 ms    QTC Calculation (Bezet) 440 ms    Calculated P Axis 109 degrees    Calculated R Axis 44 degrees    Calculated T Axis 47 degrees    Diagnosis       Normal sinus rhythm  ST elevation, consider early repolarization, pericarditis, or injury  Nonspecific ST and T wave abnormality  When compared with ECG of 12-MAY-2022 20:03,  Minimal criteria for Inferior infarct are no longer present  ST elevation has replaced ST depression in Inferior leads  ST now depressed in Anterior leads  ST more elevated in Lateral leads  Nonspecific T wave abnormality no longer evident in Inferior leads  QT has shortened     URINALYSIS W/ REFLEX CULTURE    Collection Time: 10/20/22  4:25 AM    Specimen: Urine   Result Value Ref Range    Color YELLOW/STRAW      Appearance HAZY (A) CLEAR      Specific gravity 1.010 1.003 - 1.030      pH (UA) 6.5 5.0 - 8.0      Protein TRACE (A) NEG mg/dL    Glucose Negative NEG mg/dL    Ketone Negative NEG mg/dL    Bilirubin Negative NEG      Blood TRACE (A) NEG      Urobilinogen 0.2 0.2 - 1.0 EU/dL    Nitrites Positive (A) NEG      Leukocyte Esterase MODERATE (A) NEG      WBC  0 - 4 /hpf    RBC 0-5 0 - 5 /hpf    Epithelial cells FEW FEW /lpf    Bacteria 2+ (A) NEG /hpf    UA:UC IF INDICATED URINE CULTURE ORDERED (A) CNI     METABOLIC PANEL, BASIC    Collection Time: 10/20/22  4:26 AM   Result Value Ref Range    Sodium 146 (H) 136 - 145 mmol/L    Potassium 2.9 (L) 3.5 - 5.1 mmol/L    Chloride 120 (H) 97 - 108 mmol/L    CO2 19 (L) 21 - 32 mmol/L    Anion gap 7 5 - 15 mmol/L    Glucose 76 65 - 100 mg/dL    BUN 34 (H) 6 - 20 MG/DL    Creatinine 1.01 0.70 - 1.30 MG/DL    BUN/Creatinine ratio 34 (H) 12 - 20      eGFR >60 >60 ml/min/1.73m2    Calcium 6.5 (L) 8.5 - 10.1 MG/DL   CBC W/O DIFF    Collection Time: 10/20/22  4:26 AM   Result Value Ref Range    WBC 5.7 4.1 - 11.1 K/uL    RBC 2.96 (L) 4.10 - 5.70 M/uL    HGB 8.7 (L) 12.1 - 17.0 g/dL    HCT 26.6 (L) 36.6 - 50.3 %    MCV 89.9 80.0 - 99.0 FL    MCH 29.4 26.0 - 34.0 PG    MCHC 32.7 30.0 - 36.5 g/dL    RDW 15.4 (H) 11.5 - 14.5 %    PLATELET 792 (L) 646 - 400 K/uL    MPV 11.2 8.9 - 12.9 FL    NRBC 0.0 0  WBC    ABSOLUTE NRBC 0.00 0.00 - 0.01 K/uL   MAGNESIUM    Collection Time: 10/20/22  4:26 AM   Result Value Ref Range    Magnesium 1.6 1.6 - 2.4 mg/dL   PHOSPHORUS    Collection Time: 10/20/22  4:26 AM   Result Value Ref Range    Phosphorus 2.4 (L) 2.6 - 4.7 MG/DL   TYPE & SCREEN    Collection Time: 10/20/22 11:44 AM   Result Value Ref Range    Crossmatch Expiration 10/23/2022,2359     ABO/Rh(D) O POSITIVE     Antibody screen NEG        Imaging:  I have personally reviewed the patients radiographs and have reviewed the reports:  10-19-22: CT of chest:   IMPRESSION     1.  Acute, minimally displaced fractures of the LEFT fifth through ninth ribs. There are segmental fractures of the LEFT sixth through eighth ribs. 2. There is a large left-sided pleural effusion with associated LEFT lower lobe  atelectasis. There is also a small LEFT chest subpleural hematoma. 3. No pneumothorax. 4. No acute, traumatic findings in the abdomen or pelvis.         Kelly Bass MD

## (undated) DEVICE — COLUMN DRAPE

## (undated) DEVICE — TOWEL 4 PLY TISS 19X30 SUE WHT

## (undated) DEVICE — SURGICAL PROCEDURE KIT GEN LAPAROSCOPY LF

## (undated) DEVICE — SPONGE GZ W4XL4IN COT 12 PLY TYP VII WVN C FLD DSGN

## (undated) DEVICE — STERILE POLYISOPRENE POWDER-FREE SURGICAL GLOVES: Brand: PROTEXIS

## (undated) DEVICE — SYRINGE 50ML E/T

## (undated) DEVICE — BAG SPEC BIOHZRD 10 X 10 IN --

## (undated) DEVICE — DEVICE INFL 20ML 30ATM DGT FLD DISPNS SYR W ACCESSPLUS BLU

## (undated) DEVICE — STRAP,POSITIONING,KNEE/BODY,FOAM,4X60": Brand: MEDLINE

## (undated) DEVICE — REM POLYHESIVE ADULT PATIENT RETURN ELECTRODE: Brand: VALLEYLAB

## (undated) DEVICE — TOWEL SURG W17XL27IN STD BLU COT NONFENESTRATED PREWASHED

## (undated) DEVICE — BLOCK BITE ENDOSCP AD 21 MM W/ DIL BLU LF DISP

## (undated) DEVICE — SUT CHRMC 0 27IN CT1 BRN --

## (undated) DEVICE — 3000CC GUARDIAN II: Brand: GUARDIAN

## (undated) DEVICE — SEAL UNIV 5-8MM DISP BX/10 -- DA VINCI XI - SNGL USE

## (undated) DEVICE — SOL IRRIGATION INJ NACL 0.9% 500ML BTL

## (undated) DEVICE — SYR 10ML LUER LOK 1/5ML GRAD --

## (undated) DEVICE — BASIN EMSIS 16OZ GRAPHITE PLAS KID SHP MOLD GRAD FOR ORAL

## (undated) DEVICE — PACK PROCEDURE SURG HRT CATH

## (undated) DEVICE — SUTURE 2-0 L36IN ABSRB BRN CT L40MM 1/2 CIR TAPERPOINT SGL 913H

## (undated) DEVICE — PAD NON-ADHERENT 3X4 STRL LF --

## (undated) DEVICE — DRAIN SURG 15FR RND FULL FLUT

## (undated) DEVICE — Device

## (undated) DEVICE — CATHETER,URETHRAL,REDRUBBER,STRL,12FR: Brand: MEDLINE INDUSTRIES, INC.

## (undated) DEVICE — GOWN,PREVENTION PLUS,XL,ST,24/CS: Brand: MEDLINE

## (undated) DEVICE — PROCEDURE KIT FLUID MGMT CUST MAINFOLD STRL

## (undated) DEVICE — SET ADMIN 16ML TBNG L100IN 2 Y INJ SITE IV PIGGY BK DISP

## (undated) DEVICE — INFECTION CONTROL KIT SYS

## (undated) DEVICE — SUTURE MCRYL SZ 4-0 L27IN ABSRB UD L19MM PS-2 1/2 CIR PRIM Y426H

## (undated) DEVICE — DRAPE FLD WRM W44XL66IN C6L FOR INTRATEMP SYS THERMABASIN

## (undated) DEVICE — SOLUTION IV 1000ML 0.9% SOD CHL

## (undated) DEVICE — SUTURE VCRL SZ 3-0 L18IN ABSRB UD PS-2 L19MM 1/2 CIR J497G

## (undated) DEVICE — TUBING SUCT 10FR MAL ALUM SHFT FN CAP VENT UNIV CONN W/ OBT

## (undated) DEVICE — INTENDED FOR TISSUE SEPARATION, AND OTHER PROCEDURES THAT REQUIRE A SHARP SURGICAL BLADE TO PUNCTURE OR CUT.: Brand: BARD-PARKER ® CARBON RIB-BACK BLADES

## (undated) DEVICE — ROCKER SWITCH PENCIL BLADE ELECTRODE, HOLSTER: Brand: EDGE

## (undated) DEVICE — NEEDLE HYPO 25GA L1.5IN BVL ORIENTED ECLIPSE

## (undated) DEVICE — CADIERE FORCEPS: Brand: ENDOWRIST

## (undated) DEVICE — VESSEL SEALER: Brand: ENDOWRIST

## (undated) DEVICE — KIT,1200CC CANISTER,3/16"X6' TUBING: Brand: MEDLINE INDUSTRIES, INC.

## (undated) DEVICE — MEDI-VAC YANK SUCT HNDL W/TPRD BULBOUS TIP: Brand: CARDINAL HEALTH

## (undated) DEVICE — CATH IV AUTOGRD BC PNK 20GA 25 -- INSYTE

## (undated) DEVICE — SUTURE ETHIB EXCL BR GRN TAPR PT 2-0 30 X563H X563H

## (undated) DEVICE — ELECTRODE,RADIOTRANSLUCENT,FOAM,5PK: Brand: MEDLINE

## (undated) DEVICE — APPLICATOR BNDG 1MM ADH PREMIERPRO EXOFIN

## (undated) DEVICE — 1200 GUARD II KIT W/5MM TUBE W/O VAC TUBE: Brand: GUARDIAN

## (undated) DEVICE — SHEET, T, LAPAROTOMY, STERILE: Brand: MEDLINE

## (undated) DEVICE — COVER,MAYO STAND,STERILE: Brand: MEDLINE

## (undated) DEVICE — SOLIDIFIER MEDC 1200ML -- CONVERT TO 356117

## (undated) DEVICE — COVER,TABLE,60X90,STERILE: Brand: MEDLINE

## (undated) DEVICE — DRESSING HEMOSTATIC SFT INTVENT W/O SLT DBL WRP QUIKCLOT LF

## (undated) DEVICE — TRAY PREP DRY W/ PREM GLV 2 APPL 6 SPNG 2 UNDPD 1 OVERWRAP

## (undated) DEVICE — MASTISOL ADHESIVE LIQ 2/3ML

## (undated) DEVICE — STAPLER INT L60MM STD TISS BLU 7/8 FIRING W/ 3.5MM 21 TI

## (undated) DEVICE — SUTURE PERMAHAND SZ 2-0 L30IN NONABSORBABLE BLK SILK W/O A305H

## (undated) DEVICE — SUTURE PERMAHAND SZ 4-0 L12X30IN NONABSORBABLE BLK SILK A303H

## (undated) DEVICE — POWER SHELL: Brand: SIGNIA

## (undated) DEVICE — CATH URETH INTMIT ROB 16FR FUN -- CONVERT TO ITEM 179520

## (undated) DEVICE — SYRINGE,EAR/ULCER, 2 OZ, STERILE: Brand: MEDLINE

## (undated) DEVICE — HANDLE LT SNAP ON ULT DURABLE LENS FOR TRUMPF ALC DISPOSABLE

## (undated) DEVICE — STERILE POLYISOPRENE POWDER-FREE SURGICAL GLOVES WITH EMOLLIENT COATING: Brand: PROTEXIS

## (undated) DEVICE — NEEDLE HYPO 18GA L1.5IN PNK S STL HUB POLYPR SHLD REG BVL

## (undated) DEVICE — DRAPE,EENT,SPLIT,STERILE: Brand: MEDLINE

## (undated) DEVICE — SUTURE VCRL SZ 2-0 L36IN ABSRB VLT L36MM CT-1 1/2 CIR J345H

## (undated) DEVICE — DBD-PACK,LAPAROTOMY,2 REINFORCED GOWNS: Brand: MEDLINE

## (undated) DEVICE — BLADELESS OBTURATOR: Brand: WECK VISTA

## (undated) DEVICE — SUT CHRMC 3-0 27IN SH BRN --

## (undated) DEVICE — SYR ASSEMB INFL BLLN 60ML --

## (undated) DEVICE — 3M™ TEGADERM™ TRANSPARENT FILM DRESSING FRAME STYLE, 1626W, 4 IN X 4-3/4 IN (10 CM X 12 CM), 50/CT 4CT/CASE: Brand: 3M™ TEGADERM™

## (undated) DEVICE — (D)PREP SKN CHLRAPRP APPL 26ML -- CONVERT TO ITEM 371833

## (undated) DEVICE — SUTURE PERMAHAND SZ 3-0 L30IN NONABSORBABLE BLK SILK BRAID A304H

## (undated) DEVICE — DRSG GZ PETROLATM CURAD 1/2X72 --

## (undated) DEVICE — DRESSING AQUACEL ADVANTAGE ALG W4XL5IN RECT GREATER ABSRB HYDRFBR TECHNOLOGY

## (undated) DEVICE — SUT CHRMC 4-0 27IN RB1 BRN --

## (undated) DEVICE — CATHETER F BLLN 5CC 18FR 2 W HYDRGEL COAT LESS TRAUM LUB

## (undated) DEVICE — PACK,LAPAROTOMY,2 REINFORCED GOWNS: Brand: MEDLINE

## (undated) DEVICE — SUTURE PDS II SZ 1 L36IN ABSRB VLT L48MM CTX 1/2 CIR Z371T

## (undated) DEVICE — MARKER,SKIN,WI/RULER AND LABELS: Brand: MEDLINE

## (undated) DEVICE — SUT SLK 3-0 30IN SH BLK --

## (undated) DEVICE — SLIM BODY SKIN STAPLER: Brand: APPOSE ULC

## (undated) DEVICE — GARMENT,MEDLINE,DVT,INT,CALF,MED, GEN2: Brand: MEDLINE

## (undated) DEVICE — GOWN,SIRUS,FABRNF,XL,20/CS: Brand: MEDLINE

## (undated) DEVICE — KENDALL SCD EXPRESS SLEEVES, KNEE LENGTH, MEDIUM: Brand: KENDALL SCD

## (undated) DEVICE — YANKAUER,TAPERED BULBOUS TIP,W/O VENT: Brand: MEDLINE

## (undated) DEVICE — ARM DRAPE

## (undated) DEVICE — TUBING, SUCTION, 1/4" X 12', STRAIGHT: Brand: MEDLINE

## (undated) DEVICE — KENDALL RADIOLUCENT FOAM MONITORING ELECTRODE RECTANGULAR SHAPE: Brand: KENDALL

## (undated) DEVICE — SUTURE PERMAHAND SZ 3-0 L30IN NONABSORBABLE BLK L26MM SH C017D

## (undated) DEVICE — SUTURE ETHLN SZ 2-0 L30IN NONABSORBABLE BLK L36MM PSLX 3/8 1697H

## (undated) DEVICE — CONTAINER,SPECIMEN,OR STERILE,4OZ: Brand: MEDLINE

## (undated) DEVICE — DEVON™ KNEE AND BODY STRAP 60" X 3" (1.5 M X 7.6 CM): Brand: DEVON

## (undated) DEVICE — DRESSING,GAUZE,XEROFORM,CURAD,1"X8",ST: Brand: CURAD

## (undated) DEVICE — YANKAUER,BULB TIP,W/O VENT,RIGID,STERILE: Brand: MEDLINE

## (undated) DEVICE — SOL IRR SOD CL 0.9% 1000ML BTL --

## (undated) DEVICE — EVAC SMOKE SEECLEAR XCL -- SEE CLEAR

## (undated) DEVICE — INSULATED BLADE ELECTRODE;CAUTION: FOR MANUFACTURING, PROCESSING, OR REPACKING.: Brand: EDGE

## (undated) DEVICE — CONTAINER SPEC 20 ML LID NEUT BUFF FORMALIN 10 % POLYPR STS

## (undated) DEVICE — SMOKE EVACUATION TUBING WITH INTEGRAL WAND AND SPONGE GUARD: Brand: BUFFALO FILTER

## (undated) DEVICE — 450 ML BOTTLE OF 0.05% CHLORHEXIDINE GLUCONATE IN 99.95% STERILE WATER FOR IRRIGATION, USP AND APPLICATOR.: Brand: IRRISEPT ANTIMICROBIAL WOUND LAVAGE

## (undated) DEVICE — DRAPE SHT 3 QTR PROXIMA 53X77 --

## (undated) DEVICE — SUTURE VCRL SZ 2-0 L36IN ABSRB UD L36MM CT-1 1/2 CIR J945H

## (undated) DEVICE — TUBING PRSS MON L6IN PVC M FEM CONN

## (undated) DEVICE — HI-TORQUE BALANCE MIDDLEWEIGHT UNIVERSAL GUIDE WIRE .014 STRAIGHT TIP 3.0 CM X 190 CM: Brand: HI-TORQUE BALANCE MIDDLEWEIGHT UNIVERSAL

## (undated) DEVICE — STRIP,CLOSURE,WOUND,MEDI-STRIP,1/2X4: Brand: MEDLINE

## (undated) DEVICE — CATH GUID COR JL4.0 6FR 100CM -- LAUNCHER

## (undated) DEVICE — MAGNETIC IMPLANT S1000-00: Brand: SOPHONO™

## (undated) DEVICE — LARGE NEEDLE DRIVER: Brand: ENDOWRIST

## (undated) DEVICE — SOLUTION SCRB 2OZ 10% POVIDONE IOD ANTIMIC BTL

## (undated) DEVICE — Z DISCONTINUED PER MEDLINE LINE GAS SAMPLING O2/CO2 LNG AD 13 FT NSL W/ TBNG FILTERLINE

## (undated) DEVICE — CATH DIAG-D 6F MULTI PIG 155 5 -- IMPULSE 16599-302

## (undated) DEVICE — ESOPHAGEAL BALLOON DILATATION CATHETER: Brand: CRE FIXED WIRE

## (undated) DEVICE — SPONGE HEMOSTAT CELLULS 4X8IN -- SURGICEL

## (undated) DEVICE — TELFA 1" X 3": Brand: TELFA

## (undated) DEVICE — PINNACLE INTRODUCER SHEATH: Brand: PINNACLE

## (undated) DEVICE — NEONATAL-ADULT SPO2 SENSOR: Brand: NELLCOR

## (undated) DEVICE — SUTURE VCRL 5-0 L18IN ABSRB UD PS-2 L19MM 1/2 CIR J495H

## (undated) DEVICE — SUT SLK 2-0SH 30IN BLK --

## (undated) DEVICE — SPONGE: SPECIALTY PEANUT XR 100/CS: Brand: MEDICAL ACTION INDUSTRIES

## (undated) DEVICE — PENROSE DRAIN 18 X .5" SILICONE: Brand: MEDLINE

## (undated) DEVICE — ARTICULATING RELOAD WITH TRI-STAPLE TECHNOLOGY: Brand: ENDO GIA

## (undated) DEVICE — SYR 3ML LL TIP 1/10ML GRAD --

## (undated) DEVICE — TOTAL TRAY, 16FR 10ML SIL FOLEY, URN: Brand: MEDLINE

## (undated) DEVICE — STERILE COTTON BALLS LARGE 5/P: Brand: MEDLINE

## (undated) DEVICE — AEGIS 1" DISK 4MM HOLE, PEEL OPEN: Brand: MEDLINE

## (undated) DEVICE — SUT CHRMC 2-0 54IN REEL BRN --

## (undated) DEVICE — DRAPE,REIN 53X77,STERILE: Brand: MEDLINE

## (undated) DEVICE — VISUALIZATION SYSTEM: Brand: CLEARIFY

## (undated) DEVICE — INSULATED BLADE ELECTRODE: Brand: EDGE

## (undated) DEVICE — FORCEPS BX L160CM DIA8MM GRSP DISECT CUP TIP NONLOCKING ROT

## (undated) DEVICE — BLADELESS OPTICAL TROCAR WITH FIXATION CANNULA: Brand: VERSAPORT

## (undated) DEVICE — ASTOUND STANDARD SURGICAL GOWN, XL: Brand: CONVERTORS

## (undated) DEVICE — FENESTRATED BIPOLAR FORCEPS: Brand: ENDOWRIST

## (undated) DEVICE — SUTURE VCRL SZ 4-0 L27IN ABSRB UD L19MM PS-2 3/8 CIR PRIM J426H

## (undated) DEVICE — SURGICAL PROCEDURE PACK BASIN MAJ SET CUST NO CAUT

## (undated) DEVICE — SUTURE VCRL SZ 3-0 L27IN ABSRB UD L36MM CT-1 1/2 CIR J258H

## (undated) DEVICE — GOWN,SIRUS,NONRNF,SETINSLV,XL,20/CS: Brand: MEDLINE

## (undated) DEVICE — TRAY CATH 16F URIN MTR LTX -- CONVERT TO ITEM 363111

## (undated) DEVICE — SOLUTION IRRIG 1000ML H2O STRL BLT

## (undated) DEVICE — WATERPROOF, BACTERIA PROOF DRESSING WITH ABSORBENT SEE THROUGH PAD: Brand: OPSITE POST-OP VISIBLE 20X10CM CTN 20

## (undated) DEVICE — SMOKE PREFILTER: Brand: OPTIMUMM

## (undated) DEVICE — BLADE ASSEMB CLP HAIR FINE --

## (undated) DEVICE — DEVICE SEAL L23CM NANO COAT MARYLAND JAW OPN DIV LIGASURE

## (undated) DEVICE — DILATOR AND CANNULA WITH RADIALLY EXPANDABLE SLEEVE: Brand: VERSASTEP PLUS